# Patient Record
Sex: MALE | Race: WHITE | NOT HISPANIC OR LATINO | Employment: OTHER | URBAN - METROPOLITAN AREA
[De-identification: names, ages, dates, MRNs, and addresses within clinical notes are randomized per-mention and may not be internally consistent; named-entity substitution may affect disease eponyms.]

---

## 2017-01-10 ENCOUNTER — PRE VISIT (OUTPATIENT)
Dept: UROLOGY | Facility: CLINIC | Age: 77
End: 2017-01-10

## 2017-01-10 NOTE — TELEPHONE ENCOUNTER
Patient coming in for a second opinion regarding stoma leakage. Chart reviewed and all records are available. No need for a call.

## 2017-01-12 ENCOUNTER — RECORDS - HEALTHEAST (OUTPATIENT)
Dept: LAB | Facility: CLINIC | Age: 77
End: 2017-01-12

## 2017-01-12 LAB
AST SERPL W P-5'-P-CCNC: 9 U/L (ref 0–40)
CHOLEST SERPL-MCNC: 183 MG/DL
FASTING STATUS PATIENT QL REPORTED: YES
HDLC SERPL-MCNC: 41 MG/DL
LDLC SERPL CALC-MCNC: 123 MG/DL
TRIGL SERPL-MCNC: 93 MG/DL

## 2017-07-10 ENCOUNTER — AMBULATORY - HEALTHEAST (OUTPATIENT)
Dept: VASCULAR SURGERY | Facility: CLINIC | Age: 77
End: 2017-07-10

## 2017-07-10 DIAGNOSIS — I72.3 ILIAC ARTERY ANEURYSM (H): ICD-10-CM

## 2017-07-13 ENCOUNTER — RECORDS - HEALTHEAST (OUTPATIENT)
Dept: LAB | Facility: CLINIC | Age: 77
End: 2017-07-13

## 2017-07-13 LAB
CHOLEST SERPL-MCNC: 149 MG/DL
FASTING STATUS PATIENT QL REPORTED: NORMAL
HDLC SERPL-MCNC: 41 MG/DL
LDLC SERPL CALC-MCNC: 93 MG/DL
PSA SERPL-MCNC: <0.1 NG/ML (ref 0–6.5)
TRIGL SERPL-MCNC: 76 MG/DL

## 2017-09-08 ENCOUNTER — COMMUNICATION - HEALTHEAST (OUTPATIENT)
Dept: VASCULAR SURGERY | Facility: CLINIC | Age: 77
End: 2017-09-08

## 2017-11-16 ENCOUNTER — RECORDS - HEALTHEAST (OUTPATIENT)
Dept: LAB | Facility: CLINIC | Age: 77
End: 2017-11-16

## 2017-11-16 LAB
AST SERPL W P-5'-P-CCNC: 12 U/L (ref 0–40)
CHOLEST SERPL-MCNC: 147 MG/DL
FASTING STATUS PATIENT QL REPORTED: YES
HDLC SERPL-MCNC: 39 MG/DL
LDLC SERPL CALC-MCNC: 90 MG/DL
TRIGL SERPL-MCNC: 91 MG/DL

## 2018-04-18 ENCOUNTER — RECORDS - HEALTHEAST (OUTPATIENT)
Dept: LAB | Facility: CLINIC | Age: 78
End: 2018-04-18

## 2018-04-18 LAB
ANION GAP SERPL CALCULATED.3IONS-SCNC: 8 MMOL/L (ref 5–18)
BUN SERPL-MCNC: 34 MG/DL (ref 8–28)
CALCIUM SERPL-MCNC: 9 MG/DL (ref 8.5–10.5)
CHLORIDE BLD-SCNC: 109 MMOL/L (ref 98–107)
CHOLEST SERPL-MCNC: 144 MG/DL
CO2 SERPL-SCNC: 20 MMOL/L (ref 22–31)
CREAT SERPL-MCNC: 2.14 MG/DL (ref 0.7–1.3)
FASTING STATUS PATIENT QL REPORTED: YES
GFR SERPL CREATININE-BSD FRML MDRD: 30 ML/MIN/1.73M2
GLUCOSE BLD-MCNC: 116 MG/DL (ref 70–125)
HDLC SERPL-MCNC: 40 MG/DL
LDLC SERPL CALC-MCNC: 90 MG/DL
POTASSIUM BLD-SCNC: 5.5 MMOL/L (ref 3.5–5)
SODIUM SERPL-SCNC: 137 MMOL/L (ref 136–145)
TRIGL SERPL-MCNC: 68 MG/DL

## 2018-06-12 ENCOUNTER — RECORDS - HEALTHEAST (OUTPATIENT)
Dept: LAB | Facility: CLINIC | Age: 78
End: 2018-06-12

## 2018-06-12 LAB
ANION GAP SERPL CALCULATED.3IONS-SCNC: 7 MMOL/L (ref 5–18)
BUN SERPL-MCNC: 31 MG/DL (ref 8–28)
CALCIUM SERPL-MCNC: 9.4 MG/DL (ref 8.5–10.5)
CHLORIDE BLD-SCNC: 108 MMOL/L (ref 98–107)
CO2 SERPL-SCNC: 22 MMOL/L (ref 22–31)
CREAT SERPL-MCNC: 1.98 MG/DL (ref 0.7–1.3)
GFR SERPL CREATININE-BSD FRML MDRD: 33 ML/MIN/1.73M2
GLUCOSE BLD-MCNC: 105 MG/DL (ref 70–125)
POTASSIUM BLD-SCNC: 4.8 MMOL/L (ref 3.5–5)
SODIUM SERPL-SCNC: 137 MMOL/L (ref 136–145)

## 2018-07-20 ENCOUNTER — RECORDS - HEALTHEAST (OUTPATIENT)
Dept: ADMINISTRATIVE | Facility: OTHER | Age: 78
End: 2018-07-20

## 2018-07-20 LAB
LAB AP CHARGES (HE HISTORICAL CONVERSION): NORMAL
PATH REPORT.COMMENTS IMP SPEC: NORMAL
PATH REPORT.FINAL DX SPEC: NORMAL
PATH REPORT.GROSS SPEC: NORMAL
PATH REPORT.MICROSCOPIC SPEC OTHER STN: NORMAL
PATH REPORT.RELEVANT HX SPEC: NORMAL
RESULT FLAG (HE HISTORICAL CONVERSION): NORMAL

## 2018-10-18 ENCOUNTER — RECORDS - HEALTHEAST (OUTPATIENT)
Dept: LAB | Facility: CLINIC | Age: 78
End: 2018-10-18

## 2018-10-18 LAB
ANION GAP SERPL CALCULATED.3IONS-SCNC: 9 MMOL/L (ref 5–18)
BUN SERPL-MCNC: 40 MG/DL (ref 8–28)
CALCIUM SERPL-MCNC: 9.4 MG/DL (ref 8.5–10.5)
CHLORIDE BLD-SCNC: 107 MMOL/L (ref 98–107)
CHOLEST SERPL-MCNC: 129 MG/DL
CO2 SERPL-SCNC: 22 MMOL/L (ref 22–31)
CREAT SERPL-MCNC: 2.3 MG/DL (ref 0.7–1.3)
FASTING STATUS PATIENT QL REPORTED: YES
GFR SERPL CREATININE-BSD FRML MDRD: 28 ML/MIN/1.73M2
GLUCOSE BLD-MCNC: 121 MG/DL (ref 70–125)
HDLC SERPL-MCNC: 37 MG/DL
LDLC SERPL CALC-MCNC: 66 MG/DL
POTASSIUM BLD-SCNC: 4.9 MMOL/L (ref 3.5–5)
SODIUM SERPL-SCNC: 138 MMOL/L (ref 136–145)
TRIGL SERPL-MCNC: 129 MG/DL

## 2019-01-10 ENCOUNTER — RECORDS - HEALTHEAST (OUTPATIENT)
Dept: ADMINISTRATIVE | Facility: OTHER | Age: 79
End: 2019-01-10

## 2019-01-22 ENCOUNTER — RECORDS - HEALTHEAST (OUTPATIENT)
Dept: ADMINISTRATIVE | Facility: OTHER | Age: 79
End: 2019-01-22

## 2019-01-23 ENCOUNTER — RECORDS - HEALTHEAST (OUTPATIENT)
Dept: ADMINISTRATIVE | Facility: OTHER | Age: 79
End: 2019-01-23

## 2019-02-04 ENCOUNTER — AMBULATORY - HEALTHEAST (OUTPATIENT)
Dept: SCHEDULING | Facility: CLINIC | Age: 79
End: 2019-02-04

## 2019-02-04 DIAGNOSIS — Z93.6 PRESENCE OF UROSTOMY (H): ICD-10-CM

## 2019-02-20 ENCOUNTER — RECORDS - HEALTHEAST (OUTPATIENT)
Dept: LAB | Facility: CLINIC | Age: 79
End: 2019-02-20

## 2019-02-20 LAB — PSA SERPL-MCNC: <0.1 NG/ML (ref 0–6.5)

## 2019-03-11 ENCOUNTER — RECORDS - HEALTHEAST (OUTPATIENT)
Dept: ADMINISTRATIVE | Facility: OTHER | Age: 79
End: 2019-03-11

## 2019-03-12 ENCOUNTER — RECORDS - HEALTHEAST (OUTPATIENT)
Dept: ADMINISTRATIVE | Facility: OTHER | Age: 79
End: 2019-03-12

## 2019-03-12 ENCOUNTER — AMBULATORY - HEALTHEAST (OUTPATIENT)
Dept: VASCULAR SURGERY | Facility: CLINIC | Age: 79
End: 2019-03-12

## 2019-03-12 DIAGNOSIS — I72.3 ILIAC ARTERY ANEURYSM (H): ICD-10-CM

## 2019-03-13 ENCOUNTER — OFFICE VISIT - HEALTHEAST (OUTPATIENT)
Dept: VASCULAR SURGERY | Facility: CLINIC | Age: 79
End: 2019-03-13

## 2019-03-13 ENCOUNTER — COMMUNICATION - HEALTHEAST (OUTPATIENT)
Dept: VASCULAR SURGERY | Facility: CLINIC | Age: 79
End: 2019-03-13

## 2019-03-13 DIAGNOSIS — I72.3 ILIAC ARTERY ANEURYSM (H): ICD-10-CM

## 2019-03-20 ENCOUNTER — COMMUNICATION - HEALTHEAST (OUTPATIENT)
Dept: VASCULAR SURGERY | Facility: CLINIC | Age: 79
End: 2019-03-20

## 2019-03-22 ENCOUNTER — AMBULATORY - HEALTHEAST (OUTPATIENT)
Dept: VASCULAR SURGERY | Facility: CLINIC | Age: 79
End: 2019-03-22

## 2019-03-22 ENCOUNTER — HOSPITAL ENCOUNTER (OUTPATIENT)
Dept: INTERVENTIONAL RADIOLOGY/VASCULAR | Facility: HOSPITAL | Age: 79
Discharge: HOME | End: 2019-03-22
Attending: SURGERY | Admitting: SURGERY

## 2019-03-22 DIAGNOSIS — I72.3 ILIAC ARTERY ANEURYSM (H): ICD-10-CM

## 2019-03-22 LAB
CREAT SERPL-MCNC: 2.05 MG/DL (ref 0.7–1.3)
GFR SERPL CREATININE-BSD FRML MDRD: 32 ML/MIN/1.73M2
HGB BLD-MCNC: 12.9 G/DL (ref 14–18)
INR PPP: 0.98 (ref 0.9–1.1)
PLATELET # BLD AUTO: 256 THOU/UL (ref 140–440)
POTASSIUM BLD-SCNC: 4.7 MMOL/L (ref 3.5–5)

## 2019-03-26 ENCOUNTER — AMBULATORY - HEALTHEAST (OUTPATIENT)
Dept: VASCULAR SURGERY | Facility: CLINIC | Age: 79
End: 2019-03-26

## 2019-03-26 ENCOUNTER — COMMUNICATION - HEALTHEAST (OUTPATIENT)
Dept: VASCULAR SURGERY | Facility: CLINIC | Age: 79
End: 2019-03-26

## 2019-03-27 ENCOUNTER — AMBULATORY - HEALTHEAST (OUTPATIENT)
Dept: VASCULAR SURGERY | Facility: CLINIC | Age: 79
End: 2019-03-27

## 2019-03-27 DIAGNOSIS — I72.3 ILIAC ARTERY ANEURYSM (H): ICD-10-CM

## 2019-04-08 ENCOUNTER — RECORDS - HEALTHEAST (OUTPATIENT)
Dept: ADMINISTRATIVE | Facility: OTHER | Age: 79
End: 2019-04-08

## 2019-04-08 ENCOUNTER — RECORDS - HEALTHEAST (OUTPATIENT)
Dept: LAB | Facility: CLINIC | Age: 79
End: 2019-04-08

## 2019-04-08 LAB
ANION GAP SERPL CALCULATED.3IONS-SCNC: 8 MMOL/L (ref 5–18)
BUN SERPL-MCNC: 41 MG/DL (ref 8–28)
CALCIUM SERPL-MCNC: 9.3 MG/DL (ref 8.5–10.5)
CHLORIDE BLD-SCNC: 110 MMOL/L (ref 98–107)
CO2 SERPL-SCNC: 20 MMOL/L (ref 22–31)
CREAT SERPL-MCNC: 2.16 MG/DL (ref 0.7–1.3)
GFR SERPL CREATININE-BSD FRML MDRD: 30 ML/MIN/1.73M2
GLUCOSE BLD-MCNC: 113 MG/DL (ref 70–125)
POTASSIUM BLD-SCNC: 5.1 MMOL/L (ref 3.5–5)
SODIUM SERPL-SCNC: 138 MMOL/L (ref 136–145)

## 2019-04-15 ENCOUNTER — ANESTHESIA - HEALTHEAST (OUTPATIENT)
Dept: SURGERY | Facility: CLINIC | Age: 79
End: 2019-04-15

## 2019-04-16 ENCOUNTER — HOSPITAL ENCOUNTER (OUTPATIENT)
Dept: INTERVENTIONAL RADIOLOGY/VASCULAR | Facility: CLINIC | Age: 79
Discharge: HOME | End: 2019-04-16
Attending: SURGERY

## 2019-04-16 ENCOUNTER — SURGERY - HEALTHEAST (OUTPATIENT)
Dept: SURGERY | Facility: CLINIC | Age: 79
End: 2019-04-16

## 2019-04-16 DIAGNOSIS — I72.3 ILIAC ARTERY ANEURYSM (H): ICD-10-CM

## 2019-04-16 ASSESSMENT — MIFFLIN-ST. JEOR
SCORE: 1723.52
SCORE: 1668.8

## 2019-04-17 ENCOUNTER — COMMUNICATION - HEALTHEAST (OUTPATIENT)
Dept: VASCULAR SURGERY | Facility: CLINIC | Age: 79
End: 2019-04-17

## 2019-04-19 ENCOUNTER — COMMUNICATION - HEALTHEAST (OUTPATIENT)
Dept: VASCULAR SURGERY | Facility: CLINIC | Age: 79
End: 2019-04-19

## 2019-04-24 ENCOUNTER — RECORDS - HEALTHEAST (OUTPATIENT)
Dept: LAB | Facility: CLINIC | Age: 79
End: 2019-04-24

## 2019-04-24 LAB
ANION GAP SERPL CALCULATED.3IONS-SCNC: 8 MMOL/L (ref 5–18)
BUN SERPL-MCNC: 39 MG/DL (ref 8–28)
CALCIUM SERPL-MCNC: 8.9 MG/DL (ref 8.5–10.5)
CHLORIDE BLD-SCNC: 110 MMOL/L (ref 98–107)
CHOLEST SERPL-MCNC: 138 MG/DL
CO2 SERPL-SCNC: 20 MMOL/L (ref 22–31)
CREAT SERPL-MCNC: 2.26 MG/DL (ref 0.7–1.3)
FASTING STATUS PATIENT QL REPORTED: NORMAL
GFR SERPL CREATININE-BSD FRML MDRD: 28 ML/MIN/1.73M2
GLUCOSE BLD-MCNC: 151 MG/DL (ref 70–125)
HDLC SERPL-MCNC: 40 MG/DL
LDLC SERPL CALC-MCNC: 79 MG/DL
POTASSIUM BLD-SCNC: 4.7 MMOL/L (ref 3.5–5)
SODIUM SERPL-SCNC: 138 MMOL/L (ref 136–145)
TRIGL SERPL-MCNC: 94 MG/DL

## 2019-04-25 ENCOUNTER — AMBULATORY - HEALTHEAST (OUTPATIENT)
Dept: VASCULAR SURGERY | Facility: CLINIC | Age: 79
End: 2019-04-25

## 2019-04-25 DIAGNOSIS — I82.409 DEEP VEIN THROMBOSIS (H): ICD-10-CM

## 2019-04-25 DIAGNOSIS — M79.606 LEG PAIN: ICD-10-CM

## 2019-04-25 DIAGNOSIS — I82.409 DVT (DEEP VENOUS THROMBOSIS) (H): ICD-10-CM

## 2019-04-25 DIAGNOSIS — I82.401 ACUTE EMBOLISM AND THROMBOSIS OF DEEP VEIN OF RIGHT LOWER EXTREMITY (H): ICD-10-CM

## 2019-05-15 ENCOUNTER — COMMUNICATION - HEALTHEAST (OUTPATIENT)
Dept: ONCOLOGY | Facility: HOSPITAL | Age: 79
End: 2019-05-15

## 2019-05-15 ENCOUNTER — RECORDS - HEALTHEAST (OUTPATIENT)
Dept: VASCULAR ULTRASOUND | Facility: CLINIC | Age: 79
End: 2019-05-15

## 2019-05-15 ENCOUNTER — OFFICE VISIT - HEALTHEAST (OUTPATIENT)
Dept: VASCULAR SURGERY | Facility: CLINIC | Age: 79
End: 2019-05-15

## 2019-05-15 DIAGNOSIS — I72.3 ILIAC ARTERY ANEURYSM (H): ICD-10-CM

## 2019-05-15 DIAGNOSIS — I82.409 ACUTE EMBOLISM AND THROMBOSIS OF UNSPECIFIED DEEP VEINS OF UNSPECIFIED LOWER EXTREMITY (H): ICD-10-CM

## 2019-05-15 DIAGNOSIS — I82.401 ACUTE EMBOLISM AND THROMBOSIS OF DEEP VEIN OF RIGHT LOWER EXTREMITY (H): ICD-10-CM

## 2019-05-15 DIAGNOSIS — M79.606 PAIN IN LEG, UNSPECIFIED: ICD-10-CM

## 2019-05-15 DIAGNOSIS — I82.401 ACUTE EMBOLISM AND THROMBOSIS OF UNSPECIFIED DEEP VEINS OF RIGHT LOWER EXTREMITY (H): ICD-10-CM

## 2019-05-15 DIAGNOSIS — I72.3 ANEURYSM OF ILIAC ARTERY (H): ICD-10-CM

## 2019-05-15 DIAGNOSIS — I72.3 ILIAC ARTERY ANEURYSM, LEFT (H): ICD-10-CM

## 2019-05-24 ENCOUNTER — RECORDS - HEALTHEAST (OUTPATIENT)
Dept: ADMINISTRATIVE | Facility: OTHER | Age: 79
End: 2019-05-24

## 2019-05-24 ENCOUNTER — RECORDS - HEALTHEAST (OUTPATIENT)
Dept: LAB | Facility: CLINIC | Age: 79
End: 2019-05-24

## 2019-05-24 LAB
ANION GAP SERPL CALCULATED.3IONS-SCNC: 8 MMOL/L (ref 5–18)
BUN SERPL-MCNC: 39 MG/DL (ref 8–28)
CALCIUM SERPL-MCNC: 9.1 MG/DL (ref 8.5–10.5)
CHLORIDE BLD-SCNC: 108 MMOL/L (ref 98–107)
CO2 SERPL-SCNC: 20 MMOL/L (ref 22–31)
CREAT SERPL-MCNC: 2.14 MG/DL (ref 0.7–1.3)
GFR SERPL CREATININE-BSD FRML MDRD: 30 ML/MIN/1.73M2
GLUCOSE BLD-MCNC: 125 MG/DL (ref 70–125)
POTASSIUM BLD-SCNC: 4.9 MMOL/L (ref 3.5–5)
SODIUM SERPL-SCNC: 136 MMOL/L (ref 136–145)

## 2019-05-29 ENCOUNTER — COMMUNICATION - HEALTHEAST (OUTPATIENT)
Dept: ONCOLOGY | Facility: HOSPITAL | Age: 79
End: 2019-05-29

## 2019-10-30 ENCOUNTER — RECORDS - HEALTHEAST (OUTPATIENT)
Dept: LAB | Facility: CLINIC | Age: 79
End: 2019-10-30

## 2019-10-30 LAB
ANION GAP SERPL CALCULATED.3IONS-SCNC: 6 MMOL/L (ref 5–18)
BUN SERPL-MCNC: 38 MG/DL (ref 8–28)
CALCIUM SERPL-MCNC: 9 MG/DL (ref 8.5–10.5)
CHLORIDE BLD-SCNC: 109 MMOL/L (ref 98–107)
CHOLEST SERPL-MCNC: 196 MG/DL
CO2 SERPL-SCNC: 23 MMOL/L (ref 22–31)
CREAT SERPL-MCNC: 2.39 MG/DL (ref 0.7–1.3)
FASTING STATUS PATIENT QL REPORTED: NORMAL
GFR SERPL CREATININE-BSD FRML MDRD: 26 ML/MIN/1.73M2
GLUCOSE BLD-MCNC: 122 MG/DL (ref 70–125)
HDLC SERPL-MCNC: 41 MG/DL
LDLC SERPL CALC-MCNC: 128 MG/DL
POTASSIUM BLD-SCNC: 4.7 MMOL/L (ref 3.5–5)
SODIUM SERPL-SCNC: 138 MMOL/L (ref 136–145)
TRIGL SERPL-MCNC: 133 MG/DL

## 2019-11-20 ENCOUNTER — OFFICE VISIT - HEALTHEAST (OUTPATIENT)
Dept: VASCULAR SURGERY | Facility: CLINIC | Age: 79
End: 2019-11-20

## 2019-11-20 ENCOUNTER — RECORDS - HEALTHEAST (OUTPATIENT)
Dept: VASCULAR ULTRASOUND | Facility: CLINIC | Age: 79
End: 2019-11-20

## 2019-11-20 DIAGNOSIS — I82.401 ACUTE EMBOLISM AND THROMBOSIS OF DEEP VEIN OF RIGHT LOWER EXTREMITY (H): ICD-10-CM

## 2019-11-20 DIAGNOSIS — I72.3 ANEURYSM OF ILIAC ARTERY (H): ICD-10-CM

## 2019-11-20 DIAGNOSIS — I72.3 ILIAC ARTERY ANEURYSM, LEFT (H): ICD-10-CM

## 2019-12-04 ENCOUNTER — OFFICE VISIT - HEALTHEAST (OUTPATIENT)
Dept: ONCOLOGY | Facility: HOSPITAL | Age: 79
End: 2019-12-04

## 2019-12-04 DIAGNOSIS — I82.511 CHRONIC DEEP VEIN THROMBOSIS (DVT) OF FEMORAL VEIN OF RIGHT LOWER EXTREMITY (H): ICD-10-CM

## 2019-12-04 DIAGNOSIS — I82.401 ACUTE EMBOLISM AND THROMBOSIS OF DEEP VEIN OF RIGHT LOWER EXTREMITY (H): ICD-10-CM

## 2019-12-09 ENCOUNTER — HOSPITAL ENCOUNTER (OUTPATIENT)
Dept: CT IMAGING | Facility: HOSPITAL | Age: 79
Setting detail: RADIATION/ONCOLOGY SERIES
Discharge: STILL ADMITTED OR ADMINISTRATIVE | End: 2019-12-09
Attending: INTERNAL MEDICINE

## 2019-12-09 ENCOUNTER — COMMUNICATION - HEALTHEAST (OUTPATIENT)
Dept: ONCOLOGY | Facility: HOSPITAL | Age: 79
End: 2019-12-09

## 2019-12-09 DIAGNOSIS — I82.511 CHRONIC DEEP VEIN THROMBOSIS (DVT) OF FEMORAL VEIN OF RIGHT LOWER EXTREMITY (H): ICD-10-CM

## 2019-12-09 DIAGNOSIS — I82.401 ACUTE EMBOLISM AND THROMBOSIS OF DEEP VEIN OF RIGHT LOWER EXTREMITY (H): ICD-10-CM

## 2020-02-06 ENCOUNTER — RECORDS - HEALTHEAST (OUTPATIENT)
Dept: LAB | Facility: CLINIC | Age: 80
End: 2020-02-06

## 2020-02-06 LAB
CHOLEST SERPL-MCNC: 162 MG/DL
FASTING STATUS PATIENT QL REPORTED: NORMAL
HDLC SERPL-MCNC: 40 MG/DL
LDLC SERPL CALC-MCNC: 103 MG/DL
TRIGL SERPL-MCNC: 95 MG/DL

## 2020-03-02 ENCOUNTER — RECORDS - HEALTHEAST (OUTPATIENT)
Dept: LAB | Facility: CLINIC | Age: 80
End: 2020-03-02

## 2020-03-03 LAB
ANION GAP SERPL CALCULATED.3IONS-SCNC: 7 MMOL/L (ref 5–18)
BUN SERPL-MCNC: 33 MG/DL (ref 8–28)
CALCIUM SERPL-MCNC: 8.5 MG/DL (ref 8.5–10.5)
CHLORIDE BLD-SCNC: 107 MMOL/L (ref 98–107)
CO2 SERPL-SCNC: 21 MMOL/L (ref 22–31)
CREAT SERPL-MCNC: 2.4 MG/DL (ref 0.7–1.3)
GFR SERPL CREATININE-BSD FRML MDRD: 26 ML/MIN/1.73M2
GLUCOSE BLD-MCNC: 135 MG/DL (ref 70–125)
POTASSIUM BLD-SCNC: 4.5 MMOL/L (ref 3.5–5)
SODIUM SERPL-SCNC: 135 MMOL/L (ref 136–145)

## 2020-04-28 ENCOUNTER — HOME CARE/HOSPICE - HEALTHEAST (OUTPATIENT)
Dept: HOME HEALTH SERVICES | Facility: HOME HEALTH | Age: 80
End: 2020-04-28

## 2020-04-30 ENCOUNTER — RECORDS - HEALTHEAST (OUTPATIENT)
Dept: LAB | Facility: CLINIC | Age: 80
End: 2020-04-30

## 2020-04-30 LAB
ANION GAP SERPL CALCULATED.3IONS-SCNC: 12 MMOL/L (ref 5–18)
BUN SERPL-MCNC: 27 MG/DL (ref 8–28)
CALCIUM SERPL-MCNC: 8.7 MG/DL (ref 8.5–10.5)
CHLORIDE BLD-SCNC: 108 MMOL/L (ref 98–107)
CO2 SERPL-SCNC: 16 MMOL/L (ref 22–31)
CREAT SERPL-MCNC: 2.28 MG/DL (ref 0.7–1.3)
GFR SERPL CREATININE-BSD FRML MDRD: 28 ML/MIN/1.73M2
GLUCOSE BLD-MCNC: 60 MG/DL (ref 70–125)
HGB BLD-MCNC: 12.1 G/DL (ref 14–18)
POTASSIUM BLD-SCNC: 4.4 MMOL/L (ref 3.5–5)
SODIUM SERPL-SCNC: 136 MMOL/L (ref 136–145)

## 2020-05-28 ENCOUNTER — RECORDS - HEALTHEAST (OUTPATIENT)
Dept: LAB | Facility: CLINIC | Age: 80
End: 2020-05-28

## 2020-05-28 LAB
ANION GAP SERPL CALCULATED.3IONS-SCNC: 7 MMOL/L (ref 5–18)
BUN SERPL-MCNC: 29 MG/DL (ref 8–28)
CALCIUM SERPL-MCNC: 8.9 MG/DL (ref 8.5–10.5)
CHLORIDE BLD-SCNC: 105 MMOL/L (ref 98–107)
CO2 SERPL-SCNC: 25 MMOL/L (ref 22–31)
CREAT SERPL-MCNC: 2.19 MG/DL (ref 0.7–1.3)
GFR SERPL CREATININE-BSD FRML MDRD: 29 ML/MIN/1.73M2
GLUCOSE BLD-MCNC: 96 MG/DL (ref 70–125)
POTASSIUM BLD-SCNC: 4.5 MMOL/L (ref 3.5–5)
SODIUM SERPL-SCNC: 137 MMOL/L (ref 136–145)

## 2020-06-13 ENCOUNTER — HOSPITAL ENCOUNTER (EMERGENCY)
Facility: CLINIC | Age: 80
Discharge: HOME | End: 2020-06-14
Attending: EMERGENCY MEDICINE | Admitting: EMERGENCY MEDICINE
Payer: COMMERCIAL

## 2020-06-13 DIAGNOSIS — R07.9 ACUTE CHEST PAIN: ICD-10-CM

## 2020-06-13 LAB
ANION GAP SERPL CALCULATED.3IONS-SCNC: 7 MMOL/L (ref 3–14)
BASOPHILS # BLD AUTO: 0 10E9/L (ref 0–0.2)
BASOPHILS NFR BLD AUTO: 0.4 %
BUN SERPL-MCNC: 30 MG/DL (ref 7–30)
CALCIUM SERPL-MCNC: 8 MG/DL (ref 8.5–10.1)
CHLORIDE SERPL-SCNC: 111 MMOL/L (ref 94–109)
CO2 SERPL-SCNC: 22 MMOL/L (ref 20–32)
CREAT SERPL-MCNC: 2.07 MG/DL (ref 0.66–1.25)
DIFFERENTIAL METHOD BLD: ABNORMAL
EOSINOPHIL # BLD AUTO: 0.2 10E9/L (ref 0–0.7)
EOSINOPHIL NFR BLD AUTO: 2 %
ERYTHROCYTE [DISTWIDTH] IN BLOOD BY AUTOMATED COUNT: 14.3 % (ref 10–15)
GFR SERPL CREATININE-BSD FRML MDRD: 29 ML/MIN/{1.73_M2}
GLUCOSE SERPL-MCNC: 115 MG/DL (ref 70–99)
HCT VFR BLD AUTO: 39.8 % (ref 40–53)
HGB BLD-MCNC: 13.2 G/DL (ref 13.3–17.7)
IMM GRANULOCYTES # BLD: 0.1 10E9/L (ref 0–0.4)
IMM GRANULOCYTES NFR BLD: 0.6 %
INR PPP: 2.89 (ref 0.86–1.14)
LYMPHOCYTES # BLD AUTO: 1.6 10E9/L (ref 0.8–5.3)
LYMPHOCYTES NFR BLD AUTO: 20.2 %
MCH RBC QN AUTO: 31.3 PG (ref 26.5–33)
MCHC RBC AUTO-ENTMCNC: 33.2 G/DL (ref 31.5–36.5)
MCV RBC AUTO: 94 FL (ref 78–100)
MONOCYTES # BLD AUTO: 0.7 10E9/L (ref 0–1.3)
MONOCYTES NFR BLD AUTO: 9 %
NEUTROPHILS # BLD AUTO: 5.4 10E9/L (ref 1.6–8.3)
NEUTROPHILS NFR BLD AUTO: 67.8 %
NRBC # BLD AUTO: 0 10*3/UL
NRBC BLD AUTO-RTO: 0 /100
PLATELET # BLD AUTO: 235 10E9/L (ref 150–450)
POTASSIUM SERPL-SCNC: 4.4 MMOL/L (ref 3.4–5.3)
RBC # BLD AUTO: 4.22 10E12/L (ref 4.4–5.9)
SODIUM SERPL-SCNC: 140 MMOL/L (ref 133–144)
TROPONIN I SERPL-MCNC: <0.015 UG/L (ref 0–0.04)
WBC # BLD AUTO: 8 10E9/L (ref 4–11)

## 2020-06-13 PROCEDURE — 93005 ELECTROCARDIOGRAM TRACING: CPT | Performed by: EMERGENCY MEDICINE

## 2020-06-13 PROCEDURE — 85610 PROTHROMBIN TIME: CPT | Performed by: EMERGENCY MEDICINE

## 2020-06-13 PROCEDURE — 85025 COMPLETE CBC W/AUTO DIFF WBC: CPT | Performed by: EMERGENCY MEDICINE

## 2020-06-13 PROCEDURE — 99284 EMERGENCY DEPT VISIT MOD MDM: CPT | Performed by: EMERGENCY MEDICINE

## 2020-06-13 PROCEDURE — 93010 ELECTROCARDIOGRAM REPORT: CPT | Mod: Z6 | Performed by: EMERGENCY MEDICINE

## 2020-06-13 PROCEDURE — 99284 EMERGENCY DEPT VISIT MOD MDM: CPT | Mod: 25 | Performed by: EMERGENCY MEDICINE

## 2020-06-13 PROCEDURE — 84484 ASSAY OF TROPONIN QUANT: CPT | Performed by: EMERGENCY MEDICINE

## 2020-06-13 PROCEDURE — 36415 COLL VENOUS BLD VENIPUNCTURE: CPT | Performed by: EMERGENCY MEDICINE

## 2020-06-13 PROCEDURE — 80048 BASIC METABOLIC PNL TOTAL CA: CPT | Performed by: EMERGENCY MEDICINE

## 2020-06-13 PROCEDURE — 25000132 ZZH RX MED GY IP 250 OP 250 PS 637: Mod: GY | Performed by: EMERGENCY MEDICINE

## 2020-06-13 RX ORDER — ASPIRIN 81 MG/1
162 TABLET, CHEWABLE ORAL ONCE
Status: COMPLETED | OUTPATIENT
Start: 2020-06-13 | End: 2020-06-13

## 2020-06-13 RX ADMIN — ASPIRIN 81 MG 162 MG: 81 TABLET ORAL at 22:05

## 2020-06-13 ASSESSMENT — MIFFLIN-ST. JEOR: SCORE: 1832.56

## 2020-06-13 NOTE — ED AVS SNAPSHOT
[facility]  [address] [address]  Phone:  [phone]  Fax:  [phone]                                    Eduardo Laughlin   MRN: [id number]    Department:  [facility]   Date of Visit:  6/13/2020           After Visit Summary Signature Page    I have received my discharge instructions, and my questions have been answered. I have discussed any challenges I see with this plan with the nurse or doctor.    ..........................................................................................................................................  Patient/Patient Representative Signature      ..........................................................................................................................................  Patient Representative Print Name and Relationship to Patient    ..................................................               ................................................  Date                                   Time    ..........................................................................................................................................  Reviewed by Signature/Title    ...................................................              ..............................................  Date                                               Time          22EPIC Rev 08/18

## 2020-06-14 VITALS
RESPIRATION RATE: 17 BRPM | OXYGEN SATURATION: 96 % | HEART RATE: 73 BPM | DIASTOLIC BLOOD PRESSURE: 85 MMHG | BODY MASS INDEX: 35.07 KG/M2 | TEMPERATURE: 98.3 F | WEIGHT: 245 LBS | SYSTOLIC BLOOD PRESSURE: 154 MMHG | HEIGHT: 70 IN

## 2020-06-14 LAB — TROPONIN I SERPL-MCNC: <0.015 UG/L (ref 0–0.04)

## 2020-06-14 PROCEDURE — 36415 COLL VENOUS BLD VENIPUNCTURE: CPT | Performed by: EMERGENCY MEDICINE

## 2020-06-14 PROCEDURE — 84484 ASSAY OF TROPONIN QUANT: CPT | Mod: 91 | Performed by: EMERGENCY MEDICINE

## 2020-06-14 PROCEDURE — 84484 ASSAY OF TROPONIN QUANT: CPT | Performed by: EMERGENCY MEDICINE

## 2020-06-14 NOTE — ED PROVIDER NOTES
History     Chief Complaint   Patient presents with     Chest Pain     HPI  Eduardo Laughlin is a 79 year old male who presents for chest pain.  The patient reports that about 1.5 hours prior to his arrival he had 15 seconds of left-sided chest pain while he was getting ice cream for his wife.  It went away on its own and he did not think anything of it at the time.  He did not have diaphoresis or shortness of breath or nausea with this.  Shortly afterwards he then noticed left arm numbness over the posterior aspect of the forearm ranging from the elbow down to the hand but not involving the fingers.  This came on while he was on the phone.  He was holding the phone with his right hand and had his left arm resting in his lap.  The numbness lasted for about 15 to 20 minutes and then resolved with movement and walking.  At that time he did feel slightly short of breath with this but says this is gone as well.  He denies fever, chills, headache, nausea, vomiting, abdominal pain, diarrhea, bloody stools, or rash.  He is on warfarin for blood clots in his legs.  Pain was mild.  He did take 1 baby aspirin for this.    Allergies:  Allergies   Allergen Reactions     Hmg-Coa-R Inhibitors      Penicillins      Uroxatral [Alfuzosin]        Problem List:    There are no active problems to display for this patient.       Past Medical History:    History reviewed. No pertinent past medical history.    Past Surgical History:    History reviewed. No pertinent surgical history.    Family History:    History reviewed. No pertinent family history.    Social History:  Marital Status:   [2]  Social History     Tobacco Use     Smoking status: Former Smoker     Smokeless tobacco: Never Used     Tobacco comment: Quit smoking 30 years ago   Substance Use Topics     Alcohol use: Not Currently     Drug use: Never        Medications:    ALLOPURINOL PO  AmLODIPine Besylate (NORVASC PO)  glucosamine-chondroitin 500-400 MG  "CAPS  LISINOPRIL PO  METFORMIN HCL PO  METOPROLOL SUCCINATE ER PO  Omega-3 Fatty Acids (OMEGA-3 FISH OIL PO)  Ranitidine HCl (ZANTAC PO)  Rosuvastatin Calcium (CRESTOR PO)          Review of Systems  Pertinent positives and negatives listed in the HPI, all other systems reviewed and are negative.    Physical Exam   BP: (!) 157/102  Pulse: 74  Heart Rate: 75  Temp: 98.3  F (36.8  C)  Resp: 20  Height: 177.8 cm (5' 10\")  Weight: 111.1 kg (245 lb)  SpO2: 95 %      Physical Exam  Vitals signs and nursing note reviewed.   Constitutional:       General: He is in acute distress.      Appearance: He is well-developed. He is not diaphoretic.   HENT:      Head: Normocephalic and atraumatic.      Right Ear: External ear normal.      Left Ear: External ear normal.      Nose: Nose normal.   Eyes:      General: No scleral icterus.     Conjunctiva/sclera: Conjunctivae normal.   Neck:      Musculoskeletal: Normal range of motion.   Cardiovascular:      Rate and Rhythm: Normal rate and regular rhythm.   Pulmonary:      Effort: Pulmonary effort is normal. No respiratory distress.      Breath sounds: No stridor.   Abdominal:      General: There is no distension.      Palpations: Abdomen is soft.   Musculoskeletal:      Right lower leg: No edema.      Left lower leg: No edema.   Skin:     General: Skin is warm and dry.   Neurological:      Mental Status: He is alert and oriented to person, place, and time.   Psychiatric:         Behavior: Behavior normal.         ED Course        Procedures               EKG Interpretation:      Interpreted by Andrew Monzon MD  Time reviewed: 2140  Symptoms at time of EKG: None   Rhythm: sinus   Rate: 76  Axis: Normal  Ectopy: none  Conduction: left bundle branch block (complete)  ST Segments/ T Waves: No acute ischemic changes  Q Waves: III  Comparison to prior: No old EKG available    Clinical Impression: LBBB    Critical Care time:  none               Results for orders placed or performed " during the hospital encounter of 06/13/20 (from the past 24 hour(s))   CBC with platelets, differential   Result Value Ref Range    WBC 8.0 4.0 - 11.0 10e9/L    RBC Count 4.22 (L) 4.4 - 5.9 10e12/L    Hemoglobin 13.2 (L) 13.3 - 17.7 g/dL    Hematocrit 39.8 (L) 40.0 - 53.0 %    MCV 94 78 - 100 fl    MCH 31.3 26.5 - 33.0 pg    MCHC 33.2 31.5 - 36.5 g/dL    RDW 14.3 10.0 - 15.0 %    Platelet Count 235 150 - 450 10e9/L    Diff Method Automated Method     % Neutrophils 67.8 %    % Lymphocytes 20.2 %    % Monocytes 9.0 %    % Eosinophils 2.0 %    % Basophils 0.4 %    % Immature Granulocytes 0.6 %    Nucleated RBCs 0 0 /100    Absolute Neutrophil 5.4 1.6 - 8.3 10e9/L    Absolute Lymphocytes 1.6 0.8 - 5.3 10e9/L    Absolute Monocytes 0.7 0.0 - 1.3 10e9/L    Absolute Eosinophils 0.2 0.0 - 0.7 10e9/L    Absolute Basophils 0.0 0.0 - 0.2 10e9/L    Abs Immature Granulocytes 0.1 0 - 0.4 10e9/L    Absolute Nucleated RBC 0.0    Basic metabolic panel   Result Value Ref Range    Sodium 140 133 - 144 mmol/L    Potassium 4.4 3.4 - 5.3 mmol/L    Chloride 111 (H) 94 - 109 mmol/L    Carbon Dioxide 22 20 - 32 mmol/L    Anion Gap 7 3 - 14 mmol/L    Glucose 115 (H) 70 - 99 mg/dL    Urea Nitrogen 30 7 - 30 mg/dL    Creatinine 2.07 (H) 0.66 - 1.25 mg/dL    GFR Estimate 29 (L) >60 mL/min/[1.73_m2]    GFR Estimate If Black 34 (L) >60 mL/min/[1.73_m2]    Calcium 8.0 (L) 8.5 - 10.1 mg/dL   Troponin I   Result Value Ref Range    Troponin I ES <0.015 0.000 - 0.045 ug/L   INR   Result Value Ref Range    INR 2.89 (H) 0.86 - 1.14   Troponin I   Result Value Ref Range    Troponin I ES <0.015 0.000 - 0.045 ug/L       Medications   aspirin (ASA) chewable tablet 162 mg (162 mg Oral Given 6/13/20 2205)       Assessments & Plan (with Medical Decision Making)   79-year-old male presents with chest pain and left numbness.  All symptoms resolved now.  There is 98.3  F, heart 74, SPO2 is 95% on room air.  EKG shows sinus rhythm with a left bundle branch block,  no signs of ischemia.  He is given additional aspirin.  Initial troponin is normal.  INR is therapeutic.  Electrolytes are within normal limits.  White blood cell count is 8 which is reassuring and hemoglobin is 13.2, no signs of acute anemia causing symptoms.  Repeat troponin is again normal which makes ACS unlikely.  The patient has remained asymptomatic through his 3.5 hours today and has had multiple rechecks.  He is safe to discharge at this time with reassurance and instructions to return if he has worsening symptoms or other concerns, otherwise follow-up in clinic.  The patient is in agreement with this plan.    I have reviewed the nursing notes.    I have reviewed the findings, diagnosis, plan and need for follow up with the patient.       New Prescriptions    No medications on file       Final diagnoses:   Acute chest pain       6/13/2020   [facility] EMERGENCY DEPARTMENT     Andrew Monzon MD  06/14/20 0103

## 2020-06-14 NOTE — DISCHARGE INSTRUCTIONS
Return to the emergency department for worsening symptoms, repeated vomiting, difficulty breathing, or other concerns.  Otherwise follow-up in clinic for a recheck in 1 to 2 weeks.

## 2020-06-14 NOTE — ED TRIAGE NOTES
"Pt here for evaluation of sharp left sided chest pain PTA. States episode lasted 15 minutes before resolving. Was getting ice cream out of freezer for wife when the pain started. Left arm numbness. Does report episode of difficulty finding wallet with this pain. Denies SOB or other sx. Hx of MI per EKG done by PCP \"years ago.\" Also bilateral leg clots which he is on Warfarin for. Dx one month ago while at [facility] for food poisoning. Pt took 1 81 mg ASA tablet PTA. Denies any other medications on board. Remains asymptomatic and pain free on arrival.  "

## 2020-07-23 ENCOUNTER — RECORDS - HEALTHEAST (OUTPATIENT)
Dept: LAB | Facility: CLINIC | Age: 80
End: 2020-07-23

## 2020-07-23 LAB
ANION GAP SERPL CALCULATED.3IONS-SCNC: 7 MMOL/L (ref 5–18)
BUN SERPL-MCNC: 57 MG/DL (ref 8–28)
CALCIUM SERPL-MCNC: 8.9 MG/DL (ref 8.5–10.5)
CHLORIDE BLD-SCNC: 106 MMOL/L (ref 98–107)
CO2 SERPL-SCNC: 23 MMOL/L (ref 22–31)
CREAT SERPL-MCNC: 2.76 MG/DL (ref 0.7–1.3)
GFR SERPL CREATININE-BSD FRML MDRD: 22 ML/MIN/1.73M2
GLUCOSE BLD-MCNC: 75 MG/DL (ref 70–125)
POTASSIUM BLD-SCNC: 4.5 MMOL/L (ref 3.5–5)
SODIUM SERPL-SCNC: 136 MMOL/L (ref 136–145)

## 2020-07-24 LAB — BACTERIA SPEC CULT: NORMAL

## 2020-08-06 ENCOUNTER — RECORDS - HEALTHEAST (OUTPATIENT)
Dept: LAB | Facility: CLINIC | Age: 80
End: 2020-08-06

## 2020-08-06 LAB
ANION GAP SERPL CALCULATED.3IONS-SCNC: 8 MMOL/L (ref 5–18)
BUN SERPL-MCNC: 43 MG/DL (ref 8–28)
CALCIUM SERPL-MCNC: 8.9 MG/DL (ref 8.5–10.5)
CHLORIDE BLD-SCNC: 105 MMOL/L (ref 98–107)
CHOLEST SERPL-MCNC: 226 MG/DL
CO2 SERPL-SCNC: 23 MMOL/L (ref 22–31)
CREAT SERPL-MCNC: 2.45 MG/DL (ref 0.7–1.3)
FASTING STATUS PATIENT QL REPORTED: YES
GFR SERPL CREATININE-BSD FRML MDRD: 26 ML/MIN/1.73M2
GLUCOSE BLD-MCNC: 162 MG/DL (ref 70–125)
HDLC SERPL-MCNC: 42 MG/DL
LDLC SERPL CALC-MCNC: 155 MG/DL
POTASSIUM BLD-SCNC: 4.6 MMOL/L (ref 3.5–5)
SODIUM SERPL-SCNC: 136 MMOL/L (ref 136–145)
TRIGL SERPL-MCNC: 147 MG/DL

## 2020-09-30 ENCOUNTER — RECORDS - HEALTHEAST (OUTPATIENT)
Dept: ADMINISTRATIVE | Facility: OTHER | Age: 80
End: 2020-09-30

## 2020-10-01 ENCOUNTER — HOSPITAL ENCOUNTER (OUTPATIENT)
Dept: ULTRASOUND IMAGING | Facility: CLINIC | Age: 80
Discharge: HOME | End: 2020-10-01
Attending: FAMILY MEDICINE

## 2020-10-01 DIAGNOSIS — I73.9 CLAUDICATION (H): ICD-10-CM

## 2020-11-18 ENCOUNTER — RECORDS - HEALTHEAST (OUTPATIENT)
Dept: VASCULAR ULTRASOUND | Facility: CLINIC | Age: 80
End: 2020-11-18

## 2020-11-18 DIAGNOSIS — I72.3 ANEURYSM OF ILIAC ARTERY (H): ICD-10-CM

## 2021-02-23 ENCOUNTER — RECORDS - HEALTHEAST (OUTPATIENT)
Dept: LAB | Facility: CLINIC | Age: 81
End: 2021-02-23

## 2021-02-23 LAB
ANION GAP SERPL CALCULATED.3IONS-SCNC: 8 MMOL/L (ref 5–18)
BUN SERPL-MCNC: 37 MG/DL (ref 8–28)
CALCIUM SERPL-MCNC: 8.4 MG/DL (ref 8.5–10.5)
CHLORIDE BLD-SCNC: 107 MMOL/L (ref 98–107)
CHOLEST SERPL-MCNC: 177 MG/DL
CO2 SERPL-SCNC: 21 MMOL/L (ref 22–31)
CREAT SERPL-MCNC: 2.45 MG/DL (ref 0.7–1.3)
FASTING STATUS PATIENT QL REPORTED: ABNORMAL
GFR SERPL CREATININE-BSD FRML MDRD: 26 ML/MIN/1.73M2
GLUCOSE BLD-MCNC: 159 MG/DL (ref 70–125)
HDLC SERPL-MCNC: 38 MG/DL
LDLC SERPL CALC-MCNC: 115 MG/DL
POTASSIUM BLD-SCNC: 4.9 MMOL/L (ref 3.5–5)
SODIUM SERPL-SCNC: 136 MMOL/L (ref 136–145)
TRIGL SERPL-MCNC: 122 MG/DL

## 2021-05-26 ENCOUNTER — RECORDS - HEALTHEAST (OUTPATIENT)
Dept: ADMINISTRATIVE | Facility: CLINIC | Age: 81
End: 2021-05-26

## 2021-05-26 VITALS — DIASTOLIC BLOOD PRESSURE: 80 MMHG | HEART RATE: 56 BPM | RESPIRATION RATE: 16 BRPM | SYSTOLIC BLOOD PRESSURE: 138 MMHG

## 2021-05-26 NOTE — H&P (VIEW-ONLY)
VASCULAR SURGERY OUTPATIENT CONSULT OR VISIT   VASCULAR SURGEON: Ana Gregory MD    LOCATION:  [facility]    Eduardo Laughlin   Medical Record #:  [id number]  YOB: 1940  Age:  78 y.o.     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  Imaging is noncontrast and so luminal caliber and patency of internal iliac artery cannot be verified.  Patient only has 1 functioning kidney with CKD 3 so avoiding contrast CT is appropriate.  Aneurysm extends to the iliac bifurcation and so would require either coil embolization of the internal iliac artery or bifurcated endografting if the internal iliac arteries patent.  Common iliac arteries 16 mm.  External iliac arteries 13 mm.  Internal iliac artery appears to measure as much is 12 mm.  Patient is DNR, but on questioning he only wants this to be the case if recovery is futile.  Has a good quality of life.     RECOMMENDATION: Plan for iliac aneurysm treatment with covered stent graft from a left groin approach.  Would likely need a VBX covered stent for effective treatment.  Could perform an IR at [facility], tentatively Friday after next.    HPI:  Eduardo Laughlin is a 78 y.o. male who was seen today in consultation for left common iliac artery aneurysm.  He tells me that his primary care doctor knew about this but it was previously much smaller.  It was re-recognized incidentally after admission for pyelonephritis.  He has not had symptoms from this as far as we can tell.    Patient has chronic left hip pain which he says worsens over the course of the day.  It is not clear if this is vascular in etiology.  This has been long-standing.    No clear family history of aneurysms.  Father  of a blood clot to the heart and his son has had what  sounds like a DVT.  The patient himself does not have a hypercoagulable history and has undergone major pelvic surgery without DVT.    Quit smoking in 1995.  Major prior history is bladder cancer for which he has had removal of his bladder and has an ileal conduit.  No history of stroke.  He had a murmur which led to coronary angiogram 5 years ago he tells me that there was nothing they could treat but that his collateral vessels have improved and he does not need anything now.    Patient is delineated as a DNR in his chart.  I discussed this with him at length.  He seems to feel that this is that he should not be resuscitated from a futile situation.  He did not recognize that this meant no resuscitation at all.  He will discuss it with his primary care physician.  He feels that his quality of life is good enough and his need to take care of his wife is important enough that he would want resuscitation if he can get him back to his baseline status.    He tells me that he is getting over a cold and will likely be fully recovered in the next 2 or 3 days.    No new health issues.  No new fevers or chills, no new diarrhea, no new urinary purulence.  No new neurologic symptoms.  No new rashes.  No new chest pain.  No new shortness of breath.       PHH:    Past Medical History:   Diagnosis Date     Aneurysm (H)     left common iliac - 2.6cm     Arteriosclerotic cardiovascular disease (ASCVD)      Arthritis     osteo     BPH (benign prostatic hypertrophy)      Cancer (H)     Bladder     Coronary artery disease      Diabetes mellitus (H)      Diabetic neuropathy (H)      GERD (gastroesophageal reflux disease)      Gout      Hyperlipidemia      Hypertension      Kidney stone      Kidney stone         Past Surgical History:   Procedure Laterality Date     coronary angiography Left 3/28/08    left ventriculography     CYSTECTOMY       CYSTOSCOPY       CYSTOSCOPY N/A 12/23/2015    Procedure: CYSTOSCOPY BLADDER BIOPSIES with  Fulgeration and Placement of Otero ;  Surgeon: Gordon Bajwa MD;  Location: [facility];  Service:      KIDNEY STONE SURGERY  x4     PROSTATE SURGERY         ALLERGIES:  Penicillins; Statins-hmg-coa reductase inhibitors; and Uroxatral [alfuzosin]    MEDS:    Current Outpatient Medications:      allopurinol (ZYLOPRIM) 300 MG tablet, Take 300 mg by mouth daily., Disp: , Rfl:      aspirin 325 MG tablet, Take 325 mg by mouth daily., Disp: , Rfl:      famotidine (PEPCID) 20 MG tablet, Take 20 mg by mouth 2 (two) times a day as needed for heartburn., Disp: , Rfl:      gabapentin (NEURONTIN) 300 MG capsule, Take 300 mg by mouth at bedtime., Disp: , Rfl:      GLUCOSAMINE/CHONDROITIN SULF A (GLUCOSAMINE-CHONDROITIN ORAL), Take 1 capsule by mouth 2 (two) times a day. Glucosamine-Chondroitin 1353-0507, Disp: , Rfl:      melatonin 5 mg Tab tablet, Take 5 mg by mouth at bedtime as needed., Disp: , Rfl:      metFORMIN (GLUCOPHAGE) 1000 MG tablet, Take 1,000 mg by mouth 2 (two) times a day with meals., Disp: , Rfl:      metoprolol succinate (TOPROL-XL) 100 MG 24 hr tablet, Take 50 mg by mouth daily., Disp: , Rfl:      OMEGA-3/DHA/EPA/FISH OIL (FISH OIL-OMEGA-3 FATTY ACIDS) 300-1,000 mg capsule, Take 2 g by mouth 2 (two) times a day. , Disp: , Rfl:      rosuvastatin (CRESTOR) 5 MG tablet, Take 2.5 mg by mouth once a week. , Disp: , Rfl:      terbinafine HCl (LAMISIL) 1 % cream, Apply 1 application topically daily as needed., Disp: , Rfl:      traZODone (DESYREL) 100 MG tablet, Take  mg by mouth bedtime. , Disp: , Rfl:     SOCIAL HABITS:    Social History     Tobacco Use   Smoking Status Former Smoker     Packs/day: 1.00     Years: 20.00     Pack years: 20.00     Last attempt to quit: 1995     Years since quittin.2   Smokeless Tobacco Never Used       Social History     Substance and Sexual Activity   Alcohol Use No    Comment: very seldom       Social History     Substance and Sexual Activity   Drug Use  No       FAMILY HISTORY:    Family History   Problem Relation Age of Onset     Cancer Father      Cancer Sister      Cancer Brother      Heart disease Brother      Heart disease Sister        REVIEW OF SYSTEMS:    A 12 point ROS was reviewed and except for what is listed in the HPI above, all others are negative    PE:  /84   Pulse 76   Temp 98.5  F (36.9  C)   Resp 18   Wt Readings from Last 1 Encounters:   12/23/16 210 lb (95.3 kg)     There is no height or weight on file to calculate BMI.    EXAM:  GENERAL: This is a well-developed 78 y.o. male who appears his stated age  EYES: Grossly normal.  MOUTH: Buccal mucosa normal   CARDIAC:  Normal S1 and S2, no Murmur  CHEST/LUNG:  Clear lung fields bilaterally  GASTROINTESINAL (ABDOMEN):Soft, non-tender, B/S present, no pulsatile mass .  Urinary bag intact ostomy not assessed.  MUSCULOSKELETAL: Grossly normal and both lower extremities are intact.  HEME/LYMPH: No lymphedema  NEUROLOGIC: Focally intact, Alert and oriented x 3.   PSYCH: appropriate affect  INTEGUMENT: No open lesions or ulcers.  Both groins are clean and not involved in infection.  Pulse Exam:   Carotid: No Bruit    Radial: Left +2   Right  +2    Femoral: Left +2   Right  +2        DIAGNOSTIC STUDIES:     Images:  No results found.    I personally reviewed the images and my interpretation is that his noncontrast CT clearly demonstrates a 3.8 cm fusiform left common iliac aneurysm extending right to the iliac bifurcation..  The common iliac artery appears to be 60 mm in diameter proximally and with enough seal zone to avoid having to extend her treatment into the aorta.  The external iliac appears to be about 13 mm in maximum diameter.  The internal iliac artery, at about 12 mm in diameter.  Do not know about stenoses or occlusion of the internal iliac artery.    LABS:      Sodium   Date Value Ref Range Status   10/18/2018 138 136 - 145 mmol/L Final   06/12/2018 137 136 - 145 mmol/L Final    04/18/2018 137 136 - 145 mmol/L Final     Potassium   Date Value Ref Range Status   10/18/2018 4.9 3.5 - 5.0 mmol/L Final   06/12/2018 4.8 3.5 - 5.0 mmol/L Final   04/18/2018 5.5 (H) 3.5 - 5.0 mmol/L Final     Chloride   Date Value Ref Range Status   10/18/2018 107 98 - 107 mmol/L Final   06/12/2018 108 (H) 98 - 107 mmol/L Final   04/18/2018 109 (H) 98 - 107 mmol/L Final     BUN   Date Value Ref Range Status   10/18/2018 40 (H) 8 - 28 mg/dL Final   06/12/2018 31 (H) 8 - 28 mg/dL Final   04/18/2018 34 (H) 8 - 28 mg/dL Final     Creatinine   Date Value Ref Range Status   10/18/2018 2.30 (H) 0.70 - 1.30 mg/dL Final   06/12/2018 1.98 (H) 0.70 - 1.30 mg/dL Final   04/18/2018 2.14 (H) 0.70 - 1.30 mg/dL Final     Hemoglobin   Date Value Ref Range Status   12/23/2016 11.3 (L) 14.0 - 18.0 g/dL Final   09/21/2016 10.6 (L) 14.0 - 18.0 g/dL Final   08/21/2016 9.1 (L) 14.0 - 18.0 g/dL Final     Platelets   Date Value Ref Range Status   12/23/2016 212 140 - 440 thou/uL Final   09/21/2016 288 140 - 440 thou/uL Final   08/21/2016 282 140 - 440 thou/uL Final     INR   Date Value Ref Range Status   12/23/2016 1.26 (H) 0.90 - 1.10 Final   09/21/2016 1.11 (H) 0.90 - 1.10 Final   08/18/2016 1.08 0.90 - 1.10 Final     Ana Gregory MD  VASCULAR SURGERY

## 2021-05-26 NOTE — OP NOTE
[facility] Interventional Radiology vascular surgery procedure    Date: 03/22/19    Procedures Performed:  Ir Extremity Angiogram Left from the left common iliac catheter location via left groin approach    Ultrasound-guided vascular access    Provider:  Ana Gregory MD    Indication:  Patient is a 78-year-old male who had a left common iliac artery aneurysm discovered on MRI done for evaluation of his hip and spine.  No symptoms.  Significant chronic renal failure with a creatinine over 2 so unable to get CTAs for better clarification.  Plan for CO2 angiogram to better evaluate anatomy of aneurysm and possibly treat if amenable.    Sedation:  Moderate Sedation: The procedure was performed with administration of intravenous conscious sedation with appropriate preoperative, intraoperative, and postoperative evaluation.  17 minutes of supervised face to face intraservice time was provided by a radiology nurse under my direct supervision.  Versed 0.5 mg and fentanyl 25 mcg given.    Antibiotics:  None    Fluoroscopic Time:  1.5 Minutes    Radiation Dose:  915 mGy    Contrast:  5 cc Visipaque    Procedure:    Ultrasound was used to evaluate the left groin.  The selected vessel was left common femoral artery which is good caliber and with only posterior wall plaque.. Ultrasound was then used for real-time ultrasound guided needle entry of the common femoral artery. Permanent images were recorded and saved to the patient's medical record.    Micropuncture technique was used to exchange and an 035 wire.  Over the 035 wire a bareback 4 Lao Omni Flush catheter was introduced into the common iliac artery.  CO2 angiography was performed from several angles.  2 contrast angiograms were then performed from the proximal common iliac artery and from the distal common iliac artery    Findings from all angiograms: Patent lower abdominal aorta with normal caliber.  Patent proximal right and left common iliac artery is of  normal caliber.  Significant aneurysmal degeneration of left common iliac artery with diameter consistent with 37 mm seen on prior MRI.  Internal iliac artery approximately 8.5 mm in maximum diameter.  External iliac artery approximately 9.1 mm in maximum diameter.  Inadequate healthy distal common iliac artery for seal: It is approximately 1 cm in length.    Catheter removed over a wire local pressure applied for hemostasis        Impression:  Large left common iliac artery aneurysm that would be anatomically amenable to an SCHUYLER device.  Not amenable to simple stent grafting.      Ana Gregory  Vascular Surgery

## 2021-05-26 NOTE — INTERVAL H&P NOTE
I have performed an assessment and examined the patient, as necessary, to update the patient's current status that may have changed since the prior History and Physical.  The History & Physical has been reviewed and the patient's status is unchanged.

## 2021-05-27 NOTE — ANESTHESIA POSTPROCEDURE EVALUATION
Patient: Eduardo Laughlin  Endovascular repair of left common iliac artery aneurysm with [place] SCHUYLER device.  Large for access closure x2. PROXIMAL CUFF PLACEMENT - Use CO2 angiography.  Anesthesia type: general    Patient location: PACU  Last vitals:   Vitals Value Taken Time   /82 4/16/2019  6:15 PM   Temp 37  C (98.6  F) 4/16/2019  3:01 PM   Pulse 63 4/16/2019  6:18 PM   Resp 23 4/16/2019  6:18 PM   SpO2 100 % 4/16/2019  6:18 PM   Vitals shown include unvalidated device data.  Post vital signs: stable  Level of consciousness: awake, alert and oriented  Post-anesthesia pain: pain controlled  Post-anesthesia nausea and vomiting: no  Pulmonary: unassisted, nasal cannula  Cardiovascular: stable and blood pressure at baseline  Hydration: adequate  Anesthetic events: no    QCDR Measures:  ASA# 11 - [place]-op Cardiac Arrest: ASA11B - Patient did NOT experience unanticipated cardiac arrest  ASA# 12 - [place]-op Mortality Rate: ASA12B - Patient did NOT die  ASA# 13 - PACU Re-Intubation Rate: ASA13B - Patient did NOT require a new airway mgmt  ASA# 10 - Composite Anes Safety: ASA10A - No serious adverse event    Additional Notes:  Intraoperatively the patients arterial sheath came out of the femoral artery and he bled briefly with a drop in BP.  The surgeon was able to get control of the bleeding and total EBL was 500 mL.  Vital signs have been stable since the OR.  There was some bleeding at the left groin site and there is a hematoma.  The surgeon was notified by the RN and pressure was held for 30 minutes.  The site remains compressible and relatively soft. The surgeon ordered an ultrasound to evaluate the site and the results are pending.  BP remains stable with HR in the 60s.

## 2021-05-27 NOTE — PROGRESS NOTES
Surgery/Procedure: Endovascular repair of left common iliac artery aneurysm with [place] SCHUYLER Device Large Access closure x2     Special Equipment: Michellec-Ai, I.R,. Co2 angiograghy [place] SCHUYLER Device and VBX Graft? Will notify rep     Location: [facility]    Date: 04/16/19    Time: 1200pm    Surgeon: Dr. Gregory    Assist: to be determined    Length of Surgery: 270 min     OR Confirmed/ :  Yes with corie  on 3/26/19    Orders In:  Yes    Provider Team Notified:  Yes    Entered on [employer] Calendar:  Yes    Post Op: CTA abd pelvic 21 month w/Bri to be scheduled

## 2021-05-27 NOTE — TELEPHONE ENCOUNTER
Received a request from [facility] for MD Gregory to review US from today 4/17/19 and to call the unit to discuss plan of care.  Report and information given to MD Gregory

## 2021-05-27 NOTE — ANESTHESIA PREPROCEDURE EVALUATION
Anesthesia Evaluation      Patient summary reviewed   No history of anesthetic complications     Airway   Mallampati: III  Neck ROM: full   Pulmonary - negative ROS and normal exam   (+) sleep apnea on CPAP, ,   (-) not a smoker                         Cardiovascular   Exercise tolerance: > or = 4 METS  (+) hypertension, CAD (Stable; Medical management), dysrhythmias (LBBB), ,   Received beta blockers in last 24 hours prior to incision.  ,  (-) past MI, angina  ECG reviewed  Rhythm: regular  Rate: normal,    murmur      Neuro/Psych    (+) neuromuscular disease (Diabetic neuropathy),    (-) no seizures, no CVA    Endo/Other    (+) diabetes mellitus type 2 well controlled, arthritis, obesity (bmi 30),      Comments: Gout      GI/Hepatic/Renal    (+) GERD well controlled,   chronic renal disease (Kidney stones) CRI,   (-) impaired hepatic function     Other findings: left common iliac artery aneurysm        Dental    (+) caps    Comment: Top front caps                         Anesthesia Plan  Planned anesthetic: general endotracheal    Glidescope intubation  ASA 3   Induction: intravenous   Anesthetic plan and risks discussed with: patient  Anesthesia plan special considerations: video-assisted, antiemetics,   Post-op plan: routine recovery      Results for orders placed or performed during the hospital encounter of 04/16/19   POCT Glucose   Result Value Ref Range    Glucose 132 70 - 139 mg/dL       Chemistry        Component Value Date/Time     04/08/2019 1110    K 5.1 (H) 04/08/2019 1110     (H) 04/08/2019 1110    CO2 20 (L) 04/08/2019 1110    BUN 41 (H) 04/08/2019 1110    CREATININE 2.16 (H) 04/08/2019 1110     04/08/2019 1110        Component Value Date/Time    CALCIUM 9.3 04/08/2019 1110    ALKPHOS 43 (L) 08/19/2016 0258    AST 12 11/16/2017 0838    ALT <6 08/19/2016 0258    BILITOT 0.4 08/19/2016 0258        Lab Results   Component Value Date    WBC 9.0 08/21/2016    HGB 12.9 (L) 03/22/2019     HCT 28.7 (L) 08/21/2016    MCV 86 08/21/2016     03/22/2019

## 2021-05-28 ENCOUNTER — RECORDS - HEALTHEAST (OUTPATIENT)
Dept: ADMINISTRATIVE | Facility: CLINIC | Age: 81
End: 2021-05-28

## 2021-05-28 NOTE — TELEPHONE ENCOUNTER
Telephoned and spoke with patient to scheduled benign hematology consult for iliac artery aneurysm, left, acute embolism & thrombosis of deep vein of right lower extremity as ordered by Dr. Gregory.  Appointment created with Dr. Abdalla on 5/29/19 at 11:30am (11:00 arrival).  Welcome letter, health history form, and medications/allergies list will be sent to patient's home address by US mail.  Liberty Doe RN

## 2021-05-28 NOTE — PROGRESS NOTES
Patient is status post left common iliac artery aneurysm repair with [place] SCHUYLER endovascular device done with CO2 only.  Postoperative course was complicated by discovery of right popliteal and femoral DVT that appeared to be chronic.  Because we could not completely exclude some element of acute DVT the patient has been anticoagulated.  Since then overall doing well.  Major concern is that he cannot afford the co-pays for Xarelto and is going to switch over to Coumadin.    On review the patient definitely has a family history that could be considered concerning for underlying hypercoagulability in both his father, with possible PE, and and his son with possible DVT.  Patient's DVT, in my opinion, most likely occurred at the time of his bladder surgery.    Vitals:    05/15/19 1127   BP: 120/78   Pulse: 60   Resp: 18   Temp: 97.7  F (36.5  C)     Duplex today shows widely patent iliac bifurcated stent graft.  CT today shows good positioning of the stent graft within the aneurysm, although measurement of size is not appropriate until the next follow-up.  DVT study shows what appears to be recanalized and chronic DVT unchanged from prior exam.    Impression and plan: Overall excellent prognosis regarding his aneurysm given that it is now excluded from circulation.  Will need six-month follow-up with repeat duplex and noncontrast CT scan for this.  Regarding his possible underlying hypercoagulability, we will refer him to hematology given that this may have an impact on how he is managed for other future operations.  It may also have any impact on long-term anticoagulation.    Lastly given that this is by enlarge chronic DVT, I think it is reasonable to stop at a coagulation in 3 months time with a duplex at that time.  I will leave that this to his primary care provider to arrange.

## 2021-05-28 NOTE — TELEPHONE ENCOUNTER
Called pt to see how he was doing post L iliac artery repair. He stated that he has experienced some vertigo and his BP has been high. He was started on Eliquis when he was in the hospital. Writer informed him that if the symptoms continue to occur when he decreases his dose to call back.

## 2021-05-28 NOTE — PROGRESS NOTES
1 month . L iliac artery repair on 4/16/19.  Started on Eliquis but patient states he is going to stop because it is too expensive and is planning to transition to warfarin. Seeing PCP on 5/30/19. Pt states that he may be getting the dizziness symptoms from the eliquis. It has improved some but hoping that it will go away after he switches medications.

## 2021-05-31 ENCOUNTER — RECORDS - HEALTHEAST (OUTPATIENT)
Dept: ADMINISTRATIVE | Facility: CLINIC | Age: 81
End: 2021-05-31

## 2021-06-01 ENCOUNTER — RECORDS - HEALTHEAST (OUTPATIENT)
Dept: ADMINISTRATIVE | Facility: CLINIC | Age: 81
End: 2021-06-01

## 2021-06-02 VITALS — HEIGHT: 70 IN | BODY MASS INDEX: 31.77 KG/M2 | WEIGHT: 221.9 LBS

## 2021-06-03 NOTE — PATIENT INSTRUCTIONS - HE
Please see Hematology regarding your blood work up. [facility] [phone]

## 2021-06-03 NOTE — PROGRESS NOTES
VASCULAR SURGERY OUTPATIENT CONSULT OR VISIT   VASCULAR SURGEON: Ana Gregory MD    LOCATION:  [facility]    Eduardo Laughlin   Medical Record #:  [id number]  YOB: 1940  Age:  79 y.o.     Date of Service: 11/20/2019    PRIMARY CARE PROVIDER: Rafael Montelongo MD      Reason for consultation: Follow-up of iliac aneurysm repair    IMPRESSION: Excellent early outcome post iliac aneurysm repair with [place] SCHUYLER device.  Duplex shows widely patent graft and CTA shows no aneurysm growth.  Patient had right femoral DVT incidentally discovered which I thought may have dated back to his prostate surgery 2016, but we could not rule out was associated with his aneurysm surgery.  Could not continue to take Xarelto due to high co-pays.  Did not make his hematology appointment and has been off anticoagulation since I last saw him.  Not had any clinical evidence of additional DVT or pulmonary embolus fortunately.    RECOMMENDATION: Doing well with good protection from aneurysm rupture.  We will see him back in 1 year time with a repeat CT Abdo pelvis without contrast and duplex of his bifurcated endograft.  We will make a formal appointment for him to see hematology to discuss his presumed underlying hypercoagulability and role for resumption of anticoagulation.    HPI:  Eduardo Laughlin is a 79 y.o. male who was seen today in follow-up for his left common iliac artery aneurysm repair.  This was performed with an endovascular [place] SCHUYLER device technique.  Procedure was essentially uneventful and successful but a DVT was discovered in his right groin could not rule out was that acute.  Strongly thought that it needed to when he had swelling from time of his prostate surgery but could not definitively confirm this.  We started him on Xarelto and we came to clinic to see me he said that he could not afford the co-pays that he needs to be on a different regimen.  Plan for him to work this out with his  primary care provider and also arrange for him to see hematology to look into underlying hypercoagulability given that his son had had a DVT as well.  The patient states that he did not get a call back and so missed the hematology appointment.  Quite upset about this.  Patient also did not continue Xarelto and so it appears has not been on any anticoagulation over the last months.  Unfortunately had has not had any acute leg swelling or shortness of breath to suggest new DVT or pulmonary embolus.  This goes along with a DVT having been chronic.  Very much wants to see the hematologist some sort of assurance that he will be reminded of the appointment.  Otherwise doing very well without new issues.    PHH:    Past Medical History:   Diagnosis Date     Aneurysm (H)     left common iliac - 2.6cm     Arteriosclerotic cardiovascular disease (ASCVD)      Arthritis     osteo     BPH (benign prostatic hypertrophy)      Cancer (H)     Bladder     Coronary artery disease      Diabetes mellitus (H)      Diabetic neuropathy (H)      GERD (gastroesophageal reflux disease)      Gout      Hyperlipidemia      Hypertension      Kidney stone      Kidney stone      PONV (postoperative nausea and vomiting)      Sleep apnea     CPAP        Past Surgical History:   Procedure Laterality Date     coronary angiography Left 3/28/08    left ventriculography     CYSTECTOMY       CYSTOSCOPY       CYSTOSCOPY N/A 12/23/2015    Procedure: CYSTOSCOPY BLADDER BIOPSIES with Fulgeration and Placement of Otero ;  Surgeon: Gordon Bajwa MD;  Location: [facility];  Service:      IR EXTREMITY ANGIOGRAM LEFT  3/22/2019     KIDNEY STONE SURGERY  x4     PROSTATE SURGERY         ALLERGIES:  Penicillins; Statins-hmg-coa reductase inhibitors; and Uroxatral [alfuzosin]    MEDS:    Current Outpatient Medications:      acetaminophen (TYLENOL) 500 MG tablet, Take 1,000 mg by mouth every 6 (six) hours as needed for pain.    , Disp: , Rfl:       allopurinol (ZYLOPRIM) 300 MG tablet, Take 300 mg by mouth daily., Disp: , Rfl:      aspirin 81 MG EC tablet, Take 81 mg by mouth every evening., Disp: , Rfl:      ezetimibe (ZETIA) 10 mg tablet, Take 10 mg by mouth daily., Disp: , Rfl: 0     glimepiride (AMARYL) 2 MG tablet, Take 2 mg by mouth daily before breakfast., Disp: , Rfl:      glucosamine-chondroitin 500-400 mg cap, Take 1 capsule by mouth 2 (two) times a day., Disp: , Rfl:      latanoprost (XALATAN) 0.005 % ophthalmic solution, Administer 1 drop to both eyes at bedtime., Disp: , Rfl:      lisinopril (PRINIVIL,ZESTRIL) 20 MG tablet, Take 20 mg by mouth daily., Disp: , Rfl:      metoprolol succinate (TOPROL-XL) 100 MG 24 hr tablet, Take 100 mg by mouth daily.    , Disp: , Rfl:      OMEGA-3/DHA/EPA/FISH OIL (FISH OIL-OMEGA-3 FATTY ACIDS) 300-1,000 mg capsule, Take 1 g by mouth 2 (two) times a day.    , Disp: , Rfl:      traZODone (DESYREL) 100 MG tablet, Take 100 mg by mouth at bedtime.    , Disp: , Rfl:      zolpidem (AMBIEN) 5 MG tablet, Take 1 tablet (5 mg total) by mouth at bedtime., Disp: 30 tablet, Rfl: 0    SOCIAL HABITS:    Social History     Tobacco Use   Smoking Status Former Smoker     Packs/day: 1.00     Years: 20.00     Pack years: 20.00     Last attempt to quit: 1995     Years since quittin.9   Smokeless Tobacco Never Used       Social History     Substance and Sexual Activity   Alcohol Use No    Comment: very seldom       Social History     Substance and Sexual Activity   Drug Use No       FAMILY HISTORY:    Family History   Problem Relation Age of Onset     Cancer Father      Cancer Sister      Cancer Brother      Heart disease Brother      Heart disease Sister        REVIEW OF SYSTEMS:    A 12 point ROS was reviewed and except for what is listed in the HPI above, all others are negative    PE:  /80   Pulse (!) 56   Resp 16   Wt Readings from Last 1 Encounters:   19 (!) 247 lb 2 oz (112.1 kg)     There is no height or  weight on file to calculate BMI.    EXAM:  GENERAL: This is a well-developed 79 y.o. male who appears his stated age  EYES: Grossly normal.  MOUTH: Buccal mucosa normal   MUSCULOSKELETAL: Grossly normal and both lower extremities are intact.  HEME/LYMPH: No lymphedema  NEUROLOGIC: Focally intact, Alert and oriented x 3.   PSYCH: appropriate affect  INTEGUMENT: No open lesions or ulcers        DIAGNOSTIC STUDIES:     Images:  Us Iliac Artery Left    Result Date: 11/20/2019  Bilateral Iliac Arteries with Stents Indication:  SURVEILLANCE LEFT HAYLEE ANEURYSM REPAIR WITH GORE EXCLUDER SCHUYLER ENDOPROTHESIS DONE WITH CO2 ONLY. (ILIAC BIFURCATED STENT GRAFT) Angiogram Date:  4/16/2019    Previous: 5/15/2019 History:CAD, HTN, DIABETIC, CKD Technique: Duplex imaging is performed utilizing gray-scale, two dimensional images, and color-flow imaging. Doppler waveform analysis and spectral Doppler imagine is also performed. Velocities and Waveforms Distal Aorta 65 and waveform is biphasic. Right Iliac Artery Location Velocities (cm/sec)  Waveforms Right HAYLEE Proximal 123  T Right  T Waveforms: T=Triphasic, M=Monophasic, B=Biphasic Left Iliac Artery Location Velocities (cm/sec) Waveforms Left HAYLEE Proximal  119  T Left HAYLEE Mid 116  T Left HAYLEE Distal  121 T Left EIA  109  T Left CFA 97 T Left PFA 73 B Left SFA Prox  98 T Left HAYLEE Stent ---------------------- ------------------- Proximal to Stent 119 B Proximal Stent 116  B Mid Stent  140  B Distal Stent  122  B Distal to Stent  135  B Waveforms: T=Triphasic, M=Monophasic, B=Biphasic Comments:  Impression: Widely patent flow through iliac branch graft device to treat left common iliac artery aneurysm.  Internal iliac branch not clearly visualized.       I personally reviewed the images and my interpretation is that his CT demonstrates a well-positioned endograft in the left common iliac artery aneurysm.  No aneurysm growth.  Duplex shows flow through the stent graft.  Internal iliac  branch not clearly visualized however...    LABS:      Sodium   Date Value Ref Range Status   10/30/2019 138 136 - 145 mmol/L Final   08/26/2019 137 136 - 145 mmol/L Final   08/25/2019 133 (L) 136 - 145 mmol/L Final     Potassium   Date Value Ref Range Status   10/30/2019 4.7 3.5 - 5.0 mmol/L Final   08/26/2019 4.4 3.5 - 5.0 mmol/L Final   08/25/2019 4.2 3.5 - 5.0 mmol/L Final     Chloride   Date Value Ref Range Status   10/30/2019 109 (H) 98 - 107 mmol/L Final   08/26/2019 112 (H) 98 - 107 mmol/L Final   08/25/2019 109 (H) 98 - 107 mmol/L Final     BUN   Date Value Ref Range Status   10/30/2019 38 (H) 8 - 28 mg/dL Final   08/26/2019 32 (H) 8 - 28 mg/dL Final   08/25/2019 33 (H) 8 - 28 mg/dL Final     Creatinine   Date Value Ref Range Status   10/30/2019 2.39 (H) 0.70 - 1.30 mg/dL Final   08/26/2019 2.11 (H) 0.70 - 1.30 mg/dL Final   08/25/2019 2.29 (H) 0.70 - 1.30 mg/dL Final     Hemoglobin   Date Value Ref Range Status   08/26/2019 12.3 (L) 14.0 - 18.0 g/dL Final   08/25/2019 11.9 (L) 14.0 - 18.0 g/dL Final   04/16/2019 11.4 (L) 14.0 - 18.0 g/dL Final     Platelets   Date Value Ref Range Status   08/26/2019 169 140 - 440 thou/uL Final   08/25/2019 163 140 - 440 thou/uL Final   04/16/2019 187 140 - 440 thou/uL Final     INR   Date Value Ref Range Status   03/22/2019 0.98 0.90 - 1.10 Final   12/23/2016 1.26 (H) 0.90 - 1.10 Final   09/21/2016 1.11 (H) 0.90 - 1.10 Final       Total time spent 25 minutes face to face with patient with more than 50% time spent in counseling and coordination of care.    Ana Gregory MD  VASCULAR SURGERY

## 2021-06-04 VITALS
TEMPERATURE: 98.2 F | BODY MASS INDEX: 36.57 KG/M2 | DIASTOLIC BLOOD PRESSURE: 79 MMHG | WEIGHT: 254.9 LBS | HEART RATE: 63 BPM | SYSTOLIC BLOOD PRESSURE: 161 MMHG | OXYGEN SATURATION: 96 %

## 2021-06-04 NOTE — CONSULTS
Consults   [facility] Hematology and Oncology Consult Note    Patient: Eduardo Laughlin  MRN: [id number]  Date of Service: 12/04/2019  Referring provider: Dr. Debby Willams MD.      Reason for Visit     1. Acute embolism and thrombosis of deep vein of right lower extremity (H)  CT Chest Without Contrast   2. 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  A very pleasant 79 years old gentleman with DVT in his right leg.  This was most likely provoked due to his ongoing illness at the time.  2.  Status post 6 months of anticoagulation.  3.  Currently no evidence of any active DVT.  4.  History of significant smoking which he has now quit.  5.  Other medical condition which are stable.    Plan     1.  At this time he does not necessarily need any more anticoagulation.  I do not think it is going to help him too much.  2.  I did tell him that anytime he is hospitalized or has any immobilization for any reason he needs to be anticoagulated and prophylaxed as a high risk individual.  3.  Advised to get a CT scan of the chest for rule out any smoking-related lung damage.  4.  Continue with good diet and exercise.  5.  Follow-up with his regular doctor.  6.  Follow-up with Dr. Willams for his vascular issues.    Staging History     Cancer Staging  No matching staging information was found for the patient.    History     Patient is a very pleasant 79-year-old man who has been referred to me by Dr. Ana Willams from vascular surgery for evaluation of hypercoagulability.  The patient was diagnosed to have a blood clot located in his right superficial femoral vein back in April 2019.  This was detected incidentally when the ultrasound was done to look at the vasculature after percutaneous aneurysm repair.  This was a nonocclusive thrombus.  Subsequent to that this was reevaluated on 17 April with a formal ultrasound of the lower extremities and  that confirmed chronic appearing occlusive thrombus in his right femoral and popliteal veins.  He also was noted to have duplicated right femoral vein with a suspected nonocclusive chronic thrombus in there.  At that point he was prescribed to start anticoagulation.  He initially started apixaban.  After some time switch to  warfarin due to cost issues.  He took it for about 6 months until I believe October 2019.    He has not taken anything since that time.  Fortunately his aortic aneurysm repair has gone well.  Dr. Willams's plan to see him back in a year with a CT scan.  It is important to know that he had been somewhat immobilized prior to that diagnosis due to this aortic aneurysm issue.    He also has other medical issues including diabetes, hypertension, renal insufficiency etc.  Although his condition seems to be under some control.  He follows regularly with his primary care physician.    He comes in today to discuss if he needs any anticoagulation.  He is not keen on taking any.    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     Arteriosclerotic cardiovascular disease (ASCVD)      Arthritis     osteo     Bladder cancer (H)      BPH (benign prostatic hypertrophy)      Cancer (H)     Bladder     Chronic kidney disease      Coronary artery disease      Diabetes mellitus (H)      Diabetic neuropathy (H)      GERD (gastroesophageal reflux disease)      Gout      Hyperlipidemia      Hypertension      Kidney stone      Kidney stone      PONV (postoperative nausea and vomiting)      Prostate cancer (H)      Sleep apnea     CPAP       Past Social History     Social History     Socioeconomic History     Marital status:      Spouse name: Not on file     Number of children: 3     Years of education: Not on file     Highest education level: Not on file   Occupational History     Occupation: Retired Banker   Social Needs     Financial resource  strain: Not on file     Food insecurity:     Worry: Not on file     Inability: Not on file     Transportation needs:     Medical: Not on file     Non-medical: Not on file   Tobacco Use     Smoking status: Former Smoker     Packs/day: 1.00     Years: 20.00     Pack years: 20.00     Last attempt to quit: 1995     Years since quittin.9     Smokeless tobacco: Never Used   Substance and Sexual Activity     Alcohol use: Yes     Comment: very seldom     Drug use: No     Sexual activity: Never   Lifestyle     Physical activity:     Days per week: Not on file     Minutes per session: Not on file     Stress: Not on file   Relationships     Social connections:     Talks on phone: Not on file     Gets together: Not on file     Attends Anabaptism service: Not on file     Active member of club or organization: Not on file     Attends meetings of clubs or organizations: Not on file     Relationship status: Not on file     Intimate partner violence:     Fear of current or ex partner: Not on file     Emotionally abused: Not on file     Physically abused: Not on file     Forced sexual activity: Not on file   Other Topics Concern     Not on file   Social History Narrative     Not on file       Family History     Family History   Problem Relation Age of Onset     No Medical Problems Mother      Cancer Father      Prostate cancer Father      Deep vein thrombosis Father      Cancer Sister      Heart disease Sister      No Medical Problems Daughter      Heart disease Brother      Heart disease Sister      No Medical Problems Daughter        Medication List     Prior to Admission medications    Medication Sig Start Date End Date Taking? Authorizing Provider   acetaminophen (TYLENOL) 500 MG tablet Take 1,000 mg by mouth every 6 (six) hours as needed for pain.          Yes PROVIDER, HISTORICAL   allopurinol (ZYLOPRIM) 300 MG tablet Take 300 mg by mouth daily.   Yes PROVIDER, HISTORICAL   aspirin 81 MG EC tablet Take 81 mg by mouth  every evening.   Yes PROVIDER, HISTORICAL   ezetimibe (ZETIA) 10 mg tablet Take 10 mg by mouth daily. 11/4/19  Yes PROVIDER, HISTORICAL   glimepiride (AMARYL) 2 MG tablet Take 2 mg by mouth daily before breakfast.   Yes PROVIDER, HISTORICAL   glucosamine-chondroitin 500-400 mg cap Take 1 capsule by mouth 2 (two) times a day.   Yes PROVIDER, HISTORICAL   latanoprost (XALATAN) 0.005 % ophthalmic solution Administer 1 drop to both eyes at bedtime. 3/19/19  Yes PROVIDER, HISTORICAL   lisinopril (PRINIVIL,ZESTRIL) 20 MG tablet Take 20 mg by mouth daily.   Yes PROVIDER, HISTORICAL   metoprolol succinate (TOPROL-XL) 100 MG 24 hr tablet Take 100 mg by mouth daily.          Yes PROVIDER, HISTORICAL   OMEGA-3/DHA/EPA/FISH OIL (FISH OIL-OMEGA-3 FATTY ACIDS) 300-1,000 mg capsule Take 1 g by mouth 2 (two) times a day.          Yes PROVIDER, HISTORICAL   traZODone (DESYREL) 100 MG tablet Take 100 mg by mouth at bedtime.          Yes PROVIDER, HISTORICAL   zolpidem (AMBIEN) 5 MG tablet Take 1 tablet (5 mg total) by mouth at bedtime. 8/26/19  Yes Juan Toro MD   ketoconazole (NIZORAL) 2 % cream Apply topically daily. 10/30/19   PROVIDER, HISTORICAL       Allergies     Allergies   Allergen Reactions     Penicillins Hives     Statins-Hmg-Coa Reductase Inhibitors Myalgia     Uroxatral [Alfuzosin] Other (See Comments)     Hypotension       Review of Systems   A comprehensive review of 14 systems was done. Pertinent findings noted here and in history of present illness. All the rest negative.     General  General (WDL): Exceptions to WDL  Fatigue: Yes - Chronic (Greater than 3 months)  Generalized Muscle Weakness: Yes - Chronic (Greater than 3 months)  ENT  ENT (WDL): Exceptions to WDL  Hearing loss: Yes - Chronic (Greater than 3 months)  Tinnitus: Yes - Chronic (Greater than 3 months)  Respiratory  Respiratory (WDL): Exceptions to WDL  Dyspnea: Yes - Chronic (Greater than 3 months)  Cardiovascular  Cardiovascular (WDL):  Exceptions to WDL  Irregular Heart Beat: Yes - Chronic (Greater than 3 months)  Endocrine  Endocrine (WDL): All endocrine elements are within defined limits  Gastrointestinal  Gastrointestinal (WDL): Exceptions to WDL  Constipation: Yes - Chronic (Greater than 3 months)  Diarrhea: Yes - Chronic (Greater than 3 months)  Musculoskeletal  Musculoskeletal (WDL): Exceptions to WDL  Joint pain: Yes - Chronic (Greater than 3 months)  Back Pain: Yes - Chronic (Greater than 3 months)  Recent fall: Yes - Recent (Less than 3 months)  Assistive device: Yes - Chronic (Greater than 3 months)  Neurological  Neurological (WDL): Exceptions to WDL  Difficulty walking: Yes - Chronic (Greater than 3 months)  Difficulty with memory: Yes - Recent (Less than 3 months)  Dominant Hand: Right  Psychological/Emotional  Psychological/Emotional (WDL): Exceptions to WDL  Depression: Yes - Recent (Less than 3 months)  Daytime sleepiness: Yes - Chronic (Greater than 3 months)  Hematological/Lymphatic  Hematological/Lymphatic (WDL): Exceptions to WDL  Bruising: Yes - Chronic (Greater than 3 months)  Dermatological  Dermatologic (WDL): All dermatological elements are within defined limits  Genitourinary/Reproductive  Genitourinary/Reproductive (WDL): All genitourinary/reproductive elements are within defined limits  Reproductive (Females only)     Pain  Currently in Pain: No/denies    Physical Exam     Recent Vitals 12/4/2019   Height -   Weight 254 lbs 14 oz   BSA (m2) 2.39 m2   /79   Pulse 63   Temp 98.2   Temp src 1   SpO2 96   Some recent data might be hidden       Constitutional: Oriented to person, place, and time and appears well-developed.   HEENT:  Normocephalic and atraumatic.  Eyes: Conjunctivae and EOM are normal. Pupils are equal, round, and reactive to light. No discharge.  No scleral icterus. Nose normal. Mouth/Throat: Oropharynx is clear and moist. No oropharyngeal exudate.    NECK: Normal range of motion. Neck supple. No JVD  present. No tracheal deviation present. No thyromegaly present.   CARDIOVASCULAR: Normal rate, regular rhythm and intact distal pulses.  Exam reveals no gallop and no friction rub.  Systolic murmur present.  PULMONARY: Effort normal and breath sounds normal. No respiratory distress.No Wheezing or rales.  ABDOMEN: Soft. Bowel sounds are normal. No distension and no mass.  There is no tenderness. There is no rebound and no guarding. No HSM.  MUSCULOSKELETAL: Normal range of motion. No edema and no tenderness. Mild kyphosis, no tenderness.  LYMPH NODES: Has no cervical, supraclavicular, axillary and groin adenopathy.   NEUROLOGICAL: Alert and oriented to person, place, and time. No cranial nerve deficit.  Normal muscle tone. Coordination normal.   GENITOURINARY: Deferred exam.  SKIN: Skin is warm and dry. No rash noted. No erythema. No pallor.   EXTREMITIES: No cyanosis, no clubbing, no edema. No Deformity.  Currently no sign of any Homans sign or pain.  No asymmetric swelling.  PSYCHIATRIC: Normal mood, affect and behavior.      Lab Results       Lab Results   Component Value Date    ALBUMIN 2.2 (L) 08/20/2016    ALT <6 08/19/2016    AST 12 11/16/2017    BUN 38 (H) 10/30/2019    CALCIUM 9.0 10/30/2019     (H) 10/30/2019    CHOL 196 10/30/2019    CO2 23 10/30/2019    CREATININE 2.39 (H) 10/30/2019    GFRAA 32 (L) 10/30/2019    GFRNONAA 26 (L) 10/30/2019     10/30/2019    HCT 37.0 (L) 08/26/2019    WBC 6.4 08/26/2019    HGB 12.3 (L) 08/26/2019    MG 1.9 08/20/2016    PHOS 3.0 08/20/2016     08/26/2019    K 4.7 10/30/2019    PSA <0.1 02/20/2019     10/30/2019           Imaging Results     Ct Abdomen Pelvis Without Oral Without Iv Contrast    Result Date: 11/20/2019  EXAM DATE:         11/20/2019 EXAM: CT ABDOMEN, PELVIS W/O CONTRAST LOCATION: [facility] DATE/TIME: 11/20/2019 8:30 AM INDICATION: Aneurysm of iliac artery. COMPARISON: 05/15/2019. TECHNIQUE: CT scan of the abdomen  and pelvis was performed without oral or IV contrast. Multiplanar reformats were obtained. Dose reduction techniques were used. CONTRAST: None. FINDINGS: LOWER CHEST: Stable scarring or atelectasis in the right costophrenic angle. Coronary arterial calcifications. HEPATOBILIARY: Stable benign cyst or hemangioma in the left hepatic lobe on image 11. No suspicious liver lesion. Gallbladder is present. PANCREAS: No pancreatic ductal dilatation or peripancreatic inflammatory change. SPLEEN: Normal size. ADRENAL GLANDS: Stable bilateral adrenal gland thickening. KIDNEY/BLADDER: Atrophic right kidney without hydronephrosis. Stable exophytic cyst posterior interpolar region of the right kidney. Stable small cyst in the left kidney. Stable postsurgical change consistent with a cystectomy and right lower quadrant urinary diversion. No urolithiasis. BOWEL: No free air or free fluid. No signs of bowel obstruction or bowel inflammation. There is colonic diverticulosis without diverticulitis, especially in the sigmoid colon. LYMPH NODES: Stable thickening or small amount of fluid low along the anterior right side of the rectum on image 160. No pelvic or retroperitoneal lymphadenopathy. VASCULATURE: Mild ectasia of the proximal abdominal aorta measuring 2.6 cm AP by 2.8 cm transverse. There is calcific atherosclerosis at the origin of the major visceral vessels. Infrarenal abdominal aorta is slightly tortuous but measures 2.7 cm maximal AP dimension and 2.7 cm transverse. Stable appearance of the left common, external and internal iliac artery stents. The Endosac of the distal common iliac artery measures 3.4 cm on image 109, previously 3.5 cm. Patency cannot be determined on the noncontrast examination. Right common iliac artery measures 1.8 x 1.6 cm. There is calcific atherosclerosis of the bilateral iliac systems. PELVIC ORGANS: Prostatectomy. OTHER: Small fat-containing right inguinal hernia. Diastasis recti. Tiny  fat-containing umbilical hernia. MUSCULOSKELETAL: Scoliosis with degenerative change in the spine and pelvic joints. IMPRESSION: 1.  Stable appearance of the left common, external and iliac artery stents. Stable residual Endosac dilatation of the left common iliac artery measuring 3.4 cm today, previously 3.5 cm in May 2019. DICOM format image data is available to non-affiliated external healthcare facilities or entities on a secure, media-free, reciprocally searchable basis with patient authorization for at least a 12-month period after the study.     Us Iliac Artery Left    Result Date: 11/20/2019  Bilateral Iliac Arteries with Stents Indication:  SURVEILLANCE LEFT HAYLEE ANEURYSM REPAIR WITH GORE EXCLUDER SCHUYLER ENDOPROTHESIS DONE WITH CO2 ONLY. (ILIAC BIFURCATED STENT GRAFT) Angiogram Date:  4/16/2019    Previous: 5/15/2019 History:CAD, HTN, DIABETIC, CKD Technique: Duplex imaging is performed utilizing gray-scale, two dimensional images, and color-flow imaging. Doppler waveform analysis and spectral Doppler imagine is also performed. Velocities and Waveforms Distal Aorta 65 and waveform is biphasic. Right Iliac Artery Location Velocities (cm/sec)  Waveforms Right HAYLEE Proximal 123  T Right  T Waveforms: T=Triphasic, M=Monophasic, B=Biphasic Left Iliac Artery Location Velocities (cm/sec) Waveforms Left HAYLEE Proximal  119  T Left HAYLEE Mid 116  T Left HAYLEE Distal  121 T Left EIA  109  T Left CFA 97 T Left PFA 73 B Left SFA Prox  98 T Left HAYLEE Stent ---------------------- ------------------- Proximal to Stent 119 B Proximal Stent 116  B Mid Stent  140  B Distal Stent  122  B Distal to Stent  135  B Waveforms: T=Triphasic, M=Monophasic, B=Biphasic Comments:  Impression: Widely patent flow through iliac branch graft device to treat left common iliac artery aneurysm.  Internal iliac branch not clearly visualized.         Total time spent was 60 minutes, more than half of it was in face-to-face counseling regarding  disease state, review of available images, laboratory values, pathological reports, treatment, options, their side effects and management.    Jadiel Abdalla MD

## 2021-06-04 NOTE — TELEPHONE ENCOUNTER
Patient called with CT results which per Dr. Abdalla says there is no concern for cancer.  He does have some emphysema.  Patient was appreciative of the call.

## 2021-06-09 ENCOUNTER — RECORDS - HEALTHEAST (OUTPATIENT)
Dept: ADMINISTRATIVE | Facility: CLINIC | Age: 81
End: 2021-06-09

## 2021-06-11 NOTE — PROGRESS NOTES
Patient is due for repeat CTA, 3.2 cm left common iliac aneurysm. Order routed to [facility] who will contact Pt regarding appt date, time, and location.

## 2021-06-16 PROBLEM — N18.9 ACUTE ON CHRONIC RENAL INSUFFICIENCY: Status: ACTIVE | Noted: 2020-04-24

## 2021-06-16 PROBLEM — N28.9 ACUTE ON CHRONIC RENAL INSUFFICIENCY: Status: ACTIVE | Noted: 2020-04-24

## 2021-06-16 PROBLEM — M51.06: Chronic | Status: ACTIVE | Noted: 2020-04-27

## 2021-06-17 NOTE — PATIENT INSTRUCTIONS - HE
"Patient Instructions by Rick Warren RN at 5/15/2019 11:45 AM     Author: Rick Warren RN Service: -- Author Type: Registered Nurse    Filed: 5/15/2019 11:57 AM Encounter Date: 5/15/2019 Status: Addendum    : Rick Warren RN (Registered Nurse)    Related Notes: Original Note by Rick Warren RN (Registered Nurse) filed at 5/15/2019 11:45 AM       Patient Education     Computed Tomography (CT) 11/20/19 @8:30AM     During the test, relax and remain as still as you can.     Computed tomography (CT) is a test that combines X-rays and computer scans. The result is a detailed picture that can show problems with soft tissues, such as the lining of your sinuses, organs, such as your kidneys or lungs, blood vessels, and bones.  Tell the technologist   Be sure to tell the technologist if you:    Have allergies or kidney problems    Take diabetes medicine    Are pregnant or think you may be    Ate or drank anything before the test   Before your test    Be sure to tell your healthcare provider if you have ever had a reaction to contrast material (\"X-ray dye\"). If you have had a reaction, you may need to take medicine before your scan, so be sure to tell your provider ahead of time.     Be sure to mention the medicines you take. Ask if it's OK to take them before the test.     Follow any directions youre given for not eating or drinking before the procedure. Your provider will give you instructions if required. You may be required to drink contrast by mouth before arriving for the study depending on the type of exam you are having. Your provider or the imaging site will provide this for you.    The length of the procedure may vary, depending on your condition and your provider's practices.    Arrive on time to check in.    When you arrive, you may be asked to change into a hospital gown. Remove all metal near the part of your body that will be scanned, including jewelry, eyeglasses, and dentures. Women may need to remove any " bra that has metal underwire.   During your test    You may be given contrast through an intravenous (IV) line or by mouth.    You will lie on a table. The table slides into the CT scanner.    The technologist will ask you to hold your breath for a few seconds during your scan.  After your test    You can go back to your normal diet and activities right away. Any contrast will pass naturally through your body within a day.    Before leaving, you may need to wait briefly while your images are being reviewed. Your healthcare provider will discuss the test results with you during a follow-up appointment or over the phone.    Your next appointment is:__________________  Date Last Reviewed: 5/1/2017 2000-2017 [employer]. [address] [place] [address], [place] [address]. All rights reserved. This information is not intended as a substitute for professional medical care. Always follow your healthcare professional's instructions.

## 2021-06-17 NOTE — PATIENT INSTRUCTIONS - HE
Patient Instructions by Yojana Banda RN at 3/13/2019  1:40 PM     Author: Yojana Banda RN Service: -- Author Type: Registered Nurse    Filed: 3/13/2019  2:30 PM Encounter Date: 3/13/2019 Status: Addendum    : Rick Warren RN (Registered Nurse)    Related Notes: Original Note by Rick Warren RN (Registered Nurse) filed at 3/13/2019  2:19 PM       You have been scheduled for the following procedure:    Procedure: Left leg Angiogram    Date: 3/22/19    Arrival Time: 8:30am    Procedure Time: 10am    Location: [facility]     Please report to the information desk located in the [place] entrance on [address] at [facility] or the Main Radiology desk on the ground level at [facility].  Tell them you are scheduled for an appointment in Interventional Radiology.  They will then direct you to the Surgical Admit Unit or the Main Radiology desk.    Please arrive at the hospital by 8:30 am on the day of your procedure.  (Note your scheduled arrival time will be earlier than your scheduled procedure start time to allow for your pre-procedure exam, lab work, preparation and consent.)    Other Instructions:    Do not eat or drink anything after midnight before your procedure.    You may take your normal medications on the morning of your procedure with a few sips of water (unless otherwise instructed)    Please inform us if you are taking insulin, Glucophage (Metformin), Coumadin, Arixtra, or Lovenox. It is ok to stay on Aspirin.     Please bring a current list of medications with    If you are having an angiogram:    You will need a responsible adult to stay with you at home your first night.    You will need a     You will need to hold your Coumadin 5 days before your angiogram and should consult with your primary regarding this.    Hold your Metformin the night before and morning of angiogram    Take only 1/2 of your bedtime insulin dose and hold your morning dose but bring it with you to the  angiogram.        If you have any questions regarding your procedure, your instructions or should you need to reschedule or cancel your procedure, please contact [place] at the [facility].       Peripheral Angiography  Peripheral angiography is an outpatient procedure that makes a map of the vessels (arteries) in your lower body, legs, and arms, using X-ray and dye.This map can show where blood flow may be blocked.  Talk with your healthcare provider about the risks and complications of angiography.   Before the procedure  Prepare for the peripheral angiography as follows:     Tell your healthcare provider about all medicines you take and any allergies you may have.    Follow any directions youre given for not eating or drinking before the procedure. If your provider says to take your normal medicines, swallow them with only small sips of water.    Arrange for a family member or friend to drive you home.  During the procedure  Here is what to expect:      You may get medicine through an IV (intravenous) line to relax you. Youre given an injection to numb the insertion site. Then, a tiny skin cut (incision) is made near an artery in your groin.    Your provider inserts a thin tube (catheter) through the incision. He or she then threads the catheter into an artery while looking at a video monitor.    Contrast dye is injected into the catheter to confirm position. You may feel warmth or pressure in your legs and back. You lie still as X-rays are taken. The catheter is then taken out.  After the procedure  Youll be taken to a recovery area. A healthcare provider will apply pressure to the site for about 10 minutes. Your healthcare provider will tell you how long to lie down and keep the insertion site still. Your healthcare provider will discuss the results with you soon after the procedure.  Back at home  On the day you get home, dont drive, dont exercise, avoid walking and taking stairs, and avoid  bending and lifting. Your healthcare provider may give you other care instructions.  Call your healthcare provider  Call your healthcare provider right away if:    You notice a lump or bleeding at the insertion site    You feel pain at the insertion site    You become lightheaded or dizzy    You have leg pain or numbness    You do not urinate in 8 hours    Date Last Reviewed: 5/1/2016 2000-2017 [employer]. [address] [place] [address]. All rights reserved. This information is not intended as a substitute for professional medical care. Always follow your healthcare professional's instructions.    Angiogram Procedure Discharge Instructions:     1. If you received sedation for your procedure: Do not drive or operate heavy machinery for the rest of the day.     2. Avoid strenuous activity for 72 hours (3 days):                        - Do not lift greater than 10 pounds.                        - Excessive exercise                        - Straining                        - Return to your normal activities as you tolerate after the 3 days restriction     3. Avoid tub baths, Jacuzzis, hot tubs and pools for 72 hours (3 days) or until puncture site is will healed.     4. You may shower beginning tomorrow. Do not scrub puncture site(s) until well healed, pat dry.     5. You can expect to return to work 1-2 days after your procedure - depending on the nature of your profession.     6. It is normal to have some tenderness and minimal swelling at puncture site. A small area of discoloration may be present. Tenderness typically subsides in 24-48 hours. A small knot may also be present at puncture site for 6-8 weeks, this can be a normal part of the healing process.     After the angiogram If you:      1. Experience any bleeding or active swelling from puncture site: Lie down, firmly apply pressure to puncture site and CALL 9-1-1     2. Fever greater than 101 degrees Fahrenheit.     3. Redness,  swelling, warmth to touch, or purulent (yellow/green/foul smelling) drainage from the puncture site.     4. Increasing pain, tenderness or swelling at puncture site OR of arm/leg near puncture site.     5. Feeling weak or faint.     6. Change in color, temperature, or sensation of arm/leg where puncture was made.     Call us with any other questions or concerns after your procedure: [phone]    You will need to have an ultrasound 2-3 weeks after your angiogram and should be scheduled at the time of your follow up appt.  Futher follow up will be based on ultrasound results. Typical follow up is every 3 months for the first year, then every 6 months to one year thereafter.       Your doctor has ordered for you to have imaging done.  Please contact [facility] to arrange your appointment for imaging.     [place]   [address], [address]  [place] [address]  [phone]

## 2021-06-19 NOTE — LETTER
Letter by Ana Gregory MD at      Author: Ana Gregory MD Service: -- Author Type: --    Filed:  Encounter Date: 5/15/2019 Status: (Other)         Rafael Montelongo MD  [address] [place] [id number]                                  May 15, 2019    Patient: Eduardo Laughlin   MR Number: [id number]   YOB: 1940   Date of Visit: 5/15/2019     Dear Dr. Reginald MD:    Thank you for referring Eduardo Laughlin to me for evaluation. Below are the relevant portions of my assessment and plan of care.    If you have questions, please do not hesitate to call me. I look forward to following Eduardo along with you.    Sincerely,        Ana Gregory MD          CC  No Recipients  Ana Gregory MD  5/15/2019 12:03 PM  Sign at close encounter  Patient is status post left common iliac artery aneurysm repair with [place] SCHUYLER endovascular device done with CO2 only.  Postoperative course was complicated by discovery of right popliteal and femoral DVT that appeared to be chronic.  Because we could not completely exclude some element of acute DVT the patient has been anticoagulated.  Since then overall doing well.  Major concern is that he cannot afford the co-pays for Xarelto and is going to switch over to Coumadin.    On review the patient definitely has a family history that could be considered concerning for underlying hypercoagulability in both his father, with possible PE, and and his son with possible DVT.  Patient's DVT, in my opinion, most likely occurred at the time of his bladder surgery.    Vitals:    05/15/19 1127   BP: 120/78   Pulse: 60   Resp: 18   Temp: 97.7  F (36.5  C)     Duplex today shows widely patent iliac bifurcated stent graft.  CT today shows good positioning of the stent graft within the aneurysm, although measurement of size is not appropriate until the next follow-up.  DVT study shows what appears to be recanalized and chronic DVT unchanged from prior exam.    Impression and  plan: Overall excellent prognosis regarding his aneurysm given that it is now excluded from circulation.  Will need six-month follow-up with repeat duplex and noncontrast CT scan for this.  Regarding his possible underlying hypercoagulability, we will refer him to hematology given that this may have an impact on how he is managed for other future operations.  It may also have any impact on long-term anticoagulation.    Lastly given that this is by enlarge chronic DVT, I think it is reasonable to stop at a coagulation in 3 months time with a duplex at that time.  I will leave that this to his primary care provider to arrange.

## 2021-06-19 NOTE — LETTER
Letter by Liberty Doe RN at      Author: Liberty Doe RN Service: -- Author Type: --    Filed:  Encounter Date: 5/15/2019 Status: (Other)       Dear Yuniel:    Thank you for choosing [facility] for your care.  We are committed to providing you with the highest quality and compassionate healthcare services.  The following information pertains to your first appointment with our clinic.    Date/Time of appointment: Wednesday, May 29, 2019--please arrive no later than 11:00am for your 11:30 consult with Dr. Abdalla    Note: We have you arrive 30 minutes prior to your appointment time.  This allows time to complete forms, possible labs and nursing assessment.     Name of your Physician: Jadiel Abdalla MD (2nd floor)    What to bring to your appointment:    Completed Patient History/Initial Nursing Assessment and Medication/Allergy List (these forms were sent to you).    Any paperwork or films from your physician that we have asked you to bring.    Your current insurance card(s).    Parking:    Please refer to the map included to direct you.  The [facility] is located at the [place] end of [address] in [place], [place].      After turning onto [address] from [address], take a right turn at the first stop sign.  We have designated parking on the left, identified as parking for Cancer Care patients (Lot D).     The Code to Enter Lot D is: 0501. This code changes monthly and will always coincide with the current month followed by 01. For example August will be 0801.  The month will continue to change but the 01 will remain constant.  If lot D is full please use Parking Lot A, directly across the street.    Please enter the Cancer Care Center on the north end of the [facility] [place].  You will see a sign on the building.        For Hematology appointments, please take the elevator to the second floor to check in. Also please note appointments can last 1.5-2 hours.    We hope these  instructions are helpful to you.  If you have any questions or concerns, please call us at [phone].  It is our pleasure to assist you.    Warm Regards,      Liberty Doe  Nurse Navigator  [phone]

## 2021-06-24 NOTE — PROGRESS NOTES
Consult- Enlarging iliac aneurysm- Dr. Montelongo referring  Imaging done at [facility] (CT abdomen/pelvis). HTN, DM2, CKD3. ASA, metformin, statin.

## 2021-06-24 NOTE — PROGRESS NOTES
VASCULAR SURGERY OUTPATIENT CONSULT OR VISIT   VASCULAR SURGEON: Ana Gregory MD    LOCATION:  [facility]    Eduardo Laughlin   Medical Record #:  [id number]  YOB: 1940  Age:  78 y.o.     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  Imaging is noncontrast and so luminal caliber and patency of internal iliac artery cannot be verified.  Patient only has 1 functioning kidney with CKD 3 so avoiding contrast CT is appropriate.  Aneurysm extends to the iliac bifurcation and so would require either coil embolization of the internal iliac artery or bifurcated endografting if the internal iliac arteries patent.  Common iliac arteries 16 mm.  External iliac arteries 13 mm.  Internal iliac artery appears to measure as much is 12 mm.  Patient is DNR, but on questioning he only wants this to be the case if recovery is futile.  Has a good quality of life.     RECOMMENDATION: Plan for iliac aneurysm treatment with covered stent graft from a left groin approach.  Would likely need a VBX covered stent for effective treatment.  Could perform an IR at [facility], tentatively Friday after next.    HPI:  Eduardo Laughlin is a 78 y.o. male who was seen today in consultation for left common iliac artery aneurysm.  He tells me that his primary care doctor knew about this but it was previously much smaller.  It was re-recognized incidentally after admission for pyelonephritis.  He has not had symptoms from this as far as we can tell.    Patient has chronic left hip pain which he says worsens over the course of the day.  It is not clear if this is vascular in etiology.  This has been long-standing.    No clear family history of aneurysms.  Father  of a blood clot to the heart and his son has had what  sounds like a DVT.  The patient himself does not have a hypercoagulable history and has undergone major pelvic surgery without DVT.    Quit smoking in 1995.  Major prior history is bladder cancer for which he has had removal of his bladder and has an ileal conduit.  No history of stroke.  He had a murmur which led to coronary angiogram 5 years ago he tells me that there was nothing they could treat but that his collateral vessels have improved and he does not need anything now.    Patient is delineated as a DNR in his chart.  I discussed this with him at length.  He seems to feel that this is that he should not be resuscitated from a futile situation.  He did not recognize that this meant no resuscitation at all.  He will discuss it with his primary care physician.  He feels that his quality of life is good enough and his need to take care of his wife is important enough that he would want resuscitation if he can get him back to his baseline status.    He tells me that he is getting over a cold and will likely be fully recovered in the next 2 or 3 days.    No new health issues.  No new fevers or chills, no new diarrhea, no new urinary purulence.  No new neurologic symptoms.  No new rashes.  No new chest pain.  No new shortness of breath.       PHH:    Past Medical History:   Diagnosis Date     Aneurysm (H)     left common iliac - 2.6cm     Arteriosclerotic cardiovascular disease (ASCVD)      Arthritis     osteo     BPH (benign prostatic hypertrophy)      Cancer (H)     Bladder     Coronary artery disease      Diabetes mellitus (H)      Diabetic neuropathy (H)      GERD (gastroesophageal reflux disease)      Gout      Hyperlipidemia      Hypertension      Kidney stone      Kidney stone         Past Surgical History:   Procedure Laterality Date     coronary angiography Left 3/28/08    left ventriculography     CYSTECTOMY       CYSTOSCOPY       CYSTOSCOPY N/A 12/23/2015    Procedure: CYSTOSCOPY BLADDER BIOPSIES with  Fulgeration and Placement of Otero ;  Surgeon: Gordon Bajwa MD;  Location: [facility];  Service:      KIDNEY STONE SURGERY  x4     PROSTATE SURGERY         ALLERGIES:  Penicillins; Statins-hmg-coa reductase inhibitors; and Uroxatral [alfuzosin]    MEDS:    Current Outpatient Medications:      allopurinol (ZYLOPRIM) 300 MG tablet, Take 300 mg by mouth daily., Disp: , Rfl:      aspirin 325 MG tablet, Take 325 mg by mouth daily., Disp: , Rfl:      famotidine (PEPCID) 20 MG tablet, Take 20 mg by mouth 2 (two) times a day as needed for heartburn., Disp: , Rfl:      gabapentin (NEURONTIN) 300 MG capsule, Take 300 mg by mouth at bedtime., Disp: , Rfl:      GLUCOSAMINE/CHONDROITIN SULF A (GLUCOSAMINE-CHONDROITIN ORAL), Take 1 capsule by mouth 2 (two) times a day. Glucosamine-Chondroitin [id number], Disp: , Rfl:      melatonin 5 mg Tab tablet, Take 5 mg by mouth at bedtime as needed., Disp: , Rfl:      metFORMIN (GLUCOPHAGE) 1000 MG tablet, Take 1,000 mg by mouth 2 (two) times a day with meals., Disp: , Rfl:      metoprolol succinate (TOPROL-XL) 100 MG 24 hr tablet, Take 50 mg by mouth daily., Disp: , Rfl:      OMEGA-3/DHA/EPA/FISH OIL (FISH OIL-OMEGA-3 FATTY ACIDS) 300-1,000 mg capsule, Take 2 g by mouth 2 (two) times a day. , Disp: , Rfl:      rosuvastatin (CRESTOR) 5 MG tablet, Take 2.5 mg by mouth once a week. , Disp: , Rfl:      terbinafine HCl (LAMISIL) 1 % cream, Apply 1 application topically daily as needed., Disp: , Rfl:      traZODone (DESYREL) 100 MG tablet, Take  mg by mouth bedtime. , Disp: , Rfl:     SOCIAL HABITS:    Social History     Tobacco Use   Smoking Status Former Smoker     Packs/day: 1.00     Years: 20.00     Pack years: 20.00     Last attempt to quit: 1995     Years since quittin.2   Smokeless Tobacco Never Used       Social History     Substance and Sexual Activity   Alcohol Use No    Comment: very seldom       Social History     Substance and Sexual Activity   Drug Use  No       FAMILY HISTORY:    Family History   Problem Relation Age of Onset     Cancer Father      Cancer Sister      Cancer Brother      Heart disease Brother      Heart disease Sister        REVIEW OF SYSTEMS:    A 12 point ROS was reviewed and except for what is listed in the HPI above, all others are negative    PE:  /84   Pulse 76   Temp 98.5  F (36.9  C)   Resp 18   Wt Readings from Last 1 Encounters:   12/23/16 210 lb (95.3 kg)     There is no height or weight on file to calculate BMI.    EXAM:  GENERAL: This is a well-developed 78 y.o. male who appears his stated age  EYES: Grossly normal.  MOUTH: Buccal mucosa normal   CARDIAC:  Normal S1 and S2, no Murmur  CHEST/LUNG:  Clear lung fields bilaterally  GASTROINTESINAL (ABDOMEN):Soft, non-tender, B/S present, no pulsatile mass .  Urinary bag intact ostomy not assessed.  MUSCULOSKELETAL: Grossly normal and both lower extremities are intact.  HEME/LYMPH: No lymphedema  NEUROLOGIC: Focally intact, Alert and oriented x 3.   PSYCH: appropriate affect  INTEGUMENT: No open lesions or ulcers.  Both groins are clean and not involved in infection.  Pulse Exam:   Carotid: No Bruit    Radial: Left +2   Right  +2    Femoral: Left +2   Right  +2        DIAGNOSTIC STUDIES:     Images:  No results found.    I personally reviewed the images and my interpretation is that his noncontrast CT clearly demonstrates a 3.8 cm fusiform left common iliac aneurysm extending right to the iliac bifurcation..  The common iliac artery appears to be 60 mm in diameter proximally and with enough seal zone to avoid having to extend her treatment into the aorta.  The external iliac appears to be about 13 mm in maximum diameter.  The internal iliac artery, at about 12 mm in diameter.  Do not know about stenoses or occlusion of the internal iliac artery.    LABS:      Sodium   Date Value Ref Range Status   10/18/2018 138 136 - 145 mmol/L Final   06/12/2018 137 136 - 145 mmol/L Final    04/18/2018 137 136 - 145 mmol/L Final     Potassium   Date Value Ref Range Status   10/18/2018 4.9 3.5 - 5.0 mmol/L Final   06/12/2018 4.8 3.5 - 5.0 mmol/L Final   04/18/2018 5.5 (H) 3.5 - 5.0 mmol/L Final     Chloride   Date Value Ref Range Status   10/18/2018 107 98 - 107 mmol/L Final   06/12/2018 108 (H) 98 - 107 mmol/L Final   04/18/2018 109 (H) 98 - 107 mmol/L Final     BUN   Date Value Ref Range Status   10/18/2018 40 (H) 8 - 28 mg/dL Final   06/12/2018 31 (H) 8 - 28 mg/dL Final   04/18/2018 34 (H) 8 - 28 mg/dL Final     Creatinine   Date Value Ref Range Status   10/18/2018 2.30 (H) 0.70 - 1.30 mg/dL Final   06/12/2018 1.98 (H) 0.70 - 1.30 mg/dL Final   04/18/2018 2.14 (H) 0.70 - 1.30 mg/dL Final     Hemoglobin   Date Value Ref Range Status   12/23/2016 11.3 (L) 14.0 - 18.0 g/dL Final   09/21/2016 10.6 (L) 14.0 - 18.0 g/dL Final   08/21/2016 9.1 (L) 14.0 - 18.0 g/dL Final     Platelets   Date Value Ref Range Status   12/23/2016 212 140 - 440 thou/uL Final   09/21/2016 288 140 - 440 thou/uL Final   08/21/2016 282 140 - 440 thou/uL Final     INR   Date Value Ref Range Status   12/23/2016 1.26 (H) 0.90 - 1.10 Final   09/21/2016 1.11 (H) 0.90 - 1.10 Final   08/18/2016 1.08 0.90 - 1.10 Final     Ana Gregory MD  VASCULAR SURGERY

## 2021-06-25 NOTE — TELEPHONE ENCOUNTER
Called to remind patient of angio set for 3/22/19 with Dr. Gregory. Left message to call back if he has questions regarding prep instructions.

## 2021-08-03 PROBLEM — I72.3 COMMON ILIAC ANEURYSM (H): Chronic | Status: RESOLVED | Noted: 2019-04-16 | Resolved: 2020-04-27

## 2021-08-03 PROBLEM — I72.3 ILIAC ARTERY ANEURYSM, LEFT (H): Status: RESOLVED | Noted: 2019-03-26 | Resolved: 2020-04-27

## 2021-08-13 ENCOUNTER — OFFICE VISIT (OUTPATIENT)
Dept: CARDIOLOGY | Facility: CLINIC | Age: 81
End: 2021-08-13
Payer: COMMERCIAL

## 2021-08-13 VITALS
RESPIRATION RATE: 18 BRPM | WEIGHT: 246 LBS | OXYGEN SATURATION: 97 % | SYSTOLIC BLOOD PRESSURE: 124 MMHG | DIASTOLIC BLOOD PRESSURE: 72 MMHG | BODY MASS INDEX: 35.3 KG/M2 | HEART RATE: 74 BPM

## 2021-08-13 DIAGNOSIS — I82.511 CHRONIC DEEP VEIN THROMBOSIS (DVT) OF FEMORAL VEIN OF RIGHT LOWER EXTREMITY (H): Chronic | ICD-10-CM

## 2021-08-13 DIAGNOSIS — I48.0 PAROXYSMAL ATRIAL FIBRILLATION (H): ICD-10-CM

## 2021-08-13 DIAGNOSIS — R06.09 DYSPNEA ON EXERTION: ICD-10-CM

## 2021-08-13 DIAGNOSIS — E11.610 TYPE 2 DIABETES MELLITUS WITH DIABETIC NEUROPATHIC ARTHROPATHY, WITHOUT LONG-TERM CURRENT USE OF INSULIN (H): Chronic | ICD-10-CM

## 2021-08-13 DIAGNOSIS — I10 HYPERTENSION, UNSPECIFIED TYPE: Primary | Chronic | ICD-10-CM

## 2021-08-13 DIAGNOSIS — I44.7 LBBB (LEFT BUNDLE BRANCH BLOCK): ICD-10-CM

## 2021-08-13 PROCEDURE — 99204 OFFICE O/P NEW MOD 45 MIN: CPT | Performed by: INTERNAL MEDICINE

## 2021-08-13 RX ORDER — NYSTATIN 100000 [USP'U]/G
POWDER TOPICAL PRN
Status: ON HOLD | COMMUNITY
End: 2022-09-13

## 2021-08-13 RX ORDER — TRAZODONE HYDROCHLORIDE 100 MG/1
100-200 TABLET ORAL AT BEDTIME
Status: ON HOLD | COMMUNITY
End: 2022-12-07

## 2021-08-13 RX ORDER — EZETIMIBE 10 MG/1
10 TABLET ORAL DAILY
Status: ON HOLD | COMMUNITY
Start: 2019-11-04 | End: 2022-12-07

## 2021-08-13 RX ORDER — ACETAMINOPHEN 500 MG
500-1000 TABLET ORAL EVERY 6 HOURS PRN
Status: ON HOLD | COMMUNITY
End: 2022-09-14

## 2021-08-13 RX ORDER — ZOLPIDEM TARTRATE 12.5 MG/1
12.5 TABLET, FILM COATED, EXTENDED RELEASE ORAL PRN
Status: ON HOLD | COMMUNITY
Start: 2021-08-13 | End: 2022-09-13

## 2021-08-13 RX ORDER — GLIMEPIRIDE 1 MG/1
1 TABLET ORAL DAILY
Status: ON HOLD | COMMUNITY
Start: 2021-07-06 | End: 2022-12-07

## 2021-08-13 RX ORDER — KETOCONAZOLE 20 MG/G
CREAM TOPICAL PRN
COMMUNITY
Start: 2021-02-23 | End: 2021-10-04

## 2021-08-13 RX ORDER — WARFARIN SODIUM 5 MG/1
5-8 TABLET ORAL SEE ADMIN INSTRUCTIONS
Status: ON HOLD | COMMUNITY
Start: 2021-07-13 | End: 2022-11-30

## 2021-08-13 NOTE — LETTER
8/13/2021    Rafael Montelongo MD  [facility] [address]  [facility] [address]    RE: Eduardo Laughlin       Dear Colleague,    I had the pleasure of seeing Eduardo Laughlin in the [facility].            Assessment/Recommendations   Patient with multiple risk factors for coronary artery disease including hypertension, hyperlipidemia, type 2 diabetes who has shortness of breath. His shortness of breath may be related to deconditioning but I certainly cannot exclude a cardiac contribution. He has atrial fibrillation I suspect it is likely persistent or permanent. I would like to get a Holter monitor to see what his ventricular response is to exercise particularly when he is short of breath. I have asked him to be active when he has a Holter monitor on so that we can get an idea of the the appropriateness of his chronotropic response to exercise which could be either blunted or accelerated.    I have also asked that we get a pharmacologic nuclear study as the last stress test that he had that I can find was 2008. This will evaluate his left ventricular systolic function as well as for myocardial ischemia. He does have a left bundle branch block pattern so if there was reduction in left ventricular systolic function resynchronization therapy could be an option as well.    He had a very extensive pulmonary work-up in the fall 2020 with a negative VQ scan, normal pulmonary function tests and no significant findings on chest x-ray.    Thank you for allowing us to participate in his care.       History of Present Illness/Subjective    Mr. Eduardo Laughlin is a 80 year old male with no known cardiac disease with the exception of hypertension and atrial fibrillation. He believes he has had atrial fibrillation for several years and has been appropriately anticoagulated. His chads 2 vascular score is 7.    He complains of shortness of breath with  activity which has been going on for quite some time. He believes it is gradually getting worse. Minimal activity can cause shortness of breath and a particular toward the end of the day. He seems to be better in the morning. If he forgets to take his metoprolol he knows that even more dramatically.    He denies chest discomfort and also denies orthopnea or paroxysmal nocturnal dyspnea but does have sleep apnea and does not use a CPAP machine. He has a machine but does not use it. He denies syncopal episodes but he has had some lightheadedness in the past. He does not take his heart rate  with exercise but has taken his blood pressure when he is lightheaded and it has been a bit low but not dramatically so.    He denies any history rheumatic fever cerebrovascular accident but does report a TIA but it was very short-lived.    He quit smoking many years ago, does have hypertension and hyperlipidemia and takes Zetia. Statins cause significant muscle aching and joint aching. He is diabetic, type II. Family history is not significant for premature coronary artery disease.    He was born in [facility] and grew up basically in [place]. He worked as a  at [employer] is been retired for many years.      Prior ECG reports indicate left bundle branch block pattern. Personally reviewed EKG from October 2020 which shows left bundle branch block and atrial fibrillation.       Physical Examination Review of Systems   /72 (BP Location: Left arm, Patient Position: Sitting, Cuff Size: Adult Large)   Pulse 74   Resp 18   Wt 111.6 kg (246 lb)   SpO2 97%   BMI 35.30 kg/m    Body mass index is 35.3 kg/m .  Wt Readings from Last 3 Encounters:   08/13/21 111.6 kg (246 lb)   06/13/20 111.1 kg (245 lb)   12/04/19 115.6 kg (254 lb 14.4 oz)     General Appearance:   Alert, cooperative and in no acute distress.   ENT/Mouth: Patient wearing a mask.      EYES:  no scleral icterus, normal conjunctivae   Neck: JVP  normal. No Hepatojugular reflux. Thyroid not visualized.   Chest/Lungs:   Lungs are clear to auscultation, equal chest wall expansion.   Cardiovascular:   S1, S2 without murmur ,clicks or rubs. Brachial, radial and posterior tibial pulses are intact and symetric. No carotid bruits noted   Abdomen:  Nontender. BS+. Rounded   Extremities: No cyanosis, clubbing and trivial pretibial edema   Skin: no xanthelasma, warm.    Neurologic: normal arm movement bilateral, no tremors     Psychiatric: Appropriate affect.      Enc Vitals  BP: 124/72  Pulse: 74  Resp: 18  SpO2: 97 %  Weight: 111.6 kg (246 lb)                                           Medical History  Surgical History Family History Social History   Past Medical History:   Diagnosis Date     Acute renal failure with acute tubular necrosis superimposed on stage 3 chronic kidney disease (H)      Arteriosclerotic cardiovascular disease (ASCVD)      Bladder cancer (H)      BPH (benign prostatic hypertrophy)      Chronic kidney disease      Complicated UTI (urinary tract infection) 8/25/2019     Coronary artery disease      Diabetes mellitus (H)      Diabetic neuropathy (H)      E coli bacteremia      GERD (gastroesophageal reflux disease)      Gout      Hyperlipidemia      Hypertension      Hyponatremia      Iliac artery aneurysm, 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    Added automatically from request for surgery [id number]     Kidney stone      Osteoarthritis      Personal history of malignant neoplasm of prostate 4/27/2020     PONV (postoperative nausea and vomiting)      Prostate cancer (H)      Sleep apnea     CPAP    Past Surgical History:   Procedure Laterality Date     CARDIAC CATHETERIZATION  03/28/2008    and coronary angios     CYSTECTOMY       CYSTOSCOPY       CYSTOSCOPY N/A 12/23/2015    Procedure: CYSTOSCOPY BLADDER BIOPSIES with Fulgeration and Placement of Otero ;  Surgeon: Gordon Bajwa MD;  Location: [facility];  Service:      IR EXTREMITY ANGIOGRAM LEFT   3/22/2019     IR EXTREMITY ANGIOGRAM LEFT  3/22/2019     IR ILEAL CONDUIT INJECTION  12/23/2016     IR NEPHROSTOMY TUBE PLACEMENT RIGHT  8/18/2016     IR URETEROSTOMY TUBE CHANGE RIGHT  9/21/2016     KIDNEY STONE SURGERY  x4     PROSTATE SURGERY      Family History   Problem Relation Age of Onset     No Known Problems Mother      Prostate Cancer Father      Deep Vein Thrombosis Father      Cancer Sister      Heart Disease Sister      No Known Problems Daughter      Heart Disease Brother      Heart Disease Sister      No Known Problems Daughter     Social History     Socioeconomic History     Marital status:      Spouse name: Not on file     Number of children: Not on file     Years of education: Not on file     Highest education level: Not on file   Occupational History     Not on file   Tobacco Use     Smoking status: Former Smoker     Smokeless tobacco: Never Used     Tobacco comment: Quit smoking 30 years ago   Substance and Sexual Activity     Alcohol use: Not Currently     Drug use: Never     Sexual activity: Not Currently   Other Topics Concern     Not on file   Social History Narrative     Not on file     Social Determinants of Health     Financial Resource Strain:      Difficulty of Paying Living Expenses:    Food Insecurity:      Worried About Running Out of Food in the Last Year:      Ran Out of Food in the Last Year:    Transportation Needs:      Lack of Transportation (Medical):      Lack of Transportation (Non-Medical):    Physical Activity:      Days of Exercise per Week:      Minutes of Exercise per Session:    Stress:      Feeling of Stress :    Social Connections:      Frequency of Communication with Friends and Family:      Frequency of Social Gatherings with Friends and Family:      Attends [employer] Services:      Active Member of Clubs or Organizations:      Attends Club or Organization Meetings:      Marital Status:    Intimate Partner Violence:      Fear of Current or Ex-Partner:       Emotionally Abused:      Physically Abused:      Sexually Abused:           Medications  Allergies   Current Outpatient Medications   Medication Sig Dispense Refill     acetaminophen (TYLENOL) 500 MG tablet Take 1,000 mg by mouth as needed       ALLOPURINOL PO Take 300 mg by mouth daily       AmLODIPine Besylate (NORVASC PO) Take 5 mg by mouth daily       ezetimibe (ZETIA) 10 MG tablet Take 10 mg by mouth daily       glimepiride (AMARYL) 1 MG tablet Take 1 mg by mouth daily       ketoconazole (NIZORAL) 2 % external cream Apply topically as needed       LISINOPRIL PO Take 20 mg by mouth 2 times daily        METOPROLOL SUCCINATE ER PO Take 100 mg by mouth daily        nystatin (MYCOSTATIN) 692700 UNIT/GM external powder Apply topically as needed       traZODone (DESYREL) 100 MG tablet Take 50 mg by mouth daily       warfarin ANTICOAGULANT (COUMADIN) 5 MG tablet Take 5-7.5 mg by mouth       zolpidem ER (AMBIEN CR) 12.5 MG CR tablet Take 12.5 mg by mouth as needed       glucosamine-chondroitin 500-400 MG CAPS Take 1 capsule by mouth 2 times daily (Patient not taking: Reported on 8/13/2021)       METFORMIN HCL PO Take 1,000 mg by mouth 2 times daily (Patient not taking: Reported on 8/13/2021)       Omega-3 Fatty Acids (OMEGA-3 FISH OIL PO) Take 1 g by mouth 2 times daily (Patient not taking: Reported on 8/13/2021)       Ranitidine HCl (ZANTAC PO) Take 150 mg by mouth daily as needed for heartburn (Patient not taking: Reported on 8/13/2021)       Rosuvastatin Calcium (CRESTOR PO) Take 2.5 mg by mouth daily  (Patient not taking: Reported on 8/13/2021)      Allergies   Allergen Reactions     Metoprolol Succinate Er      Other reaction(s): low blood pressure     Pcn [Penicillins]      Statin Drugs [Hmg-Coa-R Inhibitors]      Uroxatral [Alfuzosin]          Lab Results    Chemistry/lipid CBC Cardiac Enzymes/BNP/TSH/INR   Lab Results   Component Value Date    CHOL 177 02/23/2021    HDL 38 (L) 02/23/2021    TRIG 122 02/23/2021     BUN 37 (H) 02/23/2021     02/23/2021    CO2 21 (L) 02/23/2021    Lab Results   Component Value Date    WBC 8.0 06/13/2020    HGB 13.2 (L) 06/13/2020    HCT 39.8 (L) 06/13/2020    MCV 94 06/13/2020     06/13/2020    Lab Results   Component Value Date    TROPONINI 0.10 04/25/2020     (H) 04/25/2020    INR 2.89 (H) 06/13/2020                                               Thank you for allowing me to participate in the care of your patient.      Sincerely,     Bryan Proctor MD     [facility]  cc:   Rafael Montelongo MD  [facility]  [address] [address]

## 2021-08-13 NOTE — PROGRESS NOTES
Assessment/Recommendations   Patient with multiple risk factors for coronary artery disease including hypertension, hyperlipidemia, type 2 diabetes who has shortness of breath. His shortness of breath may be related to deconditioning but I certainly cannot exclude a cardiac contribution. He has atrial fibrillation I suspect it is likely persistent or permanent. I would like to get a Holter monitor to see what his ventricular response is to exercise particularly when he is short of breath. I have asked him to be active when he has a Holter monitor on so that we can get an idea of the the appropriateness of his chronotropic response to exercise which could be either blunted or accelerated.    I have also asked that we get a pharmacologic nuclear study as the last stress test that he had that I can find was 2008. This will evaluate his left ventricular systolic function as well as for myocardial ischemia. He does have a left bundle branch block pattern so if there was reduction in left ventricular systolic function resynchronization therapy could be an option as well.    He had a very extensive pulmonary work-up in the fall 2020 with a negative VQ scan, normal pulmonary function tests and no significant findings on chest x-ray.    Thank you for allowing us to participate in his care.       History of Present Illness/Subjective    Mr. Eduardo Laughlin is a 80 year old male with no known cardiac disease with the exception of hypertension and atrial fibrillation. He believes he has had atrial fibrillation for several years and has been appropriately anticoagulated. His chads 2 vascular score is 7.    He complains of shortness of breath with activity which has been going on for quite some time. He believes it is gradually getting worse. Minimal activity can cause shortness of breath and a particular toward the end of the day. He seems to be better in the morning. If he forgets to take his metoprolol he knows  that even more dramatically.    He denies chest discomfort and also denies orthopnea or paroxysmal nocturnal dyspnea but does have sleep apnea and does not use a CPAP machine. He has a machine but does not use it. He denies syncopal episodes but he has had some lightheadedness in the past. He does not take his heart rate  with exercise but has taken his blood pressure when he is lightheaded and it has been a bit low but not dramatically so.    He denies any history rheumatic fever cerebrovascular accident but does report a TIA but it was very short-lived.    He quit smoking many years ago, does have hypertension and hyperlipidemia and takes Zetia. Statins cause significant muscle aching and joint aching. He is diabetic, type II. Family history is not significant for premature coronary artery disease.    He was born in [facility] and grew up basically in [place]. He worked as a  at [employer] is been retired for many years.      Prior ECG reports indicate left bundle branch block pattern. Personally reviewed EKG from October 2020 which shows left bundle branch block and atrial fibrillation.       Physical Examination Review of Systems   /72 (BP Location: Left arm, Patient Position: Sitting, Cuff Size: Adult Large)   Pulse 74   Resp 18   Wt 111.6 kg (246 lb)   SpO2 97%   BMI 35.30 kg/m    Body mass index is 35.3 kg/m .  Wt Readings from Last 3 Encounters:   08/13/21 111.6 kg (246 lb)   06/13/20 111.1 kg (245 lb)   12/04/19 115.6 kg (254 lb 14.4 oz)     General Appearance:   Alert, cooperative and in no acute distress.   ENT/Mouth: Patient wearing a mask.      EYES:  no scleral icterus, normal conjunctivae   Neck: JVP normal. No Hepatojugular reflux. Thyroid not visualized.   Chest/Lungs:   Lungs are clear to auscultation, equal chest wall expansion.   Cardiovascular:   S1, S2 without murmur ,clicks or rubs. Brachial, radial and posterior tibial pulses are intact and symetric. No  carotid bruits noted   Abdomen:  Nontender. BS+. Rounded   Extremities: No cyanosis, clubbing and trivial pretibial edema   Skin: no xanthelasma, warm.    Neurologic: normal arm movement bilateral, no tremors     Psychiatric: Appropriate affect.      Enc Vitals  BP: 124/72  Pulse: 74  Resp: 18  SpO2: 97 %  Weight: 111.6 kg (246 lb)                                           Medical History  Surgical History Family History Social History   Past Medical History:   Diagnosis Date     Acute renal failure with acute tubular necrosis superimposed on stage 3 chronic kidney disease (H)      Arteriosclerotic cardiovascular disease (ASCVD)      Bladder cancer (H)      BPH (benign prostatic hypertrophy)      Chronic kidney disease      Complicated UTI (urinary tract infection) 8/25/2019     Coronary artery disease      Diabetes mellitus (H)      Diabetic neuropathy (H)      E coli bacteremia      GERD (gastroesophageal reflux disease)      Gout      Hyperlipidemia      Hypertension      Hyponatremia      Iliac artery aneurysm, 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    Added automatically from request for surgery [id number]     Kidney stone      Osteoarthritis      Personal history of malignant neoplasm of prostate 4/27/2020     PONV (postoperative nausea and vomiting)      Prostate cancer (H)      Sleep apnea     CPAP    Past Surgical History:   Procedure Laterality Date     CARDIAC CATHETERIZATION  03/28/2008    and coronary angios     CYSTECTOMY       CYSTOSCOPY       CYSTOSCOPY N/A 12/23/2015    Procedure: CYSTOSCOPY BLADDER BIOPSIES with Fulgeration and Placement of Otero ;  Surgeon: Gordon Bajwa MD;  Location: [facility];  Service:      IR EXTREMITY ANGIOGRAM LEFT  3/22/2019     IR EXTREMITY ANGIOGRAM LEFT  3/22/2019     IR ILEAL CONDUIT INJECTION  12/23/2016     IR NEPHROSTOMY TUBE PLACEMENT RIGHT  8/18/2016     IR URETEROSTOMY TUBE CHANGE RIGHT  9/21/2016     KIDNEY STONE SURGERY  x4     PROSTATE SURGERY      Family History    Problem Relation Age of Onset     No Known Problems Mother      Prostate Cancer Father      Deep Vein Thrombosis Father      Cancer Sister      Heart Disease Sister      No Known Problems Daughter      Heart Disease Brother      Heart Disease Sister      No Known Problems Daughter     Social History     Socioeconomic History     Marital status:      Spouse name: Not on file     Number of children: Not on file     Years of education: Not on file     Highest education level: Not on file   Occupational History     Not on file   Tobacco Use     Smoking status: Former Smoker     Smokeless tobacco: Never Used     Tobacco comment: Quit smoking 30 years ago   Substance and Sexual Activity     Alcohol use: Not Currently     Drug use: Never     Sexual activity: Not Currently   Other Topics Concern     Not on file   Social History Narrative     Not on file     Social Determinants of Health     Financial Resource Strain:      Difficulty of Paying Living Expenses:    Food Insecurity:      Worried About Running Out of Food in the Last Year:      Ran Out of Food in the Last Year:    Transportation Needs:      Lack of Transportation (Medical):      Lack of Transportation (Non-Medical):    Physical Activity:      Days of Exercise per Week:      Minutes of Exercise per Session:    Stress:      Feeling of Stress :    Social Connections:      Frequency of Communication with Friends and Family:      Frequency of Social Gatherings with Friends and Family:      Attends [employer] Services:      Active Member of Clubs or Organizations:      Attends Club or Organization Meetings:      Marital Status:    Intimate Partner Violence:      Fear of Current or Ex-Partner:      Emotionally Abused:      Physically Abused:      Sexually Abused:           Medications  Allergies   Current Outpatient Medications   Medication Sig Dispense Refill     acetaminophen (TYLENOL) 500 MG tablet Take 1,000 mg by mouth as needed       ALLOPURINOL PO Take  300 mg by mouth daily       AmLODIPine Besylate (NORVASC PO) Take 5 mg by mouth daily       ezetimibe (ZETIA) 10 MG tablet Take 10 mg by mouth daily       glimepiride (AMARYL) 1 MG tablet Take 1 mg by mouth daily       ketoconazole (NIZORAL) 2 % external cream Apply topically as needed       LISINOPRIL PO Take 20 mg by mouth 2 times daily        METOPROLOL SUCCINATE ER PO Take 100 mg by mouth daily        nystatin (MYCOSTATIN) 769019 UNIT/GM external powder Apply topically as needed       traZODone (DESYREL) 100 MG tablet Take 50 mg by mouth daily       warfarin ANTICOAGULANT (COUMADIN) 5 MG tablet Take 5-7.5 mg by mouth       zolpidem ER (AMBIEN CR) 12.5 MG CR tablet Take 12.5 mg by mouth as needed       glucosamine-chondroitin 500-400 MG CAPS Take 1 capsule by mouth 2 times daily (Patient not taking: Reported on 8/13/2021)       METFORMIN HCL PO Take 1,000 mg by mouth 2 times daily (Patient not taking: Reported on 8/13/2021)       Omega-3 Fatty Acids (OMEGA-3 FISH OIL PO) Take 1 g by mouth 2 times daily (Patient not taking: Reported on 8/13/2021)       Ranitidine HCl (ZANTAC PO) Take 150 mg by mouth daily as needed for heartburn (Patient not taking: Reported on 8/13/2021)       Rosuvastatin Calcium (CRESTOR PO) Take 2.5 mg by mouth daily  (Patient not taking: Reported on 8/13/2021)      Allergies   Allergen Reactions     Metoprolol Succinate Er      Other reaction(s): low blood pressure     Pcn [Penicillins]      Statin Drugs [Hmg-Coa-R Inhibitors]      Uroxatral [Alfuzosin]          Lab Results    Chemistry/lipid CBC Cardiac Enzymes/BNP/TSH/INR   Lab Results   Component Value Date    CHOL 177 02/23/2021    HDL 38 (L) 02/23/2021    TRIG 122 02/23/2021    BUN 37 (H) 02/23/2021     02/23/2021    CO2 21 (L) 02/23/2021    Lab Results   Component Value Date    WBC 8.0 06/13/2020    HGB 13.2 (L) 06/13/2020    HCT 39.8 (L) 06/13/2020    MCV 94 06/13/2020     06/13/2020    Lab Results   Component Value Date     TROPONINI 0.10 04/25/2020     (H) 04/25/2020    INR 2.89 (H) 06/13/2020

## 2021-08-26 ENCOUNTER — HOSPITAL ENCOUNTER (OUTPATIENT)
Dept: NUCLEAR MEDICINE | Facility: HOSPITAL | Age: 81
End: 2021-08-26
Attending: INTERNAL MEDICINE
Payer: COMMERCIAL

## 2021-08-26 ENCOUNTER — HOSPITAL ENCOUNTER (OUTPATIENT)
Dept: CARDIOLOGY | Facility: HOSPITAL | Age: 81
End: 2021-08-26
Attending: INTERNAL MEDICINE
Payer: COMMERCIAL

## 2021-08-26 DIAGNOSIS — I48.0 PAROXYSMAL ATRIAL FIBRILLATION (H): ICD-10-CM

## 2021-08-26 DIAGNOSIS — E11.610 TYPE 2 DIABETES MELLITUS WITH DIABETIC NEUROPATHIC ARTHROPATHY (H): ICD-10-CM

## 2021-08-26 DIAGNOSIS — R06.09 DYSPNEA ON EXERTION: ICD-10-CM

## 2021-08-26 DIAGNOSIS — E11.610 TYPE 2 DIABETES MELLITUS WITH DIABETIC NEUROPATHIC ARTHROPATHY, WITHOUT LONG-TERM CURRENT USE OF INSULIN (H): Chronic | ICD-10-CM

## 2021-08-26 DIAGNOSIS — E11.610 TYPE 2 DIABETES MELLITUS WITH DIABETIC NEUROPATHIC ARTHROPATHY, WITHOUT LONG-TERM CURRENT USE OF INSULIN (H): ICD-10-CM

## 2021-08-26 DIAGNOSIS — I25.10 CORONARY ARTERY DISEASE: ICD-10-CM

## 2021-08-26 DIAGNOSIS — I44.7 LBBB (LEFT BUNDLE BRANCH BLOCK): Primary | ICD-10-CM

## 2021-08-26 PROCEDURE — A9500 TC99M SESTAMIBI: HCPCS | Performed by: INTERNAL MEDICINE

## 2021-08-26 PROCEDURE — 93227 XTRNL ECG REC<48 HR R&I: CPT | Performed by: INTERNAL MEDICINE

## 2021-08-26 PROCEDURE — 93225 XTRNL ECG REC<48 HRS REC: CPT

## 2021-08-26 PROCEDURE — 78452 HT MUSCLE IMAGE SPECT MULT: CPT | Mod: 26 | Performed by: INTERNAL MEDICINE

## 2021-08-26 PROCEDURE — 343N000001 HC RX 343: Performed by: INTERNAL MEDICINE

## 2021-08-26 PROCEDURE — 78452 HT MUSCLE IMAGE SPECT MULT: CPT

## 2021-08-26 PROCEDURE — 93018 CV STRESS TEST I&R ONLY: CPT | Performed by: INTERNAL MEDICINE

## 2021-08-26 PROCEDURE — 93017 CV STRESS TEST TRACING ONLY: CPT

## 2021-08-26 PROCEDURE — 250N000011 HC RX IP 250 OP 636: Performed by: INTERNAL MEDICINE

## 2021-08-26 PROCEDURE — 93016 CV STRESS TEST SUPVJ ONLY: CPT | Performed by: INTERNAL MEDICINE

## 2021-08-26 RX ORDER — REGADENOSON 0.08 MG/ML
0.4 INJECTION, SOLUTION INTRAVENOUS ONCE
Status: COMPLETED | OUTPATIENT
Start: 2021-08-26 | End: 2021-08-26

## 2021-08-26 RX ADMIN — Medication 8.7 MILLICURIE: at 09:25

## 2021-08-26 RX ADMIN — Medication 36 MILLICURIE: at 12:22

## 2021-08-26 RX ADMIN — REGADENOSON 0.4 MG: 0.08 INJECTION, SOLUTION INTRAVENOUS at 10:26

## 2021-08-27 ENCOUNTER — LAB REQUISITION (OUTPATIENT)
Dept: LAB | Facility: CLINIC | Age: 81
End: 2021-08-27

## 2021-08-27 DIAGNOSIS — E78.5 HYPERLIPIDEMIA, UNSPECIFIED: ICD-10-CM

## 2021-08-27 DIAGNOSIS — I12.9 HYPERTENSIVE CHRONIC KIDNEY DISEASE WITH STAGE 1 THROUGH STAGE 4 CHRONIC KIDNEY DISEASE, OR UNSPECIFIED CHRONIC KIDNEY DISEASE: ICD-10-CM

## 2021-08-27 LAB
ANION GAP SERPL CALCULATED.3IONS-SCNC: 7 MMOL/L (ref 5–18)
BUN SERPL-MCNC: 49 MG/DL (ref 8–28)
CALCIUM SERPL-MCNC: 9 MG/DL (ref 8.5–10.5)
CHLORIDE BLD-SCNC: 108 MMOL/L (ref 98–107)
CHOLEST SERPL-MCNC: 180 MG/DL
CO2 SERPL-SCNC: 20 MMOL/L (ref 22–31)
CREAT SERPL-MCNC: 2.73 MG/DL (ref 0.7–1.3)
FASTING STATUS PATIENT QL REPORTED: ABNORMAL
GFR SERPL CREATININE-BSD FRML MDRD: 21 ML/MIN/1.73M2
GLUCOSE BLD-MCNC: 151 MG/DL (ref 70–125)
HDLC SERPL-MCNC: 38 MG/DL
LDLC SERPL CALC-MCNC: 110 MG/DL
MAGNESIUM SERPL-MCNC: 2.3 MG/DL (ref 1.8–2.6)
POTASSIUM BLD-SCNC: 4.9 MMOL/L (ref 3.5–5)
SODIUM SERPL-SCNC: 135 MMOL/L (ref 136–145)
TRIGL SERPL-MCNC: 162 MG/DL

## 2021-08-27 PROCEDURE — 80061 LIPID PANEL: CPT | Performed by: FAMILY MEDICINE

## 2021-08-27 PROCEDURE — 83735 ASSAY OF MAGNESIUM: CPT | Performed by: FAMILY MEDICINE

## 2021-08-27 PROCEDURE — 36415 COLL VENOUS BLD VENIPUNCTURE: CPT | Performed by: FAMILY MEDICINE

## 2021-08-27 PROCEDURE — 80048 BASIC METABOLIC PNL TOTAL CA: CPT | Performed by: FAMILY MEDICINE

## 2021-08-31 DIAGNOSIS — I48.0 PAROXYSMAL ATRIAL FIBRILLATION (H): Primary | ICD-10-CM

## 2021-09-14 ENCOUNTER — HOSPITAL ENCOUNTER (OUTPATIENT)
Dept: CARDIOLOGY | Facility: HOSPITAL | Age: 81
Discharge: HOME | End: 2021-09-14
Attending: INTERNAL MEDICINE | Admitting: INTERNAL MEDICINE
Payer: COMMERCIAL

## 2021-09-14 DIAGNOSIS — I48.0 PAROXYSMAL ATRIAL FIBRILLATION (H): ICD-10-CM

## 2021-09-14 PROCEDURE — 93270 REMOTE 30 DAY ECG REV/REPORT: CPT

## 2021-09-14 PROCEDURE — 93272 ECG/REVIEW INTERPRET ONLY: CPT | Performed by: INTERNAL MEDICINE

## 2021-10-04 ENCOUNTER — OFFICE VISIT (OUTPATIENT)
Dept: CARDIOLOGY | Facility: CLINIC | Age: 81
End: 2021-10-04
Payer: COMMERCIAL

## 2021-10-04 VITALS
HEART RATE: 68 BPM | WEIGHT: 262 LBS | BODY MASS INDEX: 37.51 KG/M2 | DIASTOLIC BLOOD PRESSURE: 76 MMHG | HEIGHT: 70 IN | SYSTOLIC BLOOD PRESSURE: 122 MMHG | RESPIRATION RATE: 16 BRPM

## 2021-10-04 DIAGNOSIS — I44.30 HEART BLOCK, AV: Primary | ICD-10-CM

## 2021-10-04 DIAGNOSIS — E66.01 MORBID OBESITY (H): ICD-10-CM

## 2021-10-04 PROCEDURE — 99215 OFFICE O/P EST HI 40 MIN: CPT | Performed by: NURSE PRACTITIONER

## 2021-10-04 ASSESSMENT — MIFFLIN-ST. JEOR: SCORE: 1904.67

## 2021-10-04 NOTE — LETTER
10/4/2021    Rafael Montelongo MD  [facility] [address]  [facility] [address]    RE: Eduardo Laughlin       Dear Colleague,    I had the pleasure of seeing Eduardo Laughlin in the [facility].          Assessment/Recommendations     Assessment:      1.  Paroxysmal atrial fibrillation-unclear history of atrial fibrillation-EKG 4/2020 shows atrial fibrillation with LBBB.  According to documentation patient had transient episode of atrial fibrillation associated with colitis 4/2020.  Patient reports he is not aware of how long he has had atrial fibrillation.  8/26/2021 Holter monitor showed sinus rhythm with episodes of Mobitz type I second-degree AV block, also brief 2-1 AV block, also 3/2 and 4/3 AV block without pauses or bradycardia.  Metoprolol was decreased and 14-day cardiac monitor worn 9/14 through 9/27/2021 showing poor quality baseline reading with difficult to assess P waves-reported average heart rate 65 with prolonged LA interval and bundle branch block, no bradycardia or high-grade heart block, no atrial ectopy and no atrial fibrillation    -Reviewed recent stress test, Holter monitor, 14-day monitor, and echocardiogram with patient  -Although atrial fibrillation was not documented during Holter; AV block was documented and resolved with a reduction in metoprolol dosing.  Patient reports feeling much better with metoprolol adjustment  -As mentioned; 14-day heart monitor 9/14 through 9/27/2021 showed resolution of 2-1 block  -Continue metoprolol succinate 50 mg oral daily  -Continue ongoing warfarin therapy  -Due to patient being asymptomatic and completely unaware of atrial fibrillation I recommend rate control strategy and not changing any current medications.      [id number] score of 7: Age 2, diabetes 1, hypertension 1, CVA/TIA 2, vascular disease 1 and on lifelong warfarin therapy.    Eduardo Laughlin will follow up in  1 year with Dr. Proctor sooner if needed.       History of Present Illness/Subjective    Mr. Eduardo Laughlin is a 80 year old male seen at [facility] today for paroxysmal atrial fibrillation, also covered AV block.      Eduardo Laughlin has a known history of hypertension, hyperlipidemia, diabetes type 2, shortness of breath.    Met with patient today to discuss paroxysmal atrial fibrillation.  Patient tells me he is unaware of how long he has had atrial fibrillation or if he has atrial fibrillation.  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  Since his metoprolol was decreased he reports feeling much better his shortness of breath has decreased and he is tolerating ADLs with no issues.  He denies fatigue, lightheadedness, shortness of breath, dyspnea on exertion, orthopnea, PND, palpitations, chest pain, abdominal fullness/bloating and lower extremity edema.      Cardiographics (reviewed):    ECHO  9/29/2021 8/26/21 EKG during stress test (from scanned media)- not overly convincing for atrial fibrillation.       NM Lexiscan stress test 8/26/2021  Result Text        There is no prior study for comparison.     Pharmacological regadenoson stress ECG is nondiagnostic due to left bundle branch block.  The patient is in atrial fibrillation.     Pharmacological Regadenonson stress nuclear study is negative for inducible myocardial ischemia or infarction.     Normal left ventricular size with mild global hypokinesis.  The calculated left ventricular ejection fraction 52%.     The patient is at a low risk of future cardiac ischemic events.          Physical Examination Review of Systems   Vitals: There were no vitals taken for this visit.  BMI= There is no height or weight on file to calculate BMI.  Wt Readings from Last 3 Encounters:   08/13/21 111.6 kg (246 lb)   06/13/20 111.1 kg (245 lb)   12/04/19 115.6 kg (254 lb 14.4 oz)       General Appearance:   Alert,  cooperative and in no acute distress.   ENT/Mouth: membranes moist, no facial drooping   EYES:  no scleral icterus, normal conjunctivae   Neck: no JVD   Chest/Lungs:   lungs are clear to auscultation, no rales or wheezing, respirations unlabored   Cardiovascular:   Regular. Normal first and second heart sounds with no murmurs, rubs, or gallops; the radial and posterior tibial pulses are intact, no edema bilateral lower extremities    Abdomen:  Soft, nontender, nondistended, bowel sounds present   Extremities: no cyanosis or clubbing   Skin: warm, dry.    Neurologic: mood and affect are appropriate, alert and oriented x3         Please refer above for cardiac ROS details.      Medical History  Surgical History Family History Social History   Past Medical History:   Diagnosis Date     Acute renal failure with acute tubular necrosis superimposed on stage 3 chronic kidney disease (H)      Arteriosclerotic cardiovascular disease (ASCVD)      Bladder cancer (H)      BPH (benign prostatic hypertrophy)      Chronic kidney disease      Complicated UTI (urinary tract infection) 8/25/2019     Coronary artery disease      Diabetes mellitus (H)      Diabetic neuropathy (H)      E coli bacteremia      GERD (gastroesophageal reflux disease)      Gout      Hyperlipidemia      Hypertension      Hyponatremia      Iliac artery aneurysm, 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    Added automatically from request for surgery [id number]     Kidney stone      Osteoarthritis      Personal history of malignant neoplasm of prostate 4/27/2020     PONV (postoperative nausea and vomiting)      Prostate cancer (H)      Sleep apnea     CPAP     Past Surgical History:   Procedure Laterality Date     CARDIAC CATHETERIZATION  03/28/2008    and coronary angios     CYSTECTOMY       CYSTOSCOPY       CYSTOSCOPY N/A 12/23/2015    Procedure: CYSTOSCOPY BLADDER BIOPSIES with Fulgeration and Placement of Otero ;  Surgeon: Gordon Bajwa MD;  Location: [facility];   Service:      IR EXTREMITY ANGIOGRAM LEFT  3/22/2019     IR EXTREMITY ANGIOGRAM LEFT  3/22/2019     IR ILEAL CONDUIT INJECTION  12/23/2016     IR NEPHROSTOMY TUBE PLACEMENT RIGHT  8/18/2016     IR URETEROSTOMY TUBE CHANGE RIGHT  9/21/2016     KIDNEY STONE SURGERY  x4     PROSTATE SURGERY       Family History   Problem Relation Age of Onset     No Known Problems Mother      Prostate Cancer Father      Deep Vein Thrombosis Father      Cancer Sister      Heart Disease Sister      No Known Problems Daughter      Heart Disease Brother      Heart Disease Sister      No Known Problems Daughter     Social History     Socioeconomic History     Marital status:      Spouse name: Not on file     Number of children: Not on file     Years of education: Not on file     Highest education level: Not on file   Occupational History     Not on file   Tobacco Use     Smoking status: Former Smoker     Smokeless tobacco: Never Used     Tobacco comment: Quit smoking 30 years ago   Substance and Sexual Activity     Alcohol use: Not Currently     Drug use: Never     Sexual activity: Not Currently   Other Topics Concern     Not on file   Social History Narrative     Not on file     Social Determinants of Health     Financial Resource Strain:      Difficulty of Paying Living Expenses:    Food Insecurity:      Worried About Running Out of Food in the Last Year:      Ran Out of Food in the Last Year:    Transportation Needs:      Lack of Transportation (Medical):      Lack of Transportation (Non-Medical):    Physical Activity:      Days of Exercise per Week:      Minutes of Exercise per Session:    Stress:      Feeling of Stress :    Social Connections:      Frequency of Communication with Friends and Family:      Frequency of Social Gatherings with Friends and Family:      Attends Scientologist Services:      Active Member of Clubs or Organizations:      Attends Club or Organization Meetings:      Marital Status:    Intimate Partner  Violence:      Fear of Current or Ex-Partner:      Emotionally Abused:      Physically Abused:      Sexually Abused:           Medications  Allergies   Current Outpatient Medications   Medication Sig Dispense Refill     acetaminophen (TYLENOL) 500 MG tablet Take 1,000 mg by mouth as needed       ALLOPURINOL PO Take 300 mg by mouth daily       AmLODIPine Besylate (NORVASC PO) Take 5 mg by mouth daily       ezetimibe (ZETIA) 10 MG tablet Take 10 mg by mouth daily       glimepiride (AMARYL) 1 MG tablet Take 1 mg by mouth daily       glucosamine-chondroitin 500-400 MG CAPS Take 1 capsule by mouth 2 times daily        ketoconazole (NIZORAL) 2 % external cream Apply topically as needed       LISINOPRIL PO Take 20 mg by mouth 2 times daily        METFORMIN HCL PO Take 1,000 mg by mouth 2 times daily        METOPROLOL SUCCINATE ER PO Take 50 mg by mouth daily       nystatin (MYCOSTATIN) 755728 UNIT/GM external powder Apply topically as needed       Omega-3 Fatty Acids (OMEGA-3 FISH OIL PO) Take 1 g by mouth 2 times daily        Ranitidine HCl (ZANTAC PO) Take 150 mg by mouth daily as needed for heartburn        Rosuvastatin Calcium (CRESTOR PO) Take 2.5 mg by mouth daily        traZODone (DESYREL) 100 MG tablet Take 50 mg by mouth daily       warfarin ANTICOAGULANT (COUMADIN) 5 MG tablet Take 5-7.5 mg by mouth       zolpidem ER (AMBIEN CR) 12.5 MG CR tablet Take 12.5 mg by mouth as needed      Allergies   Allergen Reactions     Metoprolol Succinate Er      Other reaction(s): low blood pressure     Pcn [Penicillins]      Statin Drugs [Hmg-Coa-R Inhibitors]      Uroxatral [Alfuzosin]          Lab Results    Chemistry/lipid CBC Cardiac Enzymes/BNP/TSH/INR   Recent Labs   Lab Test 08/27/21  1041   CHOL 180   HDL 38*      TRIG 162*     Recent Labs   Lab Test 08/27/21  1041 02/23/21  1118 08/06/20  0901    115 155*     Recent Labs   Lab Test 08/27/21  1041   *   POTASSIUM 4.9   CHLORIDE 108*   CO2 20*   GLC  151*   BUN 49*   CR 2.73*   GFRESTIMATED 21*   GALLO 9.0     Recent Labs   Lab Test 08/27/21  1041 02/23/21  1118 08/06/20  0901   CR 2.73* 2.45* 2.45*     Recent Labs   Lab Test 04/25/20  0655 08/25/19  0619 04/17/19  0734   A1C 5.9* 6.3* 6.5*    Recent Labs   Lab Test 06/13/20  2142   WBC 8.0   HGB 13.2*   HCT 39.8*   MCV 94        Recent Labs   Lab Test 06/13/20  2142 04/30/20  1405 04/28/20  0534   HGB 13.2* 12.1* 12.0*    Recent Labs   Lab Test 04/25/20  0655 04/25/20  0009 04/24/20  2049   TROPONINI 0.10 0.14 0.14     Recent Labs   Lab Test 04/25/20  0655 04/24/20  1427   * 86*     No results for input(s): TSH in the last 63077 hours.  Recent Labs   Lab Test 06/13/20  2142 04/28/20  0534 04/27/20  0543   INR 2.89* 1.13* 1.26*        Total Time- 50 minutes spent on date of encounter doing chart review, history and exam, documentation and further activities as noted above.  This note has been dictated using voice recognition software. Any grammatical, typographical, or context distortions are unintentional and inherent to the software.    CHAYITO Sher [facility]        Thank you for allowing me to participate in the care of your patient.      Sincerely,     CARLOS Sher CNP [facility]  cc:   No referring provider defined for this encounter.

## 2021-10-04 NOTE — PROGRESS NOTES
Assessment/Recommendations     Assessment:      1.  Paroxysmal atrial fibrillation-unclear history of atrial fibrillation-EKG 4/2020 shows atrial fibrillation with LBBB.  According to documentation patient had transient episode of atrial fibrillation associated with colitis 4/2020.  Patient reports he is not aware of how long he has had atrial fibrillation.  8/26/2021 Holter monitor showed sinus rhythm with episodes of Mobitz type I second-degree AV block, also brief 2-1 AV block, also 3/2 and 4/3 AV block without pauses or bradycardia.  Metoprolol was decreased and 14-day cardiac monitor worn 9/14 through 9/27/2021 showing poor quality baseline reading with difficult to assess P waves-reported average heart rate 65 with prolonged AR interval and bundle branch block, no bradycardia or high-grade heart block, no atrial ectopy and no atrial fibrillation    -Reviewed recent stress test, Holter monitor, 14-day monitor, and echocardiogram with patient  -Although atrial fibrillation was not documented during Holter; AV block was documented and resolved with a reduction in metoprolol dosing.  Patient reports feeling much better with metoprolol adjustment  -As mentioned; 14-day heart monitor 9/14 through 9/27/2021 showed resolution of 2-1 block  -Continue metoprolol succinate 50 mg oral daily  -Continue ongoing warfarin therapy  -Due to patient being asymptomatic and completely unaware of atrial fibrillation I recommend rate control strategy and not changing any current medications.      [id number] score of 7: Age 2, diabetes 1, hypertension 1, CVA/TIA 2, vascular disease 1 and on lifelong warfarin therapy.    Eduardo Laughlin will follow up in 1 year with Dr. Proctor sooner if needed.       History of Present Illness/Subjective    Mr. Eduardo Laughlin is a 80 year old male seen at [facility] today for paroxysmal atrial fibrillation, also covered AV block.      Eduardo Laughlin has a  known history of hypertension, hyperlipidemia, diabetes type 2, shortness of breath.    Met with patient today to discuss paroxysmal atrial fibrillation.  Patient tells me he is unaware of how long he has had atrial fibrillation or if he has atrial fibrillation.  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  Since his metoprolol was decreased he reports feeling much better his shortness of breath has decreased and he is tolerating ADLs with no issues.  He denies fatigue, lightheadedness, shortness of breath, dyspnea on exertion, orthopnea, PND, palpitations, chest pain, abdominal fullness/bloating and lower extremity edema.      Cardiographics (reviewed):    ECHO  9/29/2021 8/26/21 EKG during stress test (from scanned media)- not overly convincing for atrial fibrillation.       NM Lexiscan stress test 8/26/2021  Result Text        There is no prior study for comparison.     Pharmacological regadenoson stress ECG is nondiagnostic due to left bundle branch block.  The patient is in atrial fibrillation.     Pharmacological Regadenonson stress nuclear study is negative for inducible myocardial ischemia or infarction.     Normal left ventricular size with mild global hypokinesis.  The calculated left ventricular ejection fraction 52%.     The patient is at a low risk of future cardiac ischemic events.          Physical Examination Review of Systems   Vitals: There were no vitals taken for this visit.  BMI= There is no height or weight on file to calculate BMI.  Wt Readings from Last 3 Encounters:   08/13/21 111.6 kg (246 lb)   06/13/20 111.1 kg (245 lb)   12/04/19 115.6 kg (254 lb 14.4 oz)       General Appearance:   Alert, cooperative and in no acute distress.   ENT/Mouth: membranes moist, no facial drooping   EYES:  no scleral icterus, normal conjunctivae   Neck: no JVD   Chest/Lungs:   lungs are clear to auscultation, no rales or wheezing, respirations unlabored   Cardiovascular:   Regular.  Normal first and second heart sounds with no murmurs, rubs, or gallops; the radial and posterior tibial pulses are intact, no edema bilateral lower extremities    Abdomen:  Soft, nontender, nondistended, bowel sounds present   Extremities: no cyanosis or clubbing   Skin: warm, dry.    Neurologic: mood and affect are appropriate, alert and oriented x3         Please refer above for cardiac ROS details.      Medical History  Surgical History Family History Social History   Past Medical History:   Diagnosis Date     Acute renal failure with acute tubular necrosis superimposed on stage 3 chronic kidney disease (H)      Arteriosclerotic cardiovascular disease (ASCVD)      Bladder cancer (H)      BPH (benign prostatic hypertrophy)      Chronic kidney disease      Complicated UTI (urinary tract infection) 8/25/2019     Coronary artery disease      Diabetes mellitus (H)      Diabetic neuropathy (H)      E coli bacteremia      GERD (gastroesophageal reflux disease)      Gout      Hyperlipidemia      Hypertension      Hyponatremia      Iliac artery aneurysm, 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    Added automatically from request for surgery [id number]     Kidney stone      Osteoarthritis      Personal history of malignant neoplasm of prostate 4/27/2020     PONV (postoperative nausea and vomiting)      Prostate cancer (H)      Sleep apnea     CPAP     Past Surgical History:   Procedure Laterality Date     CARDIAC CATHETERIZATION  03/28/2008    and coronary angios     CYSTECTOMY       CYSTOSCOPY       CYSTOSCOPY N/A 12/23/2015    Procedure: CYSTOSCOPY BLADDER BIOPSIES with Fulgeration and Placement of Otero ;  Surgeon: Gordon Bajwa MD;  Location: [facility];  Service:      IR EXTREMITY ANGIOGRAM LEFT  3/22/2019     IR EXTREMITY ANGIOGRAM LEFT  3/22/2019     IR ILEAL CONDUIT INJECTION  12/23/2016     IR NEPHROSTOMY TUBE PLACEMENT RIGHT  8/18/2016     IR URETEROSTOMY TUBE CHANGE RIGHT  9/21/2016     KIDNEY STONE SURGERY  x4     PROSTATE  SURGERY       Family History   Problem Relation Age of Onset     No Known Problems Mother      Prostate Cancer Father      Deep Vein Thrombosis Father      Cancer Sister      Heart Disease Sister      No Known Problems Daughter      Heart Disease Brother      Heart Disease Sister      No Known Problems Daughter     Social History     Socioeconomic History     Marital status:      Spouse name: Not on file     Number of children: Not on file     Years of education: Not on file     Highest education level: Not on file   Occupational History     Not on file   Tobacco Use     Smoking status: Former Smoker     Smokeless tobacco: Never Used     Tobacco comment: Quit smoking 30 years ago   Substance and Sexual Activity     Alcohol use: Not Currently     Drug use: Never     Sexual activity: Not Currently   Other Topics Concern     Not on file   Social History Narrative     Not on file     Social Determinants of Health     Financial Resource Strain:      Difficulty of Paying Living Expenses:    Food Insecurity:      Worried About Running Out of Food in the Last Year:      Ran Out of Food in the Last Year:    Transportation Needs:      Lack of Transportation (Medical):      Lack of Transportation (Non-Medical):    Physical Activity:      Days of Exercise per Week:      Minutes of Exercise per Session:    Stress:      Feeling of Stress :    Social Connections:      Frequency of Communication with Friends and Family:      Frequency of Social Gatherings with Friends and Family:      Attends Yazidism Services:      Active Member of Clubs or Organizations:      Attends Club or Organization Meetings:      Marital Status:    Intimate Partner Violence:      Fear of Current or Ex-Partner:      Emotionally Abused:      Physically Abused:      Sexually Abused:           Medications  Allergies   Current Outpatient Medications   Medication Sig Dispense Refill     acetaminophen (TYLENOL) 500 MG tablet Take 1,000 mg by mouth as  needed       ALLOPURINOL PO Take 300 mg by mouth daily       AmLODIPine Besylate (NORVASC PO) Take 5 mg by mouth daily       ezetimibe (ZETIA) 10 MG tablet Take 10 mg by mouth daily       glimepiride (AMARYL) 1 MG tablet Take 1 mg by mouth daily       glucosamine-chondroitin 500-400 MG CAPS Take 1 capsule by mouth 2 times daily        ketoconazole (NIZORAL) 2 % external cream Apply topically as needed       LISINOPRIL PO Take 20 mg by mouth 2 times daily        METFORMIN HCL PO Take 1,000 mg by mouth 2 times daily        METOPROLOL SUCCINATE ER PO Take 50 mg by mouth daily       nystatin (MYCOSTATIN) 514271 UNIT/GM external powder Apply topically as needed       Omega-3 Fatty Acids (OMEGA-3 FISH OIL PO) Take 1 g by mouth 2 times daily        Ranitidine HCl (ZANTAC PO) Take 150 mg by mouth daily as needed for heartburn        Rosuvastatin Calcium (CRESTOR PO) Take 2.5 mg by mouth daily        traZODone (DESYREL) 100 MG tablet Take 50 mg by mouth daily       warfarin ANTICOAGULANT (COUMADIN) 5 MG tablet Take 5-7.5 mg by mouth       zolpidem ER (AMBIEN CR) 12.5 MG CR tablet Take 12.5 mg by mouth as needed      Allergies   Allergen Reactions     Metoprolol Succinate Er      Other reaction(s): low blood pressure     Pcn [Penicillins]      Statin Drugs [Hmg-Coa-R Inhibitors]      Uroxatral [Alfuzosin]          Lab Results    Chemistry/lipid CBC Cardiac Enzymes/BNP/TSH/INR   Recent Labs   Lab Test 08/27/21  1041   CHOL 180   HDL 38*      TRIG 162*     Recent Labs   Lab Test 08/27/21  1041 02/23/21  1118 08/06/20  0901    115 155*     Recent Labs   Lab Test 08/27/21  1041   *   POTASSIUM 4.9   CHLORIDE 108*   CO2 20*   *   BUN 49*   CR 2.73*   GFRESTIMATED 21*   GALLO 9.0     Recent Labs   Lab Test 08/27/21  1041 02/23/21  1118 08/06/20  0901   CR 2.73* 2.45* 2.45*     Recent Labs   Lab Test 04/25/20  0655 08/25/19  0619 04/17/19  0734   A1C 5.9* 6.3* 6.5*    Recent Labs   Lab Test 06/13/20  6752    WBC 8.0   HGB 13.2*   HCT 39.8*   MCV 94        Recent Labs   Lab Test 06/13/20  2142 04/30/20  1405 04/28/20  0534   HGB 13.2* 12.1* 12.0*    Recent Labs   Lab Test 04/25/20  0655 04/25/20  0009 04/24/20  2049   TROPONINI 0.10 0.14 0.14     Recent Labs   Lab Test 04/25/20  0655 04/24/20  1427   * 86*     No results for input(s): TSH in the last 84568 hours.  Recent Labs   Lab Test 06/13/20  2142 04/28/20  0534 04/27/20  0543   INR 2.89* 1.13* 1.26*        Total Time- 50 minutes spent on date of encounter doing chart review, history and exam, documentation and further activities as noted above.  This note has been dictated using voice recognition software. Any grammatical, typographical, or context distortions are unintentional and inherent to the software.    Shefali Rios, [facility]

## 2021-10-26 LAB
CV STRESS CURRENT BP HE: NORMAL
CV STRESS CURRENT HR HE: 65
CV STRESS CURRENT HR HE: 67
CV STRESS CURRENT HR HE: 68
CV STRESS CURRENT HR HE: 72
CV STRESS CURRENT HR HE: 73
CV STRESS CURRENT HR HE: 74
CV STRESS CURRENT HR HE: 75
CV STRESS CURRENT HR HE: 76
CV STRESS DEVIATION TIME HE: NORMAL
CV STRESS ECHO PERCENT HR HE: NORMAL
CV STRESS EXERCISE STAGE HE: NORMAL
CV STRESS FINAL RESTING BP HE: NORMAL
CV STRESS FINAL RESTING HR HE: 72
CV STRESS MAX HR HE: 79
CV STRESS MAX TREADMILL GRADE HE: 0
CV STRESS MAX TREADMILL SPEED HE: 0
CV STRESS PEAK DIA BP HE: NORMAL
CV STRESS PEAK SYS BP HE: NORMAL
CV STRESS PHASE HE: NORMAL
CV STRESS PROTOCOL HE: NORMAL
CV STRESS RESTING PT POSITION HE: NORMAL
CV STRESS ST DEVIATION AMOUNT HE: NORMAL
CV STRESS ST DEVIATION ELEVATION HE: NORMAL
CV STRESS ST EVELATION AMOUNT HE: NORMAL
CV STRESS TEST TYPE HE: NORMAL
CV STRESS TOTAL STAGE TIME MIN 1 HE: NORMAL
NUC STRESS EJECTION FRACTION: 52 %
RATE PRESSURE PRODUCT: 8690
STRESS ECHO BASELINE DIASTOLIC HE: 70
STRESS ECHO BASELINE HR: 65
STRESS ECHO BASELINE SYSTOLIC BP: 127
STRESS ECHO CALCULATED PERCENT HR: 56 %
STRESS ECHO LAST STRESS DIASTOLIC BP: 80
STRESS ECHO LAST STRESS HR: 75
STRESS ECHO LAST STRESS SYSTOLIC BP: 110
STRESS ECHO TARGET HR: 140

## 2021-11-30 ENCOUNTER — LAB REQUISITION (OUTPATIENT)
Dept: LAB | Facility: CLINIC | Age: 81
End: 2021-11-30

## 2021-11-30 DIAGNOSIS — I12.9 HYPERTENSIVE CHRONIC KIDNEY DISEASE WITH STAGE 1 THROUGH STAGE 4 CHRONIC KIDNEY DISEASE, OR UNSPECIFIED CHRONIC KIDNEY DISEASE: ICD-10-CM

## 2021-11-30 LAB
ANION GAP SERPL CALCULATED.3IONS-SCNC: 12 MMOL/L (ref 5–18)
BUN SERPL-MCNC: 46 MG/DL (ref 8–28)
CALCIUM SERPL-MCNC: 8.9 MG/DL (ref 8.5–10.5)
CHLORIDE BLD-SCNC: 106 MMOL/L (ref 98–107)
CO2 SERPL-SCNC: 18 MMOL/L (ref 22–31)
CREAT SERPL-MCNC: 3.31 MG/DL (ref 0.7–1.3)
GFR SERPL CREATININE-BSD FRML MDRD: 17 ML/MIN/1.73M2
GLUCOSE BLD-MCNC: 168 MG/DL (ref 70–125)
POTASSIUM BLD-SCNC: 4.7 MMOL/L (ref 3.5–5)
SODIUM SERPL-SCNC: 136 MMOL/L (ref 136–145)

## 2021-11-30 PROCEDURE — 80048 BASIC METABOLIC PNL TOTAL CA: CPT | Performed by: FAMILY MEDICINE

## 2022-03-03 ENCOUNTER — LAB REQUISITION (OUTPATIENT)
Dept: LAB | Facility: CLINIC | Age: 82
End: 2022-03-03

## 2022-03-03 DIAGNOSIS — E78.5 HYPERLIPIDEMIA, UNSPECIFIED: ICD-10-CM

## 2022-03-03 DIAGNOSIS — I12.9 HYPERTENSIVE CHRONIC KIDNEY DISEASE WITH STAGE 1 THROUGH STAGE 4 CHRONIC KIDNEY DISEASE, OR UNSPECIFIED CHRONIC KIDNEY DISEASE: ICD-10-CM

## 2022-03-03 LAB
ANION GAP SERPL CALCULATED.3IONS-SCNC: 10 MMOL/L (ref 5–18)
BUN SERPL-MCNC: 30 MG/DL (ref 8–28)
CALCIUM SERPL-MCNC: 8.6 MG/DL (ref 8.5–10.5)
CHLORIDE BLD-SCNC: 108 MMOL/L (ref 98–107)
CHOLEST SERPL-MCNC: 168 MG/DL
CO2 SERPL-SCNC: 20 MMOL/L (ref 22–31)
CREAT SERPL-MCNC: 2.34 MG/DL (ref 0.7–1.3)
GFR SERPL CREATININE-BSD FRML MDRD: 27 ML/MIN/1.73M2
GLUCOSE BLD-MCNC: 155 MG/DL (ref 70–125)
HDLC SERPL-MCNC: 38 MG/DL
LDLC SERPL CALC-MCNC: 107 MG/DL
POTASSIUM BLD-SCNC: 4.6 MMOL/L (ref 3.5–5)
SODIUM SERPL-SCNC: 138 MMOL/L (ref 136–145)
TRIGL SERPL-MCNC: 117 MG/DL

## 2022-03-03 PROCEDURE — 80048 BASIC METABOLIC PNL TOTAL CA: CPT | Performed by: FAMILY MEDICINE

## 2022-03-03 PROCEDURE — 80061 LIPID PANEL: CPT | Performed by: FAMILY MEDICINE

## 2022-06-17 ENCOUNTER — HOSPITAL ENCOUNTER (OUTPATIENT)
Dept: CARDIOLOGY | Facility: HOSPITAL | Age: 82
Discharge: HOME | End: 2022-06-17
Attending: FAMILY MEDICINE | Admitting: FAMILY MEDICINE
Payer: COMMERCIAL

## 2022-06-17 DIAGNOSIS — R00.1 BRADYCARDIA: ICD-10-CM

## 2022-06-17 PROCEDURE — 93270 REMOTE 30 DAY ECG REV/REPORT: CPT

## 2022-07-20 ENCOUNTER — HOSPITAL ENCOUNTER (OUTPATIENT)
Dept: CARDIOLOGY | Facility: HOSPITAL | Age: 82
Discharge: HOME | End: 2022-07-20
Attending: FAMILY MEDICINE | Admitting: FAMILY MEDICINE
Payer: COMMERCIAL

## 2022-07-20 DIAGNOSIS — R00.1 BRADYCARDIA: ICD-10-CM

## 2022-07-20 PROCEDURE — 93226 XTRNL ECG REC<48 HR SCAN A/R: CPT

## 2022-07-23 PROCEDURE — 93227 XTRNL ECG REC<48 HR R&I: CPT | Performed by: INTERNAL MEDICINE

## 2022-09-07 RX ORDER — DOXYCYCLINE HYCLATE 50 MG/1
50 CAPSULE ORAL DAILY
Status: ON HOLD | COMMUNITY
Start: 2022-05-21 | End: 2022-11-08

## 2022-09-09 ENCOUNTER — TRANSFERRED RECORDS (OUTPATIENT)
Dept: HEALTH INFORMATION MANAGEMENT | Facility: CLINIC | Age: 82
End: 2022-09-09

## 2022-09-09 ENCOUNTER — LAB REQUISITION (OUTPATIENT)
Dept: LAB | Facility: CLINIC | Age: 82
End: 2022-09-09
Payer: COMMERCIAL

## 2022-09-09 DIAGNOSIS — I12.9 HYPERTENSIVE CHRONIC KIDNEY DISEASE WITH STAGE 1 THROUGH STAGE 4 CHRONIC KIDNEY DISEASE, OR UNSPECIFIED CHRONIC KIDNEY DISEASE: ICD-10-CM

## 2022-09-09 DIAGNOSIS — Z01.818 ENCOUNTER FOR OTHER PREPROCEDURAL EXAMINATION: ICD-10-CM

## 2022-09-09 PROCEDURE — 80048 BASIC METABOLIC PNL TOTAL CA: CPT | Performed by: FAMILY MEDICINE

## 2022-09-09 PROCEDURE — U0005 INFEC AGEN DETEC AMPLI PROBE: HCPCS | Mod: ORL | Performed by: FAMILY MEDICINE

## 2022-09-10 LAB
ANION GAP SERPL CALCULATED.3IONS-SCNC: 8 MMOL/L (ref 7–15)
BUN SERPL-MCNC: 47.1 MG/DL (ref 8–23)
CALCIUM SERPL-MCNC: 9 MG/DL (ref 8.8–10.2)
CHLORIDE SERPL-SCNC: 104 MMOL/L (ref 98–107)
CREAT SERPL-MCNC: 2.46 MG/DL (ref 0.67–1.17)
DEPRECATED HCO3 PLAS-SCNC: 24 MMOL/L (ref 22–29)
GFR SERPL CREATININE-BSD FRML MDRD: 26 ML/MIN/1.73M2
GLUCOSE SERPL-MCNC: 174 MG/DL (ref 70–99)
POTASSIUM SERPL-SCNC: 4.8 MMOL/L (ref 3.4–5.3)
SARS-COV-2 RNA RESP QL NAA+PROBE: NEGATIVE
SODIUM SERPL-SCNC: 136 MMOL/L (ref 136–145)

## 2022-09-13 ENCOUNTER — ANESTHESIA (OUTPATIENT)
Dept: SURGERY | Facility: CLINIC | Age: 82
End: 2022-09-13
Payer: COMMERCIAL

## 2022-09-13 ENCOUNTER — TRANSFERRED RECORDS (OUTPATIENT)
Dept: HEALTH INFORMATION MANAGEMENT | Facility: CLINIC | Age: 82
End: 2022-09-13

## 2022-09-13 ENCOUNTER — ANESTHESIA EVENT (OUTPATIENT)
Dept: SURGERY | Facility: CLINIC | Age: 82
End: 2022-09-13
Payer: COMMERCIAL

## 2022-09-13 ENCOUNTER — HOSPITAL ENCOUNTER (OUTPATIENT)
Facility: CLINIC | Age: 82
Discharge: HOME | End: 2022-09-14
Attending: ORTHOPAEDIC SURGERY | Admitting: ORTHOPAEDIC SURGERY
Payer: COMMERCIAL

## 2022-09-13 ENCOUNTER — APPOINTMENT (OUTPATIENT)
Dept: RADIOLOGY | Facility: CLINIC | Age: 82
End: 2022-09-13
Attending: ORTHOPAEDIC SURGERY
Payer: COMMERCIAL

## 2022-09-13 DIAGNOSIS — M48.062 NEUROGENIC CLAUDICATION DUE TO LUMBAR SPINAL STENOSIS: Primary | ICD-10-CM

## 2022-09-13 PROBLEM — Z98.890 STATUS POST SPINAL SURGERY: Status: ACTIVE | Noted: 2022-09-13

## 2022-09-13 LAB
GLUCOSE BLDC GLUCOMTR-MCNC: 133 MG/DL (ref 70–99)
GLUCOSE BLDC GLUCOMTR-MCNC: 148 MG/DL (ref 70–99)
GLUCOSE BLDC GLUCOMTR-MCNC: 176 MG/DL (ref 70–99)
GLUCOSE BLDC GLUCOMTR-MCNC: 352 MG/DL (ref 70–99)
HOLD SPECIMEN: NORMAL
INR PPP: 1.06 (ref 0.85–1.15)

## 2022-09-13 PROCEDURE — 250N000005 HC OR RX SURGIFLO HEMOSTATIC MATRIX 10ML 199102S OPNP: Performed by: ORTHOPAEDIC SURGERY

## 2022-09-13 PROCEDURE — 250N000009 HC RX 250: Performed by: NURSE ANESTHETIST, CERTIFIED REGISTERED

## 2022-09-13 PROCEDURE — 250N000009 HC RX 250: Performed by: ANESTHESIOLOGY

## 2022-09-13 PROCEDURE — 272N000001 HC OR GENERAL SUPPLY STERILE: Performed by: ORTHOPAEDIC SURGERY

## 2022-09-13 PROCEDURE — 999N000180 XR SURGERY CARM FLUORO LESS THAN 5 MIN: Mod: TC

## 2022-09-13 PROCEDURE — 36415 COLL VENOUS BLD VENIPUNCTURE: CPT | Performed by: ORTHOPAEDIC SURGERY

## 2022-09-13 PROCEDURE — 96372 THER/PROPH/DIAG INJ SC/IM: CPT | Mod: 59 | Performed by: EMERGENCY MEDICINE

## 2022-09-13 PROCEDURE — 370N000017 HC ANESTHESIA TECHNICAL FEE, PER MIN: Performed by: ORTHOPAEDIC SURGERY

## 2022-09-13 PROCEDURE — 258N000003 HC RX IP 258 OP 636: Performed by: NURSE ANESTHETIST, CERTIFIED REGISTERED

## 2022-09-13 PROCEDURE — 82962 GLUCOSE BLOOD TEST: CPT

## 2022-09-13 PROCEDURE — 250N000025 HC SEVOFLURANE, PER MIN: Performed by: ORTHOPAEDIC SURGERY

## 2022-09-13 PROCEDURE — 258N000003 HC RX IP 258 OP 636: Performed by: ANESTHESIOLOGY

## 2022-09-13 PROCEDURE — 85610 PROTHROMBIN TIME: CPT | Performed by: ORTHOPAEDIC SURGERY

## 2022-09-13 PROCEDURE — 250N000011 HC RX IP 250 OP 636: Performed by: ANESTHESIOLOGY

## 2022-09-13 PROCEDURE — 250N000011 HC RX IP 250 OP 636: Performed by: PHYSICIAN ASSISTANT

## 2022-09-13 PROCEDURE — 710N000010 HC RECOVERY PHASE 1, LEVEL 2, PER MIN: Performed by: ORTHOPAEDIC SURGERY

## 2022-09-13 PROCEDURE — 250N000009 HC RX 250: Performed by: ORTHOPAEDIC SURGERY

## 2022-09-13 PROCEDURE — 250N000011 HC RX IP 250 OP 636: Performed by: NURSE ANESTHETIST, CERTIFIED REGISTERED

## 2022-09-13 PROCEDURE — 999N000141 HC STATISTIC PRE-PROCEDURE NURSING ASSESSMENT: Performed by: ORTHOPAEDIC SURGERY

## 2022-09-13 PROCEDURE — 250N000013 HC RX MED GY IP 250 OP 250 PS 637: Performed by: EMERGENCY MEDICINE

## 2022-09-13 PROCEDURE — 250N000012 HC RX MED GY IP 250 OP 636 PS 637: Performed by: EMERGENCY MEDICINE

## 2022-09-13 PROCEDURE — 99204 OFFICE O/P NEW MOD 45 MIN: CPT | Performed by: EMERGENCY MEDICINE

## 2022-09-13 PROCEDURE — 360N000084 HC SURGERY LEVEL 4 W/ FLUORO, PER MIN: Performed by: ORTHOPAEDIC SURGERY

## 2022-09-13 PROCEDURE — 250N000013 HC RX MED GY IP 250 OP 250 PS 637: Performed by: ORTHOPAEDIC SURGERY

## 2022-09-13 PROCEDURE — 250N000011 HC RX IP 250 OP 636: Performed by: ORTHOPAEDIC SURGERY

## 2022-09-13 RX ORDER — ONDANSETRON 2 MG/ML
4 INJECTION INTRAMUSCULAR; INTRAVENOUS EVERY 30 MIN PRN
Status: DISCONTINUED | OUTPATIENT
Start: 2022-09-13 | End: 2022-09-13 | Stop reason: HOSPADM

## 2022-09-13 RX ORDER — FENTANYL CITRATE 50 UG/ML
25 INJECTION, SOLUTION INTRAMUSCULAR; INTRAVENOUS EVERY 5 MIN PRN
Status: DISCONTINUED | OUTPATIENT
Start: 2022-09-13 | End: 2022-09-13 | Stop reason: HOSPADM

## 2022-09-13 RX ORDER — ALLOPURINOL 300 MG/1
300 TABLET ORAL DAILY
Status: DISCONTINUED | OUTPATIENT
Start: 2022-09-14 | End: 2022-09-14 | Stop reason: HOSPADM

## 2022-09-13 RX ORDER — FENTANYL CITRATE 50 UG/ML
100 INJECTION, SOLUTION INTRAMUSCULAR; INTRAVENOUS
Status: DISCONTINUED | OUTPATIENT
Start: 2022-09-13 | End: 2022-09-13 | Stop reason: HOSPADM

## 2022-09-13 RX ORDER — HYDROMORPHONE HCL IN WATER/PF 6 MG/30 ML
0.2 PATIENT CONTROLLED ANALGESIA SYRINGE INTRAVENOUS EVERY 5 MIN PRN
Status: DISCONTINUED | OUTPATIENT
Start: 2022-09-13 | End: 2022-09-13 | Stop reason: HOSPADM

## 2022-09-13 RX ORDER — HYDROXYZINE HYDROCHLORIDE 10 MG/1
10 TABLET, FILM COATED ORAL EVERY 6 HOURS PRN
Status: DISCONTINUED | OUTPATIENT
Start: 2022-09-13 | End: 2022-09-14 | Stop reason: HOSPADM

## 2022-09-13 RX ORDER — ONDANSETRON 4 MG/1
4 TABLET, ORALLY DISINTEGRATING ORAL EVERY 30 MIN PRN
Status: DISCONTINUED | OUTPATIENT
Start: 2022-09-13 | End: 2022-09-13 | Stop reason: HOSPADM

## 2022-09-13 RX ORDER — GLIMEPIRIDE 1 MG/1
1 TABLET ORAL DAILY
Status: DISCONTINUED | OUTPATIENT
Start: 2022-09-14 | End: 2022-09-14 | Stop reason: HOSPADM

## 2022-09-13 RX ORDER — CLINDAMYCIN PHOSPHATE 900 MG/50ML
900 INJECTION, SOLUTION INTRAVENOUS
Status: COMPLETED | OUTPATIENT
Start: 2022-09-13 | End: 2022-09-13

## 2022-09-13 RX ORDER — CLINDAMYCIN PHOSPHATE 900 MG/50ML
900 INJECTION, SOLUTION INTRAVENOUS EVERY 8 HOURS
Status: COMPLETED | OUTPATIENT
Start: 2022-09-13 | End: 2022-09-14

## 2022-09-13 RX ORDER — AMOXICILLIN 250 MG
1-2 CAPSULE ORAL 2 TIMES DAILY
Qty: 30 TABLET | Refills: 0 | Status: SHIPPED | OUTPATIENT
Start: 2022-09-13 | End: 2022-10-12

## 2022-09-13 RX ORDER — OXYCODONE HYDROCHLORIDE 5 MG/1
5-10 TABLET ORAL EVERY 4 HOURS PRN
Qty: 20 TABLET | Refills: 0 | Status: SHIPPED | OUTPATIENT
Start: 2022-09-13 | End: 2022-10-12

## 2022-09-13 RX ORDER — LISINOPRIL 20 MG/1
20 TABLET ORAL 2 TIMES DAILY
Status: ON HOLD | COMMUNITY
End: 2022-10-19

## 2022-09-13 RX ORDER — NALOXONE HYDROCHLORIDE 0.4 MG/ML
0.2 INJECTION, SOLUTION INTRAMUSCULAR; INTRAVENOUS; SUBCUTANEOUS
Status: DISCONTINUED | OUTPATIENT
Start: 2022-09-13 | End: 2022-09-14 | Stop reason: HOSPADM

## 2022-09-13 RX ORDER — PHENOL 1.4 %
10 AEROSOL, SPRAY (ML) MUCOUS MEMBRANE
COMMUNITY
End: 2022-10-12

## 2022-09-13 RX ORDER — ONDANSETRON 4 MG/1
4 TABLET, ORALLY DISINTEGRATING ORAL EVERY 6 HOURS PRN
Status: DISCONTINUED | OUTPATIENT
Start: 2022-09-13 | End: 2022-09-14 | Stop reason: HOSPADM

## 2022-09-13 RX ORDER — FENTANYL CITRATE 50 UG/ML
100 INJECTION, SOLUTION INTRAMUSCULAR; INTRAVENOUS ONCE
Status: COMPLETED | OUTPATIENT
Start: 2022-09-13 | End: 2022-09-13

## 2022-09-13 RX ORDER — MEPERIDINE HYDROCHLORIDE 25 MG/ML
12.5 INJECTION INTRAMUSCULAR; INTRAVENOUS; SUBCUTANEOUS
Status: DISCONTINUED | OUTPATIENT
Start: 2022-09-13 | End: 2022-09-13 | Stop reason: HOSPADM

## 2022-09-13 RX ORDER — METOPROLOL SUCCINATE 50 MG/1
50 TABLET, EXTENDED RELEASE ORAL DAILY
Status: DISCONTINUED | OUTPATIENT
Start: 2022-09-14 | End: 2022-09-14 | Stop reason: HOSPADM

## 2022-09-13 RX ORDER — PROCHLORPERAZINE MALEATE 5 MG
5 TABLET ORAL EVERY 6 HOURS PRN
Status: DISCONTINUED | OUTPATIENT
Start: 2022-09-13 | End: 2022-09-14 | Stop reason: HOSPADM

## 2022-09-13 RX ORDER — PROPOFOL 10 MG/ML
INJECTION, EMULSION INTRAVENOUS PRN
Status: DISCONTINUED | OUTPATIENT
Start: 2022-09-13 | End: 2022-09-13

## 2022-09-13 RX ORDER — TEMAZEPAM 15 MG/1
15 CAPSULE ORAL
COMMUNITY
End: 2022-11-18

## 2022-09-13 RX ORDER — OXYCODONE HYDROCHLORIDE 5 MG/1
5 TABLET ORAL EVERY 4 HOURS PRN
Status: DISCONTINUED | OUTPATIENT
Start: 2022-09-13 | End: 2022-09-13 | Stop reason: HOSPADM

## 2022-09-13 RX ORDER — NALOXONE HYDROCHLORIDE 0.4 MG/ML
0.4 INJECTION, SOLUTION INTRAMUSCULAR; INTRAVENOUS; SUBCUTANEOUS
Status: DISCONTINUED | OUTPATIENT
Start: 2022-09-13 | End: 2022-09-14 | Stop reason: HOSPADM

## 2022-09-13 RX ORDER — EZETIMIBE 10 MG/1
10 TABLET ORAL DAILY
Status: DISCONTINUED | OUTPATIENT
Start: 2022-09-14 | End: 2022-09-14 | Stop reason: HOSPADM

## 2022-09-13 RX ORDER — LISINOPRIL 20 MG/1
20 TABLET ORAL 2 TIMES DAILY
Status: DISCONTINUED | OUTPATIENT
Start: 2022-09-13 | End: 2022-09-14 | Stop reason: HOSPADM

## 2022-09-13 RX ORDER — ACETAMINOPHEN 325 MG/1
650 TABLET ORAL EVERY 6 HOURS PRN
Status: DISCONTINUED | OUTPATIENT
Start: 2022-09-13 | End: 2022-09-14 | Stop reason: HOSPADM

## 2022-09-13 RX ORDER — EPHEDRINE SULFATE 50 MG/ML
INJECTION, SOLUTION INTRAMUSCULAR; INTRAVENOUS; SUBCUTANEOUS PRN
Status: DISCONTINUED | OUTPATIENT
Start: 2022-09-13 | End: 2022-09-13

## 2022-09-13 RX ORDER — SODIUM CHLORIDE, SODIUM LACTATE, POTASSIUM CHLORIDE, CALCIUM CHLORIDE 600; 310; 30; 20 MG/100ML; MG/100ML; MG/100ML; MG/100ML
INJECTION, SOLUTION INTRAVENOUS CONTINUOUS
Status: DISCONTINUED | OUTPATIENT
Start: 2022-09-13 | End: 2022-09-13 | Stop reason: HOSPADM

## 2022-09-13 RX ORDER — NICOTINE POLACRILEX 4 MG
15-30 LOZENGE BUCCAL
Status: DISCONTINUED | OUTPATIENT
Start: 2022-09-13 | End: 2022-09-14 | Stop reason: HOSPADM

## 2022-09-13 RX ORDER — AMLODIPINE BESYLATE 5 MG/1
7.5 TABLET ORAL AT BEDTIME
Status: ON HOLD | COMMUNITY
End: 2022-10-19

## 2022-09-13 RX ORDER — ALLOPURINOL 300 MG/1
300 TABLET ORAL DAILY
Status: ON HOLD | COMMUNITY
End: 2022-10-19

## 2022-09-13 RX ORDER — METOPROLOL SUCCINATE 100 MG/1
50 TABLET, EXTENDED RELEASE ORAL DAILY
Status: ON HOLD | COMMUNITY
End: 2022-11-07

## 2022-09-13 RX ORDER — AMLODIPINE BESYLATE 5 MG/1
2.5 TABLET ORAL DAILY PRN
COMMUNITY
End: 2022-11-04

## 2022-09-13 RX ORDER — DOXYCYCLINE HYCLATE 50 MG/1
50 CAPSULE ORAL DAILY
Status: DISCONTINUED | OUTPATIENT
Start: 2022-09-14 | End: 2022-09-14 | Stop reason: HOSPADM

## 2022-09-13 RX ORDER — GABAPENTIN 100 MG/1
100 CAPSULE ORAL
Status: COMPLETED | OUTPATIENT
Start: 2022-09-13 | End: 2022-09-13

## 2022-09-13 RX ORDER — DEXAMETHASONE SODIUM PHOSPHATE 10 MG/ML
INJECTION, SOLUTION INTRAMUSCULAR; INTRAVENOUS PRN
Status: DISCONTINUED | OUTPATIENT
Start: 2022-09-13 | End: 2022-09-13

## 2022-09-13 RX ORDER — LIDOCAINE 40 MG/G
CREAM TOPICAL
Status: DISCONTINUED | OUTPATIENT
Start: 2022-09-13 | End: 2022-09-13 | Stop reason: HOSPADM

## 2022-09-13 RX ORDER — ACETAMINOPHEN 325 MG/1
325-650 TABLET ORAL EVERY 4 HOURS PRN
Qty: 100 TABLET | Refills: 0 | Status: SHIPPED | OUTPATIENT
Start: 2022-09-13 | End: 2022-11-06

## 2022-09-13 RX ORDER — HYDROMORPHONE HCL IN WATER/PF 6 MG/30 ML
0.2 PATIENT CONTROLLED ANALGESIA SYRINGE INTRAVENOUS
Status: DISCONTINUED | OUTPATIENT
Start: 2022-09-13 | End: 2022-09-14 | Stop reason: HOSPADM

## 2022-09-13 RX ORDER — TRAZODONE HYDROCHLORIDE 100 MG/1
100-200 TABLET ORAL AT BEDTIME
Status: DISCONTINUED | OUTPATIENT
Start: 2022-09-13 | End: 2022-09-14 | Stop reason: HOSPADM

## 2022-09-13 RX ORDER — LIDOCAINE 40 MG/G
CREAM TOPICAL
Status: DISCONTINUED | OUTPATIENT
Start: 2022-09-13 | End: 2022-09-14 | Stop reason: HOSPADM

## 2022-09-13 RX ORDER — OXYCODONE HYDROCHLORIDE 5 MG/1
5 TABLET ORAL EVERY 4 HOURS PRN
Status: DISCONTINUED | OUTPATIENT
Start: 2022-09-13 | End: 2022-09-14 | Stop reason: HOSPADM

## 2022-09-13 RX ORDER — BUPIVACAINE HYDROCHLORIDE AND EPINEPHRINE 5; 5 MG/ML; UG/ML
INJECTION, SOLUTION PERINEURAL PRN
Status: DISCONTINUED | OUTPATIENT
Start: 2022-09-13 | End: 2022-09-13 | Stop reason: HOSPADM

## 2022-09-13 RX ORDER — ONDANSETRON 2 MG/ML
4 INJECTION INTRAMUSCULAR; INTRAVENOUS EVERY 6 HOURS PRN
Status: DISCONTINUED | OUTPATIENT
Start: 2022-09-13 | End: 2022-09-14 | Stop reason: HOSPADM

## 2022-09-13 RX ORDER — DEXTROSE MONOHYDRATE 25 G/50ML
25-50 INJECTION, SOLUTION INTRAVENOUS
Status: DISCONTINUED | OUTPATIENT
Start: 2022-09-13 | End: 2022-09-14 | Stop reason: HOSPADM

## 2022-09-13 RX ORDER — CLINDAMYCIN PHOSPHATE 900 MG/50ML
900 INJECTION, SOLUTION INTRAVENOUS SEE ADMIN INSTRUCTIONS
Status: DISCONTINUED | OUTPATIENT
Start: 2022-09-13 | End: 2022-09-13 | Stop reason: HOSPADM

## 2022-09-13 RX ORDER — TEMAZEPAM 15 MG/1
15 CAPSULE ORAL
Status: DISCONTINUED | OUTPATIENT
Start: 2022-09-13 | End: 2022-09-14 | Stop reason: HOSPADM

## 2022-09-13 RX ADMIN — PHENYLEPHRINE HYDROCHLORIDE 0.5 MCG/KG/MIN: 10 INJECTION INTRAVENOUS at 15:28

## 2022-09-13 RX ADMIN — AMLODIPINE BESYLATE 7.5 MG: 5 TABLET ORAL at 21:28

## 2022-09-13 RX ADMIN — ONDANSETRON 4 MG: 2 INJECTION INTRAMUSCULAR; INTRAVENOUS at 14:42

## 2022-09-13 RX ADMIN — LISINOPRIL 20 MG: 20 TABLET ORAL at 21:28

## 2022-09-13 RX ADMIN — TEMAZEPAM 15 MG: 15 CAPSULE ORAL at 22:36

## 2022-09-13 RX ADMIN — CLINDAMYCIN PHOSPHATE 900 MG: 900 INJECTION, SOLUTION INTRAVENOUS at 21:27

## 2022-09-13 RX ADMIN — SUGAMMADEX 400 MG: 100 INJECTION, SOLUTION INTRAVENOUS at 16:01

## 2022-09-13 RX ADMIN — PHENYLEPHRINE HYDROCHLORIDE 200 MCG: 10 INJECTION INTRAVENOUS at 15:05

## 2022-09-13 RX ADMIN — LIDOCAINE HYDROCHLORIDE 20 MG: 10 INJECTION, SOLUTION INFILTRATION; PERINEURAL at 14:42

## 2022-09-13 RX ADMIN — Medication 10 MG: at 15:17

## 2022-09-13 RX ADMIN — PHENYLEPHRINE HYDROCHLORIDE 300 MCG: 10 INJECTION INTRAVENOUS at 15:23

## 2022-09-13 RX ADMIN — INSULIN ASPART 1 UNITS: 100 INJECTION, SOLUTION INTRAVENOUS; SUBCUTANEOUS at 20:19

## 2022-09-13 RX ADMIN — Medication 15 MG: at 15:21

## 2022-09-13 RX ADMIN — CLINDAMYCIN PHOSPHATE 900 MG: 900 INJECTION, SOLUTION INTRAVENOUS at 12:41

## 2022-09-13 RX ADMIN — FENTANYL CITRATE 100 MCG: 50 INJECTION, SOLUTION INTRAMUSCULAR; INTRAVENOUS at 14:42

## 2022-09-13 RX ADMIN — ROCURONIUM BROMIDE 50 MG: 10 INJECTION, SOLUTION INTRAVENOUS at 14:42

## 2022-09-13 RX ADMIN — PROPOFOL 200 MG: 10 INJECTION, EMULSION INTRAVENOUS at 14:42

## 2022-09-13 RX ADMIN — TRAZODONE HYDROCHLORIDE 200 MG: 100 TABLET ORAL at 22:36

## 2022-09-13 RX ADMIN — GABAPENTIN 100 MG: 100 CAPSULE ORAL at 12:36

## 2022-09-13 RX ADMIN — DEXAMETHASONE SODIUM PHOSPHATE 10 MG: 10 INJECTION, SOLUTION INTRAMUSCULAR; INTRAVENOUS at 15:08

## 2022-09-13 RX ADMIN — PHENYLEPHRINE HYDROCHLORIDE 200 MCG: 10 INJECTION INTRAVENOUS at 15:01

## 2022-09-13 RX ADMIN — SODIUM CHLORIDE, POTASSIUM CHLORIDE, SODIUM LACTATE AND CALCIUM CHLORIDE: 600; 310; 30; 20 INJECTION, SOLUTION INTRAVENOUS at 12:39

## 2022-09-13 ASSESSMENT — ACTIVITIES OF DAILY LIVING (ADL)
ADLS_ACUITY_SCORE: 36
ADLS_ACUITY_SCORE: 35
ADLS_ACUITY_SCORE: 36
ADLS_ACUITY_SCORE: 35
ADLS_ACUITY_SCORE: 36
ADLS_ACUITY_SCORE: 36

## 2022-09-13 NOTE — ANESTHESIA PREPROCEDURE EVALUATION
Anesthesia Pre-Procedure Evaluation    Patient: Eduardo Laughlin   MRN: [id number] : 1940        Procedure : Procedure(s):  LUMBAR 4 - LUMBAR 5 BILATERAL MEDIAL FACETECTOMY AND DECOMPRESSION          Past Medical History:   Diagnosis Date     Acute renal failure with acute tubular necrosis superimposed on stage 3 chronic kidney disease (H)      Arteriosclerotic cardiovascular disease (ASCVD)      Bladder cancer (H)      BPH (benign prostatic hypertrophy)      Chronic kidney disease      Complicated UTI (urinary tract infection) 2019     Coronary artery disease      Diabetes mellitus (H)      Diabetic neuropathy (H)      E coli bacteremia      GERD (gastroesophageal reflux disease)      Gout      Hyperlipidemia      Hypertension      Hyponatremia      Iliac artery aneurysm, left (H) 2019    Added automatically from request for surgery [id number]     Kidney stone      Osteoarthritis      Personal history of malignant neoplasm of prostate 2020     Prostate cancer (H)      Sleep apnea     CPAP      Past Surgical History:   Procedure Laterality Date     CARDIAC CATHETERIZATION  2008    and coronary angios     CYSTECTOMY       CYSTOSCOPY       CYSTOSCOPY N/A 2015    Procedure: CYSTOSCOPY BLADDER BIOPSIES with Fulgeration and Placement of Otero ;  Surgeon: Gordon Bajwa MD;  Location: [facility] OR;  Service:      IR EXTREMITY ANGIOGRAM LEFT  3/22/2019     IR EXTREMITY ANGIOGRAM LEFT  3/22/2019     IR ILEAL CONDUIT INJECTION  2016     IR NEPHROSTOMY TUBE PLACEMENT RIGHT  2016     IR URETEROSTOMY TUBE CHANGE RIGHT  2016     KIDNEY STONE SURGERY  x4     PROSTATE SURGERY        Allergies   Allergen Reactions     Metoprolol Succinate Er      Other reaction(s): low blood pressure     Pcn [Penicillins] Other (See Comments)     Childhood-doesn't know reactions     Statin Drugs [Hmg-Coa-R Inhibitors] Cramps     Uroxatral [Alfuzosin] Other (See Comments)     hypotension       Social History     Tobacco Use     Smoking status: Former Smoker     Smokeless tobacco: Never Used     Tobacco comment: Quit smoking 30 years ago   Substance Use Topics     Alcohol use: Not Currently      Wt Readings from Last 1 Encounters:   09/13/22 117.8 kg (259 lb 11.2 oz)        Anesthesia Evaluation            ROS/MED HX  ENT/Pulmonary:       Neurologic:  - neg neurologic ROS     Cardiovascular:       METS/Exercise Tolerance:     Hematologic:  - neg hematologic  ROS     Musculoskeletal:  - neg musculoskeletal ROS     GI/Hepatic:       Renal/Genitourinary:       Endo:       Psychiatric/Substance Use:  - neg psychiatric ROS     Infectious Disease:  - neg infectious disease ROS     Malignancy:  - neg malignancy ROS     Other:  - neg other ROS          Physical Exam    Airway  airway exam normal      Mallampati: II       Respiratory Devices and Support         Dental  no notable dental history         Cardiovascular   cardiovascular exam normal          Pulmonary   pulmonary exam normal                OUTSIDE LABS:  CBC:   Lab Results   Component Value Date    WBC 8.0 06/13/2020    WBC 5.8 04/26/2020    HGB 13.2 (L) 06/13/2020    HGB 12.1 (L) 04/30/2020    HCT 39.8 (L) 06/13/2020    HCT 34.1 (L) 04/26/2020     06/13/2020     04/28/2020     BMP:   Lab Results   Component Value Date     09/09/2022     03/03/2022    POTASSIUM 4.8 09/09/2022    POTASSIUM 4.6 03/03/2022    CHLORIDE 104 09/09/2022    CHLORIDE 108 (H) 03/03/2022    CO2 24 09/09/2022    CO2 20 (L) 03/03/2022    BUN 47.1 (H) 09/09/2022    BUN 30 (H) 03/03/2022    CR 2.46 (H) 09/09/2022    CR 2.34 (H) 03/03/2022     (H) 09/09/2022     (H) 03/03/2022     COAGS:   Lab Results   Component Value Date    PTT 25 04/24/2020    INR 2.89 (H) 06/13/2020     POC: No results found for: BGM, HCG, HCGS  HEPATIC:   Lab Results   Component Value Date    ALBUMIN 3.0 (L) 04/24/2020    PROTTOTAL 7.9 04/24/2020    ALT 20 04/24/2020     AST 22 04/24/2020    ALKPHOS 64 04/24/2020    BILITOTAL 0.3 04/24/2020     OTHER:   Lab Results   Component Value Date    LACT 1.6 04/24/2020    A1C 5.9 (H) 04/25/2020    GALLO 9.0 09/09/2022    MAG 2.3 08/27/2021    CRP 12.0 (H) 04/25/2020       Anesthesia Plan    ASA Status:  4      Anesthesia Type: General.     - Airway: ETT              Consents    Anesthesia Plan(s) and associated risks, benefits, and realistic alternatives discussed. Questions answered and patient/representative(s) expressed understanding.    - Discussed:     - Discussed with:  Patient         Postoperative Care            Comments:                Yadiel Duque MD

## 2022-09-13 NOTE — PROCEDURES
Procedure     PREOPERATIVE DIAGNOSES:  1.     L4-5 spinal stenosis with neurogenic claudication.  2.     Failure of conservative management.      POSTOPERATIVE DIAGNOSES:  1.     L4-5 spinal stenosis with neurogenic claudication.  2.     Failure of conservative management.      PROCEDURES PERFORMED:  1.     Left L4-5 laminotomy.  2.     Bilateral L4-5 medial facetectomies and bilateral lateral recess decompression.      ATTENDING SURGEON:  Everett Holland M.D.      FIRST ASSISTANT:  Hector Lerma PA-C assist required for the entire duration of the case, including patient positioning, soft tissue retraction, and patient safety. He was invaluable in the performance of the discectomy, particularly in protecting the edge of the dura and the nerve root. I would not have allowed this job to be done by a surgical tech, but by a trained surgical PA with training in spine.         ESTIMATED BLOOD LOSS:52 cc.     COMPLICATIONS:  None.     DETAILS OF PROCEDURE/INTRODUCTION:  The patient is a very [id number] year old male patient who came to the clinic with a longstanding history of low back pain and bilateral lower extremity pain consistent with imaging studies showing severe L4-5 spinal stenosis. The patient had exhausted nonoperative measures and continued to have debilitating symptoms. Therefore, I had a discussion with him regarding surgery and specifically discussed the risks including but not limited to death, infection, dural tear, nerve injury, and nonresolution of symptoms, among others, and he accepted and wished to proceed.      The patient was brought to the operating room and placed under general endotracheal anesthetic without complications. Antibiotics were given intravenously within 1 hour of incision. He was then placed prone on the Stefano table ensuring that all nerve areas and bony areas were padded and free of pressure. The low back area was then prepped and draped in routine sterile manner. After  appropriate timeout, I placed a marking needle into the skin and subcutaneous tissue and took intraoperative imaging to plan my incision. Thereafter, I made an incision over the L4 and L5  spinous processes and carried out a subperiosteal dissection on the left side to expose the interlaminar space. I took intraoperative imaging to confirm my level. I then,  on the L4-5 level on the left side, used a rosey to rosey down the medial descending facet of L4. I used a Kerrison rongeur to perform a laminotomy and medial facetectomy all the way to the medial border of the L4 and L5 pedicles on the left side. I then, through the same laminotomy, performed a contralateral lateral recess decompression and medial facetectomy all the way to the medial border of the L4 and L5 pedicles on the right side.  I then performed copious irrigation and hemostasis. At the end of the procedure, there was a capacious canal at the L4-5 level with no evidence of neural impingement. I performed closure of the fascia utilizing Vicryl 0 in a continuous fashion. I then closed the subcutaneous tissue layer utilizing Vicryl 2-0. Vicryl 3-0 was used for the skin. Marcaine 0.25% with epinephrine was then injected into the skin and subcutaneous tissues. Steri-Strips were applied followed by a sterile dressing. The patient was then placed supine on his bed and subsequently extubated without complications and brought to the recovery room in satisfactory condition.      POSTOPERATIVE PLAN:  The patient is to mobilize as tolerated. Oxycodone for pain. After discharge, the patient will be seen back in clinic in 6 weeks.

## 2022-09-13 NOTE — INTERVAL H&P NOTE
"I have reviewed the surgical (or preoperative) H&P that is linked to this encounter, and examined the patient. There are no significant changes    Clinical Conditions Present on Arrival:  Clinically Significant Risk Factors Present on Admission                 # Coagulation Defect: home medication list includes an anticoagulant medication   # Obesity: Estimated body mass index is 37.26 kg/m  as calculated from the following:    Height as of this encounter: 1.778 m (5' 10\").    Weight as of this encounter: 117.8 kg (259 lb 11.2 oz).       "

## 2022-09-13 NOTE — PHARMACY-ADMISSION MEDICATION HISTORY
Pharmacy Note - Admission Medication History    Pertinent Provider Information:    ______________________________________________________________________    Prior To Admission (PTA) med list completed and updated in EMR.       PTA Med List   Medication Sig Note Last Dose     acetaminophen (TYLENOL) 500 MG tablet Take 500-1,000 mg by mouth every 6 hours as needed  Past Week at Unknown time     allopurinol (ZYLOPRIM) 300 MG tablet Take 300 mg by mouth daily  9/13/2022 at Unknown time     amLODIPine (NORVASC) 5 MG tablet Take 7.5 mg by mouth At Bedtime  9/12/2022 at Unknown time     amLODIPine (NORVASC) 5 MG tablet Take 2.5 mg by mouth daily as needed (For elevated BP SBP > 140)  Past Week at Unknown time     doxycycline hyclate (VIBRAMYCIN) 50 MG capsule Take 50 mg by mouth daily  9/12/2022 at Unknown time     ezetimibe (ZETIA) 10 MG tablet Take 10 mg by mouth daily  9/13/2022 at Unknown time     glimepiride (AMARYL) 1 MG tablet Take 1 mg by mouth daily  9/12/2022 at Unknown time     lisinopril (ZESTRIL) 20 MG tablet Take 20 mg by mouth 2 times daily  9/12/2022 at pm     Melatonin 10 MG TABS tablet Take 10 mg by mouth nightly as needed for sleep  Past Week at Unknown time     metoprolol succinate ER (TOPROL XL) 100 MG 24 hr tablet Take 50 mg by mouth daily  9/13/2022 at am     temazepam (RESTORIL) 15 MG capsule Take 15 mg by mouth nightly as needed for sleep  9/12/2022 at Unknown time     traZODone (DESYREL) 100 MG tablet Take 100-200 mg by mouth At Bedtime  9/12/2022 at Unknown time     warfarin ANTICOAGULANT (COUMADIN) 5 MG tablet Take 5-7.5 mg by mouth See Admin Instructions 7.5 mg MWF and 5 mg all other days 9/7/2022: LD 9/6 9/6/2022       Information source(s): Patient and CareEverywhere/SureScripts    Method of interview communication: in-person    Patient was asked about OTC/herbal products specifically.  PTA med list reflects this.    Based on the pharmacist's assessment, the PTA med list information appears  reliable    Allergies were reviewed, assessed, and updated with the patient.      Patient does not use any multi-dose medications prior to admission.     Thank you for the opportunity to participate in the care of this patient.      Isaac Pittman RPH     9/13/2022     12:47 PM

## 2022-09-13 NOTE — ANESTHESIA PROCEDURE NOTES
Airway       Patient location during procedure: OR       Procedure Start/Stop Times: 9/13/2022 2:45 PM  Staff -        Anesthesiologist:  Yadiel Duque MD       CRNA: Marcelina Mccarthy APRN CRNA       Performed By: CRNA  Consent for Airway        Urgency: elective  Indications and Patient Condition       Indications for airway management: charles-procedural and airway protection       Induction type:intravenous       Mask difficulty assessment: 2 - vent by mask + OA or adjuvant +/- NMBA    Final Airway Details       Final airway type: endotracheal airway       Successful airway: ETT - single  Endotracheal Airway Details        ETT size (mm): 7.0       Cuffed: yes       Successful intubation technique: video laryngoscopy       VL Blade Size: Glidescope 4       Grade View of Cords: 1       Adjucts: stylet       Position: Right       Measured from: lips       Secured at (cm): 22       Bite block used: None    Post intubation assessment        Placement verified by: capnometry, equal breath sounds and chest rise        Number of attempts at approach: 1       Number of other approaches attempted: 0       Secured with: silk tape       Ease of procedure: easy       Dentition: Intact and Unchanged    Medication(s) Administered   Medication Administration Time: 9/13/2022 2:45 PM

## 2022-09-13 NOTE — ANESTHESIA CARE TRANSFER NOTE
Patient: Eduardo Laughlin    Procedure: Procedure(s):  LUMBAR 4 - LUMBAR 5 BILATERAL MEDIAL FACETECTOMY AND DECOMPRESSION       Diagnosis: Neurogenic claudication (H) [G95.19]  Spinal stenosis [M48.00]  Diagnosis Additional Information: No value filed.    Anesthesia Type:   General     Note:    Oropharynx: oropharynx clear of all foreign objects  Level of Consciousness: awake  Oxygen Supplementation: face mask  Level of Supplemental Oxygen (L/min / FiO2): 8  Independent Airway: airway patency satisfactory and stable  Dentition: dentition unchanged  Vital Signs Stable: post-procedure vital signs reviewed and stable  Report to RN Given: handoff report given  Patient transferred to: PACU    Handoff Report: Identifed the Patient, Identified the Reponsible Provider, Reviewed the pertinent medical history, Discussed the surgical course, Reviewed Intra-OP anesthesia mangement and issues during anesthesia, Set expectations for post-procedure period and Allowed opportunity for questions and acknowledgement of understanding      Vitals:  Vitals Value Taken Time   /79 09/13/22 1616   Temp 36.8  C (98.2  F) 09/13/22 1616   Pulse 78 09/13/22 1617   Resp 21 09/13/22 1617   SpO2 98 % 09/13/22 1617   Vitals shown include unvalidated device data.    Electronically Signed By: CARLOS VINCENT CRNA  September 13, 2022  4:18 PM

## 2022-09-13 NOTE — CONSULTS
[facility] MEDICINE CONSULT NOTE   Physician requesting consult: Everett Holland*    Reason for consult: Postoperative medical management of medical co-morbidities as below    Assessment and Plan    81 year old old male with a history of neurogenic claudication due to lumbar spinal stenosis who underwent a bilateral L4-5 medial facetectomy and decompression.    [facility] service was asked to evaluate patient for postoperative medical management as follows below. Please resume the home medications as reconciled and further noted below with ordered hold parameters.  Vital signs have been stable post-operatively including hemodynamically stable blood pressure and heart rate. Thank you for this consult; we will continue to follow this patient until discharge.    Problem list:    1.  History of HTN: restart amlodipine 7.5 mg daily  2.  History of DM2: restart glimepiride 1 mg daily; glucose monitoring; insulin    If needed  3.  GINNY: does not use a CPAP device; pulse oximeter tonight  4.  History of left iliac vein dvt: prophylaxis per ortho  5.  Chronic atrial fibrillation with coumadin use: restart per ortho  6.  Code status: full  7.  Disposition: possible home tomorrow      Procedure(s):  LUMBAR 4 - LUMBAR 5 BILATERAL MEDIAL FACETECTOMY AND DECOMPRESSION  Post-operative Day: Day of Surgery  Code status:No Order     Type of Anesthesia       Estimated Blood Loss:  5 mL    Hospital Problem List   No problem-specific Assessment & Plan notes found for this encounter.    Active Problems:    Status post spinal surgery      -Reviewed the patient's preoperative H and P and updated missing elements.  -Home medication reconciliation has been reviewed.  Medications have been ordered as noted from the home list and changes are documented above     HISTORY     81 year old male into the hospital for an elective bilateral L4-5 median facetectomy and decompression.  PMHx includes diabetes mellitus type 2  non insulin, essential hypertension, paroxsysmal atrial fibrillation, history of dvt due to prolonged immobilization, ckd (baseline creatinine about 2.4 with the same on   9.9)    Past Medical History       1.    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) 04/27/2020     Priority: Medium     Hydronephrosis of right kidney      Priority: Medium     Coronary artery disease      Priority: Medium     Paroxysmal atrial fibrillation (H)      Priority: Medium     LBBB (left bundle branch block)      Priority: Medium     Deep vein thrombosis (DVT) of proximal vein of both lower extremities, unspecified chronicity (H)      Priority: Medium     Acute on chronic renal insufficiency 04/24/2020     Priority: Medium     Chronic deep vein thrombosis (DVT) of femoral vein of right lower extremity (H) 12/04/2019     Priority: Medium     S/P ileal conduit (H) 08/18/2016     Priority: Medium     HTN (hypertension) 08/18/2016     Priority: Medium     CKD (chronic kidney disease) stage 3, GFR 30-59 ml/min (H) 08/18/2016     Priority: Medium     Bladder cancer (H) 01/27/2016     Priority: Medium        Surgical History   He  has a past surgical history that includes IR Nephrostomy Tube Placement Right (8/18/2016); IR Ureterostomy Tube Change Right (9/21/2016); IR Ileal Conduit Injection (12/23/2016); IR Extremity Angiogram Left (3/22/2019); Cystoscopy; Kidney Stone Surgery (x4); Cystoscopy (N/A, 12/23/2015); Prostate surgery; Cystectomy; IR Extremity Angiogram Left (3/22/2019); and  Cardiac catheterization (03/28/2008).     Past Surgical History:   Procedure Laterality Date     CARDIAC CATHETERIZATION  03/28/2008    and coronary angios     CYSTECTOMY       CYSTOSCOPY       CYSTOSCOPY N/A 12/23/2015    Procedure: CYSTOSCOPY BLADDER BIOPSIES with Fulgeration and Placement of Otero ;  Surgeon: Gordon Bajwa MD;  Location: [facility] [place];  Service:      IR EXTREMITY ANGIOGRAM LEFT  3/22/2019     IR EXTREMITY ANGIOGRAM LEFT  3/22/2019     IR ILEAL CONDUIT INJECTION  12/23/2016     IR NEPHROSTOMY TUBE PLACEMENT RIGHT  8/18/2016     IR URETEROSTOMY TUBE CHANGE RIGHT  9/21/2016     KIDNEY STONE SURGERY  x4     PROSTATE SURGERY         Family History    Reviewed, and family history includes Cancer in his sister; Deep Vein Thrombosis in his father; Heart Disease in his brother, sister, and sister; No Known Problems in his daughter, daughter, and mother; Prostate Cancer in his father.    Social History    Reviewed, and he  reports that he has quit smoking. He has never used smokeless tobacco. He reports previous alcohol use. He reports that he does not use drugs.  Social History     Tobacco Use     Smoking status: Former Smoker     Smokeless tobacco: Never Used     Tobacco comment: Quit smoking 30 years ago   Substance Use Topics     Alcohol use: Not Currently       Allergies     Allergies   Allergen Reactions     Metoprolol Succinate Er      Other reaction(s): low blood pressure     Pcn [Penicillins] Other (See Comments)     Childhood-doesn't know reactions     Statin Drugs [Hmg-Coa-R Inhibitors] Cramps     Uroxatral [Alfuzosin] Other (See Comments)     hypotension       Prior to Admission Medications      Medications Prior to Admission   Medication Sig Dispense Refill Last Dose     acetaminophen (TYLENOL) 500 MG tablet Take 500-1,000 mg by mouth every 6 hours as needed   Past Week at Unknown time     allopurinol (ZYLOPRIM) 300 MG tablet Take 300 mg by mouth daily   9/13/2022 at Unknown time      amLODIPine (NORVASC) 5 MG tablet Take 7.5 mg by mouth At Bedtime   9/12/2022 at Unknown time     amLODIPine (NORVASC) 5 MG tablet Take 2.5 mg by mouth daily as needed (For elevated BP SBP > 140)   Past Week at Unknown time     doxycycline hyclate (VIBRAMYCIN) 50 MG capsule Take 50 mg by mouth daily   9/12/2022 at Unknown time     ezetimibe (ZETIA) 10 MG tablet Take 10 mg by mouth daily   9/13/2022 at Unknown time     glimepiride (AMARYL) 1 MG tablet Take 1 mg by mouth daily   9/12/2022 at Unknown time     lisinopril (ZESTRIL) 20 MG tablet Take 20 mg by mouth 2 times daily   9/12/2022 at pm     Melatonin 10 MG TABS tablet Take 10 mg by mouth nightly as needed for sleep   Past Week at Unknown time     metoprolol succinate ER (TOPROL XL) 100 MG 24 hr tablet Take 50 mg by mouth daily   9/13/2022 at am     temazepam (RESTORIL) 15 MG capsule Take 15 mg by mouth nightly as needed for sleep   9/12/2022 at Unknown time     traZODone (DESYREL) 100 MG tablet Take 100-200 mg by mouth At Bedtime   9/12/2022 at Unknown time     warfarin ANTICOAGULANT (COUMADIN) 5 MG tablet Take 5-7.5 mg by mouth See Admin Instructions 7.5 mg MWF and 5 mg all other days   9/6/2022       Review of Systems   A 12 point comprehensive review of systems was negative except as noted above.    OBJECTIVE         Physical Exam   Temp:  [97  F (36.1  C)-98.3  F (36.8  C)] 98.3  F (36.8  C)  Pulse:  [62-80] 76  Resp:  [16-22] 21  BP: (131-182)/(63-91) 136/70  SpO2:  [93 %-98 %] 93 %  Body mass index is 37.26 kg/m .    GENERAL:  Alert, appears comfortable, in no acute distress, appears stated age   HEAD:  Normocephalic, without obvious abnormality, atraumatic   EYES:  PERRL, conjunctiva/corneas clear, no scleral icterus, EOM's intact   NOSE: Nares normal, septum midline, mucosa normal, no drainage   THROAT: Lips, mucosa, and tongue normal; teeth and gums normal, mouth moist   NECK: Supple, symmetrical, trachea midline   BACK:   Symmetric, no curvature, ROM  "normal   LUNGS:   Clear to auscultation bilaterally, no rales, rhonchi, or wheezing, symmetric chest rise on inhalation, respirations unlabored   CHEST WALL:  No tenderness or deformity   HEART:  Regular rate and rhythm, S1 and S2 normal, no murmur, rub, or gallop    ABDOMEN:   Soft, non-tender, bowel sounds active all four quadrants, no masses, no organomegaly, no rebound or guarding   EXTREMITIES: Extremities normal, atraumatic, no cyanosis or edema    SKIN: Post op dressing not pulled   NEURO: Alert, oriented x 4, moves all four extremities freely/spontaneously   PSYCH: Cooperative, behavior is appropriate        Cardiographics Reviewed Personally By Myself   EKG Results: 9/12 normal sinus rhythm with LBBB (old), stable    Imaging Reviewed Personally By Myself    Radiology Results:   Recent Results (from the past 24 hour(s))   POC US Guidance Needle Placement    Narrative    Ultrasound was performed as guidance to an anesthesia procedure.  Click   \"PACS images\" hyperlink below to view any stored images.  For specific   procedure details, view procedure note authored by anesthesia.   XR Surgery KASH L/T 5 Min Fluoro    Narrative    This exam was marked as non-reportable because it will not be read by a   radiologist or a [place] non-radiologist provider.             Labs Reviewed Personally By Myself     Most Recent 3 BMP's:Recent Labs   Lab Test 09/13/22  1620 09/13/22  1234 09/09/22  1128 03/03/22  0921 11/30/21  1058   NA  --   --  136 138 136   POTASSIUM  --   --  4.8 4.6 4.7   CHLORIDE  --   --  104 108* 106   CO2  --   --  24 20* 18*   BUN  --   --  47.1* 30* 46*   CR  --   --  2.46* 2.34* 3.31*   ANIONGAP  --   --  8 10 12   GALLO  --   --  9.0 8.6 8.9   * 148* 174* 155* 168*       Preoperative Labs Reviewed Personally By Myself     Thank you for this consultation.  Appreciate the opportunity to participate in the care of Eduardo SILAS Melgarjoanne, please feel free to contact us for any questions or " concerns.    Sidra Siddiqui MD  [facility] Medicine  [facility]  Phone: #[phone]

## 2022-09-13 NOTE — PROGRESS NOTES
[facility] Orthopedic Brief Operative Note    Pre-operative diagnosis: Neurogenic claudication (H) [G95.19]  Spinal stenosis [M48.00]   Post-operative diagnosis: Same   Procedure: Bilateral L4-5 medial facetectomy and decompression   Surgeon: Everett Holland   Assistant(s): Hector Lerma PA-C   Anesthesia: General endotracheal anesthesia   Estimated blood loss: Less than 50 ml   Total IV fluids: (See anesthesia record)   Blood transfusion: No transfusion was given during surgery   Total urine output: (See anesthesia record)   Drains: None   Specimens: None   Implants: None   Findings: L4-5 spinal stenosis   Complications: None   Condition: Stable   Weight bearing status: Weight bearing as tolerated   Activity: Activity as tolerated  Patient may move about with assist as indicated or with supervision   Orthotic management: Not applicable   Comments: See dictated operative report for full details     Hector Lerma PA-C  [facility]

## 2022-09-14 ENCOUNTER — APPOINTMENT (OUTPATIENT)
Dept: OCCUPATIONAL THERAPY | Facility: CLINIC | Age: 82
End: 2022-09-14
Attending: ORTHOPAEDIC SURGERY
Payer: COMMERCIAL

## 2022-09-14 ENCOUNTER — APPOINTMENT (OUTPATIENT)
Dept: PHYSICAL THERAPY | Facility: CLINIC | Age: 82
End: 2022-09-14
Attending: ORTHOPAEDIC SURGERY
Payer: COMMERCIAL

## 2022-09-14 VITALS
WEIGHT: 259.7 LBS | DIASTOLIC BLOOD PRESSURE: 64 MMHG | SYSTOLIC BLOOD PRESSURE: 118 MMHG | HEIGHT: 70 IN | OXYGEN SATURATION: 96 % | RESPIRATION RATE: 16 BRPM | BODY MASS INDEX: 37.18 KG/M2 | TEMPERATURE: 97.1 F | HEART RATE: 75 BPM

## 2022-09-14 LAB
FASTING STATUS PATIENT QL REPORTED: ABNORMAL
GLUCOSE BLD-MCNC: 227 MG/DL (ref 70–125)
GLUCOSE BLDC GLUCOMTR-MCNC: 204 MG/DL (ref 70–99)
INR PPP: 1.11 (ref 0.85–1.15)

## 2022-09-14 PROCEDURE — 82962 GLUCOSE BLOOD TEST: CPT

## 2022-09-14 PROCEDURE — 250N000011 HC RX IP 250 OP 636: Performed by: PHYSICIAN ASSISTANT

## 2022-09-14 PROCEDURE — 250N000013 HC RX MED GY IP 250 OP 250 PS 637: Performed by: EMERGENCY MEDICINE

## 2022-09-14 PROCEDURE — 97535 SELF CARE MNGMENT TRAINING: CPT | Mod: GO

## 2022-09-14 PROCEDURE — 36415 COLL VENOUS BLD VENIPUNCTURE: CPT | Performed by: EMERGENCY MEDICINE

## 2022-09-14 PROCEDURE — 97110 THERAPEUTIC EXERCISES: CPT | Mod: GP

## 2022-09-14 PROCEDURE — 97166 OT EVAL MOD COMPLEX 45 MIN: CPT | Mod: GO

## 2022-09-14 PROCEDURE — 99214 OFFICE O/P EST MOD 30 MIN: CPT | Performed by: EMERGENCY MEDICINE

## 2022-09-14 PROCEDURE — 97116 GAIT TRAINING THERAPY: CPT | Mod: GP

## 2022-09-14 PROCEDURE — 82947 ASSAY GLUCOSE BLOOD QUANT: CPT | Mod: 91 | Performed by: ORTHOPAEDIC SURGERY

## 2022-09-14 PROCEDURE — 97161 PT EVAL LOW COMPLEX 20 MIN: CPT | Mod: GP

## 2022-09-14 PROCEDURE — 85610 PROTHROMBIN TIME: CPT | Performed by: EMERGENCY MEDICINE

## 2022-09-14 RX ORDER — FENTANYL CITRATE 50 UG/ML
25 INJECTION, SOLUTION INTRAMUSCULAR; INTRAVENOUS
Status: DISCONTINUED | OUTPATIENT
Start: 2022-09-14 | End: 2022-09-14 | Stop reason: CLARIF

## 2022-09-14 RX ADMIN — LISINOPRIL 20 MG: 20 TABLET ORAL at 08:02

## 2022-09-14 RX ADMIN — CLINDAMYCIN PHOSPHATE 900 MG: 900 INJECTION, SOLUTION INTRAVENOUS at 04:04

## 2022-09-14 RX ADMIN — DOXYCYCLINE HYCLATE 50 MG: 50 CAPSULE ORAL at 08:02

## 2022-09-14 RX ADMIN — METOPROLOL SUCCINATE 50 MG: 50 TABLET, EXTENDED RELEASE ORAL at 08:03

## 2022-09-14 RX ADMIN — EZETIMIBE 10 MG: 10 TABLET ORAL at 08:02

## 2022-09-14 RX ADMIN — ALLOPURINOL 300 MG: 300 TABLET ORAL at 08:03

## 2022-09-14 RX ADMIN — INSULIN ASPART 1 UNITS: 100 INJECTION, SOLUTION INTRAVENOUS; SUBCUTANEOUS at 08:49

## 2022-09-14 RX ADMIN — GLIMEPIRIDE 1 MG: 1 TABLET ORAL at 08:02

## 2022-09-14 ASSESSMENT — ACTIVITIES OF DAILY LIVING (ADL)
ADLS_ACUITY_SCORE: 36
ADLS_ACUITY_SCORE: 38

## 2022-09-14 NOTE — PLAN OF CARE
Patient vital signs are at baseline: Yes  Patient able to ambulate as they were prior to admission or with assist devices provided by therapies during their stay:  Yes  Patient MUST void prior to discharge:  Yes  Patient able to tolerate oral intake:  Yes  Pain has adequate pain control using Oral analgesics:  Yes  Does patient have an identified :  Yes  Has goal D/C date and time been discussed with patient:  Yes - Pt CMS is intact and baseline. ABD to incision is CDI. Denies much pain. Plan to discharge at 1000 this morning.

## 2022-09-14 NOTE — ANESTHESIA POSTPROCEDURE EVALUATION
Patient: Eduardo Laughlin    Procedure: Procedure(s):  LUMBAR 4 - LUMBAR 5 BILATERAL MEDIAL FACETECTOMY AND DECOMPRESSION       Anesthesia Type:  General    Note:     Postop Pain Control: Uneventful            Sign Out: Well controlled pain   PONV: No   Neuro/Psych: Uneventful            Sign Out: Acceptable/Baseline neuro status   Airway/Respiratory: Uneventful            Sign Out: Acceptable/Baseline resp. status   CV/Hemodynamics: Uneventful            Sign Out: Acceptable CV status; No obvious hypovolemia; No obvious fluid overload   Other NRE: NONE   DID A NON-ROUTINE EVENT OCCUR? No           Last vitals:  Vitals Value Taken Time   /70 09/13/22 1700   Temp 36.8  C (98.3  F) 09/13/22 1700   Pulse 77 09/13/22 1701   Resp 21 09/13/22 1700   SpO2 93 % 09/13/22 1701   Vitals shown include unvalidated device data.    Electronically Signed By: Freddie Proctor MD  September 13, 2022  9:17 PM

## 2022-09-14 NOTE — PROGRESS NOTES
Physical Therapy Discharge Summary    Reason for therapy discharge:    Discharged to home.  All goals and outcomes met, no further needs identified.    Progress towards therapy goal(s). See goals on Care Plan in [facility] electronic health record for goal details.  Goals met    Therapy recommendation(s):    Continue home exercise program.

## 2022-09-14 NOTE — PROGRESS NOTES
Occupational Therapy Discharge Summary    Reason for therapy discharge:    Discharged to home.    Progress towards therapy goal(s). See goals on Care Plan in [facility] electronic health record for goal details.  Goals met    Therapy recommendation(s):    No further therapy is recommended.

## 2022-09-14 NOTE — PROGRESS NOTES
"Orthopedic Progress Note      Assessment: 1 Day Post-Op  S/P Procedure(s):  LUMBAR 4 - LUMBAR 5 BILATERAL MEDIAL FACETECTOMY AND DECOMPRESSION @    Plan:   -Continue PT/OT.   -Weightbearing status: WBAT.  -Avoid bending, lifting & twisting x6 weeks. No lifting greater than 10 pounds x6 weeks.  -DVT prophylaxis with SCDs, stalin stockings and early ambulation.  -Anticoagulation: None due to Spine surgery. May resume Coumadin on 9/18/22.  -NO aspirin, anticoagulation or NSAIDS (advil/ibuprofen, aleve/naproxen, celebrex) for 5 days after surgery.  -Encourage IS.  -Appreciate hospitalist recs.  -Discharge planning: Anticipate discharge to home today. Discussed with him that he'll resume Coumadin on 9/18/22.    Subjective:  Pain: No pain while at rest.   Nausea, Vomiting:  no  Chest pain: No  Lightheadedness, Dizziness:  no  Neuro:  Patient denies new onset numbness or paresthesias in bilateral lower extremities    Patient doing very well on POD #1. Not really having any pain while at rest. Having surgical pain while up and moving. Tolerating oral intake. Ambulating in hallways. Urostomy in place.     Objective:  BP (!) 150/71 (BP Location: Left arm)   Pulse 75   Temp 97.1  F (36.2  C) (Oral)   Resp 18   Ht 1.778 m (5' 10\")   Wt 117.8 kg (259 lb 11.2 oz)   SpO2 96%   BMI 37.26 kg/m    The patient is A&Ox3. Appears comfortable. Sitting up at bedside. Conversational.  Sensation is intact to light touch in L2-S1 dermatomes bilaterally.  Motor: Left lower extremity +5/5 hip flexion, knee extension, ankle plantarflexion, dorsiflexion & EHL. Right lower extremity +5/5 hip flexion, knee extension, ankle plantar flexion; +4.5/5 ankle dorsiflexion & EHL.  Dorsalis pedis pulses intact bilaterally (R>L).  Calves are soft and non-tender. Negative Adrienne's.  Back incision is covered. Removed part of dressing and incision c/d/i, no surrounding erythema, re-taped dressing.     Pertinent Labs   Lab Results: personally reviewed.   Lab " Results   Component Value Date    INR 1.11 09/14/2022    INR 1.06 09/13/2022    INR 2.89 (H) 06/13/2020     Lab Results   Component Value Date    WBC 8.0 06/13/2020    HGB 13.2 (L) 06/13/2020    HCT 39.8 (L) 06/13/2020    MCV 94 06/13/2020     06/13/2020     Lab Results   Component Value Date     09/09/2022    CO2 24 09/09/2022       Report completed by:  Jeanette Katz PA-C  [facility]    Date: 9/14/2022  Time: 8:01 AM

## 2022-09-14 NOTE — PLAN OF CARE
Patient vital signs are at baseline: No - BP elevated   Patient able to ambulate as they were prior to admission or with assist devices provided by therapies during their stay:  Yes  Patient MUST void prior to discharge:  No,  Reason:  Pt has urostomy bag. Home appliance. Draining adequately.   Patient able to tolerate oral intake:  Yes  Pain has adequate pain control using Oral analgesics:  Yes  Does patient have an identified :  Yes  Has goal D/C date and time been discussed with patient:  Yes     Pt has discomfort at surgery site- declined MAR interventions. Ice pack removed due to causing pressure on surgery site per pt. Pt was repositioned. Pt ambulating in [place]. Urostomy in place - pt placed new appliance this AM.

## 2022-09-14 NOTE — DISCHARGE SUMMARY
ORTHOPEDIC DISCHARGE SUMMARY       Eduardo Laughlin,  1940, MRN [id number]    Admission Date: 2022      Admission Diagnoses: Neurogenic claudication (H) [G95.19]  Spinal stenosis [M48.00]  Status post spinal surgery [Z98.890]     Discharge Date: 2022     Post-operative Day:  1 Day Post-Op    Reason for Admission: The patient was admitted for the following: Procedure(s):  LUMBAR 4 - LUMBAR 5 BILATERAL MEDIAL FACETECTOMY AND DECOMPRESSION    BRIEF HOSPITAL COURSE   Eduardo Laughlin is a pleasant 81 year old male who underwent the aforementioned procedure with Dr. Holland on 2022. There were no intraoperative complications and the patient was transferred to the recovery room and later the orthopedic unit in stable condition. Once the patient reached the orthopedic floor our orthopedic pain protocol was implemented along with the following:    Anticoagulation Medications: None Patient will resume Coumadin on 2022. INR 1.11 at discharge.  Therapy: PT and OT  Activity: Avoid bending, lifting & twisting x6 weeks. No lifting greater than 10 pounds x6 weeks.  Bracing: None    Consultations during Admission: Hospitalist service for medical management     COMPLICATIONS/SIGNIFICANT FINDINGS    None    DISCHARGE INFORMATION   Condition at discharge: Good  Discharge destination: Home  Patient was seen by myself on the date of discharge.    FOLLOW UP CARE   Follow up with orthopedics in 2 weeks or sooner should the need arise. Ortho will continue to manage pain control, post op anticoagulation and incision care.     Follow up with your PCP for management of chronic medical problems and to evaluate for post op medical complications including constipation, nausea/vomiting, DVT/PE, anemia, changes in blood pressure, fevers/chills, urinary retention and atelectasis/pneumonia.     Subjective   Patient is doing well on POD #1. Pain is well controlled with oral medications. Ambulating. Tolerating oral  "intake. Urostomy in place.     Physical Exam   BP (!) 150/71 (BP Location: Left arm)   Pulse 75   Temp 97.1  F (36.2  C) (Oral)   Resp 18   Ht 1.778 m (5' 10\")   Wt 117.8 kg (259 lb 11.2 oz)   SpO2 96%   BMI 37.26 kg/m    The patient is A&Ox3. Appears comfortable. Sitting up at bedside. Conversational.  Sensation is intact to light touch in L2-S1 dermatomes bilaterally.  Motor: Left lower extremity +5/5 hip flexion, knee extension, ankle plantarflexion, dorsiflexion & EHL. Right lower extremity +5/5 hip flexion, knee extension, ankle plantar flexion; +4.5/5 ankle dorsiflexion & EHL.  Dorsalis pedis pulses intact bilaterally (R>L).  Calves are soft and non-tender. Negative Adrienne's.  Back incision is covered. Removed part of dressing and incision c/d/i, no surrounding erythema, re-taped dressing.     Pertinent Results at Discharge     Hemoglobin   Date/Time Value Ref Range Status   06/13/2020 09:42 PM 13.2 (L) 13.3 - 17.7 g/dL Final   04/30/2020 02:05 PM 12.1 (L) 14.0 - 18.0 g/dL Final   04/28/2020 05:34 AM 12.0 (L) 14.0 - 18.0 g/dL Final   04/26/2020 05:24 AM 11.6 (L) 14.0 - 18.0 g/dL Final   02/19/2016 09:10 PM 10.5 (L) 13.3 - 17.7 g/dL Final     INR   Date/Time Value Ref Range Status   09/14/2022 05:53 AM 1.11 0.85 - 1.15 Final   09/13/2022 12:33 PM 1.06 0.85 - 1.15 Final   06/13/2020 09:42 PM 2.89 (H) 0.86 - 1.14 Final   04/28/2020 05:34 AM 1.13 (H) 0.90 - 1.10 Final     Platelet Count   Date/Time Value Ref Range Status   06/13/2020 09:42  150 - 450 10e9/L Final   04/28/2020 05:34  140 - 440 thou/uL Final   04/26/2020 05:24  140 - 440 thou/uL Final   04/25/2020 06:55  140 - 440 thou/uL Final   04/25/2020 06:55  140 - 440 thou/uL Final       Problem List   Active Problems:    Status post spinal surgery    Jeanette Katz PA-C/Dr. Holland  [facility]  [phone]  Date: 9/14/2022  Time: 8:12 AM    "

## 2022-09-14 NOTE — PROGRESS NOTES
[facility]      OUTPATIENT PHYSICAL THERAPY EVALUATION  PLAN OF TREATMENT FOR OUTPATIENT REHABILITATION  (COMPLETE FOR INITIAL CLAIMS ONLY)  Patient's Last Name, First Name, M.I.  YOB: 1940  Eduardo Laughlin                        Provider's Name  [facility] Medical Record No.  [id number]                               Onset Date:  09/13/22   Start of Care Date:  09/14/22      Type:     _X_PT   ___OT   ___SLP Medical Diagnosis:  s/p lumbar decomp                        PT Diagnosis:  Impaired functional mobility   Visits from [facility] [place]:  1   _________________________________________________________________________________  Plan of Treatment/Functional Goals    Planned Interventions: gait training, home exercise program, patient/family education, ROM (range of motion), strengthening     Goals: See Physical Therapy Goals on Care Plan in [employer] electronic health record.    Therapy Frequency: One time eval and treatment only  Predicted Duration of Therapy Intervention: 09/14/22  _________________________________________________________________________________    I CERTIFY THE NEED FOR THESE SERVICES FURNISHED UNDER        THIS PLAN OF TREATMENT AND WHILE UNDER MY CARE     (Physician co-signature of this document indicates review and certification of the therapy plan).              Certification date from: 09/14/22, Certification date to: 10/14/22    Referring Physician: Dr. Negro Holland            Initial Assessment        See Physical Therapy evaluation dated 09/14/22 in Epic electronic health record.

## 2022-09-14 NOTE — PROGRESS NOTES
09/14/22 0830   Quick Adds   Quick Adds Certification   Type of Visit Initial Occupational Therapy Evaluation   Living Environment   People in Home spouse   Current Living Arrangements apartment   Self-Care   Usual Activity Tolerance moderate   Current Activity Tolerance moderate   Activity/Exercise/Self-Care Comment indep with all IADL includ driving   General Information   Onset of Illness/Injury or Date of Surgery 09/13/22   Referring Physician Dr. Holland   Patient/Family Therapy Goal Statement (OT) home today   Additional Occupational Profile Info/Pertinent History of Current Problem mod   Existing Precautions/Restrictions spinal;lifting   Cognitive Status Examination   Orientation Status orientation to person, place and time   Memory Deficit minimal deficit   Sensory   Sensory Quick Adds No deficits were identified   Pain Assessment   Patient Currently in Pain No   Posture   Posture forward head position   Range of Motion Comprehensive   General Range of Motion no range of motion deficits identified   Strength Comprehensive (MMT)   Comment, General Manual Muscle Testing (MMT) Assessment grossly intact   Coordination   Upper Extremity Coordination No deficits were identified   Bed Mobility   Comment (Bed Mobility) SBA with cues   Transfers   Transfer Comments SBA with cues   Balance   Balance Comments decreased slightly   Activities of Daily Living   BADL Assessment/Intervention lower body dressing   Lower Body Dressing Assessment/Training   [place] Level (Lower Body Dressing) minimum assist (75% patient effort)   Clinical Impression   Criteria for Skilled Therapeutic Interventions Met (OT) Yes, treatment indicated   OT Diagnosis decr ADL indep/safety   Influenced by the following impairments spinal surgery   OT Problem List-Impairments impacting ADL activity tolerance impaired;balance;cognition;mobility;post-surgical precautions   Assessment of Occupational Performance 3-5 Performance Deficits    Identified Performance Deficits dressing, home mgmt, bathing   Planned Therapy Interventions (OT) ADL retraining;transfer training   Clinical Decision Making Complexity (OT) moderate complexity   Anticipated Equipment Needs Upon Discharge (OT) raised toilet seat   Risk & Benefits of therapy have been explained care plan/treatment goals reviewed;patient   Clinical Impression Comments Pt needed reinforcement for spinal precautions, as tended to forget.   OT Discharge Planning   OT Discharge Recommendation (DC Rec) (S)  home with assist   OT Rationale for DC Rec pt may need assist for I/ADL   Therapy Certification   Start of Care Date 09/14/22   Certification date from 09/14/22   Certification date to 10/14/22   Total Evaluation Time (Minutes)   Total Evaluation Time (Minutes) 10   OT Goals   Therapy Frequency (OT) One time eval and treatment   OT Predicted Duration/Target Date for Goal Attainment 09/14/22   OT Goals Lower Body Dressing;Bed Mobility   OT: Lower Body Dressing Supervision/stand-by assist;within precautions;using adaptive equipment;Goal Met;Completed  (after increased cues)   OT: Bed Mobility Modified independent;supine to/from sitting;within precautions;Goal Met;Completed

## 2022-09-14 NOTE — CONSULTS
[facility] MEDICINE CONSULT NOTE  Follow up   Physician requesting consult: Everett Holland*    Reason for consult: Postoperative medical management of medical co-morbidities as below    Assessment and Plan    81 year old old male with a history of neurogenic claudication due to lumbar spinal stenosis who underwent a bilateral L4-5 medial facetectomy and decompression.    [facility] service was asked to evaluate patient for postoperative medical management as follows below. Please resume the home medications as reconciled and further noted below with ordered hold parameters.  Vital signs have been stable post-operatively including hemodynamically stable blood pressure and heart rate. Thank you for this consult; we will continue to follow this patient until discharge.    Problem list:    1.  History of HTN: restart amlodipine 7.5 mg daily  2.  History of DM2: restart glimepiride 1 mg daily; glucose monitoring; insulin    If needed  3.  GINNY: does not use a CPAP device; pulse oximeter tonight  4.  History of left iliac vein dvt: ambulation, TEDS, discussed with ortho;   5.  Chronic atrial fibrillation with coumadin use: ortho requests restart on 9/18  6.  Code status: full  7.  Disposition: home today      Procedure(s):  LUMBAR 4 - LUMBAR 5 BILATERAL MEDIAL FACETECTOMY AND DECOMPRESSION  Post-operative Day: Day of Surgery  Code status:Full Code     Type of Anesthesia       Estimated Blood Loss:  5 mL    Hospital Problem List   No problem-specific Assessment & Plan notes found for this encounter.    Active Problems:    Status post spinal surgery      -Reviewed the patient's preoperative H and P and updated missing elements.  -Home medication reconciliation has been reviewed.  Medications have been ordered as noted from the home list and changes are documented above     HISTORY     81 year old male into the hospital for an elective bilateral L4-5 median facetectomy and decompression.  PMHx  includes diabetes mellitus type 2 non insulin, essential hypertension, paroxsysmal atrial fibrillation, history of dvt due to prolonged immobilization, ckd (baseline creatinine about 2.4 with the same on   9.9)    Past Medical History       1.    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) 04/27/2020     Priority: Medium     Hydronephrosis of right kidney      Priority: Medium     Coronary artery disease      Priority: Medium     Paroxysmal atrial fibrillation (H)      Priority: Medium     LBBB (left bundle branch block)      Priority: Medium     Deep vein thrombosis (DVT) of proximal vein of both lower extremities, unspecified chronicity (H)      Priority: Medium     Acute on chronic renal insufficiency 04/24/2020     Priority: Medium     Chronic deep vein thrombosis (DVT) of femoral vein of right lower extremity (H) 12/04/2019     Priority: Medium     S/P ileal conduit (H) 08/18/2016     Priority: Medium     HTN (hypertension) 08/18/2016     Priority: Medium     CKD (chronic kidney disease) stage 3, GFR 30-59 ml/min (H) 08/18/2016     Priority: Medium     Bladder cancer (H) 01/27/2016     Priority: Medium        Surgical History   He  has a past surgical history that includes IR Nephrostomy Tube Placement Right (8/18/2016); IR Ureterostomy Tube Change Right (9/21/2016); IR Ileal Conduit Injection (12/23/2016); IR Extremity Angiogram Left (3/22/2019); Cystoscopy; Kidney Stone Surgery (x4); Cystoscopy (N/A, 12/23/2015); Prostate surgery; Cystectomy; IR Extremity  Angiogram Left (3/22/2019); and Cardiac catheterization (03/28/2008).     Past Surgical History:   Procedure Laterality Date     CARDIAC CATHETERIZATION  03/28/2008    and coronary angios     CYSTECTOMY       CYSTOSCOPY       CYSTOSCOPY N/A 12/23/2015    Procedure: CYSTOSCOPY BLADDER BIOPSIES with Fulgeration and Placement of Otero ;  Surgeon: Gordon Bajwa MD;  Location: [facility];  Service:      IR EXTREMITY ANGIOGRAM LEFT  3/22/2019     IR EXTREMITY ANGIOGRAM LEFT  3/22/2019     IR ILEAL CONDUIT INJECTION  12/23/2016     IR NEPHROSTOMY TUBE PLACEMENT RIGHT  8/18/2016     IR URETEROSTOMY TUBE CHANGE RIGHT  9/21/2016     KIDNEY STONE SURGERY  x4     PROSTATE SURGERY         Family History    Reviewed, and family history includes Cancer in his sister; Deep Vein Thrombosis in his father; Heart Disease in his brother, sister, and sister; No Known Problems in his daughter, daughter, and mother; Prostate Cancer in his father.    Social History    Reviewed, and he  reports that he has quit smoking. He has never used smokeless tobacco. He reports previous alcohol use. He reports that he does not use drugs.  Social History     Tobacco Use     Smoking status: Former Smoker     Smokeless tobacco: Never Used     Tobacco comment: Quit smoking 30 years ago   Substance Use Topics     Alcohol use: Not Currently       Allergies     Allergies   Allergen Reactions     Metoprolol Succinate Er      Other reaction(s): low blood pressure     Pcn [Penicillins] Other (See Comments)     Childhood-doesn't know reactions     Statin Drugs [Hmg-Coa-R Inhibitors] Cramps     Uroxatral [Alfuzosin] Other (See Comments)     hypotension       Prior to Admission Medications      Medications Prior to Admission   Medication Sig Dispense Refill Last Dose     allopurinol (ZYLOPRIM) 300 MG tablet Take 300 mg by mouth daily   9/13/2022 at Unknown time     amLODIPine (NORVASC) 5 MG tablet Take 7.5 mg by mouth At Bedtime   9/12/2022 at Unknown  time     amLODIPine (NORVASC) 5 MG tablet Take 2.5 mg by mouth daily as needed (For elevated BP SBP > 140)   Past Week at Unknown time     doxycycline hyclate (VIBRAMYCIN) 50 MG capsule Take 50 mg by mouth daily   9/12/2022 at Unknown time     ezetimibe (ZETIA) 10 MG tablet Take 10 mg by mouth daily   9/13/2022 at Unknown time     glimepiride (AMARYL) 1 MG tablet Take 1 mg by mouth daily   9/12/2022 at Unknown time     lisinopril (ZESTRIL) 20 MG tablet Take 20 mg by mouth 2 times daily   9/12/2022 at pm     Melatonin 10 MG TABS tablet Take 10 mg by mouth nightly as needed for sleep   Past Week at Unknown time     metoprolol succinate ER (TOPROL XL) 100 MG 24 hr tablet Take 50 mg by mouth daily   9/13/2022 at am     temazepam (RESTORIL) 15 MG capsule Take 15 mg by mouth nightly as needed for sleep   9/12/2022 at Unknown time     traZODone (DESYREL) 100 MG tablet Take 100-200 mg by mouth At Bedtime   9/12/2022 at Unknown time     warfarin ANTICOAGULANT (COUMADIN) 5 MG tablet Take 5-7.5 mg by mouth See Admin Instructions 7.5 mg MWF and 5 mg all other days   9/6/2022     [DISCONTINUED] acetaminophen (TYLENOL) 500 MG tablet Take 500-1,000 mg by mouth every 6 hours as needed   Past Week at Unknown time       Review of Systems   A 12 point comprehensive review of systems was negative except as noted above.    OBJECTIVE         Physical Exam   Temp:  [97  F (36.1  C)-98.3  F (36.8  C)] 97.1  F (36.2  C)  Pulse:  [62-84] 75  Resp:  [16-22] 16  BP: (118-182)/(63-91) 118/64  SpO2:  [93 %-98 %] 96 %  Body mass index is 37.26 kg/m .    GENERAL:  Alert, appears comfortable, in no acute distress, appears stated age   HEAD:  Normocephalic, without obvious abnormality, atraumatic   EYES:  PERRL, conjunctiva/corneas clear, no scleral icterus, EOM's intact   NOSE: Nares normal, septum midline, mucosa normal, no drainage   THROAT: Lips, mucosa, and tongue normal; teeth and gums normal, mouth moist   NECK: Supple, symmetrical, trachea  "midline   BACK:   Symmetric, no curvature, ROM normal   LUNGS:   Clear to auscultation bilaterally, no rales, rhonchi, or wheezing, symmetric chest rise on inhalation, respirations unlabored   CHEST WALL:  No tenderness or deformity   HEART:  Regular rate and rhythm, S1 and S2 normal, no murmur, rub, or gallop    ABDOMEN:   Soft, non-tender, bowel sounds active all four quadrants, no masses, no organomegaly, no rebound or guarding   EXTREMITIES: Extremities normal, atraumatic, no cyanosis or edema    SKIN: Post op dressing not pulled   NEURO: Alert, oriented x 4, moves all four extremities freely/spontaneously   PSYCH: Cooperative, behavior is appropriate        Cardiographics Reviewed Personally By Myself   EKG Results: 9/12 normal sinus rhythm with LBBB (old), stable    Imaging Reviewed Personally By Myself    Radiology Results:   Recent Results (from the past 24 hour(s))   POC US Guidance Needle Placement    Narrative    Ultrasound was performed as guidance to an anesthesia procedure.  Click   \"PACS images\" hyperlink below to view any stored images.  For specific   procedure details, view procedure note authored by anesthesia.   XR Surgery KASH L/T 5 Min Fluoro    Narrative    This exam was marked as non-reportable because it will not be read by a   radiologist or a [facility] non-radiologist provider.             Labs Reviewed Personally By Myself     Most Recent 3 BMP's:  Recent Labs   Lab Test 09/14/22  0810 09/14/22  0553 09/13/22 2058 09/13/22  1234 09/09/22  1128 03/03/22  0921 11/30/21  1058   NA  --   --   --   --  136 138 136   POTASSIUM  --   --   --   --  4.8 4.6 4.7   CHLORIDE  --   --   --   --  104 108* 106   CO2  --   --   --   --  24 20* 18*   BUN  --   --   --   --  47.1* 30* 46*   CR  --   --   --   --  2.46* 2.34* 3.31*   ANIONGAP  --   --   --   --  8 10 12   GALLO  --   --   --   --  9.0 8.6 8.9   * 227* 352*   < > 174* 155* 168*    < > = values in this interval not displayed. "       Preoperative Labs Reviewed Personally By Myself     Thank you for this consultation.  Appreciate the opportunity to participate in the care of Eduardo Laughlin, please feel free to contact us for any questions or concerns.    Sidra Siddiqui MD  [facility]  Phone: #[phone]

## 2022-09-14 NOTE — PROGRESS NOTES
Care Management Discharge Note    Discharge Date: 09/14/2022       Discharge Disposition: Home    Discharge Services: None    Discharge DME: None    Discharge Transportation:      Private pay costs discussed: Not applicable    PAS Confirmation Code:  (N/A)  Patient/family educated on Medicare website which has current facility and service quality ratings: no    Education Provided on the Discharge Plan:  No   Persons Notified of Discharge Plans: Pt   Patient/Family in Agreement with the Plan: yes    Handoff Referral Completed: No    Additional Information:    Chart review.  Pt has no CM needs.  Family to transport.     ANJEL Cee

## 2022-09-14 NOTE — PROGRESS NOTES
09/14/22 0730   Quick Adds   Quick Adds Certification   Type of Visit Initial PT Evaluation   Living Environment   People in Home spouse   Current Living Arrangements apartment   Home Accessibility no concerns   Self-Care   Usual Activity Tolerance moderate   Current Activity Tolerance moderate   Equipment Currently Used at Home walker, rolling   Fall history within last six months no   General Information   Onset of Illness/Injury or Date of Surgery 09/13/22   Referring Physician Dr. Negro Holland   Patient/Family Therapy Goals Statement (PT) Improve overall mobility   Pertinent History of Current Problem (include personal factors and/or comorbidities that impact the POC) Lumbar decomp   Existing Precautions/Restrictions spinal   Weight-Bearing Status - LLE full weight-bearing   Weight-Bearing Status - RLE full weight-bearing   Range of Motion (ROM)   ROM Comment WFL, decreased due to precautions   Strength (Manual Muscle Testing)   Strength Comments WFL   Transfers   Transfers sit-stand transfer   Sit-Stand Transfer   Sit-Stand [place] (Transfers) contact guard   Gait/Stairs (Locomotion)   [place] Level (Gait) contact guard   Distance in Feet (Required for LE Total Joints) 10'   Pattern (Gait) step-through   Deviations/Abnormal Patterns (Gait) antalgic;wilber decreased;gait speed decreased   Clinical Impression   Criteria for Skilled Therapeutic Intervention Yes, treatment indicated   PT Diagnosis (PT) Impaired functional mobility   Influenced by the following impairments Weakness, pain   Functional limitations due to impairments Transfers, gait   Clinical Presentation (PT Evaluation Complexity) Stable/Uncomplicated   Clinical Presentation Rationale Pt presents as medically diagnosed   Clinical Decision Making (Complexity) low complexity   Planned Therapy Interventions (PT) gait training;home exercise program;patient/family education;ROM (range of motion);strengthening   Risk & Benefits of therapy  have been explained care plan/treatment goals reviewed;patient   PT Discharge Planning   PT Discharge Recommendation (DC Rec) (S)  home   PT Rationale for DC Rec Pt ambulating and transferring well, pain controlled, no stairs, understands precautions. Safe to d/c from mobility standpoint.   Plan of Care Review   Plan of Care Reviewed With patient   Therapy Certification   Start of care date 09/14/22   Certification date from 09/14/22   Certification date to 10/14/22   Medical Diagnosis s/p lumbar decomp   Total Evaluation Time   Total Evaluation Time (Minutes) 10   Physical Therapy Goals   PT Frequency One time eval and treatment only   PT Predicted Duration/Target Date for Goal Attainment 09/14/22   PT Goals Transfers;Gait;PT Goal 1   PT: Transfers Supervision/stand-by assist;Sit to/from stand;Within precautions;Goal Met   PT: Gait Supervision/stand-by assist;Greater than 200 feet;Goal Met   PT: Goal 1 Independent with HEP for spinal rehab after initial education and cueing, Goal Met

## 2022-09-14 NOTE — PROGRESS NOTES
[facility]      OUTPATIENT OCCUPATIONAL THERAPY  EVALUATION  PLAN OF TREATMENT FOR OUTPATIENT REHABILITATION  (COMPLETE FOR INITIAL CLAIMS ONLY)  Patient's Last Name, First Name, M.I.  YOB: 1940  Eduardo Laughlin                          Provider's Name  [facility] Medical Record No.  [id number]                               Onset Date:  09/13/22   Start of Care Date:  09/14/22     Type:     ___PT   _X_OT   ___SLP Medical Diagnosis:                           OT Diagnosis:  decr ADL indep/safety   Visits from SOC:  1   _________________________________________________________________________________  Plan of Treatment/Functional Goals    Planned Interventions: ADL retraining, transfer training   Goals: See Occupational Therapy Goals on Care Plan in [facility] electronic health record.    Therapy Frequency:    Predicted Duration of Therapy Intervention:    _________________________________________________________________________________    I CERTIFY THE NEED FOR THESE SERVICES FURNISHED UNDER        THIS PLAN OF TREATMENT AND WHILE UNDER MY CARE     (Physician co-signature of this document indicates review and certification of the therapy plan).              Certification date from: 09/14/22, Certification date to: 10/14/22    Referring Physician: Dr. Holland            Initial Assessment        See Occupational Therapy evaluation dated 09/14/22 in Epic electronic health record.

## 2022-09-19 NOTE — PROCEDURES
Procedure     PREOPERATIVE DIAGNOSES:  1.     L4-5 spinal stenosis with neurogenic claudication.  2.     Failure of conservative management.      POSTOPERATIVE DIAGNOSES:  1.     L4-5 spinal stenosis with neurogenic claudication.  2.     Failure of conservative management.      PROCEDURES PERFORMED:  1.     Left L4-5 laminotomy.  2.     Bilateral L4-5 medial facetectomies and bilateral lateral recess decompression.      ATTENDING SURGEON:  Everett Holland M.D.      FIRST ASSISTANT:  Hector Lerma PA-C assist required for the entire duration of the case, including patient positioning, soft tissue retraction, and patient safety. He was invaluable in the performance of the discectomy, particularly in protecting the edge of the dura and the nerve root. I would not have allowed this job to be done by a surgical tech, but by a trained surgical PA with training in spine.         ESTIMATED BLOOD LOSS:  10 cc.     COMPLICATIONS:  None.     DETAILS OF PROCEDURE/INTRODUCTION:  The patient is a very pleasant 81 year old male patient who came to the clinic with a longstanding history of low back pain and bilateral lower extremity pain consistent with imaging studies showing severe L4-5 spinal stenosis. The patient had exhausted nonoperative measures and continued to have debilitating symptoms. Therefore, I had a discussion with him regarding surgery and specifically discussed the risks including but not limited to death, infection, dural tear, nerve injury, and nonresolution of symptoms, among others, and he accepted and wished to proceed.      The patient was brought to the operating room and placed under general endotracheal anesthetic without complications. Antibiotics were given intravenously within 1 hour of incision. He was then placed prone on the Stefano table ensuring that all nerve areas and bony areas were padded and free of pressure. The low back area was then prepped and draped in routine sterile manner. After  appropriate timeout, I placed a marking needle into the skin and subcutaneous tissue and took intraoperative imaging to plan my incision. Thereafter, I made an incision over the L4 and L5 spinous processes and carried out a subperiosteal dissection on the left side to expose the interlaminar space. I took intraoperative imaging to confirm my level. I then,  on the L4-5 level on the left side, used a rosey to rosey down the medial descending facet of L4. I used a Kerrison rongeur to perform a laminotomy and medial facetectomy all the way to the medial border of the L4 and L5 pedicles on the left side. I then, through the same laminotomy, performed a contralateral lateral recess decompression and medial facetectomy all the way to the medial border of the L4 and L5 pedicles on the right side.  I then performed copious irrigation and hemostasis. At the end of the procedure, there was a capacious canal at the L4-L5 level with no evidence of neural impingement. I performed closure of the fascia utilizing Vicryl 0 in a continuous fashion. I then closed the subcutaneous tissue layer utilizing Vicryl 2-0. Vicryl 3-0 was used for the skin. Marcaine 0.25% with epinephrine was then injected into the skin and subcutaneous tissues. Steri-Strips were applied followed by a sterile dressing. The patient was then placed supine on his bed and subsequently extubated without complications and brought to the recovery room in satisfactory condition.      POSTOPERATIVE PLAN:  The patient is to mobilize as tolerated. Oxycodone for pain. After discharge, the patient will be seen back in clinic in 6 weeks.

## 2022-10-12 ENCOUNTER — HOSPITAL ENCOUNTER (INPATIENT)
Facility: HOSPITAL | Age: 82
LOS: 7 days | Discharge: HOME | DRG: 291 | End: 2022-10-19
Attending: EMERGENCY MEDICINE | Admitting: INTERNAL MEDICINE
Payer: COMMERCIAL

## 2022-10-12 ENCOUNTER — TELEPHONE (OUTPATIENT)
Dept: CARDIOLOGY | Facility: CLINIC | Age: 82
End: 2022-10-12

## 2022-10-12 ENCOUNTER — APPOINTMENT (OUTPATIENT)
Dept: RADIOLOGY | Facility: HOSPITAL | Age: 82
DRG: 291 | End: 2022-10-12
Attending: FAMILY MEDICINE
Payer: COMMERCIAL

## 2022-10-12 DIAGNOSIS — R06.00 DYSPNEA, UNSPECIFIED TYPE: ICD-10-CM

## 2022-10-12 DIAGNOSIS — I50.9 ACUTE ON CHRONIC CONGESTIVE HEART FAILURE, UNSPECIFIED HEART FAILURE TYPE (H): ICD-10-CM

## 2022-10-12 DIAGNOSIS — R42 LIGHTHEADEDNESS: ICD-10-CM

## 2022-10-12 DIAGNOSIS — M1A.9XX0 CHRONIC GOUT WITHOUT TOPHUS, UNSPECIFIED CAUSE, UNSPECIFIED SITE: Primary | ICD-10-CM

## 2022-10-12 DIAGNOSIS — I44.7 LBBB (LEFT BUNDLE BRANCH BLOCK): ICD-10-CM

## 2022-10-12 LAB
ANION GAP SERPL CALCULATED.3IONS-SCNC: 10 MMOL/L (ref 7–15)
BASOPHILS # BLD AUTO: 0 10E3/UL (ref 0–0.2)
BASOPHILS NFR BLD AUTO: 0 %
BUN SERPL-MCNC: 45.7 MG/DL (ref 8–23)
CALCIUM SERPL-MCNC: 8.9 MG/DL (ref 8.8–10.2)
CHLORIDE SERPL-SCNC: 104 MMOL/L (ref 98–107)
CREAT SERPL-MCNC: 3.03 MG/DL (ref 0.67–1.17)
DEPRECATED HCO3 PLAS-SCNC: 22 MMOL/L (ref 22–29)
EOSINOPHIL # BLD AUTO: 0.1 10E3/UL (ref 0–0.7)
EOSINOPHIL NFR BLD AUTO: 1 %
ERYTHROCYTE [DISTWIDTH] IN BLOOD BY AUTOMATED COUNT: 14.2 % (ref 10–15)
GFR SERPL CREATININE-BSD FRML MDRD: 20 ML/MIN/1.73M2
GLUCOSE SERPL-MCNC: 108 MG/DL (ref 70–99)
HBA1C MFR BLD: 7.7 %
HCT VFR BLD AUTO: 39.5 % (ref 40–53)
HGB BLD-MCNC: 13.2 G/DL (ref 13.3–17.7)
IMM GRANULOCYTES # BLD: 0.1 10E3/UL
IMM GRANULOCYTES NFR BLD: 1 %
LYMPHOCYTES # BLD AUTO: 1.8 10E3/UL (ref 0.8–5.3)
LYMPHOCYTES NFR BLD AUTO: 21 %
MAGNESIUM SERPL-MCNC: 2.2 MG/DL (ref 1.7–2.3)
MCH RBC QN AUTO: 31.8 PG (ref 26.5–33)
MCHC RBC AUTO-ENTMCNC: 33.4 G/DL (ref 31.5–36.5)
MCV RBC AUTO: 95 FL (ref 78–100)
MONOCYTES # BLD AUTO: 0.8 10E3/UL (ref 0–1.3)
MONOCYTES NFR BLD AUTO: 9 %
NEUTROPHILS # BLD AUTO: 5.8 10E3/UL (ref 1.6–8.3)
NEUTROPHILS NFR BLD AUTO: 68 %
NRBC # BLD AUTO: 0 10E3/UL
NRBC BLD AUTO-RTO: 0 /100
NT-PROBNP SERPL-MCNC: 5303 PG/ML (ref 0–1800)
PLATELET # BLD AUTO: 201 10E3/UL (ref 150–450)
POTASSIUM SERPL-SCNC: 4.6 MMOL/L (ref 3.4–5.3)
RBC # BLD AUTO: 4.15 10E6/UL (ref 4.4–5.9)
SODIUM SERPL-SCNC: 136 MMOL/L (ref 136–145)
TROPONIN T SERPL HS-MCNC: 59 NG/L
TROPONIN T SERPL HS-MCNC: 73 NG/L
WBC # BLD AUTO: 8.4 10E3/UL (ref 4–11)

## 2022-10-12 PROCEDURE — 83880 ASSAY OF NATRIURETIC PEPTIDE: CPT | Performed by: FAMILY MEDICINE

## 2022-10-12 PROCEDURE — 83735 ASSAY OF MAGNESIUM: CPT | Performed by: FAMILY MEDICINE

## 2022-10-12 PROCEDURE — 84484 ASSAY OF TROPONIN QUANT: CPT | Performed by: EMERGENCY MEDICINE

## 2022-10-12 PROCEDURE — 250N000011 HC RX IP 250 OP 636: Performed by: EMERGENCY MEDICINE

## 2022-10-12 PROCEDURE — 99285 EMERGENCY DEPT VISIT HI MDM: CPT | Mod: 25

## 2022-10-12 PROCEDURE — 84484 ASSAY OF TROPONIN QUANT: CPT | Performed by: FAMILY MEDICINE

## 2022-10-12 PROCEDURE — 85025 COMPLETE CBC W/AUTO DIFF WBC: CPT | Performed by: FAMILY MEDICINE

## 2022-10-12 PROCEDURE — 210N000001 HC R&B IMCU HEART CARE

## 2022-10-12 PROCEDURE — 36415 COLL VENOUS BLD VENIPUNCTURE: CPT | Performed by: EMERGENCY MEDICINE

## 2022-10-12 PROCEDURE — 99223 1ST HOSP IP/OBS HIGH 75: CPT | Performed by: INTERNAL MEDICINE

## 2022-10-12 PROCEDURE — 96374 THER/PROPH/DIAG INJ IV PUSH: CPT

## 2022-10-12 PROCEDURE — 83036 HEMOGLOBIN GLYCOSYLATED A1C: CPT | Performed by: INTERNAL MEDICINE

## 2022-10-12 PROCEDURE — 93005 ELECTROCARDIOGRAM TRACING: CPT | Performed by: FAMILY MEDICINE

## 2022-10-12 PROCEDURE — 82310 ASSAY OF CALCIUM: CPT | Performed by: FAMILY MEDICINE

## 2022-10-12 PROCEDURE — 96376 TX/PRO/DX INJ SAME DRUG ADON: CPT

## 2022-10-12 PROCEDURE — 36415 COLL VENOUS BLD VENIPUNCTURE: CPT | Performed by: FAMILY MEDICINE

## 2022-10-12 PROCEDURE — 71046 X-RAY EXAM CHEST 2 VIEWS: CPT

## 2022-10-12 RX ORDER — FUROSEMIDE 10 MG/ML
40 INJECTION INTRAMUSCULAR; INTRAVENOUS EVERY 12 HOURS
Status: DISCONTINUED | OUTPATIENT
Start: 2022-10-13 | End: 2022-10-14

## 2022-10-12 RX ORDER — NICOTINE POLACRILEX 4 MG
15-30 LOZENGE BUCCAL
Status: DISCONTINUED | OUTPATIENT
Start: 2022-10-12 | End: 2022-10-19 | Stop reason: HOSPADM

## 2022-10-12 RX ORDER — ONDANSETRON 2 MG/ML
4 INJECTION INTRAMUSCULAR; INTRAVENOUS EVERY 6 HOURS PRN
Status: DISCONTINUED | OUTPATIENT
Start: 2022-10-12 | End: 2022-10-19 | Stop reason: HOSPADM

## 2022-10-12 RX ORDER — LIDOCAINE 40 MG/G
CREAM TOPICAL
Status: DISCONTINUED | OUTPATIENT
Start: 2022-10-12 | End: 2022-10-19 | Stop reason: HOSPADM

## 2022-10-12 RX ORDER — ONDANSETRON 4 MG/1
4 TABLET, ORALLY DISINTEGRATING ORAL EVERY 6 HOURS PRN
Status: DISCONTINUED | OUTPATIENT
Start: 2022-10-12 | End: 2022-10-19 | Stop reason: HOSPADM

## 2022-10-12 RX ORDER — HYDRALAZINE HYDROCHLORIDE 10 MG/1
10 TABLET, FILM COATED ORAL 4 TIMES DAILY PRN
Status: DISCONTINUED | OUTPATIENT
Start: 2022-10-12 | End: 2022-10-19 | Stop reason: HOSPADM

## 2022-10-12 RX ORDER — DEXTROSE MONOHYDRATE 25 G/50ML
25-50 INJECTION, SOLUTION INTRAVENOUS
Status: DISCONTINUED | OUTPATIENT
Start: 2022-10-12 | End: 2022-10-19 | Stop reason: HOSPADM

## 2022-10-12 RX ORDER — FUROSEMIDE 10 MG/ML
40 INJECTION INTRAMUSCULAR; INTRAVENOUS ONCE
Status: COMPLETED | OUTPATIENT
Start: 2022-10-12 | End: 2022-10-12

## 2022-10-12 RX ORDER — ACETAMINOPHEN 650 MG/1
650 SUPPOSITORY RECTAL EVERY 6 HOURS PRN
Status: DISCONTINUED | OUTPATIENT
Start: 2022-10-12 | End: 2022-10-19 | Stop reason: HOSPADM

## 2022-10-12 RX ORDER — ACETAMINOPHEN 325 MG/1
650 TABLET ORAL EVERY 6 HOURS PRN
Status: DISCONTINUED | OUTPATIENT
Start: 2022-10-12 | End: 2022-10-19 | Stop reason: HOSPADM

## 2022-10-12 RX ADMIN — FUROSEMIDE 40 MG: 10 INJECTION, SOLUTION INTRAMUSCULAR; INTRAVENOUS at 20:02

## 2022-10-12 ASSESSMENT — ACTIVITIES OF DAILY LIVING (ADL)
ADLS_ACUITY_SCORE: 35
ADLS_ACUITY_SCORE: 35
DEPENDENT_IADLS:: INDEPENDENT
ADLS_ACUITY_SCORE: 35

## 2022-10-12 NOTE — TELEPHONE ENCOUNTER
"Received a voicemail from patient requesting call back to schedule appointment to see Dr. Proctor regarding \"episodes\" he has been having.     Return call to patient no answer and no voicemail. Will try again later. -ejb    "

## 2022-10-12 NOTE — ED NOTES
"ED Provider In Triage Note  [facility]  Encounter Date: Oct 12, 2022    Chief Complaint   Patient presents with     Dizziness       Brief HPI:   Eduardo Laughlin is a 81 year old male presenting to the Emergency Department with a chief complaint of dizziness and shortness of breath for months, worse last night.  Episode last night lasted about 20 minutes, with \"difficulty focusing.\"    EKG in triage- LBBB and degree AV block, both of which have been seen on prior EKGs.    Brief Physical Exam:  /68   Pulse 71   Temp 97.8  F (36.6  C) (Temporal)   Resp 16   Ht 1.778 m (5' 10\")   Wt 117.9 kg (260 lb)   SpO2 98%   BMI 37.31 kg/m    General: Non-toxic appearing  HEENT: Atraumatic  Resp: No respiratory distress  Abdomen: Non-peritoneal  Neuro: Alert, oriented, answers questions appropriately  Psych: Behavior appropriate    Plan Initiated in Triage:  No orders of the defined types were placed in this encounter.    PIT Dispo:   Return to lobby while awaiting workup and ED bed availability    Toby Bashir MD on 10/12/2022 at 5:37 PM    Patient was evaluated by the Physician in Triage due to a limitation of available rooms in the Emergency Department. A plan of care was discussed based on the information obtained on the initial evaluation and patient was consuled to return back to the Emergency Department lobby after this initial evalutaiton until results were obtained or a room became available in the Emergency Department. Patient was counseled not to leave prior to receiving the results of their workup.     Toby Bashir MD  [facility] EMERGENCY DEPARTMENT  [address] [place] [id number]  [phone]     Toby Bashir MD  10/12/22 1742    "

## 2022-10-12 NOTE — ED PROVIDER NOTES
EMERGENCY DEPARTMENT ENCOUnter      NAME: Eduardo Laughlin  AGE: 81 year old male  YOB: 1940  MRN: [id number]  EVALUATION DATE & TIME: 10/12/2022  6:11 PM    PCP: Rafael Montelongo    ED PROVIDER: Nando Adams DO      Chief Complaint   Patient presents with     Dizziness         FINAL IMPRESSION:  1. Lightheadedness    2. LBBB (left bundle branch block)    3. Acute on chronic congestive heart failure, unspecified heart failure type (H)    4. Dyspnea, unspecified type          ED COURSE & MEDICAL DECISION MAKIN:21 PM I met with the patient in room hallway 1 to gather history and to perform my initial exam. We discussed plans for the ED course, including diagnostic testing and treatment.    8:30 PM Spoke with the Hospitalist, Dr. Miranda who accepts the patient for admission.      The patient presented to the emergency department today with complaints of shortness of breath on exertion and lightheadedness.  The patient clinically appears well with no concerning physical exam findings.  Laboratory testing is notable for significantly elevated BNP at greater than 5000.  Chest x-ray is unremarkable.  Given the patient's symptoms along with his laboratory findings we will plan to keep him overnight in the hospital.  He is comfortable with this plan.        Medical Decision Making    Supplemental history from: Family Member    External Record(s) Reviewed: N/A    Differential Diagnosis: See MDM charting for differential considered.     I performed an independent interpretation of the: EKG: See my documentation.    Discussed with radiology regarding test interpretation: N/A    Discussion of management with another provider: Hospitalist    The following testing was considered but ultimately not selected: None    I considered prescription management with: N/A    The patient's care impacted: Cerebrovascular Disease    Consideration of Admission/Observation: Yes: patient admitted    Care significantly  affected by Social Determinants of Health including: N/A       At the conclusion of the encounter I discussed the results of all of the tests and the disposition. The questions were answered. The patient or family acknowledged understanding and was agreeable with the care plan.         =================================================================    HPI        Eduardo Laughlin is a 81 year old male with a pertinent medical history of DVT, paroxysmal atrial fibrillation, ASCVD, CAD, ASHD, acute renal failure with acute tubular necrosis, CKD, bladder cancer, malignant neoplasm of prostate, UTI, T2DM, iliac artery aneurysm, E. Coli bacteremia, hyponatremia, HLD, HTN, morbid obesity, who presents to this ED via walk-in for evaluation of dizziness, shortness of breath, and generalized weakness.     Patient reports that for a couple of months he has had mild dizziness and mild shortness of breath which he notes both significantly worsened last night. He mentions that last night he called EMS secondary to his dizziness and shortness of breath as they were the worst they have ever been, and states that despite EMS being concerned about him due his EKG, he was too stubborn to head to an ED yesterday. He additionally endorses having significant generalized weakness recently which he notes is provoked with standing, movement, and exertion. He notes that recently even when using a walker and walking very slowly he becomes significantly weak in all of his muscles. He also notes that today he had bilateral leg aching, bilateral vision blurriness, and light-headedness while standing, which prompted him to visit an ED. He notes that when he is laying and at rest he is at baseline and in his normal state of health. He denies any recent abdominal pain. He denies any history of respiratory/breathing problems. He denies any other complications at this karena.       REVIEW OF SYSTEMS     Constitutional:  Denies fever or  chills  HENT:  Denies sore throat   EYES: Positive for bilateral vision blurriness.  Respiratory:  Positive for shortness of breath. Denies cough.   Cardiovascular:  Denies chest pain or palpitations  GI:  Denies abdominal pain, nausea, or vomiting  Musculoskeletal:  Positive for bilateral leg pain.   Skin:  Denies rash   Neurologic:  Positive for dizziness. Positive for weakness (generalized). Positive for light-headedness. Denies headache or sensory changes    All other systems reviewed and are negative      PAST MEDICAL HISTORY:  Past Medical History:   Diagnosis Date     Acute renal failure with acute tubular necrosis superimposed on stage 3 chronic kidney disease (H)      Arteriosclerotic cardiovascular disease (ASCVD)      Bladder cancer (H)      BPH (benign prostatic hypertrophy)      Chronic kidney disease      Complicated UTI (urinary tract infection) 08/25/2019     Coronary artery disease      Diabetes mellitus (H)      Diabetic neuropathy (H)      E coli bacteremia      GERD (gastroesophageal reflux disease)      Gout      Hyperlipidemia      Hypertension      Hyponatremia      Iliac artery aneurysm, 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    Added automatically from request for surgery [id number]     Kidney stone      Osteoarthritis      Personal history of malignant neoplasm of prostate 04/27/2020     Prostate cancer (H)      Sleep apnea     CPAP       PAST SURGICAL HISTORY:  Past Surgical History:   Procedure Laterality Date     CARDIAC CATHETERIZATION  03/28/2008    and coronary angios     CYSTECTOMY       CYSTOSCOPY       CYSTOSCOPY N/A 12/23/2015    Procedure: CYSTOSCOPY BLADDER BIOPSIES with Fulgeration and Placement of Otero ;  Surgeon: Gordon Bajwa MD;  Location: [facility];  Service:      IR EXTREMITY ANGIOGRAM LEFT  3/22/2019     IR EXTREMITY ANGIOGRAM LEFT  3/22/2019     IR ILEAL CONDUIT INJECTION  12/23/2016     IR NEPHROSTOMY TUBE PLACEMENT RIGHT  8/18/2016     IR URETEROSTOMY TUBE CHANGE RIGHT   9/21/2016     KIDNEY STONE SURGERY  x4     LAMINECTOMY LUMBAR ONE LEVEL Bilateral 9/13/2022    Procedure: LUMBAR 4 - LUMBAR 5 BILATERAL MEDIAL FACETECTOMY AND DECOMPRESSION;  Surgeon: Everett Holland MD;  Location: [facility] Main OR     PROSTATE SURGERY             CURRENT MEDICATIONS:    acetaminophen (TYLENOL) 325 MG tablet  allopurinol (ZYLOPRIM) 300 MG tablet  amLODIPine (NORVASC) 5 MG tablet  amLODIPine (NORVASC) 5 MG tablet  doxycycline hyclate (VIBRAMYCIN) 50 MG capsule  ezetimibe (ZETIA) 10 MG tablet  glimepiride (AMARYL) 1 MG tablet  lisinopril (ZESTRIL) 20 MG tablet  metoprolol succinate ER (TOPROL XL) 100 MG 24 hr tablet  temazepam (RESTORIL) 15 MG capsule  traZODone (DESYREL) 100 MG tablet  warfarin ANTICOAGULANT (COUMADIN) 5 MG tablet        ALLERGIES:  Allergies   Allergen Reactions     Metoprolol Succinate Er      Other reaction(s): low blood pressure     Pcn [Penicillins] Other (See Comments)     Childhood-doesn't know reactions     Statin Drugs [Hmg-Coa-R Inhibitors] Cramps     Uroxatral [Alfuzosin] Other (See Comments)     hypotension       FAMILY HISTORY:  Family History   Problem Relation Age of Onset     No Known Problems Mother      Prostate Cancer Father      Deep Vein Thrombosis Father      Cancer Sister      Heart Disease Sister      No Known Problems Daughter      Heart Disease Brother      Heart Disease Sister      No Known Problems Daughter        SOCIAL HISTORY:   Social History     Socioeconomic History     Marital status:    Tobacco Use     Smoking status: Former     Smokeless tobacco: Never     Tobacco comments:     Quit smoking 30 years ago   Vaping Use     Vaping Use: Never used   Substance and Sexual Activity     Alcohol use: Not Currently     Drug use: Never     Sexual activity: Not Currently       VITALS:  Patient Vitals for the past 24 hrs:   BP Temp Temp src Pulse Resp SpO2 Height Weight   10/12/22 2100 (!) 185/85 -- -- 72 30 97 % -- --   10/12/22 5065  "118/68 97.8  F (36.6  C) Temporal 71 16 98 % 1.778 m (5' 10\") 117.9 kg (260 lb)       PHYSICAL EXAM    Constitutional:  Well developed, Well nourished,  HENT:  Normocephalic, Atraumatic, Bilateral external ears normal, Oropharynx moist, Nose normal.   Neck:  Normal range of motion, No meningismus, No stridor.   Eyes:  EOMI, Conjunctiva normal, No discharge.   Respiratory: Diminished breath sounds bilaterally, No respiratory distress, No wheezing, No chest tenderness.   Cardiovascular:  Normal heart rate, Normal rhythm, No murmurs  GI:  Soft, No tenderness, No guarding, No CVA tenderness.   Musculoskeletal:   No tenderness to palpation or major deformities noted.   Integument:  Warm, Dry, No erythema, No rash.   Neurologic:  Alert & oriented x 3, Normal motor function, Normal sensory function, No focal deficits noted.   Psychiatric:  Affect normal, Judgment normal, Mood normal.      LAB:  All pertinent labs reviewed and interpreted.  Results for orders placed or performed during the hospital encounter of 10/12/22              Basic metabolic panel   Result Value Ref Range    Sodium 136 136 - 145 mmol/L    Potassium 4.6 3.4 - 5.3 mmol/L    Chloride 104 98 - 107 mmol/L    Carbon Dioxide (CO2) 22 22 - 29 mmol/L    Anion Gap 10 7 - 15 mmol/L    Urea Nitrogen 45.7 (H) 8.0 - 23.0 mg/dL    Creatinine 3.03 (H) 0.67 - 1.17 mg/dL    Calcium 8.9 8.8 - 10.2 mg/dL    Glucose 108 (H) 70 - 99 mg/dL    GFR Estimate 20 (L) >60 mL/min/1.73m2   Result Value Ref Range    Troponin T, High Sensitivity 73 (H) <=22 ng/L   Result Value Ref Range    Magnesium 2.2 1.7 - 2.3 mg/dL   Nt probnp inpatient (BNP)   Result Value Ref Range    N terminal Pro BNP Inpatient 5,303 (H) 0 - 1,800 pg/mL   CBC with platelets and differential   Result Value Ref Range    WBC Count 8.4 4.0 - 11.0 10e3/uL    RBC Count 4.15 (L) 4.40 - 5.90 10e6/uL    Hemoglobin 13.2 (L) 13.3 - 17.7 g/dL    Hematocrit 39.5 (L) 40.0 - 53.0 %    MCV 95 78 - 100 fL    MCH 31.8 26.5 - " 33.0 pg    MCHC 33.4 31.5 - 36.5 g/dL    RDW 14.2 10.0 - 15.0 %    Platelet Count 201 150 - 450 10e3/uL    % Neutrophils 68 %    % Lymphocytes 21 %    % Monocytes 9 %    % Eosinophils 1 %    % Basophils 0 %    % Immature Granulocytes 1 %    NRBCs per 100 WBC 0 <1 /100    Absolute Neutrophils 5.8 1.6 - 8.3 10e3/uL    Absolute Lymphocytes 1.8 0.8 - 5.3 10e3/uL    Absolute Monocytes 0.8 0.0 - 1.3 10e3/uL    Absolute Eosinophils 0.1 0.0 - 0.7 10e3/uL    Absolute Basophils 0.0 0.0 - 0.2 10e3/uL    Absolute Immature Granulocytes 0.1 <=0.4 10e3/uL    Absolute NRBCs 0.0 10e3/uL   Troponin T, High Sensitivity (now)   Result Value Ref Range    Troponin T, High Sensitivity 59 (H) <=22 ng/L   Result Value Ref Range    Hemoglobin A1C 7.7 (H) <5.7 %       RADIOLOGY:  I have independently reviewed and interpreted the above imaging, pending the final radiology read.  Chest XR,  PA & LAT   Final Result   IMPRESSION: Lungs hyperinflated. Minimal left basilar atelectasis/scar. No pleural effusion. Stable enlarged cardiac silhouette. The pulmonary vasculature is normal.      Echocardiogram Complete    (Results Pending)       EKG:    Normal sinus rhythm at 69 bpm.  Left bundle branch block.  No obvious signs of acute ischemia.   ms,  ms.    I have independently reviewed and interpreted this EKG          I, Phan Palmer, am serving as a scribe to document services personally performed by Dr. Adams based on my observation and the provider's statements to me. I, Nando Adams, DO attest that Phan Palmer is acting in a scribe capacity, has observed my performance of the services and has documented them in accordance with my direction.    Nando Adams DO  Emergency Medicine  [facility] EMERGENCY DEPARTMENT  [address] [address]  [phone]  Dept: [phone]       Nando Adams MD  10/12/22 7479

## 2022-10-12 NOTE — ED TRIAGE NOTES
"Patient comes in with daughter.   Patient has been having \"dizzy\" spells for a long time and then last night it was \"the worst it has ever been\". EMS was called and patient was told he had a abnormal EKG and a hypotension. Patient report that he didn't want to go in.   Today, patient reports that he is dizzy every time he stands, thus why he is here.      Triage Assessment     Row Name 10/12/22 9935       Triage Assessment (Adult)    Airway WDL WDL              "

## 2022-10-13 ENCOUNTER — APPOINTMENT (OUTPATIENT)
Dept: CARDIOLOGY | Facility: HOSPITAL | Age: 82
DRG: 291 | End: 2022-10-13
Attending: INTERNAL MEDICINE
Payer: COMMERCIAL

## 2022-10-13 ENCOUNTER — TELEPHONE (OUTPATIENT)
Dept: CARDIOLOGY | Facility: CLINIC | Age: 82
End: 2022-10-13

## 2022-10-13 ENCOUNTER — APPOINTMENT (OUTPATIENT)
Dept: OCCUPATIONAL THERAPY | Facility: HOSPITAL | Age: 82
DRG: 291 | End: 2022-10-13
Attending: INTERNAL MEDICINE
Payer: COMMERCIAL

## 2022-10-13 DIAGNOSIS — I50.9 ACUTE DECOMPENSATED HEART FAILURE (H): Primary | ICD-10-CM

## 2022-10-13 LAB
ANION GAP SERPL CALCULATED.3IONS-SCNC: 9 MMOL/L (ref 7–15)
ANION GAP SERPL CALCULATED.3IONS-SCNC: 9 MMOL/L (ref 7–15)
ATRIAL RATE - MUSE: 69 BPM
BASOPHILS # BLD AUTO: 0 10E3/UL (ref 0–0.2)
BASOPHILS NFR BLD AUTO: 0 %
BUN SERPL-MCNC: 45.7 MG/DL (ref 8–23)
CALCIUM SERPL-MCNC: 9 MG/DL (ref 8.8–10.2)
CHLORIDE SERPL-SCNC: 103 MMOL/L (ref 98–107)
CHLORIDE SERPL-SCNC: 103 MMOL/L (ref 98–107)
CK SERPL-CCNC: 294 U/L (ref 39–308)
CREAT SERPL-MCNC: 2.75 MG/DL (ref 0.67–1.17)
DEPRECATED HCO3 PLAS-SCNC: 24 MMOL/L (ref 22–29)
DEPRECATED HCO3 PLAS-SCNC: 24 MMOL/L (ref 22–29)
DIASTOLIC BLOOD PRESSURE - MUSE: NORMAL MMHG
EOSINOPHIL # BLD AUTO: 0.1 10E3/UL (ref 0–0.7)
EOSINOPHIL NFR BLD AUTO: 1 %
ERYTHROCYTE [DISTWIDTH] IN BLOOD BY AUTOMATED COUNT: 14.3 % (ref 10–15)
GFR SERPL CREATININE-BSD FRML MDRD: 22 ML/MIN/1.73M2
GLUCOSE BLDC GLUCOMTR-MCNC: 126 MG/DL (ref 70–99)
GLUCOSE BLDC GLUCOMTR-MCNC: 129 MG/DL (ref 70–99)
GLUCOSE BLDC GLUCOMTR-MCNC: 134 MG/DL (ref 70–99)
GLUCOSE BLDC GLUCOMTR-MCNC: 135 MG/DL (ref 70–99)
GLUCOSE BLDC GLUCOMTR-MCNC: 154 MG/DL (ref 70–99)
GLUCOSE BLDC GLUCOMTR-MCNC: 169 MG/DL (ref 70–99)
GLUCOSE SERPL-MCNC: 152 MG/DL (ref 70–99)
HCT VFR BLD AUTO: 40.7 % (ref 40–53)
HGB BLD-MCNC: 13.5 G/DL (ref 13.3–17.7)
IMM GRANULOCYTES # BLD: 0.1 10E3/UL
IMM GRANULOCYTES NFR BLD: 1 %
INR PPP: 2.68 (ref 0.85–1.15)
INTERPRETATION ECG - MUSE: NORMAL
LVEF ECHO: NORMAL
LYMPHOCYTES # BLD AUTO: 1.4 10E3/UL (ref 0.8–5.3)
LYMPHOCYTES NFR BLD AUTO: 17 %
MAGNESIUM SERPL-MCNC: 1.9 MG/DL (ref 1.7–2.3)
MCH RBC QN AUTO: 31.5 PG (ref 26.5–33)
MCHC RBC AUTO-ENTMCNC: 33.2 G/DL (ref 31.5–36.5)
MCV RBC AUTO: 95 FL (ref 78–100)
MONOCYTES # BLD AUTO: 0.7 10E3/UL (ref 0–1.3)
MONOCYTES NFR BLD AUTO: 9 %
NEUTROPHILS # BLD AUTO: 6.2 10E3/UL (ref 1.6–8.3)
NEUTROPHILS NFR BLD AUTO: 72 %
NRBC # BLD AUTO: 0 10E3/UL
NRBC BLD AUTO-RTO: 0 /100
P AXIS - MUSE: 9 DEGREES
PHOSPHATE SERPL-MCNC: 4 MG/DL (ref 2.5–4.5)
PLATELET # BLD AUTO: 192 10E3/UL (ref 150–450)
POTASSIUM SERPL-SCNC: 3.7 MMOL/L (ref 3.4–5.3)
POTASSIUM SERPL-SCNC: 4.2 MMOL/L (ref 3.4–5.3)
POTASSIUM SERPL-SCNC: 4.2 MMOL/L (ref 3.4–5.3)
PR INTERVAL - MUSE: 294 MS
QRS DURATION - MUSE: 166 MS
QT - MUSE: 436 MS
QTC - MUSE: 467 MS
R AXIS - MUSE: -2 DEGREES
RBC # BLD AUTO: 4.28 10E6/UL (ref 4.4–5.9)
SODIUM SERPL-SCNC: 136 MMOL/L (ref 136–145)
SODIUM SERPL-SCNC: 136 MMOL/L (ref 136–145)
SYSTOLIC BLOOD PRESSURE - MUSE: NORMAL MMHG
T AXIS - MUSE: 68 DEGREES
TROPONIN T SERPL HS-MCNC: 61 NG/L
VENTRICULAR RATE- MUSE: 69 BPM
WBC # BLD AUTO: 8.4 10E3/UL (ref 4–11)

## 2022-10-13 PROCEDURE — 93005 ELECTROCARDIOGRAM TRACING: CPT

## 2022-10-13 PROCEDURE — 85025 COMPLETE CBC W/AUTO DIFF WBC: CPT | Performed by: INTERNAL MEDICINE

## 2022-10-13 PROCEDURE — 84100 ASSAY OF PHOSPHORUS: CPT | Performed by: INTERNAL MEDICINE

## 2022-10-13 PROCEDURE — 250N000012 HC RX MED GY IP 250 OP 636 PS 637: Performed by: INTERNAL MEDICINE

## 2022-10-13 PROCEDURE — 250N000013 HC RX MED GY IP 250 OP 250 PS 637: Performed by: INTERNAL MEDICINE

## 2022-10-13 PROCEDURE — 85610 PROTHROMBIN TIME: CPT | Performed by: INTERNAL MEDICINE

## 2022-10-13 PROCEDURE — 84132 ASSAY OF SERUM POTASSIUM: CPT | Performed by: INTERNAL MEDICINE

## 2022-10-13 PROCEDURE — 250N000013 HC RX MED GY IP 250 OP 250 PS 637: Performed by: HOSPITALIST

## 2022-10-13 PROCEDURE — 36415 COLL VENOUS BLD VENIPUNCTURE: CPT | Performed by: INTERNAL MEDICINE

## 2022-10-13 PROCEDURE — 84484 ASSAY OF TROPONIN QUANT: CPT | Performed by: INTERNAL MEDICINE

## 2022-10-13 PROCEDURE — 999N000208 ECHOCARDIOGRAM COMPLETE

## 2022-10-13 PROCEDURE — 210N000001 HC R&B IMCU HEART CARE

## 2022-10-13 PROCEDURE — 93005 ELECTROCARDIOGRAM TRACING: CPT | Performed by: HOSPITALIST

## 2022-10-13 PROCEDURE — 97166 OT EVAL MOD COMPLEX 45 MIN: CPT | Mod: GO

## 2022-10-13 PROCEDURE — 83735 ASSAY OF MAGNESIUM: CPT | Performed by: INTERNAL MEDICINE

## 2022-10-13 PROCEDURE — 99233 SBSQ HOSP IP/OBS HIGH 50: CPT | Performed by: HOSPITALIST

## 2022-10-13 PROCEDURE — 93306 TTE W/DOPPLER COMPLETE: CPT | Mod: 26 | Performed by: INTERNAL MEDICINE

## 2022-10-13 PROCEDURE — 255N000002 HC RX 255 OP 636: Performed by: INTERNAL MEDICINE

## 2022-10-13 PROCEDURE — 250N000011 HC RX IP 250 OP 636: Performed by: INTERNAL MEDICINE

## 2022-10-13 PROCEDURE — 99223 1ST HOSP IP/OBS HIGH 75: CPT | Performed by: INTERNAL MEDICINE

## 2022-10-13 PROCEDURE — 93010 ELECTROCARDIOGRAM REPORT: CPT | Performed by: INTERNAL MEDICINE

## 2022-10-13 PROCEDURE — 82550 ASSAY OF CK (CPK): CPT | Performed by: INTERNAL MEDICINE

## 2022-10-13 PROCEDURE — 97535 SELF CARE MNGMENT TRAINING: CPT | Mod: GO

## 2022-10-13 PROCEDURE — 80048 BASIC METABOLIC PNL TOTAL CA: CPT | Performed by: INTERNAL MEDICINE

## 2022-10-13 PROCEDURE — 97110 THERAPEUTIC EXERCISES: CPT | Mod: GO

## 2022-10-13 PROCEDURE — 84295 ASSAY OF SERUM SODIUM: CPT | Performed by: INTERNAL MEDICINE

## 2022-10-13 RX ORDER — WARFARIN SODIUM 5 MG/1
5 TABLET ORAL
Status: COMPLETED | OUTPATIENT
Start: 2022-10-13 | End: 2022-10-13

## 2022-10-13 RX ORDER — METOPROLOL SUCCINATE 50 MG/1
50 TABLET, EXTENDED RELEASE ORAL DAILY
Status: DISCONTINUED | OUTPATIENT
Start: 2022-10-13 | End: 2022-10-19 | Stop reason: HOSPADM

## 2022-10-13 RX ORDER — EZETIMIBE 10 MG/1
10 TABLET ORAL DAILY
Status: DISCONTINUED | OUTPATIENT
Start: 2022-10-13 | End: 2022-10-19 | Stop reason: HOSPADM

## 2022-10-13 RX ORDER — AMLODIPINE BESYLATE 2.5 MG/1
2.5 TABLET ORAL DAILY PRN
Status: DISCONTINUED | OUTPATIENT
Start: 2022-10-13 | End: 2022-10-19 | Stop reason: HOSPADM

## 2022-10-13 RX ORDER — POTASSIUM CHLORIDE 1500 MG/1
40 TABLET, EXTENDED RELEASE ORAL 2 TIMES DAILY
Status: COMPLETED | OUTPATIENT
Start: 2022-10-13 | End: 2022-10-14

## 2022-10-13 RX ORDER — HYDRALAZINE HYDROCHLORIDE 50 MG/1
50 TABLET, FILM COATED ORAL 4 TIMES DAILY
Status: DISCONTINUED | OUTPATIENT
Start: 2022-10-13 | End: 2022-10-19 | Stop reason: HOSPADM

## 2022-10-13 RX ORDER — DOXYCYCLINE HYCLATE 50 MG/1
50 CAPSULE ORAL DAILY
Status: DISCONTINUED | OUTPATIENT
Start: 2022-10-13 | End: 2022-10-19 | Stop reason: HOSPADM

## 2022-10-13 RX ORDER — ALLOPURINOL 300 MG/1
300 TABLET ORAL DAILY
Status: DISCONTINUED | OUTPATIENT
Start: 2022-10-13 | End: 2022-10-16 | Stop reason: DRUGHIGH

## 2022-10-13 RX ORDER — TEMAZEPAM 15 MG/1
15 CAPSULE ORAL
Status: DISCONTINUED | OUTPATIENT
Start: 2022-10-13 | End: 2022-10-19 | Stop reason: HOSPADM

## 2022-10-13 RX ORDER — TRAZODONE HYDROCHLORIDE 100 MG/1
100-200 TABLET ORAL AT BEDTIME
Status: DISCONTINUED | OUTPATIENT
Start: 2022-10-13 | End: 2022-10-19 | Stop reason: HOSPADM

## 2022-10-13 RX ORDER — MAGNESIUM OXIDE 400 MG/1
400 TABLET ORAL DAILY
Status: DISCONTINUED | OUTPATIENT
Start: 2022-10-13 | End: 2022-10-19 | Stop reason: HOSPADM

## 2022-10-13 RX ADMIN — TRAZODONE HYDROCHLORIDE 200 MG: 100 TABLET ORAL at 21:36

## 2022-10-13 RX ADMIN — TEMAZEPAM 15 MG: 15 CAPSULE ORAL at 21:36

## 2022-10-13 RX ADMIN — DOXYCYCLINE HYCLATE 50 MG: 50 CAPSULE ORAL at 08:42

## 2022-10-13 RX ADMIN — FUROSEMIDE 40 MG: 10 INJECTION, SOLUTION INTRAMUSCULAR; INTRAVENOUS at 16:13

## 2022-10-13 RX ADMIN — FUROSEMIDE 40 MG: 10 INJECTION, SOLUTION INTRAMUSCULAR; INTRAVENOUS at 04:17

## 2022-10-13 RX ADMIN — EZETIMIBE 10 MG: 10 TABLET ORAL at 08:41

## 2022-10-13 RX ADMIN — METOPROLOL SUCCINATE 50 MG: 50 TABLET, EXTENDED RELEASE ORAL at 08:38

## 2022-10-13 RX ADMIN — AMLODIPINE BESYLATE 7.5 MG: 5 TABLET ORAL at 02:02

## 2022-10-13 RX ADMIN — INSULIN ASPART 1 UNITS: 100 INJECTION, SOLUTION INTRAVENOUS; SUBCUTANEOUS at 12:27

## 2022-10-13 RX ADMIN — Medication 400 MG: at 21:36

## 2022-10-13 RX ADMIN — ACETAMINOPHEN 650 MG: 325 TABLET, FILM COATED ORAL at 20:02

## 2022-10-13 RX ADMIN — WARFARIN SODIUM 5 MG: 5 TABLET ORAL at 17:27

## 2022-10-13 RX ADMIN — TRAZODONE HYDROCHLORIDE 200 MG: 100 TABLET ORAL at 02:02

## 2022-10-13 RX ADMIN — PERFLUTREN 2 ML: 6.52 INJECTION, SUSPENSION INTRAVENOUS at 09:00

## 2022-10-13 RX ADMIN — HYDRALAZINE HYDROCHLORIDE 50 MG: 50 TABLET, FILM COATED ORAL at 16:13

## 2022-10-13 RX ADMIN — HYDRALAZINE HYDROCHLORIDE 50 MG: 50 TABLET, FILM COATED ORAL at 20:06

## 2022-10-13 RX ADMIN — POTASSIUM CHLORIDE 40 MEQ: 1500 TABLET, EXTENDED RELEASE ORAL at 21:36

## 2022-10-13 RX ADMIN — HYDRALAZINE HYDROCHLORIDE 50 MG: 50 TABLET, FILM COATED ORAL at 12:28

## 2022-10-13 RX ADMIN — ALLOPURINOL 300 MG: 300 TABLET ORAL at 08:38

## 2022-10-13 ASSESSMENT — ACTIVITIES OF DAILY LIVING (ADL)
ADLS_ACUITY_SCORE: 35
ADLS_ACUITY_SCORE: 22
ADLS_ACUITY_SCORE: 35
ADLS_ACUITY_SCORE: 35
DOING_ERRANDS_INDEPENDENTLY_DIFFICULTY: NO
ADLS_ACUITY_SCORE: 39
DRESSING/BATHING_DIFFICULTY: NO
ADLS_ACUITY_SCORE: 35
CONCENTRATING,_REMEMBERING_OR_MAKING_DECISIONS_DIFFICULTY: NO
ADLS_ACUITY_SCORE: 35
ADLS_ACUITY_SCORE: 39
FALL_HISTORY_WITHIN_LAST_SIX_MONTHS: NO
ADLS_ACUITY_SCORE: 22
ADLS_ACUITY_SCORE: 22
EQUIPMENT_CURRENTLY_USED_AT_HOME: WALKER, ROLLING
WEAR_GLASSES_OR_BLIND: NO
ADLS_ACUITY_SCORE: 22
DIFFICULTY_EATING/SWALLOWING: NO
TOILETING_ISSUES: NO
CHANGE_IN_FUNCTIONAL_STATUS_SINCE_ONSET_OF_CURRENT_ILLNESS/INJURY: NO
WALKING_OR_CLIMBING_STAIRS_DIFFICULTY: NO
ADLS_ACUITY_SCORE: 39

## 2022-10-13 NOTE — H&P
[facility]    History and Physical - Hospitalist Service       Date of Admission:  10/12/2022    Assessment & Plan             Eduardo Laughlin is a 81 year old male admitted on 10/12/2022. He has been admitted with sob with activity since couple of months and worse since last few days. The sob is also associated with dizziness/lightheadedness on standing. He also 2 episodes of CP. Last episode of CP was last night - felt like pressure, non radiating, was standing in the kitchen at that time, no cp with activity and currently CP free. Patient subsequently came to the ER for further evaluation and management. He was found to be in acute CHF with BNP elevated and started on iv diuresis, trend troponin, check echo, consult cardio.        A/p :         ROBBINS    Acute on chronic CHF     He has been admitted with sob with activity since couple of months and worse since last few days. The sob is also associated with dizziness/lightheadedness on standing.     He also 2 episodes of CP. Last episode of CP was last night - felt like pressure, non radiating, was standing in the kitchen at that time, no cp with activity and currently CP free.     Patient subsequently came to the ER for further evaluation and management.     He was found to be in acute CHF with BNP elevated and started on iv diuresis, trend troponin, check echo, consult cardio.      CP    Elevated troponin, ? Demand ischemia    He also 2 episodes of CP. Last episode of CP was last night - felt like pressure, non radiating, was standing in the kitchen at that time, no cp with activity and currently CP free.       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 Cardiorenal, on iv diuresis.      Gout : on allopurinol 300 mg daily      HTN, Essential : on norvasc 7.5 mg daily and 2.5 mg daily prn, lisinopril 20 mg bid, metoprolol 50 mg daily      HLD : on zetia      DM2 : on glimepiride 1 mg daily at home, on sliding scale  "insulin currently      Recurrent skin infections : on doxycyline 50 mg daily for prevention      Insomnia : on restoril 15 mg at bedtime prn, trazodone 100-200 mg at bedtime      A fib h/o : on coumadin      DVT on legs : on coumadin      Cancer of bladder and prostrate cancer : s/p surgery, s/p ileal conduit      Morbid obesity/ Sleep apnea ; non compliant with c pap           Diet:   cardiac  DVT Prophylaxis: Warfarin  Otero Catheter: Not present  Central Lines: None  Cardiac Monitoring: None  Code Status:   DNR/DNI    Clinically Significant Risk Factors Present on Admission               # Coagulation Defect: home medication list includes an anticoagulant medication   # Hypertension: home medication list includes antihypertensive(s)    # Obesity: Estimated body mass index is 37.31 kg/m  as calculated from the following:    Height as of this encounter: 1.778 m (5' 10\").    Weight as of this encounter: 117.9 kg (260 lb).        Disposition Plan      Expected Discharge Date: 10/15/2022                The patient's care was discussed with the Bedside Nurse, Patient and Patient's Family.    Reji Miranda MD  Hospitalist Service  [facility]  Securely message with the Vocera Web Console (learn more here)  Text page via [employer] Paging/Directory         ______________________________________________________________________    Chief Complaint   sob    History is obtained from the patient    History of Present Illness     Eduardo Laughlin is a 81 year old male admitted on 10/12/2022. He has been admitted with sob with activity since couple of months and worse since last few days. The sob is also associated with dizziness/lightheadedness on standing. He also 2 episodes of CP. Last episode of CP was last night - felt like pressure, non radiating, was standing in the kitchen at that time, no cp with activity and currently CP free. Patient subsequently came to the ER for further evaluation and " management. He was found to be in acute CHF with BNP elevated and started on iv diuresis, trend troponin, check echo, consult cardio.          Review of Systems      No fever or chills  No cough or phlegm  No abdominal symptoms  No urinary symptoms  No neuro symptoms    Past Medical History    I have reviewed this patient's medical history and updated it with pertinent information if needed.   Past Medical History:   Diagnosis Date     Acute renal failure with acute tubular necrosis superimposed on stage 3 chronic kidney disease (H)      Arteriosclerotic cardiovascular disease (ASCVD)      Bladder cancer (H)      BPH (benign prostatic hypertrophy)      Chronic kidney disease      Complicated UTI (urinary tract infection) 08/25/2019     Coronary artery disease      Diabetes mellitus (H)      Diabetic neuropathy (H)      E coli bacteremia      GERD (gastroesophageal reflux disease)      Gout      Hyperlipidemia      Hypertension      Hyponatremia      Iliac artery aneurysm, 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    Added automatically from request for surgery [id number]     Kidney stone      Osteoarthritis      Personal history of malignant neoplasm of prostate 04/27/2020     Prostate cancer (H)      Sleep apnea     CPAP       Past Surgical History   I have reviewed this patient's surgical history and updated it with pertinent information if needed.  Past Surgical History:   Procedure Laterality Date     CARDIAC CATHETERIZATION  03/28/2008    and coronary angios     CYSTECTOMY       CYSTOSCOPY       CYSTOSCOPY N/A 12/23/2015    Procedure: CYSTOSCOPY BLADDER BIOPSIES with Fulgeration and Placement of Otero ;  Surgeon: Gordon Bajwa MD;  Location: [facility];  Service:      IR EXTREMITY ANGIOGRAM LEFT  3/22/2019     IR EXTREMITY ANGIOGRAM LEFT  3/22/2019     IR ILEAL CONDUIT INJECTION  12/23/2016     IR NEPHROSTOMY TUBE PLACEMENT RIGHT  8/18/2016     IR URETEROSTOMY TUBE CHANGE RIGHT  9/21/2016     KIDNEY STONE SURGERY  x4      LAMINECTOMY LUMBAR ONE LEVEL Bilateral 9/13/2022    Procedure: LUMBAR 4 - LUMBAR 5 BILATERAL MEDIAL FACETECTOMY AND DECOMPRESSION;  Surgeon: Everett Holland MD;  Location: [facility] OR     PROSTATE SURGERY         Social History   I have reviewed this patient's social history and updated it with pertinent information if needed.  Social History     Tobacco Use     Smoking status: Former     Smokeless tobacco: Never     Tobacco comments:     Quit smoking 30 years ago   Vaping Use     Vaping Use: Never used   Substance Use Topics     Alcohol use: Not Currently     Drug use: Never     Lives in [place] in . Living    3 children  Denies smoking, alcohol. Drugs  Uses a walker    Family History   I have reviewed this patient's family history and updated it with pertinent information if needed.  Family History   Problem Relation Age of Onset     No Known Problems Mother      Prostate Cancer Father      Deep Vein Thrombosis Father      Cancer Sister      Heart Disease Sister      No Known Problems Daughter      Heart Disease Brother      Heart Disease Sister      No Known Problems Daughter        Prior to Admission Medications   Prior to Admission Medications   Prescriptions Last Dose Informant Patient Reported? Taking?   acetaminophen (TYLENOL) 325 MG tablet Past Month  No Yes   Sig: Take 1-2 tablets (325-650 mg) by mouth every 4 hours as needed for pain (mild pain)   allopurinol (ZYLOPRIM) 300 MG tablet 10/12/2022  Yes Yes   Sig: Take 300 mg by mouth daily   amLODIPine (NORVASC) 5 MG tablet 10/11/2022  Yes Yes   Sig: Take 7.5 mg by mouth At Bedtime   amLODIPine (NORVASC) 5 MG tablet Past Week  Yes Yes   Sig: Take 2.5 mg by mouth daily as needed (For elevated BP SBP > 140)   doxycycline hyclate (VIBRAMYCIN) 50 MG capsule 10/12/2022  Yes Yes   Sig: Take 50 mg by mouth daily   ezetimibe (ZETIA) 10 MG tablet 10/12/2022  Yes Yes   Sig: Take 10 mg by mouth daily   glimepiride (AMARYL) 1 MG tablet  10/12/2022  Yes Yes   Sig: Take 1 mg by mouth daily   lisinopril (ZESTRIL) 20 MG tablet 10/12/2022 at AM  Yes Yes   Sig: Take 20 mg by mouth 2 times daily   metoprolol succinate ER (TOPROL XL) 100 MG 24 hr tablet 10/12/2022 at AM  Yes Yes   Sig: Take 50 mg by mouth daily   temazepam (RESTORIL) 15 MG capsule Past Month  Yes Yes   Sig: Take 15 mg by mouth nightly as needed for sleep   traZODone (DESYREL) 100 MG tablet 10/11/2022  Yes Yes   Sig: Take 100-200 mg by mouth At Bedtime   warfarin ANTICOAGULANT (COUMADIN) 5 MG tablet 10/12/2022 at 8mg  Yes Yes   Sig: Take 5-8 mg by mouth See Admin Instructions 8 mg MWF and 5 mg all other days      Facility-Administered Medications: None     Allergies   Allergies   Allergen Reactions     Metoprolol Succinate Er      Other reaction(s): low blood pressure     Pcn [Penicillins] Other (See Comments)     Childhood-doesn't know reactions     Statin Drugs [Hmg-Coa-R Inhibitors] Cramps     Uroxatral [Alfuzosin] Other (See Comments)     hypotension       Physical Exam   Vital Signs: Temp: 97.8  F (36.6  C) Temp src: Temporal BP: 118/68 Pulse: 71   Resp: 16 SpO2: 98 %      Weight: 260 lbs 0 oz       GENERAL: The patient is not in any acute distressed. Awake and alert.  HEENT: Nonicteric sclerae, PERRLA, EOMI. Oropharynx clear. Moist mucous membranes. Conjunctivae appear well perfused.  HEART: Regular rate and rhythm without murmurs.  LUNGS: Clear to auscultation bilaterally. No wheezing or crackles.  ABDOMEN: Soft, positive bowel sounds, nontender.  SKIN: No rash, no excessive bruising, petechiae, or purpura.  EXTREMITIES : no rashes, b/l  swelling in legs.  NEUROLOGIC: conscious and oriented, follows commands, no obvious focal deficits.  ROS: All other systems negative       Data   Data reviewed today: I reviewed all medications, new labs and imaging results over the last 24 hours. I personally reviewed the EKG tracing showing NSR.    Recent Labs   Lab 10/13/22  0214 10/13/22  0018  10/12/22  1802   WBC  --   --  8.4   HGB  --   --  13.2*   MCV  --   --  95   PLT  --   --  201   NA  --   --  136   POTASSIUM  --   --  4.6   CHLORIDE  --   --  104   CO2  --   --  22   BUN  --   --  45.7*   CR  --   --  3.03*   ANIONGAP  --   --  10   GALLO  --   --  8.9   * 134* 108*

## 2022-10-13 NOTE — PLAN OF CARE
"[facility] ED Handoff Report    ED Chief Complaint:   ED Diagnosis:  (R42) Lightheadedness  Comment:  Plan:     (I44.7) LBBB (left bundle branch block)  Comment:   Plan:     (I50.9) Acute on chronic congestive heart failure, unspecified heart failure type (H)  Comment:  Plan:     (R06.00) Dyspnea, unspecified type  Comment:  Plan:        PMH:    Past Medical History:   Diagnosis Date     Acute renal failure with acute tubular necrosis superimposed on stage 3 chronic kidney disease (H)      Arteriosclerotic cardiovascular disease (ASCVD)      Bladder cancer (H)      BPH (benign prostatic hypertrophy)      Chronic kidney disease      Complicated UTI (urinary tract infection) 08/25/2019     Coronary artery disease      Diabetes mellitus (H)      Diabetic neuropathy (H)      E coli bacteremia      GERD (gastroesophageal reflux disease)      Gout      Hyperlipidemia      Hypertension      Hyponatremia      Iliac artery aneurysm, 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    Added automatically from request for surgery [id number]     Kidney stone      Osteoarthritis      Personal history of malignant neoplasm of prostate 04/27/2020     Prostate cancer (H)      Sleep apnea     CPAP        Code Status:  No CPR- Do NOT Intubate     Falls Risk: Yes Band: Applied    Current Living Situation/Residence: lives with a significant other     Elimination Status: Continent: ileostomy     Activity Level: SBA with walker    Patients Preferred Language:  English     Needed: No    Vital Signs:  /67   Pulse 73   Temp 98  F (36.7  C) (Oral)   Resp 24   Ht 1.778 m (5' 10\")   Wt 117.9 kg (260 lb)   SpO2 96%   BMI 37.31 kg/m       Cardiac Rhythm: NSR 1 degree block HX Afib on warfarin    Pain Score: 0/10    Is the Patient Confused:  No    Last Food or Drink: 10/13/22 at 1200    Focused Assessment:  Patient denies SOB, walked with therapy steady on feet. Urostomy put out 1400 ml in 8 hours. No BM.     Tests Performed: Done: Labs and " Imaging    Treatments Provided:      Family Dynamics/Concerns: Yes; please explain: Wife depends on patient and is home alone. [place] two children in [place] in    Family Updated On Visitor Policy: Yes    Plan of Care Communicated to Family: Yes    Who Was Updated about Plan of Care: spoke with daughter and wife    Belongings Checklist Done and Signed by Patient: Yes    Medications sent with patient: insulin pen warfarin    Covid: asymptomatic , negative    Additional Information:     RN: Shea Carrillo RN   10/13/2022 3:01 PM

## 2022-10-13 NOTE — PROGRESS NOTES
Occupational Therapy Discharge Summary    Reason for therapy discharge:    All goals and outcomes met, no further needs identified.    Progress towards therapy goal(s). See goals on Care Plan in [facility] electronic health record for goal details.  Goals met    Therapy recommendation(s):    No further therapy is recommended. Please re-order OT if pt's dizziness returns during activity for re-evaluation.       10/13/22 0905   Appointment Info   Signing Clinician's Name / Credentials (OT) Milla Zhu, OTR/L   Living Environment   People in Home spouse   Current Living Arrangements independent living facility   Home Accessibility no concerns   Self-Care   Equipment Currently Used at Home walker, rolling  (4WW)   Fall history within last six months no   Activity/Exercise/Self-Care Comment ind with BADLs   Instrumental Activities of Daily Living (IADL)   IADL Comments ind with IADLs, drives, does majority of house work/cooking   General Information   Onset of Illness/Injury or Date of Surgery 10/12/22   Referring Physician Reji Miranda MD   Patient/Family Therapy Goal Statement (OT) go home   Additional Occupational Profile Info/Pertinent History of Current Problem admitted with dizziness and acute on chronic heart failure; PMH spine surgery ~4 weeks ago, bladder CA   Existing Precautions/Restrictions cardiac;spinal   Cognitive Status Examination   Orientation Status orientation to person, place and time   Affect/Mental Status (Cognitive) WFL   Follows Commands WFL   Range of Motion Comprehensive   General Range of Motion no range of motion deficits identified   Strength Comprehensive (MMT)   Comment, General Manual Muscle Testing (MMT) Assessment BUE WFL   Bed Mobility   Bed Mobility supine-sit;sit-supine   Supine-Sit [place] (Bed Mobility) supervision   Sit-Supine [place] (Bed Mobility) supervision   Transfers   Transfers sit-stand transfer;toilet transfer   Sit-Stand Transfer   Sit-Stand [place]  (Transfers) supervision;verbal cues   Toilet Transfer   Type (Toilet Transfer) sit-stand;stand-sit   [place] Level (Toilet Transfer) supervision;verbal cues   Balance   Balance Comments supervision with 4WW for mobility, VC for pacing   Activities of Daily Living   BADL Assessment/Intervention other (see comments)  (supervision, VC for pacing)   Clinical Impression   Criteria for Skilled Therapeutic Interventions Met (OT) Yes, treatment indicated   Influenced by the following impairments dec ADL indep   OT Problem List-Impairments impacting ADL problems related to;activity tolerance impaired   Assessment of Occupational Performance 3-5 Performance Deficits   Identified Performance Deficits household management, household mobility, meal preparation   Planned Therapy Interventions (OT) ADL retraining;IADL retraining;home program guidelines;progressive activity/exercise   Clinical Decision Making Complexity (OT) moderate complexity   Risk & Benefits of therapy have been explained evaluation/treatment results reviewed;participants included;patient   OT Total Evaluation Time   OT Eval, Moderate Complexity Minutes (16583) 10   OT Goals   Therapy Frequency (OT) One time eval and treatment   OT Predicted Duration/Target Date for Goal Attainment 10/13/22   OT Goals Cardiac Phase 1   OT: Understanding of cardiac education to maximize quality of life, condition management, and health outcomes Patient;Verbalize   OT: Perform aerobic activity with stable cardiovascular response continuous;5 minutes;ambulation   OT Discharge Planning   OT Plan d/c   OT Discharge Recommendation (DC Rec) home   OT Rationale for DC Rec appears at baseline for ADLs and IADLs. shows endurance needed to complete household management. no dizziness during activity today.   OT Brief overview of current status ind for ADLs and IADLs   Total Session Time   Total Session Time (sum of timed and untimed services) 10    M [facility]  Services  OUTPATIENT OCCUPATIONAL THERAPY  EVALUATION  PLAN OF TREATMENT FOR OUTPATIENT REHABILITATION  (COMPLETE FOR INITIAL CLAIMS ONLY)  Patient's Last Name, First Name, M.I.  YOB: 1940  Eduardo Laughlin                          Provider's Name  [facility] Medical Record No.  [id number]                             Onset Date:  10/12/22   Start of Care Date:      Type:     ___PT   _X_OT   ___SLP Medical Diagnosis:                       OT Diagnosis:    Visits from SOC:  1     See note for plan of treatment, functional goals and certification details    I CERTIFY THE NEED FOR THESE SERVICES FURNISHED UNDER        THIS PLAN OF TREATMENT AND WHILE UNDER MY CARE     (Physician co-signature of this document indicates review and certification of the therapy plan).

## 2022-10-13 NOTE — PROGRESS NOTES
Hospitalist Progress Note        CODE STATUS:  No CPR- Do NOT Intubate    Assessment/Plan:  Eduardo Laughlin is an 80 YO man w/ history of morbid obesity, CAD, HTN, DMII, CKD, paroxysmal atrial fibrillation, Hx of DVT, prostate cancer s/p ileal conduit, insomnia who presented for several months of dyspnea on exertion for few months worsened in the past few days PTA. Admitted for medical management of acute heart failure.    Acute Heart Failure Exacerbation  - CXR reports no pulm edema. BNP ~5K  - Started on IV lasix 40 mg BID  - Echo ordered and pending  - Cardiology consulted  - Daily weights, Strict I&Os    Chest Pain   Elevated Troponin   - Trop not significantly elevated, & this iso of acute kidney injury  - Suspect demand ischemia   - Stress test in August 2021 was negative & noted EF of 52% at that time. Follow up echo.    Acute on chronic Kidney Injury   - Baseline 2-2.5. Cr 3.0 on admission. Possibly some CRS.  - Monitor & avoid nephrotoxins as able    HTN  - PTA meds include: Norvasc 7.5 mg daily, lisinopril 20 mg bid, metoprolol 50 mg daily  - Holding Lisinopril due to ROBERT    Hx of paroxysmal Atrial Fibrillation  - Rates wnl. Continue coumadin, dosing per pharmacy    Hx of DVT   - c/w coumadin as above     DMII  - PTA on glimepiride. Continue ISS.    Recurrent Skin Infections  - PTA on doxycycline 50 mg daily for prevention?    Sleep Apnea  - Non-compliance w/ CPAP    Gout  - On Allopurinol      DVT Prophylaxis:  On warfari n PTA for DVT/pAF    Disposition/Barrier to discharge: 2-3 days      Subjective:  Interval History:   Patient seen and examined.   Denied CP or SOB.   Reports good UOP with diuretics.  Has not experienced much exertion since admission to gauge improvement in symptoms.    Review of Systems:   As mentioned in subjective.    Patient Active Problem List   Diagnosis     Bladder cancer (H)     Hydronephrosis of right kidney     Coronary artery disease     S/P ileal conduit (H)     HTN  (hypertension)     CKD (chronic kidney disease) stage 3, GFR 30-59 ml/min (H)     Chronic deep vein thrombosis (DVT) of femoral vein of right lower extremity (H)     Acute on chronic renal insufficiency     Paroxysmal atrial fibrillation (H)     LBBB (left bundle branch block)     Deep vein thrombosis (DVT) of proximal vein of both lower extremities, unspecified chronicity (H)     Gastro-esophageal reflux disease without esophagitis     Idiopathic chronic gout without tophus     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)     Morbid obesity (H)     Status post spinal surgery     Lightheadedness     Dyspnea, unspecified type     Acute on chronic congestive heart failure, unspecified heart failure type (H)       Scheduled Meds:    allopurinol  300 mg Oral Daily     amLODIPine  7.5 mg Oral At Bedtime     doxycycline hyclate  50 mg Oral Daily     ezetimibe  10 mg Oral Daily     furosemide  40 mg Intravenous Q12H     insulin aspart  1-7 Units Subcutaneous TID AC     insulin aspart  1-5 Units Subcutaneous At Bedtime     metoprolol succinate ER  50 mg Oral Daily     sodium chloride (PF)  3 mL Intracatheter Q8H     traZODone  100-200 mg Oral At Bedtime     Warfarin Therapy Reminder  1 each Oral See Admin Instructions     Continuous Infusions:    - MEDICATION INSTRUCTIONS -       PRN Meds:.- MEDICATION INSTRUCTIONS -, acetaminophen **OR** acetaminophen, amLODIPine, glucose **OR** dextrose **OR** glucagon, hydrALAZINE, lidocaine 4%, lidocaine (buffered or not buffered), melatonin, ondansetron **OR** ondansetron, sodium chloride (PF), temazepam    Objective:  Vital signs in last 24 hours:  Temp:  [97.8  F (36.6  C)] 97.8  F (36.6  C)  Pulse:  [71-75] 75  Resp:  [16-30] 18  BP: (118-185)/(68-85) 149/75  SpO2:  [97 %-98 %] 98 %    Physical Exam:  General: Well appearing, in NAD.  HEENT: NCAT & EOMI  CV: Normal S1S2, regular rhythm, normal  rate  Lungs: CTAB, no crackles  Abdomen: Soft, NT, ND, +BS  Extremities: Trace edema of LLE. None of RLE. Reported on admission prior to diuretics  Neuro: AAOx3  Psych: Normal Affect.     Lab Results:    Recent Results (from the past 24 hour(s))   Basic metabolic panel    Collection Time: 10/12/22  6:02 PM   Result Value Ref Range    Sodium 136 136 - 145 mmol/L    Potassium 4.6 3.4 - 5.3 mmol/L    Chloride 104 98 - 107 mmol/L    Carbon Dioxide (CO2) 22 22 - 29 mmol/L    Anion Gap 10 7 - 15 mmol/L    Urea Nitrogen 45.7 (H) 8.0 - 23.0 mg/dL    Creatinine 3.03 (H) 0.67 - 1.17 mg/dL    Calcium 8.9 8.8 - 10.2 mg/dL    Glucose 108 (H) 70 - 99 mg/dL    GFR Estimate 20 (L) >60 mL/min/1.73m2   Troponin T, High Sensitivity    Collection Time: 10/12/22  6:02 PM   Result Value Ref Range    Troponin T, High Sensitivity 73 (H) <=22 ng/L   Magnesium    Collection Time: 10/12/22  6:02 PM   Result Value Ref Range    Magnesium 2.2 1.7 - 2.3 mg/dL   Nt probnp inpatient (BNP)    Collection Time: 10/12/22  6:02 PM   Result Value Ref Range    N terminal Pro BNP Inpatient 5,303 (H) 0 - 1,800 pg/mL   CBC with platelets and differential    Collection Time: 10/12/22  6:02 PM   Result Value Ref Range    WBC Count 8.4 4.0 - 11.0 10e3/uL    RBC Count 4.15 (L) 4.40 - 5.90 10e6/uL    Hemoglobin 13.2 (L) 13.3 - 17.7 g/dL    Hematocrit 39.5 (L) 40.0 - 53.0 %    MCV 95 78 - 100 fL    MCH 31.8 26.5 - 33.0 pg    MCHC 33.4 31.5 - 36.5 g/dL    RDW 14.2 10.0 - 15.0 %    Platelet Count 201 150 - 450 10e3/uL    % Neutrophils 68 %    % Lymphocytes 21 %    % Monocytes 9 %    % Eosinophils 1 %    % Basophils 0 %    % Immature Granulocytes 1 %    NRBCs per 100 WBC 0 <1 /100    Absolute Neutrophils 5.8 1.6 - 8.3 10e3/uL    Absolute Lymphocytes 1.8 0.8 - 5.3 10e3/uL    Absolute Monocytes 0.8 0.0 - 1.3 10e3/uL    Absolute Eosinophils 0.1 0.0 - 0.7 10e3/uL    Absolute Basophils 0.0 0.0 - 0.2 10e3/uL    Absolute Immature Granulocytes 0.1 <=0.4 10e3/uL    Absolute  NRBCs 0.0 10e3/uL   Hemoglobin A1c    Collection Time: 10/12/22  6:02 PM   Result Value Ref Range    Hemoglobin A1C 7.7 (H) <5.7 %   Troponin T, High Sensitivity (now)    Collection Time: 10/12/22  7:53 PM   Result Value Ref Range    Troponin T, High Sensitivity 59 (H) <=22 ng/L   Glucose by meter    Collection Time: 10/13/22 12:18 AM   Result Value Ref Range    GLUCOSE BY METER POCT 134 (H) 70 - 99 mg/dL   Glucose by meter    Collection Time: 10/13/22  2:14 AM   Result Value Ref Range    GLUCOSE BY METER POCT 126 (H) 70 - 99 mg/dL       Serum Glucose range:   Recent Labs   Lab 10/13/22  0214 10/13/22  0018 10/12/22  1802   * 134* 108*     ABG: No lab results found in last 7 days.  CBC:   Recent Labs   Lab 10/12/22  1802   WBC 8.4   HGB 13.2*   HCT 39.5*   MCV 95      NEUTROPHIL 68   LYMPH 21   MONOCYTE 9   EOSINOPHIL 1     Chemistry:   Recent Labs   Lab 10/12/22  1802      POTASSIUM 4.6   CHLORIDE 104   CO2 22   BUN 45.7*   CR 3.03*   GFRESTIMATED 20*   GALLO 8.9   MAG 2.2     Coags:  No results for input(s): INR, PROTIME, PTT in the last 168 hours.    Invalid input(s): APTT  Cardiac Markers:  No results for input(s): CKTOTAL, TROPONINI in the last 168 hours.               10/13/2022   Jaki Roberts MD  Hospitalist  Pager: [phone]

## 2022-10-13 NOTE — CONSULTS
"Care Management Initial Consult    General Information  Assessment completed with: Patient Yuniel  Type of CM/SW Visit: Initial Assessment    Primary Care Provider verified and updated as needed: Yes   Readmission within the last 30 days: no previous admission in last 30 days      Reason for Consult: discharge planning  Advance Care Planning: Advance Care Planning Reviewed: no concerns identified          Communication Assessment  Patient's communication style: spoken language (English or Bilingual)                   Living Environment:   People in home: spouse     Current living Arrangements: apartment ([facility])      Able to return to prior arrangements: yes       Family/Social Support:  Care provided by: self  Provides care for: no one  Marital Status:   Wife  Ginny       Description of Support System: Supportive, Involved    Support Assessment: Adequate family and caregiver support, Adequate social supports, Patient communicates needs well met    Current Resources:   Patient receiving home care services: No     Community Resources: None  Equipment currently used at home: walker, rolling  Supplies currently used at home: None    Employment/Financial:  Employment Status: retired        Financial Concerns:     Referral to Financial Worker: No       Lifestyle & Psychosocial Needs:  Social Determinants of Health     Tobacco Use: Medium Risk     Smoking Tobacco Use: Former     Smokeless Tobacco Use: Never     Passive Exposure: Not on file   Alcohol Use: Not on file   Financial Resource Strain: Not on file   Food Insecurity: Not on file   Transportation Needs: Not on file   Physical Activity: Not on file   Stress: Not on file   Social Connections: Not on file   Intimate Partner Violence: Not on file   Depression: Not on file   Housing Stability: Not on file       Functional Status:  Prior to admission patient needed assistance:   Dependent ADLs:: Independent (\"uses " "walker some of the time\")  Dependent IADLs:: Independent       Mental Health Status:          Chemical Dependency Status:                Values/Beliefs:  Spiritual, Cultural Beliefs, Protestant Practices, Values that affect care:                 Additional Information:  Yuniel lives at [facility] with his wife. He is independent with ADLs at baseline.    Unknown discharge needs at this time, but should be able to return home.    Wife to transport at discharge.    Sharee Boland RN      "

## 2022-10-13 NOTE — PROGRESS NOTES
Nutrition Therapy    Noted consult for diet education.  Pt currently rooming in the ED  RD to see patient in next 1-2 days for diet ed after transfer to patient unit.

## 2022-10-13 NOTE — PLAN OF CARE
Problem: Plan of Care - These are the overarching goals to be used throughout the patient stay.    Goal: Absence of Hospital-Acquired Illness or Injury  Intervention: Identify and Manage Fall Risk  Recent Flowsheet Documentation  Taken 10/12/2022 2330 by Jens Landa RN  Safety Promotion/Fall Prevention:    lighting adjusted    room near nurse's station  Intervention: Prevent Skin Injury  Recent Flowsheet Documentation  Taken 10/12/2022 2330 by Jens Landa RN  Body Position: position changed independently  Intervention: Prevent and Manage VTE (Venous Thromboembolism) Risk  Recent Flowsheet Documentation  Taken 10/12/2022 2330 by Jens Landa RN  VTE Prevention/Management: foot pump device off     Problem: Risk for Delirium  Goal: Improved Attention and Thought Clarity  Intervention: Maximize Cognitive Function  Recent Flowsheet Documentation  Taken 10/12/2022 2330 by Jens Landa RN  Reorientation Measures: clock in view   Goal Outcome Evaluation:  Patient is A&Ox4. Denies pain. VSS, except slightly elevated BP. Good UOP. Independent with ADLs. IV lasix given per MAR. Able to make needs known.

## 2022-10-13 NOTE — PHARMACY-ANTICOAGULATION SERVICE
Clinical Pharmacy - Warfarin Dosing Consult     Pharmacy has been consulted to manage this patient s warfarin therapy.  Indication: Atrial Fibrillation  Therapy Goal: INR 2-3  Provider/Team: EMIR/Ruben  Warfarin Prior to Admission: Yes  Warfarin PTA Regimen: 8mg MWF and 5mg all other days  Significant drug interactions: Allopurinol, doxycycline, trazodone  Recent documented change in oral intake/nutrition: No    INR   Date Value Ref Range Status   10/13/2022 2.68 (H) 0.85 - 1.15 Final   09/14/2022 1.11 0.85 - 1.15 Final       Recommend warfarin 5 mg today.  Pharmacy will monitor Eduardo Laughlin daily and order warfarin doses to achieve specified goal.      Please contact pharmacy as soon as possible if the warfarin needs to be held for a procedure or if the warfarin goals change.

## 2022-10-13 NOTE — CONSULTS
Assessment:    Persistent dry cough that is seasonal.  Decreased FEV1 to FVC ratio but normal FVC and FEV1.   I am suspicious of possible underlying asthma.    Negative skin testing.  This does not exclude allergies    Plan:    I would recommend daily Qvar 40-2 puffs daily with a spacer.  Albuterol 2 puffs every 4 hours as needed with a spacer    Cetirizine 5 mg daily as needed    Follow-up 6 weeks after starting Qvar  ____________________________________________________________________________     Bradlye is here today with her mother for evaluation of persistent cough as well as typical allergy symptoms.  She has cough from September through the spring.  Usually it improves over the summer months.  It is a dry cough.  No clear wheezing or shortness of breath.  In addition she has sneezing, itchy watery eyes, nasal congestion.  She developed some will sometimes wake from sleep with coughing.  The use Zyrtec 5 mg daily which seems to help.  Occasionally a children's for cough suppression.  At 10 months of age she was given albuterol for bronchiolitis.  Currently they will occasionally use albuterol via nebulizer and it does seem to help somewhat.      Review of symptoms:  As above, otherwise negative    Past medical history: No other chronic medical conditions noted.    Allergies: No known allergies to medications, latex, foods or hymenoptera venom    Family history: Father and mother with allergies.  Dad is currently on allergy shots    Social history: Currently lives in a house is built in 2004.  She has been in this house her whole life.  It has central air-conditioning.  There is a basement present.  She does have a basement bedroom.  No visible water seepage or mold in the home.  They have had a dog in the home for 2 years.  It does not go on the bed.  No significant cigarette smoke exposure.    Medications: reviewed in chart    Physical Exam:  General:  Alert and Oriented X 3.  Eyes:  Sclera clear.  Ears: TMs  HEART CARE NOTE        Thank you, Dr. Miranda, for asking the [facility] team to see Eduardo Laughlin to evaluate ADHF.      Assessment/Recommendations     1. ADHF c/b HFpEF  Assessment / Plan    Hypervolemic on physical exam; diuresing well on current regimen - no changes at this time     GDMT as detailed below; mainstay of treatment for HFpEF includes diuretics and adequate BP control (class I) and SGLT2-I (class 2a); additional medical therapy (ARNI, MRA, ARB) demonstrated less robust evidence for indication but may be considered per guideline recommendations (2b); no indication for BBlockers     Current Pharmacotherapy AHA Guideline-Directed Medical Therapy   Losartan  - on hold given progressive renal dysfunction ARNI/ARB   Spironolactone not started  MRA   SGLT2 inhibitor not started SGLT2-I    Furosemide IV 40 mg q 12 hrs Loop diuretic      2. 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 DM2  Assessment / Plan    Management per primary team    4. Afib   Assessment / Plan    Currently in NSR - continue metoprolol and warfarin    Clinically Significant Risk Factors Present on Admission               # Coagulation Defect: home medication list includes an anticoagulant medication      Cardiovascular: Cardiac Arrhythmia: Atrial fibrillation: Unspecified    Fluid & Electrolyte Disorders: Fluid overload, unspecified, Other fluid overload and Other disorders of electrolyte and fluid balance, not elsewhere classified     Nephrology: CKD POA List: Stage 4 (GFR 15-29)      History of Present Illness/Subjective    Mr. Eduardo Laughlin is a 81 year old male with a PMHx significant for CKD, HTN, HLP, DM2, afib, DVT, bladder and prostate Ca, morbid obesity who presents with complaints of dyspnea x several months and found to be in ADHF.     Today, Mr. Laughlin endorses  "progressive symptoms of dyspnea on exertion x several months. Per patient, his symptoms worsened significantly over the last few days which prompted his ED presentation; Management plan as detailed above    ECG: Personally reviewed. normal sinus rhythm, LBBB, 1st degree AV block.    ECHO (personnaly Reviewed):   The left ventricle is normal in size.  The visual ejection fraction is 50-55%.  Septal motion is consistent with conduction abnormality.  No regional wall motion abnormalities noted.  The right ventricular systolic function is normal.  Suspect mild AS  The ascending aorta is Moderately dilated.  There is mild aortic root enlargement.  IVC diameter <2.1 cm collapsing >50% with sniff suggests a normal RA pressure  of 3 mmHg.  Technically difficult, suboptimal study.        Physical Examination Review of Systems   BP (!) 149/75   Pulse 75   Temp 97.8  F (36.6  C) (Oral)   Resp 18   Ht 1.778 m (5' 10\")   Wt 117.9 kg (260 lb)   SpO2 98%   BMI 37.31 kg/m    Body mass index is 37.31 kg/m .  Wt Readings from Last 3 Encounters:   10/12/22 117.9 kg (260 lb)   09/13/22 117.8 kg (259 lb 11.2 oz)   10/04/21 118.8 kg (262 lb)     General Appearance:   no distress, normal body habitus   ENT/Mouth: membranes moist, no oral lesions or bleeding gums.      EYES:  no scleral icterus, normal conjunctivae   Neck: no carotid bruits or thyromegaly   Chest/Lungs:   lungs are clear to auscultation, no rales or wheezing, equal chest wall expansion    Cardiovascular:   Regular. Normal first and second heart sounds with no murmurs, rubs, or gallops; the carotid, radial and posterior tibial pulses are intact, + JVD and mild LE edema bilaterally    Abdomen:  no organomegaly, masses, bruits, or tenderness; bowel sounds are present   Extremities: no cyanosis or clubbing   Skin: no xanthelasma, warm.    Neurologic: alert and oriented x3, calm     Psychiatric: alert and oriented x3, calm     A complete 10 systems ROS was reviewed  And " translucent grey with bony landmarks visible. Nose: Pale, boggy mucosal membranes.  Throat: Pink, moist.  No lesions.  Neck: Supple.  No lymphadenopathy.  Lungs: CTA.  CV: Regular rate and rhythm. Extremities: Well perfused.  No clubbing or cyanosis. Skin: No rash    Spirometry: FEV1 to FVC ratio is 80%.  FEV1 is 1.05 L which is 92% of predicted.  FVC is 1.31 L which is 106% of predicted.  This is consistent with mild airway obstruction with decreased FEV1 to FVC ratio.    Nitric oxide testing was attempted we are unable to get a good result.    Last Percutaneous Allergy Test Results  Trees  Avni, White  1:20 H  (W/F in mm): 0/0 (06/22/17 1517)  Birch Mix 1:20 H (W/F in mm): 0/0 (06/22/17 1517)  Toombs, Common 1:20 H (W/F in mm): 0/0 (06/22/17 1517)  Elm, American 1:20 H (W/F in mm): 0/0 (06/22/17 1517)  [place], Shagbark 1:20 H (W/F in mm): 0/0 (06/22/17 1517)  Maple, Hard/Sugar 1:20 H (W/F in mm): 0/0 (06/22/17 1517)  Lone Wolf Mix 1:20 H (W/F in mm): 0/0 (06/22/17 1517)  [place], Red 1:20 H (W/F in mm): 0/0 (06/22/17 1517)  Sullivan, American 1:20 H (W/F in mm): 0/0 (06/22/17 1517)  [place] Tree 1:20 H (W/F in mm): 0/0 (06/22/17 1517)  Dust Mites  D. Pteronyssinus Mite 30,000 AU/ML H (W/F in mm): 0/0 (06/22/17 1517)  D. Farinae Mite 30,000 AU/ML H (W/F in mm: 0/0 (06/22/17 1517)  Grasses  Grass Mix #4 10,000 BAU/ML H: 0/0 (06/22/17 1517)  Sarkis Grass 1:20 H (W/F in mm): 0/0 (06/22/17 1517)  Cockroach  Cockroach Mix 1:10 H (W/F in mm): 0/0 (06/22/17 1517)  Molds/Fungi  Alternaria Tenuis 1:10 H (W/F in mm): 0/0 (06/22/17 1517)  Aspergillus Fumigatus 1:10 H (W/F in mm): 0/0 (06/22/17 1517)  Homodendrum Cladosporioides 1:10 H (W/F in mm): 0/0 (06/22/17 1517)  Penicillin Notatum 1:10 H (W/F in mm): 0/0 (06/22/17 1517)  Epicoccum 1:10 H (W/F in mm): 0/0 (06/22/17 1517)  Weeds  Ragweed, Short 1:20 H (W/F in mm): 0/0 (06/22/17 1517)  Dock, Sorrel 1:20 H (W/F in mm): 0/0 (06/22/17 1517)  Lamb's Quarter 1:20 H (W/F in mm):  0/0 (06/22/17 1517)  Pigweed, Rough Red Root 1:20 H  (W/F in mm): 0/0 (06/22/17 1517)  Plantain, English 1:20 H  (W/F in mm): 0/0 (06/22/17 1517)  Sagebrush, Mugwort 1:20 H  (W/F in mm): 0/0 (06/22/17 1517)  Animal  Cat 10,000 BAU/ML H (W/F in mm): 0/0 (06/22/17 1517)  Dog 1:10 H (W/F in mm): 0/0 (06/22/17 1517)  Controls  Device Type: QUINTIP (06/22/17 1517)  Neg. control: 50% Glycerine/Saline H (W/F in mm): 0/0 (06/22/17 1517)  Pos. control: Histamine 6mg/ML (W/F in mms): 6/30 (06/22/17 1517)   This transcription uses voice recognition software, which may contain typographical errors.     is negative except what is listed in the HPI.          Medical History  Surgical History Family History Social History   Past Medical History:   Diagnosis Date     Acute renal failure with acute tubular necrosis superimposed on stage 3 chronic kidney disease (H)      Arteriosclerotic cardiovascular disease (ASCVD)      Bladder cancer (H)      BPH (benign prostatic hypertrophy)      Chronic kidney disease      Complicated UTI (urinary tract infection) 08/25/2019     Coronary artery disease      Diabetes mellitus (H)      Diabetic neuropathy (H)      E coli bacteremia      GERD (gastroesophageal reflux disease)      Gout      Hyperlipidemia      Hypertension      Hyponatremia      Iliac artery aneurysm, 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    Added automatically from request for surgery [id number]     Kidney stone      Osteoarthritis      Personal history of malignant neoplasm of prostate 04/27/2020     Prostate cancer (H)      Sleep apnea     CPAP    Past Surgical History:   Procedure Laterality Date     CARDIAC CATHETERIZATION  03/28/2008    and coronary angios     CYSTECTOMY       CYSTOSCOPY       CYSTOSCOPY N/A 12/23/2015    Procedure: CYSTOSCOPY BLADDER BIOPSIES with Fulgeration and Placement of Otero ;  Surgeon: Gordon Bajwa MD;  Location: [facility];  Service:      IR EXTREMITY ANGIOGRAM LEFT  3/22/2019     IR EXTREMITY ANGIOGRAM LEFT  3/22/2019     IR ILEAL CONDUIT INJECTION  12/23/2016     IR NEPHROSTOMY TUBE PLACEMENT RIGHT  8/18/2016     IR URETEROSTOMY TUBE CHANGE RIGHT  9/21/2016     KIDNEY STONE SURGERY  x4     LAMINECTOMY LUMBAR ONE LEVEL Bilateral 9/13/2022    Procedure: LUMBAR 4 - LUMBAR 5 BILATERAL MEDIAL FACETECTOMY AND DECOMPRESSION;  Surgeon: Everett Holland MD;  Location: [facility]     PROSTATE SURGERY      no family history of premature coronary artery disease Social History     Socioeconomic History     Marital status:      Spouse name: Not on file     Number of children:  Not on file     Years of education: Not on file     Highest education level: Not on file   Occupational History     Not on file   Tobacco Use     Smoking status: Former     Smokeless tobacco: Never     Tobacco comments:     Quit smoking 30 years ago   Vaping Use     Vaping Use: Never used   Substance and Sexual Activity     Alcohol use: Not Currently     Drug use: Never     Sexual activity: Not Currently   Other Topics Concern     Not on file   Social History Narrative     Not on file     Social Determinants of Health     Financial Resource Strain: Not on file   Food Insecurity: Not on file   Transportation Needs: Not on file   Physical Activity: Not on file   Stress: Not on file   Social Connections: Not on file   Intimate Partner Violence: Not on file   Housing Stability: Not on file           Lab Results    Chemistry/lipid CBC Cardiac Enzymes/BNP/TSH/INR   Lab Results   Component Value Date    CHOL 168 03/03/2022    HDL 38 (L) 03/03/2022    TRIG 117 03/03/2022    BUN 45.7 (H) 10/12/2022     10/12/2022    CO2 22 10/12/2022    Lab Results   Component Value Date    WBC 8.4 10/12/2022    HGB 13.2 (L) 10/12/2022    HCT 39.5 (L) 10/12/2022    MCV 95 10/12/2022     10/12/2022    Lab Results   Component Value Date    TROPONINI 0.10 04/25/2020     (H) 04/25/2020    INR 1.11 09/14/2022     Lab Results   Component Value Date    TROPONINI 0.10 04/25/2020          Weight:    Wt Readings from Last 3 Encounters:   10/12/22 117.9 kg (260 lb)   09/13/22 117.8 kg (259 lb 11.2 oz)   10/04/21 118.8 kg (262 lb)       Allergies  Allergies   Allergen Reactions     Metoprolol Succinate Er      Other reaction(s): low blood pressure     Pcn [Penicillins] Other (See Comments)     Childhood-doesn't know reactions     Statin Drugs [Hmg-Coa-R Inhibitors] Cramps     Uroxatral [Alfuzosin] Other (See Comments)     hypotension         Surgical History  Past Surgical History:   Procedure Laterality Date     CARDIAC  CATHETERIZATION  03/28/2008    and coronary angios     CYSTECTOMY       CYSTOSCOPY       CYSTOSCOPY N/A 12/23/2015    Procedure: CYSTOSCOPY BLADDER BIOPSIES with Fulgeration and Placement of Otero ;  Surgeon: Gordon Bajwa MD;  Location: [facility];  Service:      IR EXTREMITY ANGIOGRAM LEFT  3/22/2019     IR EXTREMITY ANGIOGRAM LEFT  3/22/2019     IR ILEAL CONDUIT INJECTION  12/23/2016     IR NEPHROSTOMY TUBE PLACEMENT RIGHT  8/18/2016     IR URETEROSTOMY TUBE CHANGE RIGHT  9/21/2016     KIDNEY STONE SURGERY  x4     LAMINECTOMY LUMBAR ONE LEVEL Bilateral 9/13/2022    Procedure: LUMBAR 4 - LUMBAR 5 BILATERAL MEDIAL FACETECTOMY AND DECOMPRESSION;  Surgeon: Everett Holland MD;  Location: [facility]     PROSTATE SURGERY         Social History  Tobacco:   History   Smoking Status     Former   Smokeless Tobacco     Never     Comment: Quit smoking 30 years ago    Alcohol:   Social History    Substance and Sexual Activity      Alcohol use: Not Currently   Illicit Drugs:   History   Drug Use Unknown       Family History  Family History   Problem Relation Age of Onset     No Known Problems Mother      Prostate Cancer Father      Deep Vein Thrombosis Father      Cancer Sister      Heart Disease Sister      No Known Problems Daughter      Heart Disease Brother      Heart Disease Sister      No Known Problems Daughter           Abdelrahman Mazariegos MD on 10/13/2022      cc: Rafael Montelongo,

## 2022-10-13 NOTE — PHARMACY-ADMISSION MEDICATION HISTORY
Pharmacy Note - Admission Medication History    Pertinent Provider Information:      ______________________________________________________________________    Prior To Admission (PTA) med list completed and updated in EMR.       PTA Med List   Medication Sig Last Dose     acetaminophen (TYLENOL) 325 MG tablet Take 1-2 tablets (325-650 mg) by mouth every 4 hours as needed for pain (mild pain) Past Month     allopurinol (ZYLOPRIM) 300 MG tablet Take 300 mg by mouth daily 10/12/2022     amLODIPine (NORVASC) 5 MG tablet Take 7.5 mg by mouth At Bedtime 10/11/2022     amLODIPine (NORVASC) 5 MG tablet Take 2.5 mg by mouth daily as needed (For elevated BP SBP > 140) Past Week     doxycycline hyclate (VIBRAMYCIN) 50 MG capsule Take 50 mg by mouth daily 10/12/2022     ezetimibe (ZETIA) 10 MG tablet Take 10 mg by mouth daily 10/12/2022     glimepiride (AMARYL) 1 MG tablet Take 1 mg by mouth daily 10/12/2022     lisinopril (ZESTRIL) 20 MG tablet Take 20 mg by mouth 2 times daily 10/12/2022 at AM     metoprolol succinate ER (TOPROL XL) 100 MG 24 hr tablet Take 50 mg by mouth daily 10/12/2022 at AM     temazepam (RESTORIL) 15 MG capsule Take 15 mg by mouth nightly as needed for sleep Past Month     traZODone (DESYREL) 100 MG tablet Take 100-200 mg by mouth At Bedtime 10/11/2022     warfarin ANTICOAGULANT (COUMADIN) 5 MG tablet Take 5-8 mg by mouth See Admin Instructions 8 mg MWF and 5 mg all other days 10/12/2022 at 8mg       Information source(s): Patient, Family member and [facility]Everywhere[facility]  Method of interview communication: in-person    Summary of Changes to PTA Med List  New: none  Discontinued: melatonin  Changed: warfarin dose    Patient was asked about OTC/herbal products specifically.  PTA med list reflects this.    In the past week, patient estimated taking medication this percent of the time:  greater than 90%.    Allergies were reviewed, assessed, and updated with the patient.      Patient does not use any  multi-dose medications prior to admission.    The information provided in this note is only as accurate as the sources available at the time of the update(s).    Thank you for the opportunity to participate in the care of this patient.    Joya Vivar [facility]  10/12/2022 7:49 PM

## 2022-10-14 LAB
ANION GAP SERPL CALCULATED.3IONS-SCNC: 14 MMOL/L (ref 7–15)
ATRIAL RATE - MUSE: 73 BPM
BUN SERPL-MCNC: 55.9 MG/DL (ref 8–23)
CALCIUM SERPL-MCNC: 8.6 MG/DL (ref 8.8–10.2)
CHLORIDE SERPL-SCNC: 98 MMOL/L (ref 98–107)
CREAT SERPL-MCNC: 2.9 MG/DL (ref 0.67–1.17)
DEPRECATED HCO3 PLAS-SCNC: 19 MMOL/L (ref 22–29)
DIASTOLIC BLOOD PRESSURE - MUSE: NORMAL MMHG
GFR SERPL CREATININE-BSD FRML MDRD: 21 ML/MIN/1.73M2
GLUCOSE BLDC GLUCOMTR-MCNC: 132 MG/DL (ref 70–99)
GLUCOSE BLDC GLUCOMTR-MCNC: 146 MG/DL (ref 70–99)
GLUCOSE BLDC GLUCOMTR-MCNC: 151 MG/DL (ref 70–99)
GLUCOSE BLDC GLUCOMTR-MCNC: 170 MG/DL (ref 70–99)
GLUCOSE SERPL-MCNC: 154 MG/DL (ref 70–99)
HOLD SPECIMEN: NORMAL
INR PPP: 2.53 (ref 0.85–1.15)
INTERPRETATION ECG - MUSE: NORMAL
MAGNESIUM SERPL-MCNC: 2.1 MG/DL (ref 1.7–2.3)
P AXIS - MUSE: -10 DEGREES
POTASSIUM SERPL-SCNC: 4.4 MMOL/L (ref 3.4–5.3)
PR INTERVAL - MUSE: 276 MS
QRS DURATION - MUSE: 176 MS
QT - MUSE: 438 MS
QTC - MUSE: 482 MS
R AXIS - MUSE: -23 DEGREES
SODIUM SERPL-SCNC: 131 MMOL/L (ref 136–145)
SYSTOLIC BLOOD PRESSURE - MUSE: NORMAL MMHG
T AXIS - MUSE: 122 DEGREES
VENTRICULAR RATE- MUSE: 73 BPM

## 2022-10-14 PROCEDURE — 250N000013 HC RX MED GY IP 250 OP 250 PS 637: Performed by: INTERNAL MEDICINE

## 2022-10-14 PROCEDURE — 99233 SBSQ HOSP IP/OBS HIGH 50: CPT | Performed by: INTERNAL MEDICINE

## 2022-10-14 PROCEDURE — 250N000011 HC RX IP 250 OP 636: Performed by: INTERNAL MEDICINE

## 2022-10-14 PROCEDURE — 85610 PROTHROMBIN TIME: CPT | Performed by: INTERNAL MEDICINE

## 2022-10-14 PROCEDURE — 80048 BASIC METABOLIC PNL TOTAL CA: CPT | Performed by: HOSPITALIST

## 2022-10-14 PROCEDURE — 83735 ASSAY OF MAGNESIUM: CPT | Performed by: HOSPITALIST

## 2022-10-14 PROCEDURE — 36415 COLL VENOUS BLD VENIPUNCTURE: CPT | Performed by: INTERNAL MEDICINE

## 2022-10-14 PROCEDURE — 250N000013 HC RX MED GY IP 250 OP 250 PS 637: Performed by: HOSPITALIST

## 2022-10-14 PROCEDURE — 99233 SBSQ HOSP IP/OBS HIGH 50: CPT | Performed by: HOSPITALIST

## 2022-10-14 PROCEDURE — P9047 ALBUMIN (HUMAN), 25%, 50ML: HCPCS | Performed by: INTERNAL MEDICINE

## 2022-10-14 PROCEDURE — 999N000157 HC STATISTIC RCP TIME EA 10 MIN

## 2022-10-14 PROCEDURE — 210N000001 HC R&B IMCU HEART CARE

## 2022-10-14 RX ORDER — BUMETANIDE 2 MG/1
2 TABLET ORAL DAILY
Status: DISCONTINUED | OUTPATIENT
Start: 2022-10-14 | End: 2022-10-19 | Stop reason: HOSPADM

## 2022-10-14 RX ORDER — ALBUMIN (HUMAN) 12.5 G/50ML
50 SOLUTION INTRAVENOUS ONCE
Status: COMPLETED | OUTPATIENT
Start: 2022-10-14 | End: 2022-10-14

## 2022-10-14 RX ADMIN — WARFARIN SODIUM 8 MG: 5 TABLET ORAL at 15:55

## 2022-10-14 RX ADMIN — POTASSIUM CHLORIDE 40 MEQ: 1500 TABLET, EXTENDED RELEASE ORAL at 08:16

## 2022-10-14 RX ADMIN — TEMAZEPAM 15 MG: 15 CAPSULE ORAL at 22:19

## 2022-10-14 RX ADMIN — INSULIN ASPART 1 UNITS: 100 INJECTION, SOLUTION INTRAVENOUS; SUBCUTANEOUS at 17:31

## 2022-10-14 RX ADMIN — INSULIN ASPART 1 UNITS: 100 INJECTION, SOLUTION INTRAVENOUS; SUBCUTANEOUS at 08:01

## 2022-10-14 RX ADMIN — Medication 400 MG: at 08:17

## 2022-10-14 RX ADMIN — ACETAMINOPHEN 650 MG: 325 TABLET, FILM COATED ORAL at 18:31

## 2022-10-14 RX ADMIN — TRAZODONE HYDROCHLORIDE 200 MG: 100 TABLET ORAL at 22:19

## 2022-10-14 RX ADMIN — DOXYCYCLINE HYCLATE 50 MG: 50 CAPSULE ORAL at 08:16

## 2022-10-14 RX ADMIN — METOPROLOL SUCCINATE 50 MG: 50 TABLET, EXTENDED RELEASE ORAL at 08:16

## 2022-10-14 RX ADMIN — BUMETANIDE 2 MG: 2 TABLET ORAL at 15:55

## 2022-10-14 RX ADMIN — HYDRALAZINE HYDROCHLORIDE 50 MG: 50 TABLET, FILM COATED ORAL at 08:17

## 2022-10-14 RX ADMIN — HYDRALAZINE HYDROCHLORIDE 50 MG: 50 TABLET, FILM COATED ORAL at 12:07

## 2022-10-14 RX ADMIN — ALLOPURINOL 300 MG: 300 TABLET ORAL at 08:17

## 2022-10-14 RX ADMIN — HYDRALAZINE HYDROCHLORIDE 50 MG: 50 TABLET, FILM COATED ORAL at 20:30

## 2022-10-14 RX ADMIN — FUROSEMIDE 40 MG: 10 INJECTION, SOLUTION INTRAMUSCULAR; INTRAVENOUS at 05:40

## 2022-10-14 RX ADMIN — POTASSIUM CHLORIDE 40 MEQ: 1500 TABLET, EXTENDED RELEASE ORAL at 20:30

## 2022-10-14 RX ADMIN — ALBUMIN HUMAN 50 G: 0.25 SOLUTION INTRAVENOUS at 06:55

## 2022-10-14 RX ADMIN — HYDRALAZINE HYDROCHLORIDE 50 MG: 50 TABLET, FILM COATED ORAL at 15:55

## 2022-10-14 RX ADMIN — INSULIN ASPART 1 UNITS: 100 INJECTION, SOLUTION INTRAVENOUS; SUBCUTANEOUS at 12:07

## 2022-10-14 RX ADMIN — EZETIMIBE 10 MG: 10 TABLET ORAL at 08:17

## 2022-10-14 ASSESSMENT — ACTIVITIES OF DAILY LIVING (ADL)
ADLS_ACUITY_SCORE: 22
ADLS_ACUITY_SCORE: 20
ADLS_ACUITY_SCORE: 22
ADLS_ACUITY_SCORE: 20
ADLS_ACUITY_SCORE: 19

## 2022-10-14 NOTE — CONSULTS
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 He expresses great desire to make changes in his nutrition to help with personal motivation to continue living and taking care of his wife in their apartment. He does all the cooking, driving, and shopping. He does enjoy salt regularly, but is willing to try out other herbs and spices like Mrs. Dash, and use pepper more often as he already likes it. His brother visited him who is 91 yo, he reports that it brought him motivation to continue improving his health. Discussed low sodium nutrition with a handout, and provided a low sodium cook book. He believes this is feasible for him and will start implementing change.    CURRENT DIET:  2 gm Na    NUTRITION DIAGNOSIS:  Decreased sodium need related to chronic illness as evidenced by heart failure with preserved ejection fraction of 50-55%.    INTERVENTIONS:    Nutrition Prescription:  2000 mg sodium limit daily    Implementation:      *  Nutrition Education (Content):   A)  Provided handout on 2 gm sodium daily limit and low sodium cookbook      *  Nutrition Education (Application):   A)  Discussed current eating habits and recommended alternative food choices      *  Anticipate good compliance      *  Diet Education - refer to Education Flowsheet    Goals:      *  Patient will verbalize understanding of diet - met    Follow Up/Monitoring:      *  Provided RD contact information for future questions      *  Recommended Out-Patient Nutrition Referral, if further diet instructions are needed

## 2022-10-14 NOTE — PLAN OF CARE
Goal Outcome Evaluation:  Pt. States he feels confused about the plan of care.  Pt states he has not seen any doctors since he has been here.  RN went over who his doctors are and who saw him yesterday.  Pt states he does remember Dr. Mazariegos now, but states that he feels confused about the plan still.  RN went over the plan of care with pt.  Pt states he still wants to hear about it from the provider.

## 2022-10-14 NOTE — PLAN OF CARE
Problem: Pain Acute  Goal: Optimal Pain Control and Function  Outcome: Progressing  Intervention: Develop Pain Management Plan  Recent Flowsheet Documentation  Taken 10/13/2022 2003 by Elke Kirby, RN  Pain Management Interventions:    medication (see MAR)    MD notified (comment)  Intervention: Prevent or Manage Pain  Recent Flowsheet Documentation  Taken 10/13/2022 1700 by Elke Kirby, RN  Medication Review/Management: medications reviewed     Problem: Fluid Volume Excess  Goal: Fluid Balance  Outcome: Progressing     Problem: Electrolyte Imbalance  Goal: Electrolyte Imbalance: Plan of Care  Outcome: Progressing   Goal Outcome Evaluation:    Admit from ED.  A & O.  On RA 93%. Blood sugars 123.  Has urostomy bag.  Output 1700cc.  Pt telemetry NSR 1st degree BBB with ST elevation and long QT.  Monitor tech was concerned about ST elevation.  EKG ordered.  Texted Dr. Roberts  if she wants to look at EKG.  Pt was asymptomatic. Dr. Roberts said she didn't have that pt.  I asked Dr. Lr who looked at telemetry and said it was a BBB and hard to see ST elevation.  I reported to charge nurse who said to monitor pt. Pt reported bad leg cramps.  Dr. Quiñones ordered labs. Gave pt tylenol at 2000.  And took for a walk to stretch legs. K+ 3.7. Mag 1.9.  Doctor ordered to give mag and potassium.  Gave prn temazepam for HS.        Heart Failure Care Map  GOALS TO BE MET BEFORE DISCHARGE:    1. Decrease congestion and/or edema with diuretic therapy to achieve near optimal volume status.     Dyspnea improved: Yes, satisfactory for discharge.222   Edema improved: Yes, satisfactory for discharge.        Net I/O and Weights since admission:   09/13 2300 - 10/13 2259  In: 1096 [P.O.:1096]  Out: 6390 [Urine:6390]  Net: -5294     Vitals:    10/12/22 1739 10/13/22 1551   Weight: 117.9 kg (260 lb) 113.9 kg (251 lb)       2.  O2 sats > 90% on room air, or at prior home O2 therapy level.      Able to wean O2 this shift to keep sats  above 90%?: Yes, satisfactory for discharge.   Does patient use Home O2? No          Current oxygenation status:   SpO2: 92 %     O2 Device: None (Room air),      3.  Tolerates ambulation and mobility near baseline.     Ambulation: Yes, satisfactory for discharge.   Times patient ambulated with staff this shift: 2    Please review the Heart Failure Care Map for additional HF goal outcomes.    Elke Kirby RN  10/13/2022 222

## 2022-10-14 NOTE — PLAN OF CARE
Heart Failure Care Map  GOALS TO BE MET BEFORE DISCHARGE:    1. Decrease congestion and/or edema with diuretic therapy to achieve near optimal volume status.     Dyspnea improved: No, further care required to meet this goal. Please explain Has dyspnea on exertion    Edema improved: Yes, satisfactory for discharge.        Net I/O and Weights since admission:   09/14 0700 - 10/14 0659  In: 1336 [P.O.:1336]  Out: 7090 [Urine:7090]  Net: -5754     Vitals:    10/12/22 1739 10/13/22 1551 10/14/22 0532   Weight: 117.9 kg (260 lb) 113.9 kg (251 lb) 113.1 kg (249 lb 6.4 oz)       2.  O2 sats > 90% on room air, or at prior home O2 therapy level.      Able to wean O2 this shift to keep sats above 90%?: Yes, satisfactory for discharge.   Does patient use Home O2? No          Current oxygenation status:   SpO2: 97 %     O2 Device: None (Room air),      3.  Tolerates ambulation and mobility near baseline.     Ambulation: Yes, satisfactory for discharge.   Times patient ambulated with staff this shift: 2 in the room.    Please review the Heart Failure Care Map for additional HF goal outcomes.    Becca Fritz RN  10/14/2022       Goal Outcome Evaluation: Patient is aox4. Has SR with 1 degree AV block, BBB, and prolonged QT. ST elevation noted in V1 and ST depression noted in lead II. EKG done in evening and MD aware per evening report. Patient is asymptomatic. Denies new chest pain, SOB, and dizziness. VSS. /67 and 117/71. SpO2 97%, on room air. Patient has cramping on legs and hands. Getting better per patient after electrolytes replacement. K. 4.4 today. Patient has urostomy bag. Output 700 ml. Intake 240 ml. Continue to monitor.

## 2022-10-14 NOTE — PLAN OF CARE
Goal Outcome Evaluation:      Plan of Care Reviewed With: patient    Overall Patient Progress: improvingOverall Patient Progress: improving    Outcome Evaluation: Pt continues on RA.  Pt w/ SR BBB 1AVB on tele.  Pt has urostomy that he manages himself. Pt's son brought in urostomy supples for pt to change his pouch. Pt up to chair most of shift.

## 2022-10-14 NOTE — PROGRESS NOTES
Hospitalist Progress Note        CODE STATUS:  No CPR- Do NOT Intubate    Assessment/Plan:  Eduardo Laughlin is an 80 YO man w/ history of morbid obesity, CAD, HTN, DMII, CKD, paroxysmal atrial fibrillation, Hx of DVT, prostate cancer s/p ileal conduit, insomnia who presented for several months of dyspnea on exertion for few months worsened in the past few days PTA. Admitted for medical management of acute heart failure.    Acute Heart Failure Exacerbation  - CXR reports no pulm edema. BNP ~5K  - Cardiology following  - Started on IV lasix 40 mg BID.   - Due to increase in Cr, given albumin this AM & IV lasix held. Started on Bumex 2 mg BID per cards.  - Echo 10/13: EF 50-55%, septal WMA  - Daily weights, Strict I&Os    Acute on chronic Kidney Injury   - Baseline 2-2.5. Cr 3.0 on admission. Possibly some CRS.  - Monitor & avoid nephrotoxins as able    Hyponatremia  - Likely related to heart failure. Monitor while diuresing.     Chest Pain   Elevated Troponin   - Trop not significantly elevated, & this iso of acute kidney injury  - Suspect demand ischemia   - Stress test in August 2021 was negative     HTN  - PTA meds include: Norvasc 7.5 mg daily, lisinopril 20 mg bid, metoprolol 50 mg daily  - Holding Lisinopril due to ROBERT    Hx of paroxysmal Atrial Fibrillation  - Rates wnl. Continue coumadin, dosing per pharmacy    Hx of DVT   - c/w coumadin as above     DMII  - PTA on glimepiride. Continue ISS.    Recurrent Skin Infections  - PTA on doxycycline 50 mg daily for prevention?    Sleep Apnea  - Non-compliance w/ CPAP    Gout  - On Allopurinol      DVT Prophylaxis:  On warfari n PTA for DVT/pAF    Disposition/Barrier to discharge: 2-3 days      Subjective:  Interval History:   Patient seen and examined.   Denied CP or SOB. Feels improved.  Wondering when he might be able to go home.      Review of Systems:   As mentioned in subjective.    Patient Active Problem List   Diagnosis     Bladder cancer (H)      Hydronephrosis of right kidney     Coronary artery disease     S/P ileal conduit (H)     HTN (hypertension)     CKD (chronic kidney disease) stage 3, GFR 30-59 ml/min (H)     Chronic deep vein thrombosis (DVT) of femoral vein of right lower extremity (H)     Acute on chronic renal insufficiency     Paroxysmal atrial fibrillation (H)     LBBB (left bundle branch block)     Deep vein thrombosis (DVT) of proximal vein of both lower extremities, unspecified chronicity (H)     Gastro-esophageal reflux disease without esophagitis     Idiopathic chronic gout without tophus     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)     Morbid obesity (H)     Status post spinal surgery     Lightheadedness     Dyspnea, unspecified type     Acute on chronic congestive heart failure, unspecified heart failure type (H)       Scheduled Meds:    allopurinol  300 mg Oral Daily     bumetanide  2 mg Oral Daily     doxycycline hyclate  50 mg Oral Daily     ezetimibe  10 mg Oral Daily     hydrALAZINE  50 mg Oral 4x Daily     insulin aspart  1-7 Units Subcutaneous TID AC     insulin aspart  1-5 Units Subcutaneous At Bedtime     magnesium oxide  400 mg Oral Daily     metoprolol succinate ER  50 mg Oral Daily     potassium chloride  40 mEq Oral BID     sodium chloride (PF)  3 mL Intracatheter Q8H     traZODone  100-200 mg Oral At Bedtime     Warfarin Therapy Reminder  1 each Oral See Admin Instructions     Continuous Infusions:    - MEDICATION INSTRUCTIONS -       PRN Meds:.- MEDICATION INSTRUCTIONS -, acetaminophen **OR** acetaminophen, amLODIPine, glucose **OR** dextrose **OR** glucagon, hydrALAZINE, lidocaine 4%, lidocaine (buffered or not buffered), melatonin, ondansetron **OR** ondansetron, sodium chloride (PF), temazepam    Objective:  Vital signs in last 24 hours:  Temp:  [97.5  F (36.4  C)-98.8  F (37.1  C)] 97.5  F (36.4  C)  Pulse:  [70-93] 79  Resp:   [18-24] 18  BP: ()/(53-80) 127/64  SpO2:  [92 %-97 %] 95 %    Physical Exam:  General: Well appearing, in NAD.  HEENT: NCAT & EOMI  CV: Normal S1S2, regular rhythm, normal rate  Lungs: CTAB  Abdomen: Soft, NT, ND, +BS  Extremities: No LE edema b/l  Neuro: AAOx3  Psych: Normal Affect.     Lab Results:    Recent Results (from the past 24 hour(s))   Glucose by meter    Collection Time: 10/13/22  9:08 AM   Result Value Ref Range    GLUCOSE BY METER POCT 169 (H) 70 - 99 mg/dL   Echocardiogram Complete    Collection Time: 10/13/22  9:10 AM   Result Value Ref Range    LVEF  50-55%    Glucose by meter    Collection Time: 10/13/22 12:03 PM   Result Value Ref Range    GLUCOSE BY METER POCT 154 (H) 70 - 99 mg/dL   ECG 12-LEAD WITH MUSE (LHE)    Collection Time: 10/13/22  4:55 PM   Result Value Ref Range    Systolic Blood Pressure  mmHg    Diastolic Blood Pressure  mmHg    Ventricular Rate 73 BPM    Atrial Rate 73 BPM    OR Interval 276 ms    QRS Duration 176 ms     ms    QTc 482 ms    P Axis -10 degrees    R AXIS -23 degrees    T Axis 122 degrees    Interpretation ECG       Sinus rhythm with 1st degree A-V block  Left bundle branch block  Abnormal ECG  When compared with ECG of 12-OCT-2022 17:35,  No significant change was found     Glucose by meter    Collection Time: 10/13/22  4:58 PM   Result Value Ref Range    GLUCOSE BY METER POCT 129 (H) 70 - 99 mg/dL   Magnesium    Collection Time: 10/13/22  8:12 PM   Result Value Ref Range    Magnesium 1.9 1.7 - 2.3 mg/dL   Potassium    Collection Time: 10/13/22  8:12 PM   Result Value Ref Range    Potassium 3.7 3.4 - 5.3 mmol/L   Phosphorus    Collection Time: 10/13/22  8:12 PM   Result Value Ref Range    Phosphorus 4.0 2.5 - 4.5 mg/dL   CK total    Collection Time: 10/13/22  8:12 PM   Result Value Ref Range     39 - 308 U/L   Glucose by meter    Collection Time: 10/13/22  9:28 PM   Result Value Ref Range    GLUCOSE BY METER POCT 135 (H) 70 - 99 mg/dL   INR     Collection Time: 10/14/22  4:48 AM   Result Value Ref Range    INR 2.53 (H) 0.85 - 1.15   Basic metabolic panel    Collection Time: 10/14/22  4:48 AM   Result Value Ref Range    Sodium 131 (L) 136 - 145 mmol/L    Potassium 4.4 3.4 - 5.3 mmol/L    Chloride 98 98 - 107 mmol/L    Carbon Dioxide (CO2) 19 (L) 22 - 29 mmol/L    Anion Gap 14 7 - 15 mmol/L    Urea Nitrogen 55.9 (H) 8.0 - 23.0 mg/dL    Creatinine 2.90 (H) 0.67 - 1.17 mg/dL    Calcium 8.6 (L) 8.8 - 10.2 mg/dL    Glucose 154 (H) 70 - 99 mg/dL    GFR Estimate 21 (L) >60 mL/min/1.73m2   Extra Purple Top Tube    Collection Time: 10/14/22  4:48 AM   Result Value Ref Range    Hold Specimen JIC    Glucose by meter    Collection Time: 10/14/22  7:33 AM   Result Value Ref Range    GLUCOSE BY METER POCT 170 (H) 70 - 99 mg/dL       Serum Glucose range:   Recent Labs   Lab 10/14/22  0733 10/14/22  0448 10/13/22  2128 10/13/22  1658   * 154* 135* 129*     ABG: No lab results found in last 7 days.  CBC:   Recent Labs   Lab 10/13/22  0750 10/12/22  1802   WBC 8.4 8.4   HGB 13.5 13.2*   HCT 40.7 39.5*   MCV 95 95    201   NEUTROPHIL 72 68   LYMPH 17 21   MONOCYTE 9 9   EOSINOPHIL 1 1     Chemistry:   Recent Labs   Lab 10/14/22  0448 10/13/22  2012 10/13/22  0750 10/12/22  1802   *  --  136  136 136   POTASSIUM 4.4 3.7 4.2  4.2 4.6   CHLORIDE 98  --  103  103 104   CO2 19*  --  24  24 22   BUN 55.9*  --  45.7* 45.7*   CR 2.90*  --  2.75* 3.03*   GFRESTIMATED 21*  --  22* 20*   GALLO 8.6*  --  9.0 8.9   MAG  --  1.9  --  2.2     Coags:  Recent Labs   Lab 10/14/22  0448 10/13/22  0750   INR 2.53* 2.68*     Cardiac Markers:  No results for input(s): CKTOTAL, TROPONINI in the last 168 hours.               10/14/2022   Jaki Roberts MD  Hospitalist  Pager: [phone]

## 2022-10-14 NOTE — PROGRESS NOTES
HEART CARE NOTE          Assessment/Recommendations     1. ADHF c/b HFpEF  Assessment / Plan    Significant diuresis on current regimen; now with progressive ROBERT on am labs - will hold IV diuresis, give albumin and plan to transition to oral diuresis later today    GDMT as detailed below; mainstay of treatment for HFpEF includes diuretics and adequate BP control (class I) and SGLT2-I (class 2a); additional medical therapy (ARNI, MRA, ARB) demonstrated less robust evidence for indication but may be considered per guideline recommendations (2b); no indication for BBlockers      Current Pharmacotherapy AHA Guideline-Directed Medical Therapy   Losartan  - on hold given progressive renal dysfunction ARNI/ARB   Spironolactone not started  MRA   SGLT2 inhibitor not started SGLT2-I    Furosemide IV 40 mg q 12 hrs Loop diuretic       2. ROBERT on CKD stage 4-5  Assessment / Plan    Baseline crt 2.46 - continue to monitor closely with diuresis     Likely some component of CRS compounding progressive CKD     3. DM2  Assessment / Plan    Management per primary team     4. Afib   Assessment / Plan    Currently in NSR - continue metoprolol and warfarin      History of Present Illness/Subjective      Mr. Eduardo Laughlin is a 81 year old male with a PMHx significant for CKD, HTN, HLP, DM2, afib, DVT, bladder and prostate Ca, morbid obesity who presents with complaints of dyspnea x several months and found to be in ADHF.      Today, Mr. Laughlin denies any acute cardiac events or complaints; Management plan as detailed above     ECG: Personally reviewed. normal sinus rhythm, LBBB, 1st degree AV block.     ECHO (personnaly Reviewed):   The left ventricle is normal in size.  The visual ejection fraction is 50-55%.  Septal motion is consistent with conduction abnormality.  No regional wall motion abnormalities noted.  The right ventricular systolic function is normal.  Suspect mild AS  The ascending aorta is Moderately  "dilated.  There is mild aortic root enlargement.  IVC diameter <2.1 cm collapsing >50% with sniff suggests a normal RA pressure  of 3 mmHg.  Technically difficult, suboptimal study.          Physical Examination Review of Systems   /71 (BP Location: Right arm, Patient Position: Semi-Wills's)   Pulse 79   Temp 97.6  F (36.4  C) (Oral)   Resp 20   Ht 1.778 m (5' 10\")   Wt 113.1 kg (249 lb 6.4 oz)   SpO2 97%   BMI 35.79 kg/m    Body mass index is 35.79 kg/m .  Wt Readings from Last 3 Encounters:   10/14/22 113.1 kg (249 lb 6.4 oz)   09/13/22 117.8 kg (259 lb 11.2 oz)   10/04/21 118.8 kg (262 lb)     General Appearance:   no distress, normal body habitus   ENT/Mouth: membranes moist, no oral lesions or bleeding gums.      EYES:  no scleral icterus, normal conjunctivae   Neck: no carotid bruits or thyromegaly   Chest/Lungs:   lungs are clear to auscultation, no rales or wheezing, equal chest wall expansion    Cardiovascular:   Regular. Normal first and second heart sounds with no murmurs, rubs, or gallops; the carotid, radial and posterior tibial pulses are intact, no JVD and mild LE edema bilaterally    Abdomen:  no organomegaly, masses, bruits, or tenderness; bowel sounds are present   Extremities: no cyanosis or clubbing   Skin: no xanthelasma, warm.    Neurologic: alert and oriented x3, calm     Psychiatric: alert and oriented x3, calm     A complete 10 systems ROS was reviewed  And is negative except what is listed in the HPI.          Medical History  Surgical History Family History Social History   Past Medical History:   Diagnosis Date     Acute renal failure with acute tubular necrosis superimposed on stage 3 chronic kidney disease (H)      Arteriosclerotic cardiovascular disease (ASCVD)      Bladder cancer (H)      BPH (benign prostatic hypertrophy)      Chronic kidney disease      Complicated UTI (urinary tract infection) 08/25/2019     Coronary artery disease      Diabetes mellitus (H)      " Diabetic neuropathy (H)      E coli bacteremia      GERD (gastroesophageal reflux disease)      Gout      Hyperlipidemia      Hypertension      Hyponatremia      Iliac artery aneurysm, 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    Added automatically from request for surgery [id number]     Kidney stone      Osteoarthritis      Personal history of malignant neoplasm of prostate 04/27/2020     Prostate cancer (H)      Sleep apnea     CPAP    Past Surgical History:   Procedure Laterality Date     CARDIAC CATHETERIZATION  03/28/2008    and coronary angios     CYSTECTOMY       CYSTOSCOPY       CYSTOSCOPY N/A 12/23/2015    Procedure: CYSTOSCOPY BLADDER BIOPSIES with Fulgeration and Placement of Otero ;  Surgeon: Gordon Bajwa MD;  Location: [facility];  Service:      IR EXTREMITY ANGIOGRAM LEFT  3/22/2019     IR EXTREMITY ANGIOGRAM LEFT  3/22/2019     IR ILEAL CONDUIT INJECTION  12/23/2016     IR NEPHROSTOMY TUBE PLACEMENT RIGHT  8/18/2016     IR URETEROSTOMY TUBE CHANGE RIGHT  9/21/2016     KIDNEY STONE SURGERY  x4     LAMINECTOMY LUMBAR ONE LEVEL Bilateral 9/13/2022    Procedure: LUMBAR 4 - LUMBAR 5 BILATERAL MEDIAL FACETECTOMY AND DECOMPRESSION;  Surgeon: Everett Holland MD;  [facility]     PROSTATE SURGERY      no family history of premature coronary artery disease Social History     Socioeconomic History     Marital status:      Spouse name: Not on file     Number of children: Not on file     Years of education: Not on file     Highest education level: Not on file   Occupational History     Not on file   Tobacco Use     Smoking status: Former     Smokeless tobacco: Never     Tobacco comments:     Quit smoking 30 years ago   Vaping Use     Vaping Use: Never used   Substance and Sexual Activity     Alcohol use: Not Currently     Drug use: Never     Sexual activity: Not Currently   Other Topics Concern     Not on file   Social History Narrative     Not on file     Social Determinants of Health      Financial Resource Strain: Not on file   Food Insecurity: Not on file   Transportation Needs: Not on file   Physical Activity: Not on file   Stress: Not on file   Social Connections: Not on file   Intimate Partner Violence: Not on file   Housing Stability: Not on file           Lab Results    Chemistry/lipid CBC Cardiac Enzymes/BNP/TSH/INR   Lab Results   Component Value Date    CHOL 168 03/03/2022    HDL 38 (L) 03/03/2022    TRIG 117 03/03/2022    BUN 55.9 (H) 10/14/2022     (L) 10/14/2022    CO2 19 (L) 10/14/2022    Lab Results   Component Value Date    WBC 8.4 10/13/2022    HGB 13.5 10/13/2022    HCT 40.7 10/13/2022    MCV 95 10/13/2022     10/13/2022    Lab Results   Component Value Date    TROPONINI 0.10 04/25/2020     (H) 04/25/2020    INR 2.53 (H) 10/14/2022     Lab Results   Component Value Date    TROPONINI 0.10 04/25/2020          Weight:    Wt Readings from Last 3 Encounters:   10/14/22 113.1 kg (249 lb 6.4 oz)   09/13/22 117.8 kg (259 lb 11.2 oz)   10/04/21 118.8 kg (262 lb)       Allergies  Allergies   Allergen Reactions     Metoprolol Succinate Er      Other reaction(s): low blood pressure     Pcn [Penicillins] Other (See Comments)     Childhood-doesn't know reactions     Statin Drugs [Hmg-Coa-R Inhibitors] Cramps     Uroxatral [Alfuzosin] Other (See Comments)     hypotension         Surgical History  Past Surgical History:   Procedure Laterality Date     CARDIAC CATHETERIZATION  03/28/2008    and coronary angios     CYSTECTOMY       CYSTOSCOPY       CYSTOSCOPY N/A 12/23/2015    Procedure: CYSTOSCOPY BLADDER BIOPSIES with Fulgeration and Placement of Otero ;  Surgeon: Gordon Bajwa MD;  Location: [facility] OR;  Service:      IR EXTREMITY ANGIOGRAM LEFT  3/22/2019     IR EXTREMITY ANGIOGRAM LEFT  3/22/2019     IR ILEAL CONDUIT INJECTION  12/23/2016     IR NEPHROSTOMY TUBE PLACEMENT RIGHT  8/18/2016     IR URETEROSTOMY TUBE CHANGE RIGHT  9/21/2016     KIDNEY STONE  SURGERY  x4     LAMINECTOMY LUMBAR ONE LEVEL Bilateral 9/13/2022    Procedure: LUMBAR 4 - LUMBAR 5 BILATERAL MEDIAL FACETECTOMY AND DECOMPRESSION;  Surgeon: Everett Holland MD;  Location: [facility] Main OR     PROSTATE SURGERY         Social History  Tobacco:   History   Smoking Status     Former   Smokeless Tobacco     Never     Comment: Quit smoking 30 years ago    Alcohol:   Social History    Substance and Sexual Activity      Alcohol use: Not Currently   Illicit Drugs:   History   Drug Use Unknown       Family History  Family History   Problem Relation Age of Onset     No Known Problems Mother      Prostate Cancer Father      Deep Vein Thrombosis Father      Cancer Sister      Heart Disease Sister      No Known Problems Daughter      Heart Disease Brother      Heart Disease Sister      No Known Problems Daughter           Abdelrahman Mazariegos MD on 10/14/2022      cc: Rafael Montelongo

## 2022-10-15 LAB
ANION GAP SERPL CALCULATED.3IONS-SCNC: 12 MMOL/L (ref 7–15)
BUN SERPL-MCNC: 71.1 MG/DL (ref 8–23)
CALCIUM SERPL-MCNC: 8.9 MG/DL (ref 8.8–10.2)
CHLORIDE SERPL-SCNC: 101 MMOL/L (ref 98–107)
CREAT SERPL-MCNC: 3.36 MG/DL (ref 0.67–1.17)
DEPRECATED HCO3 PLAS-SCNC: 20 MMOL/L (ref 22–29)
GFR SERPL CREATININE-BSD FRML MDRD: 18 ML/MIN/1.73M2
GLUCOSE BLDC GLUCOMTR-MCNC: 122 MG/DL (ref 70–99)
GLUCOSE BLDC GLUCOMTR-MCNC: 142 MG/DL (ref 70–99)
GLUCOSE BLDC GLUCOMTR-MCNC: 178 MG/DL (ref 70–99)
GLUCOSE BLDC GLUCOMTR-MCNC: 178 MG/DL (ref 70–99)
GLUCOSE SERPL-MCNC: 157 MG/DL (ref 70–99)
HOLD SPECIMEN: NORMAL
INR PPP: 2.59 (ref 0.85–1.15)
POTASSIUM SERPL-SCNC: 5.1 MMOL/L (ref 3.4–5.3)
SODIUM SERPL-SCNC: 133 MMOL/L (ref 136–145)

## 2022-10-15 PROCEDURE — 85610 PROTHROMBIN TIME: CPT | Performed by: INTERNAL MEDICINE

## 2022-10-15 PROCEDURE — 36415 COLL VENOUS BLD VENIPUNCTURE: CPT | Performed by: HOSPITALIST

## 2022-10-15 PROCEDURE — 250N000013 HC RX MED GY IP 250 OP 250 PS 637: Performed by: HOSPITALIST

## 2022-10-15 PROCEDURE — 210N000001 HC R&B IMCU HEART CARE

## 2022-10-15 PROCEDURE — 999N000127 HC STATISTIC PERIPHERAL IV START W US GUIDANCE

## 2022-10-15 PROCEDURE — 258N000003 HC RX IP 258 OP 636: Performed by: HOSPITALIST

## 2022-10-15 PROCEDURE — 82310 ASSAY OF CALCIUM: CPT | Performed by: HOSPITALIST

## 2022-10-15 PROCEDURE — 99233 SBSQ HOSP IP/OBS HIGH 50: CPT | Performed by: HOSPITALIST

## 2022-10-15 PROCEDURE — 99232 SBSQ HOSP IP/OBS MODERATE 35: CPT | Performed by: INTERNAL MEDICINE

## 2022-10-15 PROCEDURE — U0005 INFEC AGEN DETEC AMPLI PROBE: HCPCS | Performed by: INTERNAL MEDICINE

## 2022-10-15 PROCEDURE — 250N000013 HC RX MED GY IP 250 OP 250 PS 637: Performed by: INTERNAL MEDICINE

## 2022-10-15 RX ORDER — WARFARIN SODIUM 5 MG/1
5 TABLET ORAL
Status: COMPLETED | OUTPATIENT
Start: 2022-10-15 | End: 2022-10-15

## 2022-10-15 RX ORDER — SODIUM CHLORIDE 9 MG/ML
INJECTION, SOLUTION INTRAVENOUS CONTINUOUS
Status: DISCONTINUED | OUTPATIENT
Start: 2022-10-15 | End: 2022-10-18

## 2022-10-15 RX ADMIN — HYDRALAZINE HYDROCHLORIDE 50 MG: 50 TABLET, FILM COATED ORAL at 08:05

## 2022-10-15 RX ADMIN — TEMAZEPAM 15 MG: 15 CAPSULE ORAL at 23:32

## 2022-10-15 RX ADMIN — INSULIN ASPART 1 UNITS: 100 INJECTION, SOLUTION INTRAVENOUS; SUBCUTANEOUS at 12:47

## 2022-10-15 RX ADMIN — ACETAMINOPHEN 650 MG: 325 TABLET, FILM COATED ORAL at 00:48

## 2022-10-15 RX ADMIN — ALLOPURINOL 300 MG: 300 TABLET ORAL at 08:05

## 2022-10-15 RX ADMIN — Medication 400 MG: at 08:05

## 2022-10-15 RX ADMIN — HYDRALAZINE HYDROCHLORIDE 50 MG: 50 TABLET, FILM COATED ORAL at 11:25

## 2022-10-15 RX ADMIN — DOXYCYCLINE HYCLATE 50 MG: 50 CAPSULE ORAL at 08:05

## 2022-10-15 RX ADMIN — ACETAMINOPHEN 650 MG: 325 TABLET, FILM COATED ORAL at 12:55

## 2022-10-15 RX ADMIN — TRAZODONE HYDROCHLORIDE 200 MG: 100 TABLET ORAL at 21:16

## 2022-10-15 RX ADMIN — ACETAMINOPHEN 650 MG: 325 TABLET, FILM COATED ORAL at 21:15

## 2022-10-15 RX ADMIN — EZETIMIBE 10 MG: 10 TABLET ORAL at 08:05

## 2022-10-15 RX ADMIN — SODIUM CHLORIDE: 9 INJECTION, SOLUTION INTRAVENOUS at 11:25

## 2022-10-15 RX ADMIN — INSULIN ASPART 1 UNITS: 100 INJECTION, SOLUTION INTRAVENOUS; SUBCUTANEOUS at 17:29

## 2022-10-15 RX ADMIN — HYDRALAZINE HYDROCHLORIDE 50 MG: 50 TABLET, FILM COATED ORAL at 17:29

## 2022-10-15 RX ADMIN — HYDRALAZINE HYDROCHLORIDE 50 MG: 50 TABLET, FILM COATED ORAL at 21:15

## 2022-10-15 RX ADMIN — WARFARIN SODIUM 5 MG: 5 TABLET ORAL at 17:31

## 2022-10-15 RX ADMIN — INSULIN ASPART 1 UNITS: 100 INJECTION, SOLUTION INTRAVENOUS; SUBCUTANEOUS at 08:05

## 2022-10-15 RX ADMIN — METOPROLOL SUCCINATE 50 MG: 50 TABLET, EXTENDED RELEASE ORAL at 08:05

## 2022-10-15 ASSESSMENT — ACTIVITIES OF DAILY LIVING (ADL)
ADLS_ACUITY_SCORE: 19
ADLS_ACUITY_SCORE: 20
ADLS_ACUITY_SCORE: 19

## 2022-10-15 NOTE — PLAN OF CARE
Problem: Plan of Care - These are the overarching goals to be used throughout the patient stay.    Goal: Absence of Hospital-Acquired Illness or Injury  Intervention: Identify and Manage Fall Risk  Recent Flowsheet Documentation  Taken 10/14/2022 2030 by Jens Landa RN  Safety Promotion/Fall Prevention: lighting adjusted  Intervention: Prevent Skin Injury  Recent Flowsheet Documentation  Taken 10/14/2022 2030 by Jens Landa RN  Body Position: position changed independently  Goal: Optimal Comfort and Wellbeing  Intervention: Monitor Pain and Promote Comfort  Recent Flowsheet Documentation  Taken 10/14/2022 2030 by Jens Landa RN  Pain Management Interventions: declines     Problem: Risk for Delirium  Goal: Improved Attention and Thought Clarity  Intervention: Maximize Cognitive Function  Recent Flowsheet Documentation  Taken 10/14/2022 2030 by Jens Landa RN  Sensory Stimulation Regulation: television on  Reorientation Measures: clock in view     Problem: Pain Acute  Goal: Optimal Pain Control and Function  Intervention: Develop Pain Management Plan  Recent Flowsheet Documentation  Taken 10/14/2022 2030 by Jens Landa RN  Pain Management Interventions: declines  Intervention: Prevent or Manage Pain  Recent Flowsheet Documentation  Taken 10/14/2022 2030 by Jens Landa RN  Sensory Stimulation Regulation: television on     Problem: Heart Failure  Goal: Optimal Functional Ability  Intervention: Optimize Functional Ability  Recent Flowsheet Documentation  Taken 10/14/2022 2030 by Jens Landa RN  Activity Management: activity adjusted per tolerance  Goal: Effective Oxygenation and Ventilation  Intervention: Promote Airway Secretion Clearance  Recent Flowsheet Documentation  Taken 10/14/2022 2030 by Jens Landa RN  Cough And Deep Breathing: done independently per patient  Activity Management: activity adjusted per tolerance  Intervention: Optimize Oxygenation and  Ventilation  Recent Flowsheet Documentation  Taken 10/14/2022 2030 by Jens Landa, RN  Head of Bed (HOB) Positioning: HOB at 20-30 degrees   Goal Outcome Evaluation: Patient is A&Ox4. C/o mild headache, declined intervention, stated waiting for next dose of PRN tylenol. Afebrile, VSS. Tele shows SR w/ 1st AVB and IVCD. Patient on RA. Pt resting comfortably.

## 2022-10-15 NOTE — PROGRESS NOTES
RESPIRATORY CARE NOTE    Pt declined CPAP; states that he does not use at home.       Curtis Dietrich, RT

## 2022-10-15 NOTE — PROGRESS NOTES
Heart Failure Care Map  GOALS TO BE MET BEFORE DISCHARGE:    1. Decrease congestion and/or edema with diuretic therapy to achieve near optimal volume status.     Dyspnea improved: Yes, satisfactory for discharge.   Edema improved: Yes, satisfactory for discharge.        Net I/O and Weights since admission:   09/15 0700 - 10/15 0659  In: 2416 [P.O.:2416]  Out: 8940 [Urine:8940]  Net: -6524     Vitals:    10/12/22 1739 10/13/22 1551 10/14/22 0532   Weight: 117.9 kg (260 lb) 113.9 kg (251 lb) 113.1 kg (249 lb 6.4 oz)       2.  O2 sats > 90% on room air, or at prior home O2 therapy level.      Able to wean O2 this shift to keep sats above 90%?: Yes, satisfactory for discharge. Pt is on RA and lung sounds are clear.    Does patient use Home O2? No          Current oxygenation status:   SpO2: 96 %     O2 Device: None (Room air),      3.  Tolerates ambulation and mobility near baseline.     Ambulation: Yes, satisfactory for discharge.   Times patient ambulated with staff this shift: 0. Pt is independent, sleeping, Pt has urostomy which he  manages himself.SR with BBB and first degree AVB on tele.     Please review the Heart Failure Care Map for additional HF goal outcomes.    Zoran Evans RN  10/15/2022

## 2022-10-15 NOTE — PROGRESS NOTES
Hospitalist Progress Note    Assessment/Plan    Principal Problem:    Dyspnea, unspecified type  Active Problems:    LBBB (left bundle branch block)    Lightheadedness    Acute on chronic congestive heart failure, unspecified heart failure type (H)  81-year-old patient with history of morbid obesity, CAD, hypertension, paroxysmal A. fib and history of DVT, prostate cancer status post ileal conduit, presented with dyspnea on exertion for few months worsening for a few days also found to have acute heart failure    Shortness of breath due to acute diastolic heart failure  Chest x-ray with evidence of pulmonary edema elevated BNP,  Was started on IV Lasix but it was held due to worsening kidney function, switch to p.o. Bumex but again it was held due to worsening kidney function,  Had negative fluid balance of 1267 in 24 hours weight has been stable, cardiology is following,    Paroxysmal A. Fib  On Coumadin and Toprol-XL, cardiology is following      Acute kidney injury with chronic kidney disease  Baseline creatinine 2-2 .5, creatinine 3.39  Bumex has been held, started him on IV fluid hydration recheck his kidney function tomorrow,    Hyponatremia  Possible secondary to dehydration, will recheck after hydration    Elevated troponin  Cardiology is following, no significant elevation possible demand ischemia    Hypertension  Lisinopril on hold due to acute kidney injury blood pressure has been stable on Norvasc and metoprolol      Barriers to Discharge: Worsening kidney function    Anticipated discharge date/Disposition: 2 days when kidney function improves likely can go home    Subjective  Patient was seen today he is frustrated from fluid restrictions and that he feels well and wants to go home I explained to him that we will liberate his fluid and will have some IV fluid hydration to help improve his kidney function    Objective    Vital signs in last 24 hours  Temp:  [98.1  F (36.7  C)-99.3  F (37.4  C)] 99  F  (37.2  C)  Pulse:  [78-87] 78  Resp:  [20] 20  BP: (113-129)/(59-76) 116/59  SpO2:  [92 %-96 %] 95 % [unfilled] O2 Device: None (Room air)    Weight:   [unfilled] Weight change: -0.771 kg (-1 lb 11.2 oz)    Intake/Output last 3 shifts  I/O last 3 completed shifts:  In: 963 [P.O.:960; I.V.:3]  Out: 1900 [Urine:1900]  Body mass index is 35.77 kg/m .    Physical Exam:  General Appearance: Alert, oriented x3, does not appear in distress.  HEENT: Normocephalic. No scleral icterus, . Mucous membranes moist.  Heart: :Regular rate and rhythm, normal S1 ,S2, No murmurs, no JVD, no pedal edema   Lungs: Clear to auscultation bilaterally. No wheezing or crackles  Abdomen: Soft, non tender, no rebound or rigidity, non distended, bowel sounds present.      Pertinent Labs   Lab Results: personally reviewed.   Recent Labs   Lab 10/15/22  0443 10/14/22  0448 10/13/22  0750   * 131* 136  136   CO2 20* 19* 24  24   BUN 71.1* 55.9* 45.7*     Recent Labs   Lab 10/13/22  0750 10/12/22  1802   WBC 8.4 8.4   HGB 13.5 13.2*   HCT 40.7 39.5*    201     No results for input(s): CKTOTAL, TROPONINI in the last 168 hours.    Invalid input(s): TROPONINT, CKMBINDEX  Invalid input(s): POCGLUFGR      Advanced Care Planning:  Discharge Planning discussed with patient  Total time with this patient is 35 min with 50% of time spent in examining the patient, reviewing records, discussing plan of care and counseling, 50% of time spent in coordination of care.  Care discussed and coordinated with RN, care manager.      Maritza Kimbrough MD  Internal Medicine Hospitalist  10/15/2022

## 2022-10-15 NOTE — PROGRESS NOTES
"[facility]  Cardiac Electrophysiology  [address]  [place] [address]   Office: [phone]  Fax: [phone]     Cardiology Progress Note    Patient: Eduardo Laughlin   : 1940       Assessment/Recommendations   Mr. Eduardo Laughlin is an 81 year old male with chronic diastolic heart failure, atrial fibrillation, LBBB, HTN, T2DM, history of DVT, bladder and prostate cancer with prior ileal conduit, CKD, GINNY non-compliant with CPAP, morbid obesity admitted with acute on chronic diastolic heart failure.    Acute on chronic diastolic heart failure - LVEF 50-55%.  LBBB  - hold bumetanide today given progressive ROBERT on CKD  - once renal function normalizes, will aim to start maintenance diuretic dose with ongoing cardiology ambulatory follow-up  - BP management per medicine team (lisinopril on hold due to ROBERT)    Atrial fibrillation - presently in sinus rhythm  [id number]  - continue metoprolol XL 50mg daily  - continue warfarin         Interval Events   Diuretics transitioned from IV to bumetanide 2mg daily orally yesterday - 24hr I/O 1083/2350, Cr 3.36 (2.90).  He feels well, with resolved exertional dyspnea.       Physical Examination  Review of Systems   VITALS: /68 (BP Location: Right arm)   Pulse 82   Temp 99.2  F (37.3  C) (Oral)   Resp 20   Ht 1.778 m (5' 10\")   Wt 113.1 kg (249 lb 4.8 oz)   SpO2 95%   BMI 35.77 kg/m    Wt Readings from Last 3 Encounters:   10/15/22 113.1 kg (249 lb 4.8 oz)   22 117.8 kg (259 lb 11.2 oz)   10/04/21 118.8 kg (262 lb)       Intake/Output Summary (Last 24 hours) at 10/15/2022 0730  Last data filed at 10/15/2022 0628  Gross per 24 hour   Intake 1083 ml   Output 2350 ml   Net -1267 ml     CONSTITUTIONAL: no distress  EYES:  Conjunctivae pink, sclerae clear.    E/N/T:  Oral mucosa pink, moist mucous membranes  RESPIRATORY:  Respiratory effort is normal  CARDIOVASCULAR:  normal S1 and S2, no lower extremity " edema  GASTROINTESTINAL:  Abdomen without masses or tenderness  EXTREMITIES:  No clubbing or cyanosis.    MUSCULOSKELETAL:  Overall grossly normal muscle strength  SKIN:  Overall, skin warm and dry, no lesions.  NEURO/PSYCH:  Oriented x 3 with normal affect.   Constitutional:  No weight loss or loss of appetite    Cardiovascular: As detailed above  Respiratory: No cough  Genitourinary: No trouble urinating           Medical History  Surgical History   Past Medical History:   Diagnosis Date     Acute renal failure with acute tubular necrosis superimposed on stage 3 chronic kidney disease (H)      Arteriosclerotic cardiovascular disease (ASCVD)      Bladder cancer (H)      BPH (benign prostatic hypertrophy)      Chronic kidney disease      Complicated UTI (urinary tract infection) 08/25/2019     Coronary artery disease      Diabetes mellitus (H)      Diabetic neuropathy (H)      E coli bacteremia      GERD (gastroesophageal reflux disease)      Gout      Hyperlipidemia      Hypertension      Hyponatremia      Iliac artery aneurysm, 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    Added automatically from request for surgery [id number]     Kidney stone      Osteoarthritis      Personal history of malignant neoplasm of prostate 04/27/2020     Prostate cancer (H)      Sleep apnea     CPAP    Past Surgical History:   Procedure Laterality Date     CARDIAC CATHETERIZATION  03/28/2008    and coronary angios     CYSTECTOMY       CYSTOSCOPY       CYSTOSCOPY N/A 12/23/2015    Procedure: CYSTOSCOPY BLADDER BIOPSIES with Fulgeration and Placement of Otero ;  Surgeon: Gordon Bajwa MD;  Location: [facility] [place];  Service:      IR EXTREMITY ANGIOGRAM LEFT  3/22/2019     IR EXTREMITY ANGIOGRAM LEFT  3/22/2019     IR ILEAL CONDUIT INJECTION  12/23/2016     IR NEPHROSTOMY TUBE PLACEMENT RIGHT  8/18/2016     IR URETEROSTOMY TUBE CHANGE RIGHT  9/21/2016     KIDNEY STONE SURGERY  x4     LAMINECTOMY LUMBAR ONE LEVEL Bilateral 9/13/2022    Procedure: LUMBAR 4  - LUMBAR 5 BILATERAL MEDIAL FACETECTOMY AND DECOMPRESSION;  Surgeon: Everett Holland MD;  Location: [facility] Main OR     PROSTATE SURGERY           Family History Social History   Family History   Problem Relation Age of Onset     No Known Problems Mother      Prostate Cancer Father      Deep Vein Thrombosis Father      Cancer Sister      Heart Disease Sister      No Known Problems Daughter      Heart Disease Brother      Heart Disease Sister      No Known Problems Daughter         Social History     Tobacco Use     Smoking status: Former     Smokeless tobacco: Never     Tobacco comments:     Quit smoking 30 years ago   Vaping Use     Vaping Use: Never used   Substance Use Topics     Alcohol use: Not Currently     Drug use: Never         Medications  Allergies     Current Facility-Administered Medications:      - MEDICATION INSTRUCTIONS -, , Does not apply, DOES NOT GO TO Ruben ROSADO Rahul, MD     acetaminophen (TYLENOL) tablet 650 mg, 650 mg, Oral, Q6H PRN, 650 mg at 10/15/22 0048 **OR** acetaminophen (TYLENOL) Suppository 650 mg, 650 mg, Rectal, Q6H PRN, Reji Miranda MD     allopurinol (ZYLOPRIM) tablet 300 mg, 300 mg, Oral, Daily, Reji Miranda MD, 300 mg at 10/14/22 0817     amLODIPine (NORVASC) tablet 2.5 mg, 2.5 mg, Oral, Daily PRN, Reji Miranda MD     bumetanide (BUMEX) tablet 2 mg, 2 mg, Oral, Daily, Abdelrahman Mazariegos MD, 2 mg at 10/14/22 1555     glucose gel 15-30 g, 15-30 g, Oral, Q15 Min PRN **OR** dextrose 50 % injection 25-50 mL, 25-50 mL, Intravenous, Q15 Min PRN **OR** glucagon injection 1 mg, 1 mg, Subcutaneous, Q15 Min PRN, Reji Miranda MD     doxycycline hyclate (VIBRAMYCIN) capsule 50 mg, 50 mg, Oral, Daily, Reji Miranda MD, 50 mg at 10/14/22 0816     ezetimibe (ZETIA) tablet 10 mg, 10 mg, Oral, Daily, Reji Miranda MD, 10 mg at 10/14/22 0817     hydrALAZINE (APRESOLINE) tablet 10 mg, 10 mg, Oral, 4x Daily PRN, Reji Miranda MD     hydrALAZINE (APRESOLINE)  tablet 50 mg, 50 mg, Oral, 4x Daily, Abdelrahman Mazariegos MD, 50 mg at 10/14/22 2030     insulin aspart (NovoLOG) injection (RAPID ACTING), 1-7 Units, Subcutaneous, TID AC, Reji Miranda MD, 1 Units at 10/14/22 1731     insulin aspart (NovoLOG) injection (RAPID ACTING), 1-5 Units, Subcutaneous, At Bedtime, Reji Miranda MD     lidocaine (LMX4) cream, , Topical, Q1H PRN, Reji Miranda MD     lidocaine 1 % 0.1-1 mL, 0.1-1 mL, Other, Q1H PRN, Reji Miranda MD     magnesium oxide (MAG-OX) tablet 400 mg, 400 mg, Oral, Daily, Cecy Quiñones MD, 400 mg at 10/14/22 0817     melatonin tablet 1 mg, 1 mg, Oral, At Bedtime PRN, eRji Miranda MD     metoprolol succinate ER (TOPROL XL) 24 hr tablet 50 mg, 50 mg, Oral, Daily, Reji Miranda MD, 50 mg at 10/14/22 0816     ondansetron (ZOFRAN ODT) ODT tab 4 mg, 4 mg, Oral, Q6H PRN **OR** ondansetron (ZOFRAN) injection 4 mg, 4 mg, Intravenous, Q6H PRN, Reji Miranda MD     sodium chloride (PF) 0.9% PF flush 3 mL, 3 mL, Intracatheter, Q8H, Reji Miranda MD, 3 mL at 10/15/22 0628     sodium chloride (PF) 0.9% PF flush 3 mL, 3 mL, Intracatheter, q1 min prn, Reji Miranda MD     temazepam (RESTORIL) capsule 15 mg, 15 mg, Oral, At Bedtime PRN, Savage Carrero MD, 15 mg at 10/14/22 2219     traZODone (DESYREL) tablet 100-200 mg, 100-200 mg, Oral, At Bedtime, Savage Carrero MD, 200 mg at 10/14/22 2219     Warfarin Dose Required Daily - Pharmacist Managed, 1 each, Oral, See Admin Instructions, Reji Miranda MD     Allergies   Allergen Reactions     Metoprolol Succinate Er      Other reaction(s): low blood pressure     Pcn [Penicillins] Other (See Comments)     Childhood-doesn't know reactions     Statin Drugs [Hmg-Coa-R Inhibitors] Cramps     Uroxatral [Alfuzosin] Other (See Comments)     hypotension          Lab Results    Chemistry CBC Cardiac Enzymes/BNP/TSH/INR   Recent Labs   Lab Test 10/15/22  0716 10/15/22  0443   NA  --  133*   POTASSIUM  --  5.1    CHLORIDE  --  101   CO2  --  20*   * 157*   BUN  --  71.1*   CR  --  3.36*   GFRESTIMATED  --  18*   GALLO  --  8.9     Recent Labs   Lab Test 10/15/22  0443 10/14/22  0448 10/13/22  0750   CR 3.36* 2.90* 2.75*          Recent Labs   Lab Test 10/13/22  0750   WBC 8.4   HGB 13.5   HCT 40.7   MCV 95        Recent Labs   Lab Test 10/13/22  0750 10/12/22  1802 06/13/20  2142   HGB 13.5 13.2* 13.2*    Recent Labs   Lab Test 04/25/20  0655 04/25/20  0009 04/24/20  2049   TROPONINI 0.10 0.14 0.14     Recent Labs   Lab Test 10/12/22  1802 04/25/20  0655 04/24/20  1427   BNP  --  258* 86*   NTBNPI 5,303*  --   --      No results for input(s): TSH in the last 76851 hours.  Recent Labs   Lab Test 10/15/22  0443 10/14/22  0448 10/13/22  0750   INR 2.59* 2.53* 2.68*            Siobhan Carroll MD  10/15/2022  7:30 AM

## 2022-10-15 NOTE — PLAN OF CARE
Goal Outcome Evaluation:      Plan of Care Reviewed With: patient    Overall Patient Progress: improvingOverall Patient Progress: improving    Outcome Evaluation: Pt continues on RA. Pt in SR BBB 1AVB. Pt with urostomy that he manages.  Pt on IVF.

## 2022-10-16 LAB
ANION GAP SERPL CALCULATED.3IONS-SCNC: 11 MMOL/L (ref 7–15)
BUN SERPL-MCNC: 80 MG/DL (ref 8–23)
CALCIUM SERPL-MCNC: 8.7 MG/DL (ref 8.8–10.2)
CHLORIDE SERPL-SCNC: 102 MMOL/L (ref 98–107)
CREAT SERPL-MCNC: 3.96 MG/DL (ref 0.67–1.17)
DEPRECATED HCO3 PLAS-SCNC: 19 MMOL/L (ref 22–29)
GFR SERPL CREATININE-BSD FRML MDRD: 14 ML/MIN/1.73M2
GLUCOSE BLDC GLUCOMTR-MCNC: 144 MG/DL (ref 70–99)
GLUCOSE BLDC GLUCOMTR-MCNC: 166 MG/DL (ref 70–99)
GLUCOSE BLDC GLUCOMTR-MCNC: 179 MG/DL (ref 70–99)
GLUCOSE BLDC GLUCOMTR-MCNC: 184 MG/DL (ref 70–99)
GLUCOSE SERPL-MCNC: 161 MG/DL (ref 70–99)
HOLD SPECIMEN: NORMAL
INR PPP: 2.72 (ref 0.85–1.15)
POTASSIUM SERPL-SCNC: 5.1 MMOL/L (ref 3.4–5.3)
SARS-COV-2 RNA RESP QL NAA+PROBE: NEGATIVE
SODIUM SERPL-SCNC: 132 MMOL/L (ref 136–145)

## 2022-10-16 PROCEDURE — 250N000013 HC RX MED GY IP 250 OP 250 PS 637: Performed by: INTERNAL MEDICINE

## 2022-10-16 PROCEDURE — 250N000013 HC RX MED GY IP 250 OP 250 PS 637: Performed by: HOSPITALIST

## 2022-10-16 PROCEDURE — 99232 SBSQ HOSP IP/OBS MODERATE 35: CPT | Performed by: INTERNAL MEDICINE

## 2022-10-16 PROCEDURE — 36415 COLL VENOUS BLD VENIPUNCTURE: CPT | Performed by: HOSPITALIST

## 2022-10-16 PROCEDURE — 210N000001 HC R&B IMCU HEART CARE

## 2022-10-16 PROCEDURE — 80048 BASIC METABOLIC PNL TOTAL CA: CPT | Performed by: HOSPITALIST

## 2022-10-16 PROCEDURE — 85610 PROTHROMBIN TIME: CPT | Performed by: INTERNAL MEDICINE

## 2022-10-16 PROCEDURE — 99233 SBSQ HOSP IP/OBS HIGH 50: CPT | Performed by: INTERNAL MEDICINE

## 2022-10-16 PROCEDURE — 258N000003 HC RX IP 258 OP 636: Performed by: HOSPITALIST

## 2022-10-16 RX ORDER — ALLOPURINOL 100 MG/1
200 TABLET ORAL DAILY
Status: DISCONTINUED | OUTPATIENT
Start: 2022-10-17 | End: 2022-10-19 | Stop reason: HOSPADM

## 2022-10-16 RX ORDER — WARFARIN SODIUM 5 MG/1
5 TABLET ORAL
Status: COMPLETED | OUTPATIENT
Start: 2022-10-16 | End: 2022-10-16

## 2022-10-16 RX ADMIN — HYDRALAZINE HYDROCHLORIDE 50 MG: 50 TABLET, FILM COATED ORAL at 21:57

## 2022-10-16 RX ADMIN — HYDRALAZINE HYDROCHLORIDE 50 MG: 50 TABLET, FILM COATED ORAL at 08:48

## 2022-10-16 RX ADMIN — ACETAMINOPHEN 650 MG: 325 TABLET, FILM COATED ORAL at 13:21

## 2022-10-16 RX ADMIN — DOXYCYCLINE HYCLATE 50 MG: 50 CAPSULE ORAL at 08:48

## 2022-10-16 RX ADMIN — EZETIMIBE 10 MG: 10 TABLET ORAL at 08:49

## 2022-10-16 RX ADMIN — METOPROLOL SUCCINATE 50 MG: 50 TABLET, EXTENDED RELEASE ORAL at 08:49

## 2022-10-16 RX ADMIN — TRAZODONE HYDROCHLORIDE 200 MG: 100 TABLET ORAL at 21:59

## 2022-10-16 RX ADMIN — WARFARIN SODIUM 5 MG: 5 TABLET ORAL at 17:22

## 2022-10-16 RX ADMIN — INSULIN ASPART 1 UNITS: 100 INJECTION, SOLUTION INTRAVENOUS; SUBCUTANEOUS at 17:21

## 2022-10-16 RX ADMIN — ALLOPURINOL 300 MG: 300 TABLET ORAL at 08:49

## 2022-10-16 RX ADMIN — INSULIN ASPART 1 UNITS: 100 INJECTION, SOLUTION INTRAVENOUS; SUBCUTANEOUS at 12:33

## 2022-10-16 RX ADMIN — TEMAZEPAM 15 MG: 15 CAPSULE ORAL at 21:59

## 2022-10-16 RX ADMIN — SODIUM CHLORIDE 50 ML/HR: 9 INJECTION, SOLUTION INTRAVENOUS at 09:01

## 2022-10-16 RX ADMIN — Medication 400 MG: at 08:48

## 2022-10-16 RX ADMIN — INSULIN ASPART 1 UNITS: 100 INJECTION, SOLUTION INTRAVENOUS; SUBCUTANEOUS at 08:47

## 2022-10-16 ASSESSMENT — ACTIVITIES OF DAILY LIVING (ADL)
ADLS_ACUITY_SCORE: 20
ADLS_ACUITY_SCORE: 23
ADLS_ACUITY_SCORE: 20
ADLS_ACUITY_SCORE: 23
ADLS_ACUITY_SCORE: 20
ADLS_ACUITY_SCORE: 23

## 2022-10-16 NOTE — PROGRESS NOTES
Care Management Follow Up    Length of Stay (days): 4    Expected Discharge Date: 10/16/2022     Concerns to be Addressed:     Nephrology consult pending; Cards following  Patient plan of care discussed at interdisciplinary rounds: Yes    Anticipated Discharge Disposition: Home       Referrals Placed by CM/SW:    Private pay costs discussed: Not applicable    Additional Information:    Therapy REC is home.     No CM needs yet identified. Anticipate pt to return home with wife to transport, pending medical mgmt.    CM following.       SHILPA LomasSW

## 2022-10-16 NOTE — PLAN OF CARE
Goal Outcome Evaluation:      Plan of Care Reviewed With: patient    Overall Patient Progress: improvingOverall Patient Progress: improving    Outcome Evaluation: Pt continues on RA. Pt in SR BBB 1AVB. Pt with urostomy that he manages.  Pt on IVF.    Heart Failure Care Map  GOALS TO BE MET BEFORE DISCHARGE:    1. Decrease congestion and/or edema with diuretic therapy to achieve near optimal volume status.     Dyspnea improved: Yes, satisfactory for discharge.   Edema improved: Yes, satisfactory for discharge.        Net I/O and Weights since admission:   09/16 1500 - 10/16 1459  In: 4505 [P.O.:3796; I.V.:709]  Out: 97641 [Urine:91743]  Net: -6635     Vitals:    10/12/22 1739 10/13/22 1551 10/14/22 0532 10/15/22 0408   Weight: 117.9 kg (260 lb) 113.9 kg (251 lb) 113.1 kg (249 lb 6.4 oz) 113.1 kg (249 lb 4.8 oz)    10/16/22 0500   Weight: 113.5 kg (250 lb 4.8 oz)       2.  O2 sats > 90% on room air, or at prior home O2 therapy level.      Able to wean O2 this shift to keep sats above 90%?: Yes, satisfactory for discharge.   Does patient use Home O2? No          Current oxygenation status:   SpO2: 97 %     O2 Device: None (Room air),      3.  Tolerates ambulation and mobility near baseline.     Ambulation: Yes, satisfactory for discharge.   Times patient ambulated with staff this shift: 1 ambulated in room    Please review the Heart Failure Care Map for additional HF goal outcomes.    Mary Merritt RN  10/16/2022

## 2022-10-16 NOTE — PROGRESS NOTES
Nephrology consult noted.  Patient follows with [facility]. I will forward the consult to their group for continuity of care.    Puja Adam MD  [facility]  [address] [place] [address], [address]  [place] [address]  Phone# [phone]  Fax# [phone]

## 2022-10-16 NOTE — PROGRESS NOTES
Heart Failure Care Map  GOALS TO BE MET BEFORE DISCHARGE:    1. Decrease congestion and/or edema with diuretic therapy to achieve near optimal volume status.     Dyspnea improved: Yes, satisfactory for discharge.   Edema improved: Yes, satisfactory for discharge.        Net I/O and Weights since admission:   09/16 0700 - 10/16 0659  In: 3262 [P.O.:3256; I.V.:6]  Out: 46606 [Urine:13749]  Net: -7128     Vitals:    10/12/22 1739 10/13/22 1551 10/14/22 0532 10/15/22 0408   Weight: 117.9 kg (260 lb) 113.9 kg (251 lb) 113.1 kg (249 lb 6.4 oz) 113.1 kg (249 lb 4.8 oz)       2.  O2 sats > 90% on room air, or at prior home O2 therapy level.      Able to wean O2 this shift to keep sats above 90%?: Yes, satisfactory for discharge.   Does patient use Home O2? No          Current oxygenation status:   SpO2: 97 %     O2 Device: None (Room air),      3.  Tolerates ambulation and mobility near baseline.     Ambulation: Yes, satisfactory for discharge.   Times patient ambulated with staff this shift: 1    Please review the Heart Failure Care Map for additional HF goal outcomes.    Zoran Evans RN  10/16/2022

## 2022-10-16 NOTE — CONSULTS
NEPHROLOGY CONSULTATION    REASON FOR CONSULTATION:    Acute kidney injury on chronic kidney disease.    HISTORY OF PRESENT ILLNESS:  Patient is an 81-year-old male with known history of chronic kidney disease stage IV, baseline creatinine 2.45 on 2/23/2021, most recently creatinine 2.75.  Patient is also known to have history of chronic diastolic heart failure, longstanding hypertension, diabetes mellitus type 2, previous history of DVTs, bladder and prostate cancer, obstructive sleep apnea, he is noncompliant with CPAP, morbid obesity, left bundle branch block.    This time patient was admitted to the hospital on 10/14/2022 with increased shortness of breath.  On exam he was found to have acute on chronic diastolic heart failure, patient was initiated on higher doses of diuretics, his overall condition has been improving.    Serum creatinine on admission 2.75, over the past few days creatinine improved to 2.9 but over the past 2 days creatinine went up to 3.96 today (10/16/2022.    Patient tells me that he feels good, he denies any peripheral edema, no chest pain or shortness of breath, no orthopnea.  He offers no complaints today.    Patient was evaluated by cardiology for heart failure, patient is now euvolemic on exam, bumetanide is on hold, lisinopril is on hold.  REVIEW OF SYSTEMS:   See history of present illness.    Past Medical History:   Diagnosis Date     Acute renal failure with acute tubular necrosis superimposed on stage 3 chronic kidney disease (H)      Arteriosclerotic cardiovascular disease (ASCVD)      Bladder cancer (H)      BPH (benign prostatic hypertrophy)      Chronic kidney disease      Complicated UTI (urinary tract infection) 08/25/2019     Coronary artery disease      Diabetes mellitus (H)      Diabetic neuropathy (H)      E coli bacteremia      GERD (gastroesophageal reflux disease)      Gout      Hyperlipidemia      Hypertension      Hyponatremia      Iliac artery aneurysm, left  (H) 03/26/2019    Added automatically from request for surgery [id number]     Kidney stone      Osteoarthritis      Personal history of malignant neoplasm of prostate 04/27/2020     Prostate cancer (H)      Sleep apnea     CPAP       Social History     Socioeconomic History     Marital status:      Spouse name: Not on file     Number of children: Not on file     Years of education: Not on file     Highest education level: Not on file   Occupational History     Not on file   Tobacco Use     Smoking status: Former     Smokeless tobacco: Never     Tobacco comments:     Quit smoking 30 years ago   Vaping Use     Vaping Use: Never used   Substance and Sexual Activity     Alcohol use: Not Currently     Drug use: Never     Sexual activity: Not Currently   Other Topics Concern     Not on file   Social History Narrative     Not on file     Social Determinants of Health     Financial Resource Strain: Not on file   Food Insecurity: Not on file   Transportation Needs: Not on file   Physical Activity: Not on file   Stress: Not on file   Social Connections: Not on file   Intimate Partner Violence: Not on file   Housing Stability: Not on file       Family History   Problem Relation Age of Onset     No Known Problems Mother      Prostate Cancer Father      Deep Vein Thrombosis Father      Cancer Sister      Heart Disease Sister      No Known Problems Daughter      Heart Disease Brother      Heart Disease Sister      No Known Problems Daughter        Allergies   Allergen Reactions     Metoprolol Succinate Er      Other reaction(s): low blood pressure     Pcn [Penicillins] Other (See Comments)     Childhood-doesn't know reactions     Statin Drugs [Hmg-Coa-R Inhibitors] Cramps     Uroxatral [Alfuzosin] Other (See Comments)     hypotension       MEDICATIONS:    [START ON 10/17/2022] allopurinol  200 mg Oral Daily     [Held by provider] bumetanide  2 mg Oral Daily     doxycycline hyclate  50 mg Oral Daily     ezetimibe  10 mg Oral  "Daily     hydrALAZINE  50 mg Oral 4x Daily     insulin aspart  1-7 Units Subcutaneous TID AC     insulin aspart  1-5 Units Subcutaneous At Bedtime     magnesium oxide  400 mg Oral Daily     metoprolol succinate ER  50 mg Oral Daily     sodium chloride (PF)  3 mL Intracatheter Q8H     traZODone  100-200 mg Oral At Bedtime     warfarin ANTICOAGULANT  5 mg Oral ONCE at 18:00     Warfarin Therapy Reminder  1 each Oral See Admin Instructions         PHYSICAL EXAM    /65 (BP Location: Right arm)   Pulse 67   Temp 97.4  F (36.3  C) (Oral)   Resp 18   Ht 1.778 m (5' 10\")   Wt 113.5 kg (250 lb 4.8 oz)   SpO2 95%   BMI 35.91 kg/m        Intake/Output Summary (Last 24 hours) at 10/16/2022 1030  Last data filed at 10/16/2022 0901  Gross per 24 hour   Intake 1546 ml   Output 1200 ml   Net 346 ml     Resp: 18      GENERAL: Chronically ill and debilitated  HEAD: Normal  HEENT: Equal pupils. Normal hearing. No eyelid edema.  CARDIOVASCULAR: No JVD present.  Atrial fibrillation present.. No peripheral edema  PULMONARY: No respiratory distress. No cyanosis  GASTROINTESTINAL: No ascites present  MSK: Diffuse muscle wasting.   NEURO: Alert, no gross focal findings  PSYCHIATRIC: Adequate mood and interaction  SKIN: Pale, no jaundice, no rash.    LABORATORIES    Recent Labs   Lab 10/13/22  0750 10/12/22  1802   WBC 8.4 8.4   HGB 13.5 13.2*   HCT 40.7 39.5*    201     Recent Labs   Lab 10/16/22  0500 10/15/22  0443 10/14/22  0448   * 133* 131*   CO2 19* 20* 19*   BUN 80.0* 71.1* 55.9*     Recent Labs   Lab 10/16/22  0500 10/15/22  0443 10/14/22  0448   INR 2.72* 2.59* 2.53*     Invalid input(s): FERRITIN  No results for input(s): IRON in the last 168 hours.    Invalid input(s): TIBC    RADIOLOGY    ECHOCARDIOGRAM    ASSESSMENT/PLAN:    1. Acute kidney injury on chronic kidney disease a stage IV.  Acute kidney injury secondary to overdiuresis of heart failure.  Baseline creatinine 2.7-2.9, creatinine went up to " 3.9 (10/16) with diuresis of heart failure.    Heart failure clinically compensated on exam today (10/16)    Hold diuretics, hold lisinopril.    Monitor renal function daily.    Will resume diuretics at a lower dose and lisinopril once a renal function is approaching baseline.    No dialysis indication.  2. Chronic kidney disease a stage IV.  Baseline creatinine 2.7-2.9.  Patient was previously evaluated by Dr. Angela in the year 2020.  No recent renal follow-up.  Chronic kidney disease most likely secondary to a combination of age-related nephrosclerosis, atherosclerotic systemic cardiovascular disease, longstanding hypertension, and contribution from longstanding diabetes mellitus type 2 and morbid obesity.    Will defer to additional work-up until renal function is improved.  3. Acute on chronic diastolic heart failure.  Left ventricular ejection fraction 50-55%.  Left bundle branch block present.  Patient seems euvolemic on exam.  Holding diuretics as above.  Continue management as per cardiology.  4. Chronic atrial fibrillation.  Patient is on metoprolol and warfarin.  Continue management as per cardiology.  5. Diabetes mellitus type 2.  Management as per primary service.    Condition and plan of care discussed in detail with patient and family at the bedside.    Christian Lackey MD  Nephrology

## 2022-10-16 NOTE — PLAN OF CARE
Problem: Plan of Care - These are the overarching goals to be used throughout the patient stay.    Goal: Plan of Care Review  Description: The Plan of Care Review/Shift note should be completed every shift.  The Outcome Evaluation is a brief statement about your assessment that the patient is improving, declining, or no change.  This information will be displayed automatically on your shift note.  Outcome: Progressing  Flowsheets (Taken 10/16/2022 1820)  Plan of Care Reviewed With: patient   Goal Outcome Evaluation:      Plan of Care Reviewed With: patient      Pt alert and oriented times 4. VSS. Denied any pain. Had to hold his scheduled hydralazine because it didn't meet parameter. Urostomy dry, intact and draining well. Blood sugar before meal was 144 mg/dl. Tele NSR with 1st degree AV block and BBB.

## 2022-10-16 NOTE — PROGRESS NOTES
"[facility]  Cardiac Electrophysiology  [address]  [place] [address]   Office: [phone]  Fax: [phone]     Cardiology Progress Note    Patient: Eduardo Laughlin   : 1940       Assessment/Recommendations   Mr. Eduardo Laughlin is an 81 year old male with chronic diastolic heart failure, atrial fibrillation, LBBB, HTN, T2DM, history of DVT, bladder and prostate cancer with prior ileal conduit, CKD, GINNY non-compliant with CPAP, morbid obesity admitted with acute on chronic diastolic heart failure.     Acute on chronic diastolic heart failure - LVEF 50-55%.  LBBB.  Euvolemic on exam.  Has ROBERT on CKD, possibly related to overdiuresis  - continue to hold bumetanide today given progressive ROBERT on CKD  - once renal function normalizes, will aim to start maintenance diuretic dose with ongoing cardiology ambulatory follow-up  - BP management per medicine team (lisinopril on hold due to ROBERT)     Atrial fibrillation - presently in sinus rhythm  [id number] 5  - continue metoprolol XL 50mg daily  - continue warfarin           Interval Events   Diuretics transitioned from IV to bumetanide 2mg daily orally yesterday - 24hr I/O 846/1200, Cr 3.96 (3.36, 2.90).  He feels well, with resolved exertional dyspnea.       Physical Examination  Review of Systems   VITALS: /59 (BP Location: Right arm)   Pulse 75   Temp 98.6  F (37  C) (Oral)   Resp 20   Ht 1.778 m (5' 10\")   Wt 113.5 kg (250 lb 4.8 oz)   SpO2 95%   BMI 35.91 kg/m    Wt Readings from Last 3 Encounters:   10/16/22 113.5 kg (250 lb 4.8 oz)   22 117.8 kg (259 lb 11.2 oz)   10/04/21 118.8 kg (262 lb)       Intake/Output Summary (Last 24 hours) at 10/16/2022 0707  Last data filed at 10/16/2022 0634  Gross per 24 hour   Intake 846 ml   Output 1200 ml   Net -354 ml     CONSTITUTIONAL: no distress  EYES:  Conjunctivae pink, sclerae clear.    E/N/T:  Oral mucosa pink, moist mucous " membranes  RESPIRATORY:  Respiratory effort is normal  CARDIOVASCULAR:  normal S1 and S2, no lower extremity edema  GASTROINTESTINAL:  Abdomen without masses or tenderness  EXTREMITIES:  No clubbing or cyanosis.    MUSCULOSKELETAL:  Overall grossly normal muscle strength  SKIN:  Overall, skin warm and dry, no lesions.  NEURO/PSYCH:  Oriented x 3 with normal affect.    Constitutional:  No weight loss or loss of appetite    Cardiovascular: As detailed above  Respiratory: No cough  Genitourinary: No trouble urinating           Medical History  Surgical History   Past Medical History:   Diagnosis Date     Acute renal failure with acute tubular necrosis superimposed on stage 3 chronic kidney disease (H)      Arteriosclerotic cardiovascular disease (ASCVD)      Bladder cancer (H)      BPH (benign prostatic hypertrophy)      Chronic kidney disease      Complicated UTI (urinary tract infection) 08/25/2019     Coronary artery disease      Diabetes mellitus (H)      Diabetic neuropathy (H)      E coli bacteremia      GERD (gastroesophageal reflux disease)      Gout      Hyperlipidemia      Hypertension      Hyponatremia      Iliac artery aneurysm, 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    Added automatically from request for surgery [id number]     Kidney stone      Osteoarthritis      Personal history of malignant neoplasm of prostate 04/27/2020     Prostate cancer (H)      Sleep apnea     CPAP    Past Surgical History:   Procedure Laterality Date     CARDIAC CATHETERIZATION  03/28/2008    and coronary angios     CYSTECTOMY       CYSTOSCOPY       CYSTOSCOPY N/A 12/23/2015    Procedure: CYSTOSCOPY BLADDER BIOPSIES with Fulgeration and Placement of Otero ;  Surgeon: Gordon Bajwa MD;  Location: [facility];  Service:      IR EXTREMITY ANGIOGRAM LEFT  3/22/2019     IR EXTREMITY ANGIOGRAM LEFT  3/22/2019     IR ILEAL CONDUIT INJECTION  12/23/2016     IR NEPHROSTOMY TUBE PLACEMENT RIGHT  8/18/2016     IR URETEROSTOMY TUBE CHANGE RIGHT   9/21/2016     KIDNEY STONE SURGERY  x4     LAMINECTOMY LUMBAR ONE LEVEL Bilateral 9/13/2022    Procedure: LUMBAR 4 - LUMBAR 5 BILATERAL MEDIAL FACETECTOMY AND DECOMPRESSION;  Surgeon: Everett Holland MD;  Location: [facility] Main OR     PROSTATE SURGERY           Family History Social History   Family History   Problem Relation Age of Onset     No Known Problems Mother      Prostate Cancer Father      Deep Vein Thrombosis Father      Cancer Sister      Heart Disease Sister      No Known Problems Daughter      Heart Disease Brother      Heart Disease Sister      No Known Problems Daughter         Social History     Tobacco Use     Smoking status: Former     Smokeless tobacco: Never     Tobacco comments:     Quit smoking 30 years ago   Vaping Use     Vaping Use: Never used   Substance Use Topics     Alcohol use: Not Currently     Drug use: Never         Medications  Allergies     Current Facility-Administered Medications:      - MEDICATION INSTRUCTIONS -, , Does not apply, DOES NOT GO TO Ruben ROSADO Rahul, MD     acetaminophen (TYLENOL) tablet 650 mg, 650 mg, Oral, Q6H PRN, 650 mg at 10/15/22 2115 **OR** acetaminophen (TYLENOL) Suppository 650 mg, 650 mg, Rectal, Q6H PRN, Reji Miranda MD     allopurinol (ZYLOPRIM) tablet 300 mg, 300 mg, Oral, Daily, Reji Miranda MD, 300 mg at 10/15/22 0805     amLODIPine (NORVASC) tablet 2.5 mg, 2.5 mg, Oral, Daily PRN, Reji Miranda MD     [Held by provider] bumetanide (BUMEX) tablet 2 mg, 2 mg, Oral, Daily, Abdelrahman Mazariegos MD, 2 mg at 10/14/22 1555     glucose gel 15-30 g, 15-30 g, Oral, Q15 Min PRN **OR** dextrose 50 % injection 25-50 mL, 25-50 mL, Intravenous, Q15 Min PRN **OR** glucagon injection 1 mg, 1 mg, Subcutaneous, Q15 Min PRN, Reji Miranda MD     doxycycline hyclate (VIBRAMYCIN) capsule 50 mg, 50 mg, Oral, Daily, Reji Miranda MD, 50 mg at 10/15/22 0805     ezetimibe (ZETIA) tablet 10 mg, 10 mg, Oral, Daily, Reji Miranda MD, 10 mg at  10/15/22 0805     hydrALAZINE (APRESOLINE) tablet 10 mg, 10 mg, Oral, 4x Daily PRN, Reji Miranda MD     hydrALAZINE (APRESOLINE) tablet 50 mg, 50 mg, Oral, 4x Daily, Abdelrahman Mazariegos MD, 50 mg at 10/15/22 2115     insulin aspart (NovoLOG) injection (RAPID ACTING), 1-7 Units, Subcutaneous, TID AC, Reji Miranda MD, 1 Units at 10/15/22 1729     insulin aspart (NovoLOG) injection (RAPID ACTING), 1-5 Units, Subcutaneous, At Bedtime, Reji Miranda MD     lidocaine (LMX4) cream, , Topical, Q1H PRN, Reji Miranda MD     lidocaine 1 % 0.1-1 mL, 0.1-1 mL, Other, Q1H PRN, Reji Miranda MD     magnesium oxide (MAG-OX) tablet 400 mg, 400 mg, Oral, Daily, Cecy Quiñones MD, 400 mg at 10/15/22 0805     melatonin tablet 1 mg, 1 mg, Oral, At Bedtime PRN, Reji Miranda MD     metoprolol succinate ER (TOPROL XL) 24 hr tablet 50 mg, 50 mg, Oral, Daily, Reji Miranda MD, 50 mg at 10/15/22 0805     ondansetron (ZOFRAN ODT) ODT tab 4 mg, 4 mg, Oral, Q6H PRN **OR** ondansetron (ZOFRAN) injection 4 mg, 4 mg, Intravenous, Q6H PRN, Reji Miranda MD     sodium chloride (PF) 0.9% PF flush 3 mL, 3 mL, Intracatheter, Q8H, Reji Miranda MD, 3 mL at 10/16/22 0634     sodium chloride (PF) 0.9% PF flush 3 mL, 3 mL, Intracatheter, q1 min prn, Reji Miranda MD     sodium chloride 0.9% infusion, , Intravenous, Continuous, Maritza Kimbrough MD, Last Rate: 50 mL/hr at 10/16/22 0635, 50 mL/hr at 10/16/22 0635     temazepam (RESTORIL) capsule 15 mg, 15 mg, Oral, At Bedtime PRN, Savage Carrero MD, 15 mg at 10/15/22 2332     traZODone (DESYREL) tablet 100-200 mg, 100-200 mg, Oral, At Bedtime, Savage Carrero MD, 200 mg at 10/15/22 2117     Warfarin Dose Required Daily - Pharmacist Managed, 1 each, Oral, See Admin Instructions, Reji Miranda MD     Allergies   Allergen Reactions     Metoprolol Succinate Er      Other reaction(s): low blood pressure     Pcn [Penicillins] Other (See Comments)     Childhood-doesn't  know reactions     Statin Drugs [Hmg-Coa-R Inhibitors] Cramps     Uroxatral [Alfuzosin] Other (See Comments)     hypotension          Lab Results    Chemistry CBC Cardiac Enzymes/BNP/TSH/INR   Recent Labs   Lab Test 10/16/22  0500   *   POTASSIUM 5.1   CHLORIDE 102   CO2 19*   *   BUN 80.0*   CR 3.96*   GFRESTIMATED 14*   GALLO 8.7*     Recent Labs   Lab Test 10/16/22  0500 10/15/22  0443 10/14/22  0448   CR 3.96* 3.36* 2.90*          Recent Labs   Lab Test 10/13/22  0750   WBC 8.4   HGB 13.5   HCT 40.7   MCV 95        Recent Labs   Lab Test 10/13/22  0750 10/12/22  1802 06/13/20  2142   HGB 13.5 13.2* 13.2*    Recent Labs   Lab Test 04/25/20  0655 04/25/20  0009 04/24/20  2049   TROPONINI 0.10 0.14 0.14     Recent Labs   Lab Test 10/12/22  1802 04/25/20  0655 04/24/20  1427   BNP  --  258* 86*   NTBNPI 5,303*  --   --      No results for input(s): TSH in the last 67192 hours.  Recent Labs   Lab Test 10/16/22  0500 10/15/22  0443 10/14/22  0448   INR 2.72* 2.59* 2.53*            Siobhan Carroll MD  10/16/2022  7:07 AM

## 2022-10-16 NOTE — PROGRESS NOTES
[facility]    Medicine Progress Note - Hospitalist Service    Date of Admission:  10/12/2022    Assessment & Plan           81-year-old patient with history of morbid obesity, CAD, hypertension, paroxysmal A. fib and history of DVT, prostate cancer status post ileal conduit, presented with dyspnea on exertion for few months worsening for a few days also found to have acute heart failure     Shortness of breath due to acute diastolic heart failure  Chest x-ray with evidence of pulmonary edema elevated BNP,  Was started on IV Lasix but it was held due to worsening kidney function, switch to p.o. Bumex but again it was held due to worsening kidney function,  Had negative fluid balance of 1267 in 24 hours weight has been stable, cardiology is following,     Paroxysmal A. Fib  On Coumadin and Toprol-XL, cardiology is following        Acute kidney injury with chronic kidney disease  Baseline creatinine 2-2 .5, creatinine trending up  Bumex has been held, started him on IV fluid hydration   -nephrology consultation     Hyponatremia  Possible secondary to dehydration,  monitor     Elevated troponin  Cardiology is following, no significant elevation possible demand ischemia     Hypertension  Lisinopril on hold due to acute kidney injury blood pressure has been stable on Norvasc and metoprolol       Diet: Room Service  2 Gram Sodium Diet Other - please comment    DVT Prophylaxis: Warfarin and Pneumatic Compression Devices  Otero Catheter: Not present  Central Lines: None  Cardiac Monitoring: ACTIVE order. Indication: Acute decompensated heart failure (48 hours)  Code Status: No CPR- Do NOT Intubate      Disposition Plan      Expected Discharge Date: 10/17/2022        Discharge Comments: LASIX  change to po 10/15, then on hold due to ROBERT. Renal Consult 10/16        The patient's care was discussed with the Bedside Nurse and Patient.    Cassie Leone MD  Hospitalist Service  [facility]  "Hospital  Securely message with the Vocera Web Console (learn more here)  Text page via [employer] Paging/Directory         Clinically Significant Risk Factors Present on Admission               # DMII: A1C = N/A within past 3 months  # Obesity: Estimated body mass index is 35.91 kg/m  as calculated from the following:    Height as of this encounter: 1.778 m (5' 10\").    Weight as of this encounter: 113.5 kg (250 lb 4.8 oz).        ______________________________________________________________________    Interval History   Feeling fine, no cp/sob, no f/c, no leg edema  Feeling frustrated after hearing cr up    Data reviewed today: I reviewed all medications, new labs and imaging results over the last 24 hours.     Physical Exam   Vital Signs: Temp: 97.4  F (36.3  C) Temp src: Oral BP: 115/65 Pulse: 67   Resp: 18 SpO2: 95 % O2 Device: None (Room air)    Weight: 250 lbs 4.8 oz  General.  Awake alert oriented not in acute distress. Obese  HEENT.  Pupils equal round react to light, anicteric, EOM intact.  Neck supple no JVD.  CVS regular rhythm no murmur gallops.  Lungs.  Clear to auscultation bilateral no wheezing or rales.  Abdomen.  Soft nontender bowel sounds present.  Extremities.  No edema no calf tenderness.  Neurological.  Awake and alert. No focal deficit.  Skin no rash. No pallor.  Psych. Normal mood.       Data   Recent Labs   Lab 10/16/22  0819 10/16/22  0500 10/15/22  2112 10/15/22  0716 10/15/22  0443 10/14/22  0733 10/14/22  0448 10/13/22  0908 10/13/22  0750 10/13/22  0018 10/12/22  1802   WBC  --   --   --   --   --   --   --   --  8.4  --  8.4   HGB  --   --   --   --   --   --   --   --  13.5  --  13.2*   MCV  --   --   --   --   --   --   --   --  95  --  95   PLT  --   --   --   --   --   --   --   --  192  --  201   INR  --  2.72*  --   --  2.59*  --  2.53*  --  2.68*   < >  --    NA  --  132*  --   --  133*  --  131*  --  136  136  --  136   POTASSIUM  --  5.1  --   --  5.1  --  4.4   < > 4.2  4.2  " --  4.6   CHLORIDE  --  102  --   --  101  --  98  --  103  103  --  104   CO2  --  19*  --   --  20*  --  19*  --  24  24  --  22   BUN  --  80.0*  --   --  71.1*  --  55.9*  --  45.7*  --  45.7*   CR  --  3.96*  --   --  3.36*  --  2.90*  --  2.75*  --  3.03*   ANIONGAP  --  11  --   --  12  --  14  --  9  9  --  10   GALLO  --  8.7*  --   --  8.9  --  8.6*  --  9.0  --  8.9   * 161* 122*   < > 157*   < > 154*   < > 152*   < > 108*    < > = values in this interval not displayed.     No results found for this or any previous visit (from the past 24 hour(s)).  Medications     - MEDICATION INSTRUCTIONS -       sodium chloride 50 mL/hr (10/16/22 0901)       [START ON 10/17/2022] allopurinol  200 mg Oral Daily     [Held by provider] bumetanide  2 mg Oral Daily     doxycycline hyclate  50 mg Oral Daily     ezetimibe  10 mg Oral Daily     hydrALAZINE  50 mg Oral 4x Daily     insulin aspart  1-7 Units Subcutaneous TID AC     insulin aspart  1-5 Units Subcutaneous At Bedtime     magnesium oxide  400 mg Oral Daily     metoprolol succinate ER  50 mg Oral Daily     sodium chloride (PF)  3 mL Intracatheter Q8H     traZODone  100-200 mg Oral At Bedtime     warfarin ANTICOAGULANT  5 mg Oral ONCE at 18:00     Warfarin Therapy Reminder  1 each Oral See Admin Instructions

## 2022-10-17 ENCOUNTER — APPOINTMENT (OUTPATIENT)
Dept: PHYSICAL THERAPY | Facility: HOSPITAL | Age: 82
DRG: 291 | End: 2022-10-17
Attending: INTERNAL MEDICINE
Payer: COMMERCIAL

## 2022-10-17 LAB
ANION GAP SERPL CALCULATED.3IONS-SCNC: 12 MMOL/L (ref 7–15)
BUN SERPL-MCNC: 80.4 MG/DL (ref 8–23)
CALCIUM SERPL-MCNC: 8.6 MG/DL (ref 8.8–10.2)
CHLORIDE SERPL-SCNC: 102 MMOL/L (ref 98–107)
CREAT SERPL-MCNC: 3.38 MG/DL (ref 0.67–1.17)
DEPRECATED HCO3 PLAS-SCNC: 18 MMOL/L (ref 22–29)
GFR SERPL CREATININE-BSD FRML MDRD: 18 ML/MIN/1.73M2
GLUCOSE BLDC GLUCOMTR-MCNC: 148 MG/DL (ref 70–99)
GLUCOSE BLDC GLUCOMTR-MCNC: 148 MG/DL (ref 70–99)
GLUCOSE BLDC GLUCOMTR-MCNC: 153 MG/DL (ref 70–99)
GLUCOSE BLDC GLUCOMTR-MCNC: 161 MG/DL (ref 70–99)
GLUCOSE SERPL-MCNC: 157 MG/DL (ref 70–99)
INR PPP: 2.98 (ref 0.85–1.15)
POTASSIUM SERPL-SCNC: 5.2 MMOL/L (ref 3.4–5.3)
SODIUM SERPL-SCNC: 132 MMOL/L (ref 136–145)

## 2022-10-17 PROCEDURE — 99232 SBSQ HOSP IP/OBS MODERATE 35: CPT | Performed by: INTERNAL MEDICINE

## 2022-10-17 PROCEDURE — 250N000013 HC RX MED GY IP 250 OP 250 PS 637: Performed by: INTERNAL MEDICINE

## 2022-10-17 PROCEDURE — 97110 THERAPEUTIC EXERCISES: CPT | Mod: GP

## 2022-10-17 PROCEDURE — 120N000004 HC R&B MS OVERFLOW

## 2022-10-17 PROCEDURE — 36415 COLL VENOUS BLD VENIPUNCTURE: CPT | Performed by: INTERNAL MEDICINE

## 2022-10-17 PROCEDURE — 250N000013 HC RX MED GY IP 250 OP 250 PS 637: Performed by: HOSPITALIST

## 2022-10-17 PROCEDURE — 97161 PT EVAL LOW COMPLEX 20 MIN: CPT | Mod: GP

## 2022-10-17 PROCEDURE — 80048 BASIC METABOLIC PNL TOTAL CA: CPT | Performed by: HOSPITALIST

## 2022-10-17 PROCEDURE — 97116 GAIT TRAINING THERAPY: CPT | Mod: GP

## 2022-10-17 PROCEDURE — 85610 PROTHROMBIN TIME: CPT | Performed by: INTERNAL MEDICINE

## 2022-10-17 RX ORDER — WARFARIN SODIUM 3 MG/1
3 TABLET ORAL
Status: COMPLETED | OUTPATIENT
Start: 2022-10-17 | End: 2022-10-17

## 2022-10-17 RX ADMIN — EZETIMIBE 10 MG: 10 TABLET ORAL at 08:50

## 2022-10-17 RX ADMIN — DOXYCYCLINE HYCLATE 50 MG: 50 CAPSULE ORAL at 08:50

## 2022-10-17 RX ADMIN — TRAZODONE HYDROCHLORIDE 200 MG: 100 TABLET ORAL at 22:22

## 2022-10-17 RX ADMIN — ACETAMINOPHEN 650 MG: 325 TABLET, FILM COATED ORAL at 08:59

## 2022-10-17 RX ADMIN — WARFARIN SODIUM 3 MG: 3 TABLET ORAL at 17:10

## 2022-10-17 RX ADMIN — INSULIN ASPART 1 UNITS: 100 INJECTION, SOLUTION INTRAVENOUS; SUBCUTANEOUS at 17:06

## 2022-10-17 RX ADMIN — INSULIN ASPART 1 UNITS: 100 INJECTION, SOLUTION INTRAVENOUS; SUBCUTANEOUS at 08:52

## 2022-10-17 RX ADMIN — HYDRALAZINE HYDROCHLORIDE 50 MG: 50 TABLET, FILM COATED ORAL at 19:14

## 2022-10-17 RX ADMIN — Medication 400 MG: at 08:50

## 2022-10-17 RX ADMIN — ALLOPURINOL 200 MG: 100 TABLET ORAL at 08:50

## 2022-10-17 RX ADMIN — HYDRALAZINE HYDROCHLORIDE 50 MG: 50 TABLET, FILM COATED ORAL at 08:50

## 2022-10-17 RX ADMIN — ACETAMINOPHEN 650 MG: 325 TABLET, FILM COATED ORAL at 22:21

## 2022-10-17 RX ADMIN — METOPROLOL SUCCINATE 50 MG: 50 TABLET, EXTENDED RELEASE ORAL at 08:50

## 2022-10-17 RX ADMIN — INSULIN ASPART 1 UNITS: 100 INJECTION, SOLUTION INTRAVENOUS; SUBCUTANEOUS at 12:15

## 2022-10-17 RX ADMIN — HYDRALAZINE HYDROCHLORIDE 50 MG: 50 TABLET, FILM COATED ORAL at 16:08

## 2022-10-17 RX ADMIN — TEMAZEPAM 15 MG: 15 CAPSULE ORAL at 22:21

## 2022-10-17 ASSESSMENT — ACTIVITIES OF DAILY LIVING (ADL)
ADLS_ACUITY_SCORE: 23

## 2022-10-17 NOTE — PLAN OF CARE
Physical Therapy Discharge Summary    Reason for therapy discharge:    All goals and outcomes met, no further needs identified.    Progress towards therapy goal(s). See goals on Care Plan in [facility] electronic health record for goal details.  Goals met    Therapy recommendation(s):    Continue home exercise program.

## 2022-10-17 NOTE — PROGRESS NOTES
Pt alert and oriented times 4. VSS. Denied any pain. Hydrazine given this evening due to BP of 141/70. Urostomy dry, intact and draining well. Blood sugar at bed time did not require coverage.  Tele NSR with 1st degree AV block and BBB. Will continue to monitor pt.

## 2022-10-17 NOTE — PLAN OF CARE
Heart Failure Care Map  GOALS TO BE MET BEFORE DISCHARGE:    1. Decrease congestion and/or edema with diuretic therapy to achieve near optimal volume status.     Dyspnea improved: Yes, satisfactory for discharge.   Edema improved: Yes, satisfactory for discharge.        Net I/O and Weights since admission:   09/17 2300 - 10/17 2259  In: 5791 [P.O.:5076; I.V.:715]  Out: 77839 [Urine:01180]  Net: -7449     Vitals:    10/12/22 1739 10/13/22 1551 10/14/22 0532 10/15/22 0408   Weight: 117.9 kg (260 lb) 113.9 kg (251 lb) 113.1 kg (249 lb 6.4 oz) 113.1 kg (249 lb 4.8 oz)    10/16/22 0500 10/17/22 0614   Weight: 113.5 kg (250 lb 4.8 oz) 113.9 kg (251 lb)       2.  O2 sats > 90% on room air, or at prior home O2 therapy level.      Able to wean O2 this shift to keep sats above 90%?: No, further care required to meet this goal. Please explain RA   Does patient use Home O2? No          Current oxygenation status:   SpO2: 95 %     O2 Device: None (Room air),      3.  Tolerates ambulation and mobility near baseline.     Ambulation: Yes, satisfactory for discharge.   Times patient ambulated with staff this shift: 2    Pt A&O x4. C/O 4/10 headache- PRN Tylenol given. Non-tele.     Please review the Heart Failure Care Map for additional HF goal outcomes.    Betty Sanchez RN  10/17/2022

## 2022-10-17 NOTE — PROGRESS NOTES
10/17/22 1340   Appointment Info   Signing Clinician's Name / Credentials (PT) Kelly Rojo, SILVANO   Student Supervision Direct supervision provided   Living Environment   People in Home spouse   Current Living Arrangements independent living facility   Home Accessibility no concerns   Transportation Anticipated family or friend will provide   Self-Care   Usual Activity Tolerance moderate   Current Activity Tolerance moderate   Regular Exercise Other (see comments)  (ADLs. All house work besides laundry. Cares for spouse.)   Equipment Currently Used at Home walker, rolling   Fall history within last six months no   Activity/Exercise/Self-Care Comment   (Pt is independent with ADLs and performs all house chores other than laundry. He cares for his wife who he states is rather dependent on him.)   General Information   Onset of Illness/Injury or Date of Surgery 10/12/22   Referring Physician Cassie Leone MD   Patient/Family Therapy Goals Statement (PT) go home   Pertinent History of Current Problem (include personal factors and/or comorbidities that impact the POC) 81-year-old patient with history of morbid obesity, CAD, hypertension, paroxysmal A. fib and history of DVT, prostate cancer status post ileal conduit, presented with dyspnea on exertion for few months worsening for a few days also found to have acute heart failure   Pain Assessment   Patient Currently in Pain No   Range of Motion (ROM)   Range of Motion ROM is WNL   Strength (Manual Muscle Testing)   Strength (Manual Muscle Testing) strength is WNL   Bed Mobility   Bed Mobility sit-supine   Sit-Supine [place] (Bed Mobility) independent;supervision   Assistive Device (Bed Mobility) bed rails   Transfers   Transfers sit-stand transfer   Sit-Stand Transfer   Sit-Stand [place] (Transfers) modified independence   Assistive Device (Sit-Stand Transfers) walker, 4-wheeled   Gait/Stairs (Locomotion)   [place] Level (Gait) supervision   Distance in  Feet (Required for LE Total Joints) 75 feet   Distance in Feet (Gait) 300 feet   Pattern (Gait) step-through   Deviations/Abnormal Patterns (Gait) base of support, wide;right sided deviations  (Trendelenburg gait)   Comment, (Gait/Stairs) Pt demonstrates Trendelenburg gait with wide EPI. He is able to ambulate with supervision and 4WW. Pt demonstrates decreased activity tolerance with increased distances.   Balance   Balance no deficits were identified   Balance Comments Pt demonstrates Trendelenburg gait, but is able to maintain balance throughout standing and gait with 4WW for support.   Clinical Impression   Criteria for Skilled Therapeutic Intervention Yes, treatment indicated   PT Diagnosis (PT) Impaired functional mobility, gait abnormality.   Influenced by the following impairments BLE weakness and decreased activity tolerance   Functional limitations due to impairments Transfers, gait.   Clinical Presentation (PT Evaluation Complexity) Stable/Uncomplicated   Clinical Presentation Rationale Pt presents as medically diagnosed   Clinical Decision Making (Complexity) low complexity   Planned Therapy Interventions (PT) gait training;home exercise program;strengthening;transfer training   Anticipated Equipment Needs at Discharge (PT) walker, rolling  (4WW owns)   Risk & Benefits of therapy have been explained evaluation/treatment results reviewed;care plan/treatment goals reviewed;patient   PT Total Evaluation Time   PT Eval, Low Complexity Minutes (26285) 10   Physical Therapy Goals   PT Frequency One time eval and treatment only   PT Predicted Duration/Target Date for Goal Attainment 10/17/22   PT Goals Transfers;Gait   PT: Transfers Modified independent;Sit to/from stand;Assistive device;Goal Met;Completed   PT: Gait Rolling walker;Greater than 200 feet;Supervision/stand-by assist;Goal Met   Interventions   Interventions Quick Adds Gait Training;Therapeutic Procedure;Therapeutic Activity   Therapeutic  Procedure/Exercise   Ther. Procedure: strength, endurance, ROM, flexibillity Minutes (88769) 10   Symptoms Noted During/After Treatment none   Treatment Detail/Skilled Intervention B LE Ankle Pumps x 15 reps. Long sitting B LE hamstring curls x 10 reps. Seated B LE hip abduction/adduction, x 8 reps each. B LE SLR x 5 reps each. B UE scaption x 12 reps each. B LE shoulder flexion x 10 reps each.   Therapeutic Activity   Therapeutic Activities: dynamic activities to improve functional performance Minutes (11427) 5   Symptoms Noted During/After Treatment None   Treatment Detail/Skilled Intervention Sit<>stand x 2, modified independent with bed rails. Bed mobility modified independent with UE support for positioning in bed.   Gait Training   Gait Training Minutes (32179) 8   Symptoms Noted During/After Treatment (Gait Training) none   Treatment Detail/Skilled Intervention Pt ambulates well in halls with supervision and 4WW. Patient required no rest breaks and able to ambulate 300 feet w/o exacerbation of symptoms and denied any symptoms of SOB or dizziness.   [place] Level (Gait Training) other (see comments)  (Supervision)   Physical Assistance Level (Gait Training) supervision   Assistive Device (Gait Training) rolling walker  (4WW)   Gait Analysis Deviations other (see comments)  (Trendelenburg gait)   PT Discharge Planning   PT Plan D/C PT   PT Discharge Recommendation (DC Rec) home   PT Rationale for DC Rec Pt ambulating and transferring well, pain controlled, no stairs, understands precautions. Safe to d/c from mobility standpoint.   PT Brief overview of current status Pt is able to ambulate with modified independent using 4WW. Owns 4WW. Pt safe to return to home that has no stairs and be independent with ADLs.   Total Session Time   Timed Code Treatment Minutes 23   Total Session Time (sum of timed and untimed services) 33

## 2022-10-17 NOTE — PROGRESS NOTES
[facility]    Medicine Progress Note - Hospitalist Service    Date of Admission:  10/12/2022    Assessment & Plan               81-year-old patient with history of morbid obesity, CAD, hypertension, paroxysmal A. fib and history of DVT, prostate cancer status post ileal conduit, presented with dyspnea on exertion for few months worsening for a few days also found to have acute heart failure     Shortness of breath due to acute diastolic heart failure  Chest x-ray with evidence of pulmonary edema elevated BNP,  Was started on IV Lasix but it was held due to worsening kidney function, switch to p.o. Bumex but again it was held due to worsening kidney function,  Had negative fluid balance of 1267 in 24 hours weight has been stable, cardiology is following,     Paroxysmal A. Fib  On Coumadin and Toprol-XL, cardiology is following        Acute kidney injury with chronic kidney disease  Baseline creatinine 2-2 .5, creatinine trending up peak at 3.9, now back down to 3.38  Bumex has been held, gentle ivf   -nephrology consultation appreciated  -defer to card/nephrology for diuretics     Hyponatremia  Possible secondary to dehydration,  monitor     Elevated troponin  Cardiology is following, no significant elevation possible demand ischemia     Hypertension  Lisinopril on hold due to acute kidney injury blood pressure has been stable on Norvasc and metoprolol         Diet: Room Service  2 Gram Sodium Diet Other - please comment    DVT Prophylaxis: Warfarin  Otero Catheter: Not present  Central Lines: None  Cardiac Monitoring: ACTIVE order. Indication: Acute decompensated heart failure (48 hours)  Code Status: No CPR- Do NOT Intubate      Disposition Plan     Expected Discharge Date: 10/17/2022        Discharge Comments: LASIX  change to po 10/15 Renal Consult 10/16        The patient's care was discussed with the Patient.    Cassie Leone MD  Hospitalist Service  [facility]  "Hospital  Securely message with the Vocera Web Console (learn more here)  Text page via [email or web address] Paging/Directory         Clinically Significant Risk Factors Present on Admission                      ______________________________________________________________________    Interval History   Feeling \"fine\", no cp/sob, no f/c, no n/v, no legs edema    Data reviewed today: I reviewed all medications, new labs and imaging results over the last 24 hours.    Physical Exam   Vital Signs: Temp: 97.6  F (36.4  C) Temp src: Oral BP: 139/66 Pulse: 72   Resp: 18 SpO2: 93 % O2 Device: None (Room air)    Weight: 251 lbs 0 oz  General.  Awake alert oriented not in acute distress. Obese  HEENT.  Pupils equal round react to light, anicteric, EOM intact.  Neck supple no JVD.  CVS regular rhythm no murmur gallops.  Lungs.  Clear to auscultation bilateral no wheezing or rales.  Abdomen.  Soft nontender bowel sounds present.  Extremities.  No edema no calf tenderness.  Neurological.  Awake and alert. No focal deficit.  Skin no rash. No pallor.  Psych. Normal mood.       Data   Recent Labs   Lab 10/17/22  0752 10/17/22  0450 10/16/22  2108 10/16/22  0819 10/16/22  0500 10/15/22  0716 10/15/22  0443 10/13/22  0908 10/13/22  0750 10/13/22  0018 10/12/22  1802   WBC  --   --   --   --   --   --   --   --  8.4  --  8.4   HGB  --   --   --   --   --   --   --   --  13.5  --  13.2*   MCV  --   --   --   --   --   --   --   --  95  --  95   PLT  --   --   --   --   --   --   --   --  192  --  201   INR  --  2.98*  --   --  2.72*  --  2.59*   < > 2.68*  --   --    NA  --  132*  --   --  132*  --  133*   < > 136  136  --  136   POTASSIUM  --  5.2  --   --  5.1  --  5.1   < > 4.2  4.2  --  4.6   CHLORIDE  --  102  --   --  102  --  101   < > 103  103  --  104   CO2  --  18*  --   --  19*  --  20*   < > 24  24  --  22   BUN  --  80.4*  --   --  80.0*  --  71.1*   < > 45.7*  --  45.7*   CR  --  3.38*  --   --  3.96*  --  3.36*   < > 2.75*  --  " 3.03*   ANIONGAP  --  12  --   --  11  --  12   < > 9  9  --  10   GALLO  --  8.6*  --   --  8.7*  --  8.9   < > 9.0  --  8.9   * 157* 179*   < > 161*   < > 157*   < > 152*   < > 108*    < > = values in this interval not displayed.     No results found for this or any previous visit (from the past 24 hour(s)).  Medications     - MEDICATION INSTRUCTIONS -       sodium chloride 50 mL/hr (10/16/22 0901)       allopurinol  200 mg Oral Daily     [Held by provider] bumetanide  2 mg Oral Daily     doxycycline hyclate  50 mg Oral Daily     ezetimibe  10 mg Oral Daily     hydrALAZINE  50 mg Oral 4x Daily     insulin aspart  1-7 Units Subcutaneous TID AC     insulin aspart  1-5 Units Subcutaneous At Bedtime     magnesium oxide  400 mg Oral Daily     metoprolol succinate ER  50 mg Oral Daily     sodium chloride (PF)  3 mL Intracatheter Q8H     traZODone  100-200 mg Oral At Bedtime     Warfarin Therapy Reminder  1 each Oral See Admin Instructions

## 2022-10-17 NOTE — PROGRESS NOTES
Heart Failure Care Map  GOALS TO BE MET BEFORE DISCHARGE:    1. Decrease congestion and/or edema with diuretic therapy to achieve near optimal volume status.     Dyspnea improved: Yes, satisfactory for discharge.   Edema improved: Yes, satisfactory for discharge.        Net I/O and Weights since admission:   09/17 0700 - 10/17 0659  In: 4748 [P.O.:4036; I.V.:712]  Out: 82197 [Urine:67886]  Net: -7092     Vitals:    10/12/22 1739 10/13/22 1551 10/14/22 0532 10/15/22 0408   Weight: 117.9 kg (260 lb) 113.9 kg (251 lb) 113.1 kg (249 lb 6.4 oz) 113.1 kg (249 lb 4.8 oz)    10/16/22 0500   Weight: 113.5 kg (250 lb 4.8 oz)       2.  O2 sats > 90% on room air, or at prior home O2 therapy level.      Able to wean O2 this shift to keep sats above 90%?: Yes, satisfactory for discharge.   Does patient use Home O2? No          Current oxygenation status:   SpO2: 93 %     O2 Device: None (Room air),      3.  Tolerates ambulation and mobility near baseline.     Ambulation: Yes, satisfactory for discharge. pt was able to ambulate in the hallway. Reported feeling so good after going for a walk. Expressed the wish to go home.    Times patient ambulated with staff this shift: 1    Please review the Heart Failure Care Map for additional HF goal outcomes.    Zoran Evans RN  10/17/2022

## 2022-10-17 NOTE — PLAN OF CARE
Heart Failure Care Map  GOALS TO BE MET BEFORE DISCHARGE:    1. Decrease congestion and/or edema with diuretic therapy to achieve near optimal volume status.     Dyspnea improved: Yes, satisfactory for discharge.   Edema improved: Yes, satisfactory for discharge.        Net I/O and Weights since admission:   09/17 1500 - 10/17 1459  In: 5311 [P.O.:4596; I.V.:715]  Out: 94965 [Urine:76976]  Net: -7229     Vitals:    10/12/22 1739 10/13/22 1551 10/14/22 0532 10/15/22 0408   Weight: 117.9 kg (260 lb) 113.9 kg (251 lb) 113.1 kg (249 lb 6.4 oz) 113.1 kg (249 lb 4.8 oz)    10/16/22 0500 10/17/22 0614   Weight: 113.5 kg (250 lb 4.8 oz) 113.9 kg (251 lb)       2.  O2 sats > 90% on room air, or at prior home O2 therapy level.      Able to wean O2 this shift to keep sats above 90%?: Yes, satisfactory for discharge.   Does patient use Home O2? No          Current oxygenation status:   SpO2: 93 %     O2 Device: None (Room air),      3.  Tolerates ambulation and mobility near baseline.     Ambulation: Yes, satisfactory for discharge.   Times patient ambulated with staff this shift: 1    Please review the Heart Failure Care Map for additional HF goal outcomes.    Mary Merritt RN  10/17/2022

## 2022-10-17 NOTE — PROGRESS NOTES
HEART CARE NOTE          Assessment/Recommendations     1. ADHF c/b HFpEF  Assessment / Plan    Continue to hold diuresis given progressive ROBERT - nephrology following    GDMT as detailed below; mainstay of treatment for HFpEF includes diuretics and adequate BP control (class I) and SGLT2-I (class 2a); additional medical therapy (ARNI, MRA, ARB) demonstrated less robust evidence for indication but may be considered per guideline recommendations (2b); no indication for BBlockers      Current Pharmacotherapy AHA Guideline-Directed Medical Therapy   Losartan  - on hold given progressive renal dysfunction ARNI/ARB   Spironolactone not started  MRA   SGLT2 inhibitor not started SGLT2-I    Furosemide IV 40 mg q 12 hrs - on hold Loop diuretic       2. 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 DM2  Assessment / Plan    Management per primary team     4. Afib   Assessment / Plan    Currently in NSR - continue metoprolol and warfarin      History of Present Illness/Subjective      Mr. Eduardo Laughlin is a 81 year old male with a PMHx significant for CKD, HTN, HLP, DM2, afib, DVT, bladder and prostate Ca, morbid obesity who presents with complaints of dyspnea x several months and found to be in ADHF.      Today, Mr. Laughlin denies any acute cardiac events or complaints; Management plan as detailed above     ECG: Personally reviewed. normal sinus rhythm, LBBB, 1st degree AV block.     ECHO (personnaly Reviewed):   The left ventricle is normal in size.  The visual ejection fraction is 50-55%.  Septal motion is consistent with conduction abnormality.  No regional wall motion abnormalities noted.  The right ventricular systolic function is normal.  Suspect mild AS  The ascending aorta is Moderately dilated.  There is mild aortic root enlargement.  IVC diameter <2.1 cm collapsing >50% with sniff suggests a  "normal RA pressure  of 3 mmHg.  Technically difficult, suboptimal study.          Physical Examination Review of Systems   BP 99/67 (BP Location: Right arm)   Pulse 65   Temp 98.7  F (37.1  C) (Oral)   Resp 20   Ht 1.778 m (5' 10\")   Wt 113.5 kg (250 lb 4.8 oz)   SpO2 94%   BMI 35.91 kg/m    Body mass index is 35.91 kg/m .  Wt Readings from Last 3 Encounters:   10/16/22 113.5 kg (250 lb 4.8 oz)   09/13/22 117.8 kg (259 lb 11.2 oz)   10/04/21 118.8 kg (262 lb)     General Appearance:   no distress, normal body habitus   ENT/Mouth: membranes moist, no oral lesions or bleeding gums.      EYES:  no scleral icterus, normal conjunctivae   Neck: no carotid bruits or thyromegaly   Chest/Lungs:   lungs are clear to auscultation, no rales or wheezing, equal chest wall expansion    Cardiovascular:   Regular. Normal first and second heart sounds with no murmurs, rubs, or gallops; the carotid, radial and posterior tibial pulses are intact, no JVD or LE edema bilaterally    Abdomen:  no organomegaly, masses, bruits, or tenderness; bowel sounds are present   Extremities: no cyanosis or clubbing   Skin: no xanthelasma, warm.    Neurologic: alert and oriented x3, calm     Psychiatric: alert and oriented x3, calm     A complete 10 systems ROS was reviewed  And is negative except what is listed in the HPI.          Medical History  Surgical History Family History Social History   Past Medical History:   Diagnosis Date     Acute renal failure with acute tubular necrosis superimposed on stage 3 chronic kidney disease (H)      Arteriosclerotic cardiovascular disease (ASCVD)      Bladder cancer (H)      BPH (benign prostatic hypertrophy)      Chronic kidney disease      Complicated UTI (urinary tract infection) 08/25/2019     Coronary artery disease      Diabetes mellitus (H)      Diabetic neuropathy (H)      E coli bacteremia      GERD (gastroesophageal reflux disease)      Gout      Hyperlipidemia      Hypertension      " Hyponatremia      Iliac artery aneurysm, 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    Added automatically from request for surgery [id number]     Kidney stone      Osteoarthritis      Personal history of malignant neoplasm of prostate 04/27/2020     Prostate cancer (H)      Sleep apnea     CPAP    Past Surgical History:   Procedure Laterality Date     CARDIAC CATHETERIZATION  03/28/2008    and coronary angios     CYSTECTOMY       CYSTOSCOPY       CYSTOSCOPY N/A 12/23/2015    Procedure: CYSTOSCOPY BLADDER BIOPSIES with Fulgeration and Placement of Otero ;  Surgeon: Gordon Bajwa MD;  Location: [facility];  Service:      IR EXTREMITY ANGIOGRAM LEFT  3/22/2019     IR EXTREMITY ANGIOGRAM LEFT  3/22/2019     IR ILEAL CONDUIT INJECTION  12/23/2016     IR NEPHROSTOMY TUBE PLACEMENT RIGHT  8/18/2016     IR URETEROSTOMY TUBE CHANGE RIGHT  9/21/2016     KIDNEY STONE SURGERY  x4     LAMINECTOMY LUMBAR ONE LEVEL Bilateral 9/13/2022    Procedure: LUMBAR 4 - LUMBAR 5 BILATERAL MEDIAL FACETECTOMY AND DECOMPRESSION;  Surgeon: Everett Holland MD;  Location: [facility]     PROSTATE SURGERY      no family history of premature coronary artery disease Social History     Socioeconomic History     Marital status:      Spouse name: Not on file     Number of children: Not on file     Years of education: Not on file     Highest education level: Not on file   Occupational History     Not on file   Tobacco Use     Smoking status: Former     Smokeless tobacco: Never     Tobacco comments:     Quit smoking 30 years ago   Vaping Use     Vaping Use: Never used   Substance and Sexual Activity     Alcohol use: Not Currently     Drug use: Never     Sexual activity: Not Currently   Other Topics Concern     Not on file   Social History Narrative     Not on file     Social Determinants of Health     Financial Resource Strain: Not on file   Food Insecurity: Not on file   Transportation Needs: Not on file   Physical Activity: Not on file    Stress: Not on file   Social Connections: Not on file   Intimate Partner Violence: Not on file   Housing Stability: Not on file           Lab Results    Chemistry/lipid CBC Cardiac Enzymes/BNP/TSH/INR   Lab Results   Component Value Date    CHOL 168 03/03/2022    HDL 38 (L) 03/03/2022    TRIG 117 03/03/2022    BUN 80.0 (H) 10/16/2022     (L) 10/16/2022    CO2 19 (L) 10/16/2022    Lab Results   Component Value Date    WBC 8.4 10/13/2022    HGB 13.5 10/13/2022    HCT 40.7 10/13/2022    MCV 95 10/13/2022     10/13/2022    Lab Results   Component Value Date    TROPONINI 0.10 04/25/2020     (H) 04/25/2020    INR 2.72 (H) 10/16/2022     Lab Results   Component Value Date    TROPONINI 0.10 04/25/2020          Weight:    Wt Readings from Last 3 Encounters:   10/16/22 113.5 kg (250 lb 4.8 oz)   09/13/22 117.8 kg (259 lb 11.2 oz)   10/04/21 118.8 kg (262 lb)       Allergies  Allergies   Allergen Reactions     Metoprolol Succinate Er      Other reaction(s): low blood pressure     Pcn [Penicillins] Other (See Comments)     Childhood-doesn't know reactions     Statin Drugs [Hmg-Coa-R Inhibitors] Cramps     Uroxatral [Alfuzosin] Other (See Comments)     hypotension         Surgical History  Past Surgical History:   Procedure Laterality Date     CARDIAC CATHETERIZATION  03/28/2008    and coronary angios     CYSTECTOMY       CYSTOSCOPY       CYSTOSCOPY N/A 12/23/2015    Procedure: CYSTOSCOPY BLADDER BIOPSIES with Fulgeration and Placement of Otero ;  Surgeon: Gordon Bajwa MD;  Location: [facility] OR;  Service:      IR EXTREMITY ANGIOGRAM LEFT  3/22/2019     IR EXTREMITY ANGIOGRAM LEFT  3/22/2019     IR ILEAL CONDUIT INJECTION  12/23/2016     IR NEPHROSTOMY TUBE PLACEMENT RIGHT  8/18/2016     IR URETEROSTOMY TUBE CHANGE RIGHT  9/21/2016     KIDNEY STONE SURGERY  x4     LAMINECTOMY LUMBAR ONE LEVEL Bilateral 9/13/2022    Procedure: LUMBAR 4 - LUMBAR 5 BILATERAL MEDIAL FACETECTOMY AND DECOMPRESSION;   Surgeon: Everett Holland MD;  Location: [facility] Main OR     PROSTATE SURGERY         Social History  Tobacco:   History   Smoking Status     Former   Smokeless Tobacco     Never     Comment: Quit smoking 30 years ago    Alcohol:   Social History    Substance and Sexual Activity      Alcohol use: Not Currently   Illicit Drugs:   History   Drug Use Unknown       Family History  Family History   Problem Relation Age of Onset     No Known Problems Mother      Prostate Cancer Father      Deep Vein Thrombosis Father      Cancer Sister      Heart Disease Sister      No Known Problems Daughter      Heart Disease Brother      Heart Disease Sister      No Known Problems Daughter           Abdelrahman Mazariegos MD on 10/17/2022      cc: Rafael Montelongo

## 2022-10-18 LAB
ANION GAP SERPL CALCULATED.3IONS-SCNC: 10 MMOL/L (ref 7–15)
BUN SERPL-MCNC: 77.3 MG/DL (ref 8–23)
CALCIUM SERPL-MCNC: 8.8 MG/DL (ref 8.8–10.2)
CHLORIDE SERPL-SCNC: 103 MMOL/L (ref 98–107)
CREAT SERPL-MCNC: 3.17 MG/DL (ref 0.67–1.17)
DEPRECATED HCO3 PLAS-SCNC: 20 MMOL/L (ref 22–29)
GFR SERPL CREATININE-BSD FRML MDRD: 19 ML/MIN/1.73M2
GLUCOSE BLDC GLUCOMTR-MCNC: 153 MG/DL (ref 70–99)
GLUCOSE BLDC GLUCOMTR-MCNC: 158 MG/DL (ref 70–99)
GLUCOSE BLDC GLUCOMTR-MCNC: 172 MG/DL (ref 70–99)
GLUCOSE BLDC GLUCOMTR-MCNC: 245 MG/DL (ref 70–99)
GLUCOSE SERPL-MCNC: 150 MG/DL (ref 70–99)
HOLD SPECIMEN: NORMAL
INR PPP: 2.71 (ref 0.85–1.15)
POTASSIUM SERPL-SCNC: 5 MMOL/L (ref 3.4–5.3)
SODIUM SERPL-SCNC: 133 MMOL/L (ref 136–145)

## 2022-10-18 PROCEDURE — 250N000011 HC RX IP 250 OP 636: Performed by: INTERNAL MEDICINE

## 2022-10-18 PROCEDURE — 120N000004 HC R&B MS OVERFLOW

## 2022-10-18 PROCEDURE — P9047 ALBUMIN (HUMAN), 25%, 50ML: HCPCS | Performed by: INTERNAL MEDICINE

## 2022-10-18 PROCEDURE — 85610 PROTHROMBIN TIME: CPT | Performed by: INTERNAL MEDICINE

## 2022-10-18 PROCEDURE — 250N000013 HC RX MED GY IP 250 OP 250 PS 637: Performed by: HOSPITALIST

## 2022-10-18 PROCEDURE — 250N000013 HC RX MED GY IP 250 OP 250 PS 637: Performed by: INTERNAL MEDICINE

## 2022-10-18 PROCEDURE — 99232 SBSQ HOSP IP/OBS MODERATE 35: CPT | Performed by: INTERNAL MEDICINE

## 2022-10-18 PROCEDURE — 999N000127 HC STATISTIC PERIPHERAL IV START W US GUIDANCE

## 2022-10-18 PROCEDURE — 36415 COLL VENOUS BLD VENIPUNCTURE: CPT | Performed by: HOSPITALIST

## 2022-10-18 PROCEDURE — 80048 BASIC METABOLIC PNL TOTAL CA: CPT | Performed by: HOSPITALIST

## 2022-10-18 RX ORDER — ALBUMIN (HUMAN) 12.5 G/50ML
50 SOLUTION INTRAVENOUS ONCE
Status: COMPLETED | OUTPATIENT
Start: 2022-10-18 | End: 2022-10-18

## 2022-10-18 RX ORDER — WARFARIN SODIUM 2 MG/1
4 TABLET ORAL
Status: COMPLETED | OUTPATIENT
Start: 2022-10-18 | End: 2022-10-18

## 2022-10-18 RX ADMIN — BUMETANIDE 2 MG: 2 TABLET ORAL at 08:30

## 2022-10-18 RX ADMIN — TEMAZEPAM 15 MG: 15 CAPSULE ORAL at 22:08

## 2022-10-18 RX ADMIN — WARFARIN SODIUM 4 MG: 2 TABLET ORAL at 17:26

## 2022-10-18 RX ADMIN — Medication 400 MG: at 08:29

## 2022-10-18 RX ADMIN — HYDRALAZINE HYDROCHLORIDE 50 MG: 50 TABLET, FILM COATED ORAL at 12:42

## 2022-10-18 RX ADMIN — INSULIN ASPART 1 UNITS: 100 INJECTION, SOLUTION INTRAVENOUS; SUBCUTANEOUS at 16:33

## 2022-10-18 RX ADMIN — METOPROLOL SUCCINATE 50 MG: 50 TABLET, EXTENDED RELEASE ORAL at 08:29

## 2022-10-18 RX ADMIN — HYDRALAZINE HYDROCHLORIDE 50 MG: 50 TABLET, FILM COATED ORAL at 16:11

## 2022-10-18 RX ADMIN — ALBUMIN HUMAN 50 G: 0.25 SOLUTION INTRAVENOUS at 10:34

## 2022-10-18 RX ADMIN — TRAZODONE HYDROCHLORIDE 200 MG: 100 TABLET ORAL at 22:08

## 2022-10-18 RX ADMIN — HYDRALAZINE HYDROCHLORIDE 50 MG: 50 TABLET, FILM COATED ORAL at 19:12

## 2022-10-18 RX ADMIN — HYDRALAZINE HYDROCHLORIDE 50 MG: 50 TABLET, FILM COATED ORAL at 08:30

## 2022-10-18 RX ADMIN — INSULIN ASPART 1 UNITS: 100 INJECTION, SOLUTION INTRAVENOUS; SUBCUTANEOUS at 12:41

## 2022-10-18 RX ADMIN — DOXYCYCLINE HYCLATE 50 MG: 50 CAPSULE ORAL at 08:30

## 2022-10-18 RX ADMIN — ACETAMINOPHEN 650 MG: 325 TABLET, FILM COATED ORAL at 22:08

## 2022-10-18 RX ADMIN — ALLOPURINOL 200 MG: 100 TABLET ORAL at 08:29

## 2022-10-18 RX ADMIN — EZETIMIBE 10 MG: 10 TABLET ORAL at 08:29

## 2022-10-18 ASSESSMENT — ACTIVITIES OF DAILY LIVING (ADL)
ADLS_ACUITY_SCORE: 26
ADLS_ACUITY_SCORE: 26
ADLS_ACUITY_SCORE: 23
ADLS_ACUITY_SCORE: 26
ADLS_ACUITY_SCORE: 23
ADLS_ACUITY_SCORE: 23
ADLS_ACUITY_SCORE: 26
ADLS_ACUITY_SCORE: 26
ADLS_ACUITY_SCORE: 23
ADLS_ACUITY_SCORE: 26

## 2022-10-18 NOTE — PROGRESS NOTES
[facility]    Medicine Progress Note - Hospitalist Service    Date of Admission:  10/12/2022    Assessment & Plan           81-year-old patient with history of morbid obesity, CAD, hypertension, paroxysmal A. fib and history of DVT, prostate cancer status post ileal conduit, presented with dyspnea on exertion for few months worsening for a few days also found to have acute heart failure     Shortness of breath due to acute diastolic heart failure  Chest x-ray with evidence of pulmonary edema elevated BNP,  Was started on IV Lasix but it was held due to worsening kidney function, switch to p.o. Bumex but again it was held due to worsening kidney function,  cardiology is following,     Paroxysmal A. Fib  On Coumadin and Toprol-XL, cardiology is following        Acute kidney injury with chronic kidney disease  Baseline creatinine 2-2 .5, creatinine trending up peak at 3.9, now trending down  Bumex has been held, gentle ivf   -nephrology consultation appreciated  -defer to card/nephrology for diuretics     Hyponatremia  Possible secondary to dehydration,  monitor     Elevated troponin  Cardiology is following, no significant elevation possible demand ischemia     Hypertension  Lisinopril on hold due to acute kidney injury blood pressure has been stable on Norvasc and metoprolol       Diet: Room Service  2 Gram Sodium Diet Other - please comment    DVT Prophylaxis: Warfarin  Otero Catheter: Not present  Central Lines: None  Cardiac Monitoring: None  Code Status: No CPR- Do NOT Intubate      Disposition Plan     Expected Discharge Date: 10/18/2022        Discharge Comments: Monitor kidney function.        The patient's care was discussed with the Patient.    Cassie Leone MD  Hospitalist Service  [facility]  Securely message with the Vocera Web Console (learn more here)  Text page via [employer] Paging/Directory         Clinically Significant Risk Factors Present on Admission                       ______________________________________________________________________    Interval History   Patient feels fine, wish to go home. But understand to wait for renal function improvement/resuming diuretics.   PT did well     Data reviewed today: I reviewed all medications, new labs and imaging results over the last 24 hours.     Physical Exam   Vital Signs: Temp: 98  F (36.7  C) Temp src: Oral BP: 103/58 Pulse: 73   Resp: 20 SpO2: 92 % O2 Device: None (Room air)    Weight: 251 lbs 0 oz  General.  Awake alert oriented not in acute distress. Obese  HEENT.  Pupils equal round react to light, anicteric, EOM intact.  Neck supple no JVD.  CVS regular rhythm no murmur gallops.  Lungs.  Clear to auscultation bilateral no wheezing or rales.  Abdomen.  Soft nontender bowel sounds present.  Extremities.  No edema no calf tenderness.  Neurological.  Awake and alert. No focal deficit.  Skin no rash. No pallor.  Psych. Normal mood.       Data   Recent Labs   Lab 10/18/22  0911 10/18/22  0445 10/17/22  2117 10/17/22  0752 10/17/22  0450 10/16/22  0819 10/16/22  0500 10/13/22  0908 10/13/22  0750 10/13/22  0018 10/12/22  1802   WBC  --   --   --   --   --   --   --   --  8.4  --  8.4   HGB  --   --   --   --   --   --   --   --  13.5  --  13.2*   MCV  --   --   --   --   --   --   --   --  95  --  95   PLT  --   --   --   --   --   --   --   --  192  --  201   INR  --  2.71*  --   --  2.98*  --  2.72*   < > 2.68*  --   --    NA  --  133*  --   --  132*  --  132*   < > 136  136  --  136   POTASSIUM  --  5.0  --   --  5.2  --  5.1   < > 4.2  4.2  --  4.6   CHLORIDE  --  103  --   --  102  --  102   < > 103  103  --  104   CO2  --  20*  --   --  18*  --  19*   < > 24  24  --  22   BUN  --  77.3*  --   --  80.4*  --  80.0*   < > 45.7*  --  45.7*   CR  --  3.17*  --   --  3.38*  --  3.96*   < > 2.75*  --  3.03*   ANIONGAP  --  10  --   --  12  --  11   < > 9  9  --  10   GALLO  --  8.8  --   --  8.6*  --  8.7*   < >  9.0  --  8.9   * 150* 161*   < > 157*   < > 161*   < > 152*   < > 108*    < > = values in this interval not displayed.     No results found for this or any previous visit (from the past 24 hour(s)).  Medications     - MEDICATION INSTRUCTIONS -         albumin human  50 g Intravenous Once     allopurinol  200 mg Oral Daily     bumetanide  2 mg Oral Daily     doxycycline hyclate  50 mg Oral Daily     ezetimibe  10 mg Oral Daily     hydrALAZINE  50 mg Oral 4x Daily     insulin aspart  1-7 Units Subcutaneous TID AC     insulin aspart  1-5 Units Subcutaneous At Bedtime     magnesium oxide  400 mg Oral Daily     metoprolol succinate ER  50 mg Oral Daily     sodium chloride (PF)  3 mL Intracatheter Q8H     traZODone  100-200 mg Oral At Bedtime     Warfarin Therapy Reminder  1 each Oral See Admin Instructions

## 2022-10-18 NOTE — PROGRESS NOTES
HEART CARE NOTE          Assessment/Recommendations     1. ADHF c/b HFpEF  Assessment / Plan    Renal function steadily improving; will give IV albumin and resume oral diuretic regimen; continue to monitor closely    GDMT as detailed below; mainstay of treatment for HFpEF includes diuretics and adequate BP control (class I) and SGLT2-I (class 2a); additional medical therapy (ARNI, MRA, ARB) demonstrated less robust evidence for indication but may be considered per guideline recommendations (2b); no indication for BBlockers      Current Pharmacotherapy AHA Guideline-Directed Medical Therapy   Losartan  - on hold given renal dysfunction ARNI/ARB   Spironolactone not started  MRA   SGLT2 inhibitor not started SGLT2-I    Bumetanide 2 mg daily Loop diuretic       2. 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 DM2  Assessment / Plan    Management per primary team     4. Afib   Assessment / Plan    Currently in NSR - continue metoprolol and warfarin      History of Present Illness/Subjective      Mr. Eduardo Laughlin is a 81 year old male with a PMHx significant for CKD, HTN, HLP, DM2, afib, DVT, bladder and prostate Ca, morbid obesity who presents with complaints of dyspnea x several months and found to be in ADHF.      Today, Mr. Laughlin denies any acute cardiac events or complaints; Management plan as detailed above     ECG: Personally reviewed. normal sinus rhythm, LBBB, 1st degree AV block.     ECHO (personnaly Reviewed):   The left ventricle is normal in size.  The visual ejection fraction is 50-55%.  Septal motion is consistent with conduction abnormality.  No regional wall motion abnormalities noted.  The right ventricular systolic function is normal.  Suspect mild AS  The ascending aorta is Moderately dilated.  There is mild aortic root enlargement.  IVC diameter <2.1 cm collapsing >50% with  "sniff suggests a normal RA pressure  of 3 mmHg.  Technically difficult, suboptimal study.          Physical Examination Review of Systems   /58 (BP Location: Right arm)   Pulse 73   Temp 98  F (36.7  C) (Oral)   Resp 20   Ht 1.778 m (5' 10\")   Wt 113.9 kg (251 lb)   SpO2 92%   BMI 36.01 kg/m    Body mass index is 36.01 kg/m .  Wt Readings from Last 3 Encounters:   10/17/22 113.9 kg (251 lb)   09/13/22 117.8 kg (259 lb 11.2 oz)   10/04/21 118.8 kg (262 lb)     General Appearance:   no distress, normal body habitus   ENT/Mouth: membranes moist, no oral lesions or bleeding gums.      EYES:  no scleral icterus, normal conjunctivae   Neck: no carotid bruits or thyromegaly   Chest/Lungs:   lungs are clear to auscultation, no rales or wheezing, equal chest wall expansion    Cardiovascular:   Regular. Normal first and second heart sounds with no murmurs, rubs, or gallops; the carotid, radial and posterior tibial pulses are intact, no JVD and mild LE edema bilaterally    Abdomen:  no organomegaly, masses, bruits, or tenderness; bowel sounds are present   Extremities: no cyanosis or clubbing   Skin: no xanthelasma, warm.    Neurologic: alert and oriented x3, calm     Psychiatric: alert and oriented x3, calm     A complete 10 systems ROS was reviewed  And is negative except what is listed in the HPI.          Medical History  Surgical History Family History Social History   Past Medical History:   Diagnosis Date     Acute renal failure with acute tubular necrosis superimposed on stage 3 chronic kidney disease (H)      Arteriosclerotic cardiovascular disease (ASCVD)      Bladder cancer (H)      BPH (benign prostatic hypertrophy)      Chronic kidney disease      Complicated UTI (urinary tract infection) 08/25/2019     Coronary artery disease      Diabetes mellitus (H)      Diabetic neuropathy (H)      E coli bacteremia      GERD (gastroesophageal reflux disease)      Gout      Hyperlipidemia      Hypertension      " Hyponatremia      Iliac artery aneurysm, 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    Added automatically from request for surgery [id number]     Kidney stone      Osteoarthritis      Personal history of malignant neoplasm of prostate 04/27/2020     Prostate cancer (H)      Sleep apnea     CPAP    Past Surgical History:   Procedure Laterality Date     CARDIAC CATHETERIZATION  03/28/2008    and coronary angios     CYSTECTOMY       CYSTOSCOPY       CYSTOSCOPY N/A 12/23/2015    Procedure: CYSTOSCOPY BLADDER BIOPSIES with Fulgeration and Placement of Otero ;  Surgeon: Gordon Bajwa MD;  Location: [facility] [place];  Service:      IR EXTREMITY ANGIOGRAM LEFT  3/22/2019     IR EXTREMITY ANGIOGRAM LEFT  3/22/2019     IR ILEAL CONDUIT INJECTION  12/23/2016     IR NEPHROSTOMY TUBE PLACEMENT RIGHT  8/18/2016     IR URETEROSTOMY TUBE CHANGE RIGHT  9/21/2016     KIDNEY STONE SURGERY  x4     LAMINECTOMY LUMBAR ONE LEVEL Bilateral 9/13/2022    Procedure: LUMBAR 4 - LUMBAR 5 BILATERAL MEDIAL FACETECTOMY AND DECOMPRESSION;  Surgeon: Everett Holland MD;  Location: [facility]     PROSTATE SURGERY      no family history of premature coronary artery disease Social History     Socioeconomic History     Marital status:      Spouse name: Not on file     Number of children: Not on file     Years of education: Not on file     Highest education level: Not on file   Occupational History     Not on file   Tobacco Use     Smoking status: Former     Smokeless tobacco: Never     Tobacco comments:     Quit smoking 30 years ago   Vaping Use     Vaping Use: Never used   Substance and Sexual Activity     Alcohol use: Not Currently     Drug use: Never     Sexual activity: Not Currently   Other Topics Concern     Not on file   Social History Narrative     Not on file     Social Determinants of Health     Financial Resource Strain: Not on file   Food Insecurity: Not on file   Transportation Needs: Not on file   Physical Activity: Not on file    Stress: Not on file   Social Connections: Not on file   Intimate Partner Violence: Not on file   Housing Stability: Not on file           Lab Results    Chemistry/lipid CBC Cardiac Enzymes/BNP/TSH/INR   Lab Results   Component Value Date    CHOL 168 03/03/2022    HDL 38 (L) 03/03/2022    TRIG 117 03/03/2022    BUN 80.4 (H) 10/17/2022     (L) 10/17/2022    CO2 18 (L) 10/17/2022    Lab Results   Component Value Date    WBC 8.4 10/13/2022    HGB 13.5 10/13/2022    HCT 40.7 10/13/2022    MCV 95 10/13/2022     10/13/2022    Lab Results   Component Value Date    TROPONINI 0.10 04/25/2020     (H) 04/25/2020    INR 2.98 (H) 10/17/2022     Lab Results   Component Value Date    TROPONINI 0.10 04/25/2020          Weight:    Wt Readings from Last 3 Encounters:   10/17/22 113.9 kg (251 lb)   09/13/22 117.8 kg (259 lb 11.2 oz)   10/04/21 118.8 kg (262 lb)       Allergies  Allergies   Allergen Reactions     Metoprolol Succinate Er      Other reaction(s): low blood pressure     Pcn [Penicillins] Other (See Comments)     Childhood-doesn't know reactions     Statin Drugs [Hmg-Coa-R Inhibitors] Cramps     Uroxatral [Alfuzosin] Other (See Comments)     hypotension         Surgical History  Past Surgical History:   Procedure Laterality Date     CARDIAC CATHETERIZATION  03/28/2008    and coronary angios     CYSTECTOMY       CYSTOSCOPY       CYSTOSCOPY N/A 12/23/2015    Procedure: CYSTOSCOPY BLADDER BIOPSIES with Fulgeration and Placement of Otero ;  Surgeon: Gordon Bawja MD;  Location: [facility] OR;  Service:      IR EXTREMITY ANGIOGRAM LEFT  3/22/2019     IR EXTREMITY ANGIOGRAM LEFT  3/22/2019     IR ILEAL CONDUIT INJECTION  12/23/2016     IR NEPHROSTOMY TUBE PLACEMENT RIGHT  8/18/2016     IR URETEROSTOMY TUBE CHANGE RIGHT  9/21/2016     KIDNEY STONE SURGERY  x4     LAMINECTOMY LUMBAR ONE LEVEL Bilateral 9/13/2022    Procedure: LUMBAR 4 - LUMBAR 5 BILATERAL MEDIAL FACETECTOMY AND DECOMPRESSION;   Surgeon: Everett Holland MD;  Location: [facility] OR     PROSTATE SURGERY         Social History  Tobacco:   History   Smoking Status     Former   Smokeless Tobacco     Never     Comment: Quit smoking 30 years ago    Alcohol:   Social History    Substance and Sexual Activity      Alcohol use: Not Currently   Illicit Drugs:   History   Drug Use Unknown       Family History  Family History   Problem Relation Age of Onset     No Known Problems Mother      Prostate Cancer Father      Deep Vein Thrombosis Father      Cancer Sister      Heart Disease Sister      No Known Problems Daughter      Heart Disease Brother      Heart Disease Sister      No Known Problems Daughter           Abdelrahman Mazariegos MD on 10/18/2022      cc: Rafael Montelongo

## 2022-10-18 NOTE — PLAN OF CARE
Patient denied pain and was able to sleep for majority of the night. Independent in the room and able to make needs known.     Vitals:    10/17/22 1607 10/17/22 1608 10/17/22 1911 10/18/22 0028   BP: (!) 153/73 (!) 153/73 114/66 103/58   BP Location: Right arm  Right arm Right arm   Patient Position: Semi-Wills's      Pulse: 70  74 73   Resp: 20  20 20   Temp: 98  F (36.7  C)  98.2  F (36.8  C) 98  F (36.7  C)   TempSrc: Oral  Oral Oral   SpO2:    92%   Weight:       Height:          Heart Failure Care Map  GOALS TO BE MET BEFORE DISCHARGE:    1. Decrease congestion and/or edema with diuretic therapy to achieve near optimal volume status.     Dyspnea improved: yes   Edema improved: yes        Net I/O and Weights since admission:   09/18 0700 - 10/18 0659  In: 6271 [P.O.:5556; I.V.:715]  Out: 05348 [Urine:70052]  Net: -8069     Vitals:    10/12/22 1739 10/13/22 1551 10/14/22 0532 10/15/22 0408   Weight: 117.9 kg (260 lb) 113.9 kg (251 lb) 113.1 kg (249 lb 6.4 oz) 113.1 kg (249 lb 4.8 oz)    10/16/22 0500 10/17/22 0614   Weight: 113.5 kg (250 lb 4.8 oz) 113.9 kg (251 lb)       2.  O2 sats > 90% on room air, or at prior home O2 therapy level.      Able to wean O2 this shift to keep sats above 90%?: Yes   Does patient use Home O2? No         Current oxygenation status:   SpO2: 92 %     O2 Device: None (Room air),      3.  Tolerates ambulation and mobility near baseline.     Ambulation: None   Times patient ambulated with staff this shift: 0     Please review the Heart Failure Care Map for additional HF goal outcomes.    Marce Patrick RN  10/18/2022      Problem: Plan of Care - These are the overarching goals to be used throughout the patient stay.    Goal: Optimal Comfort and Wellbeing  Outcome: Progressing     Problem: Risk for Delirium  Goal: Improved Sleep  Outcome: Progressing   Goal Outcome Evaluation:

## 2022-10-18 NOTE — PROGRESS NOTES
Care Management Follow Up    Length of Stay (days): 6    Expected Discharge Date: 10/19/2022     Concerns to be Addressed:     Improved renal function needed before discharge; Cards and Nephrology following   Patient plan of care discussed at interdisciplinary rounds: Yes    Anticipated Discharge Disposition: Home       Referrals Placed by CM/SW:    Private pay costs discussed: Not applicable    Additional Information:    Therapy REC is home. No CM needs yet identified. Anticipate pt to return home with wife to transport, pending medical mgmt. CM following.       LILLI Lomas

## 2022-10-18 NOTE — PROGRESS NOTES
Heart Failure Care Map  GOALS TO BE MET BEFORE DISCHARGE:    1. Decrease congestion and/or edema with diuretic therapy to achieve near optimal volume status.     Dyspnea improved: Yes, satisfactory for discharge.   Edema improved: Yes, satisfactory for discharge.        Net I/O and Weights since admission:   09/18 1500 - 10/18 1459  In: 6571 [P.O.:5856; I.V.:715]  Out: 25139 [Urine:03386]  Net: -7769     Vitals:    10/12/22 1739 10/13/22 1551 10/14/22 0532 10/15/22 0408   Weight: 117.9 kg (260 lb) 113.9 kg (251 lb) 113.1 kg (249 lb 6.4 oz) 113.1 kg (249 lb 4.8 oz)    10/16/22 0500 10/17/22 0614 10/18/22 0746   Weight: 113.5 kg (250 lb 4.8 oz) 113.9 kg (251 lb) 114.1 kg (251 lb 8 oz)       2.  O2 sats > 90% on room air, or at prior home O2 therapy level.      Able to wean O2 this shift to keep sats above 90%?: Yes, satisfactory for discharge.   Does patient use Home O2? No          Current oxygenation status:   SpO2: 97 %     O2 Device: None (Room air),      3.  Tolerates ambulation and mobility near baseline.     Ambulation: Yes, satisfactory for discharge.   Times patient ambulated with staff this shift: 1    Please review the Heart Failure Care Map for additional HF goal outcomes.  Patient alert and oriented x 4. His creatinine is 3.17 and albumin just ordered by cardiologist. Bumex given as ordered. Patient with no c/o pain. Ambulated t the bathroom steadily. Self care of urostomy. Med surg status. Hx paroxizimal  afib and on coumadin. INR 2.71 today. Call light in reach. Patient said he is wanting to go home in a couple days if possible. All care explained.   Anastasia Elias RN  10/18/2022

## 2022-10-18 NOTE — PLAN OF CARE
Heart Failure Care Map  GOALS TO BE MET BEFORE DISCHARGE:    1. Decrease congestion and/or edema with diuretic therapy to achieve near optimal volume status.     Dyspnea improved: Yes, satisfactory for discharge.   Edema improved: Yes, satisfactory for discharge.        Net I/O and Weights since admission:   09/18 2300 - 10/18 2259  In: 7251 [P.O.:6336; I.V.:915]  Out: 16138 [Urine:08606]  Net: -9989     Vitals:    10/12/22 1739 10/13/22 1551 10/14/22 0532 10/15/22 0408   Weight: 117.9 kg (260 lb) 113.9 kg (251 lb) 113.1 kg (249 lb 6.4 oz) 113.1 kg (249 lb 4.8 oz)    10/16/22 0500 10/17/22 0614 10/18/22 0746   Weight: 113.5 kg (250 lb 4.8 oz) 113.9 kg (251 lb) 114.1 kg (251 lb 8 oz)       2.  O2 sats > 90% on room air, or at prior home O2 therapy level.      Able to wean O2 this shift to keep sats above 90%?: Yes, satisfactory for discharge.   Does patient use Home O2? No          Current oxygenation status:   SpO2: 96 %     O2 Device: None (Room air),      3.  Tolerates ambulation and mobility near baseline.     Ambulation: Yes, satisfactory for discharge.   Times patient ambulated with staff this shift: 1    Pt is A&O x4. C/O 4/10 headache- PRN Tylenol given x1. Walked in the hallway with staff x1. Non-tele    Please review the Heart Failure Care Map for additional HF goal outcomes.    Betty Sanchez RN  10/18/2022

## 2022-10-19 VITALS
RESPIRATION RATE: 20 BRPM | HEIGHT: 70 IN | WEIGHT: 247.7 LBS | TEMPERATURE: 97.6 F | SYSTOLIC BLOOD PRESSURE: 134 MMHG | OXYGEN SATURATION: 94 % | DIASTOLIC BLOOD PRESSURE: 74 MMHG | BODY MASS INDEX: 35.46 KG/M2 | HEART RATE: 68 BPM

## 2022-10-19 LAB
ANION GAP SERPL CALCULATED.3IONS-SCNC: 10 MMOL/L (ref 7–15)
BUN SERPL-MCNC: 85.1 MG/DL (ref 8–23)
CALCIUM SERPL-MCNC: 9 MG/DL (ref 8.8–10.2)
CHLORIDE SERPL-SCNC: 99 MMOL/L (ref 98–107)
CREAT SERPL-MCNC: 3.48 MG/DL (ref 0.67–1.17)
DEPRECATED HCO3 PLAS-SCNC: 21 MMOL/L (ref 22–29)
GFR SERPL CREATININE-BSD FRML MDRD: 17 ML/MIN/1.73M2
GLUCOSE BLDC GLUCOMTR-MCNC: 162 MG/DL (ref 70–99)
GLUCOSE BLDC GLUCOMTR-MCNC: 252 MG/DL (ref 70–99)
GLUCOSE SERPL-MCNC: 171 MG/DL (ref 70–99)
INR PPP: 2.4 (ref 0.85–1.15)
POTASSIUM SERPL-SCNC: 4.8 MMOL/L (ref 3.4–5.3)
SODIUM SERPL-SCNC: 130 MMOL/L (ref 136–145)

## 2022-10-19 PROCEDURE — 99239 HOSP IP/OBS DSCHRG MGMT >30: CPT | Performed by: INTERNAL MEDICINE

## 2022-10-19 PROCEDURE — 36415 COLL VENOUS BLD VENIPUNCTURE: CPT | Performed by: INTERNAL MEDICINE

## 2022-10-19 PROCEDURE — 99232 SBSQ HOSP IP/OBS MODERATE 35: CPT | Performed by: INTERNAL MEDICINE

## 2022-10-19 PROCEDURE — 250N000013 HC RX MED GY IP 250 OP 250 PS 637: Performed by: HOSPITALIST

## 2022-10-19 PROCEDURE — 250N000011 HC RX IP 250 OP 636: Performed by: INTERNAL MEDICINE

## 2022-10-19 PROCEDURE — 36415 COLL VENOUS BLD VENIPUNCTURE: CPT | Performed by: HOSPITALIST

## 2022-10-19 PROCEDURE — 250N000013 HC RX MED GY IP 250 OP 250 PS 637: Performed by: INTERNAL MEDICINE

## 2022-10-19 PROCEDURE — P9047 ALBUMIN (HUMAN), 25%, 50ML: HCPCS | Performed by: INTERNAL MEDICINE

## 2022-10-19 PROCEDURE — 85610 PROTHROMBIN TIME: CPT | Performed by: INTERNAL MEDICINE

## 2022-10-19 PROCEDURE — 80048 BASIC METABOLIC PNL TOTAL CA: CPT | Performed by: HOSPITALIST

## 2022-10-19 RX ORDER — ALLOPURINOL 100 MG/1
200 TABLET ORAL DAILY
Qty: 60 TABLET | Refills: 0 | Status: ON HOLD | OUTPATIENT
Start: 2022-10-20 | End: 2022-11-30

## 2022-10-19 RX ORDER — FUROSEMIDE 20 MG
20 TABLET ORAL 2 TIMES DAILY
Qty: 30 TABLET | Refills: 0 | Status: SHIPPED | OUTPATIENT
Start: 2022-10-19 | End: 2022-11-04

## 2022-10-19 RX ORDER — ALBUMIN (HUMAN) 12.5 G/50ML
50 SOLUTION INTRAVENOUS ONCE
Status: COMPLETED | OUTPATIENT
Start: 2022-10-19 | End: 2022-10-19

## 2022-10-19 RX ADMIN — HYDRALAZINE HYDROCHLORIDE 50 MG: 50 TABLET, FILM COATED ORAL at 12:09

## 2022-10-19 RX ADMIN — DOXYCYCLINE HYCLATE 50 MG: 50 CAPSULE ORAL at 08:03

## 2022-10-19 RX ADMIN — INSULIN ASPART 1 UNITS: 100 INJECTION, SOLUTION INTRAVENOUS; SUBCUTANEOUS at 08:00

## 2022-10-19 RX ADMIN — METOPROLOL SUCCINATE 50 MG: 50 TABLET, EXTENDED RELEASE ORAL at 08:03

## 2022-10-19 RX ADMIN — HYDRALAZINE HYDROCHLORIDE 50 MG: 50 TABLET, FILM COATED ORAL at 08:03

## 2022-10-19 RX ADMIN — ALBUMIN HUMAN 50 G: 0.25 SOLUTION INTRAVENOUS at 08:59

## 2022-10-19 RX ADMIN — INSULIN ASPART 3 UNITS: 100 INJECTION, SOLUTION INTRAVENOUS; SUBCUTANEOUS at 12:10

## 2022-10-19 RX ADMIN — EZETIMIBE 10 MG: 10 TABLET ORAL at 08:03

## 2022-10-19 RX ADMIN — Medication 400 MG: at 08:03

## 2022-10-19 RX ADMIN — ALLOPURINOL 200 MG: 100 TABLET ORAL at 08:03

## 2022-10-19 ASSESSMENT — ACTIVITIES OF DAILY LIVING (ADL)
ADLS_ACUITY_SCORE: 26

## 2022-10-19 NOTE — PROGRESS NOTES
[facility]    Medicine Progress Note - Hospitalist Service    Date of Admission:  10/12/2022    Assessment & Plan             81-year-old patient with history of morbid obesity, CAD, hypertension, paroxysmal A. fib and history of DVT, prostate cancer status post ileal conduit, presented with dyspnea on exertion for few months worsening for a few days also found to have acute heart failure     Shortness of breath due to acute diastolic heart failure  Chest x-ray with evidence of pulmonary edema elevated BNP,  Was started on IV Lasix but it was held due to worsening kidney function, switch to p.o. Bumex which was on hold due to worsening kidney function, just restart as per cardiologist on 10/18. But cr uo again  cardiology is following,  -patient has been very frustrated with the situation and with to go home     Paroxysmal A. Fib  On Coumadin and Toprol-XL, cardiology is following        Acute kidney injury with chronic kidney disease  Baseline creatinine 2-2 .5, creatinine trending up peak at 3.9, trending down for last two days, but up a little today  Bumex po was restarted yesterday  -nephrology consultation appreciated  -defer to card/nephrology for diuretics     Hyponatremia  Possible secondary to dehydration,  monitor     Elevated troponin  Cardiology is following, no significant elevation possible demand ischemia     Hypertension  Lisinopril on hold due to acute kidney injury blood pressure has been stable on Norvasc and metoprolol         Diet: Room Service  2 Gram Sodium Diet Other - please comment    DVT Prophylaxis: Warfarin  Otero Catheter: Not present  Central Lines: None  Cardiac Monitoring: None  Code Status: No CPR- Do NOT Intubate      Disposition Plan     Expected Discharge Date: 10/19/2022        Discharge Comments: Monitor kidney function.        The patient's care was discussed with the Patient.    Cassie Leone MD  Hospitalist Service  [facility]  "Hospital  Securely message with the Vocera Web Console (learn more here)  Text page via [employer] Paging/Directory         Clinically Significant Risk Factors         # Hyponatremia: Lowest Na = 130 mmol/L (Ref range: 136-145) in last 2 days, will monitor as appropriate              # DMII: A1C = 7.7 % (Ref range: <5.7 %) within past 3 months   # Obesity: Estimated body mass index is 35.54 kg/m  as calculated from the following:    Height as of this encounter: 1.778 m (5' 10\").    Weight as of this encounter: 112.4 kg (247 lb 11.2 oz).          ______________________________________________________________________    Interval History   Feeling fine, no sob, no cp, no f/c. But very upset after knowing his cr up     Data reviewed today: I reviewed all medications, new labs and imaging results over the last 24 hours.     Physical Exam   Vital Signs: Temp: 97.6  F (36.4  C) Temp src: Oral BP: (!) 141/72 Pulse: 68   Resp: 20 SpO2: 94 % O2 Device: None (Room air)    Weight: 247 lbs 11.2 oz  General.  Awake alert oriented not in acute distress. obese  HEENT.  Pupils equal round react to light, anicteric, EOM intact.  Neck supple no JVD.  CVS regular rhythm no murmur gallops.  Lungs.  Clear to auscultation bilateral no wheezing or rales.  Abdomen.  Soft nontender bowel sounds present.  Extremities.  No edema no calf tenderness.  Neurological.  Awake and alert. No focal deficit.  Skin no rash. No pallor.  Psych. Normal mood.     Data   Recent Labs   Lab 10/19/22  0737 10/19/22  0633 10/19/22  0437 10/18/22  2114 10/18/22  0911 10/18/22  0445 10/17/22  0752 10/17/22  0450 10/13/22  0908 10/13/22  0750 10/13/22  0018 10/12/22  1802   WBC  --   --   --   --   --   --   --   --   --  8.4  --  8.4   HGB  --   --   --   --   --   --   --   --   --  13.5  --  13.2*   MCV  --   --   --   --   --   --   --   --   --  95  --  95   PLT  --   --   --   --   --   --   --   --   --  192  --  201   INR  --   --  2.40*  --   --  2.71*  --  " 2.98*   < > 2.68*  --   --    NA  --  130*  --   --   --  133*  --  132*   < > 136  136  --  136   POTASSIUM  --  4.8  --   --   --  5.0  --  5.2   < > 4.2  4.2  --  4.6   CHLORIDE  --  99  --   --   --  103  --  102   < > 103  103  --  104   CO2  --  21*  --   --   --  20*  --  18*   < > 24  24  --  22   BUN  --  85.1*  --   --   --  77.3*  --  80.4*   < > 45.7*  --  45.7*   CR  --  3.48*  --   --   --  3.17*  --  3.38*   < > 2.75*  --  3.03*   ANIONGAP  --  10  --   --   --  10  --  12   < > 9  9  --  10   GALLO  --  9.0  --   --   --  8.8  --  8.6*   < > 9.0  --  8.9   * 171*  --  153*   < > 150*   < > 157*   < > 152*   < > 108*    < > = values in this interval not displayed.     No results found for this or any previous visit (from the past 24 hour(s)).  Medications     - MEDICATION INSTRUCTIONS -         allopurinol  200 mg Oral Daily     [Held by provider] bumetanide  2 mg Oral Daily     doxycycline hyclate  50 mg Oral Daily     ezetimibe  10 mg Oral Daily     hydrALAZINE  50 mg Oral 4x Daily     insulin aspart  1-7 Units Subcutaneous TID AC     insulin aspart  1-5 Units Subcutaneous At Bedtime     magnesium oxide  400 mg Oral Daily     metoprolol succinate ER  50 mg Oral Daily     sodium chloride (PF)  3 mL Intracatheter Q8H     traZODone  100-200 mg Oral At Bedtime     warfarin ANTICOAGULANT  7.5 mg Oral ONCE at 18:00     Warfarin Therapy Reminder  1 each Oral See Admin Instructions

## 2022-10-19 NOTE — PROGRESS NOTES
HEART CARE NOTE          Assessment/Recommendations     1. ADHF c/b HFpEF  Assessment / Plan    Good response to oral diuretic regimen; unfortunately, some decline in renal function - will hold further diuresis and discuss with nephrology as we attempt to find a balance between volume overload and renal function    GDMT as detailed below; mainstay of treatment for HFpEF includes diuretics and adequate BP control (class I) and SGLT2-I (class 2a); additional medical therapy (ARNI, MRA, ARB) demonstrated less robust evidence for indication but may be considered per guideline recommendations (2b); no indication for BBlockers      Current Pharmacotherapy AHA Guideline-Directed Medical Therapy   Losartan  - on hold given renal dysfunction ARNI/ARB   Spironolactone not started  MRA   SGLT2 inhibitor not started SGLT2-I    Bumetanide 2 mg daily - on hold Loop diuretic       2. 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 DM2  Assessment / Plan    Management per primary team     4. Afib   Assessment / Plan    Currently in NSR - continue metoprolol and warfarin      History of Present Illness/Subjective      Mr. Eduardo Laughlin is a 81 year old male with a PMHx significant for CKD, HTN, HLP, DM2, afib, DVT, bladder and prostate Ca, morbid obesity who presents with complaints of dyspnea x several months and found to be in ADHF.      Today, Mr. Laughlin denies any acute cardiac events or complaints; Management plan as detailed above     ECG: Personally reviewed. normal sinus rhythm, LBBB, 1st degree AV block.     ECHO (personnaly Reviewed):   The left ventricle is normal in size.  The visual ejection fraction is 50-55%.  Septal motion is consistent with conduction abnormality.  No regional wall motion abnormalities noted.  The right ventricular systolic function is normal.  Suspect mild AS  The ascending  "aorta is Moderately dilated.  There is mild aortic root enlargement.  IVC diameter <2.1 cm collapsing >50% with sniff suggests a normal RA pressure  of 3 mmHg.  Technically difficult, suboptimal study.          Physical Examination Review of Systems   /59 (BP Location: Right arm)   Pulse 76   Temp 97.7  F (36.5  C) (Oral)   Resp 18   Ht 1.778 m (5' 10\")   Wt 114.1 kg (251 lb 8 oz)   SpO2 96%   BMI 36.09 kg/m    Body mass index is 36.09 kg/m .  Wt Readings from Last 3 Encounters:   10/18/22 114.1 kg (251 lb 8 oz)   09/13/22 117.8 kg (259 lb 11.2 oz)   10/04/21 118.8 kg (262 lb)     General Appearance:   no distress, normal body habitus   ENT/Mouth: membranes moist, no oral lesions or bleeding gums.      EYES:  no scleral icterus, normal conjunctivae   Neck: no carotid bruits or thyromegaly   Chest/Lungs:   lungs are clear to auscultation, no rales or wheezing, equal chest wall expansion    Cardiovascular:   Regular. Normal first and second heart sounds with no murmurs, rubs, or gallops; the carotid, radial and posterior tibial pulses are intact, no JVD or LE edema bilaterally    Abdomen:  no organomegaly, masses, bruits, or tenderness; bowel sounds are present   Extremities: no cyanosis or clubbing   Skin: no xanthelasma, warm.    Neurologic: alert and oriented x3, calm     Psychiatric: alert and oriented x3, calm     A complete 10 systems ROS was reviewed  And is negative except what is listed in the HPI.          Medical History  Surgical History Family History Social History   Past Medical History:   Diagnosis Date     Acute renal failure with acute tubular necrosis superimposed on stage 3 chronic kidney disease (H)      Arteriosclerotic cardiovascular disease (ASCVD)      Bladder cancer (H)      BPH (benign prostatic hypertrophy)      Chronic kidney disease      Complicated UTI (urinary tract infection) 08/25/2019     Coronary artery disease      Diabetes mellitus (H)      Diabetic neuropathy (H)  "     E coli bacteremia      GERD (gastroesophageal reflux disease)      Gout      Hyperlipidemia      Hypertension      Hyponatremia      Iliac artery aneurysm, 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    Added automatically from request for surgery [id number]     Kidney stone      Osteoarthritis      Personal history of malignant neoplasm of prostate 04/27/2020     Prostate cancer (H)      Sleep apnea     CPAP    Past Surgical History:   Procedure Laterality Date     CARDIAC CATHETERIZATION  03/28/2008    and coronary angios     CYSTECTOMY       CYSTOSCOPY       CYSTOSCOPY N/A 12/23/2015    Procedure: CYSTOSCOPY BLADDER BIOPSIES with Fulgeration and Placement of Otero ;  Surgeon: Gordon Bajwa MD;  Location: [facility];  Service:      IR EXTREMITY ANGIOGRAM LEFT  3/22/2019     IR EXTREMITY ANGIOGRAM LEFT  3/22/2019     IR ILEAL CONDUIT INJECTION  12/23/2016     IR NEPHROSTOMY TUBE PLACEMENT RIGHT  8/18/2016     IR URETEROSTOMY TUBE CHANGE RIGHT  9/21/2016     KIDNEY STONE SURGERY  x4     LAMINECTOMY LUMBAR ONE LEVEL Bilateral 9/13/2022    Procedure: LUMBAR 4 - LUMBAR 5 BILATERAL MEDIAL FACETECTOMY AND DECOMPRESSION;  Surgeon: Everett Holland MD;  Location: [facility]     PROSTATE SURGERY      no family history of premature coronary artery disease Social History     Socioeconomic History     Marital status:      Spouse name: Not on file     Number of children: Not on file     Years of education: Not on file     Highest education level: Not on file   Occupational History     Not on file   Tobacco Use     Smoking status: Former     Smokeless tobacco: Never     Tobacco comments:     Quit smoking 30 years ago   Vaping Use     Vaping Use: Never used   Substance and Sexual Activity     Alcohol use: Not Currently     Drug use: Never     Sexual activity: Not Currently   Other Topics Concern     Not on file   Social History Narrative     Not on file     Social Determinants of Health     Financial Resource  Strain: Not on file   Food Insecurity: Not on file   Transportation Needs: Not on file   Physical Activity: Not on file   Stress: Not on file   Social Connections: Not on file   Intimate Partner Violence: Not on file   Housing Stability: Not on file           Lab Results    Chemistry/lipid CBC Cardiac Enzymes/BNP/TSH/INR   Lab Results   Component Value Date    CHOL 168 03/03/2022    HDL 38 (L) 03/03/2022    TRIG 117 03/03/2022    BUN 77.3 (H) 10/18/2022     (L) 10/18/2022    CO2 20 (L) 10/18/2022    Lab Results   Component Value Date    WBC 8.4 10/13/2022    HGB 13.5 10/13/2022    HCT 40.7 10/13/2022    MCV 95 10/13/2022     10/13/2022    Lab Results   Component Value Date    TROPONINI 0.10 04/25/2020     (H) 04/25/2020    INR 2.71 (H) 10/18/2022     Lab Results   Component Value Date    TROPONINI 0.10 04/25/2020          Weight:    Wt Readings from Last 3 Encounters:   10/18/22 114.1 kg (251 lb 8 oz)   09/13/22 117.8 kg (259 lb 11.2 oz)   10/04/21 118.8 kg (262 lb)       Allergies  Allergies   Allergen Reactions     Metoprolol Succinate Er      Other reaction(s): low blood pressure     Pcn [Penicillins] Other (See Comments)     Childhood-doesn't know reactions     Statin Drugs [Hmg-Coa-R Inhibitors] Cramps     Uroxatral [Alfuzosin] Other (See Comments)     hypotension         Surgical History  Past Surgical History:   Procedure Laterality Date     CARDIAC CATHETERIZATION  03/28/2008    and coronary angios     CYSTECTOMY       CYSTOSCOPY       CYSTOSCOPY N/A 12/23/2015    Procedure: CYSTOSCOPY BLADDER BIOPSIES with Fulgeration and Placement of Otero ;  Surgeon: Gordon Bajwa MD;  Location: [facility] OR;  Service:      IR EXTREMITY ANGIOGRAM LEFT  3/22/2019     IR EXTREMITY ANGIOGRAM LEFT  3/22/2019     IR ILEAL CONDUIT INJECTION  12/23/2016     IR NEPHROSTOMY TUBE PLACEMENT RIGHT  8/18/2016     IR URETEROSTOMY TUBE CHANGE RIGHT  9/21/2016     KIDNEY STONE SURGERY  x4     LAMINECTOMY  LUMBAR ONE LEVEL Bilateral 9/13/2022    Procedure: LUMBAR 4 - LUMBAR 5 BILATERAL MEDIAL FACETECTOMY AND DECOMPRESSION;  Surgeon: Everett Holland MD;  Location: [facility] OR     PROSTATE SURGERY         Social History  Tobacco:   History   Smoking Status     Former   Smokeless Tobacco     Never     Comment: Quit smoking 30 years ago    Alcohol:   Social History    Substance and Sexual Activity      Alcohol use: Not Currently   Illicit Drugs:   History   Drug Use Unknown       Family History  Family History   Problem Relation Age of Onset     No Known Problems Mother      Prostate Cancer Father      Deep Vein Thrombosis Father      Cancer Sister      Heart Disease Sister      No Known Problems Daughter      Heart Disease Brother      Heart Disease Sister      No Known Problems Daughter           Abdelrahman Mazariegos MD on 10/19/2022      cc: Rafael Montelongo

## 2022-10-19 NOTE — PROGRESS NOTES
NEPHROLOGY PROGRESS NOTE    Date of service: 10/19/22     CC: ROBERT, CKD      ASSESSMENT/RECOMMENDATIONS:  1. Acute kidney injury on chronic kidney disease a stage IV.  Acute kidney injury secondary to overdiuresis of heart failure.  Baseline creatinine 2.7-2.9, creatinine went up to 3.9 (10/16) with diuresis of heart failure.    Heart failure clinically compensated on exam today     Can start lasix 20mg po bid on discharge.    Monitor renal function daily.    Hold lisinopril for now    Will arrange for follow-up at [facility] within 2 months. Will need labs with primary within one week(has appt for tomorrow) and with cards in about a week.  2. Chronic kidney disease a stage IV.  Baseline creatinine 2.7-2.9.  Patient was previously evaluated by Dr. Angela in the year 2020.  No recent renal follow-up.  Chronic kidney disease most likely secondary to a combination of age-related nephrosclerosis, atherosclerotic systemic cardiovascular disease, longstanding hypertension, and contribution from longstanding diabetes mellitus type 2 and morbid obesity.    3. Acute on chronic diastolic heart failure.  Left ventricular ejection fraction 50-55%.  Left bundle branch block present.  Patient seems euvolemic on exam.  Holding diuretics as above.  Continue management as per cardiology.        Buddy Zhang MD  [facility]   Office: [phone]      S: Since yesterday, creatinine up a bit from yesterday. He wants to go home. He reports feeling much better. No dyspnea, cp, edema.     Current Facility-Administered Medications   Medication     - MEDICATION INSTRUCTIONS -     acetaminophen (TYLENOL) tablet 650 mg    Or     acetaminophen (TYLENOL) Suppository 650 mg     allopurinol (ZYLOPRIM) tablet 200 mg     amLODIPine (NORVASC) tablet 2.5 mg     [Held by provider] bumetanide (BUMEX) tablet 2 mg     glucose gel 15-30 g    Or     dextrose 50 % injection 25-50 mL    Or     glucagon injection 1 mg      "doxycycline hyclate (VIBRAMYCIN) capsule 50 mg     ezetimibe (ZETIA) tablet 10 mg     hydrALAZINE (APRESOLINE) tablet 10 mg     hydrALAZINE (APRESOLINE) tablet 50 mg     insulin aspart (NovoLOG) injection (RAPID ACTING)     insulin aspart (NovoLOG) injection (RAPID ACTING)     lidocaine (LMX4) cream     lidocaine 1 % 0.1-1 mL     magnesium oxide (MAG-OX) tablet 400 mg     melatonin tablet 1 mg     metoprolol succinate ER (TOPROL XL) 24 hr tablet 50 mg     ondansetron (ZOFRAN ODT) ODT tab 4 mg    Or     ondansetron (ZOFRAN) injection 4 mg     sodium chloride (PF) 0.9% PF flush 3 mL     sodium chloride (PF) 0.9% PF flush 3 mL     temazepam (RESTORIL) capsule 15 mg     traZODone (DESYREL) tablet 100-200 mg     warfarin ANTICOAGULANT (COUMADIN) tablet 7.5 mg     Warfarin Dose Required Daily - Pharmacist Managed        No interval change in SH, FH.    Physical Exam  /74   Pulse 68   Temp 97.6  F (36.4  C) (Oral)   Resp 20   Ht 1.778 m (5' 10\")   Wt 112.4 kg (247 lb 11.2 oz)   SpO2 94%   BMI 35.54 kg/m    GENERAL: NAD  HEENT: NCAT, pupils equal, sclerae not icteric, MMM  NECK: Supple.Trachea midline.   LUNGS: no respiratory distress,  HEART:  no leg edema.   ABDOMEN: Soft, NT  PSYCH: Alert, normal affect  NEURO:  moves all extremities  MUSC/SKEL: normal muscle mass, no joint effusions evident  SKIN: No rash       Lab Data:  Recent Labs   Lab Test 10/19/22  0633 10/18/22  0445 10/17/22  0450 04/25/20  0655 04/24/20  1427   POTASSIUM 4.8 5.0 5.2   < > 4.0   CHLORIDE 99 103 102   < > 109*   ALBUMIN  --   --   --   --  3.0*    < > = values in this interval not displayed.     Recent Labs   Lab Test 10/19/22  0633 10/18/22  0445 10/17/22  0450 10/16/22  0500   BUN 85.1* 77.3* 80.4* 80.0*     Recent Labs   Lab Test 10/13/22  0750 10/12/22  1802 06/13/20  2142 04/25/20  0655 04/24/20 2049   WBC 8.4 8.4 8.0   < >  --    HGB 13.5 13.2* 13.2*   < >  --    MCV 95 95 94   < >  --     201 235   < >  --    LDH  --   " --   --   --  189    < > = values in this interval not displayed.     No lab results found.    Invalid input(s): PTHINTACT, [id number], [id number], PROTCREATUR    I reviewed the above labs

## 2022-10-19 NOTE — PLAN OF CARE
Patient denied pain and was able to sleep for most of the night. Independent in the room and able to make needs known. Plan to discharge today to home with family to transport.     Vitals:    10/18/22 1611 10/18/22 1912 10/19/22 0029 10/19/22 0649   BP: 132/78 111/61 103/59    BP Location:   Right arm    Pulse:   76    Resp:   18    Temp:   97.7  F (36.5  C)    TempSrc:   Oral    SpO2:   96%    Weight:    112.4 kg (247 lb 11.2 oz)   Height:            Heart Failure Care Map  GOALS TO BE MET BEFORE DISCHARGE:    1. Decrease congestion and/or edema with diuretic therapy to achieve near optimal volume status.     Dyspnea improved: yes   Edema improved: Yes        Net I/O and Weights since admission:   09/19 0700 - 10/19 0659  In: 7611 [P.O.:6696; I.V.:915]  Out: 68825 [Urine:71103]  Net: -05673     Vitals:    10/12/22 1739 10/13/22 1551 10/14/22 0532 10/15/22 0408   Weight: 117.9 kg (260 lb) 113.9 kg (251 lb) 113.1 kg (249 lb 6.4 oz) 113.1 kg (249 lb 4.8 oz)    10/16/22 0500 10/17/22 0614 10/18/22 0746   Weight: 113.5 kg (250 lb 4.8 oz) 113.9 kg (251 lb) 114.1 kg (251 lb 8 oz)       2.  O2 sats > 90% on room air, or at prior home O2 therapy level.      Able to wean O2 this shift to keep sats above 90%?: es   Does patient use Home O2? No         Current oxygenation status:   SpO2: 96 %     O2 Device: None (Room air),      3.  Tolerates ambulation and mobility near baseline.     Ambulation: within room   Times patient ambulated with staff this shift: 1    Please review the Heart Failure Care Map for additional HF goal outcomes.    Marce Patrick RN  10/19/2022      Problem: Plan of Care - These are the overarching goals to be used throughout the patient stay.    Goal: Optimal Comfort and Wellbeing  Outcome: Progressing     Problem: Risk for Delirium  Goal: Improved Sleep  Outcome: Progressing     Problem: Pain Acute  Goal: Optimal Pain Control and Function  Outcome: Progressing  Intervention: Prevent or Manage  Pain  Recent Flowsheet Documentation  Taken 10/19/2022 0100 by Marce Patrick, RN  Medication Review/Management: medications reviewed   Goal Outcome Evaluation:

## 2022-10-19 NOTE — PROGRESS NOTES
CLINICAL NUTRITION SERVICES     Reviewed nutrition risk factors due to LOS. Pt is tolerating diet, eating well per nursing documentation (100%). He has no nutrition concerns at this time and has no questions about the 2g sodium diet education that he received previously. No nutrition issues identified at this time. RD will follow peripherally at this time, unless consulted.    Krystal Diego RDN, LD

## 2022-10-19 NOTE — DISCHARGE SUMMARY
[facility]  Hospitalist Discharge Summary      Date of Admission:  10/12/2022  Date of Discharge:  10/19/2022  Discharging Provider: Cassie Leone MD  Discharge Service: Hospitalist Service    Discharge Diagnoses   CHF  ROBERT on CKD    Follow-ups Needed After Discharge   Follow-up Appointments     Follow-up and recommended labs and tests       Follow up with primary care provider, Rafael Montelongo, within 1-7 days   to evaluate medication change and for hospital follow- up.  The following   labs/tests are recommended: cbc, bmp, mg.    Follow up with  Cardiologist within 7 days;   Follow up with nephrologist as instructed             Unresulted Labs Ordered in the Past 30 Days of this Admission     No orders found from 9/12/2022 to 10/13/2022.          Discharge Disposition   Discharged to home  Condition at discharge: Fair      Hospital Course          81-year-old patient with history of morbid obesity, CAD, hypertension, paroxysmal A. fib and history of DVT, prostate cancer status post ileal conduit, presented with dyspnea on exertion for few months worsening for a few days also found to have acute heart failure     Shortness of breath due to acute diastolic heart failure  Chest x-ray with evidence of pulmonary edema elevated BNP,  Was started on IV Lasix but it was held due to worsening kidney function, switch to p.o. Bumex which was on hold due to worsening kidney function, just restart as per cardiologist on 10/18. But cr up again  cardiology and nephrologist: ok to discharge home on Lasix 20 mg po bid, with close follow up with PCP, cardiologist       Paroxysmal A. Fib  On Coumadin and Toprol-XL, cardiology is following        Acute kidney injury with chronic kidney disease  Baseline creatinine 2-2 .5, creatinine trending up peak at 3.9, trending down for last two days, but up a little today  Bumex po was restarted yesterday  -nephrology: ok to discharge on oral Lasix  Lisinopril is  on hold     Hyponatremia  Possible secondary to dehydration,  monitor     Elevated troponin  Cardiology is following, no significant elevation possible demand ischemia     Hypertension  Lisinopril on hold due to acute kidney injury blood pressure has been stable on Norvasc and metoprolol        Consultations This Hospital Stay   CARE MANAGEMENT / SOCIAL WORK IP CONSULT  CARDIOLOGY IP CONSULT  CORE CLINIC EVALUATION IP CONSULT  OCCUPATIONAL THERAPY ADULT IP CONSULT  NUTRITION SERVICES ADULT IP CONSULT  CARE MANAGEMENT / SOCIAL WORK IP CONSULT  PHARMACY TO DOSE WARFARIN  NEPHROLOGY IP CONSULT  NEPHROLOGY IP CONSULT  PHYSICAL THERAPY ADULT IP CONSULT    Code Status   No CPR- Do NOT Intubate    Time Spent on this Encounter   I, Cassie Leone MD, personally saw the patient today and spent greater than 30 minutes discharging this patient.       Cassie Leone MD  [facility] HEART [address] [address]  Phone: [phone]  Fax: [phone]  ______________________________________________________________________    Physical Exam   Vital Signs: Temp: 97.6  F (36.4  C) Temp src: Oral BP: 134/74 Pulse: 68   Resp: 20 SpO2: 94 % O2 Device: None (Room air)    Weight: 247 lbs 11.2 oz  General.  Awake alert oriented not in acute distress. Obese  HEENT.  Pupils equal round react to light, anicteric, EOM intact.  Neck supple no JVD.  CVS regular rhythm no murmur gallops.  Lungs.  Clear to auscultation bilateral no wheezing or rales.  Abdomen.  Soft nontender bowel sounds present.  Extremities.  No edema no calf tenderness.  Neurological.  Awake and alert. No focal deficit.  Skin no rash. No pallor.  Psych. Normal mood.          Primary Care Physician   Rafael Montelongo    Discharge Orders      Reason for your hospital stay    CHF  ROBERT on CKD     Follow-up and recommended labs and tests     Follow up with primary care provider, Rafael Montelongo, within 1-7 days to evaluate  medication change and for hospital follow- up.  The following labs/tests are recommended: cbc, bmp, mg.    Follow up with  Cardiologist within 7 days;   Follow up with nephrologist as instructed     Activity    Your activity upon discharge: activity as tolerated     When to contact your care team    Call your primary doctor if you have any of the following: concerns.     Diet    Follow this diet upon discharge: Orders Placed This Encounter        2 Gram Sodium Diet Other - please comment       Significant Results and Procedures   Most Recent 3 CBC's:Recent Labs   Lab Test 10/13/22  0750 10/12/22  1802 06/13/20  2142   WBC 8.4 8.4 8.0   HGB 13.5 13.2* 13.2*   MCV 95 95 94    201 235     Most Recent 3 BMP's:Recent Labs   Lab Test 10/19/22  1207 10/19/22  0737 10/19/22  0633 10/18/22  0911 10/18/22  0445 10/17/22  0752 10/17/22  0450   NA  --   --  130*  --  133*  --  132*   POTASSIUM  --   --  4.8  --  5.0  --  5.2   CHLORIDE  --   --  99  --  103  --  102   CO2  --   --  21*  --  20*  --  18*   BUN  --   --  85.1*  --  77.3*  --  80.4*   CR  --   --  3.48*  --  3.17*  --  3.38*   ANIONGAP  --   --  10  --  10  --  12   GALLO  --   --  9.0  --  8.8  --  8.6*   * 162* 171*   < > 150*   < > 157*    < > = values in this interval not displayed.     Most Recent 3 INR's:Recent Labs   Lab Test 10/19/22  0437 10/18/22  0445 10/17/22  0450   INR 2.40* 2.71* 2.98*     Most Recent 3 Troponin's:Recent Labs   Lab Test 06/14/20  0033 06/13/20 2142   TROPI <0.015 <0.015     Most Recent 3 BNP's:Recent Labs   Lab Test 10/12/22  1802   NTBNPI 5,303*   ,   Results for orders placed or performed during the hospital encounter of 10/12/22   Chest XR,  PA & LAT    Narrative    EXAM: XR CHEST 2 VIEWS  LOCATION: [facility]  DATE/TIME: 10/12/2022 7:03 PM    INDICATION: lightheaded  COMPARISON: 10/21/2020      Impression    IMPRESSION: Lungs hyperinflated. Minimal left basilar atelectasis/scar. No  pleural effusion. Stable enlarged cardiac silhouette. The pulmonary vasculature is normal.   Echocardiogram Complete     Value    LVEF  50-55%    [facility]    [id number]  [id number]  [id number]  [id number]^[address]^[place] [address]     Name: ROXANNA PITT  MRN: [id number]  : 1940  Study Date: 10/13/2022 08:25 AM  Age: 81 yrs  Gender: Male  Patient Location: [facility]  Reason For Study: CHF  Ordering Physician: NAINA SAMPSON  Performed By: NAS     BSA: 2.3 m2  Height: 70 in  Weight: 260 lb  HR: 78  ______________________________________________________________________________  Procedure  Complete Portable Echo Adult. Definity (NDC #01278-483) given intravenously.  ______________________________________________________________________________  Interpretation Summary     The left ventricle is normal in size.  The visual ejection fraction is 50-55%.  Septal motion is consistent with conduction abnormality.  No regional wall motion abnormalities noted.  The right ventricular systolic function is normal.  Suspect mild AS  The ascending aorta is Moderately dilated.  There is mild aortic root enlargement.  IVC diameter <2.1 cm collapsing >50% with sniff suggests a normal RA pressure  of 3 mmHg.  Technically difficult, suboptimal study.  ______________________________________________________________________________  Left Ventricle  The left ventricle is normal in size. The visual ejection fraction is 50-55%.  Diastolic function not assessed due to arrhythmia. Septal motion is consistent  with conduction abnormality. No regional wall motion abnormalities noted.     Right Ventricle  TAPSE is normal, which is consistent with normal right ventricular systolic  function. The right ventricular systolic function is normal.     Atria  The left atrium is not well visualized. Right atrium not well visualized.     Mitral Valve  The mitral valve leaflets appear thickened, but open  well. There is mild  mitral annular calcification. There is mild (1+) mitral regurgitation. There  is no mitral valve stenosis.     Tricuspid Valve  The tricuspid valve is not well visualized. There is mild (1+) tricuspid  regurgitation. There is no tricuspid stenosis.     Aortic Valve  The aortic valve is not well visualized. Suspect mild AS.     Pulmonic Valve  The pulmonic valve is not well visualized. There is no pulmonic valvular  stenosis.     Vessels  The ascending aorta is Moderately dilated. There is mild aortic root  enlargement. IVC diameter <2.1 cm collapsing >50% with sniff suggests a normal  RA pressure of 3 mmHg.     Pericardium  There is no pericardial effusion.     Rhythm  The rhythm was undetermined.  ______________________________________________________________________________  MMode/2D Measurements & Calculations  Ao root diam: 4.3 cm  asc Aorta Diam: 4.5 cm  LVOT diam: 2.2 cm  LVOT area: 4.0 cm2     Time Measurements  MM HR: 79.0 BPM     Doppler Measurements & Calculations  MV max P.0 mmHg  MV mean P.3 mmHg  MV V2 VTI: 22.2 cm  PA acc time: 0.11 sec  Peak E' Micha: 5.9 cm/sec     ______________________________________________________________________________  Report approved by: Rufina Tran 10/13/2022 09:57 AM               Discharge Medications   Current Discharge Medication List      START taking these medications    Details   furosemide (LASIX) 20 MG tablet Take 1 tablet (20 mg) by mouth 2 times daily for 15 days  Qty: 30 tablet, Refills: 0    Associated Diagnoses: Acute on chronic congestive heart failure, unspecified heart failure type (H)         CONTINUE these medications which have CHANGED    Details   allopurinol (ZYLOPRIM) 100 MG tablet Take 2 tablets (200 mg) by mouth daily for 30 days  Qty: 60 tablet, Refills: 0    Associated Diagnoses: Chronic gout without tophus, unspecified cause, unspecified site         CONTINUE these medications which have NOT CHANGED     Details   acetaminophen (TYLENOL) 325 MG tablet Take 1-2 tablets (325-650 mg) by mouth every 4 hours as needed for pain (mild pain)  Qty: 100 tablet, Refills: 0    Associated Diagnoses: Neurogenic claudication due to lumbar spinal stenosis      amLODIPine (NORVASC) 5 MG tablet Take 2.5 mg by mouth daily as needed (For elevated BP SBP > 140)      doxycycline hyclate (VIBRAMYCIN) 50 MG capsule Take 50 mg by mouth daily      ezetimibe (ZETIA) 10 MG tablet Take 10 mg by mouth daily      glimepiride (AMARYL) 1 MG tablet Take 1 mg by mouth daily      metoprolol succinate ER (TOPROL XL) 100 MG 24 hr tablet Take 50 mg by mouth daily      temazepam (RESTORIL) 15 MG capsule Take 15 mg by mouth nightly as needed for sleep      traZODone (DESYREL) 100 MG tablet Take 100-200 mg by mouth At Bedtime      warfarin ANTICOAGULANT (COUMADIN) 5 MG tablet Take 5-8 mg by mouth See Admin Instructions 8 mg MWF and 5 mg all other days         STOP taking these medications       lisinopril (ZESTRIL) 20 MG tablet Comments:   Reason for Stopping:             Allergies   Allergies   Allergen Reactions     Metoprolol Succinate Er      Other reaction(s): low blood pressure     Pcn [Penicillins] Other (See Comments)     Childhood-doesn't know reactions     Statin Drugs [Hmg-Coa-R Inhibitors] Cramps     Uroxatral [Alfuzosin] Other (See Comments)     hypotension

## 2022-10-19 NOTE — PROGRESS NOTES
Heart Failure Care Map  GOALS TO BE MET BEFORE DISCHARGE:    1. Decrease congestion and/or edema with diuretic therapy to achieve near optimal volume status.     Dyspnea improved: Yes, satisfactory for discharge.   Edema improved: Yes, satisfactory for discharge.        Net I/O and Weights since admission:   09/19 1500 - 10/19 1459  In: 7931 [P.O.:6816; I.V.:915]  Out: 81601 [Urine:07535]  Net: -04867     Vitals:    10/12/22 1739 10/13/22 1551 10/14/22 0532 10/15/22 0408   Weight: 117.9 kg (260 lb) 113.9 kg (251 lb) 113.1 kg (249 lb 6.4 oz) 113.1 kg (249 lb 4.8 oz)    10/16/22 0500 10/17/22 0614 10/18/22 0746 10/19/22 0649   Weight: 113.5 kg (250 lb 4.8 oz) 113.9 kg (251 lb) 114.1 kg (251 lb 8 oz) 112.4 kg (247 lb 11.2 oz)       2.  O2 sats > 90% on room air, or at prior home O2 therapy level.      Able to wean O2 this shift to keep sats above 90%?: Yes, satisfactory for discharge.   Does patient use Home O2? No          Current oxygenation status:   SpO2: 94 %     O2 Device: None (Room air),      3.  Tolerates ambulation and mobility near baseline.     Ambulation: Yes, satisfactory for discharge.   Times patient ambulated with staff this shift: 1    Please review the Heart Failure Care Map for additional HF goal outcomes.  Patient alert and oriented x 4. He is waiting for nephrologist to come see him regarding elevated creatinine. Patient impatient and wants to go home yet agreed to stay another day when careplan explained to him. Albumin given as ordered this morning. No c/o pain. Does not feel like eating lunch. INR 2.40 and to receive Coumadin 7.5 mg tonight. Urostomy with 300 ml out. Call light in reach.   Anastasia Elias RN  10/19/2022

## 2022-10-19 NOTE — PROGRESS NOTES
Informed patient that he is able to go according to the nephrologist, cardiologist, and hospitalist. Working on discharge orders and patient said his son can come at 1600 to drive him home. 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 Will update next shift of the plan of care.

## 2022-10-20 ENCOUNTER — PATIENT OUTREACH (OUTPATIENT)
Dept: CARE COORDINATION | Facility: CLINIC | Age: 82
End: 2022-10-20

## 2022-10-20 ENCOUNTER — LAB REQUISITION (OUTPATIENT)
Dept: LAB | Facility: CLINIC | Age: 82
End: 2022-10-20

## 2022-10-20 DIAGNOSIS — I50.32 CHRONIC DIASTOLIC (CONGESTIVE) HEART FAILURE (H): ICD-10-CM

## 2022-10-20 LAB
ANION GAP SERPL CALCULATED.3IONS-SCNC: 14 MMOL/L (ref 7–15)
BUN SERPL-MCNC: 82.9 MG/DL (ref 8–23)
CALCIUM SERPL-MCNC: 9.6 MG/DL (ref 8.8–10.2)
CHLORIDE SERPL-SCNC: 100 MMOL/L (ref 98–107)
CREAT SERPL-MCNC: 3.18 MG/DL (ref 0.67–1.17)
DEPRECATED HCO3 PLAS-SCNC: 20 MMOL/L (ref 22–29)
GFR SERPL CREATININE-BSD FRML MDRD: 19 ML/MIN/1.73M2
GLUCOSE SERPL-MCNC: 179 MG/DL (ref 70–99)
MAGNESIUM SERPL-MCNC: 2.8 MG/DL (ref 1.7–2.3)
POTASSIUM SERPL-SCNC: 5 MMOL/L (ref 3.4–5.3)
SODIUM SERPL-SCNC: 134 MMOL/L (ref 136–145)

## 2022-10-20 PROCEDURE — 80048 BASIC METABOLIC PNL TOTAL CA: CPT | Performed by: FAMILY MEDICINE

## 2022-10-20 PROCEDURE — 83735 ASSAY OF MAGNESIUM: CPT | Performed by: FAMILY MEDICINE

## 2022-10-20 NOTE — PROGRESS NOTES
[facility]    Background: Transitional Care Management program auto-identified and prompting a chart review by [facility] team.    Assessment: Upon chart review, [facility] Team member will cancel/close this episode of Transitional Care Management program due to reason below:    Patient has active communication with a nurse, provider or care team for reason of post-hospital follow up plan.  Outreach call by CCR team not indicated to minimize duplicative efforts.     Plan: Transitional Care Management episode closed per reason above.      Corrina Green RN  [facility], [facility]    *Connected Care Resource Team does NOT follow patient ongoing. Referrals are identified based on internal discharge reports and the outreach is to ensure patient has an understanding of their discharge instructions.

## 2022-11-04 ENCOUNTER — APPOINTMENT (OUTPATIENT)
Dept: CARDIOLOGY | Facility: CLINIC | Age: 82
End: 2022-11-04
Attending: INTERNAL MEDICINE
Payer: COMMERCIAL

## 2022-11-04 VITALS
HEIGHT: 70 IN | BODY MASS INDEX: 36.08 KG/M2 | WEIGHT: 252 LBS | DIASTOLIC BLOOD PRESSURE: 62 MMHG | SYSTOLIC BLOOD PRESSURE: 94 MMHG | HEART RATE: 76 BPM | RESPIRATION RATE: 16 BRPM

## 2022-11-04 DIAGNOSIS — I48.0 PAROXYSMAL ATRIAL FIBRILLATION (H): ICD-10-CM

## 2022-11-04 DIAGNOSIS — I50.9 ACUTE ON CHRONIC CONGESTIVE HEART FAILURE, UNSPECIFIED HEART FAILURE TYPE (H): ICD-10-CM

## 2022-11-04 DIAGNOSIS — G47.33 OSA (OBSTRUCTIVE SLEEP APNEA): ICD-10-CM

## 2022-11-04 DIAGNOSIS — I10 HYPERTENSION, UNSPECIFIED TYPE: Chronic | ICD-10-CM

## 2022-11-04 DIAGNOSIS — N18.32 STAGE 3B CHRONIC KIDNEY DISEASE (H): Chronic | ICD-10-CM

## 2022-11-04 DIAGNOSIS — I50.32 CHRONIC HEART FAILURE WITH PRESERVED EJECTION FRACTION (H): Primary | ICD-10-CM

## 2022-11-04 LAB
ANION GAP SERPL CALCULATED.3IONS-SCNC: 12 MMOL/L (ref 7–15)
BUN SERPL-MCNC: 54 MG/DL (ref 8–23)
CALCIUM SERPL-MCNC: 8.9 MG/DL (ref 8.8–10.2)
CHLORIDE SERPL-SCNC: 103 MMOL/L (ref 98–107)
CREAT SERPL-MCNC: 2.46 MG/DL (ref 0.67–1.17)
DEPRECATED HCO3 PLAS-SCNC: 21 MMOL/L (ref 22–29)
GFR SERPL CREATININE-BSD FRML MDRD: 26 ML/MIN/1.73M2
GLUCOSE SERPL-MCNC: 121 MG/DL (ref 70–99)
MAGNESIUM SERPL-MCNC: 2.2 MG/DL (ref 1.7–2.3)
POTASSIUM SERPL-SCNC: 4.7 MMOL/L (ref 3.4–5.3)
SODIUM SERPL-SCNC: 136 MMOL/L (ref 136–145)

## 2022-11-04 PROCEDURE — 83735 ASSAY OF MAGNESIUM: CPT | Performed by: NURSE PRACTITIONER

## 2022-11-04 PROCEDURE — 99215 OFFICE O/P EST HI 40 MIN: CPT | Performed by: NURSE PRACTITIONER

## 2022-11-04 PROCEDURE — 36415 COLL VENOUS BLD VENIPUNCTURE: CPT | Performed by: NURSE PRACTITIONER

## 2022-11-04 PROCEDURE — 80048 BASIC METABOLIC PNL TOTAL CA: CPT | Performed by: NURSE PRACTITIONER

## 2022-11-04 RX ORDER — FUROSEMIDE 20 MG
20 TABLET ORAL PRN
Qty: 60 TABLET | Refills: 1 | Status: ON HOLD | OUTPATIENT
Start: 2022-11-04 | End: 2022-11-30

## 2022-11-04 RX ORDER — WARFARIN SODIUM 3 MG/1
3 TABLET ORAL DAILY
Status: ON HOLD | COMMUNITY
End: 2022-11-08

## 2022-11-04 NOTE — PATIENT INSTRUCTIONS
Eduardo Laughlin,    It was a pleasure to see you today at [facility].     My recommendations after this visit include:  - Please follow up with Dr Proctor in 6 weeks   - Please follow up with Toma Diaz in 3 months  - I have checked labs and will contact you with results  - Try taking metoprolol at night   - Take furosemide 20 mg as needed for a weight gain of 2-3 pounds in one day    Toma Diaz CNP    What is the [facility] Failure Program?     The [facility] Heart Failure Program is a heart failure specialty clinic within Heart Care.  You will work with your cardiologist, nurse practitioner, and nurses to carefully adjust medications and learn how to live with heart failure.  The Heart Failure Program will help you:    Better understand your chronic heart condition  Feel better and avoid hospital stays    Monitoring for Symptoms      Call the Heart Failure Phone Line ([phone]) if you have any of these symptoms:   Increased shortness of breath/shortness of breath at rest  Waking up at night with difficulty breathing  Unable to lie down for sleep due to symptoms or needing to sit upright for sleep  Weight gain of 2 pounds a day for 2 days in a row OR 5 pounds in 1 week  Increased swelling in your ankles or legs  Dizziness or lightheadedness    Medications     Take your medications as prescribed  Bring all your medications in their original bottles to every appointment  Avoid non-steroidal anti-inflammatory medications (Advil, Aleve, Ibuprofen, Naprosyn, Naproxen, Celebrex)  Do not stop taking your medications or begin taking over-the-counter or herbal medications without first talking to your doctor or nurse practitioner    Diet and Lifestyle     Limit sodium/salt to 2000 mg daily   Read food labels for sodium content  Do not add salt when cooking or add salt at the table  Weigh yourself every day and record in your daily weight log   Call if you gain 2  pounds a day for 2 days in a row OR 5 pounds in 1 week  Bring daily weight log to every appointment  Stay active, pace yourself, listen to your body, and rest when tired  Elevate your legs if they are swollen. Ask about using compression/support stockings  Stop smoking  Lose weight if you are overweight  Avoid drinking alcohol or limit amount  Stay updated on your immunizations including flu and pneumonia vaccines     Pt presents to PST for pre-surgical evaluation prior to exposure and bonding of impacted teeth #22 & 27 on 8/13/2020 with Dr. Gómez at [facility].

## 2022-11-04 NOTE — LETTER
11/4/2022    Rafael Montelongo MD  [address]  [facility] [address]    RE: Eduardo Laughlin       Dear Colleague,     I had the pleasure of seeing Eduardo Laughlin in the [facility].        Assessment/Recommendations   Assessment:    1.  Heart failure with preserved ejection fraction, NYHA class III: Compensated.  He states his dyspnea on exertion and lower extremity edema have improved.  He was only prescribed Lasix for 2 weeks.  This ended a few days ago and his weight has increased 1 pound.  We discussed monitoring symptoms, following a low-sodium diet and monitoring daily weights.  - Last heart failure hospitalization: October 2022  - Ischemic evaluation: Lexiscan August 2021 negative for inducible myocardial ischemia or infarction  2.  Hypotension: Blood pressure 94/62.  He states he is having lightheadedness and dizziness.  Encouraged drinking 50 to 60 ounces of fluids per day.  3.  Chronic kidney disease stage IV: He had labs a few weeks ago with a sodium 134, potassium 5.0, BUN 83 and creatinine 3.18 which is improved since the hospital.  4.  GINNY: He wears his CPAP  5.  Paroxysmal atrial fibrillation: Normal sinus rhythm.  He continues warfarin for anticoagulation and metoprolol.  6. Hyperkalemia: most recent potassium 5.0. He states he has been using a salt substitute frequently. Recommend using sparingly     Plan:  1. BMP and magnesium pending    2.  Take Lasix 20 mg as needed for weight gain of 2 to 3 pounds in 1 day  3.  Try taking Metroprolol at night.  If symptoms of dizziness and lightheadedness continue may need to decrease to 25 mg daily  4.  Continue CPAP  5.  Continue monitoring weights and following low-sodium diet    Eduardo Laughlin will follow up with Dr Proctor in 6 weeks and in the heart failure clinic in 3 months.     History of Present Illness/Subjective    Mr. Eduardo Laughlin is a 82 year old male seen at [facility] heart failure clinic today for  "continued follow-up.  He follows up with heart failure with preserved ejection fraction.  He was hospitalized  October 12 to October 19 with acute heart failure and ROBERT.  He had an elevated BNP and evidence of pulmonary edema on chest x-ray.  He was started on IV Lasix which symptoms improved but had worsening kidney function.  His diuretics were on hold.  At discharge he was started on Lasix 20 mg twice a day.  He had an echocardiogram which showed ejection fraction 50 to 55%.  His lisinopril was discontinued due to acute kidney injury.  Patient has a past medical history significant for hypertension, chronic kidney disease stage IV, paroxysmal atrial fibrillation, diabetes, hyperlipidemia, GINNY on CPAP.    Today, he states his shortness of breath and edema have improved.  He does have lightheadedness and dizziness.  He denies orthopnea, PND, palpitations, chest pain, abdominal fullness/bloating and lower extremity edema.      He is monitoring home weights which are stable between 245-247 pounds.  He is following a low sodium diet.      ECHOCARDIOGRAM: 10/13/2022  Interpretation Summary     The left ventricle is normal in size.  The visual ejection fraction is 50-55%.  Septal motion is consistent with conduction abnormality.  No regional wall motion abnormalities noted.  The right ventricular systolic function is normal.  Suspect mild AS  The ascending aorta is Moderately dilated.  There is mild aortic root enlargement.  IVC diameter <2.1 cm collapsing >50% with sniff suggests a normal RA pressure  of 3 mmHg.  Technically difficult, suboptimal study.     Physical Examination Review of Systems   BP 94/62 (BP Location: Left arm, Patient Position: Sitting, Cuff Size: Adult Large)   Pulse 76   Resp 16   Ht 1.778 m (5' 10\")   Wt 114.3 kg (252 lb)   BMI 36.16 kg/m    Body mass index is 36.16 kg/m .  Wt Readings from Last 3 Encounters:   11/04/22 114.3 kg (252 lb)   10/19/22 112.4 kg (247 lb 11.2 oz)   09/13/22 117.8 " kg (259 lb 11.2 oz)       General Appearance:   no acute distress   ENT/Mouth: Wearing mask   EYES:  no scleral icterus, normal conjunctivae   Neck: no thyromegaly   Chest/Lungs:   lungs are clear to auscultation, no rales or wheezing, equal chest wall expansion    Cardiovascular:   Regular. Normal first and second heart sounds with no murmurs, rubs, or gallops, no edema bilaterally    Abdomen:  bowel sounds are present   Extremities: no cyanosis or clubbing   Skin: no xanthelasma, warm.    Neurologic: normal  bilateral, no tremors     Psychiatric: alert and oriented x3                                              Medical History  Surgical History Family History Social History   Past Medical History:   Diagnosis Date     Acute renal failure with acute tubular necrosis superimposed on stage 3 chronic kidney disease (H)      Arteriosclerotic cardiovascular disease (ASCVD)      Atrial fibrillation (H)      Bladder cancer (H)      BPH (benign prostatic hypertrophy)      Chronic kidney disease      Complicated UTI (urinary tract infection) 08/25/2019     Congestive heart failure (H)      Coronary artery disease      Diabetes mellitus (H)      Diabetic neuropathy (H)      E coli bacteremia      GERD (gastroesophageal reflux disease)      Gout      Hyperlipidemia      Hypertension      Hyponatremia      Iliac artery aneurysm, 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    Added automatically from request for surgery [id number]     Kidney stone      Osteoarthritis      Personal history of malignant neoplasm of prostate 04/27/2020     Prostate cancer (H)      Sleep apnea     CPAP    Past Surgical History:   Procedure Laterality Date     CARDIAC CATHETERIZATION  03/28/2008    and coronary angios     CYSTECTOMY       CYSTOSCOPY       CYSTOSCOPY N/A 12/23/2015    Procedure: CYSTOSCOPY BLADDER BIOPSIES with Fulgeration and Placement of Otero ;  Surgeon: Gordon Bajwa MD;  Location: [facility];  Service:      IR EXTREMITY ANGIOGRAM LEFT   3/22/2019     IR EXTREMITY ANGIOGRAM LEFT  3/22/2019     IR ILEAL CONDUIT INJECTION  12/23/2016     IR NEPHROSTOMY TUBE PLACEMENT RIGHT  8/18/2016     IR URETEROSTOMY TUBE CHANGE RIGHT  9/21/2016     KIDNEY STONE SURGERY  x4     LAMINECTOMY LUMBAR ONE LEVEL Bilateral 9/13/2022    Procedure: LUMBAR 4 - LUMBAR 5 BILATERAL MEDIAL FACETECTOMY AND DECOMPRESSION;  Surgeon: Everett Holland MD;  Location: [facility] Main OR     PROSTATE SURGERY      Family History   Problem Relation Age of Onset     No Known Problems Mother      Prostate Cancer Father      Deep Vein Thrombosis Father      Cancer Sister      Heart Disease Sister      No Known Problems Daughter      Heart Disease Brother      Heart Disease Sister      No Known Problems Daughter     Social History     Socioeconomic History     Marital status:      Spouse name: Not on file     Number of children: Not on file     Years of education: Not on file     Highest education level: Not on file   Occupational History     Not on file   Tobacco Use     Smoking status: Former     Smokeless tobacco: Never     Tobacco comments:     Quit smoking 30 years ago   Vaping Use     Vaping Use: Never used   Substance and Sexual Activity     Alcohol use: Not Currently     Drug use: Never     Sexual activity: Not Currently   Other Topics Concern     Not on file   Social History Narrative     Not on file     Social Determinants of Health     Financial Resource Strain: Not on file   Food Insecurity: Not on file   Transportation Needs: Not on file   Physical Activity: Not on file   Stress: Not on file   Social Connections: Not on file   Intimate Partner Violence: Not on file   Housing Stability: Not on file          Medications  Allergies   Current Outpatient Medications   Medication Sig Dispense Refill     acetaminophen (TYLENOL) 325 MG tablet Take 1-2 tablets (325-650 mg) by mouth every 4 hours as needed for pain (mild pain) 100 tablet 0     allopurinol (ZYLOPRIM) 100  MG tablet Take 2 tablets (200 mg) by mouth daily for 30 days 60 tablet 0     doxycycline hyclate (VIBRAMYCIN) 50 MG capsule Take 50 mg by mouth daily       ezetimibe (ZETIA) 10 MG tablet Take 10 mg by mouth daily       furosemide (LASIX) 20 MG tablet Take 1 tablet (20 mg) by mouth as needed (weight gain of 2-3 lbs in a day) 60 tablet 1     glimepiride (AMARYL) 1 MG tablet Take 1 mg by mouth daily       metoprolol succinate ER (TOPROL XL) 100 MG 24 hr tablet Take 50 mg by mouth daily       temazepam (RESTORIL) 15 MG capsule Take 15 mg by mouth nightly as needed for sleep       traZODone (DESYREL) 100 MG tablet Take 100-200 mg by mouth At Bedtime       warfarin ANTICOAGULANT (COUMADIN) 3 MG tablet Take 3 mg by mouth daily Take along with 5 MG on M, W, and F for a total of 8 MG.       warfarin ANTICOAGULANT (COUMADIN) 5 MG tablet Take 5-8 mg by mouth See Admin Instructions 8 mg MWF and 5 mg all other days      Allergies   Allergen Reactions     Metoprolol Succinate Er      Other reaction(s): low blood pressure     Pcn [Penicillins] Other (See Comments)     Childhood-doesn't know reactions     Statin Drugs [Hmg-Coa-R Inhibitors] Cramps     Uroxatral [Alfuzosin] Other (See Comments)     hypotension         Lab Results    Chemistry/lipid CBC Cardiac Enzymes/BNP/TSH/INR   Lab Results   Component Value Date    CHOL 168 03/03/2022    HDL 38 (L) 03/03/2022    TRIG 117 03/03/2022    BUN 82.9 (H) 10/20/2022     (L) 10/20/2022    CO2 20 (L) 10/20/2022    Lab Results   Component Value Date    WBC 8.4 10/13/2022    HGB 13.5 10/13/2022    HCT 40.7 10/13/2022    MCV 95 10/13/2022     10/13/2022    Lab Results   Component Value Date    TROPONINI 0.10 04/25/2020     (H) 04/25/2020    INR 2.40 (H) 10/19/2022             This note has been dictated using voice recognition software. Any grammatical, typographical, or context distortions are unintentional and inherent to the software    40 minutes spent on the date of  encounter doing chart review, review of outside records, review of test results, interpretation with above tests, patient visit and documentation.                  Thank you for allowing me to participate in the care of your patient.      Sincerely,     CARLOS Dumas [facility]  cc:   Abdelrahman Mazariegos MD  [address] [address]

## 2022-11-04 NOTE — PROGRESS NOTES
Assessment/Recommendations   Assessment:    1.  Heart failure with preserved ejection fraction, NYHA class III: Compensated.  He states his dyspnea on exertion and lower extremity edema have improved.  He was only prescribed Lasix for 2 weeks.  This ended a few days ago and his weight has increased 1 pound.  We discussed monitoring symptoms, following a low-sodium diet and monitoring daily weights.  - Last heart failure hospitalization: October 2022  - Ischemic evaluation: Lexiscan August 2021 negative for inducible myocardial ischemia or infarction  2.  Hypotension: Blood pressure 94/62.  He states he is having lightheadedness and dizziness.  Encouraged drinking 50 to 60 ounces of fluids per day.  3.  Chronic kidney disease stage IV: He had labs a few weeks ago with a sodium 134, potassium 5.0, BUN 83 and creatinine 3.18 which is improved since the hospital.  4.  GINNY: He wears his CPAP  5.  Paroxysmal atrial fibrillation: Normal sinus rhythm.  He continues warfarin for anticoagulation and metoprolol.  6. Hyperkalemia: most recent potassium 5.0. He states he has been using a salt substitute frequently. Recommend using sparingly     Plan:  1. BMP and magnesium pending    2.  Take Lasix 20 mg as needed for weight gain of 2 to 3 pounds in 1 day  3.  Try taking Metroprolol at night.  If symptoms of dizziness and lightheadedness continue may need to decrease to 25 mg daily  4.  Continue CPAP  5.  Continue monitoring weights and following low-sodium diet    Eduardo Laughlin will follow up with Dr Proctor in 6 weeks and in the heart failure clinic in 3 months.     History of Present Illness/Subjective    Mr. Eduardo Laughlin is a 82 year old male seen at [facility] heart failure clinic today for continued follow-up.  He follows up with heart failure with preserved ejection fraction.  He was hospitalized  October 12 to October 19 with acute heart failure and ROBERT.  He had an elevated BNP and evidence of  "pulmonary edema on chest x-ray.  He was started on IV Lasix which symptoms improved but had worsening kidney function.  His diuretics were on hold.  At discharge he was started on Lasix 20 mg twice a day.  He had an echocardiogram which showed ejection fraction 50 to 55%.  His lisinopril was discontinued due to acute kidney injury.  Patient has a past medical history significant for hypertension, chronic kidney disease stage IV, paroxysmal atrial fibrillation, diabetes, hyperlipidemia, GINNY on CPAP.    Today, he states his shortness of breath and edema have improved.  He does have lightheadedness and dizziness.  He denies orthopnea, PND, palpitations, chest pain, abdominal fullness/bloating and lower extremity edema.      He is monitoring home weights which are stable between 245-247 pounds.  He is following a low sodium diet.      ECHOCARDIOGRAM: 10/13/2022  Interpretation Summary     The left ventricle is normal in size.  The visual ejection fraction is 50-55%.  Septal motion is consistent with conduction abnormality.  No regional wall motion abnormalities noted.  The right ventricular systolic function is normal.  Suspect mild AS  The ascending aorta is Moderately dilated.  There is mild aortic root enlargement.  IVC diameter <2.1 cm collapsing >50% with sniff suggests a normal RA pressure  of 3 mmHg.  Technically difficult, suboptimal study.     Physical Examination Review of Systems   BP 94/62 (BP Location: Left arm, Patient Position: Sitting, Cuff Size: Adult Large)   Pulse 76   Resp 16   Ht 1.778 m (5' 10\")   Wt 114.3 kg (252 lb)   BMI 36.16 kg/m    Body mass index is 36.16 kg/m .  Wt Readings from Last 3 Encounters:   11/04/22 114.3 kg (252 lb)   10/19/22 112.4 kg (247 lb 11.2 oz)   09/13/22 117.8 kg (259 lb 11.2 oz)       General Appearance:   no acute distress   ENT/Mouth: Wearing mask   EYES:  no scleral icterus, normal conjunctivae   Neck: no thyromegaly   Chest/Lungs:   lungs are clear to " auscultation, no rales or wheezing, equal chest wall expansion    Cardiovascular:   Regular. Normal first and second heart sounds with no murmurs, rubs, or gallops, no edema bilaterally    Abdomen:  bowel sounds are present   Extremities: no cyanosis or clubbing   Skin: no xanthelasma, warm.    Neurologic: normal  bilateral, no tremors     Psychiatric: alert and oriented x3                                              Medical History  Surgical History Family History Social History   Past Medical History:   Diagnosis Date     Acute renal failure with acute tubular necrosis superimposed on stage 3 chronic kidney disease (H)      Arteriosclerotic cardiovascular disease (ASCVD)      Atrial fibrillation (H)      Bladder cancer (H)      BPH (benign prostatic hypertrophy)      Chronic kidney disease      Complicated UTI (urinary tract infection) 08/25/2019     Congestive heart failure (H)      Coronary artery disease      Diabetes mellitus (H)      Diabetic neuropathy (H)      E coli bacteremia      GERD (gastroesophageal reflux disease)      Gout      Hyperlipidemia      Hypertension      Hyponatremia      Iliac artery aneurysm, 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    Added automatically from request for surgery [id number]     Kidney stone      Osteoarthritis      Personal history of malignant neoplasm of prostate 04/27/2020     Prostate cancer (H)      Sleep apnea     CPAP    Past Surgical History:   Procedure Laterality Date     CARDIAC CATHETERIZATION  03/28/2008    and coronary angios     CYSTECTOMY       CYSTOSCOPY       CYSTOSCOPY N/A 12/23/2015    Procedure: CYSTOSCOPY BLADDER BIOPSIES with Fulgeration and Placement of Otero ;  Surgeon: Gordon Bajwa MD;  Location: [facility] [place];  Service:      IR EXTREMITY ANGIOGRAM LEFT  3/22/2019     IR EXTREMITY ANGIOGRAM LEFT  3/22/2019     IR ILEAL CONDUIT INJECTION  12/23/2016     IR NEPHROSTOMY TUBE PLACEMENT RIGHT  8/18/2016     IR URETEROSTOMY TUBE CHANGE RIGHT  9/21/2016      KIDNEY STONE SURGERY  x4     LAMINECTOMY LUMBAR ONE LEVEL Bilateral 9/13/2022    Procedure: LUMBAR 4 - LUMBAR 5 BILATERAL MEDIAL FACETECTOMY AND DECOMPRESSION;  Surgeon: Everett Holland MD;  Location: [facility] Main OR     PROSTATE SURGERY      Family History   Problem Relation Age of Onset     No Known Problems Mother      Prostate Cancer Father      Deep Vein Thrombosis Father      Cancer Sister      Heart Disease Sister      No Known Problems Daughter      Heart Disease Brother      Heart Disease Sister      No Known Problems Daughter     Social History     Socioeconomic History     Marital status:      Spouse name: Not on file     Number of children: Not on file     Years of education: Not on file     Highest education level: Not on file   Occupational History     Not on file   Tobacco Use     Smoking status: Former     Smokeless tobacco: Never     Tobacco comments:     Quit smoking 30 years ago   Vaping Use     Vaping Use: Never used   Substance and Sexual Activity     Alcohol use: Not Currently     Drug use: Never     Sexual activity: Not Currently   Other Topics Concern     Not on file   Social History Narrative     Not on file     Social Determinants of Health     Financial Resource Strain: Not on file   Food Insecurity: Not on file   Transportation Needs: Not on file   Physical Activity: Not on file   Stress: Not on file   Social Connections: Not on file   Intimate Partner Violence: Not on file   Housing Stability: Not on file          Medications  Allergies   Current Outpatient Medications   Medication Sig Dispense Refill     acetaminophen (TYLENOL) 325 MG tablet Take 1-2 tablets (325-650 mg) by mouth every 4 hours as needed for pain (mild pain) 100 tablet 0     allopurinol (ZYLOPRIM) 100 MG tablet Take 2 tablets (200 mg) by mouth daily for 30 days 60 tablet 0     doxycycline hyclate (VIBRAMYCIN) 50 MG capsule Take 50 mg by mouth daily       ezetimibe (ZETIA) 10 MG tablet Take 10 mg  by mouth daily       furosemide (LASIX) 20 MG tablet Take 1 tablet (20 mg) by mouth as needed (weight gain of 2-3 lbs in a day) 60 tablet 1     glimepiride (AMARYL) 1 MG tablet Take 1 mg by mouth daily       metoprolol succinate ER (TOPROL XL) 100 MG 24 hr tablet Take 50 mg by mouth daily       temazepam (RESTORIL) 15 MG capsule Take 15 mg by mouth nightly as needed for sleep       traZODone (DESYREL) 100 MG tablet Take 100-200 mg by mouth At Bedtime       warfarin ANTICOAGULANT (COUMADIN) 3 MG tablet Take 3 mg by mouth daily Take along with 5 MG on M, W, and F for a total of 8 MG.       warfarin ANTICOAGULANT (COUMADIN) 5 MG tablet Take 5-8 mg by mouth See Admin Instructions 8 mg MWF and 5 mg all other days      Allergies   Allergen Reactions     Metoprolol Succinate Er      Other reaction(s): low blood pressure     Pcn [Penicillins] Other (See Comments)     Childhood-doesn't know reactions     Statin Drugs [Hmg-Coa-R Inhibitors] Cramps     Uroxatral [Alfuzosin] Other (See Comments)     hypotension         Lab Results    Chemistry/lipid CBC Cardiac Enzymes/BNP/TSH/INR   Lab Results   Component Value Date    CHOL 168 03/03/2022    HDL 38 (L) 03/03/2022    TRIG 117 03/03/2022    BUN 82.9 (H) 10/20/2022     (L) 10/20/2022    CO2 20 (L) 10/20/2022    Lab Results   Component Value Date    WBC 8.4 10/13/2022    HGB 13.5 10/13/2022    HCT 40.7 10/13/2022    MCV 95 10/13/2022     10/13/2022    Lab Results   Component Value Date    TROPONINI 0.10 04/25/2020     (H) 04/25/2020    INR 2.40 (H) 10/19/2022             This note has been dictated using voice recognition software. Any grammatical, typographical, or context distortions are unintentional and inherent to the software    40 minutes spent on the date of encounter doing chart review, review of outside records, review of test results, interpretation with above tests, patient visit and documentation.

## 2022-11-05 ENCOUNTER — HOSPITAL ENCOUNTER (INPATIENT)
Facility: CLINIC | Age: 82
LOS: 2 days | Discharge: HOME | DRG: 291 | End: 2022-11-08
Attending: EMERGENCY MEDICINE | Admitting: INTERNAL MEDICINE
Payer: COMMERCIAL

## 2022-11-05 ENCOUNTER — APPOINTMENT (OUTPATIENT)
Dept: GENERAL RADIOLOGY | Facility: CLINIC | Age: 82
DRG: 291 | End: 2022-11-05
Attending: EMERGENCY MEDICINE
Payer: COMMERCIAL

## 2022-11-05 DIAGNOSIS — I50.9 ACUTE CONGESTIVE HEART FAILURE, UNSPECIFIED HEART FAILURE TYPE (H): ICD-10-CM

## 2022-11-05 DIAGNOSIS — N28.9 RENAL INSUFFICIENCY: ICD-10-CM

## 2022-11-05 DIAGNOSIS — Z79.01 CHRONIC ANTICOAGULATION: ICD-10-CM

## 2022-11-05 DIAGNOSIS — I21.4 NSTEMI (NON-ST ELEVATED MYOCARDIAL INFARCTION) (H): ICD-10-CM

## 2022-11-05 DIAGNOSIS — I10 PRIMARY HYPERTENSION: Primary | Chronic | ICD-10-CM

## 2022-11-05 DIAGNOSIS — I50.9 ACUTE ON CHRONIC CONGESTIVE HEART FAILURE, UNSPECIFIED HEART FAILURE TYPE (H): ICD-10-CM

## 2022-11-05 LAB
ALBUMIN SERPL BCG-MCNC: 3.2 G/DL (ref 3.5–5.2)
ALP SERPL-CCNC: 68 U/L (ref 40–129)
ALT SERPL W P-5'-P-CCNC: 32 U/L (ref 10–50)
ANION GAP SERPL CALCULATED.3IONS-SCNC: 14 MMOL/L (ref 7–15)
AST SERPL W P-5'-P-CCNC: 42 U/L (ref 10–50)
BASE EXCESS BLDV CALC-SCNC: -10.3 MMOL/L (ref -7.7–1.9)
BASOPHILS # BLD AUTO: 0 10E3/UL (ref 0–0.2)
BASOPHILS NFR BLD AUTO: 0 %
BILIRUB SERPL-MCNC: 0.3 MG/DL
BUN SERPL-MCNC: 47.1 MG/DL (ref 8–23)
CALCIUM SERPL-MCNC: 8.6 MG/DL (ref 8.8–10.2)
CHLORIDE SERPL-SCNC: 106 MMOL/L (ref 98–107)
CREAT SERPL-MCNC: 2.35 MG/DL (ref 0.67–1.17)
DEPRECATED HCO3 PLAS-SCNC: 17 MMOL/L (ref 22–29)
EOSINOPHIL # BLD AUTO: 0.1 10E3/UL (ref 0–0.7)
EOSINOPHIL NFR BLD AUTO: 1 %
ERYTHROCYTE [DISTWIDTH] IN BLOOD BY AUTOMATED COUNT: 14.8 % (ref 10–15)
GFR SERPL CREATININE-BSD FRML MDRD: 27 ML/MIN/1.73M2
GLUCOSE SERPL-MCNC: 232 MG/DL (ref 70–99)
HCO3 BLDV-SCNC: 17 MMOL/L (ref 21–28)
HCT VFR BLD AUTO: 37.3 % (ref 40–53)
HGB BLD-MCNC: 12.3 G/DL (ref 13.3–17.7)
HOLD SPECIMEN: NORMAL
IMM GRANULOCYTES # BLD: 0.1 10E3/UL
IMM GRANULOCYTES NFR BLD: 1 %
INR PPP: 3.74 (ref 0.85–1.15)
LACTATE SERPL-SCNC: 4.1 MMOL/L (ref 0.7–2)
LYMPHOCYTES # BLD AUTO: 2.4 10E3/UL (ref 0.8–5.3)
LYMPHOCYTES NFR BLD AUTO: 26 %
MCH RBC QN AUTO: 31.5 PG (ref 26.5–33)
MCHC RBC AUTO-ENTMCNC: 33 G/DL (ref 31.5–36.5)
MCV RBC AUTO: 95 FL (ref 78–100)
MONOCYTES # BLD AUTO: 0.8 10E3/UL (ref 0–1.3)
MONOCYTES NFR BLD AUTO: 9 %
NEUTROPHILS # BLD AUTO: 5.8 10E3/UL (ref 1.6–8.3)
NEUTROPHILS NFR BLD AUTO: 63 %
NRBC # BLD AUTO: 0 10E3/UL
NRBC BLD AUTO-RTO: 0 /100
NT-PROBNP SERPL-MCNC: 2226 PG/ML (ref 0–1800)
O2/TOTAL GAS SETTING VFR VENT: 60 %
PCO2 BLDV: 39 MM HG (ref 40–50)
PH BLDV: 7.23 [PH] (ref 7.32–7.43)
PLATELET # BLD AUTO: 154 10E3/UL (ref 150–450)
PO2 BLDV: 46 MM HG (ref 25–47)
POTASSIUM SERPL-SCNC: 4.4 MMOL/L (ref 3.4–5.3)
PROT SERPL-MCNC: 7.3 G/DL (ref 6.4–8.3)
RBC # BLD AUTO: 3.91 10E6/UL (ref 4.4–5.9)
SODIUM SERPL-SCNC: 137 MMOL/L (ref 136–145)
TROPONIN T SERPL HS-MCNC: 47 NG/L
WBC # BLD AUTO: 9.2 10E3/UL (ref 4–11)

## 2022-11-05 PROCEDURE — 80053 COMPREHEN METABOLIC PANEL: CPT | Performed by: EMERGENCY MEDICINE

## 2022-11-05 PROCEDURE — 93010 ELECTROCARDIOGRAM REPORT: CPT | Performed by: EMERGENCY MEDICINE

## 2022-11-05 PROCEDURE — 999N000157 HC STATISTIC RCP TIME EA 10 MIN

## 2022-11-05 PROCEDURE — 99285 EMERGENCY DEPT VISIT HI MDM: CPT | Mod: 25 | Performed by: EMERGENCY MEDICINE

## 2022-11-05 PROCEDURE — 94660 CPAP INITIATION&MGMT: CPT

## 2022-11-05 PROCEDURE — 71045 X-RAY EXAM CHEST 1 VIEW: CPT

## 2022-11-05 PROCEDURE — 82040 ASSAY OF SERUM ALBUMIN: CPT | Performed by: EMERGENCY MEDICINE

## 2022-11-05 PROCEDURE — C9113 INJ PANTOPRAZOLE SODIUM, VIA: HCPCS | Performed by: EMERGENCY MEDICINE

## 2022-11-05 PROCEDURE — 999N000215 HC STATISTIC HFNC ADULT NON-CPAP

## 2022-11-05 PROCEDURE — 85025 COMPLETE CBC W/AUTO DIFF WBC: CPT | Performed by: EMERGENCY MEDICINE

## 2022-11-05 PROCEDURE — 250N000011 HC RX IP 250 OP 636: Performed by: EMERGENCY MEDICINE

## 2022-11-05 PROCEDURE — 36415 COLL VENOUS BLD VENIPUNCTURE: CPT | Performed by: EMERGENCY MEDICINE

## 2022-11-05 PROCEDURE — 84484 ASSAY OF TROPONIN QUANT: CPT | Performed by: EMERGENCY MEDICINE

## 2022-11-05 PROCEDURE — 82803 BLOOD GASES ANY COMBINATION: CPT | Performed by: EMERGENCY MEDICINE

## 2022-11-05 PROCEDURE — 83605 ASSAY OF LACTIC ACID: CPT | Performed by: EMERGENCY MEDICINE

## 2022-11-05 PROCEDURE — 999N000185 HC STATISTIC TRANSPORT TIME EA 15 MIN

## 2022-11-05 PROCEDURE — 999N000123 HC STATISTIC OXYGEN O2DAILY TECH TIME

## 2022-11-05 PROCEDURE — 83880 ASSAY OF NATRIURETIC PEPTIDE: CPT | Performed by: EMERGENCY MEDICINE

## 2022-11-05 PROCEDURE — 99285 EMERGENCY DEPT VISIT HI MDM: CPT | Mod: 25

## 2022-11-05 PROCEDURE — 85610 PROTHROMBIN TIME: CPT | Performed by: EMERGENCY MEDICINE

## 2022-11-05 PROCEDURE — 96375 TX/PRO/DX INJ NEW DRUG ADDON: CPT

## 2022-11-05 PROCEDURE — C9803 HOPD COVID-19 SPEC COLLECT: HCPCS

## 2022-11-05 PROCEDURE — 93005 ELECTROCARDIOGRAM TRACING: CPT

## 2022-11-05 PROCEDURE — 96374 THER/PROPH/DIAG INJ IV PUSH: CPT

## 2022-11-05 RX ORDER — NITROGLYCERIN 0.4 MG/1
0.4 TABLET SUBLINGUAL EVERY 5 MIN PRN
Status: DISCONTINUED | OUTPATIENT
Start: 2022-11-05 | End: 2022-11-06

## 2022-11-05 RX ORDER — FUROSEMIDE 10 MG/ML
60 INJECTION INTRAMUSCULAR; INTRAVENOUS ONCE
Status: COMPLETED | OUTPATIENT
Start: 2022-11-05 | End: 2022-11-05

## 2022-11-05 RX ORDER — ONDANSETRON 2 MG/ML
4 INJECTION INTRAMUSCULAR; INTRAVENOUS EVERY 30 MIN PRN
Status: DISCONTINUED | OUTPATIENT
Start: 2022-11-05 | End: 2022-11-06

## 2022-11-05 RX ADMIN — PANTOPRAZOLE SODIUM 80 MG: 40 INJECTION, POWDER, FOR SOLUTION INTRAVENOUS at 22:11

## 2022-11-05 RX ADMIN — ONDANSETRON 4 MG: 2 INJECTION INTRAMUSCULAR; INTRAVENOUS at 22:09

## 2022-11-05 RX ADMIN — FUROSEMIDE 60 MG: 10 INJECTION, SOLUTION INTRAMUSCULAR; INTRAVENOUS at 21:55

## 2022-11-05 ASSESSMENT — ENCOUNTER SYMPTOMS
CHILLS: 0
SORE THROAT: 0
COUGH: 1
BACK PAIN: 0
LIGHT-HEADEDNESS: 0
CHEST TIGHTNESS: 1
HEADACHES: 0
ABDOMINAL PAIN: 0
RHINORRHEA: 0
FATIGUE: 1
SHORTNESS OF BREATH: 1
VOMITING: 0
DIARRHEA: 0
APPETITE CHANGE: 0
NAUSEA: 0
FEVER: 0

## 2022-11-05 ASSESSMENT — ACTIVITIES OF DAILY LIVING (ADL): ADLS_ACUITY_SCORE: 35

## 2022-11-06 ENCOUNTER — APPOINTMENT (OUTPATIENT)
Dept: CT IMAGING | Facility: CLINIC | Age: 82
DRG: 291 | End: 2022-11-06
Attending: EMERGENCY MEDICINE
Payer: COMMERCIAL

## 2022-11-06 PROBLEM — R55 SYNCOPE: Status: ACTIVE | Noted: 2022-11-06

## 2022-11-06 PROBLEM — I50.9 ACUTE ON CHRONIC CONGESTIVE HEART FAILURE, UNSPECIFIED HEART FAILURE TYPE (H): Status: RESOLVED | Noted: 2022-10-12 | Resolved: 2022-11-06

## 2022-11-06 PROBLEM — N28.9 ACUTE ON CHRONIC RENAL INSUFFICIENCY: Status: RESOLVED | Noted: 2020-04-24 | Resolved: 2022-11-06

## 2022-11-06 PROBLEM — I21.4 NSTEMI (NON-ST ELEVATED MYOCARDIAL INFARCTION) (H): Status: ACTIVE | Noted: 2022-11-06

## 2022-11-06 PROBLEM — R06.00 DYSPNEA, UNSPECIFIED TYPE: Status: RESOLVED | Noted: 2022-10-12 | Resolved: 2022-11-06

## 2022-11-06 PROBLEM — N28.9 RENAL INSUFFICIENCY: Status: ACTIVE | Noted: 2022-11-06

## 2022-11-06 PROBLEM — N18.9 ACUTE ON CHRONIC RENAL INSUFFICIENCY: Status: RESOLVED | Noted: 2020-04-24 | Resolved: 2022-11-06

## 2022-11-06 PROBLEM — N28.9 RENAL INSUFFICIENCY: Status: RESOLVED | Noted: 2022-11-06 | Resolved: 2022-11-06

## 2022-11-06 PROBLEM — R42 LIGHTHEADEDNESS: Status: RESOLVED | Noted: 2022-10-12 | Resolved: 2022-11-06

## 2022-11-06 LAB
BASE EXCESS BLDV CALC-SCNC: -4.1 MMOL/L (ref -7.7–1.9)
GLUCOSE BLDC GLUCOMTR-MCNC: 165 MG/DL (ref 70–99)
GLUCOSE BLDC GLUCOMTR-MCNC: 189 MG/DL (ref 70–99)
HCO3 BLDV-SCNC: 22 MMOL/L (ref 21–28)
INR PPP: 3.1 (ref 0.85–1.15)
LACTATE SERPL-SCNC: 1 MMOL/L (ref 0.7–2)
O2/TOTAL GAS SETTING VFR VENT: 35 %
PCO2 BLDV: 41 MM HG (ref 40–50)
PH BLDV: 7.33 [PH] (ref 7.32–7.43)
PO2 BLDV: 53 MM HG (ref 25–47)
SARS-COV-2 RNA RESP QL NAA+PROBE: NEGATIVE
TROPONIN T SERPL HS-MCNC: 116 NG/L
TROPONIN T SERPL HS-MCNC: 138 NG/L
TROPONIN T SERPL HS-MCNC: 150 NG/L

## 2022-11-06 PROCEDURE — 250N000013 HC RX MED GY IP 250 OP 250 PS 637: Performed by: PHYSICIAN ASSISTANT

## 2022-11-06 PROCEDURE — 87635 SARS-COV-2 COVID-19 AMP PRB: CPT | Performed by: EMERGENCY MEDICINE

## 2022-11-06 PROCEDURE — 250N000012 HC RX MED GY IP 250 OP 636 PS 637: Performed by: PHYSICIAN ASSISTANT

## 2022-11-06 PROCEDURE — 120N000001 HC R&B MED SURG/OB

## 2022-11-06 PROCEDURE — 84484 ASSAY OF TROPONIN QUANT: CPT | Performed by: EMERGENCY MEDICINE

## 2022-11-06 PROCEDURE — 99223 1ST HOSP IP/OBS HIGH 75: CPT | Mod: AI | Performed by: PHYSICIAN ASSISTANT

## 2022-11-06 PROCEDURE — 83605 ASSAY OF LACTIC ACID: CPT | Performed by: EMERGENCY MEDICINE

## 2022-11-06 PROCEDURE — 85610 PROTHROMBIN TIME: CPT | Performed by: EMERGENCY MEDICINE

## 2022-11-06 PROCEDURE — 36415 COLL VENOUS BLD VENIPUNCTURE: CPT | Performed by: EMERGENCY MEDICINE

## 2022-11-06 PROCEDURE — 999N000123 HC STATISTIC OXYGEN O2DAILY TECH TIME

## 2022-11-06 PROCEDURE — 250N000013 HC RX MED GY IP 250 OP 250 PS 637: Performed by: INTERNAL MEDICINE

## 2022-11-06 PROCEDURE — 82803 BLOOD GASES ANY COMBINATION: CPT | Performed by: EMERGENCY MEDICINE

## 2022-11-06 PROCEDURE — 71250 CT THORAX DX C-: CPT

## 2022-11-06 RX ORDER — ONDANSETRON 2 MG/ML
4 INJECTION INTRAMUSCULAR; INTRAVENOUS EVERY 6 HOURS PRN
Status: DISCONTINUED | OUTPATIENT
Start: 2022-11-06 | End: 2022-11-08 | Stop reason: HOSPADM

## 2022-11-06 RX ORDER — TRAZODONE HYDROCHLORIDE 100 MG/1
200 TABLET ORAL AT BEDTIME
Status: DISCONTINUED | OUTPATIENT
Start: 2022-11-07 | End: 2022-11-06

## 2022-11-06 RX ORDER — ACETAMINOPHEN 325 MG/1
650 TABLET ORAL EVERY 4 HOURS PRN
Status: DISCONTINUED | OUTPATIENT
Start: 2022-11-06 | End: 2022-11-08 | Stop reason: HOSPADM

## 2022-11-06 RX ORDER — AMOXICILLIN 250 MG
1-2 CAPSULE ORAL 2 TIMES DAILY PRN
Status: DISCONTINUED | OUTPATIENT
Start: 2022-11-06 | End: 2022-11-08 | Stop reason: HOSPADM

## 2022-11-06 RX ORDER — FUROSEMIDE 20 MG
20 TABLET ORAL
Status: DISCONTINUED | OUTPATIENT
Start: 2022-11-07 | End: 2022-11-08 | Stop reason: HOSPADM

## 2022-11-06 RX ORDER — TEMAZEPAM 7.5 MG/1
7.5 CAPSULE ORAL
Status: DISCONTINUED | OUTPATIENT
Start: 2022-11-06 | End: 2022-11-08 | Stop reason: HOSPADM

## 2022-11-06 RX ORDER — LIDOCAINE 40 MG/G
CREAM TOPICAL
Status: DISCONTINUED | OUTPATIENT
Start: 2022-11-06 | End: 2022-11-08 | Stop reason: HOSPADM

## 2022-11-06 RX ORDER — EZETIMIBE 10 MG/1
10 TABLET ORAL DAILY
Status: DISCONTINUED | OUTPATIENT
Start: 2022-11-07 | End: 2022-11-08 | Stop reason: HOSPADM

## 2022-11-06 RX ORDER — ALLOPURINOL 100 MG/1
200 TABLET ORAL DAILY
Status: DISCONTINUED | OUTPATIENT
Start: 2022-11-07 | End: 2022-11-08 | Stop reason: HOSPADM

## 2022-11-06 RX ORDER — NICOTINE POLACRILEX 4 MG
15-30 LOZENGE BUCCAL
Status: DISCONTINUED | OUTPATIENT
Start: 2022-11-06 | End: 2022-11-08 | Stop reason: HOSPADM

## 2022-11-06 RX ORDER — WARFARIN SODIUM 2.5 MG/1
2.5 TABLET ORAL
Status: COMPLETED | OUTPATIENT
Start: 2022-11-06 | End: 2022-11-06

## 2022-11-06 RX ORDER — DEXTROSE MONOHYDRATE 25 G/50ML
25-50 INJECTION, SOLUTION INTRAVENOUS
Status: DISCONTINUED | OUTPATIENT
Start: 2022-11-06 | End: 2022-11-08 | Stop reason: HOSPADM

## 2022-11-06 RX ORDER — TEMAZEPAM 7.5 MG/1
15 CAPSULE ORAL
Status: DISCONTINUED | OUTPATIENT
Start: 2022-11-06 | End: 2022-11-06

## 2022-11-06 RX ORDER — TRAZODONE HYDROCHLORIDE 100 MG/1
200 TABLET ORAL AT BEDTIME
Status: DISCONTINUED | OUTPATIENT
Start: 2022-11-06 | End: 2022-11-08 | Stop reason: HOSPADM

## 2022-11-06 RX ORDER — ACETAMINOPHEN 500 MG
1000 TABLET ORAL EVERY 6 HOURS PRN
Status: ON HOLD | COMMUNITY
End: 2022-12-07

## 2022-11-06 RX ORDER — ONDANSETRON 4 MG/1
4 TABLET, ORALLY DISINTEGRATING ORAL EVERY 6 HOURS PRN
Status: DISCONTINUED | OUTPATIENT
Start: 2022-11-06 | End: 2022-11-08 | Stop reason: HOSPADM

## 2022-11-06 RX ORDER — GLIMEPIRIDE 1 MG/1
1 TABLET ORAL DAILY
Status: DISCONTINUED | OUTPATIENT
Start: 2022-11-07 | End: 2022-11-08 | Stop reason: HOSPADM

## 2022-11-06 RX ORDER — TRAZODONE HYDROCHLORIDE 100 MG/1
100 TABLET ORAL AT BEDTIME
Status: DISCONTINUED | OUTPATIENT
Start: 2022-11-06 | End: 2022-11-06

## 2022-11-06 RX ADMIN — ACETAMINOPHEN 650 MG: 325 TABLET, FILM COATED ORAL at 21:12

## 2022-11-06 RX ADMIN — INSULIN ASPART 1 UNITS: 100 INJECTION, SOLUTION INTRAVENOUS; SUBCUTANEOUS at 18:25

## 2022-11-06 RX ADMIN — TEMAZEPAM 7.5 MG: 7.5 CAPSULE ORAL at 22:33

## 2022-11-06 RX ADMIN — WARFARIN SODIUM 2.5 MG: 2.5 TABLET ORAL at 18:31

## 2022-11-06 RX ADMIN — TRAZODONE HYDROCHLORIDE 200 MG: 100 TABLET ORAL at 22:53

## 2022-11-06 ASSESSMENT — ACTIVITIES OF DAILY LIVING (ADL)
ADLS_ACUITY_SCORE: 35
DOING_ERRANDS_INDEPENDENTLY_DIFFICULTY: NO
ADLS_ACUITY_SCORE: 35
TRANSFERRING: 0-->INDEPENDENT
FALL_HISTORY_WITHIN_LAST_SIX_MONTHS: NO
WALKING_OR_CLIMBING_STAIRS_DIFFICULTY: YES
DIFFICULTY_EATING/SWALLOWING: NO
ADLS_ACUITY_SCORE: 35
CHANGE_IN_FUNCTIONAL_STATUS_SINCE_ONSET_OF_CURRENT_ILLNESS/INJURY: NO
ADLS_ACUITY_SCORE: 35
WEAR_GLASSES_OR_BLIND: YES
ADLS_ACUITY_SCORE: 35
TRANSFERRING: 0-->INDEPENDENT
ADLS_ACUITY_SCORE: 24
WALKING_OR_CLIMBING_STAIRS: AMBULATION DIFFICULTY, REQUIRES EQUIPMENT
VISION_MANAGEMENT: GLASSES FOR READING
ADLS_ACUITY_SCORE: 24
TOILETING_ISSUES: NO
ADLS_ACUITY_SCORE: 35
DRESSING/BATHING_DIFFICULTY: NO
EQUIPMENT_CURRENTLY_USED_AT_HOME: WALKER, ROLLING
CONCENTRATING,_REMEMBERING_OR_MAKING_DECISIONS_DIFFICULTY: NO
ADLS_ACUITY_SCORE: 24
ADLS_ACUITY_SCORE: 35

## 2022-11-06 NOTE — PROGRESS NOTES
"[facility] ADMISSION NOTE    Patient admitted to room 2215 at approximately 1645 via wheel chair from emergency room. Patient was accompanied by son and daughter.     Verbal SBAR report received from Melvin HANNA prior to patient arrival.     Patient ambulated to bed with stand-by assist. Patient alert and oriented X 3. The patient is not having any pain.  . Admission vital signs: Blood pressure 134/73, pulse 80, temperature 97.9  F (36.6  C), temperature source Oral, resp. rate 20, height 1.778 m (5' 10\"), weight 111.3 kg (245 lb 6 oz), SpO2 94 %. Patient was oriented to plan of care, call light, bed controls, tv, telephone, bathroom and visiting hours.     Risk Assessment    The following safety risks were identified during admission: fall. Yellow risk band applied: YES.     Skin Initial Assessment    This writer admitted this patient and completed a full skin assessment and Guillermo score in the Adult PCS flowsheet. Appropriate interventions initiated as needed.     Secondary skin check completed by Fariba HANNA.         Education    Patient has a [place] to Observation order: No  Observation education completed and documented: N/A      Yojana Simmons RN    "
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
## 2022-11-06 NOTE — ED PROVIDER NOTES
History     Chief Complaint   Patient presents with     Shortness of Breath     HPI  Eduardo Laughlin is a 82 year old male with a history of bladder cancer status post resection, hypertension, chronic kidney disease, DVT, diabetes, and congestive heart failure with preserved ejection fraction presenting for evaluation of abrupt worsening dyspnea.  Patient reports he has been off of his Lasix for about 2 days due to running out of prescription.  Patient has noticed increasing weight by about 1 pound per day.  This evening he accidentally burned some food in the house and started Coughing.  He has had worsening difficulty breathing ever since.  Unable to catch his breath and eventually called 911 due to severe dyspnea.  Denies chest pains.  Denies diaphoresis or nausea.  Was feeling relatively well last night and this morning when he woke up.  EMS found patient in severe respiratory distress with oxygen saturations in the 50s.  Placed on high flow oxygen with improvement up to the 80s.  Patient had a coughing spell and episode of severe bradycardia for EMS with heart rates down to the 20s.  Upon arrival patient feeling better with oxygen saturations finally up into the 90s although still with increased work of breathing.  No reported fevers, chills, rhinorrhea, or nasal congestion.  Patient is not certain whether has been having increasing swelling in his legs.  Denies abdominal pain, nausea, or vomiting.  Did cough up some dark pink sputum for EMS, patient reports is the first time this is happened.    ==================================================================    CHART REVIEW:      Echo 10/12/22:    Interpretation Summary     The left ventricle is normal in size.  The visual ejection fraction is 50-55%.  Septal motion is consistent with conduction abnormality.  No regional wall motion abnormalities noted.  The right ventricular systolic function is normal.  Suspect mild AS  The ascending aorta is  Moderately dilated.  There is mild aortic root enlargement.  IVC diameter <2.1 cm collapsing >50% with sniff suggests a normal RA pressure  of 3 mmHg.  Technically difficult, suboptimal study.    END CHART REVIEW  ==================================================================      Allergies:  Allergies   Allergen Reactions     Metoprolol Succinate Er      Other reaction(s): low blood pressure     Pcn [Penicillins] Other (See Comments)     Childhood-doesn't know reactions     Statin Drugs [Hmg-Coa-R Inhibitors] Cramps     Uroxatral [Alfuzosin] Other (See Comments)     hypotension       Problem List:    Patient Active Problem List    Diagnosis Date Noted     NSTEMI (non-ST elevated myocardial infarction) (H) 11/06/2022     Priority: Medium     Chronic heart failure with preserved ejection fraction (H) 11/04/2022     Priority: Medium     GINNY (obstructive sleep apnea) 11/04/2022     Priority: Medium     Lightheadedness 10/12/2022     Priority: Medium     Dyspnea, unspecified type 10/12/2022     Priority: Medium     Acute on chronic congestive heart failure, unspecified heart failure type (H) 10/12/2022     Priority: Medium     Status post spinal surgery 09/13/2022     Priority: Medium     Morbid obesity (H) 10/04/2021     Priority: Medium     Gastro-esophageal reflux disease without esophagitis 04/27/2020     Priority: Medium     Idiopathic chronic gout without tophus 04/27/2020     Priority: Medium     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 04/27/2020     Priority: Medium     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) 04/27/2020     Priority: Medium     Hydronephrosis of right kidney      Priority: Medium     Coronary artery disease      Priority: Medium     Paroxysmal atrial fibrillation (H)      Priority: Medium     LBBB (left bundle branch block)      Priority: Medium     Deep vein thrombosis (DVT) of proximal vein of both lower extremities, unspecified chronicity  (H)      Priority: Medium     Acute on chronic renal insufficiency 04/24/2020     Priority: Medium     Chronic deep vein thrombosis (DVT) of femoral vein of right lower extremity (H) 12/04/2019     Priority: Medium     S/P ileal conduit (H) 08/18/2016     Priority: Medium     HTN (hypertension) 08/18/2016     Priority: Medium     CKD (chronic kidney disease) stage 3, GFR 30-59 ml/min (H) 08/18/2016     Priority: Medium     Bladder cancer (H) 01/27/2016     Priority: Medium        Past Medical History:    Past Medical History:   Diagnosis Date     Acute renal failure with acute tubular necrosis superimposed on stage 3 chronic kidney disease (H)      Arteriosclerotic cardiovascular disease (ASCVD)      Atrial fibrillation (H)      Bladder cancer (H)      BPH (benign prostatic hypertrophy)      Chronic kidney disease      Complicated UTI (urinary tract infection) 08/25/2019     Congestive heart failure (H)      Coronary artery disease      Diabetes mellitus (H)      Diabetic neuropathy (H)      E coli bacteremia      GERD (gastroesophageal reflux disease)      Gout      Hyperlipidemia      Hypertension      Hyponatremia      Iliac artery aneurysm, 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     Kidney stone      Osteoarthritis      Personal history of malignant neoplasm of prostate 04/27/2020     Prostate cancer (H)      Sleep apnea        Past Surgical History:    Past Surgical History:   Procedure Laterality Date     CARDIAC CATHETERIZATION  03/28/2008    and coronary angios     CYSTECTOMY       CYSTOSCOPY       CYSTOSCOPY N/A 12/23/2015    Procedure: CYSTOSCOPY BLADDER BIOPSIES with Fulgeration and Placement of Otero ;  Surgeon: Gordon Bajwa MD;  [place] [facility] [place]:      IR EXTREMITY ANGIOGRAM LEFT  3/22/2019     IR EXTREMITY ANGIOGRAM LEFT  3/22/2019     IR ILEAL CONDUIT INJECTION  12/23/2016     IR NEPHROSTOMY TUBE PLACEMENT RIGHT  8/18/2016     IR URETEROSTOMY TUBE CHANGE RIGHT  9/21/2016     KIDNEY STONE  SURGERY  x4     LAMINECTOMY LUMBAR ONE LEVEL Bilateral 9/13/2022    Procedure: LUMBAR 4 - LUMBAR 5 BILATERAL MEDIAL FACETECTOMY AND DECOMPRESSION;  Surgeon: Everett Holland MD;  Location: [facility] Main OR     PROSTATE SURGERY         Family History:    Family History   Problem Relation Age of Onset     No Known Problems Mother      Prostate Cancer Father      Deep Vein Thrombosis Father      Cancer Sister      Heart Disease Sister      No Known Problems Daughter      Heart Disease Brother      Heart Disease Sister      No Known Problems Daughter        Social History:  Marital Status:   [2]  Social History     Tobacco Use     Smoking status: Former     Smokeless tobacco: Never     Tobacco comments:     Quit smoking 30 years ago   Vaping Use     Vaping Use: Never used   Substance Use Topics     Alcohol use: Not Currently     Drug use: Never        Medications:    acetaminophen (TYLENOL) 325 MG tablet  allopurinol (ZYLOPRIM) 100 MG tablet  doxycycline hyclate (VIBRAMYCIN) 50 MG capsule  ezetimibe (ZETIA) 10 MG tablet  furosemide (LASIX) 20 MG tablet  glimepiride (AMARYL) 1 MG tablet  metoprolol succinate ER (TOPROL XL) 100 MG 24 hr tablet  temazepam (RESTORIL) 15 MG capsule  traZODone (DESYREL) 100 MG tablet  warfarin ANTICOAGULANT (COUMADIN) 3 MG tablet  warfarin ANTICOAGULANT (COUMADIN) 5 MG tablet          Review of Systems   Constitutional: Positive for fatigue. Negative for appetite change, chills and fever.   HENT: Negative for congestion, rhinorrhea and sore throat.    Respiratory: Positive for cough (this afternoon), chest tightness and shortness of breath.    Cardiovascular: Positive for leg swelling (uncertain if worsening). Negative for chest pain.   Gastrointestinal: Negative for abdominal pain, diarrhea, nausea and vomiting.   Genitourinary: Negative for decreased urine volume.   Musculoskeletal: Negative for back pain.   Skin: Negative for rash.   Neurological: Negative for  light-headedness and headaches.   All other systems reviewed and are negative.      Physical Exam   BP: (!) 89/64  Pulse: 74  Resp: 9  SpO2: 100 %      Physical Exam  Vitals and nursing note reviewed.   Constitutional:       General: He is in acute distress.      Appearance: He is ill-appearing. He is not diaphoretic.      Comments: Awake and alert but with obvious respiratory distress upon arrival on high flow oxygen by EMS.  Patient able to speak in 3-5 word sentences.   HENT:      Head: Atraumatic.      Mouth/Throat:      Comments: Mildly dry oral mucous membranes  Eyes:      Pupils: Pupils are equal, round, and reactive to light.   Cardiovascular:      Rate and Rhythm: Normal rate. Rhythm irregular.   Pulmonary:      Effort: Accessory muscle usage and respiratory distress present.      Breath sounds: Decreased breath sounds (Diminished throughout), wheezing (Trace scattered wheeze) and rales (Bilateral bases) present.   Chest:      Chest wall: No tenderness.   Abdominal:      Palpations: Abdomen is soft.      Tenderness: There is no abdominal tenderness. There is no guarding.   Musculoskeletal:      Cervical back: Normal range of motion.      Right lower leg: No tenderness. Edema present.      Left lower leg: No tenderness. Edema present.      Comments: 1-2+ pitting edema bilaterally   Skin:     General: Skin is warm and dry.      Capillary Refill: Capillary refill takes less than 2 seconds.   Neurological:      Mental Status: He is oriented to person, place, and time.   Psychiatric:         Mood and Affect: Mood normal.         ED Course                 Procedures              EKG Interpretation:      Interpreted by Freddie Lawrence MD  Time reviewed: 955p  Symptoms at time of EKG: Dyspnea  Rhythm: Sinus rhythm with first-degree AV block and bigeminal PVCs  Rate: Normal  Axis: Left Axis Deviation  Ectopy: Bigeminal PVCs  Conduction: left bundle branch block (complete)  ST Segments/ T Waves: No acute  changes  Q Waves: Lead III, aVF, V1  Comparison to prior: Similar to previous EKG    Clinical Impression: Sinus rhythm with left bundle branch block and bigeminal PVCs, general QRS morphology similar to previous however bigeminal PVCs are new.                   Results for orders placed or performed during the hospital encounter of 11/05/22 (from the past 24 hour(s))   [place] Draw    Narrative    The following orders were created for panel order [place] Draw.  Procedure                               Abnormality         Status                     ---------                               -----------         ------                     Extra Blue Top Tube[[id number]]                              Final result               Extra Red Top Tube[[id number]]                               Final result               Extra Green Top (Lithium...[[id number]]                      Final result               Extra Purple Top Tube[[id number]]                            Final result                 Please view results for these tests on the individual orders.   Lactic acid whole blood   Result Value Ref Range    Lactic Acid 4.1 (HH) 0.7 - 2.0 mmol/L   Blood gas venous   Result Value Ref Range    pH Venous 7.23 (L) 7.32 - 7.43    pCO2 Venous 39 (L) 40 - 50 mm Hg    pO2 Venous 46 25 - 47 mm Hg    Bicarbonate Venous 17 (L) 21 - 28 mmol/L    Base Excess/Deficit (+/-) -10.3 (L) -7.7 - 1.9 mmol/L    FIO2 60    Extra Blue Top Tube   Result Value Ref Range    Hold Specimen JIC    Extra Red Top Tube   Result Value Ref Range    Hold Specimen JIC    Extra Green Top (Lithium Heparin) Tube   Result Value Ref Range    Hold Specimen JIC    Extra Purple Top Tube   Result Value Ref Range    Hold Specimen JIC    CBC with platelets differential    Narrative    The following orders were created for panel order CBC with platelets differential.  Procedure                               Abnormality         Status                     ---------                                -----------         ------                     CBC with platelets and d...[[id number]]  Abnormal            Final result                 Please view results for these tests on the individual orders.   Comprehensive metabolic panel   Result Value Ref Range    Sodium 137 136 - 145 mmol/L    Potassium 4.4 3.4 - 5.3 mmol/L    Chloride 106 98 - 107 mmol/L    Carbon Dioxide (CO2) 17 (L) 22 - 29 mmol/L    Anion Gap 14 7 - 15 mmol/L    Urea Nitrogen 47.1 (H) 8.0 - 23.0 mg/dL    Creatinine 2.35 (H) 0.67 - 1.17 mg/dL    Calcium 8.6 (L) 8.8 - 10.2 mg/dL    Glucose 232 (H) 70 - 99 mg/dL    Alkaline Phosphatase 68 40 - 129 U/L    AST 42 10 - 50 U/L    ALT 32 10 - 50 U/L    Protein Total 7.3 6.4 - 8.3 g/dL    Albumin 3.2 (L) 3.5 - 5.2 g/dL    Bilirubin Total 0.3 <=1.2 mg/dL    GFR Estimate 27 (L) >60 mL/min/1.73m2   Troponin T, High Sensitivity   Result Value Ref Range    Troponin T, High Sensitivity 47 (H) <=22 ng/L   Nt probnp inpatient (BNP)   Result Value Ref Range    N terminal Pro BNP Inpatient 2,226 (H) 0 - 1,800 pg/mL   INR   Result Value Ref Range    INR 3.74 (H) 0.85 - 1.15   CBC with platelets and differential   Result Value Ref Range    WBC Count 9.2 4.0 - 11.0 10e3/uL    RBC Count 3.91 (L) 4.40 - 5.90 10e6/uL    Hemoglobin 12.3 (L) 13.3 - 17.7 g/dL    Hematocrit 37.3 (L) 40.0 - 53.0 %    MCV 95 78 - 100 fL    MCH 31.5 26.5 - 33.0 pg    MCHC 33.0 31.5 - 36.5 g/dL    RDW 14.8 10.0 - 15.0 %    Platelet Count 154 150 - 450 10e3/uL    % Neutrophils 63 %    % Lymphocytes 26 %    % Monocytes 9 %    % Eosinophils 1 %    % Basophils 0 %    % Immature Granulocytes 1 %    NRBCs per 100 WBC 0 <1 /100    Absolute Neutrophils 5.8 1.6 - 8.3 10e3/uL    Absolute Lymphocytes 2.4 0.8 - 5.3 10e3/uL    Absolute Monocytes 0.8 0.0 - 1.3 10e3/uL    Absolute Eosinophils 0.1 0.0 - 0.7 10e3/uL    Absolute Basophils 0.0 0.0 - 0.2 10e3/uL    Absolute Immature Granulocytes 0.1 <=0.4 10e3/uL    Absolute NRBCs 0.0 10e3/uL   XR Chest Port 1  View    Narrative    EXAM: XR CHEST PORT 1 VIEW  LOCATION: [facility]  DATE/TIME: 11/5/2022 10:13 PM    INDICATION: dyspnea  COMPARISON: 10/12/2022      Impression    IMPRESSION: Heart size is enlarged. Lung volumes are mildly diminished. Increased pulmonary vascularity is noted centrally, with increased interstitial and airspace opacities bilaterally, left greater than right. Findings are favored to represent   asymmetric edema. Superimposed left-sided pneumonia is difficult to exclude. No pneumothorax. Possible small left effusion. No acute bony abnormality.   Troponin T, High Sensitivity   Result Value Ref Range    Troponin T, High Sensitivity 138 (HH) <=22 ng/L   Blood gas venous   Result Value Ref Range    pH Venous 7.33 7.32 - 7.43    pCO2 Venous 41 40 - 50 mm Hg    pO2 Venous 53 (H) 25 - 47 mm Hg    Bicarbonate Venous 22 21 - 28 mmol/L    Base Excess/Deficit (+/-) -4.1 -7.7 - 1.9 mmol/L    FIO2 35    Lactic acid whole blood   Result Value Ref Range    Lactic Acid 1.0 0.7 - 2.0 mmol/L   Asymptomatic COVID-19 Virus (Coronavirus) by PCR Nasopharyngeal    Specimen: Nasopharyngeal; Swab   Result Value Ref Range    SARS CoV2 PCR Negative Negative    Narrative    Testing was performed using the harrison  SARS-CoV-2 & Influenza A/B Assay on the harrison  [employer]  System.  This test should be ordered for the detection of SARS-COV-2 in individuals who meet SARS-CoV-2 clinical and/or epidemiological criteria. Test performance is unknown in asymptomatic patients.  This test is for in vitro diagnostic use under the FDA EUA for laboratories certified under CLIA to perform moderate and/or high complexity testing. This test has not been FDA cleared or approved.  A negative test does not rule out the presence of PCR inhibitors in the specimen or target RNA in concentration below the limit of detection for the assay. The possibility of a false negative should be considered if the patient's recent exposure or  clinical presentation suggests COVID-19.  [facility] are certified under the Clinical Laboratory Improvement Amendments of 1988 (CLIA-88) as qualified to perform moderate and/or high complexity laboratory testing.       Medications   nitroGLYcerin (NITROSTAT) sublingual tablet 0.4 mg (has no administration in time range)   ondansetron (ZOFRAN) injection 4 mg (4 mg Intravenous Given 11/5/22 2209)   furosemide (LASIX) injection 60 mg (60 mg Intravenous Given 11/5/22 2155)   pantoprazole (PROTONIX) IV push injection 80 mg (80 mg Intravenous Given 11/5/22 2211)     9:58 PM Patient re-assessed: Patient reports feeling much better on the BiPAP.  Titrating back oxygen and volumes, brought his levels down to 14/7 with 35% oxygen.  Patient had a bout of nausea and did vomit 1 more time.  Emesis was approximately 200cc of dark brown fluid.  Suspicious for some blood.  Will order Zofran and Protonix.    1:36 AM: Patient reassessed.  Second troponin climbing up to 138 from 47.  INR shows him being supratherapeutic at 3.74.  Will discuss with cardiology.  Patient breathing much more comfortably on BiPAP.  VBG has normalized as is his lactic acid.  Patient would like to try coming off of BiPAP so we will trial this.  Has not had any further vomiting.  Searching for a bed for transfer    6:15 AM: Patient was signed out at shift change to Dr Ortega.     Assessments & Plan (with Medical Decision Making)  82-year-old male with history of CHF presenting for evaluation of abrupt onset of worsening dyspnea tonight.  Has been off his Lasix for 2 days and started coughing after inhaling some smoke fumes at home.  Developed significant dyspnea and hypoxia and was brought here for evaluation and treatment.  Having significant increased work of breathing upon arrival despite improvement in oxygenation with supplemental oxygen.  Started on BiPAP with continued improvement.  Did vomit once here with some dark fluid and  once for EMS.  Not having abdominal pain or tenderness, uncertain whether there is some degree of mild GI bleed.  Due to this vomiting, given dose of pantoprazole without any further episodes of vomiting or nausea.  Regarding his breathing, he was able to be weaned from BiPAP and was diuresed with a single dose of Lasix.  X-ray showed evidence of some fluid overload and his BNP was elevated although less so than his previous value.  Not having any chest pain and his EKG showed bigeminal PVCs but otherwise not significant change from previous.  Initial troponin mildly elevated and repeat 2 hours later showed more than a doubling elevation.  Initially considered heparin however patient is already anticoagulated and on Coumadin and is supratherapeutic.  No beds available for transfer overnight.  Repeat labs ordered for the morning and signed out at shift change pending repeat evaluation and disposition     I have reviewed the nursing notes.    I have reviewed the findings, diagnosis, plan and need for follow up with the patient.     Critical Care Addendum    My initial assessment, based on my review of prehospital provider report, review of nursing observations, review of vital signs, focused history and physical exam, established that Eduardo Laughlin has respiratory insufficiency and respiratory failure, which requires immediate intervention, and therefore he is critically ill.     After the initial assessment, the care team initiated multiple lab tests, initiated IV fluid administration, initiated medication therapy with lasix and initiated intensive non-invasive respiratory support to provide stabilization care. Due to the critical nature of this patient, I reassessed nursing observations, vital signs, physical exam, review of cardiac rhythm monitor, 12 lead ECG analysis, mental status, neurologic status and respiratory status multiple times prior to his disposition.     Time also spent performing documentation,  reviewing test results and coordination of care.     Critical care time (excluding teaching time and procedures): 60 minutes.     New Prescriptions    No medications on file       Final diagnoses:   NSTEMI (non-ST elevated myocardial infarction) (H)   Acute congestive heart failure, unspecified heart failure type (H)   Renal insufficiency       11/5/2022   [facility] EMERGENCY DEPT     Lawrence, Freddie Butts MD  11/06/22 0696

## 2022-11-06 NOTE — ED TRIAGE NOTES
Pt was in the hospital for 8 days due to shortness of breath last month. He was discharged on October 19th and was sent home with lasix. Pt has not taken his lasix for a few days and has gained a few pounds. Pt started having increased shortness of breath today. EMS stated pts o2 was 50% on room air upon arrival and HR was in the 20s. Pt started coughing in the ambulance and vomited x2 with slight blood noted. EMS stated pt was not adequately ventilating and they placed him on double o2. Pt is now on bipap 18/8 fio2 40% with o2 sats at 100%.     Triage Assessment       Row Name 11/05/22 0630       Triage Assessment (Adult)    Airway WDL WDL       Respiratory WDL    Respiratory WDL X;rhythm/pattern    Rhythm/Pattern, Respiratory shortness of breath;labored       Skin Circulation/Temperature WDL    Skin Circulation/Temperature WDL WDL       Cardiac WDL    Cardiac WDL WDL       Peripheral/Neurovascular WDL    Peripheral Neurovascular WDL WDL       Cognitive/Neuro/Behavioral WDL    Cognitive/Neuro/Behavioral WDL WDL

## 2022-11-06 NOTE — H&P
[facility]    History and Physical - Hospitalist Service       Date of Admission:  11/5/2022    Assessment & Plan      Eduardo Laughlin is a 82 year old male admitted on 11/5/2022. He has history of HFpEF, CKD, hypertension, type 2 diabetes, paroxysmal atrial fibrillation, hyperlipidemia, gout, bladder cancer status post cystectomy and insomnia.  He presents due to progressive shortness of breath, dizziness and an episode of syncope.    Acute hypoxic respiratory failure  Acute on Chronic Diastolic Heart Failure (HFpEF)  Reportedly hypoxic per EMS and started on BiPAP in ED though able to wean to oxygen by nasal cannula. No clear documented hypoxia.     No weight done on presentation; recent discharge weight 247 lb 11 oz after diuresis. BNP 2226. Troponin elevated as below. CT shows mild groundglass infiltrates. Last Echo 10/12/22: EF 50 to 55%, no regional wall motion abnormality, suspected mild aortic stenosis, moderate ascending aorta dilation, mild aortic root dilation.  Recent hospitalization for decompensation (10/12-10/19/22 at [facility]), diuresed with IV furosemide 40 mg twice daily at that time, developed ROBERT on this regimen and then discharged on furosemide 20 mg daily for 2 weeks. Home diuretics completed 11/2/22.  Suspect this is recurrent CHF exacerbation in the setting of recently stopping oral diuretics as outpatient.  Received IV furosemide 60 mg x 1 dose in ED with ~1700 mL diuresis recorded. Weaned off oxygen at time of admission evaluation. Appears to be fairly well compensated on admission though has not ambulated yet.  - restart oral furosemide 20 mg BID  - I and O's, daily weights    Elevated troponin, suspected demand ischemia  Troponin trend 47 --> 138 --> 150 --> 116 during ED observation. ECG shows bigeminy, LBBB which was previously present, similar morphology to prior. Low suspicion for ACS. Suspect this is demand ischemia related to CHF.  - monitor  for concerning anginal symptoms    Elevated Lactic  Lactic 4.1 on presentation, improved to 1.0 on recheck after initiated on oxygen/BiPAP and furosemide. Suspect this may have been related to increased work of breathing. No signs of infection/sepsis.   - monitor    Syncope  Progressive SOB and dizziness over the past few weeks. Reports orthostatic symptoms at home with low blood pressures on home checks. Vitals notable for: initial blood pressure low since then stable/mild hypertension and HR 50s-70s. On admission, borderline orthostasis with BP drop of 18 mmHg.  May be related to hypoxia in the setting of decompensated HF as above versus orthostatic hypotension versus metoprolol causing hypotension and/or bradycardia.   - continue telemetry   - orthostatic vital signs twice daily  - hold home metoprolol, monitor HR/blood pressure  - recent echo without structural etiology  - CHF management as above    CKD  On admission, creatinine 2.46, GFR 26, BUN 47.1. Recent baseline creatinine 2.34-2.46 and GFR 26-27.  Recently had ROBERT with peak creatinine of 3.96 on 10/16/2022 in the setting of diuresis.  Renal function has since recovered and is at baseline on admission.  - AM BMP    Hypertension  Chronic. Blood pressures reviewed, stable though borderline orthostasis as above.  Lisinopril recently discontinued due to renal dysfunction.  Recently noted to have low blood pressures, cardiology recommended decreasing metoprolol to 25 mg daily if this persisted.  - hold home metoprolol initially, monitor BP/orthostasis    Diabetes Mellitus, Type II  Chronic.  Last a1C 7.7% on 10/2022.   -continue home glimepiride  -moderate SSI  -hypo/hyperglycemia protocol with blood glucose monitoring    Paroxysmal Atrial Fibrillation  Acquired coagulopathy secondary to warfarin  LBBB, chronic  Rate managed on metoprolol though has been 50s-70s on admission despite not receiving metoprolol. INR therapeutic on admission.   - Continue warfarin  "(pharmacy to dose)  - Hold home metoprolol, monitor rate    Hyperlipidemia  - Chronic: continue home ezetimibe     Gout  - Chronic: continue home allopurinol    History of bladder and prostate cancer  S/p cystectomy, ostomy in place  No current issues.  - ostomy cares per nursing    Sleep Disturbance  - Chronic: continue home trazodone, temazepam prn    Obesity  Body mass index is 35.21 kg/m . Contributes to overall morbidity and mortality.      Diet: Moderate Consistent Carb (60 g CHO per Meal) Diet  DVT Prophylaxis: Warfarin  Otero Catheter: Not present  Central Lines: None  Cardiac Monitoring: ACTIVE order. Indication: Acute decompensated heart failure (48 hours)  Code Status: No CPR- Do NOT Intubate , discussed directly on admission    Clinically Significant Risk Factors Present on Admission              # Hypoalbuminemia: Lowest albumin = 3.2 g/dL (Ref range: 3.5-5.2) at 11/5/2022  9:45 PM, will monitor as appropriate  # Drug Induced Coagulation Defect: home medication list includes an anticoagulant medication       # DMII: A1C = 7.7 % (Ref range: <5.7 %) within past 3 months    # Obesity: Estimated body mass index is 35.21 kg/m  as calculated from the following:    Height as of this encounter: 1.778 m (5' 10\").    Weight as of this encounter: 111.3 kg (245 lb 6 oz).           Disposition Plan      Expected Discharge Date: 11/07/2022                The patient's care was discussed with the Attending Physician, Dr. Tim Osei, Patient and Patient's Family.    Matilda Cooper PA-C  Hospitalist Service  [facility]  Securely message with the Vocera Web Console (learn more here)  Text page via [facility] Paging/Directory   ______________________________________________________________________    Chief Complaint   Dizziness, syncope    History is obtained from the patient, electronic health record and patient's children    History of Present Illness   Eduardo Sortojaylinjoanne is a 82 year old " male who presents with concerns of dizziness and shortness of breath.    He was recently hospitalized at [facility] for CHF exacerbation. At that time he presented with progressive shortness of breath, chest discomfort and dizziness. His symptoms improved with diuresis. The hospital course was complicated by ROBERT. He was ultimately discharged on furosemide 20 mg twice day for a total of 2 weeks.     He started feeling dizzy again about 5 days after leaving the hospital. The symptoms were worse when he stood up and later in the day. His blood pressure was low when checked during these symptoms. He feels better if he takes time transitioning to standing and takes deep breaths.     Around the same time he developed shortness of breath which is worse with activity. He has no orthopnea or PND. He denies significant cough, congestion or URI symptoms. No fevers, chills or malaise. As above he was taking furosemide 20 mg twice daily until 11/2/22, when his prescribed medication was complete.    Yesterday his symptoms were much worse and he had a syncopal episode where he was not responsive per family report. He was brought in by EMS. He reportedly was hypoxic and ultimately started on BiPAP initially on arrival. He was weaned to oxygen fairly quickly.     There were no beds available for hospitalization and so he remained a boarder in the ED until this evening. He received IV furosemide and had good urinary output. On admission he has no complaints. He feels well although he notes he has not been up out of bed much. He did use the bedside commode without any concerning symptoms.    He further denies any palpitations, chest discomfort, weakness, abdominal pain, nausea, emesis, diarrhea, urinary issues or skin complaints.    Review of Systems    The 10 point Review of Systems is negative other than noted in the HPI or here.     Past Medical History    I have reviewed this patient's medical history and updated it with pertinent  information if needed.   Past Medical History:   Diagnosis Date     Acute renal failure with acute tubular necrosis superimposed on stage 3 chronic kidney disease (H)      Arteriosclerotic cardiovascular disease (ASCVD)      Atrial fibrillation (H)      Bladder cancer (H)      BPH (benign prostatic hypertrophy)      Chronic kidney disease      Complicated UTI (urinary tract infection) 08/25/2019     Congestive heart failure (H)      Coronary artery disease      Diabetes mellitus (H)      Diabetic neuropathy (H)      E coli bacteremia      GERD (gastroesophageal reflux disease)      Gout      Hyperlipidemia      Hypertension      Hyponatremia      Iliac artery aneurysm, 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  Past Surgical History:   Procedure Laterality Date     CARDIAC CATHETERIZATION  03/28/2008    and coronary angios     CYSTECTOMY       CYSTOSCOPY       CYSTOSCOPY N/A 12/23/2015    Procedure: CYSTOSCOPY BLADDER BIOPSIES with Fulgeration and Placement of Otero ;  Surgeon: Gordon Bajwa MD;  [place] [facility] [place]  Service:      IR EXTREMITY ANGIOGRAM LEFT  3/22/2019     IR EXTREMITY ANGIOGRAM LEFT  3/22/2019     IR ILEAL CONDUIT INJECTION  12/23/2016     IR NEPHROSTOMY TUBE PLACEMENT RIGHT  8/18/2016     IR URETEROSTOMY TUBE CHANGE RIGHT  9/21/2016     KIDNEY STONE SURGERY  x4     LAMINECTOMY LUMBAR ONE LEVEL Bilateral 9/13/2022    Procedure: LUMBAR 4 - LUMBAR 5 BILATERAL MEDIAL FACETECTOMY AND DECOMPRESSION;  Surgeon: Everett Holland MD;  Location: [facility]     PROSTATE SURGERY         Social History   I have reviewed this patient's social history and updated it  with pertinent information if needed.  He lives in [place] with his wife in independently senior apartments. No current smoking, alcohol or illicit drugs.  Social History     Tobacco Use     Smoking status: Former     Smokeless tobacco: Never     Tobacco comments:     Quit smoking 30 years ago   Vaping Use     Vaping Use: Never used   Substance Use Topics     Alcohol use: Not Currently     Drug use: Never       Family History   I have reviewed this patient's family history and updated it with pertinent information if needed.  Family History   Problem Relation Age of Onset     No Known Problems Mother      Prostate Cancer Father      Deep Vein Thrombosis Father      Cancer Sister      Heart Disease Sister      No Known Problems Daughter      Heart Disease Brother      Heart Disease Sister      No Known Problems Daughter        Prior to Admission Medications   Prior to Admission Medications   Prescriptions Last Dose Informant Patient Reported? Taking?   acetaminophen (TYLENOL) 500 MG tablet 11/5/2022 at am Self Yes Yes   Sig: Take 1,000 mg by mouth every 6 hours as needed for mild pain   allopurinol (ZYLOPRIM) 100 MG tablet 11/5/2022 at am Self No Yes   Sig: Take 2 tablets (200 mg) by mouth daily for 30 days   doxycycline hyclate (VIBRAMYCIN) 50 MG capsule 11/5/2022 at am Self Yes Yes   Sig: Take 50 mg by mouth daily   ezetimibe (ZETIA) 10 MG tablet 11/5/2022 at am Self Yes Yes   Sig: Take 10 mg by mouth daily   furosemide (LASIX) 20 MG tablet 11/2/2022 Self No Yes   Sig: Take 1 tablet (20 mg) by mouth as needed (weight gain of 2-3 lbs in a day)   glimepiride (AMARYL) 1 MG tablet 11/5/2022 at am Self Yes Yes   Sig: Take 1 mg by mouth daily   metoprolol succinate ER (TOPROL XL) 100 MG 24 hr tablet 11/5/2022 at am Self Yes Yes   Sig: Take 50 mg by mouth daily   temazepam (RESTORIL) 15 MG capsule 11/4/2022 at hs Self Yes Yes   Sig: Take 15 mg by mouth nightly as needed for sleep   traZODone (DESYREL) 100 MG tablet  11/4/2022 at hs Self Yes Yes   Sig: Take 100-200 mg by mouth At Bedtime   warfarin ANTICOAGULANT (COUMADIN) 3 MG tablet  Self Yes No   Sig: Take 3 mg by mouth daily Take along with 5 MG on M, W, and F for a total of 8 MG.   warfarin ANTICOAGULANT (COUMADIN) 5 MG tablet 11/5/2022 Self Yes Yes   Sig: Take 5-8 mg by mouth See Admin Instructions 8 mg MWF and 5 mg all other days      Facility-Administered Medications: None     Allergies   Allergies   Allergen Reactions     Metoprolol Succinate Er      Other reaction(s): low blood pressure     Pcn [Penicillins] Other (See Comments)     Childhood-doesn't know reactions     Statin Drugs [Hmg-Coa-R Inhibitors] Cramps     Uroxatral [Alfuzosin] Other (See Comments)     hypotension     Physical Exam   Vital Signs: Temp: 97.9  F (36.6  C) Temp src: Oral BP: 134/73 Pulse: 80   Resp: 20 SpO2: 94 % O2 Device: None (Room air) Oxygen Delivery: 3 LPM  Weight: 245 lbs 5.95 oz    Constitutional: Laying down comfortably in bed, well-appearing, awake, alert, cooperative, no apparent distress, and appears stated age  ENT: Oropharynx is clear and moist  Respiratory: No increased work of breathing, good air exchange, clear to auscultation bilaterally, no crackles or wheezing  Cardiovascular: Normal rate, regular rhythm with occasional irregularity noted.  Trace bilateral lower extremity edema.  GI: Obese, normal bowel sounds, soft, nondistended, nontender.  Genitounirinary: Deferred  Skin: Warm and dry  Musculoskeletal: Normal bulk and tone.  Moving all 4 extremities.  Neurologic: Awake, alert, oriented  Neuropsychiatric: Calm, pleasant, cooperative.  Appropriate thought process and content.  Return memory grossly intact.    Data   Data reviewed today: I reviewed all medications, new labs and imaging results over the last 24 hours. I personally reviewed the EKG tracing showing atrial bigeminty with LBBB (chronic).    Recent Labs   Lab 11/06/22  0639 11/05/22  2145 11/04/22  1458   WBC  --   9.2  --    HGB  --  12.3*  --    MCV  --  95  --    PLT  --  154  --    INR 3.10* 3.74*  --    NA  --  137 136   POTASSIUM  --  4.4 4.7   CHLORIDE  --  106 103   CO2  --  17* 21*   BUN  --  47.1* 54.0*   CR  --  2.35* 2.46*   ANIONGAP  --  14 12   GALLO  --  8.6* 8.9   GLC  --  232* 121*   ALBUMIN  --  3.2*  --    PROTTOTAL  --  7.3  --    BILITOTAL  --  0.3  --    ALKPHOS  --  68  --    ALT  --  32  --    AST  --  42  --      Recent Results (from the past 24 hour(s))   XR Chest Port 1 View    Narrative    EXAM: XR CHEST PORT 1 VIEW  LOCATION: [facility]  DATE/TIME: 11/5/2022 10:13 PM    INDICATION: dyspnea  COMPARISON: 10/12/2022      Impression    IMPRESSION: Heart size is enlarged. Lung volumes are mildly diminished. Increased pulmonary vascularity is noted centrally, with increased interstitial and airspace opacities bilaterally, left greater than right. Findings are favored to represent   asymmetric edema. Superimposed left-sided pneumonia is difficult to exclude. No pneumothorax. Possible small left effusion. No acute bony abnormality.   Chest CT w/o contrast    Narrative    EXAM: CT CHEST WITHOUT CONTRAST  LOCATION: [facility]  DATE/TIME: 11/06/2022, 11:38 AM    INDICATION: Acute hypoxic respiratory distress with syncope. Chronic anticoagulation, concern for hemoptysis.  Abnormal x-ray chest. Evaluate for acute cardiopulmonary process. CT chest 12.09/2019.  COMPARISON: 12/09/2019.  TECHNIQUE: CT chest without IV contrast. Multiplanar reformats were obtained. Dose reduction techniques were used.  CONTRAST: None.    FINDINGS:   LUNGS AND PLEURA: Scattered benign granulomatous change. Groundglass infiltrates in the left upper lobe, right upper lobe, and right lower lobe noted. Stable benign left upper lobe nodule image 21 series 3.    MEDIASTINUM/AXILLAE: No gross adenopathy in the absence of contrast. No aneurysm.    CORONARY ARTERY CALCIFICATION:  Moderate.    UPPER ABDOMEN: No acute findings. Right renal atrophy.    MUSCULOSKELETAL: No frankly destructive bony lesions.      Impression    IMPRESSION:   1.  Mild groundglass infiltrates in both lungs.

## 2022-11-07 LAB
ANION GAP SERPL CALCULATED.3IONS-SCNC: 8 MMOL/L (ref 7–15)
BUN SERPL-MCNC: 47.5 MG/DL (ref 8–23)
CALCIUM SERPL-MCNC: 9 MG/DL (ref 8.8–10.2)
CHLORIDE SERPL-SCNC: 106 MMOL/L (ref 98–107)
CREAT SERPL-MCNC: 2.54 MG/DL (ref 0.67–1.17)
DEPRECATED HCO3 PLAS-SCNC: 23 MMOL/L (ref 22–29)
ERYTHROCYTE [DISTWIDTH] IN BLOOD BY AUTOMATED COUNT: 14.6 % (ref 10–15)
GFR SERPL CREATININE-BSD FRML MDRD: 25 ML/MIN/1.73M2
GLUCOSE BLDC GLUCOMTR-MCNC: 127 MG/DL (ref 70–99)
GLUCOSE BLDC GLUCOMTR-MCNC: 153 MG/DL (ref 70–99)
GLUCOSE BLDC GLUCOMTR-MCNC: 154 MG/DL (ref 70–99)
GLUCOSE BLDC GLUCOMTR-MCNC: 168 MG/DL (ref 70–99)
GLUCOSE BLDC GLUCOMTR-MCNC: 193 MG/DL (ref 70–99)
GLUCOSE SERPL-MCNC: 151 MG/DL (ref 70–99)
HCT VFR BLD AUTO: 35.4 % (ref 40–53)
HGB BLD-MCNC: 11.6 G/DL (ref 13.3–17.7)
INR PPP: 2.78 (ref 0.85–1.15)
MCH RBC QN AUTO: 31.4 PG (ref 26.5–33)
MCHC RBC AUTO-ENTMCNC: 32.8 G/DL (ref 31.5–36.5)
MCV RBC AUTO: 96 FL (ref 78–100)
PLATELET # BLD AUTO: 137 10E3/UL (ref 150–450)
POTASSIUM SERPL-SCNC: 4.3 MMOL/L (ref 3.4–5.3)
RBC # BLD AUTO: 3.69 10E6/UL (ref 4.4–5.9)
SODIUM SERPL-SCNC: 137 MMOL/L (ref 136–145)
WBC # BLD AUTO: 7 10E3/UL (ref 4–11)

## 2022-11-07 PROCEDURE — 250N000013 HC RX MED GY IP 250 OP 250 PS 637: Performed by: PHYSICIAN ASSISTANT

## 2022-11-07 PROCEDURE — 36415 COLL VENOUS BLD VENIPUNCTURE: CPT | Performed by: PHYSICIAN ASSISTANT

## 2022-11-07 PROCEDURE — 85610 PROTHROMBIN TIME: CPT | Performed by: PHYSICIAN ASSISTANT

## 2022-11-07 PROCEDURE — 82310 ASSAY OF CALCIUM: CPT | Performed by: PHYSICIAN ASSISTANT

## 2022-11-07 PROCEDURE — 85027 COMPLETE CBC AUTOMATED: CPT | Performed by: PHYSICIAN ASSISTANT

## 2022-11-07 PROCEDURE — 250N000013 HC RX MED GY IP 250 OP 250 PS 637: Performed by: INTERNAL MEDICINE

## 2022-11-07 PROCEDURE — 120N000001 HC R&B MED SURG/OB

## 2022-11-07 PROCEDURE — 99233 SBSQ HOSP IP/OBS HIGH 50: CPT | Performed by: HOSPITALIST

## 2022-11-07 PROCEDURE — 250N000013 HC RX MED GY IP 250 OP 250 PS 637: Performed by: HOSPITALIST

## 2022-11-07 RX ORDER — METOPROLOL SUCCINATE 25 MG/1
25 TABLET, EXTENDED RELEASE ORAL DAILY
Status: DISCONTINUED | OUTPATIENT
Start: 2022-11-07 | End: 2022-11-08 | Stop reason: HOSPADM

## 2022-11-07 RX ADMIN — MICONAZOLE NITRATE ANTIFUNGAL POWDER: 2 POWDER TOPICAL at 20:49

## 2022-11-07 RX ADMIN — INSULIN ASPART 1 UNITS: 100 INJECTION, SOLUTION INTRAVENOUS; SUBCUTANEOUS at 08:10

## 2022-11-07 RX ADMIN — FUROSEMIDE 20 MG: 20 TABLET ORAL at 08:07

## 2022-11-07 RX ADMIN — METOPROLOL SUCCINATE 25 MG: 25 TABLET, FILM COATED, EXTENDED RELEASE ORAL at 10:08

## 2022-11-07 RX ADMIN — INSULIN ASPART 1 UNITS: 100 INJECTION, SOLUTION INTRAVENOUS; SUBCUTANEOUS at 11:45

## 2022-11-07 RX ADMIN — WARFARIN SODIUM 8 MG: 5 TABLET ORAL at 17:57

## 2022-11-07 RX ADMIN — ALLOPURINOL 200 MG: 100 TABLET ORAL at 08:08

## 2022-11-07 RX ADMIN — EZETIMIBE 10 MG: 10 TABLET ORAL at 08:08

## 2022-11-07 RX ADMIN — TRAZODONE HYDROCHLORIDE 200 MG: 100 TABLET ORAL at 22:43

## 2022-11-07 RX ADMIN — FUROSEMIDE 20 MG: 20 TABLET ORAL at 17:00

## 2022-11-07 RX ADMIN — MICONAZOLE NITRATE ANTIFUNGAL POWDER: 2 POWDER TOPICAL at 09:51

## 2022-11-07 RX ADMIN — TEMAZEPAM 7.5 MG: 7.5 CAPSULE ORAL at 22:43

## 2022-11-07 RX ADMIN — GLIMEPIRIDE 1 MG: 1 TABLET ORAL at 08:08

## 2022-11-07 ASSESSMENT — ACTIVITIES OF DAILY LIVING (ADL)
ADLS_ACUITY_SCORE: 24

## 2022-11-07 NOTE — PROGRESS NOTES
[facility]    Medicine Progress Note - Hospitalist Service    Date of Admission:  11/5/2022    Assessment & Plan      Eduardo Laughlin is a 82 year old male admitted on 11/5/2022. He has history of HFpEF, CKD, hypertension, type 2 diabetes, paroxysmal atrial fibrillation, hyperlipidemia, gout, bladder cancer status post cystectomy and insomnia.  He presents due to progressive shortness of breath, dizziness and an episode of syncope.    Acute hypoxic respiratory failure  Acute on Chronic Diastolic Heart Failure (HFpEF)  Reportedly hypoxic per EMS and started on BiPAP in ED though able to wean to oxygen by nasal cannula. No clear documented hypoxia.     No weight done on presentation; recent discharge weight 247 lb 11 oz after diuresis. BNP 2226. Troponin elevated as below. CT shows mild groundglass infiltrates. Last Echo 10/12/22: EF 50 to 55%, no regional wall motion abnormality, suspected mild aortic stenosis, moderate ascending aorta dilation, mild aortic root dilation.  Recent hospitalization for decompensation (10/12-10/19/22 at [facility]), diuresed with IV furosemide 40 mg twice daily at that time, developed ROBERT on this regimen and then discharged on furosemide 20 mg daily for 2 weeks. Home diuretics completed 11/2/22.  Suspect this is recurrent CHF exacerbation in the setting of recently stopping oral diuretics as outpatient.  Received IV furosemide 60 mg x 1 dose in ED with ~1700 mL diuresis recorded. Weaned off oxygen at time of admission evaluation. Appears to be fairly well compensated on admission though has not ambulated yet.  - restart oral furosemide 20 mg BID  - I and O's, daily weights    Elevated troponin, suspected demand ischemia  Troponin trend 47 --> 138 --> 150 --> 116 during ED observation. ECG shows bigeminy, LBBB which was previously present, similar morphology to prior. Low suspicion for ACS. Suspect this is demand ischemia related to CHF.  - monitor for  concerning anginal symptoms    Elevated Lactic  Lactic 4.1 on presentation, improved to 1.0 on recheck after initiated on oxygen/BiPAP and furosemide. Suspect this may have been related to increased work of breathing. No signs of infection/sepsis.   - monitor    Syncope  Progressive SOB and dizziness over the past few weeks. Reports orthostatic symptoms at home with low blood pressures on home checks. Vitals notable for: initial blood pressure low since then stable/mild hypertension and HR 50s-70s. On admission, borderline orthostasis with BP drop of 18 mmHg.  May be related to hypoxia in the setting of decompensated HF as above versus orthostatic hypotension versus metoprolol causing hypotension and/or bradycardia.   - continue telemetry   - orthostatic vital signs twice daily  - hold home metoprolol, monitor HR/blood pressure  - recent echo without structural etiology  - CHF management as above    CKD  On admission, creatinine 2.46, GFR 26, BUN 47.1. Recent baseline creatinine 2.34-2.46 and GFR 26-27.  Recently had ROBERT with peak creatinine of 3.96 on 10/16/2022 in the setting of diuresis.  Renal function has since recovered and is at baseline on admission.  - AM BMP    Hypertension  Chronic. Blood pressures reviewed, stable though borderline orthostasis as above.  Lisinopril recently discontinued due to renal dysfunction.  Recently noted to have low blood pressures, cardiology recommended decreasing metoprolol to 25 mg daily if this persisted.  - hold home metoprolol initially, monitor BP/orthostasis    Diabetes Mellitus, Type II  Chronic.  Last a1C 7.7% on 10/2022.   -continue home glimepiride  -moderate SSI  -hypo/hyperglycemia protocol with blood glucose monitoring    Paroxysmal Atrial Fibrillation  Acquired coagulopathy secondary to warfarin  LBBB, chronic  Rate managed on metoprolol though has been 50s-70s on admission despite not receiving metoprolol. INR therapeutic on admission.   - Continue warfarin  "(pharmacy to dose)  - Hold home metoprolol, monitor rate    Hyperlipidemia  - Chronic: continue home ezetimibe     Gout  - Chronic: continue home allopurinol    History of bladder and prostate cancer  S/p cystectomy, ostomy in place  No current issues.  - ostomy cares per nursing    Sleep Disturbance  - Chronic: continue home trazodone, temazepam prn    Obesity  Body mass index is 35.21 kg/m . Contributes to overall morbidity and mortality.        Diet: Moderate Consistent Carb (60 g CHO per Meal) Diet    DVT Prophylaxis: Warfarin  Otero Catheter: Not present  Central Lines: None  Cardiac Monitoring: ACTIVE order. Indication: Acute decompensated heart failure (48 hours)  Code Status: No CPR- Do NOT Intubate      Disposition Plan     Expected Discharge Date: 11/07/2022      Destination: home with family          The patient's care was discussed with the Bedside Nurse and Patient.    Josh Oliver MD  Hospitalist Service  [facility]  Securely message with the Vocera Web Console (learn more here)  Text page via [employer] Paging/Directory         Clinically Significant Risk Factors              # Hypoalbuminemia: Lowest albumin = 3.2 g/dL (Ref range: 3.5-5.2) at 11/5/2022  9:45 PM, will monitor as appropriate         # DMII: A1C = 7.7 % (Ref range: <5.7 %) within past 3 months, PRESENT ON ADMISSION  # Obesity: Estimated body mass index is 35.21 kg/m  as calculated from the following:    Height as of this encounter: 1.778 m (5' 10\").    Weight as of this encounter: 111.3 kg (245 lb 6 oz)., PRESENT ON ADMISSION         ______________________________________________________________________    Interval History   Patient generally feels better and does not have any new complaints.  He is not short of breath.  He is on room air.  Denies any chest pain or cough or fever or chills or abdominal pain or nausea or vomiting or dysuria.  He does not have any rales or edema.  Lung exam is normal.  He seems " to be at baseline this evening.  Most likely can be discharged home tomorrow.  We will recheck BMP in AM.      Data reviewed today: I reviewed all medications, new labs and imaging results over the last 24 hours. I personally reviewed no images or EKG's today.    Physical Exam   Vital Signs: Temp: 97.7  F (36.5  C) Temp src: Oral BP: (!) 144/85 Pulse: 88   Resp: 18 SpO2: 97 % O2 Device: None (Room air)    Weight: 245 lbs 5.95 oz  Patient is alert and oriented and pleasant.  Laying down in bed and is in no acute distress.  He is on room air.  HEENT is unremarkable  Neck is supple  Chest is clear to auscultation bilaterally without crackles and with good air movement.  Heart with regular rate and rhythm  Abdomen is benign and nontender   deferred  Extremities without edema  Neurological exam is nonfocal.  He is alert and oriented.    Data   Recent Labs   Lab 11/07/22  1635 11/07/22  1134 11/07/22  0806 11/07/22  0411 11/06/22  1714 11/06/22  0639 11/05/22  2145 11/04/22  1458   WBC  --   --   --  7.0  --   --  9.2  --    HGB  --   --   --  11.6*  --   --  12.3*  --    MCV  --   --   --  96  --   --  95  --    PLT  --   --   --  137*  --   --  154  --    INR  --   --   --  2.78*  --  3.10* 3.74*  --    NA  --   --   --  137  --   --  137 136   POTASSIUM  --   --   --  4.3  --   --  4.4 4.7   CHLORIDE  --   --   --  106  --   --  106 103   CO2  --   --   --  23  --   --  17* 21*   BUN  --   --   --  47.5*  --   --  47.1* 54.0*   CR  --   --   --  2.54*  --   --  2.35* 2.46*   ANIONGAP  --   --   --  8  --   --  14 12   GALLO  --   --   --  9.0  --   --  8.6* 8.9   * 154* 168* 151*   < >  --  232* 121*   ALBUMIN  --   --   --   --   --   --  3.2*  --    PROTTOTAL  --   --   --   --   --   --  7.3  --    BILITOTAL  --   --   --   --   --   --  0.3  --    ALKPHOS  --   --   --   --   --   --  68  --    ALT  --   --   --   --   --   --  32  --    AST  --   --   --   --   --   --  42  --     < > = values in this  interval not displayed.     No results found for this or any previous visit (from the past 24 hour(s)).  Medications       allopurinol  200 mg Oral Daily     ezetimibe  10 mg Oral Daily     furosemide  20 mg Oral BID     glimepiride  1 mg Oral Daily     insulin aspart  1-7 Units Subcutaneous TID AC     insulin aspart  1-5 Units Subcutaneous At Bedtime     metoprolol succinate ER  25 mg Oral Daily     miconazole   Topical BID     sodium chloride (PF)  3 mL Intracatheter Q8H     traZODone  200 mg Oral At Bedtime     warfarin ANTICOAGULANT  8 mg Oral ONCE at 18:00     Warfarin Therapy Reminder  1 each Oral See Admin Instructions

## 2022-11-07 NOTE — PLAN OF CARE
Patient is alert and oriented.  Ambulates with walker and sba, patient is weak.  Dyspnea on exertion.  Patient has a urostomy with good urine output following lasix given in emergency department.  Denies pain.  O2 sat is good on room air at this time, 95%.      Orthostatic BP: Lying 117/51, HR 87       Sitting 104/56, HR 61       Standing 99/51, HR 54

## 2022-11-07 NOTE — PLAN OF CARE
Goal Outcome Evaluation:      Neuro: A&O x4  Cardiac: Afib, on tele   Respiratory: WNL  GI/: Ostomy  Diet/appetite: Consistent Carb  Activity: SBA  Pain: Denies  Skin: Scattered bruising, blanchable coccyx  LDA's:  R hand SL, L AC SL     Other: Powder for perineum     Plan: Possible discharge tomorrow

## 2022-11-07 NOTE — CONSULTS
Nutrition Note Brief    RD consulted for HF-low sodium diet education. Patient is not a new HF dx. Patient received education from RD at last hospitalization. Literature was last provided at that time. RD spoke to MD who stated OK to take out orders for patient.    Citlali Ortiz RDN, LD  Clinical Dietitian  Office: [phone]  Weekend pager: [phone]

## 2022-11-08 VITALS
HEIGHT: 70 IN | BODY MASS INDEX: 35.1 KG/M2 | TEMPERATURE: 98.3 F | WEIGHT: 245.15 LBS | HEART RATE: 90 BPM | SYSTOLIC BLOOD PRESSURE: 156 MMHG | OXYGEN SATURATION: 93 % | RESPIRATION RATE: 18 BRPM | DIASTOLIC BLOOD PRESSURE: 84 MMHG

## 2022-11-08 LAB
ANION GAP SERPL CALCULATED.3IONS-SCNC: 9 MMOL/L (ref 7–15)
BUN SERPL-MCNC: 47.8 MG/DL (ref 8–23)
CALCIUM SERPL-MCNC: 9.1 MG/DL (ref 8.8–10.2)
CHLORIDE SERPL-SCNC: 101 MMOL/L (ref 98–107)
CREAT SERPL-MCNC: 2.39 MG/DL (ref 0.67–1.17)
DEPRECATED HCO3 PLAS-SCNC: 24 MMOL/L (ref 22–29)
GFR SERPL CREATININE-BSD FRML MDRD: 26 ML/MIN/1.73M2
GLUCOSE BLDC GLUCOMTR-MCNC: 131 MG/DL (ref 70–99)
GLUCOSE BLDC GLUCOMTR-MCNC: 155 MG/DL (ref 70–99)
GLUCOSE BLDC GLUCOMTR-MCNC: 171 MG/DL (ref 70–99)
GLUCOSE SERPL-MCNC: 162 MG/DL (ref 70–99)
HOLD SPECIMEN: NORMAL
INR PPP: 2.42 (ref 0.85–1.15)
POTASSIUM SERPL-SCNC: 4.1 MMOL/L (ref 3.4–5.3)
SODIUM SERPL-SCNC: 134 MMOL/L (ref 136–145)

## 2022-11-08 PROCEDURE — 250N000013 HC RX MED GY IP 250 OP 250 PS 637: Performed by: PHYSICIAN ASSISTANT

## 2022-11-08 PROCEDURE — 85610 PROTHROMBIN TIME: CPT | Performed by: PHYSICIAN ASSISTANT

## 2022-11-08 PROCEDURE — 80048 BASIC METABOLIC PNL TOTAL CA: CPT | Performed by: HOSPITALIST

## 2022-11-08 PROCEDURE — 250N000013 HC RX MED GY IP 250 OP 250 PS 637: Performed by: HOSPITALIST

## 2022-11-08 PROCEDURE — 36415 COLL VENOUS BLD VENIPUNCTURE: CPT | Performed by: PHYSICIAN ASSISTANT

## 2022-11-08 PROCEDURE — 99239 HOSP IP/OBS DSCHRG MGMT >30: CPT | Performed by: HOSPITALIST

## 2022-11-08 RX ORDER — METOPROLOL SUCCINATE 25 MG/1
25 TABLET, EXTENDED RELEASE ORAL DAILY
Qty: 30 TABLET | Refills: 0 | Status: ON HOLD | OUTPATIENT
Start: 2022-11-09 | End: 2022-12-07

## 2022-11-08 RX ORDER — WARFARIN SODIUM 5 MG/1
5 TABLET ORAL
Status: DISCONTINUED | OUTPATIENT
Start: 2022-11-08 | End: 2022-11-08 | Stop reason: HOSPADM

## 2022-11-08 RX ORDER — DOXYCYCLINE HYCLATE 50 MG/1
50 CAPSULE ORAL DAILY
Status: ON HOLD | COMMUNITY
End: 2022-11-30

## 2022-11-08 RX ORDER — WARFARIN SODIUM 3 MG/1
TABLET ORAL
Qty: 1 TABLET | Refills: 0 | Status: ON HOLD | OUTPATIENT
Start: 2022-11-08 | End: 2022-11-30

## 2022-11-08 RX ADMIN — EZETIMIBE 10 MG: 10 TABLET ORAL at 09:14

## 2022-11-08 RX ADMIN — INSULIN ASPART 1 UNITS: 100 INJECTION, SOLUTION INTRAVENOUS; SUBCUTANEOUS at 12:22

## 2022-11-08 RX ADMIN — ALLOPURINOL 200 MG: 100 TABLET ORAL at 09:14

## 2022-11-08 RX ADMIN — INSULIN ASPART 1 UNITS: 100 INJECTION, SOLUTION INTRAVENOUS; SUBCUTANEOUS at 09:12

## 2022-11-08 RX ADMIN — FUROSEMIDE 20 MG: 20 TABLET ORAL at 09:14

## 2022-11-08 RX ADMIN — METOPROLOL SUCCINATE 25 MG: 25 TABLET, FILM COATED, EXTENDED RELEASE ORAL at 09:15

## 2022-11-08 RX ADMIN — ACETAMINOPHEN 650 MG: 325 TABLET, FILM COATED ORAL at 09:33

## 2022-11-08 RX ADMIN — GLIMEPIRIDE 1 MG: 1 TABLET ORAL at 09:14

## 2022-11-08 ASSESSMENT — ACTIVITIES OF DAILY LIVING (ADL)
ADLS_ACUITY_SCORE: 25
ADLS_ACUITY_SCORE: 24
ADLS_ACUITY_SCORE: 25
ADLS_ACUITY_SCORE: 24

## 2022-11-08 NOTE — PHARMACY-ANTICOAGULATION SERVICE
Clinical Pharmacy- Warfarin Discharge Note  This patient is currently on warfarin for the treatment of Atrial fibrillation.  INR Goal= 2-3  Expected length of therapy undetermined.    Warfarin PTA Regimen: 8 mg MWF, 5 mg all other days      Anticoagulation Dose History     Recent Dosing and Labs Latest Ref Rng & Units 10/17/2022 10/18/2022 10/19/2022 11/5/2022 11/6/2022 11/7/2022 11/8/2022    SKYE IMS TEMPLATE - - - - - -  -    Warfarin 2 mg - - 4 mg - - - - -    Warfarin 2.5 mg - - - - - 2.5 mg - -    Warfarin 3 mg - 3 mg - - - - - -    INR 0.85 - 1.15 2.98(H) 2.71(H) 2.40(H) 3.74(H) 3.10(H) 2.78(H) 2.42(H)          Recommend discharging the patient with the following warfarin/INR instructions:  Resume your normal home warfarin regimen of 8 mg three days weekly (on Mondays, Wednesdays, and Fridays) and 5 mg four days weekly (the rest of the week). Please call your normal anticoagulation clinic, inform them that you have been hospitalized, and follow their instructions regarding when you should next have your INR checked to ensure your warfarin dosing is appropriate for you.

## 2022-11-08 NOTE — PROGRESS NOTES
"Time: Assumed care of patient from [id number]      Reason for Admission: Acute CHF      Activity: SBA with walker      Neuro: alert and oriented, uses call light and able to make needs known      GI/:  continent of bowels, urostomy present and patent, draining into night bag currently      Skin: scattered bruising      Diet: consistent carb diet, diabetic, strict I&O      Lines/Drains: PIV saline locked, patent      Vitals: /60 (BP Location: Left arm)   Pulse 83   Temp 98.1  F (36.7  C) (Oral)   Resp 16   Ht 1.778 m (5' 10\")   Wt 111.3 kg (245 lb 6 oz)   SpO2 94%   BMI 35.21 kg/m        Pain: denies pain      Plan: discharge today      Labs: sodium 134, urea nitrogen 47.8, Cre 2.39, GFR 26, glucose 162, INR 2.42    Jeanette Pacheco RN on 11/8/2022 at 5:29 AM      "

## 2022-11-08 NOTE — DISCHARGE INSTRUCTIONS
Warfarin Instructions: Resume your normal home warfarin regimen of 8 mg three days weekly (on Mondays, Wednesdays, and Fridays) and 5 mg four days weekly (the rest of the week). Please call your normal anticoagulation clinic, inform them that you have been hospitalized, and follow their instructions regarding when you should next have your INR checked to ensure your warfarin dosing is appropriate for you.        Warfarin (By mouth)  Warfarin (WAR-far-in)    Prevents and treats blood clots. May lower the risk of serious complications after a heart attack. This medicine is a blood thinner.    Brand Name(s):Coumadin, Jantoven  There may be other brand names for this medicine.    When This Medicine Should Not Be Used:  This medicine is not right for everyone. Do not use it if you had an allergic reaction to warfarin, if you are pregnant, or if you have health problems that could cause bleeding.    How to Use This Medicine:  Tablet    - Take your medicine as directed. Your dose may need to be changed several times to find what works best for you.  - This medicine should come with a Medication Guide. Ask your pharmacist for a copy if you do not have one.  - Missed dose: Take a dose as soon as you remember. If it is almost time for your next dose, wait until then and take a regular dose. Do not take extra medicine to make up for a missed dose.  - Store the medicine in a closed container at room temperature, away from heat, moisture, and direct light.    Drugs and Foods to Avoid:  Ask your doctor or pharmacist before using any other medicine, including over-the-counter medicines, vitamins, and herbal products.    - Many medicines and foods can affect how warfarin works and may affect the PT/INR test results. Tell your doctor before you start or stop any medicine, especially the following:  - Co-enzyme Q10, echinacea, garlic, ginkgo, ginseng, goldenseal, or Lincoln's wort  - Another blood thinner, including apixaban,  argatroban, bivalirudin, cilostazol, clopidogrel, dabigatran, desirudin, dipyridamole, heparin, lepirudin, prasugrel, rivaroxaban, ticlopidine  - Medicine to treat depression or anxiety, including citalopram, desvenlafaxine, duloxetine, escitalopram, fluoxetine, fluvoxamine, milnacipran, paroxetine, sertraline, venlafaxine, vilazodone  - Medicine to treat an infection  - NSAID pain or arthritis medicine, including aspirin, celecoxib, diclofenac, diflunisal, fenoprofen, ibuprofen, indomethacin, ketoprofen, ketorolac, mefenamic acid, naproxen, oxaprozin, piroxicam, sulindac. Check labels for over-the-counter medicines to find out if they contain an NSAID.  - Steroid medicine, including dexamethasone, hydrocortisone, methylprednisolone, prednisolone, prednisone  - Warfarin works best if you eat about the same amount of vitamin K every day. Foods high in vitamin K include asparagus, broccoli, brussels sprouts, cabbage, green leafy vegetables, plums, rhubarb, and canola oil. Talk to your doctor before you make changes to your normal diet.  - Do not eat grapefruit or drink grapefruit juice while you are using this medicine.    Warnings While Using This Medicine:    - It is not safe to take this medicine during pregnancy. It could harm an unborn baby. Tell your doctor right away if you become pregnant. Use an effective form of birth control to keep from getting pregnant during treatment and for at least 1 month after your last dose.  - Tell your doctor if you are breastfeeding, or if you have kidney disease, liver disease, heart disease, diabetes, heart failure, high blood pressure, an infection, a stomach ulcer, or protein C deficiency. Also tell your doctor if you had recent surgery or an injury, or a history of stroke, anemia, severe bleeding or bruising, or problems caused by heparin use.  - This medicine may cause the following problems:  - Bleeding, which may be life-threatening  - Gangrene (skin or tissue damage)  -  Calciphylaxis or calcium uremic arteriolopathy  - Kidney problems, including acute kidney injury  - Purple toes syndrome  - You must have a PT/INR blood test while you use this medicine to check how well your blood is clotting. Your doctor will use the test results to make sure the medicine is working properly. Keep all appointments for the PT/INR blood tests.  - You may bleed or bruise more easily with warfarin. To prevent injury or cuts, do not play rough sports, be careful with sharp objects, and brush and floss your teeth gently. Blow your nose gently, and do not pick your nose.  - Carry an ID card or wear a medical alert bracelet to let emergency caregivers know that you use warfarin.  - Tell any doctor or dentist who treats you that you are using this medicine. You may need to stop using this medicine several days before you have surgery or medical tests.  - Keep all medicine out of the reach of children. Never share your medicine with anyone.    Possible Side Effects While Using This Medicine:  Call your doctor right away if you notice any of these side effects:    - Allergic reaction: Itching or hives, swelling in your face or hands, swelling or tingling in your mouth or throat, chest tightness, trouble breathing  - Bleeding from your gums or nose, coughing up blood, heavy monthly periods  - Bleeding that does not stop, bruising, or weakness  - Dizziness, fainting, lightheadedness  - Pain, brown or black skin, or skin that is cool to the touch  - Purple toes or feet, or new pain in your leg, foot, or toes  - Purplish red, net-like, blotchy spots on the skin  - Red or dark brown urine, or red or black, tarry stools  - Vomiting blood or material that looks like coffee grounds    If you notice other side effects that you think are caused by this medicine, tell your doctor.  Call your doctor for medical advice about side effects. You may report side effects to FDA at 0-739-FDA-[phone]

## 2022-11-08 NOTE — PROVIDER NOTIFICATION
Orthostatic BP and pulse done this evening per orders.  Patient denied feeling dizzy while vitals were obtained.  Patient up to chair for supper.  Denies pain, nausea or shortenss of air.  Patient only ate pudding from supper tray and declined offer of other foods.   11/07/22 1710 11/07/22 1712 11/07/22 1714   Lying Orthostatic BP   Lying Orthostatic /90  --   --    Lying Orthostatic Pulse 80 bpm  --   --    Sitting Orthostatic BP   Sitting Orthostatic BP  --  142/76  --    Sitting Orthostatic Pulse  --  89 bpm  --    Standing Orthostatic BP   Standing Orthostatic BP  --   --  119/70   Standing Orthostatic Pulse  --   --  90 bpm

## 2022-11-08 NOTE — CONSULTS
Care Management Note:     Care Management team received referral due to elevated SHAYY score.       Per IDT rounds, EMR review, and/or discussion with staff, it has been determined that pt will discharge to home with support of family.  Pt declined the need of supportive services. Patient states that he is eager to discharge from the hospital as he is the main caregiver to his wife. Pt reports that he is at his baseline.     Writer spoke about recommendation of making an appointment w/ his PCP following his hospitalization, patient reports that he will take care of making an appointment.      Care Management will close referral at this time, but please place new consult should pt develop new needs during this stay.     ANJEL Parrish  Care Management, [facility]  [phone]

## 2022-11-08 NOTE — PLAN OF CARE
[facility] [facility] DISCHARGE NOTE    Patient discharged to home at 1330 PM via wheel chair. Accompanied by son and staff. Discharge instructions reviewed with patient and son, opportunity offered to ask questions. Prescriptions sent to patients preferred pharmacy. All belongings sent with patient.    Gemma Crawford RN    Goal Outcome Evaluation:      Plan of Care Reviewed With: patient, other (see comments) (son)

## 2022-11-08 NOTE — DISCHARGE SUMMARY
[facility]  Hospitalist Discharge Summary      Date of Admission:  11/5/2022  Date of Discharge:  11/8/2022  Discharging Provider: Josh Oliver MD  Discharge Service: Hospitalist Service    Discharge Diagnoses   CHF with exacerbation  Hypertension  CKD 3  Orthostatic hypotension    Follow-ups Needed After Discharge       Unresulted Labs Ordered in the Past 30 Days of this Admission     No orders found from 10/6/2022 to 11/6/2022.          Discharge Disposition   Discharged to home  Condition at discharge: Stable    Hospital Course   Eduardo Laughlin is a 82 year old male admitted on 11/5/2022. He has history of HFpEF, CKD, hypertension, type 2 diabetes, paroxysmal atrial fibrillation, hyperlipidemia, gout, bladder cancer status post cystectomy and insomnia.  He presents due to progressive shortness of breath, dizziness and an episode of syncope.    Acute hypoxic respiratory failure  Acute on Chronic Diastolic Heart Failure (HFpEF)  Reportedly hypoxic per EMS and started on BiPAP in ED though able to wean to oxygen by nasal cannula. No clear documented hypoxia.     No weight done on presentation; recent discharge weight 247 lb 11 oz after diuresis. BNP 2226. Troponin elevated as below. CT shows mild groundglass infiltrates. Last Echo 10/12/22: EF 50 to 55%, no regional wall motion abnormality, suspected mild aortic stenosis, moderate ascending aorta dilation, mild aortic root dilation.  Recent hospitalization for decompensation (10/12-10/19/22 at [facility]), diuresed with IV furosemide 40 mg twice daily at that time, developed ROBERT on this regimen and then discharged on furosemide 20 mg daily for 2 weeks. Home diuretics completed 11/2/22.  Suspect this is recurrent CHF exacerbation in the setting of recently stopping oral diuretics as outpatient.  Received IV furosemide 60 mg x 1 dose in ED with ~1700 mL diuresis recorded. Weaned off oxygen at time of admission evaluation.  Appears to be fairly well compensated on admission though has not ambulated yet.  - Started oral furosemide 20 mg BID.  He was discharged on home dose of Lasix.  - I and O's, daily weights    Elevated troponin, suspected demand ischemia  Troponin trend 47 --> 138 --> 150 --> 116 during ED observation. ECG shows bigeminy, LBBB which was previously present, similar morphology to prior. Low suspicion for ACS. Suspect this is demand ischemia related to CHF.  - monitor for concerning anginal symptoms    Elevated Lactic  Lactic 4.1 on presentation, improved to 1.0 on recheck after initiated on oxygen/BiPAP and furosemide. Suspect this may have been related to increased work of breathing. No signs of infection/sepsis.   - monitor    Syncope  Progressive SOB and dizziness over the past few weeks. Reports orthostatic symptoms at home with low blood pressures on home checks. Vitals notable for: initial blood pressure low since then stable/mild hypertension and HR 50s-70s. On admission, borderline orthostasis with BP drop of 18 mmHg.  May be related to hypoxia in the setting of decompensated HF as above versus orthostatic hypotension versus metoprolol causing hypotension and/or bradycardia.   - continued telemetry, was uneventful  - Patient had orthostatic hypotension.  He was discharged on lower dose of metoprolol as 25 mg daily.  He needs to follow-up with his PCP/cardiology to adjust medication dosages if continues being symptomatic with occasional syncope or lightheadedness.  - Patient was discharged on home dose of metoprolol which was initially held  - recent echo without structural etiology  - CHF management with Lasix 20 mg twice daily    CKD  On admission, creatinine 2.46, GFR 26, BUN 47.1. Recent baseline creatinine 2.34-2.46 and GFR 26-27.  Recently had ROBERT with peak creatinine of 3.96 on 10/16/2022 in the setting of diuresis.  Renal function has since recovered and is at baseline on admission.  - BMP was repeated  and BUN and creatinine stayed the same.  Sodium was 134 on day of discharge and potassium was within normal range.    Hypertension  Chronic. Blood pressures reviewed, stable though borderline orthostasis as above.  Lisinopril recently discontinued due to renal dysfunction.  Recently noted to have low blood pressures, cardiology recommended decreasing metoprolol to 25 mg daily if this persisted.  - hold home metoprolol initially, monitor BP/orthostasis    Diabetes Mellitus, Type II  Chronic.  Last a1C 7.7% on 10/2022.   -continued home glimepiride  -moderate SSI  -hypo/hyperglycemia protocol with blood glucose monitoring    Paroxysmal Atrial Fibrillation  Acquired coagulopathy secondary to warfarin  LBBB, chronic  Rate managed on metoprolol though has been 50s-70s on admission despite not receiving metoprolol. INR therapeutic on admission.   - Continue warfarin (pharmacy to dose)  - Metoprolol was initially held.  Heart rate stayed controlled.  Patient was discharged on home dose of metoprolol    Hyperlipidemia  - Chronic: continue home ezetimibe     Gout  - Chronic: continue home allopurinol    History of bladder and prostate cancer  S/p cystectomy, ostomy in place  No current issues.  - ostomy cares per nursing    Sleep Disturbance  - Chronic: continue home trazodone, temazepam prn    Obesity  Body mass index is 35.21 kg/m . Contributes to overall morbidity and mortality.       Consultations This Hospital Stay   CARE MANAGEMENT / SOCIAL WORK IP CONSULT  NUTRITION SERVICES ADULT IP CONSULT  PHARMACY TO DOSE WARFARIN    Code Status   No CPR- Do NOT Intubate    Time Spent on this Encounter   IJosh MD, personally saw the patient today and spent greater than 30 minutes discharging this patient.       Josh Oliver MD  [facility] SURGICAL  [address] [address]  Phone: [phone]  Fax:  [phone]  ______________________________________________________________________    Physical Exam   Vital Signs: Temp: 98.3  F (36.8  C) Temp src: Oral BP: (!) 156/84 Pulse: 90   Resp: 18 SpO2: 93 % O2 Device: None (Room air)    Weight: 245 lbs 2.42 oz  Patient is alert and oriented and pleasant.  Laying down in bed and is in no acute distress.  He is on room air.  HEENT is unremarkable  Neck is supple  Chest is clear to auscultation bilaterally without crackles and with good air movement.  Heart with regular rate and rhythm  Abdomen is benign and nontender   deferred  Extremities without edema  Neurological exam is nonfocal.  He is alert and oriented.        Primary Care Physician   Rafael Montelongo    Discharge Orders      Reason for your hospital stay    CHF exacerbation     Follow-up and recommended labs and tests     Follow up with primary care provider, Rafael Montelongo, within 7 days to evaluate medication change and for hospital follow- up.  The following labs/tests are recommended: BMP, BNP, troponin.    Follow up with your cardiologist in the next 2 weeks or first available appointment to review your medications.  You will also need to follow-up with a nephrologist on regular basis due to chronic renal failure.     Activity    Your activity upon discharge: activity as tolerated     Diet    Follow this diet upon discharge: Orders Placed This Encounter      Moderate Consistent Carb (60 g CHO per Meal) Diet, less than 2 g NaCl per day.  No added salt.       Significant Results and Procedures   Most Recent 3 CBC's:  Recent Labs   Lab Test 11/07/22  0411 11/05/22  2145 10/13/22  0750   WBC 7.0 9.2 8.4   HGB 11.6* 12.3* 13.5   MCV 96 95 95   * 154 192     Most Recent 3 BMP's:  Recent Labs   Lab Test 11/08/22  1154 11/08/22  0750 11/08/22  0413 11/07/22  0806 11/07/22  0411 11/06/22  1714 11/05/22  2145   NA  --   --  134*  --  137  --  137   POTASSIUM  --   --  4.1  --  4.3  --  4.4   CHLORIDE  --    --  101  --  106  --  106   CO2  --   --  24  --  23  --  17*   BUN  --   --  47.8*  --  47.5*  --  47.1*   CR  --   --  2.39*  --  2.54*  --  2.35*   ANIONGAP  --   --  9  --  8  --  14   GALLO  --   --  9.1  --  9.0  --  8.6*   * 171* 162*   < > 151*   < > 232*    < > = values in this interval not displayed.     Most Recent 2 LFT's:  Recent Labs   Lab Test 11/05/22 2145 04/24/20  1427   AST 42 22   ALT 32 20   ALKPHOS 68 64   BILITOTAL 0.3 0.3     Most Recent 3 INR's:  Recent Labs   Lab Test 11/08/22  0413 11/07/22  0411 11/06/22  0639   INR 2.42* 2.78* 3.10*     Most Recent 3 BNP's:  Recent Labs   Lab Test 11/05/22  2145 10/12/22  1802   NTBNPI 2,226* 5,303*     Most Recent 6 glucoses:  Recent Labs   Lab Test 11/08/22  1154 11/08/22  0750 11/08/22  0413 11/08/22  0204 11/07/22  2130 11/07/22  1635   * 171* 162* 131* 193* 127*   ,   Results for orders placed or performed during the hospital encounter of 11/05/22   XR Chest Port 1 View    Narrative    EXAM: XR CHEST PORT 1 VIEW  LOCATION: [facility]  DATE/TIME: 11/5/2022 10:13 PM    INDICATION: dyspnea  COMPARISON: 10/12/2022      Impression    IMPRESSION: Heart size is enlarged. Lung volumes are mildly diminished. Increased pulmonary vascularity is noted centrally, with increased interstitial and airspace opacities bilaterally, left greater than right. Findings are favored to represent   asymmetric edema. Superimposed left-sided pneumonia is difficult to exclude. No pneumothorax. Possible small left effusion. No acute bony abnormality.   Chest CT w/o contrast    Narrative    EXAM: CT CHEST WITHOUT CONTRAST  LOCATION: [facility]  DATE/TIME: 11/06/2022, 11:38 AM    INDICATION: Acute hypoxic respiratory distress with syncope. Chronic anticoagulation, concern for hemoptysis.  Abnormal x-ray chest. Evaluate for acute cardiopulmonary process. CT chest 12.09/2019.  COMPARISON: 12/09/2019.  TECHNIQUE: CT  chest without IV contrast. Multiplanar reformats were obtained. Dose reduction techniques were used.  CONTRAST: None.    FINDINGS:   LUNGS AND PLEURA: Scattered benign granulomatous change. Groundglass infiltrates in the left upper lobe, right upper lobe, and right lower lobe noted. Stable benign left upper lobe nodule image 21 series 3.    MEDIASTINUM/AXILLAE: No gross adenopathy in the absence of contrast. No aneurysm.    CORONARY ARTERY CALCIFICATION: Moderate.    UPPER ABDOMEN: No acute findings. Right renal atrophy.    MUSCULOSKELETAL: No frankly destructive bony lesions.      Impression    IMPRESSION:   1.  Mild groundglass infiltrates in both lungs.           Discharge Medications   Current Discharge Medication List      START taking these medications    Details   metoprolol succinate ER (TOPROL XL) 25 MG 24 hr tablet Take 1 tablet (25 mg) by mouth daily for 30 days  Qty: 30 tablet, Refills: 0    Associated Diagnoses: Acute on chronic congestive heart failure, unspecified heart failure type (H); Primary hypertension         CONTINUE these medications which have CHANGED    Details   !! warfarin ANTICOAGULANT (COUMADIN) 3 MG tablet Used to be taken with 5 mg tablets on M, W, and F for a total of 8 mg.  Qty: 1 tablet, Refills: 0    Associated Diagnoses: Chronic anticoagulation       !! - Potential duplicate medications found. Please discuss with provider.      CONTINUE these medications which have NOT CHANGED    Details   acetaminophen (TYLENOL) 500 MG tablet Take 1,000 mg by mouth every 6 hours as needed for mild pain      allopurinol (ZYLOPRIM) 100 MG tablet Take 2 tablets (200 mg) by mouth daily for 30 days  Qty: 60 tablet, Refills: 0    Associated Diagnoses: Chronic gout without tophus, unspecified cause, unspecified site      doxycycline hyclate (VIBRAMYCIN) 50 MG capsule Take 50 mg by mouth daily      ezetimibe (ZETIA) 10 MG tablet Take 10 mg by mouth daily      furosemide (LASIX) 20 MG tablet Take 1  tablet (20 mg) by mouth as needed (weight gain of 2-3 lbs in a day)  Qty: 60 tablet, Refills: 1    Associated Diagnoses: Acute on chronic congestive heart failure, unspecified heart failure type (H)      glimepiride (AMARYL) 1 MG tablet Take 1 mg by mouth daily      temazepam (RESTORIL) 15 MG capsule Take 15 mg by mouth nightly as needed for sleep      traZODone (DESYREL) 100 MG tablet Take 100-200 mg by mouth At Bedtime      !! warfarin ANTICOAGULANT (COUMADIN) 5 MG tablet Take 5-8 mg by mouth See Admin Instructions 8 mg MWF and 5 mg all other days       !! - Potential duplicate medications found. Please discuss with provider.        Allergies   Allergies   Allergen Reactions     Metoprolol Succinate Er      Other reaction(s): low blood pressure     Pcn [Penicillins] Other (See Comments)     Childhood-doesn't know reactions     Statin Drugs [Hmg-Coa-R Inhibitors] Cramps     Uroxatral [Alfuzosin] Other (See Comments)     hypotension

## 2022-11-08 NOTE — PROVIDER NOTIFICATION
Patient's orthostatic vitals done this morning.     11/08/22 0741   Lying Orthostatic BP   Lying Orthostatic /78   Lying Orthostatic Pulse 95 bpm   Sitting Orthostatic BP   Sitting Orthostatic BP 92/58   Sitting Orthostatic Pulse 76 bpm   Standing Orthostatic BP   Standing Orthostatic BP 80/46   Standing Orthostatic Pulse 65 bpm

## 2022-11-10 ENCOUNTER — PATIENT OUTREACH (OUTPATIENT)
Dept: CARE COORDINATION | Facility: CLINIC | Age: 82
End: 2022-11-10

## 2022-11-10 NOTE — PROGRESS NOTES
Clinic Care Coordination Contact  Care Team Conversations    Patient identified for care management outreach, however [facility] RN staff has already followed up with patient to ensure they are following up with PCP and have needs and resources met. Clinic RN will refer back to TRESSA JACKSON if needed/appropriate.    Erin Vasquez, [facility]  Social Work Care Coordinator - [facility]  Care Coordination  [email or web address]  Cell Phone: [phone]  Gender pronouns: she/her  Employed by [employer]

## 2022-11-11 ENCOUNTER — TRANSFERRED RECORDS (OUTPATIENT)
Dept: HEALTH INFORMATION MANAGEMENT | Facility: CLINIC | Age: 82
End: 2022-11-11

## 2022-11-11 ENCOUNTER — LAB REQUISITION (OUTPATIENT)
Dept: LAB | Facility: CLINIC | Age: 82
End: 2022-11-11

## 2022-11-11 DIAGNOSIS — I50.32 CHRONIC DIASTOLIC (CONGESTIVE) HEART FAILURE (H): ICD-10-CM

## 2022-11-11 LAB
ANION GAP SERPL CALCULATED.3IONS-SCNC: 13 MMOL/L (ref 7–15)
BUN SERPL-MCNC: 55.4 MG/DL (ref 8–23)
CALCIUM SERPL-MCNC: 8.9 MG/DL (ref 8.8–10.2)
CHLORIDE SERPL-SCNC: 105 MMOL/L (ref 98–107)
CREAT SERPL-MCNC: 2.46 MG/DL (ref 0.67–1.17)
DEPRECATED HCO3 PLAS-SCNC: 18 MMOL/L (ref 22–29)
GFR SERPL CREATININE-BSD FRML MDRD: 26 ML/MIN/1.73M2
GLUCOSE SERPL-MCNC: 159 MG/DL (ref 70–99)
NT-PROBNP SERPL-MCNC: 1108 PG/ML (ref 0–1800)
POTASSIUM SERPL-SCNC: 4.2 MMOL/L (ref 3.4–5.3)
SODIUM SERPL-SCNC: 136 MMOL/L (ref 136–145)
TROPONIN T SERPL HS-MCNC: 44 NG/L

## 2022-11-11 PROCEDURE — 80048 BASIC METABOLIC PNL TOTAL CA: CPT | Performed by: FAMILY MEDICINE

## 2022-11-11 PROCEDURE — 83880 ASSAY OF NATRIURETIC PEPTIDE: CPT | Performed by: FAMILY MEDICINE

## 2022-11-11 PROCEDURE — 84484 ASSAY OF TROPONIN QUANT: CPT | Performed by: FAMILY MEDICINE

## 2022-11-18 ENCOUNTER — APPOINTMENT (OUTPATIENT)
Dept: RADIOLOGY | Facility: HOSPITAL | Age: 82
DRG: 853 | End: 2022-11-18
Attending: NURSE PRACTITIONER
Payer: COMMERCIAL

## 2022-11-18 ENCOUNTER — APPOINTMENT (OUTPATIENT)
Dept: CT IMAGING | Facility: HOSPITAL | Age: 82
DRG: 853 | End: 2022-11-18
Attending: EMERGENCY MEDICINE
Payer: COMMERCIAL

## 2022-11-18 ENCOUNTER — HOSPITAL ENCOUNTER (INPATIENT)
Facility: HOSPITAL | Age: 82
LOS: 13 days | Discharge: HOME HEALTH | DRG: 853 | End: 2022-12-01
Attending: EMERGENCY MEDICINE | Admitting: INTERNAL MEDICINE
Payer: COMMERCIAL

## 2022-11-18 ENCOUNTER — ANCILLARY PROCEDURE (OUTPATIENT)
Dept: ULTRASOUND IMAGING | Facility: HOSPITAL | Age: 82
DRG: 853 | End: 2022-11-18
Attending: EMERGENCY MEDICINE
Payer: COMMERCIAL

## 2022-11-18 DIAGNOSIS — I25.110 CORONARY ARTERY DISEASE INVOLVING NATIVE CORONARY ARTERY OF NATIVE HEART WITH UNSTABLE ANGINA PECTORIS (H): Chronic | ICD-10-CM

## 2022-11-18 DIAGNOSIS — M1A.9XX0 CHRONIC GOUT WITHOUT TOPHUS, UNSPECIFIED CAUSE, UNSPECIFIED SITE: ICD-10-CM

## 2022-11-18 DIAGNOSIS — R10.13 EPIGASTRIC PAIN: ICD-10-CM

## 2022-11-18 DIAGNOSIS — J96.01 ACUTE RESPIRATORY FAILURE WITH HYPOXIA (H): ICD-10-CM

## 2022-11-18 DIAGNOSIS — I21.4 NSTEMI (NON-ST ELEVATED MYOCARDIAL INFARCTION) (H): Primary | ICD-10-CM

## 2022-11-18 DIAGNOSIS — F41.9 ANXIETY: ICD-10-CM

## 2022-11-18 DIAGNOSIS — I50.32 CHRONIC HEART FAILURE WITH PRESERVED EJECTION FRACTION (H): Chronic | ICD-10-CM

## 2022-11-18 DIAGNOSIS — K59.01 SLOW TRANSIT CONSTIPATION: ICD-10-CM

## 2022-11-18 DIAGNOSIS — Z79.01 CHRONIC ANTICOAGULATION: ICD-10-CM

## 2022-11-18 DIAGNOSIS — E87.20 LACTIC ACIDOSIS: ICD-10-CM

## 2022-11-18 DIAGNOSIS — I50.9 ACUTE CONGESTIVE HEART FAILURE, UNSPECIFIED HEART FAILURE TYPE (H): ICD-10-CM

## 2022-11-18 DIAGNOSIS — J69.0 ASPIRATION PNEUMONIA OF BOTH LUNGS, UNSPECIFIED ASPIRATION PNEUMONIA TYPE, UNSPECIFIED PART OF LUNG (H): ICD-10-CM

## 2022-11-18 DIAGNOSIS — E46 MALNUTRITION, UNSPECIFIED TYPE (H): ICD-10-CM

## 2022-11-18 LAB
ALBUMIN UR-MCNC: 600 MG/DL
ANION GAP SERPL CALCULATED.3IONS-SCNC: 13 MMOL/L (ref 7–15)
ANION GAP SERPL CALCULATED.3IONS-SCNC: 13 MMOL/L (ref 7–15)
ANION GAP SERPL CALCULATED.3IONS-SCNC: 20 MMOL/L (ref 7–15)
APPEARANCE UR: ABNORMAL
BASE EXCESS BLDV CALC-SCNC: -8.8 MMOL/L
BASOPHILS # BLD MANUAL: 0 10E3/UL (ref 0–0.2)
BASOPHILS NFR BLD MANUAL: 0 %
BILIRUB UR QL STRIP: NEGATIVE
BUN SERPL-MCNC: 50.8 MG/DL (ref 8–23)
BUN SERPL-MCNC: 51.2 MG/DL (ref 8–23)
BUN SERPL-MCNC: 53.7 MG/DL (ref 8–23)
CALCIUM SERPL-MCNC: 8.4 MG/DL (ref 8.8–10.2)
CALCIUM SERPL-MCNC: 8.6 MG/DL (ref 8.8–10.2)
CALCIUM SERPL-MCNC: 8.6 MG/DL (ref 8.8–10.2)
CHLORIDE SERPL-SCNC: 101 MMOL/L (ref 98–107)
CHLORIDE SERPL-SCNC: 102 MMOL/L (ref 98–107)
CHLORIDE SERPL-SCNC: 99 MMOL/L (ref 98–107)
COLOR UR AUTO: YELLOW
CREAT BLD-MCNC: 4.2 MG/DL (ref 0.7–1.3)
CREAT SERPL-MCNC: 3.46 MG/DL (ref 0.67–1.17)
CREAT SERPL-MCNC: 3.68 MG/DL (ref 0.67–1.17)
CREAT SERPL-MCNC: 3.74 MG/DL (ref 0.67–1.17)
DEPRECATED HCO3 PLAS-SCNC: 14 MMOL/L (ref 22–29)
DEPRECATED HCO3 PLAS-SCNC: 19 MMOL/L (ref 22–29)
DEPRECATED HCO3 PLAS-SCNC: 20 MMOL/L (ref 22–29)
ELLIPTOCYTES BLD QL SMEAR: SLIGHT
EOSINOPHIL # BLD MANUAL: 0 10E3/UL (ref 0–0.7)
EOSINOPHIL NFR BLD MANUAL: 0 %
ERYTHROCYTE [DISTWIDTH] IN BLOOD BY AUTOMATED COUNT: 15.4 % (ref 10–15)
FLUAV RNA SPEC QL NAA+PROBE: NEGATIVE
FLUBV RNA RESP QL NAA+PROBE: NEGATIVE
FRAGMENTS BLD QL SMEAR: SLIGHT
GFR SERPL CREATININE-BSD FRML MDRD: 13 ML/MIN/1.73M2
GFR SERPL CREATININE-BSD FRML MDRD: 15 ML/MIN/1.73M2
GFR SERPL CREATININE-BSD FRML MDRD: 16 ML/MIN/1.73M2
GFR SERPL CREATININE-BSD FRML MDRD: 17 ML/MIN/1.73M2
GLUCOSE BLDC GLUCOMTR-MCNC: 160 MG/DL (ref 70–99)
GLUCOSE BLDC GLUCOMTR-MCNC: 167 MG/DL (ref 70–99)
GLUCOSE SERPL-MCNC: 152 MG/DL (ref 70–99)
GLUCOSE SERPL-MCNC: 174 MG/DL (ref 70–99)
GLUCOSE SERPL-MCNC: 245 MG/DL (ref 70–99)
GLUCOSE UR STRIP-MCNC: NEGATIVE MG/DL
HCO3 BLDV-SCNC: 19 MMOL/L (ref 24–30)
HCT VFR BLD AUTO: 41.9 % (ref 40–53)
HGB BLD-MCNC: 13.4 G/DL (ref 13.3–17.7)
HGB UR QL STRIP: ABNORMAL
HOLD SPECIMEN: NORMAL
HYALINE CASTS: 21 /LPF
INR PPP: 4.11 (ref 0.85–1.15)
KETONES UR STRIP-MCNC: NEGATIVE MG/DL
LACTATE SERPL-SCNC: 1.5 MMOL/L (ref 0.7–2)
LACTATE SERPL-SCNC: 2.5 MMOL/L (ref 0.7–2)
LACTATE SERPL-SCNC: 6.9 MMOL/L (ref 0.7–2)
LEUKOCYTE ESTERASE UR QL STRIP: ABNORMAL
LYMPHOCYTES # BLD MANUAL: 0.8 10E3/UL (ref 0.8–5.3)
LYMPHOCYTES NFR BLD MANUAL: 4 %
MAGNESIUM SERPL-MCNC: 2.4 MG/DL (ref 1.7–2.3)
MCH RBC QN AUTO: 31.3 PG (ref 26.5–33)
MCHC RBC AUTO-ENTMCNC: 32 G/DL (ref 31.5–36.5)
MCV RBC AUTO: 98 FL (ref 78–100)
MONOCYTES # BLD MANUAL: 0.2 10E3/UL (ref 0–1.3)
MONOCYTES NFR BLD MANUAL: 1 %
MUCOUS THREADS #/AREA URNS LPF: PRESENT /LPF
NEUTROPHILS # BLD MANUAL: 19.5 10E3/UL (ref 1.6–8.3)
NEUTROPHILS NFR BLD MANUAL: 95 %
NITRATE UR QL: NEGATIVE
NT-PROBNP SERPL-MCNC: 3064 PG/ML (ref 0–1800)
OXYHGB MFR BLDV: 37.1 % (ref 70–75)
PCO2 BLDV: 49 MM HG (ref 35–50)
PH BLDV: 7.21 [PH] (ref 7.35–7.45)
PH UR STRIP: 6.5 [PH] (ref 5–7)
PLAT MORPH BLD: ABNORMAL
PLATELET # BLD AUTO: 322 10E3/UL (ref 150–450)
PO2 BLDV: 23 MM HG (ref 25–47)
POTASSIUM SERPL-SCNC: 4.9 MMOL/L (ref 3.4–5.3)
POTASSIUM SERPL-SCNC: 5.5 MMOL/L (ref 3.4–5.3)
POTASSIUM SERPL-SCNC: 5.9 MMOL/L (ref 3.4–5.3)
PROCALCITONIN SERPL IA-MCNC: 1.21 NG/ML
RBC # BLD AUTO: 4.28 10E6/UL (ref 4.4–5.9)
RBC MORPH BLD: ABNORMAL
RBC URINE: 30 /HPF
RSV RNA SPEC NAA+PROBE: NEGATIVE
SAO2 % BLDV: 37.6 % (ref 70–75)
SARS-COV-2 RNA RESP QL NAA+PROBE: NEGATIVE
SODIUM SERPL-SCNC: 133 MMOL/L (ref 136–145)
SODIUM SERPL-SCNC: 133 MMOL/L (ref 136–145)
SODIUM SERPL-SCNC: 135 MMOL/L (ref 136–145)
SP GR UR STRIP: 1.02 (ref 1–1.03)
SQUAMOUS EPITHELIAL: 2 /HPF
TROPONIN T SERPL HS-MCNC: 325 NG/L
TROPONIN T SERPL HS-MCNC: 703 NG/L
UROBILINOGEN UR STRIP-MCNC: <2 MG/DL
WBC # BLD AUTO: 20.5 10E3/UL (ref 4–11)
WBC CLUMPS #/AREA URNS HPF: PRESENT /HPF
WBC URINE: >182 /HPF

## 2022-11-18 PROCEDURE — 72128 CT CHEST SPINE W/O DYE: CPT

## 2022-11-18 PROCEDURE — 83605 ASSAY OF LACTIC ACID: CPT | Performed by: EMERGENCY MEDICINE

## 2022-11-18 PROCEDURE — 999N000157 HC STATISTIC RCP TIME EA 10 MIN

## 2022-11-18 PROCEDURE — 36415 COLL VENOUS BLD VENIPUNCTURE: CPT | Performed by: NURSE PRACTITIONER

## 2022-11-18 PROCEDURE — C9803 HOPD COVID-19 SPEC COLLECT: HCPCS

## 2022-11-18 PROCEDURE — 85610 PROTHROMBIN TIME: CPT | Performed by: EMERGENCY MEDICINE

## 2022-11-18 PROCEDURE — 82805 BLOOD GASES W/O2 SATURATION: CPT | Performed by: EMERGENCY MEDICINE

## 2022-11-18 PROCEDURE — 87086 URINE CULTURE/COLONY COUNT: CPT | Performed by: EMERGENCY MEDICINE

## 2022-11-18 PROCEDURE — 80048 BASIC METABOLIC PNL TOTAL CA: CPT | Performed by: INTERNAL MEDICINE

## 2022-11-18 PROCEDURE — 250N000011 HC RX IP 250 OP 636: Performed by: NURSE PRACTITIONER

## 2022-11-18 PROCEDURE — 82565 ASSAY OF CREATININE: CPT

## 2022-11-18 PROCEDURE — 80048 BASIC METABOLIC PNL TOTAL CA: CPT | Performed by: EMERGENCY MEDICINE

## 2022-11-18 PROCEDURE — 84484 ASSAY OF TROPONIN QUANT: CPT | Performed by: NURSE PRACTITIONER

## 2022-11-18 PROCEDURE — 200N000001 HC R&B ICU

## 2022-11-18 PROCEDURE — 93005 ELECTROCARDIOGRAM TRACING: CPT | Performed by: EMERGENCY MEDICINE

## 2022-11-18 PROCEDURE — 71250 CT THORAX DX C-: CPT

## 2022-11-18 PROCEDURE — 5A09357 ASSISTANCE WITH RESPIRATORY VENTILATION, LESS THAN 24 CONSECUTIVE HOURS, CONTINUOUS POSITIVE AIRWAY PRESSURE: ICD-10-PCS | Performed by: EMERGENCY MEDICINE

## 2022-11-18 PROCEDURE — 250N000011 HC RX IP 250 OP 636: Performed by: EMERGENCY MEDICINE

## 2022-11-18 PROCEDURE — 83605 ASSAY OF LACTIC ACID: CPT | Performed by: NURSE PRACTITIONER

## 2022-11-18 PROCEDURE — 83880 ASSAY OF NATRIURETIC PEPTIDE: CPT | Performed by: EMERGENCY MEDICINE

## 2022-11-18 PROCEDURE — 999N000065 XR CHEST PORT 1 VIEW

## 2022-11-18 PROCEDURE — 85027 COMPLETE CBC AUTOMATED: CPT | Performed by: EMERGENCY MEDICINE

## 2022-11-18 PROCEDURE — 250N000012 HC RX MED GY IP 250 OP 636 PS 637: Performed by: INTERNAL MEDICINE

## 2022-11-18 PROCEDURE — 82310 ASSAY OF CALCIUM: CPT | Performed by: NURSE PRACTITIONER

## 2022-11-18 PROCEDURE — 96365 THER/PROPH/DIAG IV INF INIT: CPT

## 2022-11-18 PROCEDURE — 250N000009 HC RX 250: Performed by: EMERGENCY MEDICINE

## 2022-11-18 PROCEDURE — 250N000013 HC RX MED GY IP 250 OP 250 PS 637: Performed by: NURSE PRACTITIONER

## 2022-11-18 PROCEDURE — 99285 EMERGENCY DEPT VISIT HI MDM: CPT | Mod: 25

## 2022-11-18 PROCEDURE — 70450 CT HEAD/BRAIN W/O DYE: CPT

## 2022-11-18 PROCEDURE — 99291 CRITICAL CARE FIRST HOUR: CPT | Performed by: NURSE PRACTITIONER

## 2022-11-18 PROCEDURE — 72131 CT LUMBAR SPINE W/O DYE: CPT

## 2022-11-18 PROCEDURE — 87637 SARSCOV2&INF A&B&RSV AMP PRB: CPT | Performed by: EMERGENCY MEDICINE

## 2022-11-18 PROCEDURE — 84145 PROCALCITONIN (PCT): CPT | Performed by: EMERGENCY MEDICINE

## 2022-11-18 PROCEDURE — 85007 BL SMEAR W/DIFF WBC COUNT: CPT | Performed by: EMERGENCY MEDICINE

## 2022-11-18 PROCEDURE — 36415 COLL VENOUS BLD VENIPUNCTURE: CPT | Performed by: EMERGENCY MEDICINE

## 2022-11-18 PROCEDURE — 272N000452 HC KIT SHRLOCK 5FR POWER PICC TRIPLE LUMEN

## 2022-11-18 PROCEDURE — 83735 ASSAY OF MAGNESIUM: CPT | Performed by: EMERGENCY MEDICINE

## 2022-11-18 PROCEDURE — 36569 INSJ PICC 5 YR+ W/O IMAGING: CPT

## 2022-11-18 PROCEDURE — 84484 ASSAY OF TROPONIN QUANT: CPT | Performed by: EMERGENCY MEDICINE

## 2022-11-18 PROCEDURE — 72125 CT NECK SPINE W/O DYE: CPT

## 2022-11-18 PROCEDURE — 87040 BLOOD CULTURE FOR BACTERIA: CPT | Performed by: EMERGENCY MEDICINE

## 2022-11-18 PROCEDURE — 81001 URINALYSIS AUTO W/SCOPE: CPT | Performed by: EMERGENCY MEDICINE

## 2022-11-18 PROCEDURE — 94660 CPAP INITIATION&MGMT: CPT

## 2022-11-18 RX ORDER — BUMETANIDE 0.25 MG/ML
1 INJECTION INTRAMUSCULAR; INTRAVENOUS ONCE
Status: COMPLETED | OUTPATIENT
Start: 2022-11-18 | End: 2022-11-18

## 2022-11-18 RX ORDER — BISACODYL 5 MG
5 TABLET, DELAYED RELEASE (ENTERIC COATED) ORAL DAILY PRN
Status: DISCONTINUED | OUTPATIENT
Start: 2022-11-18 | End: 2022-12-01 | Stop reason: HOSPADM

## 2022-11-18 RX ORDER — NICOTINE POLACRILEX 4 MG
15-30 LOZENGE BUCCAL
Status: DISCONTINUED | OUTPATIENT
Start: 2022-11-18 | End: 2022-11-25

## 2022-11-18 RX ORDER — LIDOCAINE 40 MG/G
CREAM TOPICAL
Status: ACTIVE | OUTPATIENT
Start: 2022-11-18 | End: 2022-11-21

## 2022-11-18 RX ORDER — FUROSEMIDE 10 MG/ML
40 INJECTION INTRAMUSCULAR; INTRAVENOUS ONCE
Status: COMPLETED | OUTPATIENT
Start: 2022-11-18 | End: 2022-11-18

## 2022-11-18 RX ORDER — ONDANSETRON 4 MG/1
4 TABLET, ORALLY DISINTEGRATING ORAL EVERY 6 HOURS PRN
Status: DISCONTINUED | OUTPATIENT
Start: 2022-11-18 | End: 2022-12-01 | Stop reason: HOSPADM

## 2022-11-18 RX ORDER — PROCHLORPERAZINE 25 MG
12.5 SUPPOSITORY, RECTAL RECTAL EVERY 12 HOURS PRN
Status: DISCONTINUED | OUTPATIENT
Start: 2022-11-18 | End: 2022-12-01 | Stop reason: HOSPADM

## 2022-11-18 RX ORDER — MEROPENEM 500 MG/1
500 INJECTION, POWDER, FOR SOLUTION INTRAVENOUS ONCE
Status: COMPLETED | OUTPATIENT
Start: 2022-11-18 | End: 2022-11-18

## 2022-11-18 RX ORDER — ACETAMINOPHEN 500 MG
1000 TABLET ORAL EVERY 6 HOURS PRN
Status: DISCONTINUED | OUTPATIENT
Start: 2022-11-18 | End: 2022-12-01 | Stop reason: HOSPADM

## 2022-11-18 RX ORDER — PANTOPRAZOLE SODIUM 40 MG/1
40 TABLET, DELAYED RELEASE ORAL
Status: DISCONTINUED | OUTPATIENT
Start: 2022-11-19 | End: 2022-12-01 | Stop reason: HOSPADM

## 2022-11-18 RX ORDER — DEXTROSE MONOHYDRATE 25 G/50ML
25-50 INJECTION, SOLUTION INTRAVENOUS
Status: DISCONTINUED | OUTPATIENT
Start: 2022-11-18 | End: 2022-12-01 | Stop reason: HOSPADM

## 2022-11-18 RX ORDER — MEROPENEM 1 G/1
1 INJECTION, POWDER, FOR SOLUTION INTRAVENOUS EVERY 12 HOURS
Status: DISCONTINUED | OUTPATIENT
Start: 2022-11-19 | End: 2022-11-18

## 2022-11-18 RX ORDER — BISACODYL 5 MG
10 TABLET, DELAYED RELEASE (ENTERIC COATED) ORAL DAILY PRN
Status: DISCONTINUED | OUTPATIENT
Start: 2022-11-18 | End: 2022-12-01 | Stop reason: HOSPADM

## 2022-11-18 RX ORDER — NICOTINE POLACRILEX 4 MG
15-30 LOZENGE BUCCAL
Status: DISCONTINUED | OUTPATIENT
Start: 2022-11-18 | End: 2022-12-01 | Stop reason: HOSPADM

## 2022-11-18 RX ORDER — ONDANSETRON 2 MG/ML
4 INJECTION INTRAMUSCULAR; INTRAVENOUS EVERY 6 HOURS PRN
Status: DISCONTINUED | OUTPATIENT
Start: 2022-11-18 | End: 2022-12-01 | Stop reason: HOSPADM

## 2022-11-18 RX ORDER — TRAZODONE HYDROCHLORIDE 50 MG/1
100 TABLET, FILM COATED ORAL AT BEDTIME
Status: DISCONTINUED | OUTPATIENT
Start: 2022-11-18 | End: 2022-11-23

## 2022-11-18 RX ORDER — PROCHLORPERAZINE MALEATE 5 MG
5 TABLET ORAL EVERY 6 HOURS PRN
Status: DISCONTINUED | OUTPATIENT
Start: 2022-11-18 | End: 2022-12-01 | Stop reason: HOSPADM

## 2022-11-18 RX ORDER — MEROPENEM 500 MG/1
500 INJECTION, POWDER, FOR SOLUTION INTRAVENOUS EVERY 12 HOURS
Status: DISCONTINUED | OUTPATIENT
Start: 2022-11-19 | End: 2022-11-21

## 2022-11-18 RX ORDER — BISACODYL 5 MG
15 TABLET, DELAYED RELEASE (ENTERIC COATED) ORAL DAILY PRN
Status: DISCONTINUED | OUTPATIENT
Start: 2022-11-18 | End: 2022-12-01 | Stop reason: HOSPADM

## 2022-11-18 RX ORDER — ALLOPURINOL 100 MG/1
200 TABLET ORAL DAILY
Status: DISCONTINUED | OUTPATIENT
Start: 2022-11-19 | End: 2022-12-01 | Stop reason: HOSPADM

## 2022-11-18 RX ORDER — DEXTROSE MONOHYDRATE 25 G/50ML
25-50 INJECTION, SOLUTION INTRAVENOUS
Status: DISCONTINUED | OUTPATIENT
Start: 2022-11-18 | End: 2022-11-25

## 2022-11-18 RX ADMIN — INSULIN ASPART 1 UNITS: 100 INJECTION, SOLUTION INTRAVENOUS; SUBCUTANEOUS at 19:40

## 2022-11-18 RX ADMIN — FUROSEMIDE 40 MG: 10 INJECTION, SOLUTION INTRAMUSCULAR; INTRAVENOUS at 13:39

## 2022-11-18 RX ADMIN — MEROPENEM 500 MG: 500 INJECTION, POWDER, FOR SOLUTION INTRAVENOUS at 12:17

## 2022-11-18 RX ADMIN — ACETAMINOPHEN 1000 MG: 500 TABLET ORAL at 19:35

## 2022-11-18 RX ADMIN — BUMETANIDE 1 MG: 0.25 INJECTION, SOLUTION INTRAMUSCULAR; INTRAVENOUS at 17:38

## 2022-11-18 RX ADMIN — SODIUM BICARBONATE 50 MEQ: 84 INJECTION, SOLUTION INTRAVENOUS at 13:24

## 2022-11-18 ASSESSMENT — ACTIVITIES OF DAILY LIVING (ADL)
CONCENTRATING,_REMEMBERING_OR_MAKING_DECISIONS_DIFFICULTY: NO
ADLS_ACUITY_SCORE: 35
WALKING_OR_CLIMBING_STAIRS_DIFFICULTY: YES
DRESS: 0-->INDEPENDENT
FALL_HISTORY_WITHIN_LAST_SIX_MONTHS: YES
ADLS_ACUITY_SCORE: 35
DOING_ERRANDS_INDEPENDENTLY_DIFFICULTY: YES
EQUIPMENT_CURRENTLY_USED_AT_HOME: WALKER, ROLLING
TOILETING_ISSUES: NO
WALKING_OR_CLIMBING_STAIRS: AMBULATION DIFFICULTY, ASSISTANCE 1 PERSON
TRANSFERRING: 0-->INDEPENDENT
DRESSING/BATHING_DIFFICULTY: YES
DIFFICULTY_EATING/SWALLOWING: NO
ADLS_ACUITY_SCORE: 21
CHANGE_IN_FUNCTIONAL_STATUS_SINCE_ONSET_OF_CURRENT_ILLNESS/INJURY: YES
WEAR_GLASSES_OR_BLIND: YES
ADLS_ACUITY_SCORE: 35
TRANSFERRING: 0-->INDEPENDENT
DRESS: 0-->INDEPENDENT
ADLS_ACUITY_SCORE: 21
DEPENDENT_IADLS:: INDEPENDENT
ADLS_ACUITY_SCORE: 35
ADLS_ACUITY_SCORE: 23
BATHING: 0-->INDEPENDENT

## 2022-11-18 NOTE — ED TRIAGE NOTES
Pt arrives via EMS. Wife called EMS due to pt having an altered mental status. Upon EMS arrival, pt was slumped over, bradycardiac, HR  34, O2 sats of 75% on RA, and pressures were low. EMS gave 0.5 of atropine, fluids were started. Possible fall this morning.  Upon arrival, Pt is alert and orientated x 4, HR 49. He has a history of A-fib.

## 2022-11-18 NOTE — ED NOTES
POC US ABDOMEN LIMITED    Date/Time: 11/18/2022 11:01 AM  Performed by: Ildefonso Pickens MD  Authorized by: Ildefonso Pickens MD     Procedure details:     Indications comment:  Fall, lightheadedness, low blood pressure in the field  Comments:      Views obtained: Right upper quadrant, left upper quadrant, suprapubic, subxiphoid cardiac, bilateral thoracic.  Findings: There is no evidence of intra-abdominal fluid.  The right kidney is irregular consistent with the patient's history of chronic right-sided hydronephrosis.  Patient has also had a cystectomy, no visible lower abdominal fluid collections.  There is no evidence of pericardial effusion.  There is bilateral lung sliding.    Impression: Negative E fast exam    Attempted to save images in PACS.  I was able to pull up the patient's exam, but I am sure if these images are going to be able to properly load due to recent technical difficulties.           Ildefonso Pickens MD  11/18/22 1104

## 2022-11-18 NOTE — ED NOTES
Skin tear to left elbow, skin missing. Bruising noted to right calf. Pt admits he had a fall yesterday and was able to get himself up with his walker.

## 2022-11-18 NOTE — PHARMACY-ADMISSION MEDICATION HISTORY
I Called pt and not available, LMV to call back.     If pt calls back please inform pt that her CK improved a little bit,  Monday was: 539. But still high. Likely due to her legs muscle pain. I ordered referral to rheumatologist for the further evaluation and treatment, the office will call her for the appointment. In the mean time please push water. If she develops chest pain, sob, headache, nausea and vomiting please go to er.     Valeria Salgado MD on 2/23/2022 at 9:19 AM              Pharmacy Note - Admission Medication History    Pertinent Provider Information: Unable to confirm meds with patient in ED due to AMS and BIPAP. Family does not know meds.     Recent discharge from [facility] on 11/8/22. Follow up visit at [facility] on 11/11/22.   Med list per these 2 recent visits, plus fill history.     ______________________________________________________________________    Prior To Admission (PTA) med list completed and updated in EMR.       PTA Med List   Medication Sig Note Last Dose     allopurinol (ZYLOPRIM) 100 MG tablet Take 2 tablets (200 mg) by mouth daily for 30 days  11/17/2022     doxycycline hyclate (VIBRAMYCIN) 50 MG capsule Take 50 mg by mouth daily  11/17/2022     ezetimibe (ZETIA) 10 MG tablet Take 10 mg by mouth daily  11/17/2022     furosemide (LASIX) 20 MG tablet Take 1 tablet (20 mg) by mouth as needed (weight gain of 2-3 lbs in a day)  Unknown at prn     glimepiride (AMARYL) 1 MG tablet Take 1 mg by mouth daily  11/17/2022     metoprolol succinate ER (TOPROL XL) 25 MG 24 hr tablet Take 1 tablet (25 mg) by mouth daily for 30 days  11/17/2022     traZODone (DESYREL) 100 MG tablet Take 100-200 mg by mouth At Bedtime  11/17/2022     warfarin ANTICOAGULANT (COUMADIN) 5 MG tablet Take 5-8 mg by mouth See Admin Instructions 8 mg MWF and 5 mg all other days 11/18/2022: Dose as reported at hospitals clinic visit 11/11/22. Unable to confirm with patient. 11/17/2022       Information source(s): Family member, Clinic records, Hospital records and [facility]  Method of interview communication: N/A    Summary of Changes to PTA Med List  New: none  Discontinued: temazepam (no fills on file, but is on clinic med list)  Changed: none    In the past week, patient estimated taking medication this percent of the time:  Unable to determine.    Allergies were reviewed, assessed, and updated with clinic records    Patient does not use any multi-dose medications prior to  admission.    The information provided in this note is only as accurate as the sources available at the time of the update(s).    Thank you,  Mila Bingham, [facility]  11/18/2022 1:23 PM

## 2022-11-18 NOTE — H&P
"ICU NOTE:    11/18/2022 10:11 AM Admit Date and Time     Clinically Significant Risk Factors Present on Admission        # Hyperkalemia: Highest K = 5.9 mmol/L (Ref range: 3.4-5.3) in last 2 days, will monitor as appropriate  # Hyponatremia: Lowest Na = 133 mmol/L (Ref range: 136-145) in last 2 days, will monitor as appropriate     # Anion Gap Metabolic Acidosis: Highest Anion Gap = 20 mmol/L (Ref range: 7-15) in last 2 days, will monitor and treat as appropriate   # Drug Induced Coagulation Defect: home medication list includes an anticoagulant medication   # Acute Kidney Injury, unspecified: based on a >150% or 0.3 mg/dL increase in last creatinine compared to past 90 day average, will monitor renal function    # Non-Invasive mechanical ventilation: current O2 Device: BiPAP/CPAP  # Acute hypoxic respiratory failure: continue supplemental O2 as needed   # DMII: A1C = 7.7 % (Ref range: <5.7 %) within past 3 months    # Obesity: Estimated body mass index is 35.15 kg/m  as calculated from the following:    Height as of this encounter: 1.778 m (5' 10\").    Weight as of this encounter: 111.1 kg (245 lb).             Assessment/Plan:    Eduardo Laughlin is a 82 year old male with medical history significant for bladder cancer, urostomy, CKD-stage III, DVT-on Coumadin, LBBB, NSTEMI, T2DM, and morbid obesity.  Patient was recently hospitalized for CHF exacerbation.  Today, patient presented to the emergency room with concerns for recent falls, generalized weakness, and shortness of breath.  He was found to be bradycardia, hypothermic, and hypotensive, with increased work of breathing.  He was placed on Bipap and started on antibiotics. Will be admitted to ICU.     Neuro: Weakness at home.  Frequent episodes of standing too fast, becoming dizzy, and then falling.    CT scans of head and spine negative for acute injury or abnormalities.    Avoid narcotics or other sedating medications    Tylenol prn pain and or " fever    Patient complains of difficulty moving and numbness of left arm.  He did recently fall on his elbow.  Consider Xray.       Pulmonary:  Acute respiratory failure in the setting of possible left lower lobe pneumonia and pulmonary edema    BNP elevated at 3,064    Given one time dose of Lasix, agree that despite elevated lactic and sepsis, patient we should not be aggressively receiving  fluids in setting of CHF    Merrem for possible pneumonia, given PCN allergy as well as recent hospitalization     Will obtain sputum culture if able    SpO2 goal 92% or greater.  Bipap for work of breathing.      CV:  Septic Shock in the setting of UTI and aspiration pneumonia and CHF exacerbation.  Elevation of Troponin   History of Paroxysmal A fib   Bradycardia      Recheck troponin, likely this is related to demand ischemia in setting of CHF and shock    Bradycardic on admission.  Given atropine times one for HR of 30's.  Likely secondary to hypothermia. K was also elevated at 5.9. Patient is also on metoprolol PTA  Could consider dopamine gtt if patient is symptomatic, or becomes hypotensive.     Given lasix 40 mg times one in ED, blood pressure tolerating     Elevated Lactic, now improving from 6.9 to 2.5 as oxygenation has improved    MAP goal 65 or greater, at this point has been hemodynamically stable    Warfarin PTA for hx of DVT and hx of A fib     Patient is also on metoprolol at home.  His dose was recently reduced by his primary due to falls and dizziness at home.  Holding now.       GI:  No acute issues.  Keep NPO except for meds,  while requiring NIPPV.       Renal:  Acute on Chronic Renal Failure.    Anion Gap Metabolic Acidosis  Lactic Acidosis.. Hyperkalemia    Sepsis in setting of UTI (urine with very elevated leukocyte esterase).  Patient has history of urostomy/bladder cancer.  Urine culture pending     As above, lactic acid is improving with improved respiratory status and oxygenation.    Follow up  labs, as above, received one time dose of lasix.  Hopefully this will improve his hyperkalemia.    Metabolic acidosis in setting of acute on chronic renal failure. Given amp of Bicarb in ED.  Consider gtt based on results of follow up BMP          ID:  Sepsis with urinary source.  Possible pneumonia.    Merrem as above, should cover for pneumonia and UTI    Try to obtain sputum as above    UC pending as above    Blood culture in process          Heme: Leukocytosis, in setting of UTI and possible pneumonia    Continue to trend    Antibiotics as above     Endocrine:  T2DM    ICU insulin protocol    Goal FSBG less than 180    sliding scale insulin, medium resistance scale to start, may require insulin gtt      ICU Prophylaxis:    PPI:  Protonix while NPO    Peridex:  Not required     Anticoagulation/DVT Prophylaxis:  Warfarin, pharmacy to dose          Lines/Drains/Tubes:  PIV, Day one  PICC Line to be placed       CODE STATUS:  No CPR, Do Not Intubate      SUBJECTIVE:    Ccx:  Feeling better.  Family would just like him to go to TCU at discharge.    HPI:  Eduardo Laughlin is a 82 year old male with medical history significant for bladder cancer, urostomy, CKD-stage III, DVT-on Coumadin, LBBB, NSTEMI, T2DM, and morbid obesity.  Patient was recently hospitalized for CHF exacerbation.  Today, patient presented to the emergency room with concerns for recent falls, generalized weakness, and shortness of breath.  He was found to be bradycardia, hypothermic, and hypotensive, with increased work of breathing.  He was placed on Bipap and started on antibiotics. Will be admitted to ICU.   Patent taking break from BiPAP when I saw in ED.  He had normal work of breathing, and did not feel short of breath.  He is not having chest pain, or any symptoms of dizziness while lying in bed.  He is thirsty.  Family's main concern is his dispo at discharge.  They feel he is not currently safe at home due to frequent falls.  He also  "dose a lot of \"cares\" for his wife.            Past Medical History:   Diagnosis Date     Acute renal failure with acute tubular necrosis superimposed on stage 3 chronic kidney disease (H)      Arteriosclerotic cardiovascular disease (ASCVD)      Atrial fibrillation (H)      Bladder cancer (H)      BPH (benign prostatic hypertrophy)      Chronic kidney disease      Complicated UTI (urinary tract infection) 08/25/2019     Congestive heart failure (H)      Coronary artery disease      Diabetes mellitus (H)      Diabetic neuropathy (H)      E coli bacteremia      GERD (gastroesophageal reflux disease)      Gout      Hyperlipidemia      Hypertension      Hyponatremia      Iliac artery aneurysm, 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    Added automatically from request for surgery [id number]     Kidney stone      Osteoarthritis      Personal history of malignant neoplasm of prostate 04/27/2020     Prostate cancer (H)      Sleep apnea     CPAP     Past Surgical History:   Procedure Laterality Date     CARDIAC CATHETERIZATION  03/28/2008    and coronary angios     CYSTECTOMY       CYSTOSCOPY       CYSTOSCOPY N/A 12/23/2015    Procedure: CYSTOSCOPY BLADDER BIOPSIES with Fulgeration and Placement of Otero ;  Surgeon: Gordon Bajwa MD;  [place] [facility] [place]:      IR EXTREMITY ANGIOGRAM LEFT  3/22/2019     IR EXTREMITY ANGIOGRAM LEFT  3/22/2019     IR ILEAL CONDUIT INJECTION  12/23/2016     IR NEPHROSTOMY TUBE PLACEMENT RIGHT  8/18/2016     IR URETEROSTOMY TUBE CHANGE RIGHT  9/21/2016     KIDNEY STONE SURGERY  x4     LAMINECTOMY LUMBAR ONE LEVEL Bilateral 9/13/2022    Procedure: LUMBAR 4 - LUMBAR 5 BILATERAL MEDIAL FACETECTOMY AND DECOMPRESSION;  Surgeon: Everett Holland MD;  Location: [facility]     PROSTATE SURGERY       No current facility-administered medications for this encounter.     Current Outpatient Medications   Medication     allopurinol (ZYLOPRIM) 100 MG tablet     doxycycline hyclate " (VIBRAMYCIN) 50 MG capsule     ezetimibe (ZETIA) 10 MG tablet     furosemide (LASIX) 20 MG tablet     glimepiride (AMARYL) 1 MG tablet     metoprolol succinate ER (TOPROL XL) 25 MG 24 hr tablet     traZODone (DESYREL) 100 MG tablet     warfarin ANTICOAGULANT (COUMADIN) 5 MG tablet     acetaminophen (TYLENOL) 500 MG tablet     warfarin ANTICOAGULANT (COUMADIN) 3 MG tablet        Allergies   Allergen Reactions     Metoprolol Succinate Er      Other reaction(s): low blood pressure     Pcn [Penicillins] Other (See Comments)     Childhood-doesn't know reactions     Statin Drugs [Hmg-Coa-R Inhibitors] Cramps     Uroxatral [Alfuzosin] Other (See Comments)     hypotension     Family History   Problem Relation Age of Onset     No Known Problems Mother      Prostate Cancer Father      Deep Vein Thrombosis Father      Cancer Sister      Heart Disease Sister      No Known Problems Daughter      Heart Disease Brother      Heart Disease Sister      No Known Problems Daughter          PHYSICAL ASSESSMENT:  General: appears stated age, alert, cooperative  Lungs:  Crackles throughout.   Heart: RRR, S1 and S2   Abdomen: Soft, non-tender.  Bowel sounds present X 4 quadrants.  Skin: skin color normal, texture normal.  Multiple bruises on arms.  Right elbow has bandage in place after skin tear/fall.   Extremities:  No overt edema noted.   Pulses:  +2 BUE  Neuro:  Seems a bit forgetful, but easily re-directed.     Temp: (!) 96.1  F (35.6  C) Temp src: Axillary BP: (!) 146/65 Pulse: (!) 45   Resp: 27 SpO2: 100 % O2 Device: BiPAP/CPAP          Intake/Output Summary (Last 24 hours) at 11/18/2022 1448  Last data filed at 11/18/2022 1255  Gross per 24 hour   Intake 100 ml   Output --   Net 100 ml     Temp: (!) 96.1  F (35.6  C) Temp src: Axillary BP: (!) 146/65 Pulse: (!) 45   Resp: 27 SpO2: 100 % O2 Device: BiPAP/CPAP        Lab Results   Component Value Date    WBC 20.5 11/18/2022    WBC 8.0 06/13/2020     Lab Results   Component Value Date     RBC 4.28 11/18/2022    RBC 4.22 06/13/2020     Lab Results   Component Value Date    HGB 13.4 11/18/2022    HGB 13.2 06/13/2020     Lab Results   Component Value Date    HCT 41.9 11/18/2022    HCT 39.8 06/13/2020     No components found for: MCT  Lab Results   Component Value Date    MCV 98 11/18/2022    MCV 94 06/13/2020     Lab Results   Component Value Date    MCH 31.3 11/18/2022    MCH 31.3 06/13/2020     Lab Results   Component Value Date    MCHC 32.0 11/18/2022    MCHC 33.2 06/13/2020     Lab Results   Component Value Date    RDW 15.4 11/18/2022    RDW 14.3 06/13/2020     Lab Results   Component Value Date     11/18/2022     06/13/2020       Last Comprehensive Metabolic Panel:  Sodium   Date Value Ref Range Status   11/18/2022 133 (L) 136 - 145 mmol/L Final   06/13/2020 140 133 - 144 mmol/L Final     Potassium   Date Value Ref Range Status   11/18/2022 5.9 (H) 3.4 - 5.3 mmol/L Final     Comment:     Specimen slightly hemolyzed, potassium may be falsely elevated.   03/03/2022 4.6 3.5 - 5.0 mmol/L Final   06/13/2020 4.4 3.4 - 5.3 mmol/L Final     Chloride   Date Value Ref Range Status   11/18/2022 99 98 - 107 mmol/L Final   03/03/2022 108 (H) 98 - 107 mmol/L Final   06/13/2020 111 (H) 94 - 109 mmol/L Final     Carbon Dioxide   Date Value Ref Range Status   06/13/2020 22 20 - 32 mmol/L Final     Carbon Dioxide (CO2)   Date Value Ref Range Status   11/18/2022 14 (L) 22 - 29 mmol/L Final   03/03/2022 20 (L) 22 - 31 mmol/L Final     Anion Gap   Date Value Ref Range Status   11/18/2022 20 (H) 7 - 15 mmol/L Final   03/03/2022 10 5 - 18 mmol/L Final   06/13/2020 7 3 - 14 mmol/L Final     Glucose   Date Value Ref Range Status   11/18/2022 245 (H) 70 - 99 mg/dL Final   09/14/2022 227 (H) 70 - 125 mg/dL Final   06/13/2020 115 (H) 70 - 99 mg/dL Final     GLUCOSE BY METER POCT   Date Value Ref Range Status   11/08/2022 155 (H) 70 - 99 mg/dL Final     Urea Nitrogen   Date Value Ref Range Status   11/18/2022  50.8 (H) 8.0 - 23.0 mg/dL Final   03/03/2022 30 (H) 8 - 28 mg/dL Final   06/13/2020 30 7 - 30 mg/dL Final     Creatinine   Date Value Ref Range Status   11/18/2022 3.46 (H) 0.67 - 1.17 mg/dL Final   06/13/2020 2.07 (H) 0.66 - 1.25 mg/dL Final     Creatinine POCT   Date Value Ref Range Status   11/18/2022 4.2 (H) 0.7 - 1.3 mg/dL Final     GFR Estimate   Date Value Ref Range Status   11/18/2022 17 (L) >60 mL/min/1.73m2 Final     Comment:     Effective December 21, 2021 eGFRcr in adults is calculated using the 2021 CKD-EPI creatinine equation which includes age and gender (Roselyn carter al., [place], DOI: 10.1056/YJSLwy5740295)   02/23/2021 26 (L) >60 mL/min/1.73m2 Final   06/13/2020 29 (L) >60 mL/min/[1.73_m2] Final     Comment:     Non  GFR Calc  Starting 12/18/2018, serum creatinine based estimated GFR (eGFR) will be   calculated using the Chronic Kidney Disease Epidemiology Collaboration   (CKD-EPI) equation.       GFR, ESTIMATED POCT   Date Value Ref Range Status   11/18/2022 13 (L) >60 mL/min/1.73m2 Final     Calcium   Date Value Ref Range Status   11/18/2022 8.6 (L) 8.8 - 10.2 mg/dL Final   06/13/2020 8.0 (L) 8.5 - 10.1 mg/dL Final       Last Arterial Blood Gas:  No results found for: PH, PCO2, PO2, HCO3, O2SAT, BASEEXCESS, HARPER, SAT    Troponin I ES   Date Value Ref Range Status   06/14/2020 <0.015 0.000 - 0.045 ug/L Final     Comment:     The 99th percentile for upper reference range is 0.045 ug/L.  Troponin values   in the range of 0.045 - 0.120 ug/L may be associated with risks of adverse   clinical events.         EXAM: CT CHEST ABDOMEN PELVIS W/O CONTRAST  LOCATION: [facility]  DATE/TIME: 11/18/2022 11:13 AM     INDICATION: Severe weakness, dyspnea, sepsis evaluation, fall  COMPARISON: CT abdomen pelvis 03/25/2022, CT chest 12/09/2019  TECHNIQUE: CT scan of the chest, abdomen, and pelvis was performed without IV contrast. Multiplanar reformats were obtained. Dose reduction  techniques were used.   CONTRAST: None.     FINDINGS:   LUNGS AND PLEURA: Patchy and nodular airspace opacities throughout the upper and lower lobes bilaterally. Bronchial wall thickening and endobronchial debris in the left lower lobe. No pleural effusion or pneumothorax.      MEDIASTINUM/AXILLAE: Normal.     CORONARY ARTERY CALCIFICATION: Moderate.     HEPATOBILIARY: Normal.     PANCREAS: Normal.     SPLEEN: Normal.     ADRENAL GLANDS: Normal.     KIDNEYS/BLADDER: Atrophic right kidney with unchanged mild collecting system dilation to the urinary diversion. Cystectomy with ileal diversion. Simple cysts, which do not require follow-up.     BOWEL: Diverticulosis of the colon. No acute inflammatory change. No obstruction.      LYMPH NODES: Normal.     VASCULATURE: Moderate atherosclerotic calcification with stents in the left common, external, and internal iliac arteries. Patency not assessed without IV contrast.     PELVIC ORGANS: Absent.     MUSCULOSKELETAL: Osteopenia and degenerative changes in the spine. No aggressive or destructive lesion. No acute fracture.                                                                      IMPRESSION:  1.  Airspace opacities in both lungs, left greater than right, concerning for multifocal pneumonia associated endobronchial debris and bronchial wall thickening in the left lower lobe suggests superimposed aspiration.      EXAM: CT HEAD W/O CONTRAST  LOCATION: [facility]  DATE/TIME: 11/18/2022 11:04 AM     INDICATION: Recurrent falls, weakness, anticoagulation.  COMPARISON: Head CT 5/12/2020.  TECHNIQUE: Routine CT Head without IV contrast. Multiplanar reformats. Dose reduction techniques were used.     FINDINGS:  INTRACRANIAL CONTENTS: No acute intracranial hemorrhage. No mass effect or midline shift. Mild diffuse parenchymal volume loss. Patchy periventricular white matter hypodensities are nonspecific, but most likely related to chronic  microvascular ischemic   disease. Chronic-appearing infarct in the left insular cortex, also present on prior. Small chronic lacunar infarct versus dilated perivascular space in the left basal ganglia.     VISUALIZED ORBITS/SINUSES/MASTOIDS: No intraorbital abnormality. No paranasal sinus mucosal disease. No middle ear or mastoid effusion.     BONES/SOFT TISSUES: No acute abnormality.                                                                      IMPRESSION:  1.  No acute intracranial hemorrhage, mass, or herniation.  2.  Mild diffuse parenchymal volume loss and white matter changes most likely due to chronic microvascular ischemic disease.  3.  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        EXAM: CT CERVICAL SPINE W/O CONTRAST  LOCATION: [facility]  DATE/TIME: 11/18/2022 11:06 AM     INDICATION: Fall, weakness, AMS.  COMPARISON: Cervical spine CT 8/24/2019.  TECHNIQUE: Routine CT Cervical Spine without IV contrast. Multiplanar reformats. Dose reduction techniques were used.     FINDINGS:  VERTEBRA: Rotation of C1 on C2 which is presumably positional. No acute lucent fracture line visualized. No significant loss of vertebral body height. Marked degenerative endplate changes and loss of disc height in the cervical spine particularly at   C4-5, C5-6, and C6-7. Fusion of the right C2 and C3 facets. Right greater than left facet hypertrophy throughout the cervical spine.       CANAL/FORAMINA: No significant spinal canal narrowing at any level. Multiple levels of neural foraminal narrowing, particularly at C4-5, C5-6, and C6-7.     PARASPINAL: Groundglass opacities in the visualized left lung, better described on CT chest, abdomen and pelvis. Atherosclerotic calcifications of the carotid arteries.                                                                      IMPRESSION:  1.  No fracture or posttraumatic subluxation.  2.  Multilevel degenerative changes in the  cervical spine as detailed above.    EXAM: CT THORACIC SPINE W/O CONTRAST, CT LUMBAR SPINE W/O CONTRAST  LOCATION: [facility]  DATE/TIME: 11/18/2022 11:07 AM     INDICATION: Fall, back pain, very limited historian.  COMPARISON: None.  TECHNIQUE:  1) Routine CT Thoracic Spine without IV contrast. Multiplanar reformats. Dose reduction techniques were used.   2) Routine CT Lumbar Spine without IV contrast. Multiplanar reformats. Dose reduction techniques were used.      FINDINGS:     THORACIC SPINE CT:  VERTEBRA: Thoracic spine alignment is grossly within normal limits. No significant loss of thoracic vertebral body height. No acute lucent fracture lines appreciated in the thoracic vertebral bodies.     Subtle cortical step-off and fracture line through the medial right 11th rib (series 4 image 135). Very subtle cortical irregularity also of the medial right 10th and ninth ribs which could represent subtle fractures. Subtle cortical irregularity and   sclerosis in the right 12th rib.     Mild degenerative endplate changes and loss of disc height throughout the thoracic spine. Prominent anterior osteophytic spurring in the thoracic spine. No significant disc herniation appreciated. No significant facet hypertrophy.     CANAL/FORAMINA: No high-grade spinal canal or neural foraminal narrowing.     PARASPINAL: Groundglass opacities throughout the lungs, these are better described on CT chest abdomen pelvis report.     LUMBAR SPINE CT:  VERTEBRA: Leftward listhesis of L2 on L3. Mild grade 1 retrolisthesis of L2 on L3. Grade 1 anterolisthesis of L4 on L5. Sequela of previous left-sided laminectomy at L4-L5. Lucency through the left L4 inferior facet with sclerotic margins is likely   chronic and may be related to surgery at that site.     Subtle cortical step-off with sclerosis of the right L1, L2, and L3 transverse processes which may represent subacute fractures. No other lucent fracture lines  appreciated. No significant loss of lumbar vertebral body height.     Marked degenerative endplate changes and loss of disc height at L2-L3. Moderate degenerative endplate changes and loss of disc height at the other levels. Multiple disc herniations throughout the lumbar spine particularly at L2-L3 and asymmetric towards   the left at L3-L4, L4-L5, and L5-S1. Facet hypertrophy in the lower lumbar spine.     CANAL/FORAMINA:   Moderate spinal canal narrowings at L2-L3 and L3-L4. Multiple levels of neural foraminal narrowing most pronounced right greater than left at L2-L3 and left greater than right at L3-L4, L4-L5, and L5-S1.     PARASPINAL: Vascular stents in place.                                                                      IMPRESSION:  THORACIC SPINE CT:  1.  Acute appearing fracture of the medial right 11th rib. Probable subtle acute fractures also of the medial right 10th and ninth ribs. Question acute to subacute fracture of the right 12th rib.  2.  No fractures within the thoracic vertebral bodies. Thoracic spine alignment is within normal limits.  3.  Degenerative changes in the thoracic spine without significant spinal canal or neural foraminal narrowing.     LUMBAR SPINE CT:  1.  Subtle acute to subacute appearing fractures of the right L1, L2, and L3 transverse processes with minimal cortical step-off and surrounding sclerosis.  2.  No other acute fractures appreciated in the lumbar spine. No significant loss of lumbar vertebral body height.  3.  Multilevel degenerative changes in the lumbar spine as detailed above. Sequela of previous left-sided laminectomy at L4-L5.        CARLOS Banuelos, CNP  Pulmonary Critical Care  Pager: [phone]    Time Billed:  60 minutes of critical care time    Patient requiring ICU level of care:   Patient with bradycardia and intermittent NIPPV needs. High risk for further decompensation and or death.

## 2022-11-18 NOTE — CONSULTS
"Care Management Initial Consult    General Information  Assessment completed with: Elver Yuniel  Type of CM/SW Visit: Initial Assessment    Primary Care Provider verified and updated as needed: Yes   Readmission within the last 30 days: current reason for admission unrelated to previous admission (11/5/22 - 11/8/22 at [facility])   Return Category: New Diagnosis  Reason for Consult: discharge planning  Advance Care Planning: Advance Care Planning Reviewed: no concerns identified          Communication Assessment  Patient's communication style: spoken language (English or Bilingual)            Living Environment:   People in home: spouse     Current living Arrangements: apartment, independent living facility ([facility])      Able to return to prior arrangements: other (see comments) (unknown at this time)       Family/Social Support:  Care provided by: self  Provides care for: no one  Marital Status:   Wife  Ginny       Description of Support System: Supportive, Involved    Support Assessment: Adequate family and caregiver support    Current Resources:   Patient receiving home care services: No     Community Resources: None  Equipment currently used at home: walker, rolling (\"uses walker some of the time\")  Supplies currently used at home: None    Employment/Financial:  Employment Status: retired        Financial Concerns:     Referral to Financial Worker: No       Lifestyle & Psychosocial Needs:  Social Determinants of Health     Tobacco Use: Medium Risk     Smoking Tobacco Use: Former     Smokeless Tobacco Use: Never     Passive Exposure: Not on file   Alcohol Use: Not on file   Financial Resource Strain: Not on file   Food Insecurity: Not on file   Transportation Needs: Not on file   Physical Activity: Not on file   Stress: Not on file   Social Connections: Not on file   Intimate Partner Violence: Not on file   Depression: Not on file   Housing Stability: Not on file " "      Functional Status:  Prior to admission patient needed assistance:   Dependent ADLs:: Independent (\"uses walker some of the time\")  Dependent IADLs:: Independent       Mental Health Status:          Chemical Dependency Status:                Values/Beliefs:  Spiritual, Cultural Beliefs, Zoroastrianism Practices, Values that affect care:                 Additional Information:  Yuniel lives at [facility] with his wife. He is independent with ADLs at baseline.     Unknown discharge needs at this time.     Wife to transport at discharge.    Sharee Boland RN      "

## 2022-11-18 NOTE — ED NOTES
Bed: JN-02  Expected date:   Expected time:   Means of arrival:   Comments:  [facility] - Hypotensive 82 y M

## 2022-11-18 NOTE — ED NOTES
Called lab due to labs not resulting in timely manner. Lab states they did receive the samples but is unsure why nothing has resulted yet. Dr. Pickens updated.

## 2022-11-18 NOTE — ED PROVIDER NOTES
EMERGENCY DEPARTMENT ENCOUNTER      NAME: Eduardo Laughlin  AGE: 82 year old male  YOB: 1940  MRN: [id number]  EVALUATION DATE & TIME: 2022 10:11 AM    PCP: Rafael Montelongo    ED PROVIDER: Ildefonso Pickens M.D.      Chief Complaint   Patient presents with     Hypotension         FINAL IMPRESSION:  1. Acute respiratory failure with hypoxia (H)    2. Aspiration pneumonia of both lungs, unspecified aspiration pneumonia type, unspecified part of lung (H)    3. Lactic acidosis          ED COURSE & MEDICAL DECISION MAKIN year old male presents to the Emergency Department for evaluation of generalized weakness, fall, shortness of breath.  History of recent hospitalization for congestive heart failure.  Has been recovering at home for the last few days but progressively worsening.  He has reportedly fallen, circumstances somewhat unclear.  He was bradycardic, reported to be hypotensive in the field, and hypoxemic.  He arrives to the emergency department in mild to moderate respiratory distress, coarse lung sounds bilaterally.  Reporting back pain but also significantly altered.  He is DNR/DNI, confirmed with daughter at bedside here.  I did perform a FAST exam which was negative.  His blood pressure remained stable here.  He was bradycardic, seem to be variable between sinus rhythm with first-degree AV block and left bundle branch block and bradycardic atrial fibrillation.  Labs are immediately concerning for an elevated lactic acidosis.  I think a lot of his current symptoms seem quite reminiscent of his hospitalization about a week ago where he was on BiPAP for congestive heart failure exacerbation.  He was expedited to CT imaging.  This showed pulmonary congestion and asymmetric infiltrates most concerning for multifocal pneumonia or aspiration with endobronchial debris.  Discussed with pharmacist and will place patient on meropenem for antimicrobial coverage.  I am concerned about  possibility of sepsis given his severe lactic acidosis but also given his very tenuous volume status and current respiratory failure, I think IV fluid resuscitation would be counterproductive and prohibited currently.    Patient's labs are notable for mild acute kidney injury on top of baseline CKD.  He has a metabolic acidosis probably mostly being driven by lactate and renal insufficiency.  I think once again that fluids would be contraindicated, in fact he is resting much more comfortably on BiPAP at this time and I suspect there is probably some component of CHF as well and I am going to start him on Lasix and will monitor response.  Did update the patient's daughter that especially since this appears to be concerning for pneumonia, there is a fairly high likelihood of patient continuing to worsen and potentially dying from this illness and she was understanding of the guarded prognosis.  Reviewed case with intensivist Dr. Laird.    10:06 AM I met with the patient and performed my initial interview and exam  10:22 AM I met with the patient's daughter who is at the patient's bedside. I reexamined the patient   10:42 AM The lab reports that the patient's lactate is 6.2  10:47 AM I rechecked the patient. The lab called and reported the patient's blood pH is 7.21  12:56 PM I discussed the patient with the intensivist who agrees to admit the patient.      Critical Care  Performed by: Ildefonso Pickens MD  Authorized by: Ildefonso Pickens MD     Total critical care time: 75 minutes  Critical care time was exclusive of separately billable procedures and treating other patients.  Critical care was necessary to treat or prevent imminent or life-threatening deterioration of the following conditions: Severe lactic acidosis, respiratory failure requiring noninvasive ventilation  Critical care was time spent personally by me on the following activities: development of treatment plan with patient or surrogate, discussions  with consultants, examination of patient, evaluation of patient's response to treatment, obtaining history from patient or surrogate, ordering and performing treatments and interventions, ordering and review of laboratory studies, ordering and review of radiographic studies and re-evaluation of patient's condition, this excludes any separately billable procedures.      MEDICATIONS GIVEN IN THE EMERGENCY:  Medications   sodium bicarbonate 8.4 % injection 50 mEq (has no administration in time range)   furosemide (LASIX) injection 40 mg (has no administration in time range)   meropenem (MERREM) 500 mg vial to attach to  mL bag for ADULTS or 25 mL bag for PEDS (0 mg Intravenous Stopped 11/18/22 1255)       NEW PRESCRIPTIONS STARTED AT TODAY'S ER VISIT  New Prescriptions    No medications on file          =================================================================    HPI    Patient information was obtained from: EMS, patient, and patient's daughter    Use of : N/A       Eduardo Laughlin is a 82 year old male with a pertinent history of bladder cancer, urostomy bag present, CKD stage 3, DVT (on warfarin), LBBB, NSTEMI, DM type II, and morbid obesity who presents to this ED via EMS for evaluation of hypotension    Per chart review: The patient was admitted to [facility] from 11/5-11/8 (discharged 10 days ago) for hypoxia, CKD, and acute on chronic diastolic heart failure. The patient was initially on BiPAP, but was able to be weaned off. The patient had an Echo on 10/12/22 which showed EF 50-55% and suspected mild aortic wall stenosis. Patient had elevated troponin and elevated lactic of 4.1 which was able to be brought down to 1 following oxygen and furosemide. The patient showed no signs of sepsis. The patient had a creatinine of 2.46 upon admission which is the patient's baseline. Of note, the patient was hospitalized on 10/16 and had an ROBERT with a peak creatinine of 3.96.  "Renal function recovered during this admission. Chest xray showed enlarged heart size and a chest CT showed mild groundglass infiltrates in both lungs. Patient was discharged with Lasix and metoprolol.     Per EMS: The patient's wife called EMS because the patient had an altered mental status. The stroke scale by EMS was zero.  EMS found the patient to be incontinent of bowels and bradycardic at 34 BPM with low blood pressure and a blood sugar of 406. They placed the patient on 2L of oxygen and had O2 sats of 89%.  They report the patient was slumped over and slow to response, but is feeling better now. They report that the patient fell this morning and has a wound to the left elbow from a previous fall. They note that the patient lives in a senior apartment with their wife and both the patient and their wife are poor historians.     Per Patient: The patient complains of abdominal pain and low back pain, stating that their \"back really hurts\". The patient reports they have been feeling sick for the past 3 months and has \"kind of\" a fever. The patient notes they use a walker when walking and lives with their wife, Ginny. The patient denies chest pain.    Per patient's daughter: The patient has been sick since being discharged from the hospital (see chart review) and worsening in the past few days. They report that the patient has frequent spells of standing up too fast, feeling lightheaded, and falling. They are unsure if the patient loses consciousness during these episodes. They are not aware of the patient having recent fevers or infection symptoms. They note that the patient was on BiPAP the first day of their recent hospital stay (see chart review). They note that the they were planning to bring the patient into the hospital yesterday for their worsening weakness. They report that the patient is DNR/DNI and does not want to be intubated under any circumstances.     REVIEW OF SYSTEMS   All systems reviewed and " negative except as noted in HPI.    PAST MEDICAL HISTORY:  Past Medical History:   Diagnosis Date     Acute renal failure with acute tubular necrosis superimposed on stage 3 chronic kidney disease (H)      Arteriosclerotic cardiovascular disease (ASCVD)      Atrial fibrillation (H)      Bladder cancer (H)      BPH (benign prostatic hypertrophy)      Chronic kidney disease      Complicated UTI (urinary tract infection) 08/25/2019     Congestive heart failure (H)      Coronary artery disease      Diabetes mellitus (H)      Diabetic neuropathy (H)      E coli bacteremia      GERD (gastroesophageal reflux disease)      Gout      Hyperlipidemia      Hypertension      Hyponatremia      Iliac artery aneurysm, 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    Added automatically from request for surgery [id number]     Kidney stone      Osteoarthritis      Personal history of malignant neoplasm of prostate 04/27/2020     Prostate cancer (H)      Sleep apnea     CPAP       PAST SURGICAL HISTORY:  Past Surgical History:   Procedure Laterality Date     CARDIAC CATHETERIZATION  03/28/2008    and coronary angios     CYSTECTOMY       CYSTOSCOPY       CYSTOSCOPY N/A 12/23/2015    Procedure: CYSTOSCOPY BLADDER BIOPSIES with Fulgeration and Placement of Otero ;  Surgeon: Gordon Bajwa MD;  [place] [facility] [place]:      IR EXTREMITY ANGIOGRAM LEFT  3/22/2019     IR EXTREMITY ANGIOGRAM LEFT  3/22/2019     IR ILEAL CONDUIT INJECTION  12/23/2016     IR NEPHROSTOMY TUBE PLACEMENT RIGHT  8/18/2016     IR URETEROSTOMY TUBE CHANGE RIGHT  9/21/2016     KIDNEY STONE SURGERY  x4     LAMINECTOMY LUMBAR ONE LEVEL Bilateral 9/13/2022    Procedure: LUMBAR 4 - LUMBAR 5 BILATERAL MEDIAL FACETECTOMY AND DECOMPRESSION;  Surgeon: Everett Holland MD;  Location: [facility]     PROSTATE SURGERY             CURRENT MEDICATIONS:    Current Facility-Administered Medications   Medication     furosemide (LASIX) injection 40 mg     sodium bicarbonate  "8.4 % injection 50 mEq     Current Outpatient Medications   Medication     allopurinol (ZYLOPRIM) 100 MG tablet     doxycycline hyclate (VIBRAMYCIN) 50 MG capsule     ezetimibe (ZETIA) 10 MG tablet     furosemide (LASIX) 20 MG tablet     glimepiride (AMARYL) 1 MG tablet     metoprolol succinate ER (TOPROL XL) 25 MG 24 hr tablet     traZODone (DESYREL) 100 MG tablet     warfarin ANTICOAGULANT (COUMADIN) 5 MG tablet     acetaminophen (TYLENOL) 500 MG tablet     warfarin ANTICOAGULANT (COUMADIN) 3 MG tablet         ALLERGIES:  Allergies   Allergen Reactions     Metoprolol Succinate Er      Other reaction(s): low blood pressure     Pcn [Penicillins] Other (See Comments)     Childhood-doesn't know reactions     Statin Drugs [Hmg-Coa-R Inhibitors] Cramps     Uroxatral [Alfuzosin] Other (See Comments)     hypotension       FAMILY HISTORY:  Family History   Problem Relation Age of Onset     No Known Problems Mother      Prostate Cancer Father      Deep Vein Thrombosis Father      Cancer Sister      Heart Disease Sister      No Known Problems Daughter      Heart Disease Brother      Heart Disease Sister      No Known Problems Daughter        SOCIAL HISTORY:   Social History     Socioeconomic History     Marital status:    Tobacco Use     Smoking status: Former     Smokeless tobacco: Never     Tobacco comments:     Quit smoking 30 years ago   Vaping Use     Vaping Use: Never used   Substance and Sexual Activity     Alcohol use: Not Currently     Drug use: Never     Sexual activity: Not Currently       VITALS:  /73   Pulse (!) 46   Temp (!) 96.1  F (35.6  C) (Axillary)   Resp 25   Ht 1.778 m (5' 10\")   Wt 111.1 kg (245 lb)   SpO2 96%   BMI 35.15 kg/m      PHYSICAL EXAM    Constitutional: Chronically ill-appearing elderly male patient, sitting up in bed, moderate respiratory distress, ill-appearing  HENT: Normocephalic, Atraumatic. Neck Supple.  Eyes: EOMI, Conjunctiva normal.  Respiratory: Moderate " respiratory distress, coarse lung sounds at bilateral bases.  Speaking shorter sentences due to dyspnea.  Cardiovascular: Bradycardic heart rate, Regular rhythm. No peripheral edema.  Abdomen: Soft, nontender  Musculoskeletal: Good range of motion in all major joints. No major deformities noted.  No visible external trauma to the back, no specific midline palpable deformity, some lumbar midline tenderness.  Integument: Warm, Dry.  Neurologic: Alert & awake, oriented to self only.  Face is symmetric.  Speech is normal.  Strength is diffusely decreased but symmetric throughout bilateral upper and lower extremities and patient is able to follow commands x4.  Psychiatric: Cooperative. Affect appropriate.     LAB:  All pertinent labs reviewed and interpreted.  Labs Ordered and Resulted from Time of ED Arrival to Time of ED Departure   BASIC METABOLIC PANEL - Abnormal       Result Value    Sodium 133 (*)     Potassium 5.9 (*)     Chloride 99      Carbon Dioxide (CO2) 14 (*)     Anion Gap 20 (*)     Urea Nitrogen 50.8 (*)     Creatinine 3.46 (*)     Calcium 8.6 (*)     Glucose 245 (*)     GFR Estimate 17 (*)    TROPONIN T, HIGH SENSITIVITY - Abnormal    Troponin T, High Sensitivity 325 (*)    MAGNESIUM - Abnormal    Magnesium 2.4 (*)    LACTIC ACID WHOLE BLOOD - Abnormal    Lactic Acid 6.9 (*)    ROUTINE UA WITH MICROSCOPIC REFLEX TO CULTURE - Abnormal    Color Urine Yellow      Appearance Urine Cloudy (*)     Glucose Urine Negative      Bilirubin Urine Negative      Ketones Urine Negative      Specific Gravity Urine 1.016      Blood Urine 0.5 mg/dL (*)     pH Urine 6.5      Protein Albumin Urine 600 (*)     Urobilinogen Urine <2.0      Nitrite Urine Negative      Leukocyte Esterase Urine 500 Reuben/uL (*)     WBC Clumps Urine Present (*)     Mucus Urine Present (*)     RBC Urine 30 (*)     WBC Urine >182 (*)     Squamous Epithelials Urine 2 (*)     Hyaline Casts Urine 21 (*)    INR - Abnormal    INR 4.11 (*)    BLOOD GAS  VENOUS - Abnormal    pH Venous 7.21 (*)     pCO2 Venous 49      pO2 Venous 23 (*)     Bicarbonate Venous 19 (*)     Base Excess/Deficit (+/-) -8.8      Oxyhemoglobin Venous 37.1 (*)     O2 Sat, Venous 37.6 (*)    CBC WITH PLATELETS AND DIFFERENTIAL - Abnormal    WBC Count 20.5 (*)     RBC Count 4.28 (*)     Hemoglobin 13.4      Hematocrit 41.9      MCV 98      MCH 31.3      MCHC 32.0      RDW 15.4 (*)     Platelet Count 322     NT PROBNP INPATIENT - Abnormal    N terminal Pro BNP Inpatient 3,064 (*)    ISTAT CREATININE POCT - Abnormal    Creatinine POCT 4.2 (*)     GFR, ESTIMATED POCT 13 (*)    DIFFERENTIAL - Abnormal    % Neutrophils 95      % Lymphocytes 4      % Monocytes 1      % Eosinophils 0      % Basophils 0      Absolute Neutrophils 19.5 (*)     Absolute Lymphocytes 0.8      Absolute Monocytes 0.2      Absolute Eosinophils 0.0      Absolute Basophils 0.0      RBC Morphology Confirmed RBC Indices      Platelet Assessment        Value: Automated Count Confirmed. Platelet morphology is normal.    Elliptocytes Slight (*)     RBC Fragments Slight (*)    INFLUENZA A/B & SARS-COV2 PCR MULTIPLEX - Normal    Influenza A PCR Negative      Influenza B PCR Negative      RSV PCR Negative      SARS CoV2 PCR Negative     LACTIC ACID WHOLE BLOOD   PROCALCITONIN   ISTAT CREATININE POCT   BLOOD CULTURE   BLOOD CULTURE   URINE CULTURE       RADIOLOGY:  Reviewed all pertinent imaging. Please see official radiology report.  CT Chest Abdomen Pelvis w/o Contrast   Final Result   IMPRESSION:   1.  Airspace opacities in both lungs, left greater than right, concerning for multifocal pneumonia associated endobronchial debris and bronchial wall thickening in the left lower lobe suggests superimposed aspiration.      Lumbar spine CT w/o contrast   Final Result   IMPRESSION:   THORACIC SPINE CT:   1.  Acute appearing fracture of the medial right 11th rib. Probable subtle acute fractures also of the medial right 10th and ninth ribs.  Question acute to subacute fracture of the right 12th rib.   2.  No fractures within the thoracic vertebral bodies. Thoracic spine alignment is within normal limits.   3.  Degenerative changes in the thoracic spine without significant spinal canal or neural foraminal narrowing.      LUMBAR SPINE CT:   1.  Subtle acute to subacute appearing fractures of the right L1, L2, and L3 transverse processes with minimal cortical step-off and surrounding sclerosis.   2.  No other acute fractures appreciated in the lumbar spine. No significant loss of lumbar vertebral body height.   3.  Multilevel degenerative changes in the lumbar spine as detailed above. Sequela of previous left-sided laminectomy at L4-L5.         CT Thoracic Spine w/o Contrast   Final Result   IMPRESSION:   THORACIC SPINE CT:   1.  Acute appearing fracture of the medial right 11th rib. Probable subtle acute fractures also of the medial right 10th and ninth ribs. Question acute to subacute fracture of the right 12th rib.   2.  No fractures within the thoracic vertebral bodies. Thoracic spine alignment is within normal limits.   3.  Degenerative changes in the thoracic spine without significant spinal canal or neural foraminal narrowing.      LUMBAR SPINE CT:   1.  Subtle acute to subacute appearing fractures of the right L1, L2, and L3 transverse processes with minimal cortical step-off and surrounding sclerosis.   2.  No other acute fractures appreciated in the lumbar spine. No significant loss of lumbar vertebral body height.   3.  Multilevel degenerative changes in the lumbar spine as detailed above. Sequela of previous left-sided laminectomy at L4-L5.         Cervical spine CT w/o contrast   Final Result   IMPRESSION:   1.  No fracture or posttraumatic subluxation.   2.  Multilevel degenerative changes in the cervical spine as detailed above.      Head CT w/o contrast   Final Result   IMPRESSION:   1.  No acute intracranial hemorrhage, mass, or  herniation.   2.  Mild diffuse parenchymal volume loss and white matter changes most likely due to chronic microvascular ischemic disease.   3.  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                        POC US ABDOMEN LIMITED   Final Result          EKG:    Performed at: 1018    Impression: Atrial fibrillation with slow ventricular response, left bundle branch block    Rate: 44  Rhythm: Afib  Axis: Leftward  KY Interval: NA  QRS Interval: 164  QTc Interval: 448  ST Changes: No acute changes  Comparison: Compared to October 13, 2022, atrial fibrillation has replaced sinus rhythm, rate is decreased    I have independently reviewed and interpreted the EKG(s) documented above.      Performed at: 1118    Impression: Sinus bradycardia with first-degree AV block, left bundle branch block    Rate: 43  Rhythm: Sinus  Axis: Leftward  KY Interval: 286  QRS Interval: 184  QTc Interval: 446  ST Changes: No acute changes  Comparison: Compared to earlier today, sinus rhythm has replaced atrial fibrillation    I have independently reviewed and interpreted the EKG(s) documented above.        I, Kira Lancaster, am serving as a scribe to document services personally performed by Dr. Ildefonso Pickens, based on my observation and the provider's statements to me. I, Ildefonso Pickens MD attest that Kira Lancaster is acting in a scribe capacity, has observed my performance of the services and has documented them in accordance with my direction.    Ildefonso Pickens M.D.  Emergency Medicine  [facility] EMERGENCY DEPARTMENT  [facility] [facility] [address]  [phone]  Dept: [phone]     Ildefonso Pickens MD  11/18/22 7649

## 2022-11-18 NOTE — Clinical Note
The first balloon was inserted into the circumflex and marginal circumflex.Max pressure = 10 tamika. Total duration = 22 seconds.     Max pressure = 16 tamika. Total duration = 15 seconds.    Balloon reinflated a second time: Max pressure = 16 tamika. Total duration = 15 seconds.  Balloon reinflated a third time: Max pressure = 16 tamika. Total duration = 13 seconds.

## 2022-11-18 NOTE — PROCEDURES
"PICC Line Insertion Procedure Note  Pt. Name: Eduardo Laughlin  MRN:        [id number]    Procedure: Insertion of a  triple Lumen  5 fr  Bard SOLO (valved) Power PICC, Lot number [id number]    Indications: Vascular access    Contraindications : none    Procedure Details     Patient identified with 2 identifiers and \"Time Out\" conducted.  .     Central line insertion bundle followed: hand hygiene performed prior to procedure, site cleansed with cholraprep, hat, mask, sterile gloves, sterile gown worn, patient draped with maximum barrier head to toe drape, sterile field maintained.    The vein was assessed and found to be compressible and of adequate size.     Lidocaine 1% 2 ml administered sq to the insertion site. A 5 Fr PICC was inserted into the basilic vein of the left arm with ultrasound guidance. 1 attempt(s) required to access vein.   Catheter threaded without difficulty. Good blood return noted.    Modified Seldinger Technique used for insertion.    The 8 sharps that are included in the PICC insertion kit were accounted for and disposed of in the sharps container prior to breakdown of the sterile field.    Catheter secured with Statlock, biopatch and Tegaderm dressing applied.    Findings:    Total catheter length  52 cm, with 0 cm exposed. Mid upper arm circumference is 41 cm. Catheter was flushed with 30 cc NS. Patient  tolerated procedure well.    Tip placement verified by xray. Xray read by Tunde Balbuena  . Tip placement in the SVC.     CLABSI prevention brochure left at bedside.    Patient's primary RN notified PICC is ready for use.      Comments:        Mirtha Rahman BSN,RN,VA-BC  Vascular Access - [facility]        "

## 2022-11-18 NOTE — PROGRESS NOTES
PT continues on BIPAP with the settings of S/T 12/6 R18 30% with an SpO2 of 98%. Alternating between large face mask and under the nose mask.  No skin breakdown noted.  RT will continue to monitor.    Ignacio Lu, RT  11/18/2022

## 2022-11-18 NOTE — Clinical Note
Prepped: left groin and left radial. Prepped with: ChloraPrep. The patient was draped. .Pre-procedure site marking:N/A

## 2022-11-18 NOTE — Clinical Note
The first balloon was inserted into the circumflex and marginal circumflex.Max pressure = 12 tamika. Total duration = 51 seconds.     Max pressure = 10 tamika. Total duration = 19 seconds.    Balloon reinflated a second time: Max pressure = 10 tamika. Total duration = 19 seconds.

## 2022-11-18 NOTE — PHARMACY-ANTICOAGULATION SERVICE
Clinical Pharmacy - Warfarin Dosing Consult     Pharmacy has been consulted to manage this patient s warfarin therapy.  Indication: DVT/PE Prophylaxis  Therapy Goal: INR 2-3  Provider/Team: Da/critical care  Warfarin Prior to Admission: Yes  Warfarin PTA Regimen: 8mg MWF, 5mg all other days  Dose Comments: INR above goal range on admission (some uncertainty existed re: home dosing per PTA list)-no dose today    INR   Date Value Ref Range Status   11/18/2022 4.11 (H) 0.85 - 1.15 Final   11/08/2022 2.42 (H) 0.85 - 1.15 Final       Recommend no warfarin dose today.  Pharmacy will monitor Eduardo Laughlin daily and order warfarin doses to achieve specified goal.      Please contact pharmacy as soon as possible if the warfarin needs to be held for a procedure or if the warfarin goals change.

## 2022-11-18 NOTE — PROGRESS NOTES
Placed patient on BIPAP with the settings of S/T 12/6 R18 40% with an SpO2 of 98%.  BS diminished.  Placed on medium mask.  Liquicell on.  No skin breakdown.  RT will continue to monitor.    Ignacio Lu, RT  11/18/2022

## 2022-11-19 ENCOUNTER — APPOINTMENT (OUTPATIENT)
Dept: MRI IMAGING | Facility: HOSPITAL | Age: 82
DRG: 853 | End: 2022-11-19
Attending: NURSE PRACTITIONER
Payer: COMMERCIAL

## 2022-11-19 ENCOUNTER — APPOINTMENT (OUTPATIENT)
Dept: RADIOLOGY | Facility: HOSPITAL | Age: 82
DRG: 853 | End: 2022-11-19
Attending: NURSE PRACTITIONER
Payer: COMMERCIAL

## 2022-11-19 ENCOUNTER — TRANSFERRED RECORDS (OUTPATIENT)
Dept: MEDSURG UNIT | Facility: HOSPITAL | Age: 82
End: 2022-11-19

## 2022-11-19 ENCOUNTER — APPOINTMENT (OUTPATIENT)
Dept: PHYSICAL THERAPY | Facility: HOSPITAL | Age: 82
DRG: 853 | End: 2022-11-19
Attending: NURSE PRACTITIONER
Payer: COMMERCIAL

## 2022-11-19 ENCOUNTER — APPOINTMENT (OUTPATIENT)
Dept: OCCUPATIONAL THERAPY | Facility: HOSPITAL | Age: 82
DRG: 853 | End: 2022-11-19
Attending: NURSE PRACTITIONER
Payer: COMMERCIAL

## 2022-11-19 LAB
ANION GAP SERPL CALCULATED.3IONS-SCNC: 12 MMOL/L (ref 7–15)
ATRIAL RATE - MUSE: 111 BPM
BUN SERPL-MCNC: 60.5 MG/DL (ref 8–23)
CALCIUM SERPL-MCNC: 8.4 MG/DL (ref 8.8–10.2)
CHLORIDE SERPL-SCNC: 103 MMOL/L (ref 98–107)
CREAT SERPL-MCNC: 3.71 MG/DL (ref 0.67–1.17)
DEPRECATED HCO3 PLAS-SCNC: 22 MMOL/L (ref 22–29)
DIASTOLIC BLOOD PRESSURE - MUSE: NORMAL MMHG
ERYTHROCYTE [DISTWIDTH] IN BLOOD BY AUTOMATED COUNT: 14.9 % (ref 10–15)
GFR SERPL CREATININE-BSD FRML MDRD: 16 ML/MIN/1.73M2
GLUCOSE BLDC GLUCOMTR-MCNC: 151 MG/DL (ref 70–99)
GLUCOSE BLDC GLUCOMTR-MCNC: 157 MG/DL (ref 70–99)
GLUCOSE BLDC GLUCOMTR-MCNC: 159 MG/DL (ref 70–99)
GLUCOSE BLDC GLUCOMTR-MCNC: 160 MG/DL (ref 70–99)
GLUCOSE BLDC GLUCOMTR-MCNC: 162 MG/DL (ref 70–99)
GLUCOSE BLDC GLUCOMTR-MCNC: 174 MG/DL (ref 70–99)
GLUCOSE SERPL-MCNC: 165 MG/DL (ref 70–99)
HCT VFR BLD AUTO: 35.9 % (ref 40–53)
HGB BLD-MCNC: 12.2 G/DL (ref 13.3–17.7)
INR PPP: 4.76 (ref 0.85–1.15)
INTERPRETATION ECG - MUSE: NORMAL
MCH RBC QN AUTO: 31.9 PG (ref 26.5–33)
MCHC RBC AUTO-ENTMCNC: 34 G/DL (ref 31.5–36.5)
MCV RBC AUTO: 94 FL (ref 78–100)
P AXIS - MUSE: NORMAL DEGREES
PLATELET # BLD AUTO: 270 10E3/UL (ref 150–450)
POTASSIUM SERPL-SCNC: 4.5 MMOL/L (ref 3.4–5.3)
PR INTERVAL - MUSE: NORMAL MS
QRS DURATION - MUSE: 166 MS
QT - MUSE: 394 MS
QTC - MUSE: 527 MS
R AXIS - MUSE: -28 DEGREES
RBC # BLD AUTO: 3.82 10E6/UL (ref 4.4–5.9)
SODIUM SERPL-SCNC: 137 MMOL/L (ref 136–145)
SYSTOLIC BLOOD PRESSURE - MUSE: NORMAL MMHG
T AXIS - MUSE: 120 DEGREES
TROPONIN T SERPL HS-MCNC: 766 NG/L
VENTRICULAR RATE- MUSE: 108 BPM
WBC # BLD AUTO: 15.5 10E3/UL (ref 4–11)

## 2022-11-19 PROCEDURE — 97530 THERAPEUTIC ACTIVITIES: CPT | Mod: GP

## 2022-11-19 PROCEDURE — 250N000013 HC RX MED GY IP 250 OP 250 PS 637: Performed by: NURSE PRACTITIONER

## 2022-11-19 PROCEDURE — 97162 PT EVAL MOD COMPLEX 30 MIN: CPT | Mod: GP

## 2022-11-19 PROCEDURE — 97535 SELF CARE MNGMENT TRAINING: CPT | Mod: GO

## 2022-11-19 PROCEDURE — 85610 PROTHROMBIN TIME: CPT | Performed by: NURSE PRACTITIONER

## 2022-11-19 PROCEDURE — 99233 SBSQ HOSP IP/OBS HIGH 50: CPT | Performed by: NURSE PRACTITIONER

## 2022-11-19 PROCEDURE — A9585 GADOBUTROL INJECTION: HCPCS | Performed by: NURSE PRACTITIONER

## 2022-11-19 PROCEDURE — 70544 MR ANGIOGRAPHY HEAD W/O DYE: CPT

## 2022-11-19 PROCEDURE — 250N000011 HC RX IP 250 OP 636: Performed by: NURSE PRACTITIONER

## 2022-11-19 PROCEDURE — 999N000157 HC STATISTIC RCP TIME EA 10 MIN

## 2022-11-19 PROCEDURE — 85027 COMPLETE CBC AUTOMATED: CPT | Performed by: NURSE PRACTITIONER

## 2022-11-19 PROCEDURE — 200N000001 HC R&B ICU

## 2022-11-19 PROCEDURE — 97166 OT EVAL MOD COMPLEX 45 MIN: CPT | Mod: GO

## 2022-11-19 PROCEDURE — 80048 BASIC METABOLIC PNL TOTAL CA: CPT | Performed by: NURSE PRACTITIONER

## 2022-11-19 PROCEDURE — 93005 ELECTROCARDIOGRAM TRACING: CPT

## 2022-11-19 PROCEDURE — 70549 MR ANGIOGRAPH NECK W/O&W/DYE: CPT

## 2022-11-19 PROCEDURE — 70553 MRI BRAIN STEM W/O & W/DYE: CPT

## 2022-11-19 PROCEDURE — 250N000013 HC RX MED GY IP 250 OP 250 PS 637: Performed by: INTERNAL MEDICINE

## 2022-11-19 PROCEDURE — 255N000002 HC RX 255 OP 636: Performed by: NURSE PRACTITIONER

## 2022-11-19 PROCEDURE — 73060 X-RAY EXAM OF HUMERUS: CPT | Mod: LT

## 2022-11-19 PROCEDURE — 84484 ASSAY OF TROPONIN QUANT: CPT | Performed by: NURSE PRACTITIONER

## 2022-11-19 PROCEDURE — 94660 CPAP INITIATION&MGMT: CPT

## 2022-11-19 PROCEDURE — 93010 ELECTROCARDIOGRAM REPORT: CPT | Performed by: GENERAL ACUTE CARE HOSPITAL

## 2022-11-19 RX ORDER — HYDROCORTISONE 2.5 %
CREAM (GRAM) TOPICAL 2 TIMES DAILY
Status: DISCONTINUED | OUTPATIENT
Start: 2022-11-19 | End: 2022-12-01 | Stop reason: HOSPADM

## 2022-11-19 RX ORDER — BUMETANIDE 1 MG/1
1 TABLET ORAL DAILY
Status: DISCONTINUED | OUTPATIENT
Start: 2022-11-19 | End: 2022-11-23

## 2022-11-19 RX ORDER — GADOBUTROL 604.72 MG/ML
10 INJECTION INTRAVENOUS ONCE
Status: COMPLETED | OUTPATIENT
Start: 2022-11-19 | End: 2022-11-19

## 2022-11-19 RX ADMIN — INSULIN ASPART 1 UNITS: 100 INJECTION, SOLUTION INTRAVENOUS; SUBCUTANEOUS at 00:13

## 2022-11-19 RX ADMIN — MEROPENEM 500 MG: 500 INJECTION, POWDER, FOR SOLUTION INTRAVENOUS at 00:04

## 2022-11-19 RX ADMIN — BUMETANIDE 1 MG: 1 TABLET ORAL at 08:25

## 2022-11-19 RX ADMIN — MEROPENEM 500 MG: 500 INJECTION, POWDER, FOR SOLUTION INTRAVENOUS at 11:00

## 2022-11-19 RX ADMIN — MEROPENEM 500 MG: 500 INJECTION, POWDER, FOR SOLUTION INTRAVENOUS at 23:44

## 2022-11-19 RX ADMIN — INSULIN ASPART 1 UNITS: 100 INJECTION, SOLUTION INTRAVENOUS; SUBCUTANEOUS at 09:15

## 2022-11-19 RX ADMIN — ALLOPURINOL 200 MG: 100 TABLET ORAL at 08:25

## 2022-11-19 RX ADMIN — INSULIN ASPART 1 UNITS: 100 INJECTION, SOLUTION INTRAVENOUS; SUBCUTANEOUS at 03:35

## 2022-11-19 RX ADMIN — ACETAMINOPHEN 1000 MG: 500 TABLET ORAL at 14:45

## 2022-11-19 RX ADMIN — ACETAMINOPHEN 1000 MG: 500 TABLET ORAL at 21:44

## 2022-11-19 RX ADMIN — HYDROCORTISONE: 25 CREAM TOPICAL at 21:50

## 2022-11-19 RX ADMIN — ONDANSETRON 4 MG: 2 INJECTION INTRAMUSCULAR; INTRAVENOUS at 04:17

## 2022-11-19 RX ADMIN — PANTOPRAZOLE SODIUM 40 MG: 40 TABLET, DELAYED RELEASE ORAL at 08:26

## 2022-11-19 RX ADMIN — TRAZODONE HYDROCHLORIDE 100 MG: 50 TABLET ORAL at 21:44

## 2022-11-19 RX ADMIN — GADOBUTROL 10 ML: 604.72 INJECTION INTRAVENOUS at 19:04

## 2022-11-19 RX ADMIN — HYDROCORTISONE: 25 CREAM TOPICAL at 14:16

## 2022-11-19 ASSESSMENT — ACTIVITIES OF DAILY LIVING (ADL)
ADLS_ACUITY_SCORE: 27
ADLS_ACUITY_SCORE: 23
ADLS_ACUITY_SCORE: 23
ADLS_ACUITY_SCORE: 29
ADLS_ACUITY_SCORE: 27
ADLS_ACUITY_SCORE: 27
ADLS_ACUITY_SCORE: 23
ADLS_ACUITY_SCORE: 27
ADLS_ACUITY_SCORE: 27

## 2022-11-19 NOTE — PROGRESS NOTES
"ICU PROGRESS NOTE:      Assessment/Plan:     Changes for Today:      Neurosurgery Consult, appreciate.  No concerns related to L1 L2 and L3 transverse process fractures.    Will consult Neurology as patient has had vague complaints last night of numbness in left face, and numbness of tongue.    Clinically Significant Risk Factors Present on Admission            # Hyperkalemia: Highest K = 5.9 mmol/L (Ref range: 3.4-5.3) in last 2 days, will monitor as appropriate  # Hyponatremia: Lowest Na = 133 mmol/L (Ref range: 136-145) in last 2 days, will monitor as appropriate     # Anion Gap Metabolic Acidosis: Highest Anion Gap = 20 mmol/L (Ref range: 7-15) in last 2 days, will monitor and treat as appropriate   # Drug Induced Coagulation Defect: home medication list includes an anticoagulant medication   # Acute Kidney Injury, unspecified: based on a >150% or 0.3 mg/dL increase in last creatinine compared to past 90 day average, will monitor renal function    # Non-Invasive mechanical ventilation: current O2 Device: BiPAP/CPAP  # Acute hypoxic respiratory failure: continue supplemental O2 as needed   # DMII: A1C = 7.7 % (Ref range: <5.7 %) within past 3 months    # Obesity: Estimated body mass index is 35.15 kg/m  as calculated from the following:    Height as of this encounter: 1.778 m (5' 10\").    Weight as of this encounter: 111.1 kg (245 lb).        PLAN:     Neuro: Weakness at home.  Frequent episodes of standing too fast, becoming dizzy, and then falling.    CT scans of head and spine negative for acute injury or abnormalities.    Avoid narcotics or other sedating medications    Tylenol prn pain and or fever    Patient complains of difficulty moving and numbness of right arm.  He did recently fall on his elbow.  Consider Xray.     Overnight, tingling on left face/numbness.  Numbness of tongue.        Pulmonary:  Acute respiratory failure in the setting of possible left lower lobe pneumonia and pulmonary edema    BNP " elevated at 3,064    Lasix was not affective. Given one dose of IV Bumex with results. Will change to PO daily today.     Merrem for possible pneumonia, given PCN allergy as well as recent hospitalization     Will obtain sputum culture if able    SpO2 goal 92% or greater.  Bipap for work of breathing (has not needed since ED, discontinued).         CV:  Septic Shock in the setting of UTI and aspiration pneumonia and CHF exacerbation.  Elevation of Troponin-likely demand ischemia.   History of Paroxysmal A fib   Bradycardia-resolved        Recheck troponin, likely this is related to demand ischemia in setting of CHF and shock    Bradycardic on admission.  Given atropine times one for HR of 30's.  Likely secondary to hypothermia. K was also elevated at 5.9. Patient is also on metoprolol PTA  This has now resolved with resolution of prior mentioned.     Bumex 1 mg Q day.     Lactic acidosis cleared     MAP goal 65 or greater, at this point has been hemodynamically stable    Warfarin PTA for hx of DVT and hx of A fib     Patient is also on metoprolol at home.  His dose was recently reduced by his primary due to falls and dizziness at home.  Holding now.         GI:  No acute issues. Cardiac and Diabetic Diet Today         Renal:  Acute on Chronic Renal Failure.    Anion Gap Metabolic Acidosis  Lactic Acidosis.. Hyperkalemia (all resolved, renal functioning improving)    Sepsis in setting of UTI (urine with very elevated leukocyte esterase).  Patient has history of urostomy/bladder cancer.  Urine culture pending     Continue to follow renal function and avoid nephrotoxins.         ID:  Sepsis with urinary source.  Possible pneumonia.    Merrem as above, should cover for pneumonia and UTI    Try to obtain sputum as above    UC pending as above    Blood culture in process        Heme: Leukocytosis, in setting of UTI and possible pneumonia    Continue to trend    Antibiotics as above      Endocrine:  T2DM    ICU insulin  "protocol    Goal FSBG less than 180    sliding scale insulin, medium resistance scale to start, may require insulin gtt        ICU Prophylaxis:     PPI:  Protonix while NPO     Peridex:  Not required      Anticoagulation/DVT Prophylaxis:  Warfarin, pharmacy to dose              Lines/Drains/Tubes:  PIV, Day one  PICC Line, placed 11/18/22         CODE STATUS:  No CPR, Do Not Intubate       SUBJECTIVE:    Ccx:  \"I'm here,\" but \"doing ok.\"     HPI:    Eduardo Laughlin is a 82 year old male with medical history significant for bladder cancer, urostomy, CKD-stage III, DVT-on Coumadin, LBBB, NSTEMI, T2DM, and morbid obesity.  Patient was recently hospitalized for CHF exacerbation.  Today, patient presented to the emergency room with concerns for recent falls, generalized weakness, and shortness of breath.  He was found to be bradycardia, hypothermic, and hypotensive, with increased work of breathing.  He was placed on Bipap and started on antibiotics. Will be admitted to ICU.   Weaned off of BiPAP.  Electrolytes improved.  Bradycardia resolved.  Having numbness in various areas on left side-neurology consulted.       Past Medical History:   Diagnosis Date     Acute renal failure with acute tubular necrosis superimposed on stage 3 chronic kidney disease (H)      Arteriosclerotic cardiovascular disease (ASCVD)      Atrial fibrillation (H)      Bladder cancer (H)      BPH (benign prostatic hypertrophy)      Chronic kidney disease      Complicated UTI (urinary tract infection) 08/25/2019     Congestive heart failure (H)      Coronary artery disease      Diabetes mellitus (H)      Diabetic neuropathy (H)      E coli bacteremia      GERD (gastroesophageal reflux disease)      Gout      Hyperlipidemia      Hypertension      Hyponatremia      Iliac artery aneurysm, 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    Added automatically from request for surgery [id number]     Kidney stone      Osteoarthritis      Personal history of malignant neoplasm of " prostate 04/27/2020     Prostate cancer (H)      Sleep apnea     CPAP     Past Surgical History:   Procedure Laterality Date     CARDIAC CATHETERIZATION  03/28/2008    and coronary angios     CYSTECTOMY       CYSTOSCOPY       CYSTOSCOPY N/A 12/23/2015    Procedure: CYSTOSCOPY BLADDER BIOPSIES with Fulgeration and Placement of Otero ;  Surgeon: Gordon Bajwa MD;  [place] [facility] [place]  Service:      IR EXTREMITY ANGIOGRAM LEFT  3/22/2019     IR EXTREMITY ANGIOGRAM LEFT  3/22/2019     IR ILEAL CONDUIT INJECTION  12/23/2016     IR NEPHROSTOMY TUBE PLACEMENT RIGHT  8/18/2016     IR URETEROSTOMY TUBE CHANGE RIGHT  9/21/2016     KIDNEY STONE SURGERY  x4     LAMINECTOMY LUMBAR ONE LEVEL Bilateral 9/13/2022    Procedure: LUMBAR 4 - LUMBAR 5 BILATERAL MEDIAL FACETECTOMY AND DECOMPRESSION;  Surgeon: Everett Holland MD;  Location: [facility]     PICC TRIPLE LUMEN PLACEMENT  11/18/2022          PROSTATE SURGERY       Current Facility-Administered Medications   Medication     acetaminophen (TYLENOL) tablet 1,000 mg     allopurinol (ZYLOPRIM) tablet 200 mg     bisacodyl (DULCOLAX) EC tablet 5 mg    Or     bisacodyl (DULCOLAX) EC tablet 10 mg    Or     bisacodyl (DULCOLAX) EC tablet 15 mg     bumetanide (BUMEX) tablet 1 mg     glucose gel 15-30 g    Or     dextrose 50 % injection 25-50 mL    Or     glucagon injection 1 mg     glucose gel 15-30 g    Or     dextrose 50 % injection 25-50 mL    Or     glucagon injection 1 mg     insulin aspart (NovoLOG) injection (RAPID ACTING)     insulin aspart (NovoLOG) injection (RAPID ACTING)     lidocaine (LMX4) cream     lidocaine 1 % 0.1-5 mL     meropenem (MERREM) 500 mg vial to attach to  mL bag for ADULTS or 25 mL bag for PEDS     ondansetron (ZOFRAN ODT) ODT tab 4 mg    Or     ondansetron (ZOFRAN) injection 4 mg     pantoprazole (PROTONIX) EC tablet 40 mg     Patient is already receiving anticoagulation with heparin, enoxaparin (LOVENOX), warfarin  (COUMADIN)  or other anticoagulant medication     prochlorperazine (COMPAZINE) injection 5 mg    Or     prochlorperazine (COMPAZINE) tablet 5 mg    Or     prochlorperazine (COMPAZINE) suppository 12.5 mg     sodium chloride (PF) 0.9% PF flush 10-20 mL     sodium chloride (PF) 0.9% PF flush 10-40 mL     sodium chloride (PF) 0.9% PF flush 10-40 mL     sodium chloride (PF) 0.9% PF flush 10-40 mL     traZODone (DESYREL) tablet 100 mg     Warfarin Dose Required Daily - Pharmacist Managed     warfarin-No DOSE today        Allergies   Allergen Reactions     Metoprolol Succinate Er      Other reaction(s): low blood pressure     Pcn [Penicillins] Other (See Comments)     Childhood-doesn't know reactions     Statin Drugs [Hmg-Coa-R Inhibitors] Cramps     Uroxatral [Alfuzosin] Other (See Comments)     hypotension     Family History   Problem Relation Age of Onset     No Known Problems Mother      Prostate Cancer Father      Deep Vein Thrombosis Father      Cancer Sister      Heart Disease Sister      No Known Problems Daughter      Heart Disease Brother      Heart Disease Sister      No Known Problems Daughter          PHYSICAL ASSESSMENT:  General: appears stated age, alert, cooperative  Lungs:  Crackles improving. Diminished in bases.   Heart: RRR, S1 and S2   Abdomen: Soft, non-tender.  Bowel sounds present X 4 quadrants.  Skin: skin color normal, texture normal, no rashes or lesions.   Extremities:  No overt edema noted.   Pulses:  +2 BUE and +2 BLE  Neuro:  Intact.     Temp: 98.5  F (36.9  C) Temp src: Oral BP: 115/69 Pulse: 97   Resp: 27 SpO2: 92 % O2 Device: Nasal cannula Oxygen Delivery: 2 LPM        Intake/Output Summary (Last 24 hours) at 11/19/2022 1259  Last data filed at 11/19/2022 1100  Gross per 24 hour   Intake 275 ml   Output 2175 ml   Net -1900 ml     Temp: 98.5  F (36.9  C) Temp src: Oral BP: 115/69 Pulse: 97   Resp: 27 SpO2: 92 % O2 Device: Nasal cannula Oxygen Delivery: 2 LPM      Lab Results   Component  Value Date    WBC 15.5 11/19/2022    WBC 8.0 06/13/2020     Lab Results   Component Value Date    RBC 3.82 11/19/2022    RBC 4.22 06/13/2020     Lab Results   Component Value Date    HGB 12.2 11/19/2022    HGB 13.2 06/13/2020     Lab Results   Component Value Date    HCT 35.9 11/19/2022    HCT 39.8 06/13/2020     No components found for: MCT  Lab Results   Component Value Date    MCV 94 11/19/2022    MCV 94 06/13/2020     Lab Results   Component Value Date    MCH 31.9 11/19/2022    MCH 31.3 06/13/2020     Lab Results   Component Value Date    MCHC 34.0 11/19/2022    MCHC 33.2 06/13/2020     Lab Results   Component Value Date    RDW 14.9 11/19/2022    RDW 14.3 06/13/2020     Lab Results   Component Value Date     11/19/2022     06/13/2020       Last Comprehensive Metabolic Panel:  Sodium   Date Value Ref Range Status   11/19/2022 137 136 - 145 mmol/L Final   06/13/2020 140 133 - 144 mmol/L Final     Potassium   Date Value Ref Range Status   11/19/2022 4.5 3.4 - 5.3 mmol/L Final   03/03/2022 4.6 3.5 - 5.0 mmol/L Final   06/13/2020 4.4 3.4 - 5.3 mmol/L Final     Chloride   Date Value Ref Range Status   11/19/2022 103 98 - 107 mmol/L Final   03/03/2022 108 (H) 98 - 107 mmol/L Final   06/13/2020 111 (H) 94 - 109 mmol/L Final     Carbon Dioxide   Date Value Ref Range Status   06/13/2020 22 20 - 32 mmol/L Final     Carbon Dioxide (CO2)   Date Value Ref Range Status   11/19/2022 22 22 - 29 mmol/L Final   03/03/2022 20 (L) 22 - 31 mmol/L Final     Anion Gap   Date Value Ref Range Status   11/19/2022 12 7 - 15 mmol/L Final   03/03/2022 10 5 - 18 mmol/L Final   06/13/2020 7 3 - 14 mmol/L Final     Glucose   Date Value Ref Range Status   09/14/2022 227 (H) 70 - 125 mg/dL Final   06/13/2020 115 (H) 70 - 99 mg/dL Final     GLUCOSE BY METER POCT   Date Value Ref Range Status   11/19/2022 160 (H) 70 - 99 mg/dL Final     Urea Nitrogen   Date Value Ref Range Status   11/19/2022 60.5 (H) 8.0 - 23.0 mg/dL Final    03/03/2022 30 (H) 8 - 28 mg/dL Final   06/13/2020 30 7 - 30 mg/dL Final     Creatinine   Date Value Ref Range Status   11/19/2022 3.71 (H) 0.67 - 1.17 mg/dL Final   06/13/2020 2.07 (H) 0.66 - 1.25 mg/dL Final     GFR Estimate   Date Value Ref Range Status   11/19/2022 16 (L) >60 mL/min/1.73m2 Final     Comment:     Effective December 21, 2021 eGFRcr in adults is calculated using the 2021 CKD-EPI creatinine equation which includes age and gender (Roselyn et al., NEJ, DOI: 10.1056/DHOWvg4328474)   02/23/2021 26 (L) >60 mL/min/1.73m2 Final   06/13/2020 29 (L) >60 mL/min/[1.73_m2] Final     Comment:     Non  GFR Calc  Starting 12/18/2018, serum creatinine based estimated GFR (eGFR) will be   calculated using the Chronic Kidney Disease Epidemiology Collaboration   (CKD-EPI) equation.       GFR, ESTIMATED POCT   Date Value Ref Range Status   11/18/2022 13 (L) >60 mL/min/1.73m2 Final     Calcium   Date Value Ref Range Status   11/19/2022 8.4 (L) 8.8 - 10.2 mg/dL Final   06/13/2020 8.0 (L) 8.5 - 10.1 mg/dL Final       Last Arterial Blood Gas:  No results found for: PH, PCO2, PO2, HCO3, O2SAT, BASEEXCESS, HARPER, SAT    Troponin I ES   Date Value Ref Range Status   06/14/2020 <0.015 0.000 - 0.045 ug/L Final     Comment:     The 99th percentile for upper reference range is 0.045 ug/L.  Troponin values   in the range of 0.045 - 0.120 ug/L may be associated with risks of adverse   clinical events.       Reviewed imaging from this admission.     CARLOS Banuelos, CNP  Pulmonary Critical Care  Please contact through [employer] Secure Application     :

## 2022-11-19 NOTE — PROGRESS NOTES
11/19/22 9878   Appointment Info   Signing Clinician's Name / Credentials (PT) Gemma Delgado DPT   Living Environment   People in Home spouse   Current Living Arrangements apartment;independent living facility   Home Accessibility no concerns   Living Environment Comments tub shower, grab bars, shower seat. raised toilet seat, grab bars.   Self-Care   Usual Activity Tolerance moderate   Current Activity Tolerance poor   Equipment Currently Used at Home walker, rolling  (4WW)   Fall history within last six months yes   Number of times patient has fallen within last six months   (twice in last two days)   Activity/Exercise/Self-Care Comment cleaning every other week. Ind w/ ADLs, 5 meals a week from services (primarily for spouse).   General Information   Onset of Illness/Injury or Date of Surgery 11/18/22   Referring Physician CARLOS Banuelos   Patient/Family Therapy Goals Statement (PT) none stated   Pertinent History of Current Problem (include personal factors and/or comorbidities that impact the POC) medical history significant for bladder cancer, urostomy, CKD-stage III, DVT-on Coumadin, LBBB, NSTEMI, T2DM, and morbid obesity.  Patient was recently hospitalized for CHF exacerbation.  Today, patient presented to the emergency room with concerns for recent falls, generalized weakness, and shortness of breath.  He was found to be bradycardia, hypothermic, and hypotensive, with increased work of breathing.  He was placed on Bipap and started on antibiotics   Pain Assessment   Patient Currently in Pain Yes, see Vital Sign flowsheet  (L arm, BLEs)   Range of Motion (ROM)   Range of Motion ROM deficits secondary to weakness;ROM deficits secondary to pain   Strength (Manual Muscle Testing)   Strength (Manual Muscle Testing) Deficits observed during functional mobility   Strength Comments generalized weakness   Bed Mobility   Bed Mobility supine-sit;sit-supine   Supine-Sit Pine (Bed Mobility) maximum  assist (25% patient effort);2 person assist   Sit-Supine Rush (Bed Mobility) maximum assist (25% patient effort);2 person assist   Bed Mobility Limitations decreased ability to use arms for pushing/pulling;decreased ability to use legs for bridging/pushing;impaired ability to control trunk for mobility   Assistive Device (Bed Mobility) bed rails   Comment, (Bed Mobility) log roll technique   Transfers   Transfers sit-stand transfer   Sit-Stand Transfer   Sit-Stand Rush (Transfers) moderate assist (50% patient effort);2 person assist   Assistive Device (Sit-Stand Transfers) other (see comments)  (arm in arm of 2)   Balance   Balance other (describe)   Balance Comments posterior and R lean, unable to get to midline independently.   Sensory Examination   Sensory Perception other (describe)   Sensory Perception Comments peripheral neuropathy in BLEs, no change in sensation from baseline   Clinical Impression   Criteria for Skilled Therapeutic Intervention Yes, treatment indicated   PT Diagnosis (PT) impaired functional mobility, gait abnormality   Influenced by the following impairments decreased strength, decreased endurance, impaired sensation, decreased ROM   Functional limitations due to impairments transfers, gait, bed mobility   Clinical Presentation (PT Evaluation Complexity) Evolving/Changing   Clinical Presentation Rationale pt presents as medically diagnosed   Clinical Decision Making (Complexity) moderate complexity   Planned Therapy Interventions (PT) balance training;bed mobility training;gait training;home exercise program;neuromuscular re-education;patient/family education;strengthening;transfer training   Anticipated Equipment Needs at Discharge (PT) walker, rolling   Risk & Benefits of therapy have been explained evaluation/treatment results reviewed;patient   PT Total Evaluation Time   PT Luis, Moderate Complexity Minutes (61671) 10   Physical Therapy Goals   PT Frequency Daily   PT  Predicted Duration/Target Date for Goal Attainment 11/26/22   PT Goals Bed Mobility;Transfers;Gait   PT: Bed Mobility Minimal assist;Supine to/from sit;Rolling;Within precautions   PT: Transfers Minimal assist;Sit to/from stand;Bed to/from chair;Assistive device;Within precautions   PT: Gait Minimal assist;Rolling walker;Within precautions;Assistive device;25 feet   Therapeutic Activity   Therapeutic Activities: dynamic activities to improve functional performance Minutes (93117) 13   Symptoms Noted During/After Treatment Fatigue   Treatment Detail/Skilled Intervention supine > sit EOB maxA x 2 with log roll technique education. Inc pain, needing assist with trunk into upright. Sitting EOB x 5 minutes with SBA-CGA, heavy lean to R and posterior. Instructed in upright posture and midline, pt unable to achieve midline independently. Sit <> stand x 1 rep with modA x 2 arm in arm. Heavy lean onto R side, R knee blocked. Able to take 3 small steps to L with mod-maxA x 2 arm in arm. Sit > supine maxA x 2, inc assist for log roll technique   PT Discharge Planning   PT Plan supine <> sit with log roll, midline sitting tolerance/balance, sit <> stand w/ FWW A x 2.   PT Discharge Recommendation (DC Rec) Transitional Care Facility   PT Rationale for DC Rec currently needing A x 2 for all mobility.   PT Brief overview of current status all mobility mod-maxA x 2, unsteady, R heavy lean, unsafe for home d/c at this time   Total Session Time   Timed Code Treatment Minutes 13   Total Session Time (sum of timed and untimed services) 23

## 2022-11-19 NOTE — PROGRESS NOTES
Neurosurgery plan of care    Consult request received for Yuniel for subtle acute to subacute appearing lumbar transverse process fractures of right L1 L2 and L3. Surrounding sclerosis.    These types of fractures do not interfere with the stability of the spine and there is no specific treatment indicated. No surgery indicated. Some patients prefer to wear an off-the-shelf lumbar wrap brace prn comfort only. Activity as tolerated. If ongoing pain in this area, can place referral for spine center for conservative treatment such as PT, medications.     No indication for formal consult at this time. We will sign off    Kelly Jones FNP-C  [facility] Neurosurgery  O. [phone]

## 2022-11-19 NOTE — PROGRESS NOTES
Care Management Follow Up    Length of Stay (days): 1    Expected Discharge Date: 11/21/2022     Concerns to be Addressed:  Neuro consulted today, needs therapy recs      Patient plan of care discussed at interdisciplinary rounds: Yes    Anticipated Discharge Disposition:  TBD     Anticipated Discharge Services:  TBD  Anticipated Discharge DME:  TBD  Education Provided on the Discharge Plan:  Per Care Team   Patient/Family in Agreement with the Plan:  Yes    Referrals Placed by CM/SW:    Private pay costs discussed: Not applicable    Additional Information:  Chart reviewed.    Yuniel lives at [facility] with his wife. He is independent with ADLs at baseline. Wife to transport at discharge.     Came in to the hosp for recurrent falls, and left side facial numbness. Neuro and therapy consulted.    Waiting for further recommendations from the care team.     CM will continue to follow plan of care, review recommendations, and assist with any discharge needs anticipated.          Johanna Roach RN

## 2022-11-19 NOTE — PLAN OF CARE
[facility] - ICU    RN Progress Note:            Pertinent Assessments:      Please refer to flowsheet rows for full assessment     Very vague sensation and numbness assessment with pt throughout the day---- left arm is difficult for pt to lift up ( sort of drags it but some antigravity ) on the right hand the numbness is just finger tips--- feet currently are fine except tip of toes. Now stating the tip of his tongue. Still oriented X4 no new symptoms other than the tongue VSS         Mobility Level:     Level  2         Key Events - This Shift:     [facility] SAT / SBT Record: Delete if N/A    SAT Safety Screen    If FAILED why?    SAT Performed    If FAILED why?    SBT Safety Screen    If FAILED why?                     Plan:     Neuro surg. Cleared pt for any type of treatment and stated no formal consult needed --- Neurology is consulted as well.  Therapies this afternoon--daughter here and updated         Point of Contact Update        Goal Outcome Evaluation:      inconsistant neuro exams

## 2022-11-19 NOTE — SIGNIFICANT EVENT
"Significant Event Note    Time of event: 3:29 AM November 19, 2022    Description of event:  Notified of L sided facial numbness.     Known L sided weakness--stable per nursing assessment and per chart review. Though on initial admission was more concerned about weakness in R arm and numbness there.    Admitted for weakness and recurrent falls, CT scans of head and spine negative.    Tonight, states that L sided facial numbness is bothersome but has been present since presentation, he just didn't feel it was pertinent to mention.     Denies headache, dizziness. No changes in vision. No chest pain or shortness of breath.    /58   Pulse 90   Temp 98.2  F (36.8  C) (Oral)   Resp 26   Ht 1.778 m (5' 10\")   Wt 112.7 kg (248 lb 7.3 oz)   SpO2 93%   BMI 35.65 kg/m      General: Resting comfortably, conversing appropriately, alert and oriented x4.  HEENT: Normocephalic, atraumatic.  Extraoccular eye movements intact, anicteric sclera.   Neck: Supple.   Pulm: Non-labored breathing.  No use of accessory muscles.  Sating well on 2L NC.  Neuro:  Awake.  Oriented to person, place, time and situation.  Cranial nerves 2-12 grossly intact. No facial droop, facial muscle strength 5/5 and symmetric,  Endorsing numbness sensation with touch on L face in all three sensory nerve distributions when compared to touch on R--does not extend to scalp or below to neck. Moving all extremities.   strength is 4/5 in R and 4-/5 in L--nursing endorsing stable from previous exam. B/l lower extremity strength is 5/5 and symmetric.     Plan:  L sided facial numbness  Unclear etiology but due to persistence since presentation and lack of other new focal findings low suspicion for acute intracranial process. Will continue to monitor closely for other neurologic changes or development of deficit. Will need aggressive PT/OT for upper extremity weakness.    Discussed with: bedside nurse who will update supervising physician,  " Ariel.    Francia Rendon MD

## 2022-11-19 NOTE — PROGRESS NOTES
11/19/22 1500   Appointment Info   Signing Clinician's Name / Credentials (OT) Kaylee Maxwell MOT OTR/L CLT   Living Environment   People in Home spouse   Current Living Arrangements apartment;independent living facility   Home Accessibility no concerns   Living Environment Comments tub shower, grab bars, shower seat. raised toilet seat, grab bars.   Self-Care   Usual Activity Tolerance moderate   Current Activity Tolerance poor   Equipment Currently Used at Home walker, rolling   Fall history within last six months yes   Activity/Exercise/Self-Care Comment cleaning every other week. Ind w/ ADLs, 5 meals a week from services (primarily for spouse).   General Information   Onset of Illness/Injury or Date of Surgery 11/18/22   Referring Physician Shefali Kulkarni CNP   Additional Occupational Profile Info/Pertinent History of Current Problem Today, patient presented to the emergency room with concerns for recent falls, generalized weakness, and shortness of breath.  He was found to be bradycardia, hypothermic, and hypotensive, with increased work of breathing   Cognitive Status Examination   Affect/Mental Status (Cognitive) WNL   Cognitive Status Comments able to communicate but appears to have some slurring of speech   Visual Perception   Visual Impairment/Limitations WNL   Range of Motion Comprehensive   General Range of Motion no range of motion deficits identified   Comment, General Range of Motion LUE WFL- slow to reach 90degree   Strength Comprehensive (MMT)   Comment, General Manual Muscle Testing (MMT) Assessment LUE weakness, numbness and decreased coordination. Also notes DENISE has numbnes   Coordination   Fine Motor Coordination LUE impaired   Bed Mobility   Supine-Sit [place] (Bed Mobility) maximum assist (25% patient effort);2 person assist;verbal cues   Sit-Supine [place] (Bed Mobility) maximum assist (25% patient effort);2 person assist   Transfers   Transfers sit-stand transfer   Sit-Stand  Transfer   Sit-Stand [place] (Transfers) moderate assist (50% patient effort);2 person assist;maximum assist (25% patient effort)   Clinical Impression   Criteria for Skilled Therapeutic Interventions Met (OT) Yes, treatment indicated   OT Diagnosis decreased ADL indep   Assessment of Occupational Performance 3-5 Performance Deficits   Planned Therapy Interventions (OT) ADL retraining;balance training;transfer training;strengthening;ROM;neuromuscular re-education   Clinical Decision Making Complexity (OT) moderate complexity   Risk & Benefits of therapy have been explained care plan/treatment goals reviewed   OT Total Evaluation Time   OT Eval, Moderate Complexity Minutes (00502) 8   OT Goals   Therapy Frequency (OT) Daily   OT Predicted Duration/Target Date for Goal Attainment 11/26/22   OT Goals Hygiene/Grooming;Transfers;Toilet Transfer/Toileting   OT: Hygiene/Grooming minimal assist  (seated)   OT: Transfer Moderate assist   OT: Toilet Transfer/Toileting Moderate assist   Self-Care/Home Management   Self-Care/Home Mgmt/ADL, Compensatory, Meal Prep Minutes (84584) 8   Treatment Detail/Skilled Intervention additional sit to stand with mod A of 2, leaning to R, side steps to HOB with cues for technique and mod A of 2. EOB sitting for ~6 minutes with leaning to R and head turned to R. Cues for obtaining midline.   OT Discharge Planning   OT Plan EOB sitting, standing, A of 2 to chair?, PRAVEEN ex   OT Discharge Recommendation (DC Rec) Transitional Care Facility   OT Rationale for DC Rec A of 2   OT Brief overview of current status A of 2 for sit to stand, recommend nursing to use lift   Total Session Time   Timed Code Treatment Minutes 8   Total Session Time (sum of timed and untimed services) 16

## 2022-11-19 NOTE — PLAN OF CARE
[facility] - ICU    RN Progress Note:            Pertinent Assessments:      Please refer to flowsheet rows for full assessment     Pt remains A&Ox4, on 2L NC. HR hubert at times to ST. At times appears to be going into Afib very shirt term then back into a SR with AV block and BBB. BP soft at starte of shift but now WDL. Good UO.          Mobility Level:     *2         Key Events - This Shift:     *Pt complained to nurse of L facial numbness, pt states it has been going on since his arrival to the ED but did not feel it was important to let staff know. Also complaining of numbness in hands and weakness in L hand, nurse was told this in report. Resident contacted and came to assess pt. DR George also updated. No new orders at this time.               Plan:     PT and OT to evaluate.          Mayra Kinney, RN

## 2022-11-19 NOTE — PLAN OF CARE
[facility] - ICU    RN Progress Note:            Pertinent Assessments:      Please refer to flowsheet rows for full assessment     Bradycardia slowly improving throughout evening shift.  Arrived with heart rate in the 40's, but rate now 50's to low 60's.  Patient states his breathing is easier, and he is down to 1L/nasal cannula.  Left elbow pain improved with Tylenol.  Patient oriented x 4 when he arrived, but he became forgetful and restless after awaking from naps.  Asking for home Trazadone dose to help with sleep.           Mobility Level:     Bedrest, turning side to side.        Key Events - This Shift:        Admitted to ICU               Plan:     Labs as ordered, monitor urine output and respiratory status.         Point of Contact Update Daughter, Nakia at bedside.  Updated with samir.

## 2022-11-20 ENCOUNTER — APPOINTMENT (OUTPATIENT)
Dept: OCCUPATIONAL THERAPY | Facility: HOSPITAL | Age: 82
DRG: 853 | End: 2022-11-20
Attending: HOSPITALIST
Payer: COMMERCIAL

## 2022-11-20 ENCOUNTER — APPOINTMENT (OUTPATIENT)
Dept: PHYSICAL THERAPY | Facility: HOSPITAL | Age: 82
DRG: 853 | End: 2022-11-20
Payer: COMMERCIAL

## 2022-11-20 LAB
ALBUMIN SERPL BCG-MCNC: 2.9 G/DL (ref 3.5–5.2)
ALP SERPL-CCNC: 56 U/L (ref 40–129)
ALT SERPL W P-5'-P-CCNC: 60 U/L (ref 10–50)
ANION GAP SERPL CALCULATED.3IONS-SCNC: 9 MMOL/L (ref 7–15)
AST SERPL W P-5'-P-CCNC: 224 U/L (ref 10–50)
BACTERIA UR CULT: NORMAL
BILIRUB DIRECT SERPL-MCNC: <0.2 MG/DL (ref 0–0.3)
BILIRUB SERPL-MCNC: 0.4 MG/DL
BUN SERPL-MCNC: 59.9 MG/DL (ref 8–23)
CALCIUM SERPL-MCNC: 8.5 MG/DL (ref 8.8–10.2)
CHLORIDE SERPL-SCNC: 105 MMOL/L (ref 98–107)
COHGB MFR BLD: 1.5 % (ref 0–1.5)
CREAT SERPL-MCNC: 3.1 MG/DL (ref 0.67–1.17)
DEPRECATED HCO3 PLAS-SCNC: 25 MMOL/L (ref 22–29)
ERYTHROCYTE [DISTWIDTH] IN BLOOD BY AUTOMATED COUNT: 14.8 % (ref 10–15)
GFR SERPL CREATININE-BSD FRML MDRD: 19 ML/MIN/1.73M2
GLUCOSE BLDC GLUCOMTR-MCNC: 158 MG/DL (ref 70–99)
GLUCOSE BLDC GLUCOMTR-MCNC: 162 MG/DL (ref 70–99)
GLUCOSE BLDC GLUCOMTR-MCNC: 171 MG/DL (ref 70–99)
GLUCOSE BLDC GLUCOMTR-MCNC: 179 MG/DL (ref 70–99)
GLUCOSE SERPL-MCNC: 155 MG/DL (ref 70–99)
HCT VFR BLD AUTO: 35.8 % (ref 40–53)
HGB BLD-MCNC: 11.9 G/DL (ref 13.3–17.7)
INR PPP: 4.38 (ref 0.85–1.15)
MCH RBC QN AUTO: 31.8 PG (ref 26.5–33)
MCHC RBC AUTO-ENTMCNC: 33.2 G/DL (ref 31.5–36.5)
MCV RBC AUTO: 96 FL (ref 78–100)
PLATELET # BLD AUTO: 245 10E3/UL (ref 150–450)
POTASSIUM SERPL-SCNC: 4.6 MMOL/L (ref 3.4–5.3)
PROT SERPL-MCNC: 6.5 G/DL (ref 6.4–8.3)
RBC # BLD AUTO: 3.74 10E6/UL (ref 4.4–5.9)
SODIUM SERPL-SCNC: 139 MMOL/L (ref 136–145)
WBC # BLD AUTO: 11 10E3/UL (ref 4–11)

## 2022-11-20 PROCEDURE — 82248 BILIRUBIN DIRECT: CPT | Performed by: PSYCHIATRY & NEUROLOGY

## 2022-11-20 PROCEDURE — 85610 PROTHROMBIN TIME: CPT | Performed by: NURSE PRACTITIONER

## 2022-11-20 PROCEDURE — 250N000011 HC RX IP 250 OP 636: Performed by: NURSE PRACTITIONER

## 2022-11-20 PROCEDURE — 250N000013 HC RX MED GY IP 250 OP 250 PS 637: Performed by: NURSE PRACTITIONER

## 2022-11-20 PROCEDURE — 97535 SELF CARE MNGMENT TRAINING: CPT | Mod: GO

## 2022-11-20 PROCEDURE — 99223 1ST HOSP IP/OBS HIGH 75: CPT | Performed by: PSYCHIATRY & NEUROLOGY

## 2022-11-20 PROCEDURE — 82375 ASSAY CARBOXYHB QUANT: CPT | Performed by: PSYCHIATRY & NEUROLOGY

## 2022-11-20 PROCEDURE — 99233 SBSQ HOSP IP/OBS HIGH 50: CPT | Performed by: NURSE PRACTITIONER

## 2022-11-20 PROCEDURE — 97530 THERAPEUTIC ACTIVITIES: CPT | Mod: GP

## 2022-11-20 PROCEDURE — 120N000001 HC R&B MED SURG/OB

## 2022-11-20 PROCEDURE — 85027 COMPLETE CBC AUTOMATED: CPT | Performed by: NURSE PRACTITIONER

## 2022-11-20 PROCEDURE — 80053 COMPREHEN METABOLIC PANEL: CPT | Performed by: NURSE PRACTITIONER

## 2022-11-20 PROCEDURE — 250N000013 HC RX MED GY IP 250 OP 250 PS 637: Performed by: INTERNAL MEDICINE

## 2022-11-20 RX ADMIN — ACETAMINOPHEN 1000 MG: 500 TABLET ORAL at 16:18

## 2022-11-20 RX ADMIN — TRAZODONE HYDROCHLORIDE 100 MG: 50 TABLET ORAL at 20:36

## 2022-11-20 RX ADMIN — ALLOPURINOL 200 MG: 100 TABLET ORAL at 07:50

## 2022-11-20 RX ADMIN — ACETAMINOPHEN 1000 MG: 500 TABLET ORAL at 04:01

## 2022-11-20 RX ADMIN — MEROPENEM 500 MG: 500 INJECTION, POWDER, FOR SOLUTION INTRAVENOUS at 23:19

## 2022-11-20 RX ADMIN — HYDROCORTISONE: 25 CREAM TOPICAL at 07:49

## 2022-11-20 RX ADMIN — BUMETANIDE 1 MG: 1 TABLET ORAL at 07:50

## 2022-11-20 RX ADMIN — HYDROCORTISONE: 25 CREAM TOPICAL at 20:36

## 2022-11-20 RX ADMIN — ACETAMINOPHEN 1000 MG: 500 TABLET ORAL at 23:18

## 2022-11-20 RX ADMIN — PANTOPRAZOLE SODIUM 40 MG: 40 TABLET, DELAYED RELEASE ORAL at 07:49

## 2022-11-20 RX ADMIN — MEROPENEM 500 MG: 500 INJECTION, POWDER, FOR SOLUTION INTRAVENOUS at 12:35

## 2022-11-20 ASSESSMENT — ACTIVITIES OF DAILY LIVING (ADL)
ADLS_ACUITY_SCORE: 29

## 2022-11-20 NOTE — PLAN OF CARE
Goal Outcome Evaluation:  jpt is being transferred to  via W/C  Much , much stronger an d awake than yesterday. Eating and drinking well.  rreport given and pts belongs brougt to room by his daughter

## 2022-11-20 NOTE — CONSULTS
[facility]  Neurology Consultation    Patient Name:  Eduardo Laughlin  MRN:  [id number]    :  1940  Date of Service:  2022  Primary care provider:  Rafael Montelongo      Neurology consultation service was asked to see Eduardo Laughlin by CARLOS Banuelos to evaluate L sided abnormal sensation.    Chief Complaint:  L sided incoordinations    History of Present Illness:   Eduardo Laughlin is a 82 year old male with history of bladder cancer, urostomy, CKD stage IV, DVT on Coumadin, NSTEMI, type 2 diabetes who presents with bradycardia, hypothermia, hypotension and increased work of breathing.  He had a recent admission for CHF exacerbation and this was felt to be something similar.  Neurology was consulted for left-sided paresthesias.  Per chart it appears patient's symptoms are improving since admission.  He was admitted after a fall at home.  He has had frequent episodes of feeling dizzy and falling he describes it as a combination of lightheadedness and vertigo.  He denied any focality to his symptoms.  He did get skin tears over his left greater than right upper extremity.  He states around the time he was admitted he noticed tingling of his left face and numbness of the tongue.  He also felt his L arm more than his leg was uncoordinated and weak.  He states he does not remember the ambulance ride here after his fall.  He does not think he had the symptoms well at home, but upon having clear consciousness here noted symptoms.  He does not think they have been progressive but of also not improved.  His lactic acid was very high on admission at 6.9.  He denies any issues with his mentation.  He denies headache.  Denies vision changes.  He denies any suicide attempts or things such as methanol drinking.  He denies any new medication changes or toxic exposures.  He states he lives with his wife and his wife has not had any similar symptoms that he is  aware of although she does have her own health issues including vision difficulties.  He does not think symptoms involve his right side.    I spoke to wife who denies any neurologic symptoms, although she question if he could have gotten it from his motor vehicle.  He states he does a lot of driving and she never drives.  Does not believe that he is ever been in an enclosed space with the car running.    ROS  A comprehensive ROS was performed and pertinent findings were included in HPI.     PMH  Past Medical History:   Diagnosis Date     Acute renal failure with acute tubular necrosis superimposed on stage 3 chronic kidney disease (H)      Arteriosclerotic cardiovascular disease (ASCVD)      Atrial fibrillation (H)      Bladder cancer (H)      BPH (benign prostatic hypertrophy)      Chronic kidney disease      Complicated UTI (urinary tract infection) 08/25/2019     Congestive heart failure (H)      Coronary artery disease      Diabetes mellitus (H)      Diabetic neuropathy (H)      E coli bacteremia      GERD (gastroesophageal reflux disease)      Gout      Hyperlipidemia      Hypertension      Hyponatremia      Iliac artery aneurysm, 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    Added automatically from request for surgery [id number]     Kidney stone      Osteoarthritis      Personal history of malignant neoplasm of prostate 04/27/2020     Prostate cancer (H)      Sleep apnea     CPAP     Past Surgical History:   Procedure Laterality Date     CARDIAC CATHETERIZATION  03/28/2008    and coronary angios     CYSTECTOMY       CYSTOSCOPY       CYSTOSCOPY N/A 12/23/2015    Procedure: CYSTOSCOPY BLADDER BIOPSIES with Fulgeration and Placement of [place] ;  Surgeon: Gordon Bajwa MD;  Location: [facility];  Service:      IR EXTREMITY ANGIOGRAM LEFT  3/22/2019     IR EXTREMITY ANGIOGRAM LEFT  3/22/2019     IR ILEAL CONDUIT INJECTION  12/23/2016     IR NEPHROSTOMY TUBE PLACEMENT RIGHT  8/18/2016     IR URETEROSTOMY TUBE CHANGE RIGHT   "9/21/2016     KIDNEY STONE SURGERY  x4     LAMINECTOMY LUMBAR ONE LEVEL Bilateral 9/13/2022    Procedure: LUMBAR 4 - LUMBAR 5 BILATERAL MEDIAL FACETECTOMY AND DECOMPRESSION;  Surgeon: Everett Holland MD;  Location: [facility] OR     PICC TRIPLE LUMEN PLACEMENT  11/18/2022          PROSTATE SURGERY         Medications   I have personally reviewed the patient's medication list.     Allergies  I have personally reviewed the patient's allergy list.     Social History  Denies tobacco, alcohol and drug use.  Denies any toxic exposures, suicide attempts.    Family History    Denies family history of stroke.      Physical Examination   Vitals: /68   Pulse 91   Temp 98.2  F (36.8  C) (Oral)   Resp 18   Ht 1.778 m (5' 10\")   Wt 112.3 kg (247 lb 9.2 oz)   SpO2 92%   BMI 35.52 kg/m    General: Lying in bed, NAD  Head: NC/AT  Eyes: no icterus, op pink and moist  Cardiac: RRR. Extremities warm, no edema.   Respiratory: non-labored on oxygen Nasal cannula.  No wheezing but some coarse breath sounds heard  GI: Obese and soft  Skin: Skin tears most notable over the left elbow  Psych: Mood pleasant, affect congruent  Neuro:  Mental status: Awake, alert, attentive, oriented to self, time, place, and circumstance. Language is fluent and coherent with intact comprehension of complex commands, naming and repetition.  Able to tell me quarters in a dollar $0.25.  Name president.  Do three-step cross commands.  2 out of 3 and delayed recall  Cranial nerves:  PERRL, conjugate gaze, EOMI, facial sensation decreased throughout the left face symmetric, shoulder shrug strong, tongue/uvula midline, no dysarthria.   Motor: Normal bulk.  Tone normal.  Strength is somewhat pain limited after his fall.  Deltoid strength at least 4+ out of 5 bilaterally.  Biceps 5 out of 5 bilaterally.  Triceps strength limited by elbow pain.  Wrist extension strength 5 out of 5.  Finger extensors 4 out of 5 on left, 5 out of 5 on " right.  Similar for finger abductors.  Lower extremities with full strength throughout.  However he has significantly decreased finger tapping and toe tapping on his left side.  Reflexes: Deep tendon reflexes are hypoactive in the upper extremities.  1+ patella.  Absent Achilles.  No clonus.  Sensory: Reports decreased sensation in the left arm and face.  Not involving the leg.  Coordination: Finger-nose-finger without ataxia noted on the left.  Gait: Did not stand up due to fall risk    Investigations   I have personally reviewed pertinent labs, tests, and radiological imaging. Discussion of notable findings is included under Impression.     MRI brain personally reviewed  Lactic acid 6.9 that is subsequently improved    Was patient transferred from outside hospital?   No    Impression  #L sided dysmetria  #L parestheias  #Abnormal Brain MRI  #Bilateral Globus pallidus lesions.  #Possible carbon monoxide vs unwitnessed hypoglycemia    Mr. Eason 82-year-old male admitted for what was presumed CHF exacerbation as well as possible pneumonia in the setting of very high lactic acidosis.  Neurology consulted for left-sided paresthesias.  On exam he not only has left-sided paresthesias but has clear incoordination of the left upper and left lower extremity.  MRI is interesting and shows relatively symmetric bilateral globus pallidus hyperintensities.  This would suggest a toxic/metabolic etiology more likely than a vascular event.  1 consideration in the setting of very high lactic acid and dizziness would be carbon monoxide poisoning.  Although admittedly could be unusual for spouse at home to not be manifesting symptoms.  Would check carbon monoxide level..   Other causes of bilateral globus pallidus lesions include rare encephalitis of, methanol, cyanide, hepatic, renal, or hypoglycemia.  Appears most of his labs are near baseline and does not have risk factors for some of these.  Would be reasonable to get an MRV  when we are able to rule out sinus thrombosis although feel this is much less likely especially in the setting of supratherapeutic INR.  No hypoglycemia appreciated on labs.  Wife instructed to not drive car while awaiting levels    Recommendations  -Carbon monoxide level (ordered)  - LFT's (ordreed)  - MRV of brain when able  - Would hold off on additional studies such as LP at this time and we will continue to follow along    Thank you for involving Neurology in the care of Eduardo Laughlin.     Xavi Armenta, DO

## 2022-11-20 NOTE — PROGRESS NOTES
ICU PROGRESS NOTE:      Assessment/Plan:     Changes for Today:    Downgrade from ICU status    Sign off given to Dr. Augustin, Hospital Medicine Service.  Their team will be resuming care, appreciate.     PLAN:      Neuro: Weakness at home.  Frequent episodes of standing too fast, becoming dizzy, and then falling.    CT scans of head and spine negative for acute injury or abnormalities.    Avoid narcotics or other sedating medications    Tylenol prn pain and or fever    Patient complains of difficulty moving and numbness of right arm.  He did recently fall on his elbow. Xray negative for fracture.    Overnight, tingling on left face/numbness.  Numbness of tongue.  Neuro was consulted, appreciate their assistance on case.  MRI and MRA of neck and head performed 11/19.  Concerns for possible exposure to carbon monoxide, level ordered.  MRV when able.        Pulmonary:  Acute respiratory failure in the setting of possible left lower lobe pneumonia and pulmonary edema    BNP elevated at 3,064    Lasix was not affective. Given one dose of IV Bumex with results. Will change to PO daily today.     Merrem for possible pneumonia, given PCN allergy as well as recent hospitalization     Will obtain sputum culture if able    SpO2 goal 92% or greater.        CV:  Septic Shock in the setting of UTI and aspiration pneumonia and CHF exacerbation.  Elevation of Troponin-likely demand ischemia.   History of Paroxysmal A fib   Bradycardia-resolved        Bradycardic on admission.  Given atropine times one for HR of 30's.  Likely secondary to hypothermia. K was also elevated at 5.9. Patient is also on metoprolol PTA  This has now resolved with resolution of prior mentioned.     Bumex 1 mg Q day.     Lactic acidosis cleared     MAP goal 65 or greater, at this point has been hemodynamically stable    Warfarin PTA for hx of DVT and hx of A fib     Patient is also on metoprolol at home.  His dose was recently reduced by his primary due  to falls and dizziness at home.  Holding now.         GI:  No acute issues. Cardiac and Diabetic Diet Today         Renal:  Acute on Chronic Renal Failure.    Anion Gap Metabolic Acidosis  Lactic Acidosis.. Hyperkalemia (all resolved, renal functioning improving)    Sepsis in setting of UTI (urine with very elevated leukocyte esterase).  Patient has history of urostomy/bladder cancer.  Urine culture pending     Continue to follow renal function and avoid nephrotoxins.    Renal function continues to slowly improve.          ID:  Sepsis with urinary source.  Possible pneumonia.    Merrem as above, should cover for pneumonia and UTI    Try to obtain sputum as above    UC likely contaminated.     Blood culture in process, negative for one day.         Heme: Leukocytosis, in setting of UTI and possible pneumonia    Continue to trend    Antibiotics as above      Endocrine:  T2DM    ICU insulin protocol    Goal FSBG less than 180    sliding scale insulin, medium resistance scale to start, may require insulin gtt        ICU Prophylaxis:     PPI:  Protonix while NPO     Peridex:  Not required      Anticoagulation/DVT Prophylaxis:  Warfarin, pharmacy to dose             Lines/Drains/Tubes:  PIV, Day one  PICC Line, placed 11/18/22      CODE STATUS:  No CPR, Do not Intubate       SUBJECTIVE:    Ccx:  Feeling better today.     HPI:    Eduardo Laughlin is a 82 year old male with medical history significant for bladder cancer, urostomy, CKD-stage III, DVT-on Coumadin, LBBB, NSTEMI, T2DM, and morbid obesity.  Patient was recently hospitalized for CHF exacerbation.  Today, patient presented to the emergency room with concerns for recent falls, generalized weakness, and shortness of breath.  He was found to be bradycardia, hypothermic, and hypotensive, with increased work of breathing.  He was placed on Bipap and started on antibiotics. Will be admitted to ICU.   Weaned off of BiPAP.  Electrolytes improved.  Bradycardia  resolved.  Having numbness in various areas on left side-neurology consulted.         Past Medical History:   Diagnosis Date     Acute renal failure with acute tubular necrosis superimposed on stage 3 chronic kidney disease (H)      Arteriosclerotic cardiovascular disease (ASCVD)      Atrial fibrillation (H)      Bladder cancer (H)      BPH (benign prostatic hypertrophy)      Chronic kidney disease      Complicated UTI (urinary tract infection) 08/25/2019     Congestive heart failure (H)      Coronary artery disease      Diabetes mellitus (H)      Diabetic neuropathy (H)      E coli bacteremia      GERD (gastroesophageal reflux disease)      Gout      Hyperlipidemia      Hypertension      Hyponatremia      Iliac artery aneurysm, 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    Added automatically from request for surgery [id number]     Kidney stone      Osteoarthritis      Personal history of malignant neoplasm of prostate 04/27/2020     Prostate cancer (H)      Sleep apnea     CPAP     Past Surgical History:   Procedure Laterality Date     CARDIAC CATHETERIZATION  03/28/2008    and coronary angios     CYSTECTOMY       CYSTOSCOPY       CYSTOSCOPY N/A 12/23/2015    Procedure: CYSTOSCOPY BLADDER BIOPSIES with Fulgeration and Placement of Otero ;  Surgeon: Gordon Bajwa MD;  Location: [facility];  Service:      IR EXTREMITY ANGIOGRAM LEFT  3/22/2019     IR EXTREMITY ANGIOGRAM LEFT  3/22/2019     IR ILEAL CONDUIT INJECTION  12/23/2016     IR NEPHROSTOMY TUBE PLACEMENT RIGHT  8/18/2016     IR URETEROSTOMY TUBE CHANGE RIGHT  9/21/2016     KIDNEY STONE SURGERY  x4     LAMINECTOMY LUMBAR ONE LEVEL Bilateral 9/13/2022    Procedure: LUMBAR 4 - LUMBAR 5 BILATERAL MEDIAL FACETECTOMY AND DECOMPRESSION;  Surgeon: Everett Holland MD;  Location: [facility]     PICC TRIPLE LUMEN PLACEMENT  11/18/2022          PROSTATE SURGERY       Current Facility-Administered Medications   Medication     acetaminophen (TYLENOL) tablet 1,000  mg     allopurinol (ZYLOPRIM) tablet 200 mg     bisacodyl (DULCOLAX) EC tablet 5 mg    Or     bisacodyl (DULCOLAX) EC tablet 10 mg    Or     bisacodyl (DULCOLAX) EC tablet 15 mg     bumetanide (BUMEX) tablet 1 mg     glucose gel 15-30 g    Or     dextrose 50 % injection 25-50 mL    Or     glucagon injection 1 mg     glucose gel 15-30 g    Or     dextrose 50 % injection 25-50 mL    Or     glucagon injection 1 mg     hydrocortisone 2.5 % cream     insulin aspart (NovoLOG) injection (RAPID ACTING)     insulin aspart (NovoLOG) injection (RAPID ACTING)     lidocaine (LMX4) cream     lidocaine 1 % 0.1-5 mL     meropenem (MERREM) 500 mg vial to attach to  mL bag for ADULTS or 25 mL bag for PEDS     ondansetron (ZOFRAN ODT) ODT tab 4 mg    Or     ondansetron (ZOFRAN) injection 4 mg     pantoprazole (PROTONIX) EC tablet 40 mg     Patient is already receiving anticoagulation with heparin, enoxaparin (LOVENOX), warfarin (COUMADIN)  or other anticoagulant medication     prochlorperazine (COMPAZINE) injection 5 mg    Or     prochlorperazine (COMPAZINE) tablet 5 mg    Or     prochlorperazine (COMPAZINE) suppository 12.5 mg     sodium chloride (PF) 0.9% PF flush 10-20 mL     sodium chloride (PF) 0.9% PF flush 10-40 mL     sodium chloride (PF) 0.9% PF flush 10-40 mL     sodium chloride (PF) 0.9% PF flush 10-40 mL     traZODone (DESYREL) tablet 100 mg     Warfarin Dose Required Daily - Pharmacist Managed     warfarin-No DOSE today        Allergies   Allergen Reactions     Metoprolol Succinate Er      Other reaction(s): low blood pressure     Pcn [Penicillins] Other (See Comments)     Childhood-doesn't know reactions     Statin Drugs [Hmg-Coa-R Inhibitors] Cramps     Uroxatral [Alfuzosin] Other (See Comments)     hypotension     Family History   Problem Relation Age of Onset     No Known Problems Mother      Prostate Cancer Father      Deep Vein Thrombosis Father      Cancer Sister      Heart Disease Sister      No Known  Problems Daughter      Heart Disease Brother      Heart Disease Sister      No Known Problems Daughter          PHYSICAL ASSESSMENT:  General: appears stated age, alert, cooperative  Lungs:  CTA  Heart: RRR, S1 and S2   Abdomen: Soft, non-tender.  Bowel sounds present X 4 quadrants.  Skin: skin color normal, texture normal, no rashes or lesions.   Extremities:  No overt edema noted.   Pulses:  +2 BUE and +2 BLE  Neuro:  Arm movement on left about the same.  No other concerns today.  Alert and oriented times 4.     Temp: 98.2  F (36.8  C) Temp src: Oral BP: 118/82 Pulse: 95   Resp: 22 SpO2: 92 % O2 Device: Nasal cannula Oxygen Delivery: 1 LPM        Intake/Output Summary (Last 24 hours) at 11/20/2022 1159  Last data filed at 11/20/2022 1125  Gross per 24 hour   Intake 1180 ml   Output 2075 ml   Net -895 ml     Temp: 98.2  F (36.8  C) Temp src: Oral BP: 118/82 Pulse: 95   Resp: 22 SpO2: 92 % O2 Device: Nasal cannula Oxygen Delivery: 1 LPM      Lab Results   Component Value Date    WBC 11.0 11/20/2022    WBC 8.0 06/13/2020     Lab Results   Component Value Date    RBC 3.74 11/20/2022    RBC 4.22 06/13/2020     Lab Results   Component Value Date    HGB 11.9 11/20/2022    HGB 13.2 06/13/2020     Lab Results   Component Value Date    HCT 35.8 11/20/2022    HCT 39.8 06/13/2020     No components found for: MCT  Lab Results   Component Value Date    MCV 96 11/20/2022    MCV 94 06/13/2020     Lab Results   Component Value Date    MCH 31.8 11/20/2022    MCH 31.3 06/13/2020     Lab Results   Component Value Date    MCHC 33.2 11/20/2022    MCHC 33.2 06/13/2020     Lab Results   Component Value Date    RDW 14.8 11/20/2022    RDW 14.3 06/13/2020     Lab Results   Component Value Date     11/20/2022     06/13/2020       Last Comprehensive Metabolic Panel:  Sodium   Date Value Ref Range Status   11/20/2022 139 136 - 145 mmol/L Final   06/13/2020 140 133 - 144 mmol/L Final     Potassium   Date Value Ref Range Status    11/20/2022 4.6 3.4 - 5.3 mmol/L Final   03/03/2022 4.6 3.5 - 5.0 mmol/L Final   06/13/2020 4.4 3.4 - 5.3 mmol/L Final     Chloride   Date Value Ref Range Status   11/20/2022 105 98 - 107 mmol/L Final   03/03/2022 108 (H) 98 - 107 mmol/L Final   06/13/2020 111 (H) 94 - 109 mmol/L Final     Carbon Dioxide   Date Value Ref Range Status   06/13/2020 22 20 - 32 mmol/L Final     Carbon Dioxide (CO2)   Date Value Ref Range Status   11/20/2022 25 22 - 29 mmol/L Final   03/03/2022 20 (L) 22 - 31 mmol/L Final     Anion Gap   Date Value Ref Range Status   11/20/2022 9 7 - 15 mmol/L Final   03/03/2022 10 5 - 18 mmol/L Final   06/13/2020 7 3 - 14 mmol/L Final     Glucose   Date Value Ref Range Status   11/20/2022 155 (H) 70 - 99 mg/dL Final   09/14/2022 227 (H) 70 - 125 mg/dL Final   06/13/2020 115 (H) 70 - 99 mg/dL Final     GLUCOSE BY METER POCT   Date Value Ref Range Status   11/20/2022 162 (H) 70 - 99 mg/dL Final     Urea Nitrogen   Date Value Ref Range Status   11/20/2022 59.9 (H) 8.0 - 23.0 mg/dL Final   03/03/2022 30 (H) 8 - 28 mg/dL Final   06/13/2020 30 7 - 30 mg/dL Final     Creatinine   Date Value Ref Range Status   11/20/2022 3.10 (H) 0.67 - 1.17 mg/dL Final   06/13/2020 2.07 (H) 0.66 - 1.25 mg/dL Final     GFR Estimate   Date Value Ref Range Status   11/20/2022 19 (L) >60 mL/min/1.73m2 Final     Comment:     Effective December 21, 2021 eGFRcr in adults is calculated using the 2021 CKD-EPI creatinine equation which includes age and gender (Roselyn carter al., NEJM, DOI: 10.1056/TWYLyn3759025)   02/23/2021 26 (L) >60 mL/min/1.73m2 Final   06/13/2020 29 (L) >60 mL/min/[1.73_m2] Final     Comment:     Non  GFR Calc  Starting 12/18/2018, serum creatinine based estimated GFR (eGFR) will be   calculated using the Chronic Kidney Disease Epidemiology Collaboration   (CKD-EPI) equation.       GFR, ESTIMATED POCT   Date Value Ref Range Status   11/18/2022 13 (L) >60 mL/min/1.73m2 Final     Calcium   Date Value Ref  Range Status   11/20/2022 8.5 (L) 8.8 - 10.2 mg/dL Final   06/13/2020 8.0 (L) 8.5 - 10.1 mg/dL Final       Last Arterial Blood Gas:  No results found for: PH, PCO2, PO2, HCO3, O2SAT, BASEEXCESS, HARPER, SAT    Troponin I ES   Date Value Ref Range Status   06/14/2020 <0.015 0.000 - 0.045 ug/L Final     Comment:     The 99th percentile for upper reference range is 0.045 ug/L.  Troponin values   in the range of 0.045 - 0.120 ug/L may be associated with risks of adverse   clinical events.       MRA and MRI reviewed by neurology yesterday, appreciate.       CARLOS Banuelos, CNP  Pulmonary Critical Care  Please contact through Vocera Secure Application

## 2022-11-20 NOTE — PLAN OF CARE
[facility] - ICU    RN Progress Note:            Pertinent Assessments:      Please refer to flowsheet rows for full assessment     Pt remains A&Ox4. Slept well this shift. HR SR/ST into the low 100s with AV block and BBB.          Mobility Level:     2         Key Events - This Shift:     Pt complains of lower back pain, PRN tylenol given.               Plan:     Continue working with PT/OT, pain management. Neuro consult today.      Mayra Kinney RN

## 2022-11-20 NOTE — PROGRESS NOTES
Hospitalist Progress Note    Assessment/Plan    Principal Problem:    Lactic acidosis  Active Problems:    Acute respiratory failure with hypoxia (H)    Aspiration pneumonia of both lungs, unspecified aspiration pneumonia type, unspecified part of lung (H)    Eduardo is a 82-year-old male with history of bladder cancer, urostomy, CKD stage III, DVT on Coumadin, diabetes, obesity, NSTEMI, left bundle branch block who was recently hospitalized for CHF exacerbation.  Patient presented to [facility] on 11/18 with concerns for generalized weakness and shortness of breath.  On arrival to ER, patient was found to be bradycardic, hypothermic , hypotensive and hypoxic.  Patient was noted to have acute hypoxic respiratory failure, lactic acidosis and in septic shock requiring monitoring in ICU on pressors and BiPAP.  On admission patient also noted to be hyperkalemic with significantly elevated BNP and troponin.  UA showed evidence of UTI.  Work-up with CT head was unremarkable.  CT chest showed groundglass infiltrates in both lungs.  Patient currently is off BiPAP, patient now stable and transferring out of ICU on 11/20/2022.    Sepsis likely 2/2 to pneumonia and UTI  -On admission patient was found to be bradycardic, hypothermic, hypotensive and hypoxic. Also,  noted to have lactic acidosis.  -CT chest showed bilateral groundglass opacities, L>R.  -UA suggestive of UTI, urine culture shows no growth however sample appears to be contaminated.  -Blood cultures negative.  Sputum culture if able to collect sample  -Started on meropenem given history of penicillin allergy.  Continue meropenem for total of 7 days.  -Continue supplemental oxygen as needed.      CHF exacerbation  -Patient noted to have significantly elevated BNP>3000 on admission.  -Was given IV Lasix, with minimal response.  It was then switched to IV Bumex.  -Transition to Bumex p.o. now.   - Monitor intake/output, daily weight monitoring  -Echocardiogram  from 10/22 showed EF of 50-55% and WMA.    History of NSTEMI   Elevated troponin likely secondary to demand ischemia in the setting of sepsis and CHF exacerbation  -Continue to monitor on telemetry  -EKG showed no acute ST changes on admission.      Acute hypoxic respiratory failure likely secondary to pneumonia and CHF exacerbation  -Patient briefly needed BiPAP on admission, currently weaned off  -Continue supplemental oxygen, monitor saturations and wean O2 as able.    Bradycardia on admission was likely thought secondary to his metoprolol.  -Heart rate currently stable as metoprolol on hold.    Lactic acidosis-resolved currently.    History of DVT/history of paroxysmal A. Fib  Holding PTA metoprolol given his recent bradycardia  -Continue warfarin, pharmacy to dose      Left-sided numbness  -CT head unremarkable.  -MRI head and neck shows foci of restricted diffusion in bilateral globus pallidus, findings seen usually in carbon monoxide poisoning or other toxic metabolic etiologies.  -Neurology is following, appreciate input.   -Carbon monoxide levels ordered.  -MRV ordered to rule out sinus thrombosis.    Diabetes  -A1c 7.7  -Continue current sliding scale  -Monitor blood sugars, hypoglycemia protocol.    Hyperkalemia - resolved.    ROBERT on CKD  -Metabolic acidosis   -Likely In the setting of sepsis.  -Baseline  creat around 2.3, creat peaked to 3.7 this admission.  -Creat at 3.1 today.  Minimize nephrotoxins, continue to monitor.    Rib fracture  Acute fracture of right ninth, 10th and 11th ribs seen.  Questionable acute to subacute fracture of right 12th rib seen on lumbar spine CT.  -Acute to subacute right L1-L2 and L3 transverse process fractures seen.  -Will touch base with NSG tomorrow of this needs any intervention      Barriers to Discharge:  Multiple medical issues    Anticipated discharge date/Disposition: liekly will need placement    Subjective  Patient is new to me. Chart reviewed and events  noted.  Patient seen by ICU team today.  Chart check only, discussed with SUNSHINE Meehan.              Objective    Vital signs in last 24 hours  Temp:  [98.2  F (36.8  C)-98.6  F (37  C)] 98.2  F (36.8  C)  Pulse:  [] 91  Resp:  [18-28] 18  BP: ()/(58-85) 101/68  SpO2:  [90 %-95 %] 92 % [unfilled] O2 Device: Nasal cannula    Weight:   [unfilled] Weight change:     Intake/Output last 3 shifts  I/O last 3 completed shifts:  In: 795 [P.O.:795]  Out: 1575 [Urine:1575]  Body mass index is 35.52 kg/m .    Physical Exam    Deferred.  Please check ICU progress note for details on physical exam.                      Pertinent Labs   Lab Results: personally reviewed.   Recent Labs   Lab 11/20/22 0407 11/19/22 0352 11/18/22  1953    137 135*   CO2 25 22 20*   BUN 59.9* 60.5* 53.7*     Recent Labs   Lab 11/20/22 0407 11/19/22 0352 11/18/22  1025   WBC 11.0 15.5* 20.5*   HGB 11.9* 12.2* 13.4   HCT 35.8* 35.9* 41.9    270 322     No results for input(s): CKTOTAL, TROPONINI in the last 168 hours.    Invalid input(s): TROPONINT, CKMBINDEX  Invalid input(s): POCGLUFGR    Medications  Drug and lactation database from the [place] National Library of Medicine:  http://toxnet.nlm.nih.gov/cgi-bin/sis/htmlgen?LACT      Pertinent Radiology   Radiology Results:  Personally reviewed impressions    Reviewed labs in last 24 hours.    Advanced Care Planning:  Discharge Planning discussed with ICU team    This Note is created using dragon voice recognition software.  Errors in spelling or words which seems out of context are unintentional.  Sounds alike errors may have escaped editing.    Solange Augustin MD  Hospitalist

## 2022-11-20 NOTE — PROGRESS NOTES
Care Management Follow Up    Length of Stay (days): 2    Expected Discharge Date: 11/21/2022     Concerns to be Addressed: Neuro following      Patient plan of care discussed at interdisciplinary rounds: Yes    Anticipated Discharge Disposition:  Home      Anticipated Discharge Services: TCU possible    Anticipated Discharge DME: NARAYAN     Education Provided on the Discharge Plan: Per Care Team    Patient/Family in Agreement with the Plan: Yes     Referrals Placed by CM/SW:    Private pay costs discussed: Not applicable    Additional Information:  Chart reviewed.    Pt background:  Pt lives at [facility] with his wife. He is independent with ADLs at baseline. Wife to transport at discharge.     Came in to the hosp for recurrent falls, and left side facial numbness. Neuro and therapy consulted, and following.    Therapy recs TCU placement at discharge for A-2 transfers, and leaning.    Wife I agreeable to TCU , and would like [facility] as first choice, or any closer to home in [place].    RNCM sent referrals to TCU ( pending).       CM will continue to follow plan of care, review recommendations, and assist with any discharge needs anticipated.     Johanna Roach RN

## 2022-11-21 ENCOUNTER — APPOINTMENT (OUTPATIENT)
Dept: OCCUPATIONAL THERAPY | Facility: HOSPITAL | Age: 82
DRG: 853 | End: 2022-11-21
Payer: COMMERCIAL

## 2022-11-21 ENCOUNTER — APPOINTMENT (OUTPATIENT)
Dept: MRI IMAGING | Facility: HOSPITAL | Age: 82
DRG: 853 | End: 2022-11-21
Attending: HOSPITALIST
Payer: COMMERCIAL

## 2022-11-21 LAB
ALBUMIN SERPL BCG-MCNC: 3.1 G/DL (ref 3.5–5.2)
ALP SERPL-CCNC: 61 U/L (ref 40–129)
ALT SERPL W P-5'-P-CCNC: 53 U/L (ref 10–50)
ANION GAP SERPL CALCULATED.3IONS-SCNC: 8 MMOL/L (ref 7–15)
AST SERPL W P-5'-P-CCNC: 147 U/L (ref 10–50)
BILIRUB SERPL-MCNC: 0.9 MG/DL
BUN SERPL-MCNC: 53 MG/DL (ref 8–23)
CALCIUM SERPL-MCNC: 9 MG/DL (ref 8.8–10.2)
CHLORIDE SERPL-SCNC: 101 MMOL/L (ref 98–107)
CREAT SERPL-MCNC: 2.34 MG/DL (ref 0.67–1.17)
DEPRECATED HCO3 PLAS-SCNC: 25 MMOL/L (ref 22–29)
ERYTHROCYTE [DISTWIDTH] IN BLOOD BY AUTOMATED COUNT: 14.6 % (ref 10–15)
GFR SERPL CREATININE-BSD FRML MDRD: 27 ML/MIN/1.73M2
GLUCOSE BLDC GLUCOMTR-MCNC: 158 MG/DL (ref 70–99)
GLUCOSE BLDC GLUCOMTR-MCNC: 174 MG/DL (ref 70–99)
GLUCOSE BLDC GLUCOMTR-MCNC: 178 MG/DL (ref 70–99)
GLUCOSE BLDC GLUCOMTR-MCNC: 207 MG/DL (ref 70–99)
GLUCOSE SERPL-MCNC: 174 MG/DL (ref 70–99)
HCT VFR BLD AUTO: 38.9 % (ref 40–53)
HGB BLD-MCNC: 13 G/DL (ref 13.3–17.7)
INR PPP: 2.35 (ref 0.85–1.15)
MCH RBC QN AUTO: 31.9 PG (ref 26.5–33)
MCHC RBC AUTO-ENTMCNC: 33.4 G/DL (ref 31.5–36.5)
MCV RBC AUTO: 95 FL (ref 78–100)
PLATELET # BLD AUTO: 250 10E3/UL (ref 150–450)
POTASSIUM SERPL-SCNC: 4.5 MMOL/L (ref 3.4–5.3)
PROT SERPL-MCNC: 7.1 G/DL (ref 6.4–8.3)
RBC # BLD AUTO: 4.08 10E6/UL (ref 4.4–5.9)
SODIUM SERPL-SCNC: 134 MMOL/L (ref 136–145)
TROPONIN T SERPL HS-MCNC: 819 NG/L
WBC # BLD AUTO: 10.1 10E3/UL (ref 4–11)

## 2022-11-21 PROCEDURE — 250N000013 HC RX MED GY IP 250 OP 250 PS 637: Performed by: NURSE PRACTITIONER

## 2022-11-21 PROCEDURE — 84484 ASSAY OF TROPONIN QUANT: CPT | Performed by: HOSPITALIST

## 2022-11-21 PROCEDURE — 250N000013 HC RX MED GY IP 250 OP 250 PS 637: Performed by: HOSPITALIST

## 2022-11-21 PROCEDURE — 70546 MR ANGIOGRAPH HEAD W/O&W/DYE: CPT

## 2022-11-21 PROCEDURE — 85610 PROTHROMBIN TIME: CPT | Performed by: NURSE PRACTITIONER

## 2022-11-21 PROCEDURE — 99233 SBSQ HOSP IP/OBS HIGH 50: CPT | Performed by: HOSPITALIST

## 2022-11-21 PROCEDURE — 250N000011 HC RX IP 250 OP 636: Performed by: HOSPITALIST

## 2022-11-21 PROCEDURE — 82947 ASSAY GLUCOSE BLOOD QUANT: CPT | Performed by: HOSPITALIST

## 2022-11-21 PROCEDURE — 99232 SBSQ HOSP IP/OBS MODERATE 35: CPT | Performed by: PSYCHIATRY & NEUROLOGY

## 2022-11-21 PROCEDURE — A9585 GADOBUTROL INJECTION: HCPCS | Performed by: HOSPITALIST

## 2022-11-21 PROCEDURE — 255N000002 HC RX 255 OP 636: Performed by: HOSPITALIST

## 2022-11-21 PROCEDURE — 120N000001 HC R&B MED SURG/OB

## 2022-11-21 PROCEDURE — 85027 COMPLETE CBC AUTOMATED: CPT | Performed by: HOSPITALIST

## 2022-11-21 PROCEDURE — 80051 ELECTROLYTE PANEL: CPT | Performed by: HOSPITALIST

## 2022-11-21 PROCEDURE — 97110 THERAPEUTIC EXERCISES: CPT | Mod: GO

## 2022-11-21 RX ORDER — MEROPENEM 1 G/1
1 INJECTION, POWDER, FOR SOLUTION INTRAVENOUS EVERY 12 HOURS
Status: COMPLETED | OUTPATIENT
Start: 2022-11-21 | End: 2022-11-25

## 2022-11-21 RX ORDER — GADOBUTROL 604.72 MG/ML
11 INJECTION INTRAVENOUS ONCE
Status: COMPLETED | OUTPATIENT
Start: 2022-11-21 | End: 2022-11-21

## 2022-11-21 RX ADMIN — WARFARIN SODIUM 8 MG: 5 TABLET ORAL at 17:24

## 2022-11-21 RX ADMIN — HYDROCORTISONE: 25 CREAM TOPICAL at 21:07

## 2022-11-21 RX ADMIN — PANTOPRAZOLE SODIUM 40 MG: 40 TABLET, DELAYED RELEASE ORAL at 08:50

## 2022-11-21 RX ADMIN — GADOBUTROL 11 ML: 604.72 INJECTION INTRAVENOUS at 14:04

## 2022-11-21 RX ADMIN — BUMETANIDE 1 MG: 1 TABLET ORAL at 08:50

## 2022-11-21 RX ADMIN — ALLOPURINOL 200 MG: 100 TABLET ORAL at 08:49

## 2022-11-21 RX ADMIN — TRAZODONE HYDROCHLORIDE 100 MG: 50 TABLET ORAL at 22:09

## 2022-11-21 RX ADMIN — HYDROCORTISONE: 25 CREAM TOPICAL at 08:51

## 2022-11-21 RX ADMIN — MEROPENEM 1 G: 1 INJECTION, POWDER, FOR SOLUTION INTRAVENOUS at 12:53

## 2022-11-21 ASSESSMENT — ACTIVITIES OF DAILY LIVING (ADL)
ADLS_ACUITY_SCORE: 29

## 2022-11-21 NOTE — PLAN OF CARE
Problem: Plan of Care - These are the overarching goals to be used throughout the patient stay.    Goal: Plan of Care Review  Description: The Plan of Care Review/Shift note should be completed every shift.  The Outcome Evaluation is a brief statement about your assessment that the patient is improving, declining, or no change.  This information will be displayed automatically on your shift note.  Outcome: Progressing   Goal Outcome Evaluation:      Pt c/o lower back pain-pt did get some relief with prn tylenol. Pt sba of 2 when up.  Pt BG was checked before dinner and insulin was given per orders. Tele SR with 1st degree AV block and BBB. Pt on the falls program with call light within reach and bed alarm on.

## 2022-11-21 NOTE — PLAN OF CARE
"Goal Outcome Evaluation: Pt is a/o x 4. Cooperative with cares and medications. Dressing to the left arm skin tear change to mepilex due to drainage. C/o lower right back pain, recently received PRN Tylenol;  Ice pack applied and pt was reposition with good effect. Will continued to monitor as needed,    Problem: Plan of Care - These are the overarching goals to be used throughout the patient stay.    Goal: Patient-Specific Goal (Individualized)  Description: You can add care plan individualizations to a care plan. Examples of Individualization might be:  \"Parent requests to be called daily at 9am for status\", \"I have a hard time hearing out of my right ear\", or \"Do not touch me to wake me up as it startles me\".  Outcome: Progressing     Problem: Plan of Care - These are the overarching goals to be used throughout the patient stay.    Goal: Absence of Hospital-Acquired Illness or Injury  Outcome: Progressing  Intervention: Identify and Manage Fall Risk  Recent Flowsheet Documentation  Taken 11/20/2022 2039 by Enid Bain RN  Safety Promotion/Fall Prevention:   room door open   nonskid shoes/slippers when out of bed   lighting adjusted   clutter free environment maintained   bed alarm on  Intervention: Prevent Skin Injury  Recent Flowsheet Documentation  Taken 11/20/2022 2039 by Enid Bain RN  Body Position: position changed independently     Problem: Plan of Care - These are the overarching goals to be used throughout the patient stay.    Goal: Absence of Hospital-Acquired Illness or Injury  Intervention: Prevent Skin Injury  Recent Flowsheet Documentation  Taken 11/20/2022 2039 by Enid Bain RN  Body Position: position changed independently     Problem: Plan of Care - These are the overarching goals to be used throughout the patient stay.    Goal: Readiness for Transition of Care  Outcome: Progressing                           "

## 2022-11-21 NOTE — PROGRESS NOTES
Hospitalist Progress Note    Assessment/Plan    Principal Problem:    Lactic acidosis  Active Problems:    Acute respiratory failure with hypoxia (H)    Aspiration pneumonia of both lungs, unspecified aspiration pneumonia type, unspecified part of lung (H)    Eduardo is a 82-year-old male with history of bladder cancer, urostomy, CKD stage III, DVT on Coumadin, diabetes, obesity, NSTEMI, left bundle branch block who was recently hospitalized for CHF exacerbation.  Patient presented to [facility] on 11/18 with concerns for generalized weakness and shortness of breath.  On arrival to ER, patient was found to be bradycardic, hypothermic , hypotensive and hypoxic.  Patient was noted to have acute hypoxic respiratory failure, lactic acidosis and in sepsis requiring monitoring in ICU on BiPAP.  On admission patient also noted to be hyperkalemic with significantly elevated BNP and troponin.  UA showed evidence of UTI.  Work-up with CT head was unremarkable.  CT chest showed groundglass infiltrates in both lungs.  Patient currently is off BiPAP, patient now stable and transferred out of ICU on 11/20/2022.      Sepsis likely 2/2 to pneumonia and UTI  Metabolic encephalopathy - resolved.   -On admission patient was found to be bradycardic, hypothermic, hypotensive and hypoxic. Also,  noted to have lactic acidosis.  -CT chest showed bilateral groundglass opacities, L>R.  -UA suggestive of UTI, urine culture shows no growth however sample appears to be contaminated.  -Blood cultures negative.  Sputum culture if able to collect sample  -Started on meropenem given history of penicillin allergy.  Continue meropenem for total of 7 days.  -Continue supplemental oxygen as needed.      CHF exacerbation/Acute on chronic diastolic heart failure  -Patient noted to have significantly elevated BNP>3000 on admission.  -Was given IV Lasix, with minimal response.  It was then switched to IV Bumex.  -Transition to Bumex p.o. now.    -Monitor intake/output, daily weight monitoring  -Echocardiogram from 10/22 showed EF of 50-55% and  noWMA.    History of NSTEMI   Elevated troponin likely secondary to demand ischemia in the setting of sepsis and CHF exacerbation  -Continue to monitor on telemetry  -EKG showed no acute ST changes on admission.  -Reviewed recent ECHO.  -Repeat Trop today, if still high, consult cards.       Acute hypoxic respiratory failure likely secondary to pneumonia and CHF exacerbation  -Patient briefly needed BiPAP on admission, currently weaned off  -Continue supplemental oxygen, monitor saturations and wean O2 as able.    Bradycardia on admission was likely thought secondary to his metoprolol.  -Heart rate currently stable as metoprolol on hold.    Lactic acidosis-resolved currently.    History of DVT/history of paroxysmal A. Fib  Holding PTA metoprolol given his recent bradycardia  -Continue warfarin, pharmacy to dose. INR 2.35 today      Left-arm numbness  -CT head unremarkable.  -MRI head and neck shows foci of restricted diffusion in bilateral globus pallidus, findings seen usually in carbon monoxide poisoning or other toxic metabolic etiologies.  -Neurology is following, appreciate input.   -Carbon monoxide levels WNL  -MRV ordered to day torule out sinus thrombosis.    Diabetes  -A1c 7.7  -Continue current sliding scale  -Monitor blood sugars, hypoglycemia protocol.    Hyperkalemia - resolved.    ROBERT on CKD  -Metabolic acidosis   -Likely In the setting of sepsis.  -Baseline  creat around 2.3, creat peaked to 3.7 this admission.  -Creat at 2.34 today.  Minimize nephrotoxins, continue to monitor.    Transaminitis - improving. Monitor.     Rib fracture/Lumbar fracture  Acute fracture of right ninth, 10th and 11th ribs seen.  Questionable acute to subacute fracture of right 12th rib seen on lumbar spine CT.  -Acute to subacute right L1-L2 and L3 transverse process fractures seen.  -Seen by NSG,appears stable and pt can  follow up as out-pt if needed      Barriers to Discharge:  Multiple medical issues    Anticipated discharge date/Disposition: TCU on discharge.    Subjective  Patient is new to me. Chart reviewed and events noted.  Patient seen and examined.  In bed, states feels ok. Left arm numbness+. No chest pain, Exertional SOB+. No cough, fever. Feels weak and needs assistance to move around.       Physical Exam:  General: Pleasant, NAD  HEENT:EOMI, AT,NC  CVS:RRR, trace edema b/l LE  RS:CTAB  Abd: Soft, NT,ND  Neurology:Awake, alert, oriented, left arm numbness but ROM full.   Psy:Approrpiate affect        Objective    Vital signs in last 24 hours  Temp:  [97.8  F (36.6  C)-98.7  F (37.1  C)] 97.9  F (36.6  C)  Pulse:  [] 63  Resp:  [18-23] 21  BP: (103-151)/(71-99) 121/81  SpO2:  [91 %-95 %] 95 % [unfilled] O2 Device: Nasal cannula    Weight:   [unfilled] Weight change:     Intake/Output last 3 shifts  I/O last 3 completed shifts:  In: 460 [P.O.:460]  Out: 2350 [Urine:2350]  Body mass index is 35.52 kg/m .                      Pertinent Labs   Lab Results: personally reviewed.   Recent Labs   Lab 11/21/22 0903 11/20/22 0407 11/19/22  0352   * 139 137   CO2 25 25 22   BUN 53.0* 59.9* 60.5*   ALBUMIN 3.1* 2.9*  --    ALKPHOS 61 56  --    ALT 53* 60*  --    * 224*  --      Recent Labs   Lab 11/21/22  0903 11/20/22  0407 11/19/22  0352   WBC 10.1 11.0 15.5*   HGB 13.0* 11.9* 12.2*   HCT 38.9* 35.8* 35.9*    245 270     No results for input(s): CKTOTAL, TROPONINI in the last 168 hours.    Invalid input(s): TROPONINT, CKMBINDEX  Invalid input(s): POCGLUFGR    Medications  Drug and lactation database from the [place] [employer] Library of Medicine:  http://toxnet.nlm.nih.gov/cgi-bin/sis/htmlgen?LACT      Pertinent Radiology   Radiology Results:  Personally reviewed impressions    Reviewed labs in last 24 hours.    Advanced Care Planning:  Discharge Planning discussed with patient  Spent >25 mins in  care coordination. Discussed plan with patient, bedside RN, SW/CM    This Note is created using dragon voice recognition software.  Errors in spelling or words which seems out of context are unintentional.  Sounds alike errors may have escaped editing.    Solange Augustin MD  Hospitalist

## 2022-11-21 NOTE — PLAN OF CARE
"Goal Outcome Evaluation: Pt slept most of the night. Turn/reposition. Pain mostly rated 4/10.     Problem: Plan of Care - These are the overarching goals to be used throughout the patient stay.    Goal: Patient-Specific Goal (Individualized)  Description: You can add care plan individualizations to a care plan. Examples of Individualization might be:  \"Parent requests to be called daily at 9am for status\", \"I have a hard time hearing out of my right ear\", or \"Do not touch me to wake me up as it startles me\".  11/21/2022 0710 by Enid Bain RN  Outcome: Progressing  11/20/2022 2132 by Enid Bain RN  Outcome: Progressing     Problem: Plan of Care - These are the overarching goals to be used throughout the patient stay.    Goal: Absence of Hospital-Acquired Illness or Injury  11/21/2022 0710 by Enid Bain RN  Outcome: Progressing  11/20/2022 2132 by Enid Bain RN  Outcome: Progressing  Intervention: Identify and Manage Fall Risk  Recent Flowsheet Documentation  Taken 11/21/2022 0322 by Enid Bain RN  Safety Promotion/Fall Prevention:   room door open   nonskid shoes/slippers when out of bed   lighting adjusted   clutter free environment maintained   bed alarm on  Taken 11/20/2022 2312 by Enid Bain RN  Safety Promotion/Fall Prevention:   room door open   nonskid shoes/slippers when out of bed   lighting adjusted   clutter free environment maintained   bed alarm on  Taken 11/20/2022 2039 by Enid Bain RN  Safety Promotion/Fall Prevention:   room door open   nonskid shoes/slippers when out of bed   lighting adjusted   clutter free environment maintained   bed alarm on  Intervention: Prevent Skin Injury  Recent Flowsheet Documentation  Taken 11/21/2022 0322 by Enid Bain RN  Body Position: position changed independently  Taken 11/20/2022 2312 by Enid Bain RN  Body Position: position changed independently  Taken 11/20/2022 2039 by Enid Bain, " RN  Body Position: position changed independently     Problem: Plan of Care - These are the overarching goals to be used throughout the patient stay.    Goal: Absence of Hospital-Acquired Illness or Injury  Intervention: Prevent Skin Injury  Recent Flowsheet Documentation  Taken 11/21/2022 0322 by Enid Bain RN  Body Position: position changed independently  Taken 11/20/2022 2312 by Enid Bain RN  Body Position: position changed independently  Taken 11/20/2022 2039 by Enid Bain RN  Body Position: position changed independently

## 2022-11-22 ENCOUNTER — APPOINTMENT (OUTPATIENT)
Dept: CARDIOLOGY | Facility: HOSPITAL | Age: 82
DRG: 853 | End: 2022-11-22
Attending: INTERNAL MEDICINE
Payer: COMMERCIAL

## 2022-11-22 ENCOUNTER — APPOINTMENT (OUTPATIENT)
Dept: OCCUPATIONAL THERAPY | Facility: HOSPITAL | Age: 82
DRG: 853 | End: 2022-11-22
Payer: COMMERCIAL

## 2022-11-22 LAB
ANION GAP SERPL CALCULATED.3IONS-SCNC: 9 MMOL/L (ref 7–15)
BUN SERPL-MCNC: 52.7 MG/DL (ref 8–23)
CALCIUM SERPL-MCNC: 8.9 MG/DL (ref 8.8–10.2)
CHLORIDE SERPL-SCNC: 102 MMOL/L (ref 98–107)
CREAT SERPL-MCNC: 2.19 MG/DL (ref 0.67–1.17)
DEPRECATED HCO3 PLAS-SCNC: 24 MMOL/L (ref 22–29)
GFR SERPL CREATININE-BSD FRML MDRD: 29 ML/MIN/1.73M2
GLUCOSE BLDC GLUCOMTR-MCNC: 149 MG/DL (ref 70–99)
GLUCOSE BLDC GLUCOMTR-MCNC: 152 MG/DL (ref 70–99)
GLUCOSE BLDC GLUCOMTR-MCNC: 182 MG/DL (ref 70–99)
GLUCOSE BLDC GLUCOMTR-MCNC: 190 MG/DL (ref 70–99)
GLUCOSE BLDC GLUCOMTR-MCNC: 198 MG/DL (ref 70–99)
GLUCOSE SERPL-MCNC: 159 MG/DL (ref 70–99)
INR PPP: 2.03 (ref 0.85–1.15)
POTASSIUM SERPL-SCNC: 4.8 MMOL/L (ref 3.4–5.3)
SODIUM SERPL-SCNC: 135 MMOL/L (ref 136–145)

## 2022-11-22 PROCEDURE — 250N000013 HC RX MED GY IP 250 OP 250 PS 637: Performed by: NURSE PRACTITIONER

## 2022-11-22 PROCEDURE — 120N000001 HC R&B MED SURG/OB

## 2022-11-22 PROCEDURE — 255N000002 HC RX 255 OP 636: Performed by: HOSPITALIST

## 2022-11-22 PROCEDURE — 250N000011 HC RX IP 250 OP 636: Performed by: HOSPITALIST

## 2022-11-22 PROCEDURE — 99233 SBSQ HOSP IP/OBS HIGH 50: CPT | Performed by: HOSPITALIST

## 2022-11-22 PROCEDURE — 97535 SELF CARE MNGMENT TRAINING: CPT | Mod: GO

## 2022-11-22 PROCEDURE — 93306 TTE W/DOPPLER COMPLETE: CPT | Mod: 26 | Performed by: INTERNAL MEDICINE

## 2022-11-22 PROCEDURE — 80048 BASIC METABOLIC PNL TOTAL CA: CPT | Performed by: INTERNAL MEDICINE

## 2022-11-22 PROCEDURE — 99223 1ST HOSP IP/OBS HIGH 75: CPT | Performed by: INTERNAL MEDICINE

## 2022-11-22 PROCEDURE — 85610 PROTHROMBIN TIME: CPT | Performed by: NURSE PRACTITIONER

## 2022-11-22 RX ADMIN — MEROPENEM 1 G: 1 INJECTION, POWDER, FOR SOLUTION INTRAVENOUS at 12:34

## 2022-11-22 RX ADMIN — MEROPENEM 1 G: 1 INJECTION, POWDER, FOR SOLUTION INTRAVENOUS at 00:05

## 2022-11-22 RX ADMIN — TRAZODONE HYDROCHLORIDE 100 MG: 50 TABLET ORAL at 20:33

## 2022-11-22 RX ADMIN — PERFLUTREN 4 ML: 6.52 INJECTION, SUSPENSION INTRAVENOUS at 13:10

## 2022-11-22 RX ADMIN — HYDROCORTISONE: 25 CREAM TOPICAL at 20:33

## 2022-11-22 RX ADMIN — ALLOPURINOL 200 MG: 100 TABLET ORAL at 09:08

## 2022-11-22 RX ADMIN — HYDROCORTISONE: 25 CREAM TOPICAL at 09:09

## 2022-11-22 RX ADMIN — PANTOPRAZOLE SODIUM 40 MG: 40 TABLET, DELAYED RELEASE ORAL at 06:19

## 2022-11-22 RX ADMIN — ACETAMINOPHEN 1000 MG: 500 TABLET ORAL at 09:08

## 2022-11-22 ASSESSMENT — ACTIVITIES OF DAILY LIVING (ADL)
ADLS_ACUITY_SCORE: 29

## 2022-11-22 NOTE — CONSULTS
HEART CARE NOTE        Thank you, , for asking the [facility] team to see Eduardo Laughlin to evaluate ADHF c/b NSTEMI.      Assessment/Recommendations     1. HFpEF c/b ADHF  Assessment / Plan    Near euvolemia; denies HF symptoms of orthopnea, PND, fluid retention/edema - no changes to regimen at this time    GDMT as detailed below; mainstay of treatment for HFpEF includes diuretics and adequate BP control (class I) and SGLT2-I (class 2a); additional medical therapy (ARNI, MRA, ARB) demonstrated less robust evidence for indication but may be considered per guideline recommendations (2b); no indication for BBlockers     Current Pharmacotherapy AHA Guideline-Directed Medical Therapy   Losartan  - on hold given ROBERT ARNI/ARB   Spironolactone  - on hold  MRA   SGLT2 inhibitor -  Not started SGLT2-I    Bumetanide 1 mg daily - on hold Loop diuretic      2. NSTEMI  Assessment / Plan    High sensitivity troponin >700 on admission and has continued to trend-up; patient denies chest pain or anginal equivalent    Echo unremarkable on admission - will repeat today    Will hold warfarin in anticipation of coronary angiogram +/- PCI; I discussed this with him including potential risk of stroke, myocardial infarction, or death.  We also discussed the possibility of vascular injury, bleeding requiring blood transfusion, or reaction to x-ray dye     Will need nephrology consult to weigh in on potential for HD s/p contrast dye    3. DM2  Assessment / Plan    Management per primary team    4. ROBERT on CKD  Assessment / Plan    Likely large CRS component; crt continues to improve    Recommend renal consult to discuss implications of contrast dye on renal function    5. Sepsis  Assessment / Plan    resolved    Clinically Significant Risk Factors Present on Admission            # DMII: A1C = 7.7 % (Ref range: <5.7 %) within past 3 months  # Obesity: Estimated body mass index is 35.52 kg/m  as calculated from the  "following:    Height as of this encounter: 1.778 m (5' 10\").    Weight as of this encounter: 112.3 kg (247 lb 9.2 oz).    Cardiovascular: Shock: Shock, unspecified      History of Present Illness/Subjective    Mr. Eduardo Laughlin is a 82 year old male with a PMHx significant for (per Dr. Augustin's note) bladder cancer, urostomy, CKD stage III, DVT on Coumadin, diabetes, obesity, NSTEMI, left bundle branch block who was recently hospitalized for CHF exacerbation. Labs notable for significantly elevated troponin.NSTEMI     Today, Mr. Laughlin denies any acute cardiac events or complaints; He specifically denies orthopnea, PND, fluid retention, edema, chest pain/angina, palpitations; Management plan as detailed above    ECG: Personally reviewed. sinus tachycardia, LBBB.    ECHO (personnaly Reviewed):  The left ventricle is normal in size.  The visual ejection fraction is 50-55%.  Septal motion is consistent with conduction abnormality.  No regional wall motion abnormalities noted.  The right ventricular systolic function is normal.  Suspect mild AS  The ascending aorta is Moderately dilated.  There is mild aortic root enlargement.  IVC diameter <2.1 cm collapsing >50% with sniff suggests a normal RA pressure  of 3 mmHg.  Technically difficult, suboptimal study.        Physical Examination Review of Systems   BP (!) 137/90 (BP Location: Right arm)   Pulse 101   Temp 97.8  F (36.6  C) (Oral)   Resp 18   Ht 1.778 m (5' 10\")   Wt 112.3 kg (247 lb 9.2 oz)   SpO2 96%   BMI 35.52 kg/m    Body mass index is 35.52 kg/m .  Wt Readings from Last 3 Encounters:   11/19/22 112.3 kg (247 lb 9.2 oz)   11/08/22 111.2 kg (245 lb 2.4 oz)   11/04/22 114.3 kg (252 lb)     General Appearance:   no distress, normal body habitus   ENT/Mouth: membranes moist, no oral lesions or bleeding gums.      EYES:  no scleral icterus, normal conjunctivae   Neck: no carotid bruits or thyromegaly   Chest/Lungs:   lungs are clear to " auscultation, no rales or wheezing, equal chest wall expansion    Cardiovascular:   Regular. Normal first and second heart sounds with no murmurs, rubs, or gallops; the carotid, radial and posterior tibial pulses are intact, no JVD or LE edema bilaterally    Abdomen:  no organomegaly, masses, bruits, or tenderness; bowel sounds are present   Extremities: no cyanosis or clubbing   Skin: no xanthelasma, warm.    Neurologic: alert and oriented x3, calm     Psychiatric: alert and oriented x3, calm     A complete 10 systems ROS was reviewed  And is negative except what is listed in the HPI.          Medical History  Surgical History Family History Social History   Past Medical History:   Diagnosis Date     Acute renal failure with acute tubular necrosis superimposed on stage 3 chronic kidney disease (H)      Arteriosclerotic cardiovascular disease (ASCVD)      Atrial fibrillation (H)      Bladder cancer (H)      BPH (benign prostatic hypertrophy)      Chronic kidney disease      Complicated UTI (urinary tract infection) 08/25/2019     Congestive heart failure (H)      Coronary artery disease      Diabetes mellitus (H)      Diabetic neuropathy (H)      E coli bacteremia      GERD (gastroesophageal reflux disease)      Gout      Hyperlipidemia      Hypertension      Hyponatremia      Iliac artery aneurysm, 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    Added automatically from request for surgery [id number]     Kidney stone      Osteoarthritis      Personal history of malignant neoplasm of prostate 04/27/2020     Prostate cancer (H)      Sleep apnea     CPAP    Past Surgical History:   Procedure Laterality Date     CARDIAC CATHETERIZATION  03/28/2008    and coronary angios     CYSTECTOMY       CYSTOSCOPY       CYSTOSCOPY N/A 12/23/2015    Procedure: CYSTOSCOPY BLADDER BIOPSIES with Fulgeration and Placement of Otero ;  Surgeon: Gordon Bajwa MD;  [facility];  Service:      IR EXTREMITY ANGIOGRAM LEFT  3/22/2019     IR EXTREMITY  ANGIOGRAM LEFT  3/22/2019     IR ILEAL CONDUIT INJECTION  12/23/2016     IR NEPHROSTOMY TUBE PLACEMENT RIGHT  8/18/2016     IR URETEROSTOMY TUBE CHANGE RIGHT  9/21/2016     KIDNEY STONE SURGERY  x4     LAMINECTOMY LUMBAR ONE LEVEL Bilateral 9/13/2022    Procedure: LUMBAR 4 - LUMBAR 5 BILATERAL MEDIAL FACETECTOMY AND DECOMPRESSION;  Surgeon: Everett Holland MD;  Location: [facility] Main OR     PICC TRIPLE LUMEN PLACEMENT  11/18/2022          PROSTATE SURGERY      no family history of premature coronary artery disease Social History     Socioeconomic History     Marital status:      Spouse name: Not on file     Number of children: Not on file     Years of education: Not on file     Highest education level: Not on file   Occupational History     Not on file   Tobacco Use     Smoking status: Former     Smokeless tobacco: Never     Tobacco comments:     Quit smoking 30 years ago   Vaping Use     Vaping Use: Never used   Substance and Sexual Activity     Alcohol use: Not Currently     Drug use: Never     Sexual activity: Not Currently   Other Topics Concern     Not on file   Social History Narrative     Not on file     Social Determinants of Health     Financial Resource Strain: Not on file   Food Insecurity: Not on file   Transportation Needs: Not on file   Physical Activity: Not on file   Stress: Not on file   Social Connections: Not on file   Intimate Partner Violence: Not on file   Housing Stability: Not on file           Lab Results    Chemistry/lipid CBC Cardiac Enzymes/BNP/TSH/INR   Lab Results   Component Value Date    CHOL 168 03/03/2022    HDL 38 (L) 03/03/2022    TRIG 117 03/03/2022    BUN 53.0 (H) 11/21/2022     (L) 11/21/2022    CO2 25 11/21/2022    Lab Results   Component Value Date    WBC 10.1 11/21/2022    HGB 13.0 (L) 11/21/2022    HCT 38.9 (L) 11/21/2022    MCV 95 11/21/2022     11/21/2022    Lab Results   Component Value Date    TROPONINI 0.10 04/25/2020     (H)  04/25/2020    INR 2.03 (H) 11/22/2022     Lab Results   Component Value Date    TROPONINI 0.10 04/25/2020          Weight:    Wt Readings from Last 3 Encounters:   11/19/22 112.3 kg (247 lb 9.2 oz)   11/08/22 111.2 kg (245 lb 2.4 oz)   11/04/22 114.3 kg (252 lb)       Allergies  Allergies   Allergen Reactions     Metoprolol Succinate Er      Other reaction(s): low blood pressure     Pcn [Penicillins] Other (See Comments)     Childhood-doesn't know reactions     Statin Drugs [Hmg-Coa-R Inhibitors] Cramps     Uroxatral [Alfuzosin] Other (See Comments)     hypotension         Surgical History  Past Surgical History:   Procedure Laterality Date     CARDIAC CATHETERIZATION  03/28/2008    and coronary angios     CYSTECTOMY       CYSTOSCOPY       CYSTOSCOPY N/A 12/23/2015    Procedure: CYSTOSCOPY BLADDER BIOPSIES with Fulgeration and Placement of Otero ;  Surgeon: Gordon Bajwa MD;  [place] [facility] [place];  Service:      IR EXTREMITY ANGIOGRAM LEFT  3/22/2019     IR EXTREMITY ANGIOGRAM LEFT  3/22/2019     IR ILEAL CONDUIT INJECTION  12/23/2016     IR NEPHROSTOMY TUBE PLACEMENT RIGHT  8/18/2016     IR URETEROSTOMY TUBE CHANGE RIGHT  9/21/2016     KIDNEY STONE SURGERY  x4     LAMINECTOMY LUMBAR ONE LEVEL Bilateral 9/13/2022    Procedure: LUMBAR 4 - LUMBAR 5 BILATERAL MEDIAL FACETECTOMY AND DECOMPRESSION;  Surgeon: Everett Holland MD;  Location: [facility]     PICC TRIPLE LUMEN PLACEMENT  11/18/2022          PROSTATE SURGERY         Social History  Tobacco:   History   Smoking Status     Former   Smokeless Tobacco     Never    Alcohol:   Social History    Substance and Sexual Activity      Alcohol use: Not Currently   Illicit Drugs:   History   Drug Use Unknown       Family History  Family History   Problem Relation Age of Onset     No Known Problems Mother      Prostate Cancer Father      Deep Vein Thrombosis Father      Cancer Sister      Heart Disease Sister      No Known Problems Daughter       Heart Disease Brother      Heart Disease Sister      No Known Problems Daughter           Abdelrahman Mazariegos MD on 11/22/2022      cc: Rafael Montelongo,

## 2022-11-22 NOTE — PLAN OF CARE
Problem: Plan of Care - These are the overarching goals to be used throughout the patient stay.    Goal: Optimal Comfort and Wellbeing  Outcome: Progressing    Pt comfortable overnight, no pain reported. He says he feels much better today compared to yesterday. Urostomy in place, patent.   A/O 4x, makes needs known.

## 2022-11-22 NOTE — PLAN OF CARE
Problem: Plan of Care - These are the overarching goals to be used throughout the patient stay.    Goal: Absence of Hospital-Acquired Illness or Injury  Outcome: Progressing  Intervention: Identify and Manage Fall Risk  Recent Flowsheet Documentation  Taken 11/22/2022 0929 by Delfina Armenta, RN  Safety Promotion/Fall Prevention: activity supervised  Goal: Optimal Comfort and Wellbeing  Outcome: Progressing  Intervention: Monitor Pain and Promote Comfort  Recent Flowsheet Documentation  Taken 11/22/2022 0926 by Delfina Armenta, RN  Pain Management Interventions: medication (see MAR)     Problem: Diabetes Comorbidity  Goal: Blood Glucose Level Within Targeted Range  Outcome: Progressing   Goal Outcome Evaluation:  Patient refused lunch today. Continuing to monitor blood glucose.

## 2022-11-22 NOTE — PROGRESS NOTES
"[facility]  Neurology Consultation - Progress Note    Patient Name:  Eduardo Laughlin  Date of Service:  November 21, 2022    Subjective:    Patient denies any changes to his LUE symptoms.  Was transferred to the floor overnight.   MRV without venous sinus thrombosi.      Objective:    Vitals: /67 (BP Location: Left arm)   Pulse 104   Temp 98.1  F (36.7  C) (Oral)   Resp 20   Ht 1.778 m (5' 10\")   Wt 112.3 kg (247 lb 9.2 oz)   SpO2 94%   BMI 35.52 kg/m    General: Lying in bed, NAD  Head: Atraumatic, normocephalic   Cardiac: no lower extremity edema  EXtremiteis: Skin tear over L arm  Neurologic:  Awake, alert, attentive.  Speech clear and fluent.  Able to answer simple questions.  Gaze conjugate, no facial droop.  L pronator drift and satelliting with rapid arm roll.    Slow finger tappping on L and FNF with ataxia noted on L.      Pertinent Investigations:    I have personally reviewed most recent and pertinent labs, tests, and radiological images.     FINDINGS:  DURAL SINUSES: No significant stenosis or occlusion. Dominant left and smaller right transverse and sigmoid dural sinuses.     INTERNAL CEREBRAL VEINS: No significant stenosis or occlusion.       MAJOR CORTICAL VENOUS BRANCHES: No significant stenosis or occlusion.                                                                      IMPRESSION:  1.  No dural venous sinus thrombosis or significant stenosis.    Assessment  #L sided dysmetria  #L parestheias  #Abnormal Brain MRI  #Bilateral Globus pallidus lesions.  #Possible carbon monoxide vs unwitnessed hypoglycemia  #LFT elevation    MR. Laughlin is a 82 year old male admitted for possible pneumonia in the setting of lactic acidosis.  MRI shows diffusion restriction and FLAIR hyperintensity relatively symmetric in globus pallidus.   Interestingly only his L side is symptomatic.  He does have facial paresthesias so do not think it is spinal in " etiology.   Etiology of lesions in unclear.  Would favor CO vs unwitnessed hypoglycemic event.  CO was not checked on admission so cannot defintiively rule that out.  I asked them to have car exhaust checked prior to using.   His exam is stable for me and he feels much improved since admission.  Even if represented stroke he is already anticoagulated but again feel this is less likely.  At this time, will sign off.  He should follow up with neurology and get repeat MRI in 2-3 months.  If were to develop new symptoms would consider sooner interval imaging.      Recommendations:    - Instructed to have car exhaust checked.  - Medicine work up for LFT's.    - Follow up in neurology outpatient and repaet imaging in 2-3 months.  If exam worsens could consider sooner imaging and please re-consult neurology in that setting.    - Signing off.      Thank you for involving Neurology in the care of Eduardo ROSEN Qian.      Xavi Armenta, DO

## 2022-11-22 NOTE — PLAN OF CARE
Problem: Plan of Care - These are the overarching goals to be used throughout the patient stay.    Goal: Plan of Care Review  Description: The Plan of Care Review/Shift note should be completed every shift.  The Outcome Evaluation is a brief statement about your assessment that the patient is improving, declining, or no change.  This information will be displayed automatically on your shift note.  Outcome: Progressing   Goal Outcome Evaluation:             Pt up to chair this shift. Eating well for dinner. Assist of 1 to chair with gait belt and FWW. Urostomy intact putting out 1400+ urine. Troponin was drawn and found to be 819. MD ordered a cardiology consult to be done. INR 2.85 coumadin was restarted and pt was given 8 mg of coumadin this evening. MD aware that pt does not like the PCD's and is on coumadin, not concerned that he is not wearing his PCD's.  Tele junctional rhythm this am   MRV was performed and reviewed with neurology and pt as well as son at the bedside.  PICC left arm P/I  No BM this shift  Pt on the falls program with call light within reach and bed alarm on.  Will cont to monitor this pt and alert the MD of any status changes.

## 2022-11-22 NOTE — PROGRESS NOTES
Care Management Follow Up    Length of Stay (days): 4    Expected Discharge Date: 11/23/2022     Concerns to be Addressed:       Patient plan of care discussed at interdisciplinary rounds: Yes    Anticipated Discharge Disposition:  TCU     Anticipated Discharge Services:    Anticipated Discharge DME:      Patient/family educated on Medicare website which has current facility and service quality ratings:    Education Provided on the Discharge Plan:    Patient/Family in Agreement with the Plan:      Referrals Placed by CM/SW:    Private pay costs discussed: Not applicable    Additional Information:  See below       Citlali Farley RN          Per hospitalist, patient medically ready for TCU today.     Amy from [facility] accepted patient for tomorrow (earliest bed). Request afternoon admission     Met with patient. He is agreeable to TCU but says he is having a stent placed tomorrow    Message sent to hospitalist to clarify    Updated wife Ginny via telephone, she is agreeable to [facility] TCU upon discharge . Says she will transport with the help of son or daughter     12:08 PM  Wife left message stating they would like transport arranged to TCU

## 2022-11-22 NOTE — PROGRESS NOTES
Hospitalist Progress Note    Assessment/Plan    Principal Problem:    Lactic acidosis  Active Problems:    Acute respiratory failure with hypoxia (H)    Aspiration pneumonia of both lungs, unspecified aspiration pneumonia type, unspecified part of lung (H)    Edurado is a 82-year-old male with history of bladder cancer, urostomy, CKD stage III, DVT on Coumadin, diabetes, obesity, NSTEMI, left bundle branch block who was recently hospitalized for CHF exacerbation.  Patient presented to [facility] on 11/18 with concerns for generalized weakness and shortness of breath.  On arrival to ER, patient was found to be bradycardic, hypothermic , hypotensive and hypoxic.  Patient was noted to have acute hypoxic respiratory failure, lactic acidosis and in sepsis requiring monitoring in ICU on BiPAP.  On admission patient also noted to be hyperkalemic with significantly elevated BNP and troponin.  UA showed evidence of UTI.  Work-up with CT head was unremarkable.  CT chest showed groundglass infiltrates in both lungs.  Patient currently is off BiPAP, patient now stable and transferred out of ICU on 11/20/2022.      Sepsis likely 2/2 to pneumonia and UTI  Metabolic encephalopathy - resolved.   -On admission patient was found to be bradycardic, hypothermic, hypotensive and hypoxic. Also,  noted to have lactic acidosis.  -CT chest showed bilateral groundglass opacities, L>R.  -UA suggestive of UTI, urine culture shows no growth however sample appears to be contaminated.  -Blood cultures negative.  Sputum culture unable to collect sample.   -Started on meropenem given history of penicillin allergy.  Continue meropenem for total of 7 days.  -Continue supplemental oxygen as needed, currently on room air.      CHF exacerbation/Acute on chronic diastolic heart failure  -Patient noted to have significantly elevated BNP>3000 on admission.  -Was given IV Lasix, with minimal response.  It was then switched to IV Bumex.  -Transition to  Bumex p.o. now, on hold per cardiology  -Monitor intake/output, daily weight monitoring  -Echocardiogram from 10/22 showed EF of 50-55% and  noWMA.    History of NSTEMI   Elevated troponin likely secondary to demand ischemia in the setting of sepsis and CHF exacerbation  -Continue to monitor on telemetry  -EKG showed no acute ST changes on admission.  -Reviewed recent ECHO.  -Cardiology consulted, awaiting recommendations      Acute hypoxic respiratory failure likely secondary to pneumonia and CHF exacerbation  -Patient briefly needed BiPAP on admission, currently weaned off  -Continue supplemental oxygen, monitor saturations and wean O2 as able.    Bradycardia on admission was likely thought secondary to his metoprolol.  -Heart rate currently stable as metoprolol on hold.    Lactic acidosis-resolved currently.    History of DVT/history of paroxysmal A. Fib  Holding PTA metoprolol given his recent bradycardia  -Continue warfarin, pharmacy to dose. INR 2.35 today      Left-arm numbness  -CT head unremarkable.  -MRI head and neck shows foci of restricted diffusion in bilateral globus pallidus, findings seen usually in carbon monoxide poisoning or other toxic metabolic etiologies.  -Neurology following, appreciate input. Signed off now.   -Carbon monoxide levels WNL but however was checked few days after admission hence cannot be ruled out just based on levels.   -MRV neg for sinus thrombosis.    Diabetes  -A1c 7.7  -Continue current sliding scale  -Monitor blood sugars, hypoglycemia protocol.    Hyperkalemia - resolved.    ROBERT on CKD  -Metabolic acidosis   -Likely In the setting of sepsis.  -Baseline  creat around 2.3, creat peaked to 3.7 this admission.  -Las creat at 2.34. Minimize nephrotoxins, continue to monitor.    Transaminitis - improving. Monitor.     Rib fracture/Lumbar fracture  Acute fracture of right ninth, 10th and 11th ribs seen.  Questionable acute to subacute fracture of right 12th rib seen on lumbar  spine CT.  -Acute to subacute right L1-L2 and L3 transverse process fractures seen.  -Seen by NSG,appears stable and pt can follow up as out-pt if needed      Barriers to Discharge:  Cardiology eval,     Anticipated discharge date/Disposition: TCU on discharge.    Subjective  Chart reviewed and events noted.  Patient seen and examined.  In bed, states feels ok. States left arm numbness improved. No chest pain, SOB. No N/V. No fever.       Physical Exam:  General: Pleasant, NAD  HEENT:EOMI, AT,NC  CVS:RRR, trace edema b/l LE  RS:CTAB  Abd: Soft, NT,ND  Neurology:Awake, alert, oriented  Psy:Approrpiate affect        Objective    Vital signs in last 24 hours  Temp:  [97.7  F (36.5  C)-98.2  F (36.8  C)] 97.8  F (36.6  C)  Pulse:  [] 101  Resp:  [18-20] 18  BP: (103-137)/(67-98) 137/90  SpO2:  [94 %-98 %] 96 % [unfilled] O2 Device: Nasal cannula    Weight:   [unfilled] Weight change:     Intake/Output last 3 shifts  I/O last 3 completed shifts:  In: 480 [P.O.:480]  Out: 2050 [Urine:2050]  Body mass index is 35.52 kg/m .                      Pertinent Labs   Lab Results: personally reviewed.   Recent Labs   Lab 11/21/22 0903 11/20/22 0407 11/19/22  0352   * 139 137   CO2 25 25 22   BUN 53.0* 59.9* 60.5*   ALBUMIN 3.1* 2.9*  --    ALKPHOS 61 56  --    ALT 53* 60*  --    * 224*  --      Recent Labs   Lab 11/21/22  0903 11/20/22  0407 11/19/22  0352   WBC 10.1 11.0 15.5*   HGB 13.0* 11.9* 12.2*   HCT 38.9* 35.8* 35.9*    245 270     No results for input(s): CKTOTAL, TROPONINI in the last 168 hours.    Invalid input(s): TROPONINT, CKMBINDEX  Invalid input(s): POCGLUFGR    Medications  Drug and lactation database from the [place] National Library of Medicine:  http://toxnet.nlm.nih.gov/cgi-bin/sis/htmlgen?LACT      Pertinent Radiology   Radiology Results:  Personally reviewed impressions    Reviewed labs in last 24 hours.    Advanced Care Planning:  Discharge Planning discussed with  patient  Spent >35 mins in care coordination. Discussed plan with patient, SW/CM    This Note is created using dragon voice recognition software.  Errors in spelling or words which seems out of context are unintentional.  Sounds alike errors may have escaped editing.    Solange Augustin MD  Hospitalist

## 2022-11-23 ENCOUNTER — APPOINTMENT (OUTPATIENT)
Dept: PHYSICAL THERAPY | Facility: HOSPITAL | Age: 82
DRG: 853 | End: 2022-11-23
Payer: COMMERCIAL

## 2022-11-23 ENCOUNTER — APPOINTMENT (OUTPATIENT)
Dept: OCCUPATIONAL THERAPY | Facility: HOSPITAL | Age: 82
DRG: 853 | End: 2022-11-23
Payer: COMMERCIAL

## 2022-11-23 LAB
ALBUMIN SERPL BCG-MCNC: 3 G/DL (ref 3.5–5.2)
ALP SERPL-CCNC: 65 U/L (ref 40–129)
ALT SERPL W P-5'-P-CCNC: 54 U/L (ref 10–50)
ANION GAP SERPL CALCULATED.3IONS-SCNC: 9 MMOL/L (ref 7–15)
AST SERPL W P-5'-P-CCNC: 85 U/L (ref 10–50)
BACTERIA BLD CULT: NO GROWTH
BACTERIA BLD CULT: NO GROWTH
BILIRUB SERPL-MCNC: 1.3 MG/DL
BUN SERPL-MCNC: 54.8 MG/DL (ref 8–23)
CALCIUM SERPL-MCNC: 9 MG/DL (ref 8.8–10.2)
CHLORIDE SERPL-SCNC: 102 MMOL/L (ref 98–107)
CREAT SERPL-MCNC: 1.99 MG/DL (ref 0.67–1.17)
DEPRECATED HCO3 PLAS-SCNC: 24 MMOL/L (ref 22–29)
ERYTHROCYTE [DISTWIDTH] IN BLOOD BY AUTOMATED COUNT: 14.6 % (ref 10–15)
GFR SERPL CREATININE-BSD FRML MDRD: 33 ML/MIN/1.73M2
GLUCOSE BLDC GLUCOMTR-MCNC: 170 MG/DL (ref 70–99)
GLUCOSE BLDC GLUCOMTR-MCNC: 180 MG/DL (ref 70–99)
GLUCOSE BLDC GLUCOMTR-MCNC: 182 MG/DL (ref 70–99)
GLUCOSE BLDC GLUCOMTR-MCNC: 194 MG/DL (ref 70–99)
GLUCOSE SERPL-MCNC: 176 MG/DL (ref 70–99)
HCT VFR BLD AUTO: 39.2 % (ref 40–53)
HGB BLD-MCNC: 13.3 G/DL (ref 13.3–17.7)
INR PPP: 2.34 (ref 0.85–1.15)
MCH RBC QN AUTO: 32 PG (ref 26.5–33)
MCHC RBC AUTO-ENTMCNC: 33.9 G/DL (ref 31.5–36.5)
MCV RBC AUTO: 95 FL (ref 78–100)
PLATELET # BLD AUTO: 228 10E3/UL (ref 150–450)
POTASSIUM SERPL-SCNC: 4.7 MMOL/L (ref 3.4–5.3)
PROT SERPL-MCNC: 6.9 G/DL (ref 6.4–8.3)
RBC # BLD AUTO: 4.15 10E6/UL (ref 4.4–5.9)
SODIUM SERPL-SCNC: 135 MMOL/L (ref 136–145)
WBC # BLD AUTO: 9.9 10E3/UL (ref 4–11)

## 2022-11-23 PROCEDURE — 250N000013 HC RX MED GY IP 250 OP 250 PS 637: Performed by: NURSE PRACTITIONER

## 2022-11-23 PROCEDURE — 97535 SELF CARE MNGMENT TRAINING: CPT | Mod: GO

## 2022-11-23 PROCEDURE — 120N000001 HC R&B MED SURG/OB

## 2022-11-23 PROCEDURE — 85027 COMPLETE CBC AUTOMATED: CPT | Performed by: HOSPITALIST

## 2022-11-23 PROCEDURE — 250N000011 HC RX IP 250 OP 636: Performed by: NURSE PRACTITIONER

## 2022-11-23 PROCEDURE — 80053 COMPREHEN METABOLIC PANEL: CPT | Performed by: HOSPITALIST

## 2022-11-23 PROCEDURE — 97110 THERAPEUTIC EXERCISES: CPT | Mod: GP

## 2022-11-23 PROCEDURE — 250N000013 HC RX MED GY IP 250 OP 250 PS 637: Performed by: HOSPITALIST

## 2022-11-23 PROCEDURE — 85610 PROTHROMBIN TIME: CPT | Performed by: NURSE PRACTITIONER

## 2022-11-23 PROCEDURE — 99232 SBSQ HOSP IP/OBS MODERATE 35: CPT | Performed by: HOSPITALIST

## 2022-11-23 PROCEDURE — 250N000013 HC RX MED GY IP 250 OP 250 PS 637: Performed by: INTERNAL MEDICINE

## 2022-11-23 PROCEDURE — 99232 SBSQ HOSP IP/OBS MODERATE 35: CPT | Performed by: INTERNAL MEDICINE

## 2022-11-23 PROCEDURE — 250N000011 HC RX IP 250 OP 636: Performed by: HOSPITALIST

## 2022-11-23 PROCEDURE — 97116 GAIT TRAINING THERAPY: CPT | Mod: GP

## 2022-11-23 RX ORDER — EZETIMIBE 10 MG/1
10 TABLET ORAL DAILY
Status: DISCONTINUED | OUTPATIENT
Start: 2022-11-23 | End: 2022-12-01 | Stop reason: HOSPADM

## 2022-11-23 RX ORDER — LIDOCAINE 4 G/G
1 PATCH TOPICAL EVERY 24 HOURS
Status: DISCONTINUED | OUTPATIENT
Start: 2022-11-23 | End: 2022-12-01 | Stop reason: HOSPADM

## 2022-11-23 RX ORDER — TRAZODONE HYDROCHLORIDE 50 MG/1
100-200 TABLET, FILM COATED ORAL AT BEDTIME
Status: DISCONTINUED | OUTPATIENT
Start: 2022-11-23 | End: 2022-11-23

## 2022-11-23 RX ORDER — TRAZODONE HYDROCHLORIDE 50 MG/1
200 TABLET, FILM COATED ORAL AT BEDTIME
Status: DISCONTINUED | OUTPATIENT
Start: 2022-11-23 | End: 2022-12-01 | Stop reason: HOSPADM

## 2022-11-23 RX ORDER — LIDOCAINE 4 G/G
1 PATCH TOPICAL
Status: DISCONTINUED | OUTPATIENT
Start: 2022-11-23 | End: 2022-11-23

## 2022-11-23 RX ORDER — BUMETANIDE 1 MG/1
1 TABLET ORAL DAILY
Status: DISCONTINUED | OUTPATIENT
Start: 2022-11-23 | End: 2022-11-26

## 2022-11-23 RX ADMIN — TRAZODONE HYDROCHLORIDE 200 MG: 50 TABLET ORAL at 22:04

## 2022-11-23 RX ADMIN — ONDANSETRON 4 MG: 2 INJECTION INTRAMUSCULAR; INTRAVENOUS at 16:55

## 2022-11-23 RX ADMIN — MEROPENEM 1 G: 1 INJECTION, POWDER, FOR SOLUTION INTRAVENOUS at 01:03

## 2022-11-23 RX ADMIN — HYDROCORTISONE: 25 CREAM TOPICAL at 08:19

## 2022-11-23 RX ADMIN — ACETAMINOPHEN 1000 MG: 500 TABLET ORAL at 15:55

## 2022-11-23 RX ADMIN — HYDROCORTISONE: 25 CREAM TOPICAL at 22:05

## 2022-11-23 RX ADMIN — MEROPENEM 1 G: 1 INJECTION, POWDER, FOR SOLUTION INTRAVENOUS at 12:54

## 2022-11-23 RX ADMIN — LIDOCAINE PATCH 4% 1 PATCH: 40 PATCH TOPICAL at 12:54

## 2022-11-23 RX ADMIN — EZETIMIBE 10 MG: 10 TABLET ORAL at 08:26

## 2022-11-23 RX ADMIN — ALLOPURINOL 200 MG: 100 TABLET ORAL at 08:19

## 2022-11-23 RX ADMIN — BUMETANIDE 1 MG: 1 TABLET ORAL at 12:54

## 2022-11-23 RX ADMIN — PANTOPRAZOLE SODIUM 40 MG: 40 TABLET, DELAYED RELEASE ORAL at 06:48

## 2022-11-23 ASSESSMENT — ACTIVITIES OF DAILY LIVING (ADL)
ADLS_ACUITY_SCORE: 29

## 2022-11-23 NOTE — CONSULTS
NEPHROLOGY CONSULTATION - [facility]        REASON FOR CONSULTATION: CKD 4    HISTORY OF PRESENT ILLNESS: 83 yo male with h/o CKD 4, HTN. DM2, bladder cancer s/p cystectomy/urostomy admit with weakness and sob.  Required admit to ICU and bipap, treated for pneumonia with meropenem and diuresed.  Had NSTEMI and now with plan for angiogram on Friday.  His creatinine at baseline has been 2.4 recently but has frequent fluctuations, did have ROBERT on admit with creat of 3.7, has now improved to 1.99.  He reports recent issues with falls and orthostatic hypotension, near syncope.      REVIEW OF SYSTEMS:Comprehensive review of systems obtained and otherwise negative.    Past Medical History:   Diagnosis Date     Acute renal failure with acute tubular necrosis superimposed on stage 3 chronic kidney disease (H)      Arteriosclerotic cardiovascular disease (ASCVD)      Atrial fibrillation (H)      Bladder cancer (H)      BPH (benign prostatic hypertrophy)      Chronic kidney disease      Complicated UTI (urinary tract infection) 08/25/2019     Congestive heart failure (H)      Coronary artery disease      Diabetes mellitus (H)      Diabetic neuropathy (H)      E coli bacteremia      GERD (gastroesophageal reflux disease)      Gout      Hyperlipidemia      Hypertension      Hyponatremia      Iliac artery aneurysm, 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    Added automatically from request for surgery [id number]     Kidney stone      Osteoarthritis      Personal history of malignant neoplasm of prostate 04/27/2020     Prostate cancer (H)      Sleep apnea     CPAP       Social History     Socioeconomic History     Marital status:      Spouse name: Not on file     Number of children: Not on file     Years of education: Not on file     Highest education level: Not on file   Occupational History     Not on file   Tobacco Use     Smoking status: Former     Smokeless tobacco: Never     Tobacco comments:     Quit smoking 30 years ago  "  Vaping Use     Vaping Use: Never used   Substance and Sexual Activity     Alcohol use: Not Currently     Drug use: Never     Sexual activity: Not Currently   Other Topics Concern     Not on file   Social History Narrative     Not on file     Social Determinants of Health     Financial Resource Strain: Not on file   Food Insecurity: Not on file   Transportation Needs: Not on file   Physical Activity: Not on file   Stress: Not on file   Social Connections: Not on file   Intimate Partner Violence: Not on file   Housing Stability: Not on file       Family History   Problem Relation Age of Onset     No Known Problems Mother      Prostate Cancer Father      Deep Vein Thrombosis Father      Cancer Sister      Heart Disease Sister      No Known Problems Daughter      Heart Disease Brother      Heart Disease Sister      No Known Problems Daughter        Allergies   Allergen Reactions     Metoprolol Succinate Er      Other reaction(s): low blood pressure     Pcn [Penicillins] Other (See Comments)     Childhood-doesn't know reactions     Statin Drugs [Hmg-Coa-R Inhibitors] Cramps     Uroxatral [Alfuzosin] Other (See Comments)     hypotension       MEDICATIONS:    allopurinol  200 mg Oral Daily     bumetanide  1 mg Oral Daily     ezetimibe  10 mg Oral Daily     hydrocortisone   Topical BID     insulin aspart  1-12 Units Subcutaneous TID w/meals     insulin aspart  1-7 Units Subcutaneous At Bedtime     lidocaine  1 patch Transdermal Q24H     lidocaine   Transdermal Q8H YUSRA     meropenem  1 g Intravenous Q12H     pantoprazole  40 mg Oral QAM AC     sodium chloride (PF)  10-40 mL Intracatheter Q7 Days     traZODone  200 mg Oral At Bedtime     [Held by provider] Warfarin Therapy Reminder  1 each Oral See Admin Instructions         PHYSICAL EXAM    BP (!) 123/96 (BP Location: Right arm)   Pulse 88   Temp 98  F (36.7  C) (Oral)   Resp 18   Ht 1.778 m (5' 10\")   Wt 109.7 kg (241 lb 14.4 oz)   SpO2 95%   BMI 34.71 kg/m  "         Gen: NAD  HEENT: No icterus, NC/AT  CARDIOVASCULAR: tachycardic,  1+ diffuse edema  PULMONARY: CTA ant No cyanosis  GASTROINTESTINAL: soft, NT/ND  MSK: Diffuse muscle atrophy, warm  NEURO: Alert, no gross focal findings  PSYCHIATRIC: Normal affect, answers questions appropriately  SKIN: Pale, no jaundice, no rash. Scattered skin tears/bruising    LABORATORIES    Recent Labs   Lab 11/23/22  0650 11/21/22  0903 11/20/22  0407   WBC 9.9 10.1 11.0   HGB 13.3 13.0* 11.9*   HCT 39.2* 38.9* 35.8*    250 245     Recent Labs   Lab 11/23/22  0650 11/22/22  1244 11/21/22  0903 11/20/22  0407   * 135* 134* 139   CO2 24 24 25 25   BUN 54.8* 52.7* 53.0* 59.9*   ALKPHOS 65  --  61 56   ALT 54*  --  53* 60*   AST 85*  --  147* 224*         ASSESSMENT:   81 yo male with h/o CKD 4, HTN. DM2, bladder cancer s/p cystectomy/urostomy admit with pneumonia, CHF, now with plan for angiogram    PLAN:  1. CKD 4 - has baseline CKD due to atrophic kidney, DM2, HTN, vascular disease, also with cystectomy/urostomy.  Has had very frequent fluctuations in creatinine associated with diuresis.  Currently renal function improved.  At increased risk for contrast nephropathy with angiogram on Friday  -would give IVF with NS for 6 hrs before angiogram to ensure intravascularly volume replete, hold bumex on Friday morning  -daily bmp, ins/outs  2. HTN - with recent orthostatic hypotension  -controlled now, no change in meds  3. NSTEMI - plan for angiogram Friday, see above          Jodie Lee MD  [facility]  [phone]

## 2022-11-23 NOTE — PROGRESS NOTES
Nephrology consult noted.  Patient follows with [facility]. I will forward the consult to their group for continuity of care.      Puja Adam MD  [facility]  [address] [place] [address], [address]  [place] [address]  Phone# [phone]  Fax# [phone]

## 2022-11-23 NOTE — PROGRESS NOTES
Hospitalist Progress Note    Assessment/Plan    Principal Problem:    Lactic acidosis  Active Problems:    Acute respiratory failure with hypoxia (H)    Aspiration pneumonia of both lungs, unspecified aspiration pneumonia type, unspecified part of lung (H)    Eduardo is a 82-year-old male with history of bladder cancer, urostomy, CKD stage III, DVT on Coumadin, diabetes, obesity, NSTEMI, left bundle branch block who was recently hospitalized for CHF exacerbation.  Patient presented to [facility] on 11/18 with concerns for generalized weakness and shortness of breath.  On arrival to ER, patient was found to be bradycardic, hypothermic , hypotensive and hypoxic.  Patient was noted to have acute hypoxic respiratory failure, lactic acidosis and in sepsis requiring monitoring in ICU on BiPAP.  On admission patient also noted to be hyperkalemic with significantly elevated BNP and troponin.  UA showed evidence of UTI.  Work-up with CT head was unremarkable.  CT chest showed groundglass infiltrates in both lungs.  Patient currently is off BiPAP, patient now stable and transferred out of ICU on 11/20/2022.      Sepsis likely 2/2 to pneumonia and UTI  Metabolic encephalopathy - resolved.   -On admission patient was found to be bradycardic, hypothermic, hypotensive and hypoxic. Also,  noted to have lactic acidosis.  -CT chest showed bilateral groundglass opacities, L>R.  -UA suggestive of UTI, urine culture shows no growth however sample appears to be contaminated.  -Blood cultures negative.  Sputum culture unable to collect sample.   -Started on meropenem given history of penicillin allergy.  Continue meropenem for total of 7 days.  -Continue supplemental oxygen as needed, currently on room air.      CHF exacerbation/Acute on chronic diastolic heart failure  -Patient noted to have significantly elevated BNP>3000 on admission.  -Was given IV Lasix, with minimal response.  It was then switched to IV Bumex.  -On PO Bumex  now, managed by cardiology  -Monitor intake/output, daily weight monitoring  -Echocardiogram from 10/22 showed EF of 50-55% and  noWMA.    History of NSTEMI   Elevated troponin likely secondary to demand ischemia in the setting of sepsis and CHF exacerbation  -Continue to monitor on telemetry  -EKG showed no acute ST changes on admission.  -Repeat ECHO on 11/22 showed EF 45% and abnormal septal motion  -Cardiology consulted, planned for angiogram on 11/25.      Acute hypoxic respiratory failure likely secondary to pneumonia and CHF exacerbation  -Patient briefly needed BiPAP on admission, currently weaned off  -Continue supplemental oxygen, monitor saturations and wean O2 as able.    Bradycardia on admission was likely thought secondary to his metoprolol.  -Heart rate currently stable as metoprolol on hold.    Lactic acidosis-resolved currently.    History of DVT/history of paroxysmal A. Fib  Holding PTA metoprolol given his recent bradycardia  On PTA warfarin, currently on hold in anticipation of angiogram on 11/25.  INR 2.34 today      Left-arm numbness  -CT head unremarkable.  -MRI head and neck shows foci of restricted diffusion in bilateral globus pallidus, findings seen usually in carbon monoxide poisoning or other toxic metabolic etiologies.  -Neurology following, appreciate input. Signed off now.   -Carbon monoxide levels WNL but however was checked few days after admission hence cannot be ruled out just based on levels.   -MRV neg for sinus thrombosis.    Diabetes  -A1c 7.7  -Continue current sliding scale  -Monitor blood sugars, hypoglycemia protocol.    Hyperkalemia - resolved.    ROBERT on CKD  -Metabolic acidosis   -Likely In the setting of sepsis.  -Baseline  creat around 2.3, creat peaked to 3.7 this admission.  -Creat 1.99 today.  -Minimize nephrotoxins, continue to monitor.  -Consulting renal in anticipation of contrast load for upcoming angiogram    Transaminitis - improving. Monitor.     Rib  fracture/Lumbar fracture  Acute fracture of right ninth, 10th and 11th ribs seen.  Questionable acute to subacute fracture of right 12th rib seen on lumbar spine CT.  -Acute to subacute right L1-L2 and L3 transverse process fractures seen.  -Seen by NSG,appears stable and pt can follow up as out-pt if needed  -Lidocaine patch ordered for pain control.       Barriers to Discharge:  Angiogram, renal eval, clinical progress    Anticipated discharge date/Disposition: TCU on discharge.    Subjective  Chart reviewed and events noted.  Patient seen and examined.  Sitting up in chair, states hasnt been sleeping well due to not getting his bedtime temazepam. This med not mentioned on his home med list. Also states takes trazodone 200mg at bedtime. Some R upper back pain. No SOB, abd pain, N/V.       Physical Exam:  General: Pleasant, NAD  HEENT:EOMI, AT,NC  CVS:RRR, trace LE edema  RS:CTAB  Abd: Soft, NT,ND  Neurology:Awake, alert, oriented  Psy:Approrpiate affect        Objective    Vital signs in last 24 hours  Temp:  [97.4  F (36.3  C)-98.6  F (37  C)] 98  F (36.7  C)  Pulse:  [] 88  Resp:  [18] 18  BP: (107-146)/(75-96) 123/96  SpO2:  [93 %-97 %] 95 % [unfilled] O2 Device: Nasal cannula    Weight:   [unfilled] Weight change:     Intake/Output last 3 shifts  I/O last 3 completed shifts:  In: 360 [P.O.:360]  Out: 1225 [Urine:1225]  Body mass index is 34.71 kg/m .                      Pertinent Labs   Lab Results: personally reviewed.   Recent Labs   Lab 11/23/22  0650 11/22/22  1244 11/21/22  0903 11/20/22  0407   * 135* 134* 139   CO2 24 24 25 25   BUN 54.8* 52.7* 53.0* 59.9*   ALBUMIN 3.0*  --  3.1* 2.9*   ALKPHOS 65  --  61 56   ALT 54*  --  53* 60*   AST 85*  --  147* 224*     Recent Labs   Lab 11/23/22  0650 11/21/22  0903 11/20/22  0407   WBC 9.9 10.1 11.0   HGB 13.3 13.0* 11.9*   HCT 39.2* 38.9* 35.8*    250 245     No results for input(s): CKTOTAL, TROPONINI in the last 168 hours.    Invalid  input(s): TROPONINT, CKMBINDEX  Invalid input(s): POCGLUFGR    Medications  Drug and lactation database from the [place] National Library of Medicine:  http://toxnet.nlm.nih.gov/cgi-bin/sis/htmlgen?LACT      Pertinent Radiology   Radiology Results:  Personally reviewed impressions    Reviewed labs in last 24 hours.    Advanced Care Planning:  Discharge Planning discussed with patient and family by bedside  Spent >35 mins in care coordination. Discussed plan with patient, family by bedside, SW/CM    This Note is created using dragon voice recognition software.  Errors in spelling or words which seems out of context are unintentional.  Sounds alike errors may have escaped editing.    Solange Augustin MD  Hospitalist

## 2022-11-23 NOTE — PROGRESS NOTES
HEART CARE NOTE          Assessment/Recommendations     1. HFpEF c/b ADHF  Assessment / Plan    Near euvolemia; denies HF symptoms of orthopnea, PND, fluid retention/edema - will resume oral diuretic therapy    GDMT as detailed below; mainstay of treatment for HFpEF includes diuretics and adequate BP control (class I) and SGLT2-I (class 2a); additional medical therapy (ARNI, MRA, ARB) demonstrated less robust evidence for indication but may be considered per guideline recommendations (2b); no indication for BBlockers      Current Pharmacotherapy AHA Guideline-Directed Medical Therapy   Losartan  - on hold given ROBERT ARNI/ARB   Spironolactone  - on hold  MRA   SGLT2 inhibitor -  Not started SGLT2-I    Bumetanide 1 mg daily Loop diuretic       2. NSTEMI  Assessment / Plan    High sensitivity troponin >700 on admission and has continued to trend-up; patient denies chest pain or anginal equivalent    Echo unremarkable on admission - will repeat today    Will hold warfarin in anticipation of coronary angiogram +/- PCI; I discussed this with him including potential risk of stroke, myocardial infarction, or death.  We also discussed the possibility of vascular injury, bleeding requiring blood transfusion, or reaction to x-ray dye        3. DM2  Assessment / Plan    Management per primary team     4. ROBERT on CKD  Assessment / Plan    Likely large CRS component; crt continues to improve    Recommend renal consult to discuss implications of contrast dye on renal function     5. Sepsis  Assessment / Plan    resolved      History of Present Illness/Subjective      Mr. Eduardo Laughlin is a 82 year old male with a PMHx significant for (per Dr. Augustin's note) bladder cancer, urostomy, CKD stage III, DVT on Coumadin, diabetes, obesity, NSTEMI, left bundle branch block who was recently hospitalized for CHF exacerbation. Labs notable for significantly elevated troponin.NSTEMI      Today, Mr. Laughlin denies any acute cardiac  "events or complaints; Management plan as detailed above     ECG: Personally reviewed. sinus tachycardia, LBBB.     ECHO (personnaly Reviewed):  The left ventricle is normal in size.  The visual ejection fraction is 50-55%.  Septal motion is consistent with conduction abnormality.  No regional wall motion abnormalities noted.  The right ventricular systolic function is normal.  Suspect mild AS  The ascending aorta is Moderately dilated.  There is mild aortic root enlargement.  IVC diameter <2.1 cm collapsing >50% with sniff suggests a normal RA pressure  of 3 mmHg.  Technically difficult, suboptimal study.          Physical Examination Review of Systems   /83 (BP Location: Right arm)   Pulse 112   Temp 98.6  F (37  C) (Oral)   Resp 18   Ht 1.778 m (5' 10\")   Wt 109.7 kg (241 lb 14.4 oz)   SpO2 94%   BMI 34.71 kg/m    Body mass index is 34.71 kg/m .  Wt Readings from Last 3 Encounters:   11/22/22 109.7 kg (241 lb 14.4 oz)   11/08/22 111.2 kg (245 lb 2.4 oz)   11/04/22 114.3 kg (252 lb)     General Appearance:   no distress, normal body habitus   ENT/Mouth: membranes moist, no oral lesions or bleeding gums.          Neck: no carotid bruits or thyromegaly   Chest/Lungs:   lungs are clear to auscultation, no rales or wheezing, equal chest wall expansion    Cardiovascular:   Regular. Normal first and second heart sounds with no murmurs, rubs, or gallops; the carotid, radial and posterior tibial pulses are intact, No JVD or LE edema bilaterally    Abdomen:  no organomegaly, masses, bruits, or tenderness; bowel sounds are present   Extremities: no cyanosis or clubbing   Skin: no xanthelasma, warm.    Neurologic: alert and oriented x3, calm     Psychiatric: alert and oriented x3, calm     A complete 10 systems ROS was reviewed  And is negative except what is listed in the HPI.          Medical History  Surgical History Family History Social History   Past Medical History:   Diagnosis Date     Acute renal failure " with acute tubular necrosis superimposed on stage 3 chronic kidney disease (H)      Arteriosclerotic cardiovascular disease (ASCVD)      Atrial fibrillation (H)      Bladder cancer (H)      BPH (benign prostatic hypertrophy)      Chronic kidney disease      Complicated UTI (urinary tract infection) 08/25/2019     Congestive heart failure (H)      Coronary artery disease      Diabetes mellitus (H)      Diabetic neuropathy (H)      E coli bacteremia      GERD (gastroesophageal reflux disease)      Gout      Hyperlipidemia      Hypertension      Hyponatremia      Iliac artery aneurysm, 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    Added automatically from request for surgery [id number]     Kidney stone      Osteoarthritis      Personal history of malignant neoplasm of prostate 04/27/2020     Prostate cancer (H)      Sleep apnea     CPAP    Past Surgical History:   Procedure Laterality Date     CARDIAC CATHETERIZATION  03/28/2008    and coronary angios     CYSTECTOMY       CYSTOSCOPY       CYSTOSCOPY N/A 12/23/2015    Procedure: CYSTOSCOPY BLADDER BIOPSIES with Fulgeration and Placement of Otero ;  Surgeon: Gordon Bajwa MD;  Location: [facility] [place];  Service:      IR EXTREMITY ANGIOGRAM LEFT  3/22/2019     IR EXTREMITY ANGIOGRAM LEFT  3/22/2019     IR ILEAL CONDUIT INJECTION  12/23/2016     IR NEPHROSTOMY TUBE PLACEMENT RIGHT  8/18/2016     IR URETEROSTOMY TUBE CHANGE RIGHT  9/21/2016     KIDNEY STONE SURGERY  x4     LAMINECTOMY LUMBAR ONE LEVEL Bilateral 9/13/2022    Procedure: LUMBAR 4 - LUMBAR 5 BILATERAL MEDIAL FACETECTOMY AND DECOMPRESSION;  Surgeon: Everett Holland MD;  Location: [facility]     PICC TRIPLE LUMEN PLACEMENT  11/18/2022          PROSTATE SURGERY      no family history of premature coronary artery disease Social History     Socioeconomic History     Marital status:      Spouse name: Not on file     Number of children: Not on file     Years of education: Not on file     Highest education  level: Not on file   Occupational History     Not on file   Tobacco Use     Smoking status: Former     Smokeless tobacco: Never     Tobacco comments:     Quit smoking 30 years ago   Vaping Use     Vaping Use: Never used   Substance and Sexual Activity     Alcohol use: Not Currently     Drug use: Never     Sexual activity: Not Currently   Other Topics Concern     Not on file   Social History Narrative     Not on file     Social Determinants of Health     Financial Resource Strain: Not on file   Food Insecurity: Not on file   Transportation Needs: Not on file   Physical Activity: Not on file   Stress: Not on file   Social Connections: Not on file   Intimate Partner Violence: Not on file   Housing Stability: Not on file           Lab Results    Chemistry/lipid CBC Cardiac Enzymes/BNP/TSH/INR   Lab Results   Component Value Date    CHOL 168 03/03/2022    HDL 38 (L) 03/03/2022    TRIG 117 03/03/2022    BUN 52.7 (H) 11/22/2022     (L) 11/22/2022    CO2 24 11/22/2022    Lab Results   Component Value Date    WBC 10.1 11/21/2022    HGB 13.0 (L) 11/21/2022    HCT 38.9 (L) 11/21/2022    MCV 95 11/21/2022     11/21/2022    Lab Results   Component Value Date    TROPONINI 0.10 04/25/2020     (H) 04/25/2020    INR 2.03 (H) 11/22/2022     Lab Results   Component Value Date    TROPONINI 0.10 04/25/2020          Weight:    Wt Readings from Last 3 Encounters:   11/22/22 109.7 kg (241 lb 14.4 oz)   11/08/22 111.2 kg (245 lb 2.4 oz)   11/04/22 114.3 kg (252 lb)       Allergies  Allergies   Allergen Reactions     Metoprolol Succinate Er      Other reaction(s): low blood pressure     Pcn [Penicillins] Other (See Comments)     Childhood-doesn't know reactions     Statin Drugs [Hmg-Coa-R Inhibitors] Cramps     Uroxatral [Alfuzosin] Other (See Comments)     hypotension         Surgical History  Past Surgical History:   Procedure Laterality Date     CARDIAC CATHETERIZATION  03/28/2008    and coronary angios     CYSTECTOMY        CYSTOSCOPY       CYSTOSCOPY N/A 12/23/2015    Procedure: CYSTOSCOPY BLADDER BIOPSIES with Fulgeration and Placement of Otreo ;  Surgeon: Gordon Bajwa MD;  Location: [facility] [place];  Service:      IR EXTREMITY ANGIOGRAM LEFT  3/22/2019     IR EXTREMITY ANGIOGRAM LEFT  3/22/2019     IR ILEAL CONDUIT INJECTION  12/23/2016     IR NEPHROSTOMY TUBE PLACEMENT RIGHT  8/18/2016     IR URETEROSTOMY TUBE CHANGE RIGHT  9/21/2016     KIDNEY STONE SURGERY  x4     LAMINECTOMY LUMBAR ONE LEVEL Bilateral 9/13/2022    Procedure: LUMBAR 4 - LUMBAR 5 BILATERAL MEDIAL FACETECTOMY AND DECOMPRESSION;  Surgeon: Everett Holland MD;  Location: [facility]     PICC TRIPLE LUMEN PLACEMENT  11/18/2022          PROSTATE SURGERY         Social History  Tobacco:   History   Smoking Status     Former   Smokeless Tobacco     Never    Alcohol:   Social History    Substance and Sexual Activity      Alcohol use: Not Currently   Illicit Drugs:   History   Drug Use Unknown       Family History  Family History   Problem Relation Age of Onset     No Known Problems Mother      Prostate Cancer Father      Deep Vein Thrombosis Father      Cancer Sister      Heart Disease Sister      No Known Problems Daughter      Heart Disease Brother      Heart Disease Sister      No Known Problems Daughter           Abdelrahman Mazariegos MD on 11/23/2022      cc: Rafael Montelongo

## 2022-11-23 NOTE — PROGRESS NOTES
Care Management Follow Up    Length of Stay (days): 5    Expected Discharge Date: 11/24/2022     Concerns to be Addressed:  Needs stent placed, CARDS following      Patient plan of care discussed at interdisciplinary rounds: Yes    Anticipated Discharge Disposition:  TBD     Anticipated Discharge Services:  TCU  Anticipated Discharge DME:  TBD      Education Provided on the Discharge Plan:  Per Care Team   Patient/Family in Agreement with the Plan:  Yes    Referrals Placed by CM/SW:    Private pay costs discussed: Not applicable    Additional Information:  Chart reviewed.    RNCM spoke with the doctor following this morning. Patient is going to get stent placed, and CARDS is following so not discharge ready at this point to TCU.    RNCM called and updated the patient's wife.    RNCM called and left   for admissions at [facility] to updated, and see if they would be able to accept the patient later this week. Awaiting response.    When closer to discharge ride will need to be set up.    10:27- Per Doctor stent placement is going to be Friday now, so TCU placement will be more like early next week.     Per Amy at [facility], bed would be available next Tues, and to follow up Monday morning 11/28.    CM will continue to follow plan of care, review recommendations, and assist with any discharge needs anticipated.       Johanna Roach RN

## 2022-11-23 NOTE — PLAN OF CARE
Problem: Plan of Care - These are the overarching goals to be used throughout the patient stay.    Goal: Optimal Comfort and Wellbeing  Outcome: Progressing     Pt comfortable overnight, no pain reported.  Unable to sleep the last 2 nights. He says he is not receiving one of his nightly sleep meds which is causing this.   A/O 4x, makes needs known. Urostomy in place, patent.

## 2022-11-24 ENCOUNTER — APPOINTMENT (OUTPATIENT)
Dept: PHYSICAL THERAPY | Facility: HOSPITAL | Age: 82
DRG: 853 | End: 2022-11-24
Payer: COMMERCIAL

## 2022-11-24 LAB
ALBUMIN SERPL BCG-MCNC: 3.1 G/DL (ref 3.5–5.2)
ALP SERPL-CCNC: 64 U/L (ref 40–129)
ALT SERPL W P-5'-P-CCNC: 55 U/L (ref 10–50)
ANION GAP SERPL CALCULATED.3IONS-SCNC: 14 MMOL/L (ref 7–15)
AST SERPL W P-5'-P-CCNC: 69 U/L (ref 10–50)
BILIRUB SERPL-MCNC: 1.2 MG/DL
BUN SERPL-MCNC: 67.8 MG/DL (ref 8–23)
CALCIUM SERPL-MCNC: 8.7 MG/DL (ref 8.8–10.2)
CHLORIDE SERPL-SCNC: 98 MMOL/L (ref 98–107)
CREAT SERPL-MCNC: 2.67 MG/DL (ref 0.67–1.17)
DEPRECATED HCO3 PLAS-SCNC: 23 MMOL/L (ref 22–29)
ERYTHROCYTE [DISTWIDTH] IN BLOOD BY AUTOMATED COUNT: 14.7 % (ref 10–15)
GFR SERPL CREATININE-BSD FRML MDRD: 23 ML/MIN/1.73M2
GLUCOSE BLDC GLUCOMTR-MCNC: 151 MG/DL (ref 70–99)
GLUCOSE BLDC GLUCOMTR-MCNC: 172 MG/DL (ref 70–99)
GLUCOSE BLDC GLUCOMTR-MCNC: 178 MG/DL (ref 70–99)
GLUCOSE BLDC GLUCOMTR-MCNC: 235 MG/DL (ref 70–99)
GLUCOSE SERPL-MCNC: 179 MG/DL (ref 70–99)
HCT VFR BLD AUTO: 39.1 % (ref 40–53)
HGB BLD-MCNC: 12.7 G/DL (ref 13.3–17.7)
INR PPP: 2.17 (ref 0.85–1.15)
MCH RBC QN AUTO: 31.6 PG (ref 26.5–33)
MCHC RBC AUTO-ENTMCNC: 32.5 G/DL (ref 31.5–36.5)
MCV RBC AUTO: 97 FL (ref 78–100)
PLATELET # BLD AUTO: 204 10E3/UL (ref 150–450)
POTASSIUM SERPL-SCNC: 4.4 MMOL/L (ref 3.4–5.3)
PROT SERPL-MCNC: 6.5 G/DL (ref 6.4–8.3)
RBC # BLD AUTO: 4.02 10E6/UL (ref 4.4–5.9)
SODIUM SERPL-SCNC: 135 MMOL/L (ref 136–145)
WBC # BLD AUTO: 10.1 10E3/UL (ref 4–11)

## 2022-11-24 PROCEDURE — 250N000013 HC RX MED GY IP 250 OP 250 PS 637: Performed by: INTERNAL MEDICINE

## 2022-11-24 PROCEDURE — 250N000013 HC RX MED GY IP 250 OP 250 PS 637: Performed by: HOSPITALIST

## 2022-11-24 PROCEDURE — 97116 GAIT TRAINING THERAPY: CPT | Mod: GP

## 2022-11-24 PROCEDURE — 97530 THERAPEUTIC ACTIVITIES: CPT | Mod: GP

## 2022-11-24 PROCEDURE — 120N000001 HC R&B MED SURG/OB

## 2022-11-24 PROCEDURE — 250N000011 HC RX IP 250 OP 636: Performed by: INTERNAL MEDICINE

## 2022-11-24 PROCEDURE — 80053 COMPREHEN METABOLIC PANEL: CPT | Performed by: HOSPITALIST

## 2022-11-24 PROCEDURE — 99232 SBSQ HOSP IP/OBS MODERATE 35: CPT | Performed by: INTERNAL MEDICINE

## 2022-11-24 PROCEDURE — 99232 SBSQ HOSP IP/OBS MODERATE 35: CPT | Performed by: HOSPITALIST

## 2022-11-24 PROCEDURE — 85027 COMPLETE CBC AUTOMATED: CPT | Performed by: HOSPITALIST

## 2022-11-24 PROCEDURE — P9047 ALBUMIN (HUMAN), 25%, 50ML: HCPCS | Performed by: INTERNAL MEDICINE

## 2022-11-24 PROCEDURE — 97110 THERAPEUTIC EXERCISES: CPT | Mod: GP

## 2022-11-24 PROCEDURE — 250N000011 HC RX IP 250 OP 636: Performed by: HOSPITALIST

## 2022-11-24 PROCEDURE — 250N000013 HC RX MED GY IP 250 OP 250 PS 637: Performed by: NURSE PRACTITIONER

## 2022-11-24 PROCEDURE — 85610 PROTHROMBIN TIME: CPT | Performed by: NURSE PRACTITIONER

## 2022-11-24 RX ORDER — ALBUMIN (HUMAN) 12.5 G/50ML
25 SOLUTION INTRAVENOUS ONCE
Status: COMPLETED | OUTPATIENT
Start: 2022-11-24 | End: 2022-11-24

## 2022-11-24 RX ORDER — SODIUM CHLORIDE 9 MG/ML
INJECTION, SOLUTION INTRAVENOUS CONTINUOUS
Status: ACTIVE | OUTPATIENT
Start: 2022-11-25 | End: 2022-11-25

## 2022-11-24 RX ORDER — TEMAZEPAM 15 MG/1
15 CAPSULE ORAL
Status: DISCONTINUED | OUTPATIENT
Start: 2022-11-24 | End: 2022-12-01 | Stop reason: HOSPADM

## 2022-11-24 RX ORDER — TEMAZEPAM 15 MG/1
15 CAPSULE ORAL
Status: ON HOLD | COMMUNITY
End: 2022-12-07

## 2022-11-24 RX ADMIN — MEROPENEM 1 G: 1 INJECTION, POWDER, FOR SOLUTION INTRAVENOUS at 00:39

## 2022-11-24 RX ADMIN — PANTOPRAZOLE SODIUM 40 MG: 40 TABLET, DELAYED RELEASE ORAL at 06:59

## 2022-11-24 RX ADMIN — HYDROCORTISONE: 25 CREAM TOPICAL at 09:23

## 2022-11-24 RX ADMIN — BUMETANIDE 1 MG: 1 TABLET ORAL at 09:26

## 2022-11-24 RX ADMIN — ALBUMIN HUMAN 25 G: 0.25 SOLUTION INTRAVENOUS at 13:16

## 2022-11-24 RX ADMIN — EZETIMIBE 10 MG: 10 TABLET ORAL at 09:22

## 2022-11-24 RX ADMIN — HYDROCORTISONE: 25 CREAM TOPICAL at 22:12

## 2022-11-24 RX ADMIN — ALLOPURINOL 200 MG: 100 TABLET ORAL at 09:20

## 2022-11-24 RX ADMIN — LIDOCAINE PATCH 4% 1 PATCH: 40 PATCH TOPICAL at 10:24

## 2022-11-24 RX ADMIN — MEROPENEM 1 G: 1 INJECTION, POWDER, FOR SOLUTION INTRAVENOUS at 12:28

## 2022-11-24 RX ADMIN — TRAZODONE HYDROCHLORIDE 200 MG: 50 TABLET ORAL at 22:11

## 2022-11-24 ASSESSMENT — ACTIVITIES OF DAILY LIVING (ADL)
ADLS_ACUITY_SCORE: 29

## 2022-11-24 NOTE — PROGRESS NOTES
Hospitalist Progress Note    Assessment/Plan    Principal Problem:    Lactic acidosis  Active Problems:    Acute respiratory failure with hypoxia (H)    Aspiration pneumonia of both lungs, unspecified aspiration pneumonia type, unspecified part of lung (H)    Eduardo is a 82-year-old male with history of bladder cancer, urostomy, CKD stage III, DVT on Coumadin, diabetes, obesity, NSTEMI, left bundle branch block who was recently hospitalized for CHF exacerbation.  Patient presented to [facility] on 11/18 with concerns for generalized weakness and shortness of breath.  On arrival to ER, patient was found to be bradycardic, hypothermic , hypotensive and hypoxic.  Patient was noted to have acute hypoxic respiratory failure, lactic acidosis and in sepsis requiring monitoring in ICU on BiPAP.  On admission patient also noted to be hyperkalemic with significantly elevated BNP and troponin.  UA showed evidence of UTI.  Work-up with CT head was unremarkable.  CT chest showed groundglass infiltrates in both lungs.  Patient currently is off BiPAP, patient now stable and transferred out of ICU on 11/20/2022.      Sepsis likely 2/2 to pneumonia and UTI  Metabolic encephalopathy - resolved.   -On admission patient was found to be bradycardic, hypothermic, hypotensive and hypoxic. Also,  noted to have lactic acidosis.  -CT chest showed bilateral groundglass opacities, L>R.  -UA suggestive of UTI, urine culture shows no growth however sample appears to be contaminated.  -Blood cultures negative.  Sputum culture unable to collect sample.   -Started on meropenem given history of penicillin allergy.  Continue meropenem for total of 7 days.  -Continue supplemental oxygen as needed, currently on room air.      CHF exacerbation/Acute on chronic diastolic heart failure  -Patient noted to have significantly elevated BNP>3000 on admission.  -Was given IV Lasix, with minimal response.  It was then switched to IV Bumex.  -On PO Bumex  now, managed by cardiology. Holding for morning of angiogram.  -Monitor intake/output, daily weight monitoring  -Echocardiogram from 10/22 showed EF of 50-55%     History of NSTEMI   Elevated troponin likely secondary to demand ischemia in the setting of sepsis and CHF exacerbation  -Continue to monitor on telemetry  -EKG showed no acute ST changes on admission.  -Repeat ECHO on 11/22 showed EF 45% and abnormal septal motion  -Cardiology consulted, planned for angiogram tomorrow.      Acute hypoxic respiratory failure likely secondary to pneumonia and CHF exacerbation  -Patient briefly needed BiPAP on admission, currently weaned off  -Continue supplemental oxygen, monitor saturations and wean O2 as able.    Bradycardia on admission was likely thought secondary to his metoprolol.  -Heart rate currently stable as metoprolol on hold.    Lactic acidosis-resolved currently.    History of DVT/history of paroxysmal A. Fib  Holding PTA metoprolol given his recent bradycardia  On PTA warfarin, currently on hold in anticipation of angiogram tomorrow. 11/25.       Left-arm numbness  -CT head unremarkable.  -MRI head and neck shows foci of restricted diffusion in bilateral globus pallidus, findings seen usually in carbon monoxide poisoning or other toxic metabolic etiologies.  -Neurology following, appreciate input. Signed off now.   -Carbon monoxide levels WNL but however was checked few days after admission hence cannot be ruled out just based on levels.   -MRV neg for sinus thrombosis.    Diabetes  -A1c 7.7  -Continue current sliding scale  -Monitor blood sugars, hypoglycemia protocol.    Hyperkalemia - resolved.    ROBERT on CKD  -Metabolic acidosis   -Likely In the setting of sepsis.  -Baseline  creat around 2.3, creat peaked to 3.7 this admission.  -Creat 2.67 today.  -Minimize nephrotoxins, continue to monitor.  -Renal following. Holding bumex and will start NS prior to angio as recommended.     Transaminitis - improving.  Monitor.     Rib fracture/Lumbar fracture  Acute fracture of right ninth, 10th and 11th ribs seen.Questionable acute to subacute fracture of right 12th rib seen on lumbar spine CT.  -Acute to subacute right L1-L2 and L3 transverse process fractures seen.  -Seen by NSG,appears stable and pt can follow up as out-pt if needed  -Lidocaine patch ordered for pain control.       Barriers to Discharge:  Angiogram,placement    Anticipated discharge date/Disposition: TCU on discharge.    Subjective  Chart reviewed and events noted.  Patient seen and examined.  Sitting up in bed. Reports pain R rib in back. Denies any chest pain , SOB. No new issues overnight.       Physical Exam:  General: Pleasant, NAD  HEENT:EOMI, AT,NC  CVS:RRR, trace LE edema  RS:CTAB  Abd: Soft, NT,ND  Neurology:Awake, alert, oriented  Psy:Approrpiate affect        Objective    Vital signs in last 24 hours  Temp:  [97.4  F (36.3  C)-98.7  F (37.1  C)] 97.8  F (36.6  C)  Pulse:  [] 106  Resp:  [18-20] 18  BP: ()/(57-86) 125/74  SpO2:  [93 %-96 %] 94 % [unfilled] O2 Device: Nasal cannula    Weight:   [unfilled] Weight change:     Intake/Output last 3 shifts  I/O last 3 completed shifts:  In: 1205 [P.O.:1080; I.V.:125]  Out: 2175 [Urine:2175]  Body mass index is 34.71 kg/m .                      Pertinent Labs   Lab Results: personally reviewed.   Recent Labs   Lab 11/24/22  0544 11/23/22  0650 11/22/22  1244 11/21/22  0903   * 135* 135* 134*   CO2 23 24 24 25   BUN 67.8* 54.8* 52.7* 53.0*   ALBUMIN 3.1* 3.0*  --  3.1*   ALKPHOS 64 65  --  61   ALT 55* 54*  --  53*   AST 69* 85*  --  147*     Recent Labs   Lab 11/24/22  0544 11/23/22  0650 11/21/22  0903   WBC 10.1 9.9 10.1   HGB 12.7* 13.3 13.0*   HCT 39.1* 39.2* 38.9*    228 250     No results for input(s): CKTOTAL, TROPONINI in the last 168 hours.    Invalid input(s): TROPONINT, CKMBINDEX  Invalid input(s): POCGLUFGR    Medications  Drug and lactation database from the [facility] National Library of Medicine:  http://toxnet.nlm.nih.gov/cgi-bin/sis/htmlgen?LACT      Pertinent Radiology   Radiology Results:  Personally reviewed impressions    Reviewed labs in last 24 hours.    Advanced Care Planning:  Discharge Planning discussed with patient   Spent >35 mins in care coordination. Discussed plan with patient, SW/CM    This Note is created using dragon voice recognition software.  Errors in spelling or words which seems out of context are unintentional.  Sounds alike errors may have escaped editing.    Solange Augustin MD  Hospitalist

## 2022-11-24 NOTE — PLAN OF CARE
Goal Outcome Evaluation:         Problem: Plan of Care - These are the overarching goals to be used throughout the patient stay.    Goal: Optimal Comfort and Wellbeing  Outcome: Progressing  Intervention: Monitor Pain and Promote Comfort  Recent Flowsheet Documentation  Taken 11/23/2022 1951 by Latonya Lan, RN  Pain Management Interventions:   repositioned   rest     Problem: Diabetes Comorbidity  Goal: Blood Glucose Level Within Targeted Range  Outcome: Progressing      Tylenol given at start of shift for lower back pain; helpful. Patient up in chair until after dinner. Patient changed urostomy appliance with family at bedside. Urostomy patent and draining. Blood sugars 180 and 182. Sinus tachycardia with BBB on telemetry.

## 2022-11-24 NOTE — PROGRESS NOTES
SPIRITUAL HEALTH SERVICES NOTE  [facility]; P2    SPIRITUAL ASSESSMENT    Questions about his procedure tomorrow    Goal is to stay in his home; does not want to go to a TCU    CARE PROVIDED  Provided calm, empathic listening and presence  Normalized/validated his/her feelings of concern  Explored/identified sources of strength  Prayer shared    SPIRITUAL CARE NOTE  Saw Yuniel due to admission screening response/patient request upon admission. Yuniel was accompanied by his daughter Mansi. He shares that he has had weakness, SOB, and dizziness, which have led to falls. He is having a procedure tomorrow, but was not clear if it is an angiogram, or angioplasty. Yuniel states that he had an angioplasty 20 years ago. He thinks that they were unable to place stents at that time because his vessels were too thin. He would like to speak with the cardiologist about this before his procedure tomorrow.     Mansi notes that Yuniel is concerned about going to a TCU. He would much prefer to return home, where he and his wife life in a senior independent living building. Mansi [place] out of state, but is willing to stay with her parents for a few weeks to help her dad recover. While we were talking, Yuniel's lunch arrived. Prayer shared.     Time Spent with Patient/Family: 15 minutes    Plan of Care: Available for further follow-up as requested by patient, family or staff.      Jennie Zhu M.Div.      Office: [phone] (for non-urgent requests)  Please Vocera or page through [facility] for time-sensitive requests

## 2022-11-24 NOTE — PLAN OF CARE
"  Problem: Plan of Care - These are the overarching goals to be used throughout the patient stay.    Goal: Plan of Care Review  Description: The Plan of Care Review/Shift note should be completed every shift.  The Outcome Evaluation is a brief statement about your assessment that the patient is improving, declining, or no change.  This information will be displayed automatically on your shift note.  Outcome: Progressing  Goal: Patient-Specific Goal (Individualized)  Description: You can add care plan individualizations to a care plan. Examples of Individualization might be:  \"Parent requests to be called daily at 9am for status\", \"I have a hard time hearing out of my right ear\", or \"Do not touch me to wake me up as it startles me\".  Outcome: Progressing  Goal: Absence of Hospital-Acquired Illness or Injury  Outcome: Progressing  Intervention: Identify and Manage Fall Risk  Recent Flowsheet Documentation  Taken 11/24/2022 0900 by Ramona Alvarez RN  Safety Promotion/Fall Prevention: activity supervised  Goal: Optimal Comfort and Wellbeing  Outcome: Progressing  Goal: Readiness for Transition of Care  Outcome: Progressing   Goal Outcome Evaluation:         Patient up in chair most of afternoon and tolerating well.  LE and left arm edema.  Patient denies pain.  No complaints of nausea. Albumin given per MD order.                 "

## 2022-11-24 NOTE — PLAN OF CARE
Problem: Risk for Delirium  Goal: Improved Sleep  Outcome: Progressing  Patient slept in between cares.   Left elbow skin tear draining, serous drain, dressing changed. V/S stable. Denied pain. SR with 1st degree AVB, BBB.  Will continue to monitor closely.

## 2022-11-24 NOTE — PROGRESS NOTES
"    Cardiology Progress Note    Assessment:  Pneumonia/sepsis improved  Chronic heart failure /borderline depressed LV systolic function most likely due to dyssynchrony from left bundle branch block  Moderate coronary calcification consistent with coronary atherosclerosis  Non-ST segment myocardial infarction likely type II chest pain-free  Acute on chronic kidney failure, rising creatinine likely due to diuresis and transient hypotension  Diabetes mellitus  Paroxysmal atrial fibrillation on chronic anticoagulation with warfarin    Plan:  Coronary angiogram tomorrow unless creatinine continues to climb  Hold diuretics  Renal protective measures by nephrologist        Subjective:   Denies chest pains or increasing shortness of breath today    Objective:   /71 (BP Location: Right arm)   Pulse 105   Temp 97  F (36.1  C) (Oral)   Resp 18   Ht 1.778 m (5' 10\")   Wt 109.7 kg (241 lb 14.4 oz)   SpO2 95%   BMI 34.71 kg/m      Intake/Output Summary (Last 24 hours) at 11/24/2022 1257  Last data filed at 11/24/2022 0700  Gross per 24 hour   Intake 1135 ml   Output 1525 ml   Net -390 ml         Physical Exam:  GENERAL: no distress  NECK: No JVD  LUNGS: Clear to auscultation.  CARDIAC: regular  rhythm, S1 & S2 normal.  No heaves, thrills, gallops or murmurs.  ABDOMEN: flat, negative hepatosplenomegaly, soft and non-tender.  EXTREMITIES: No evidence of cyanosis, clubbing or edema.    Current Facility-Administered Medications Ordered in Epic   Medication Dose Route Frequency Provider Last Rate Last Admin     acetaminophen (TYLENOL) tablet 1,000 mg  1,000 mg Oral Q6H PRN Shefali Kulkarni APRN CNP   1,000 mg at 11/23/22 1555     albumin human 25 % injection 25 g  25 g Intravenous Once Tariq Angela DO         allopurinol (ZYLOPRIM) tablet 200 mg  200 mg Oral Daily Shefali Kulkarni APRN CNP   200 mg at 11/24/22 0920     bisacodyl (DULCOLAX) EC tablet 5 mg  5 mg Oral Daily PRN Shefali Kulkarni APRN CNP        Or     " bisacodyl (DULCOLAX) EC tablet 10 mg  10 mg Oral Daily PRN Shefali Kulkarni APRN CNP        Or     bisacodyl (DULCOLAX) EC tablet 15 mg  15 mg Oral Daily PRN Shefali Kulkarni APRN CNP         [Held by provider] bumetanide (BUMEX) tablet 1 mg  1 mg Oral Daily Abdelrahman Mazariegos MD   1 mg at 11/24/22 0926     glucose gel 15-30 g  15-30 g Oral Q15 Min PRN Shefali Kulkarni APRN CNP        Or     dextrose 50 % injection 25-50 mL  25-50 mL Intravenous Q15 Min PRN Shefali Kulkarni APRN CNP        Or     glucagon injection 1 mg  1 mg Subcutaneous Q15 Min PRN Shefali Kulkarni APRN CNP         glucose gel 15-30 g  15-30 g Oral Q15 Min PRN Shefali Kulkarni APRN CNP        Or     dextrose 50 % injection 25-50 mL  25-50 mL Intravenous Q15 Min PRShefali Rome APRN CNP        Or     glucagon injection 1 mg  1 mg Subcutaneous Q15 Min PRN Shefali Kulkarni APRN CNP         ezetimibe (ZETIA) tablet 10 mg  10 mg Oral Daily Solange Augustin MD   10 mg at 11/24/22 0922     hydrocortisone 2.5 % cream   Topical BID Shefali Kulkarni APRN CNP   Given at 11/24/22 0923     insulin aspart (NovoLOG) injection (RAPID ACTING)  1-12 Units Subcutaneous TID w/meals Shefali Kulkarni APRN CNP   4 Units at 11/24/22 1223     insulin aspart (NovoLOG) injection (RAPID ACTING)  1-7 Units Subcutaneous At Bedtime Shefali Kulkarni APRN CNP   1 Units at 11/21/22 2124     Lidocaine (LIDOCARE) 4 % Patch 1 patch  1 patch Transdermal Q24H Solange Augustin MD   1 patch at 11/24/22 1024     lidocaine patch in PLACE   Transdermal Q8H [facility] Solange Augustin MD         meropenem (MERREM) 1 g vial to attach to  mL bag  1 g Intravenous Q12H Solange Augustin  mL/hr at 11/22/22 1234 1 g at 11/24/22 1228     ondansetron (ZOFRAN ODT) ODT tab 4 mg  4 mg Oral Q6H PRN Shefali Kulkarni APRN CNP        Or     ondansetron (ZOFRAN) injection 4 mg  4 mg Intravenous Q6H PRN Shefali Kulkarni APRN CNP   4 mg at 11/23/22 1653     pantoprazole (PROTONIX) EC tablet 40 mg   40 mg Oral QAM AC Shefali Kulkarni APRN CNP   40 mg at 11/24/22 0659     Patient is already receiving anticoagulation with heparin, enoxaparin (LOVENOX), warfarin (COUMADIN)  or other anticoagulant medication   Does not apply Continuous PRN Shefali Kulkarni APRN CNP         prochlorperazine (COMPAZINE) injection 5 mg  5 mg Intravenous Q6H PRN Shefali Kulkarni APRN CNP        Or     prochlorperazine (COMPAZINE) tablet 5 mg  5 mg Oral Q6H PRN Shefali Kulkarni APRN CNP        Or     prochlorperazine (COMPAZINE) suppository 12.5 mg  12.5 mg Rectal Q12H PRN Shefali Kulkarni APRN CNP         sodium chloride (PF) 0.9% PF flush 10-20 mL  10-20 mL Intracatheter q1 min prn Shefali Kulkarni APRN CNP         sodium chloride (PF) 0.9% PF flush 10-40 mL  10-40 mL Intracatheter Q7 Days Shefali Kulkarni APRN CNP   10 mL at 11/18/22 1936     sodium chloride (PF) 0.9% PF flush 10-40 mL  10-40 mL Intracatheter Q1H PRN Sehfali Kulkarni APRN CNP         [START ON 11/25/2022] sodium chloride 0.9% infusion   Intravenous Continuous Solange Augustin MD         temazepam (RESTORIL) capsule 15 mg  15 mg Oral At Bedtime PRN Solange Augustin MD         traZODone (DESYREL) tablet 200 mg  200 mg Oral At Bedtime Solange Augustin MD   200 mg at 11/23/22 2204     [Held by provider] Warfarin Dose Required Daily - Pharmacist Managed  1 each Oral See Admin Instructions Shefali Kulkarni APRN CNP         No current Epic-ordered outpatient medications on file.       Cardiographics:      ECG: Sinus rhythm with a left bundle branch block  Echocardiogram:   1. Technically difficult study despite utilization of ultrasound enhancing  agent.  2. The left ventricle is normal in size. Image quality does not provide for  detailed assessment of LV systolic function, but is felt to be mildly  decreased with a visually estimated ejection fraction of roughly 45%.  3. There is mild global reduction in left ventricular systolic performance.  4. There is abnormal septal  motion likely related to altered electrical  activation due to bundle branch block.  5. There is mild aortic stenosis.  6. Probable normal right ventricular size and systolic performance though  right-sided structures are not clearly visualized on all views on this study.  7. There is mild enlargement of the proximal ascending aorta.    Stress test: August 2021  No perfusion defect   Lab Results    Chemistry/lipid CBC Cardiac Enzymes/BNP/TSH/INR   Recent Labs   Lab Test 03/03/22  0921   CHOL 168   HDL 38*      TRIG 117     Recent Labs   Lab Test 03/03/22  0921 08/27/21  1041 02/23/21  1118    110 115     Recent Labs   Lab Test 11/24/22  1217 11/24/22  0852 11/24/22  0544   NA  --   --  135*   POTASSIUM  --   --  4.4   CHLORIDE  --   --  98   CO2  --   --  23   *   < > 179*   BUN  --   --  67.8*   CR  --   --  2.67*   GFRESTIMATED  --   --  23*   GALLO  --   --  8.7*    < > = values in this interval not displayed.     Recent Labs   Lab Test 11/24/22  0544 11/23/22  0650 11/22/22  1244   CR 2.67* 1.99* 2.19*     Recent Labs   Lab Test 10/12/22  1802 04/25/20  0655 08/25/19  0619   A1C 7.7* 5.9* 6.3*          Recent Labs   Lab Test 11/24/22  0544   WBC 10.1   HGB 12.7*   HCT 39.1*   MCV 97        Recent Labs   Lab Test 11/24/22  0544 11/23/22  0650 11/21/22  0903   HGB 12.7* 13.3 13.0*    Recent Labs   Lab Test 04/25/20  0655 04/25/20  0009 04/24/20  2049   TROPONINI 0.10 0.14 0.14     Recent Labs   Lab Test 11/18/22  1025 11/11/22  1320 11/05/22  2145 10/12/22  1802 04/25/20  0655 04/24/20  1427   BNP  --   --   --   --  258* 86*   NTBNPI 3,064*  --  2,226* 5,303*  --   --    NTBNP  --  1,108  --   --   --   --      No results for input(s): TSH in the last 09604 hours.  Recent Labs   Lab Test 11/24/22  0544 11/23/22  0650 11/22/22  0612   INR 2.17* 2.34* 2.03*

## 2022-11-24 NOTE — PLAN OF CARE
Problem: Heart Failure Comorbidity  Goal: Maintenance of Heart Failure Symptom Control  Outcome: Progressing     Problem: Plan of Care - These are the overarching goals to be used throughout the patient stay.    Goal: Optimal Comfort and Wellbeing  Outcome: Progressing   Goal Outcome Evaluation:       Pt is alert oriented x4. VSS. Pt denies pain. Assist of one to and fro bed and chair and tolerated well. Pt seemed to enjoy visit with daughter this vincenzo. Questions about pt's status  and upcoming procedure/angio entertained with answers to pt and daughter. MD updated with asymptomatic  HR in low 100's. No new orders.  Will cont to monitor.

## 2022-11-24 NOTE — PROGRESS NOTES
"  '    RENAL ([facility]) progress note  CC: F/U ROBERT  S: Since last visit, up in chair. No sob. No pain. Eatig alright. No swelling. Discussed labs and risk for BERENICE with angio.     A/P:   Principal Problem:    Lactic acidosis  Active Problems:    Acute respiratory failure with hypoxia (H)    Aspiration pneumonia of both lungs, unspecified aspiration pneumonia type, unspecified part of lung (H)    ASSESSMENT:   81 yo male with h/o CKD 4, HTN. DM2, bladder cancer s/p cystectomy/urostomy admit with pneumonia, CHF, now with plan for angiogram     PLAN:  1. CKD 4 - has baseline CKD due to atrophic kidney, DM2, HTN, vascular disease, also with cystectomy/urostomy.  Has had very frequent fluctuations in creatinine associated with diuresis.  Currently renal function improved.  At increased risk for contrast nephropathy with angiogram on Friday  -Worsening today, noted hypotension yesterday along with recent diuresis  holding Bumex  -IV albumin today.   -If worsening tomorrow need to consider holding angio if possible based on risk of BERENICE and HD.   -would give IVF with NS for 6 hrs before angiogram to ensure intravascularly volume replete, hold bumex on Friday morning    2. HTN - with recent orthostatic hypotension  -controlled now, no change in meds    3. NSTEMI - plan for angiogram Friday, see above    Tariq Angela,   [facility]  [phone] (off)       No interval changes to past medical history, social history or family history to report.    /71 (BP Location: Right arm)   Pulse 105   Temp 97  F (36.1  C) (Oral)   Resp 18   Ht 1.778 m (5' 10\")   Wt 109.7 kg (241 lb 14.4 oz)   SpO2 95%   BMI 34.71 kg/m      I/O last 3 completed shifts:  In: 1205 [P.O.:1080; I.V.:125]  Out: 2175 [Urine:2175]    Physical Exam:   Gen: NAD  HEENT: No icterus, NC/AT  CARDIOVASCULAR: RRR, trace  diffuse edema  PULMONARY: CTA ant No cyanosis  GASTROINTESTINAL: soft, NT/ND  MSK: Diffuse muscle atrophy, " warm  NEURO: Alert, no gross focal findings  PSYCHIATRIC: Normal affect, answers questions appropriately  SKIN: Pale, no jaundice, no rash. Scattered skin tears/bruising    Recent Labs   Lab 11/24/22  0544 11/23/22  0650 11/22/22  1244 11/21/22  0903 11/20/22  0407 11/19/22  0352 11/18/22  1953 11/18/22  1041 11/18/22  1025   * 135* 135* 134* 139 137 135*   < > 133*   POTASSIUM 4.4 4.7 4.8 4.5 4.6 4.5 4.9   < > 5.9*   CHLORIDE 98 102 102 101 105 103 102   < > 99   CO2 23 24 24 25 25 22 20*   < > 14*   BUN 67.8* 54.8* 52.7* 53.0* 59.9* 60.5* 53.7*   < > 50.8*   CR 2.67* 1.99* 2.19* 2.34* 3.10* 3.71* 3.74*   < > 3.46*   GFRESTIMATED 23* 33* 29* 27* 19* 16* 15*   < > 17*   GALLO 8.7* 9.0 8.9 9.0 8.5* 8.4* 8.6*   < > 8.6*   MAG  --   --   --   --   --   --   --   --  2.4*   ALBUMIN 3.1* 3.0*  --  3.1* 2.9*  --   --   --   --     < > = values in this interval not displayed.       Recent Labs   Lab 11/24/22  0544 11/23/22  0650 11/21/22  0903 11/20/22  0407 11/19/22  0352 11/18/22  1025   WBC 10.1 9.9 10.1 11.0 15.5* 20.5*   HGB 12.7* 13.3 13.0* 11.9* 12.2* 13.4   HCT 39.1* 39.2* 38.9* 35.8* 35.9* 41.9   MCV 97 95 95 96 94 98    228 250 245 270 322             Current Facility-Administered Medications:      acetaminophen (TYLENOL) tablet 1,000 mg, 1,000 mg, Oral, Q6H PRN, Da, Shefali A, APRN CNP, 1,000 mg at 11/23/22 1555     albumin human 25 % injection 25 g, 25 g, Intravenous, Once, Tariq Angela DO     allopurinol (ZYLOPRIM) tablet 200 mg, 200 mg, Oral, Daily, Shefali Kulkarni APRN CNP, 200 mg at 11/24/22 0920     bisacodyl (DULCOLAX) EC tablet 5 mg, 5 mg, Oral, Daily PRN **OR** bisacodyl (DULCOLAX) EC tablet 10 mg, 10 mg, Oral, Daily PRN **OR** bisacodyl (DULCOLAX) EC tablet 15 mg, 15 mg, Oral, Daily PRN, Shefali Kulkarni APRN CNP     [Held by provider] bumetanide (BUMEX) tablet 1 mg, 1 mg, Oral, Daily, Abdelrahman Mazariegos MD, 1 mg at 11/24/22 0926     glucose gel 15-30 g, 15-30 g, Oral, Q15 Min PRN  **OR** dextrose 50 % injection 25-50 mL, 25-50 mL, Intravenous, Q15 Min PRN **OR** glucagon injection 1 mg, 1 mg, Subcutaneous, Q15 Min PRN, Shefali Kulkarni APRN CNP     glucose gel 15-30 g, 15-30 g, Oral, Q15 Min PRN **OR** dextrose 50 % injection 25-50 mL, 25-50 mL, Intravenous, Q15 Min PRN **OR** glucagon injection 1 mg, 1 mg, Subcutaneous, Q15 Min PRN, Shefali Kulkarni APRN CNP     ezetimibe (ZETIA) tablet 10 mg, 10 mg, Oral, Daily, Solange Augustin MD, 10 mg at 11/24/22 0922     hydrocortisone 2.5 % cream, , Topical, BID, Shefali Kulkarni APRN CNP, Given at 11/24/22 0923     insulin aspart (NovoLOG) injection (RAPID ACTING), 1-12 Units, Subcutaneous, TID w/meals, Shefali Kulkarni APRN CNP, 4 Units at 11/24/22 1223     insulin aspart (NovoLOG) injection (RAPID ACTING), 1-7 Units, Subcutaneous, At Bedtime, Shefali Kulkarni APRN CNP, 1 Units at 11/21/22 2124     Lidocaine (LIDOCARE) 4 % Patch 1 patch, 1 patch, Transdermal, Q24H, Solange Augustin MD, 1 patch at 11/24/22 1024     lidocaine patch in PLACE, , Transdermal, Q8H [facility], Solange Augustin MD     meropenem (MERREM) 1 g vial to attach to  mL bag, 1 g, Intravenous, Q12H, Solange Augustin MD, Last Rate: 200 mL/hr at 11/22/22 1234, 1 g at 11/24/22 1228     ondansetron (ZOFRAN ODT) ODT tab 4 mg, 4 mg, Oral, Q6H PRN **OR** ondansetron (ZOFRAN) injection 4 mg, 4 mg, Intravenous, Q6H PRN, Shefali Kulkarni APRN CNP, 4 mg at 11/23/22 1655     pantoprazole (PROTONIX) EC tablet 40 mg, 40 mg, Oral, QAM AC, Shefali Kulkarni APRN CNP, 40 mg at 11/24/22 0659     Patient is already receiving anticoagulation with heparin, enoxaparin (LOVENOX), warfarin (COUMADIN)  or other anticoagulant medication, , Does not apply, Continuous PRN, Shefali Kulkarni, APRN CNP     prochlorperazine (COMPAZINE) injection 5 mg, 5 mg, Intravenous, Q6H PRN **OR** prochlorperazine (COMPAZINE) tablet 5 mg, 5 mg, Oral, Q6H PRN **OR** prochlorperazine (COMPAZINE) suppository 12.5 mg, 12.5 mg,  Rectal, Q12H PRN, Shefali Kulkarni APRN CNP     sodium chloride (PF) 0.9% PF flush 10-20 mL, 10-20 mL, Intracatheter, q1 min prn, Shefali Kulkarni APRN CNP     sodium chloride (PF) 0.9% PF flush 10-40 mL, 10-40 mL, Intracatheter, Q7 Days, Shefali Kulkarni APRN CNP, 10 mL at 11/18/22 1936     sodium chloride (PF) 0.9% PF flush 10-40 mL, 10-40 mL, Intracatheter, Q1H PRN, Shefali Kulkarni APRN CNP     [START ON 11/25/2022] sodium chloride 0.9% infusion, , Intravenous, Continuous, Solange Augustin MD     temazepam (RESTORIL) capsule 15 mg, 15 mg, Oral, At Bedtime PRN, Solange Augustin MD     traZODone (DESYREL) tablet 200 mg, 200 mg, Oral, At Bedtime, Solange Augustin MD, 200 mg at 11/23/22 2204     [Held by provider] Warfarin Dose Required Daily - Pharmacist Managed, 1 each, Oral, See Admin Instructions, Shefali Kulkarni APRN CNP      Labs personally reviewed today during this evaluation at 12:42 PM

## 2022-11-25 ENCOUNTER — APPOINTMENT (OUTPATIENT)
Dept: PHYSICAL THERAPY | Facility: HOSPITAL | Age: 82
DRG: 853 | End: 2022-11-25
Payer: COMMERCIAL

## 2022-11-25 ENCOUNTER — TRANSFERRED RECORDS (OUTPATIENT)
Dept: MEDSURG UNIT | Facility: HOSPITAL | Age: 82
End: 2022-11-25

## 2022-11-25 ENCOUNTER — APPOINTMENT (OUTPATIENT)
Dept: ULTRASOUND IMAGING | Facility: HOSPITAL | Age: 82
DRG: 853 | End: 2022-11-25
Payer: COMMERCIAL

## 2022-11-25 LAB
ALBUMIN SERPL BCG-MCNC: 3 G/DL (ref 3.5–5.2)
ALP SERPL-CCNC: 62 U/L (ref 40–129)
ALT SERPL W P-5'-P-CCNC: 44 U/L (ref 10–50)
ANION GAP SERPL CALCULATED.3IONS-SCNC: 13 MMOL/L (ref 7–15)
ANION GAP SERPL CALCULATED.3IONS-SCNC: 8 MMOL/L (ref 7–15)
AST SERPL W P-5'-P-CCNC: 42 U/L (ref 10–50)
BILIRUB SERPL-MCNC: 1 MG/DL
BUN SERPL-MCNC: 66.9 MG/DL (ref 8–23)
BUN SERPL-MCNC: 69.1 MG/DL (ref 8–23)
CALCIUM SERPL-MCNC: 8.5 MG/DL (ref 8.8–10.2)
CALCIUM SERPL-MCNC: 8.8 MG/DL (ref 8.8–10.2)
CHLORIDE SERPL-SCNC: 101 MMOL/L (ref 98–107)
CHLORIDE SERPL-SCNC: 102 MMOL/L (ref 98–107)
CREAT SERPL-MCNC: 2.31 MG/DL (ref 0.67–1.17)
CREAT SERPL-MCNC: 2.52 MG/DL (ref 0.67–1.17)
DEPRECATED HCO3 PLAS-SCNC: 21 MMOL/L (ref 22–29)
DEPRECATED HCO3 PLAS-SCNC: 25 MMOL/L (ref 22–29)
ERYTHROCYTE [DISTWIDTH] IN BLOOD BY AUTOMATED COUNT: 14.5 % (ref 10–15)
GFR SERPL CREATININE-BSD FRML MDRD: 25 ML/MIN/1.73M2
GFR SERPL CREATININE-BSD FRML MDRD: 28 ML/MIN/1.73M2
GLUCOSE BLDC GLUCOMTR-MCNC: 137 MG/DL (ref 70–99)
GLUCOSE BLDC GLUCOMTR-MCNC: 159 MG/DL (ref 70–99)
GLUCOSE BLDC GLUCOMTR-MCNC: 174 MG/DL (ref 70–99)
GLUCOSE BLDC GLUCOMTR-MCNC: 189 MG/DL (ref 70–99)
GLUCOSE SERPL-MCNC: 157 MG/DL (ref 70–99)
GLUCOSE SERPL-MCNC: 202 MG/DL (ref 70–99)
HCT VFR BLD AUTO: 34.6 % (ref 40–53)
HGB BLD-MCNC: 11.2 G/DL (ref 13.3–17.7)
INR PPP: 1.97 (ref 0.85–1.15)
INR PPP: 2.07 (ref 0.85–1.15)
MAGNESIUM SERPL-MCNC: 2 MG/DL (ref 1.7–2.3)
MCH RBC QN AUTO: 31.5 PG (ref 26.5–33)
MCHC RBC AUTO-ENTMCNC: 32.4 G/DL (ref 31.5–36.5)
MCV RBC AUTO: 98 FL (ref 78–100)
PHOSPHATE SERPL-MCNC: 3.2 MG/DL (ref 2.5–4.5)
PLATELET # BLD AUTO: 169 10E3/UL (ref 150–450)
POTASSIUM SERPL-SCNC: 4.1 MMOL/L (ref 3.4–5.3)
POTASSIUM SERPL-SCNC: 4.4 MMOL/L (ref 3.4–5.3)
PROT SERPL-MCNC: 6.1 G/DL (ref 6.4–8.3)
RBC # BLD AUTO: 3.55 10E6/UL (ref 4.4–5.9)
SODIUM SERPL-SCNC: 135 MMOL/L (ref 136–145)
SODIUM SERPL-SCNC: 135 MMOL/L (ref 136–145)
TROPONIN T SERPL HS-MCNC: 273 NG/L
WBC # BLD AUTO: 7.1 10E3/UL (ref 4–11)

## 2022-11-25 PROCEDURE — P9047 ALBUMIN (HUMAN), 25%, 50ML: HCPCS | Performed by: INTERNAL MEDICINE

## 2022-11-25 PROCEDURE — 97116 GAIT TRAINING THERAPY: CPT | Mod: GP

## 2022-11-25 PROCEDURE — 84484 ASSAY OF TROPONIN QUANT: CPT

## 2022-11-25 PROCEDURE — 82040 ASSAY OF SERUM ALBUMIN: CPT | Performed by: HOSPITALIST

## 2022-11-25 PROCEDURE — 97110 THERAPEUTIC EXERCISES: CPT | Mod: GP

## 2022-11-25 PROCEDURE — 250N000013 HC RX MED GY IP 250 OP 250 PS 637: Performed by: INTERNAL MEDICINE

## 2022-11-25 PROCEDURE — 99233 SBSQ HOSP IP/OBS HIGH 50: CPT | Performed by: INTERNAL MEDICINE

## 2022-11-25 PROCEDURE — 36415 COLL VENOUS BLD VENIPUNCTURE: CPT | Performed by: HOSPITALIST

## 2022-11-25 PROCEDURE — 85027 COMPLETE CBC AUTOMATED: CPT | Performed by: HOSPITALIST

## 2022-11-25 PROCEDURE — 36415 COLL VENOUS BLD VENIPUNCTURE: CPT

## 2022-11-25 PROCEDURE — 83735 ASSAY OF MAGNESIUM: CPT

## 2022-11-25 PROCEDURE — 93005 ELECTROCARDIOGRAM TRACING: CPT

## 2022-11-25 PROCEDURE — 250N000013 HC RX MED GY IP 250 OP 250 PS 637: Performed by: HOSPITALIST

## 2022-11-25 PROCEDURE — 85610 PROTHROMBIN TIME: CPT | Performed by: NURSE PRACTITIONER

## 2022-11-25 PROCEDURE — 120N000001 HC R&B MED SURG/OB

## 2022-11-25 PROCEDURE — 93971 EXTREMITY STUDY: CPT | Mod: LT

## 2022-11-25 PROCEDURE — 258N000003 HC RX IP 258 OP 636: Performed by: HOSPITALIST

## 2022-11-25 PROCEDURE — 85610 PROTHROMBIN TIME: CPT | Performed by: HOSPITALIST

## 2022-11-25 PROCEDURE — 250N000011 HC RX IP 250 OP 636: Performed by: HOSPITALIST

## 2022-11-25 PROCEDURE — 93010 ELECTROCARDIOGRAM REPORT: CPT | Performed by: INTERNAL MEDICINE

## 2022-11-25 PROCEDURE — 85610 PROTHROMBIN TIME: CPT

## 2022-11-25 PROCEDURE — 99232 SBSQ HOSP IP/OBS MODERATE 35: CPT | Performed by: HOSPITALIST

## 2022-11-25 PROCEDURE — 250N000011 HC RX IP 250 OP 636: Performed by: INTERNAL MEDICINE

## 2022-11-25 PROCEDURE — 250N000013 HC RX MED GY IP 250 OP 250 PS 637: Performed by: NURSE PRACTITIONER

## 2022-11-25 PROCEDURE — 80053 COMPREHEN METABOLIC PANEL: CPT | Performed by: HOSPITALIST

## 2022-11-25 PROCEDURE — 85027 COMPLETE CBC AUTOMATED: CPT

## 2022-11-25 PROCEDURE — 250N000011 HC RX IP 250 OP 636

## 2022-11-25 PROCEDURE — 84100 ASSAY OF PHOSPHORUS: CPT

## 2022-11-25 RX ORDER — ASPIRIN 325 MG
325 TABLET ORAL ONCE
Status: COMPLETED | OUTPATIENT
Start: 2022-11-25 | End: 2022-11-25

## 2022-11-25 RX ORDER — FUROSEMIDE 10 MG/ML
40 INJECTION INTRAMUSCULAR; INTRAVENOUS ONCE
Status: COMPLETED | OUTPATIENT
Start: 2022-11-25 | End: 2022-11-25

## 2022-11-25 RX ORDER — HEPARIN SOD,PORCINE/0.9 % NACL 5K/1000 ML
INTRAVENOUS SOLUTION INTRAVENOUS CONTINUOUS
Status: DISCONTINUED | OUTPATIENT
Start: 2022-11-26 | End: 2022-11-26 | Stop reason: ALTCHOICE

## 2022-11-25 RX ORDER — MULTIVITAMIN,THERAPEUTIC
1 TABLET ORAL DAILY
Status: DISCONTINUED | OUTPATIENT
Start: 2022-11-25 | End: 2022-12-01 | Stop reason: HOSPADM

## 2022-11-25 RX ORDER — ALBUMIN (HUMAN) 12.5 G/50ML
50 SOLUTION INTRAVENOUS ONCE
Status: COMPLETED | OUTPATIENT
Start: 2022-11-25 | End: 2022-11-25

## 2022-11-25 RX ADMIN — HYDROCORTISONE: 25 CREAM TOPICAL at 22:15

## 2022-11-25 RX ADMIN — ACETAMINOPHEN 1000 MG: 500 TABLET ORAL at 19:00

## 2022-11-25 RX ADMIN — EZETIMIBE 10 MG: 10 TABLET ORAL at 07:53

## 2022-11-25 RX ADMIN — MEROPENEM 1 G: 1 INJECTION, POWDER, FOR SOLUTION INTRAVENOUS at 01:33

## 2022-11-25 RX ADMIN — THERA TABS 1 TABLET: TAB at 17:08

## 2022-11-25 RX ADMIN — MEROPENEM 1 G: 1 INJECTION, POWDER, FOR SOLUTION INTRAVENOUS at 12:38

## 2022-11-25 RX ADMIN — FUROSEMIDE 40 MG: 10 INJECTION, SOLUTION INTRAMUSCULAR; INTRAVENOUS at 22:50

## 2022-11-25 RX ADMIN — BISACODYL 15 MG: 5 TABLET, COATED ORAL at 15:07

## 2022-11-25 RX ADMIN — PANTOPRAZOLE SODIUM 40 MG: 40 TABLET, DELAYED RELEASE ORAL at 06:45

## 2022-11-25 RX ADMIN — TRAZODONE HYDROCHLORIDE 200 MG: 50 TABLET ORAL at 22:06

## 2022-11-25 RX ADMIN — ALBUMIN HUMAN 50 G: 0.25 SOLUTION INTRAVENOUS at 09:27

## 2022-11-25 RX ADMIN — HYDROCORTISONE: 25 CREAM TOPICAL at 07:58

## 2022-11-25 RX ADMIN — SODIUM CHLORIDE, PRESERVATIVE FREE: 5 INJECTION INTRAVENOUS at 01:33

## 2022-11-25 RX ADMIN — ALLOPURINOL 200 MG: 100 TABLET ORAL at 07:54

## 2022-11-25 RX ADMIN — ASPIRIN 325 MG: 325 TABLET ORAL at 02:23

## 2022-11-25 ASSESSMENT — ACTIVITIES OF DAILY LIVING (ADL)
ADLS_ACUITY_SCORE: 31
ADLS_ACUITY_SCORE: 29

## 2022-11-25 NOTE — PROGRESS NOTES
Hospitalist Progress Note    Assessment/Plan    Principal Problem:    Lactic acidosis  Active Problems:    Acute respiratory failure with hypoxia (H)    Aspiration pneumonia of both lungs, unspecified aspiration pneumonia type, unspecified part of lung (H)    Eduardo is a 82-year-old male with history of bladder cancer, urostomy, CKD stage III, DVT on Coumadin, diabetes, obesity, NSTEMI, left bundle branch block who was recently hospitalized for CHF exacerbation.  Patient presented to [facility] on 11/18 with concerns for generalized weakness and shortness of breath.  On arrival to ER, patient was found to be bradycardic, hypothermic , hypotensive and hypoxic.  Patient was noted to have acute hypoxic respiratory failure, lactic acidosis and in sepsis requiring monitoring in ICU on BiPAP.  On admission patient also noted to be hyperkalemic with significantly elevated BNP and troponin.  UA showed evidence of UT, however urine cx no growth however sample contaminated. Work-up with CT head was unremarkable.  CT chest showed groundglass infiltrates in both lungs.  Patient currently is off BiPAP, patient now stable and transferred out of ICU on 11/20/2022.  Noted to be troponin elevated, pt has no anginal symptoms. Cardiology planning for angiogram on 11/28.       Sepsis likely 2/2 to pneumonia and UTI  Metabolic encephalopathy - resolved.   -On admission patient was found to be bradycardic, hypothermic, hypotensive and hypoxic. Also,  noted to have lactic acidosis.  -CT chest showed bilateral groundglass opacities, L>R.  -UA suggestive of UTI, urine culture shows no growth however sample appears to be contaminated.  -Blood cultures negative.  Sputum culture unable to collect sample.   -Started on meropenem given history of penicillin allergy.  Continue meropenem for total of 7 days.  -Continue supplemental oxygen as needed, currently on room air.      CHF exacerbation/Acute on chronic diastolic heart  failure  -Patient noted to have significantly elevated BNP>3000 on admission.  -Was given IV Lasix, with minimal response.  It was then switched to IV Bumex.  -On PO Bumex now, managed by cardiology. Holding for now in anticipation of angiogram.  -Monitor intake/output, daily weight monitoring  -Echocardiogram from 10/22 showed EF of 50-55%     History of NSTEMI   Elevated troponin likely secondary to demand ischemia in the setting of sepsis and CHF exacerbation  -Continue to monitor on telemetry  -EKG showed no acute ST changes on admission.  -Repeat ECHO on 11/22 showed EF 45% and abnormal septal motion  -Cardiology consulted, planned for angiogram on 11/28. Was scheduled for today but cancelled given elevated INR      Acute hypoxic respiratory failure likely secondary to pneumonia and CHF exacerbation  -Patient briefly needed BiPAP on admission, currently weaned off  -Continue supplemental oxygen prn    Bradycardia on admission was likely thought secondary to his metoprolol.  -Heart rate currently stable as metoprolol on hold.    Lactic acidosis-resolved currently.    History of DVT/history of paroxysmal A. Fib  Holding PTA metoprolol given his recent bradycardia  On PTA warfarin, currently on hold in anticipation of angiogram 11/28      Left-arm numbness  -CT head unremarkable.  -MRI head and neck shows foci of restricted diffusion in bilateral globus pallidus, findings seen usually in carbon monoxide poisoning or other toxic metabolic etiologies.  -Neurology following, appreciate input. Signed off now.   -Carbon monoxide levels WNL but however was checked few days after admission hence cannot be ruled out just based on levels.   -MRV neg for sinus thrombosis.    Diabetes  -A1c 7.7  -Continue current sliding scale  -Monitor blood sugars, hypoglycemia protocol.    Hyperkalemia - resolved.    ROBERT on CKD  -Metabolic acidosis   -Likely In the setting of sepsis.  -Baseline creat around 2.3, creat peaked to 3.7 this  admission.  -Creat 2.52 today.  -Minimize nephrotoxins, continue to monitor.  -Renal following. Holding bumex , hydrate prior to angiogram.    Transaminitis - resolved.     Rib fracture/Lumbar fracture  Acute fracture of right ninth, 10th and 11th ribs seen.Questionable acute to subacute fracture of right 12th rib seen on lumbar spine CT.  -Acute to subacute right L1-L2 and L3 transverse process fractures seen.  -Seen by NSG,appears stable and pt can follow up as out-pt if needed  -Lidocaine patch ordered for pain control.       Barriers to Discharge:  Angiogram,placement    Anticipated discharge date/Disposition: TCU on discharge in 3-4 days    Subjective  Chart reviewed and events noted.  Patient seen and examined.  States no new issues ok. No chest pain, SOB. Is not happy that angiogram got cancelled today.       Physical Exam:  General: Pleasant, NAD  HEENT:EOMI, AT,NC  CVS:RRR,No LE edema  RS:CTAB  Abd: Soft, NT,ND  Neurology:Awake, alert, oriented  Psy:Approrpiate affect        Objective    Vital signs in last 24 hours  Temp:  [97  F (36.1  C)-98.4  F (36.9  C)] 97.7  F (36.5  C)  Pulse:  [] 95  Resp:  [18] 18  BP: (102-145)/(71-86) 145/86  SpO2:  [94 %-96 %] 94 % [unfilled] O2 Device: Nasal cannula    Weight:   [unfilled] Weight change:     Intake/Output last 3 shifts  I/O last 3 completed shifts:  In: 1230 [P.O.:1230]  Out: 1950 [Urine:1950]  Body mass index is 34.71 kg/m .                      Pertinent Labs   Lab Results: personally reviewed.   Recent Labs   Lab 11/25/22  0634 11/24/22  0544 11/23/22  0650   * 135* 135*   CO2 25 23 24   BUN 69.1* 67.8* 54.8*   ALBUMIN 3.0* 3.1* 3.0*   ALKPHOS 62 64 65   ALT 44 55* 54*   AST 42 69* 85*     Recent Labs   Lab 11/25/22  0634 11/24/22  0544 11/23/22  0650   WBC 7.1 10.1 9.9   HGB 11.2* 12.7* 13.3   HCT 34.6* 39.1* 39.2*    204 228     No results for input(s): CKTOTAL, TROPONINI in the last 168 hours.    Invalid input(s): TROPONINT,  CKMBINDEX  Invalid input(s): POCGLUFGR    Medications  Drug and lactation database from the [place] [employer] Medicine:  http://toxnet.nlm.nih.gov/cgi-bin/sis/htmlgen?LACT      Pertinent Radiology   Radiology Results:  Personally reviewed impressions    Reviewed labs in last 24 hours.    Advanced Care Planning:  Discharge Planning discussed with patient   Spent >35 mins in care coordination. Discussed plan with patient, bedside RN, SW/CM    This Note is created using dragon voice recognition software.  Errors in spelling or words which seems out of context are unintentional.  Sounds alike errors may have escaped editing.    Solange Augustin MD  Hospitalist

## 2022-11-25 NOTE — PLAN OF CARE
Problem: Plan of Care - These are the overarching goals to be used throughout the patient stay.    Goal: Optimal Comfort and Wellbeing  11/25/2022 0531 by Elsa Cochran RN  Outcome: Progressing  11/24/2022 1645 by Elsa Cochran RN  Outcome: Progressing  Intervention: Provide Person-Centered Care  Recent Flowsheet Documentation  Taken 11/24/2022 2000 by Elsa Cochran RN  Trust Relationship/Rapport:    care explained    questions encouraged    questions answered  Taken 11/24/2022 1630 by Elsa Cochran RN  Trust Relationship/Rapport:    care explained    questions encouraged    questions answered     Problem: Heart Failure Comorbidity  Goal: Maintenance of Heart Failure Symptom Control  Intervention: Maintain Heart Failure Management  Recent Flowsheet Documentation  Taken 11/24/2022 2000 by Elsa Cochran RN  Medication Review/Management: medications reviewed  Taken 11/24/2022 1630 by Elsa Cochran RN  Medication Review/Management: medications reviewed   Goal Outcome Evaluation:       Pt had uneventful night. VSS.  No c/o pain. Pt is currently in NSR with HR in the mid to upper 80's. Pt has been NPO except for sips with med. since midnight for scheduled Angio this morning. Will cont to monitor.

## 2022-11-25 NOTE — PLAN OF CARE
Problem: Plan of Care - These are the overarching goals to be used throughout the patient stay.    Goal: Optimal Comfort and Wellbeing  Outcome: Progressing     Problem: Heart Failure Comorbidity  Goal: Maintenance of Heart Failure Symptom Control  Outcome: Progressing  Intervention: Maintain Heart Failure Management  Recent Flowsheet Documentation  Taken 11/25/2022 0800 by Ashleigh Dumont RN  Medication Review/Management: medications reviewed   Denies discomfort and shortness of breath today.  Remains in SR with 1st degree AV block and BBB.  Improved peripheral edema in LE's.  Pt aware of reasons for postponement of coronary angiography today.  Has remained in chair since therapy this morning.  Appetite good.

## 2022-11-25 NOTE — PROGRESS NOTES
HEART CARE NOTE          Assessment/Recommendations     1. HFpEF c/b ADHF  Assessment / Plan    Near euvolemia; denies HF symptoms of orthopnea, PND, fluid retention/edema - oral diuretic therapy on hold in the setting of progressive ROBERT    GDMT as detailed below; mainstay of treatment for HFpEF includes diuretics and adequate BP control (class I) and SGLT2-I (class 2a); additional medical therapy (ARNI, MRA, ARB) demonstrated less robust evidence for indication but may be considered per guideline recommendations (2b); no indication for BBlockers      Current Pharmacotherapy AHA Guideline-Directed Medical Therapy   Losartan  - on hold given ORBERT ARNI/ARB   Spironolactone  - on hold  MRA   SGLT2 inhibitor -  Not started SGLT2-I    Bumetanide 1 mg daily - on hold Loop diuretic       2. NSTEMI  Assessment / Plan    High sensitivity troponin >700 on admission and has continued to trend-up; patient denies chest pain or anginal equivalent    Echo unremarkable on admission - will repeat today    Will hold warfarin in anticipation of coronary angiogram +/- PCI; I discussed this with him including potential risk of stroke, myocardial infarction, or death.  We also discussed the possibility of vascular injury, bleeding requiring blood transfusion, or reaction to x-ray dye - will defer until Monday given renal compromise and INR >2        3. DM2  Assessment / Plan    Management per primary team     4. ROBERT on CKD  Assessment / Plan    Likely large CRS component; crt continues to improve    Recommend renal consult to discuss implications of contrast dye on renal function     5. Sepsis  Assessment / Plan    Resolved    History of Present Illness/Subjective      Mr. Eduardo Laughlin is a 82 year old male with a PMHx significant for (per Dr. Augustin's note) bladder cancer, urostomy, CKD stage III, DVT on Coumadin, diabetes, obesity, NSTEMI, left bundle branch block who was recently hospitalized for CHF exacerbation. Labs  "notable for significantly elevated troponin.NSTEMI      Today, Mr. Laughlin denies any acute cardiac events or complaints; Management plan as detailed above     ECG: Personally reviewed. sinus tachycardia, LBBB.     ECHO (personnaly Reviewed):  The left ventricle is normal in size.  The visual ejection fraction is 50-55%.  Septal motion is consistent with conduction abnormality.  No regional wall motion abnormalities noted.  The right ventricular systolic function is normal.  Suspect mild AS  The ascending aorta is Moderately dilated.  There is mild aortic root enlargement.  IVC diameter <2.1 cm collapsing >50% with sniff suggests a normal RA pressure  of 3 mmHg.  Technically difficult, suboptimal study.          Physical Examination Review of Systems   /81 (BP Location: Right arm)   Pulse 93   Temp 98.2  F (36.8  C) (Oral)   Resp 18   Ht 1.778 m (5' 10\")   Wt 109.7 kg (241 lb 14.4 oz)   SpO2 94%   BMI 34.71 kg/m    Body mass index is 34.71 kg/m .  Wt Readings from Last 3 Encounters:   11/22/22 109.7 kg (241 lb 14.4 oz)   11/08/22 111.2 kg (245 lb 2.4 oz)   11/04/22 114.3 kg (252 lb)     General Appearance:   no distress, normal body habitus   ENT/Mouth: membranes moist, no oral lesions or bleeding gums.      EYES:  no scleral icterus, normal conjunctivae   Neck: no carotid bruits or thyromegaly   Chest/Lungs:   lungs are clear to auscultation, no rales or wheezing, equal chest wall expansion    Cardiovascular:   Regular. Normal first and second heart sounds with no murmurs, rubs, or gallops; the carotid, radial and posterior tibial pulses are intact, no JVD or LE edema bilaterally    Abdomen:  no organomegaly, masses, bruits, or tenderness; bowel sounds are present   Extremities: no cyanosis or clubbing   Skin: no xanthelasma, warm.    Neurologic: alert and oriented x3, calm     Psychiatric: alert and oriented x3, calm     A complete 10 systems ROS was reviewed  And is negative except what is listed " in the HPI.          Medical History  Surgical History Family History Social History   Past Medical History:   Diagnosis Date     Acute renal failure with acute tubular necrosis superimposed on stage 3 chronic kidney disease (H)      Arteriosclerotic cardiovascular disease (ASCVD)      Atrial fibrillation (H)      Bladder cancer (H)      BPH (benign prostatic hypertrophy)      Chronic kidney disease      Complicated UTI (urinary tract infection) 08/25/2019     Congestive heart failure (H)      Coronary artery disease      Diabetes mellitus (H)      Diabetic neuropathy (H)      E coli bacteremia      GERD (gastroesophageal reflux disease)      Gout      Hyperlipidemia      Hypertension      Hyponatremia      Iliac artery aneurysm, 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    Added automatically from request for surgery [id number]     Kidney stone      Osteoarthritis      Personal history of malignant neoplasm of prostate 04/27/2020     Prostate cancer (H)      Sleep apnea     CPAP    Past Surgical History:   Procedure Laterality Date     CARDIAC CATHETERIZATION  03/28/2008    and coronary angios     CYSTECTOMY       CYSTOSCOPY       CYSTOSCOPY N/A 12/23/2015    Procedure: CYSTOSCOPY BLADDER BIOPSIES with Fulgeration and Placement of Otero ;  Surgeon: Gordon Bajwa MD;  Location: [facility];  Service:      IR EXTREMITY ANGIOGRAM LEFT  3/22/2019     IR EXTREMITY ANGIOGRAM LEFT  3/22/2019     IR ILEAL CONDUIT INJECTION  12/23/2016     IR NEPHROSTOMY TUBE PLACEMENT RIGHT  8/18/2016     IR URETEROSTOMY TUBE CHANGE RIGHT  9/21/2016     KIDNEY STONE SURGERY  x4     LAMINECTOMY LUMBAR ONE LEVEL Bilateral 9/13/2022    Procedure: LUMBAR 4 - LUMBAR 5 BILATERAL MEDIAL FACETECTOMY AND DECOMPRESSION;  Surgeon: Everett Holland MD;  Location: [facility]     PICC TRIPLE LUMEN PLACEMENT  11/18/2022          PROSTATE SURGERY      no family history of premature coronary artery disease Social History     Socioeconomic History      Marital status:      Spouse name: Not on file     Number of children: Not on file     Years of education: Not on file     Highest education level: Not on file   Occupational History     Not on file   Tobacco Use     Smoking status: Former     Smokeless tobacco: Never     Tobacco comments:     Quit smoking 30 years ago   Vaping Use     Vaping Use: Never used   Substance and Sexual Activity     Alcohol use: Not Currently     Drug use: Never     Sexual activity: Not Currently   Other Topics Concern     Not on file   Social History Narrative     Not on file     Social Determinants of Health     Financial Resource Strain: Not on file   Food Insecurity: Not on file   Transportation Needs: Not on file   Physical Activity: Not on file   Stress: Not on file   Social Connections: Not on file   Intimate Partner Violence: Not on file   Housing Stability: Not on file           Lab Results    Chemistry/lipid CBC Cardiac Enzymes/BNP/TSH/INR   Lab Results   Component Value Date    CHOL 168 03/03/2022    HDL 38 (L) 03/03/2022    TRIG 117 03/03/2022    BUN 67.8 (H) 11/24/2022     (L) 11/24/2022    CO2 23 11/24/2022    Lab Results   Component Value Date    WBC 10.1 11/24/2022    HGB 12.7 (L) 11/24/2022    HCT 39.1 (L) 11/24/2022    MCV 97 11/24/2022     11/24/2022    Lab Results   Component Value Date    TROPONINI 0.10 04/25/2020     (H) 04/25/2020    INR 2.17 (H) 11/24/2022     Lab Results   Component Value Date    TROPONINI 0.10 04/25/2020          Weight:    Wt Readings from Last 3 Encounters:   11/22/22 109.7 kg (241 lb 14.4 oz)   11/08/22 111.2 kg (245 lb 2.4 oz)   11/04/22 114.3 kg (252 lb)       Allergies  Allergies   Allergen Reactions     Metoprolol Succinate Er      Other reaction(s): low blood pressure     Pcn [Penicillins] Other (See Comments)     Childhood-doesn't know reactions     Statin Drugs [Hmg-Coa-R Inhibitors] Cramps     Uroxatral [Alfuzosin] Other (See Comments)     hypotension          Surgical History  Past Surgical History:   Procedure Laterality Date     CARDIAC CATHETERIZATION  03/28/2008    and coronary angios     CYSTECTOMY       CYSTOSCOPY       CYSTOSCOPY N/A 12/23/2015    Procedure: CYSTOSCOPY BLADDER BIOPSIES with Fulgeration and Placement of Otero ;  Surgeon: Gordon Bajwa MD;  Location: [facility];  Service:      IR EXTREMITY ANGIOGRAM LEFT  3/22/2019     IR EXTREMITY ANGIOGRAM LEFT  3/22/2019     IR ILEAL CONDUIT INJECTION  12/23/2016     IR NEPHROSTOMY TUBE PLACEMENT RIGHT  8/18/2016     IR URETEROSTOMY TUBE CHANGE RIGHT  9/21/2016     KIDNEY STONE SURGERY  x4     LAMINECTOMY LUMBAR ONE LEVEL Bilateral 9/13/2022    Procedure: LUMBAR 4 - LUMBAR 5 BILATERAL MEDIAL FACETECTOMY AND DECOMPRESSION;  Surgeon: Everett Holland MD;  Location: [facility]     PICC TRIPLE LUMEN PLACEMENT  11/18/2022          PROSTATE SURGERY         Social History  Tobacco:   History   Smoking Status     Former   Smokeless Tobacco     Never    Alcohol:   Social History    Substance and Sexual Activity      Alcohol use: Not Currently   Illicit Drugs:   History   Drug Use Unknown       Family History  Family History   Problem Relation Age of Onset     No Known Problems Mother      Prostate Cancer Father      Deep Vein Thrombosis Father      Cancer Sister      Heart Disease Sister      No Known Problems Daughter      Heart Disease Brother      Heart Disease Sister      No Known Problems Daughter           Abdelrahman Mazariegos MD on 11/25/2022      cc: Rafael Montelongo

## 2022-11-25 NOTE — PROGRESS NOTES
"CLINICAL NUTRITION SERVICES - ASSESSMENT NOTE     Nutrition Prescription    RECOMMENDATIONS FOR MDs/PROVIDERS TO ORDER:  Recommend Mvi w/minerals d/t not meeting RDIs x 1 week    Malnutrition Status:    Severe in acute illness    Recommendations already ordered by Registered Dietitian (RD):  Glucerna with meals = 660 kcal , 30 g protein total   Add high consistent CHO diet     Future/Additional Recommendations:  Adjust supplement pending intake, tolerance, acceptance, labs, bg, weight     REASON FOR ASSESSMENT  Eduardo Laughlin is a/an 82 year old male assessed by the dietitian for LOS    Pt presents with sepsis, PNA, UTI, chf exacerbation, respiratory failure, ROBERT on ckd4  Hx MI, DM2, rib/lumbar fxs    NUTRITION HISTORY  Lives in ILF with wife    Pt reports decreased intake since admit d/t \"numb toungue\", also burned the roof of his mouth on coffee, and feeling constipated  Pt reports eating WNL PTA    CURRENT NUTRITION ORDERS  Diet: Low Saturated Fat/2400 mg Sodium    Intake/Tolerance: variable, fair. % of 2-3 meal/day  [place] intake 11/23 948 kcal, 51 g protein meeting 45% of estimated kcal and 70% of estimated protein needs   Pt ordering 850-1000 kcal, 46-53 g protein/day     Pt reports difficulty eating solids he needs to chew d/t numb tongue. Liquids and soft solids go down the best. Discussed foods to relieve constipation. Pt willing to try supplements    LABS  Labs reviewed   BuN 69 (H), increased-renal holding diuresis  CR 6.9 (H), increased  -235 mg/dl past 24 hours, in fair control    MEDICATIONS  Medications reviewed  Iv albumin today, ssi, protonix    ANTHROPOMETRICS  Height: 177.8 cm (5' 10\")  Most Recent Weight: 109.7 kg (241 lb 14.4 oz)  11/22 -2.8% weight loss x 4 days but pt also diuresing  112.7 kg (248 lb 7.3 oz) 11/18  IBW: 75.4 kg  BMI: Obesity Grade I BMI 30-34.9  Weight History:   Wt Readings from Last 10 Encounters:   11/22/22 109.7 kg (241 lb 14.4 oz)   11/08/22 111.2 kg " (245 lb 2.4 oz)   11/04/22 114.3 kg (252 lb)   10/19/22 112.4 kg (247 lb 11.2 oz)   09/13/22 117.8 kg (259 lb 11.2 oz)   10/04/21 118.8 kg (262 lb)   08/13/21 111.6 kg (246 lb)   06/13/20 111.1 kg (245 lb)   12/04/19 115.6 kg (254 lb 14.4 oz)   04/17/19 100.7 kg (221 lb 14.4 oz)   Weight fluctuates    Dosing Weight: 89.4 kg adjusted weight    ASSESSED NUTRITION NEEDS  Estimated Energy Needs: 3303-8040 kcals/day (25 - 30 kcals/kg)  Justification: Maintenance  Estimated Protein Needs: 71-89 grams protein/day (0.8 - 1 grams of pro/kg)  Justification: CKD  Estimated Fluid Needs: 7733-5916 mL/day (1 mL/kcal)   Justification: Maintenance and Per provider pending fluid status    PHYSICAL FINDINGS  See malnutrition section below.  GI - last BM 11/20-constipated    MALNUTRITION:  Did not preform physical assessment d/t PT in session  % Weight Loss:  > 2% in 1 week (severe malnutrition)- pt also diuresing but weight below baseline  % Intake:  </= 50% for >/= 5 days (severe malnutrition)  Subcutaneous Fat Loss:  Orbital region mild to moderate depletion  Muscle Loss:  Unable to assess  Fluid Retention:  Mild +1 LE edema    Malnutrition Diagnosis: Severe malnutrition  In Context of:  Acute illness or injury    NUTRITION DIAGNOSIS  Malnutrition related to sepsis, CHF, PNA, mouth numbness, constipation as evidenced by intake meeting 50% of kcal needs x 1 week, 2.8% weight loss x 1 week      INTERVENTIONS  Implementation  Recommend Mvi w/minerals d/t not meeting RDIs x 1 week  Glucerna with meals = 660 kcal , 30 g protein total   Add high consistent CHO diet   RN made aware of pt constipation - prn bowel meds are available.     Goals  Meet estimated nutrition needs  BG   Maintain weight     Monitoring/Evaluation  Progress toward goals will be monitored and evaluated per protocol.

## 2022-11-25 NOTE — PROGRESS NOTES
"  '    RENAL ([facility]) progress note  CC: F/U ROBERT  S: Up in chair. No sob. No leg swelling. Eating alright. Discussed labs with patient and family.     A/P:   Principal Problem:    Lactic acidosis  Active Problems:    Acute respiratory failure with hypoxia (H)    Aspiration pneumonia of both lungs, unspecified aspiration pneumonia type, unspecified part of lung (H)    ASSESSMENT:   83 yo male with h/o CKD 4, HTN. DM2, bladder cancer s/p cystectomy/urostomy admit with pneumonia, CHF, now with plan for angiogram     PLAN:  1. CKD 4 - has baseline CKD due to atrophic kidney, DM2, HTN, vascular disease, also with cystectomy/urostomy.  Has had very frequent fluctuations in creatinine associated with diuresis.  Currently renal function improved.  At increased risk for contrast nephropathy with angiogram on Friday  -IVF this am. Renal functio slightly better. Agree with angio Monday.  -Hold bumex today(last dose on 11/24)   -would give IVF with NS for 6 hrs before and 4 hours after angiogram to ensure intravascularly volume replete    2. HTN - with recent orthostatic hypotension  -controlled now, no change in meds    3. NSTEMI - plan for angiogram Monday, see above    Tariq Angela, DO  [facility]  [phone] (off)       No interval changes to past medical history, social history or family history to report.    /74 (BP Location: Right arm)   Pulse 94   Temp 97.7  F (36.5  C) (Oral)   Resp 18   Ht 1.778 m (5' 10\")   Wt 109.7 kg (241 lb 14.4 oz)   SpO2 94%   BMI 34.71 kg/m      I/O last 3 completed shifts:  In: 1230 [P.O.:1230]  Out: 1950 [Urine:1950]    Physical Exam:   Gen: NAD  HEENT: No icterus, NC/AT  CARDIOVASCULAR: trace diffuse edema  PULMONARY:  No cyanosis  GASTROINTESTINAL: ND  MSK: Diffuse muscle atrophy, warm  NEURO: Alert, no gross focal findings  PSYCHIATRIC: Normal affect, answers questions appropriately  SKIN: Pale, no jaundice, no rash. Scattered skin " tears/bruising    Recent Labs   Lab 11/25/22  0634 11/24/22  0544 11/23/22  0650 11/22/22  1244 11/21/22  0903 11/20/22  0407 11/19/22  0352   * 135* 135* 135* 134* 139 137   POTASSIUM 4.4 4.4 4.7 4.8 4.5 4.6 4.5   CHLORIDE 102 98 102 102 101 105 103   CO2 25 23 24 24 25 25 22   BUN 69.1* 67.8* 54.8* 52.7* 53.0* 59.9* 60.5*   CR 2.52* 2.67* 1.99* 2.19* 2.34* 3.10* 3.71*   GFRESTIMATED 25* 23* 33* 29* 27* 19* 16*   GALLO 8.5* 8.7* 9.0 8.9 9.0 8.5* 8.4*   ALBUMIN 3.0* 3.1* 3.0*  --  3.1* 2.9*  --        Recent Labs   Lab 11/25/22  0634 11/24/22  0544 11/23/22  0650 11/21/22  0903 11/20/22  0407 11/19/22  0352   WBC 7.1 10.1 9.9 10.1 11.0 15.5*   HGB 11.2* 12.7* 13.3 13.0* 11.9* 12.2*   HCT 34.6* 39.1* 39.2* 38.9* 35.8* 35.9*   MCV 98 97 95 95 96 94    204 228 250 245 270             Current Facility-Administered Medications:      acetaminophen (TYLENOL) tablet 1,000 mg, 1,000 mg, Oral, Q6H PRN, Shefali Kulkarni APRN CNP, 1,000 mg at 11/23/22 1555     allopurinol (ZYLOPRIM) tablet 200 mg, 200 mg, Oral, Daily, Shefali Kulkarni APRN CNP, 200 mg at 11/25/22 0104     bisacodyl (DULCOLAX) EC tablet 5 mg, 5 mg, Oral, Daily PRN **OR** bisacodyl (DULCOLAX) EC tablet 10 mg, 10 mg, Oral, Daily PRN **OR** bisacodyl (DULCOLAX) EC tablet 15 mg, 15 mg, Oral, Daily PRN, Shefali Kulkarni APRN CNP     [Held by provider] bumetanide (BUMEX) tablet 1 mg, 1 mg, Oral, Daily, Abdelrahman Mazariegos MD, 1 mg at 11/24/22 0926     glucose gel 15-30 g, 15-30 g, Oral, Q15 Min PRN **OR** dextrose 50 % injection 25-50 mL, 25-50 mL, Intravenous, Q15 Min PRN **OR** glucagon injection 1 mg, 1 mg, Subcutaneous, Q15 Min PRN, Shefali Kulkarni APRN CNP     ezetimibe (ZETIA) tablet 10 mg, 10 mg, Oral, Daily, Solange Augustin MD, 10 mg at 11/25/22 0753     HOLD: metformin and metformin containing medications, , Does not apply, HOLD, Abdelrahman Mazariegos MD     hydrocortisone 2.5 % cream, , Topical, BID, Shefali Kulkarni APRN CNP, Given at  11/25/22 0758     insulin aspart (NovoLOG) injection (RAPID ACTING), 1-12 Units, Subcutaneous, TID w/meals, Shefali Kulkarni APRN CNP, 1 Units at 11/25/22 1252     insulin aspart (NovoLOG) injection (RAPID ACTING), 1-7 Units, Subcutaneous, At Bedtime, Shefali Kulkarni APRN CNP, 1 Units at 11/21/22 2124     Lidocaine (LIDOCARE) 4 % Patch 1 patch, 1 patch, Transdermal, Q24H, Solange Augustin MD, 1 patch at 11/24/22 1024     lidocaine patch in PLACE, , Transdermal, Q8H [facility], Solange Augustin MD     ondansetron (ZOFRAN ODT) ODT tab 4 mg, 4 mg, Oral, Q6H PRN **OR** ondansetron (ZOFRAN) injection 4 mg, 4 mg, Intravenous, Q6H PRN, Shefali Kulkarni APRN CNP, 4 mg at 11/23/22 1655     pantoprazole (PROTONIX) EC tablet 40 mg, 40 mg, Oral, QAM AC, Shefali Kulkarni APRN CNP, 40 mg at 11/25/22 0645     Patient is already receiving anticoagulation with heparin, enoxaparin (LOVENOX), warfarin (COUMADIN)  or other anticoagulant medication, , Does not apply, Continuous PRN, Shefali Kulkarni APRN CNP     prochlorperazine (COMPAZINE) injection 5 mg, 5 mg, Intravenous, Q6H PRN **OR** prochlorperazine (COMPAZINE) tablet 5 mg, 5 mg, Oral, Q6H PRN **OR** prochlorperazine (COMPAZINE) suppository 12.5 mg, 12.5 mg, Rectal, Q12H PRN, Shefali Kulkarni APRN CNP     sodium chloride (PF) 0.9% PF flush 10-20 mL, 10-20 mL, Intracatheter, q1 min prn, Shefali Kulkarni APRN CNP     sodium chloride (PF) 0.9% PF flush 10-40 mL, 10-40 mL, Intracatheter, Q7 Days, Shefali Kulkarni APRN CNP, 10 mL at 11/18/22 1936     sodium chloride (PF) 0.9% PF flush 10-40 mL, 10-40 mL, Intracatheter, Q1H PRN, Shefali Kulkarni APRN CNP     temazepam (RESTORIL) capsule 15 mg, 15 mg, Oral, At Bedtime PRN, Solange Augustin MD     traZODone (DESYREL) tablet 200 mg, 200 mg, Oral, At Bedtime, Solange Augustin MD, 200 mg at 11/24/22 2211     [Held by provider] Warfarin Dose Required Daily - Pharmacist Managed, 1 each, Oral, See Admin Instructions, Shefali Kulkarni APRN  CNP      Labs personally reviewed today during this evaluation at 12:42 PM

## 2022-11-26 ENCOUNTER — APPOINTMENT (OUTPATIENT)
Dept: OCCUPATIONAL THERAPY | Facility: HOSPITAL | Age: 82
DRG: 853 | End: 2022-11-26
Payer: COMMERCIAL

## 2022-11-26 LAB
ALBUMIN SERPL BCG-MCNC: 3.9 G/DL (ref 3.5–5.2)
ALP SERPL-CCNC: 65 U/L (ref 40–129)
ALT SERPL W P-5'-P-CCNC: 41 U/L (ref 10–50)
ANION GAP SERPL CALCULATED.3IONS-SCNC: 13 MMOL/L (ref 7–15)
AST SERPL W P-5'-P-CCNC: 43 U/L (ref 10–50)
BILIRUB SERPL-MCNC: 1 MG/DL
BUN SERPL-MCNC: 65.1 MG/DL (ref 8–23)
CALCIUM SERPL-MCNC: 9.1 MG/DL (ref 8.8–10.2)
CHLORIDE SERPL-SCNC: 99 MMOL/L (ref 98–107)
CREAT SERPL-MCNC: 2.16 MG/DL (ref 0.67–1.17)
DEPRECATED HCO3 PLAS-SCNC: 23 MMOL/L (ref 22–29)
ERYTHROCYTE [DISTWIDTH] IN BLOOD BY AUTOMATED COUNT: 14.6 % (ref 10–15)
ERYTHROCYTE [DISTWIDTH] IN BLOOD BY AUTOMATED COUNT: 14.8 % (ref 10–15)
GFR SERPL CREATININE-BSD FRML MDRD: 30 ML/MIN/1.73M2
GLUCOSE BLDC GLUCOMTR-MCNC: 133 MG/DL (ref 70–99)
GLUCOSE BLDC GLUCOMTR-MCNC: 170 MG/DL (ref 70–99)
GLUCOSE BLDC GLUCOMTR-MCNC: 177 MG/DL (ref 70–99)
GLUCOSE BLDC GLUCOMTR-MCNC: 238 MG/DL (ref 70–99)
GLUCOSE SERPL-MCNC: 173 MG/DL (ref 70–99)
HCT VFR BLD AUTO: 33.8 % (ref 40–53)
HCT VFR BLD AUTO: 35.2 % (ref 40–53)
HGB BLD-MCNC: 11.1 G/DL (ref 13.3–17.7)
HGB BLD-MCNC: 11.7 G/DL (ref 13.3–17.7)
HOLD SPECIMEN: NORMAL
INR PPP: 1.88 (ref 0.85–1.15)
INR PPP: 1.91 (ref 0.85–1.15)
MCH RBC QN AUTO: 31.8 PG (ref 26.5–33)
MCH RBC QN AUTO: 32.2 PG (ref 26.5–33)
MCHC RBC AUTO-ENTMCNC: 32.8 G/DL (ref 31.5–36.5)
MCHC RBC AUTO-ENTMCNC: 33.2 G/DL (ref 31.5–36.5)
MCV RBC AUTO: 96 FL (ref 78–100)
MCV RBC AUTO: 98 FL (ref 78–100)
PLATELET # BLD AUTO: 154 10E3/UL (ref 150–450)
PLATELET # BLD AUTO: 179 10E3/UL (ref 150–450)
POTASSIUM SERPL-SCNC: 3.7 MMOL/L (ref 3.4–5.3)
PROT SERPL-MCNC: 6.9 G/DL (ref 6.4–8.3)
RBC # BLD AUTO: 3.45 10E6/UL (ref 4.4–5.9)
RBC # BLD AUTO: 3.68 10E6/UL (ref 4.4–5.9)
SODIUM SERPL-SCNC: 135 MMOL/L (ref 136–145)
UFH PPP CHRO-ACNC: 0.81 IU/ML
UFH PPP CHRO-ACNC: <0.1 IU/ML
UFH PPP CHRO-ACNC: >1.1 IU/ML
WBC # BLD AUTO: 7.3 10E3/UL (ref 4–11)
WBC # BLD AUTO: 7.3 10E3/UL (ref 4–11)

## 2022-11-26 PROCEDURE — 250N000013 HC RX MED GY IP 250 OP 250 PS 637: Performed by: INTERNAL MEDICINE

## 2022-11-26 PROCEDURE — 250N000011 HC RX IP 250 OP 636

## 2022-11-26 PROCEDURE — 97535 SELF CARE MNGMENT TRAINING: CPT | Mod: GO

## 2022-11-26 PROCEDURE — 36415 COLL VENOUS BLD VENIPUNCTURE: CPT | Performed by: HOSPITALIST

## 2022-11-26 PROCEDURE — 250N000013 HC RX MED GY IP 250 OP 250 PS 637: Performed by: HOSPITALIST

## 2022-11-26 PROCEDURE — 250N000013 HC RX MED GY IP 250 OP 250 PS 637: Performed by: NURSE PRACTITIONER

## 2022-11-26 PROCEDURE — 120N000001 HC R&B MED SURG/OB

## 2022-11-26 PROCEDURE — 85027 COMPLETE CBC AUTOMATED: CPT | Performed by: HOSPITALIST

## 2022-11-26 PROCEDURE — 85520 HEPARIN ASSAY: CPT | Performed by: INTERNAL MEDICINE

## 2022-11-26 PROCEDURE — 80053 COMPREHEN METABOLIC PANEL: CPT | Performed by: HOSPITALIST

## 2022-11-26 PROCEDURE — 85520 HEPARIN ASSAY: CPT

## 2022-11-26 PROCEDURE — 85610 PROTHROMBIN TIME: CPT | Performed by: NURSE PRACTITIONER

## 2022-11-26 PROCEDURE — 99233 SBSQ HOSP IP/OBS HIGH 50: CPT | Performed by: INTERNAL MEDICINE

## 2022-11-26 PROCEDURE — 99232 SBSQ HOSP IP/OBS MODERATE 35: CPT | Mod: 25 | Performed by: INTERNAL MEDICINE

## 2022-11-26 RX ORDER — BUMETANIDE 1 MG/1
1 TABLET ORAL DAILY
Status: DISCONTINUED | OUTPATIENT
Start: 2022-11-26 | End: 2022-12-01

## 2022-11-26 RX ORDER — HEPARIN SODIUM 10000 [USP'U]/100ML
0-5000 INJECTION, SOLUTION INTRAVENOUS CONTINUOUS
Status: DISCONTINUED | OUTPATIENT
Start: 2022-11-26 | End: 2022-11-28

## 2022-11-26 RX ADMIN — BUMETANIDE 1 MG: 1 TABLET ORAL at 14:25

## 2022-11-26 RX ADMIN — HEPARIN SODIUM AND DEXTROSE 1800 UNITS/HR: 10000; 5 INJECTION INTRAVENOUS at 01:19

## 2022-11-26 RX ADMIN — LIDOCAINE PATCH 4% 1 PATCH: 40 PATCH TOPICAL at 11:53

## 2022-11-26 RX ADMIN — THERA TABS 1 TABLET: TAB at 08:53

## 2022-11-26 RX ADMIN — TRAZODONE HYDROCHLORIDE 200 MG: 50 TABLET ORAL at 20:50

## 2022-11-26 RX ADMIN — PANTOPRAZOLE SODIUM 40 MG: 40 TABLET, DELAYED RELEASE ORAL at 06:03

## 2022-11-26 RX ADMIN — ALLOPURINOL 200 MG: 100 TABLET ORAL at 08:53

## 2022-11-26 RX ADMIN — HEPARIN SODIUM AND DEXTROSE 1500 UNITS/HR: 10000; 5 INJECTION INTRAVENOUS at 09:56

## 2022-11-26 RX ADMIN — EZETIMIBE 10 MG: 10 TABLET ORAL at 08:53

## 2022-11-26 RX ADMIN — ACETAMINOPHEN 1000 MG: 500 TABLET ORAL at 20:50

## 2022-11-26 RX ADMIN — HYDROCORTISONE: 25 CREAM TOPICAL at 20:50

## 2022-11-26 RX ADMIN — HYDROCORTISONE: 25 CREAM TOPICAL at 08:53

## 2022-11-26 ASSESSMENT — ACTIVITIES OF DAILY LIVING (ADL)
ADLS_ACUITY_SCORE: 31

## 2022-11-26 NOTE — PLAN OF CARE
"  Problem: Plan of Care - These are the overarching goals to be used throughout the patient stay.    Goal: Patient-Specific Goal (Individualized)  Description: You can add care plan individualizations to a care plan. Examples of Individualization might be:  \"Parent requests to be called daily at 9am for status\", \"I have a hard time hearing out of my right ear\", or \"Do not touch me to wake me up as it startles me\".  Outcome: Progressing    Problem: Plan of Care - These are the overarching goals to be used throughout the patient stay.    Goal: Absence of Hospital-Acquired Illness or Injury  Intervention: Prevent Skin Injury  Recent Flowsheet Documentation  Taken 11/26/2022 1131 by Tony Valdes RN  Body Position: position changed independently     Problem: Plan of Care - These are the overarching goals to be used throughout the patient stay.    Goal: Absence of Hospital-Acquired Illness or Injury  Intervention: Prevent and Manage VTE (Venous Thromboembolism) Risk  Recent Flowsheet Documentation  Taken 11/26/2022 1131 by Tony Valdes RN  VTE Prevention/Management: SCDs (sequential compression devices) off     Problem: Plan of Care - These are the overarching goals to be used throughout the patient stay.    Goal: Optimal Comfort and Wellbeing  Outcome: Progressing     Problem: Plan of Care - These are the overarching goals to be used throughout the patient stay.    Goal: Optimal Comfort and Wellbeing  Outcome: Progressing     Problem: Risk for Delirium  Goal: Optimal Coping  Outcome: Progressing     Problem: Risk for Delirium  Goal: Improved Behavioral Control  Outcome: Progressing     Problem: Risk for Delirium  Goal: Improved Attention and Thought Clarity  Outcome: Progressing     Problem: Risk for Delirium  Goal: Improved Sleep  Outcome: Progressing     Problem: Diabetes Comorbidity  Goal: Blood Glucose Level Within Targeted Range  Outcome: Progressing     Problem: Heart Failure Comorbidity  Goal: Maintenance of Heart " Failure Symptom Control  Outcome: Progressing

## 2022-11-26 NOTE — PROGRESS NOTES
[facility]    Medicine Progress Note - Hospitalist Service    Date of Admission:  11/18/2022    Assessment & Plan      Eduardo is a 82-year-old male with history of bladder cancer, urostomy, CKD stage III, DVT on Coumadin, diabetes, obesity, NSTEMI, left bundle branch block who was recently hospitalized for CHF exacerbation.  Patient presented to [facility] on 11/18 with concerns for generalized weakness and shortness of breath.  On arrival to ER, patient was found to be bradycardic, hypothermic , hypotensive and hypoxic.  Patient was noted to have acute hypoxic respiratory failure, lactic acidosis and in sepsis requiring monitoring in ICU on BiPAP.  On admission patient also noted to be hyperkalemic with significantly elevated BNP and troponin.  UA showed evidence of UT, however urine cx no growth however sample contaminated. Work-up with CT head was unremarkable.  CT chest showed groundglass infiltrates in both lungs.  Patient currently is off BiPAP, patient now stable and transferred out of ICU on 11/20/2022.  Noted to be troponin elevated, pt has no anginal symptoms. Cardiology planning for angiogram on 11/28.     Overnight: episode of cp; started on heparin gtt. No cp now.         Sepsis likely 2/2 to pneumonia and UTI  Metabolic encephalopathy - resolved.   -On admission patient was found to be bradycardic, hypothermic, hypotensive and hypoxic. Also,  noted to have lactic acidosis.  -CT chest showed bilateral groundglass opacities, L>R.  -UA suggestive of UTI, urine culture shows no growth however sample appears to be contaminated.  -Blood cultures negative.  Sputum culture unable to collect sample.   -Started on meropenem given history of penicillin allergy.  Continue meropenem for total of 7 days.  -Continue supplemental oxygen as needed, currently on room air.        CHF exacerbation/Acute on chronic diastolic heart failure  -Patient noted to have significantly elevated BNP>3000  on admission.  -Was given IV Lasix, with minimal response.  It was then switched to IV Bumex.  -On PO Bumex now, managed by cardiology. Holding for now in anticipation of angiogram.  -Monitor intake/output, daily weight monitoring  -Echocardiogram from 10/22 showed EF of 50-55%      History of NSTEMI   Elevated troponin likely secondary to demand ischemia in the setting of sepsis and CHF exacerbation  -Continue to monitor on telemetry  -EKG showed no acute ST changes on admission.  -Repeat ECHO on 11/22 showed EF 45% and abnormal septal motion  -Cardiology consulted, planned for angiogram on 11/28.   -cp overnight on 11/25; heparin gtt started        Acute hypoxic respiratory failure likely secondary to pneumonia and CHF exacerbation  -Patient briefly needed BiPAP on admission, currently weaned off  -Continue supplemental oxygen prn     Bradycardia on admission was likely thought secondary to his metoprolol.  -Heart rate currently stable as metoprolol on hold.     Lactic acidosis-resolved currently.     History of DVT/history of paroxysmal A. Fib  Holding PTA metoprolol given his recent bradycardia  On PTA warfarin, currently on hold in anticipation of angiogram 11/28        Left-arm numbness  -CT head unremarkable.  -MRI head and neck shows foci of restricted diffusion in bilateral globus pallidus, findings seen usually in carbon monoxide poisoning or other toxic metabolic etiologies.  -Neurology following, appreciate input. Signed off now.   -Carbon monoxide levels WNL but however was checked few days after admission hence cannot be ruled out just based on levels.   -MRV neg for sinus thrombosis.     Diabetes  -A1c 7.7  -Continue current sliding scale  -Monitor blood sugars, hypoglycemia protocol.     Hyperkalemia - resolved.     ROBERT on CKD  -Metabolic acidosis   -Likely In the setting of sepsis.  -Baseline creat around 2.3, creat peaked to 3.7 this admission.  -Creat 2.52 today.  -Minimize nephrotoxins, continue  to monitor.  -Renal following. Holding bumex , hydrate prior to angiogram.     Transaminitis - resolved.      Rib fracture/Lumbar fracture  Acute fracture of right ninth, 10th and 11th ribs seen.Questionable acute to subacute fracture of right 12th rib seen on lumbar spine CT.  -Acute to subacute right L1-L2 and L3 transverse process fractures seen.  -Seen by NSG,appears stable and pt can follow up as out-pt if needed  -Lidocaine patch ordered for pain control.         Barriers to Discharge:  Angiogram,placement     Anticipated discharge date/Disposition: TCU on discharge in 3-4 days     Diet: NPO per Anesthesia Guidelines for Procedure/Surgery Except for: Meds  Snacks/Supplements Adult: Glucerna; With Meals  Combination Diet High Consistent Carb (75 g CHO per Meal) Diet; Low Saturated Fat Na <2400mg Diet    DVT Prophylaxis: heparin gtt  Otero Catheter: Not present  Central Lines: PRESENT  PICC 11/18/22 Triple Lumen Left Basilic Vascular Access-Site Assessment: WDL  Cardiac Monitoring: ACTIVE order. Indication: Electrolyte Imbalance (24 hours)- Magnesium <1.3 mg/ml; Potassium < =2.8 or > 5.5 mg/ml  Code Status: No CPR- Do NOT Intubate      Disposition Plan     Expected Discharge Date: 11/28/2022    Discharge Delays: Specialist Consult (enter specialist & decision needed in comments)  Procedure Pending (enter procedure & time in comments)    Discharge Comments: IV abx  need INR low enough  then bridge        The patient's care was discussed with the Bedside Nurse.    Cassie Leone MD  Hospitalist Service  [facility]  Securely message with the Vocera Web Console (learn more here)  Text page via [employer] Paging/Directory         Clinically Significant Risk Factors         # Hyponatremia: Lowest Na = 135 mmol/L (Ref range: 136-145) in last 2 days, will monitor as appropriate      # Hypoalbuminemia: Lowest albumin = 2.9 g/dL (Ref range: 3.5-5.2) at 11/20/2022  4:07 AM, will monitor as appropriate    "      # DMII: A1C = 7.7 % (Ref range: <5.7 %) within past 3 months   # Obesity: Estimated body mass index is 34.71 kg/m  as calculated from the following:    Height as of this encounter: 1.778 m (5' 10\").    Weight as of this encounter: 109.7 kg (241 lb 14.4 oz).   # Severe Malnutrition: based on nutrition assessment        ______________________________________________________________________    Interval History   C/o back pain, denies cp/sob, no f/c, no n/v; short run of VT while on physical therapy    Data reviewed today: I reviewed all medications, new labs and imaging results over the last 24 hours.     Physical Exam   Vital Signs: Temp: 98.1  F (36.7  C) Temp src: Oral BP: 134/77 Pulse: 94   Resp: 18 SpO2: 92 % O2 Device: None (Room air)    Weight: 241 lbs 14.4 oz  General.  Awake alert oriented not in acute distress.  HEENT.  Pupils equal round react to light, anicteric, EOM intact.  Neck supple no JVD.  CVS regular rhythm no murmur gallops.  Lungs.  Clear to auscultation bilateral no wheezing or rales.  Abdomen.  Soft nontender bowel sounds present.  Extremities.  No edema no calf tenderness.  Neurological.  Awake and alert. No focal deficit. General weakness  Skin no rash. No pallor.  Psych. Normal mood.       Data   Recent Labs   Lab 11/26/22  1225 11/26/22  0833 11/26/22  0634 11/25/22  2344 11/25/22  2312 11/25/22  2034 11/25/22  1840 11/25/22  0730 11/25/22  0634   WBC  --   --  7.3 7.3  --   --   --   --  7.1   HGB  --   --  11.7* 11.1*  --   --   --   --  11.2*   MCV  --   --  96 98  --   --   --   --  98   PLT  --   --  179 154  --   --   --   --  169   INR  --   --  1.88* 1.91* 1.97*  --   --   --  2.07*   NA  --   --  135*  --   --   --  135*  --  135*   POTASSIUM  --   --  3.7  --   --   --  4.1  --  4.4   CHLORIDE  --   --  99  --   --   --  101  --  102   CO2  --   --  23  --   --   --  21*  --  25   BUN  --   --  65.1*  --   --   --  66.9*  --  69.1*   CR  --   --  2.16*  --   --   --  2.31*  " --  2.52*   ANIONGAP  --   --  13  --   --   --  13  --  8   GALLO  --   --  9.1  --   --   --  8.8  --  8.5*   * 177* 173*  --   --    < > 202*   < > 157*   ALBUMIN  --   --  3.9  --   --   --   --   --  3.0*   PROTTOTAL  --   --  6.9  --   --   --   --   --  6.1*   BILITOTAL  --   --  1.0  --   --   --   --   --  1.0   ALKPHOS  --   --  65  --   --   --   --   --  62   ALT  --   --  41  --   --   --   --   --  44   AST  --   --  43  --   --   --   --   --  42    < > = values in this interval not displayed.     Recent Results (from the past 24 hour(s))   US Upper Extremity Venous Duplex Left    Narrative    EXAM: US UPPER EXTREMITY VENOUS DUPLEX LEFT  LOCATION: [facility]  DATE/TIME: 11/25/2022 9:31 PM    INDICATION: L arm swelling  COMPARISON: None.  TECHNIQUE: Venous Duplex ultrasound of the left upper extremity with (when possible) and without compression, augmentation, and duplex. Color flow and spectral Doppler with waveform analysis performed.    FINDINGS: Ultrasound includes evaluation of the internal jugular vein, innominate vein, subclavian vein, axillary vein, and brachial vein. The superficial cephalic and basilic veins were also evaluated where seen.     LEFT: Partially occlusive thrombus involving the left mid subclavian vein and axillary veins. Nonocclusive thrombus in the basilic vein in the upper arm.       Impression    IMPRESSION:   1.  Partially occlusive deep venous thrombosis involving the left subclavian and axillary veins.    2.  Superficial thrombus involving the left basilic vein.     Medications     heparin 1,500 Units/hr (11/26/22 0956)     - MEDICATION INSTRUCTIONS -         allopurinol  200 mg Oral Daily     bumetanide  1 mg Oral Daily     ezetimibe  10 mg Oral Daily     hydrocortisone   Topical BID     insulin aspart  1-12 Units Subcutaneous TID w/meals     insulin aspart  1-7 Units Subcutaneous At Bedtime     lidocaine  1 patch Transdermal Q24H      lidocaine   Transdermal Q8H YUSRA     multivitamin, therapeutic  1 tablet Oral Daily     pantoprazole  40 mg Oral QAM AC     sodium chloride (PF)  10-40 mL Intracatheter Q7 Days     traZODone  200 mg Oral At Bedtime     [Held by provider] Warfarin Therapy Reminder  1 each Oral See Admin Instructions

## 2022-11-26 NOTE — PROGRESS NOTES
"  '    [facility] ([facility]) progress note  CC: F/U ROBERT  S: Sitting up in bed. Notes sob with activity improved from overnight. Ongoing left arm swelling. Fatigued. Good urine output. Creatinine trending down. Received lasix overnight and US LUE with DVT.     A/P:   Principal Problem:    Lactic acidosis  Active Problems:    Acute respiratory failure with hypoxia (H)    Aspiration pneumonia of both lungs, unspecified aspiration pneumonia type, unspecified part of lung (H)    ASSESSMENT:   83 yo male with h/o CKD 4, HTN. DM2, bladder cancer s/p cystectomy/urostomy admit with pneumonia, CHF, now with plan for angiogram     PLAN:  1. CKD 4 - has baseline CKD due to atrophic kidney, DM2, HTN, vascular disease, also with cystectomy/urostomy.  Has had very frequent fluctuations in creatinine associated with diuresis.  Currently renal function improved.  At increased risk for contrast nephropathy with angiogram   -resume bumex today. Received IV lasix overnight with good response. No daily weight recorded.   -would give IVF with NS for 6 hrs before and 4 hours after angiogram to ensure intravascularly volume replete    2. HTN - with recent orthostatic hypotension  -controlled now, no change in meds    3. NSTEMI - plan for angiogram Monday, see above    Tariq Angela, DO  [facility]  [phone] (off)       No interval changes to past medical history, social history or family history to report.    /73 (BP Location: Right arm)   Pulse 99   Temp 98.1  F (36.7  C) (Oral)   Resp 20   Ht 1.778 m (5' 10\")   Wt 109.7 kg (241 lb 14.4 oz)   SpO2 92%   BMI 34.71 kg/m      I/O last 3 completed shifts:  In: 1070 [P.O.:422; I.V.:648]  Out: 2250 [Urine:2250]    Physical Exam:   Gen: NAD  HEENT: No icterus, NC/AT  CARDIOVASCULAR: +1 LE edema  PULMONARY: CTAB,  No cyanosis  GASTROINTESTINAL: ND  MSK: Diffuse muscle atrophy, warm  NEURO: Alert, no gross focal findings  PSYCHIATRIC: Normal affect, answers " questions appropriately  SKIN: Pale, no jaundice, no rash. Scattered skin tears/bruising    Recent Labs   Lab 11/26/22  0634 11/25/22  1840 11/25/22  0634 11/24/22  0544 11/23/22  0650 11/22/22  1244 11/21/22  0903 11/20/22  0407   * 135* 135* 135* 135* 135* 134* 139   POTASSIUM 3.7 4.1 4.4 4.4 4.7 4.8 4.5 4.6   CHLORIDE 99 101 102 98 102 102 101 105   CO2 23 21* 25 23 24 24 25 25   BUN 65.1* 66.9* 69.1* 67.8* 54.8* 52.7* 53.0* 59.9*   CR 2.16* 2.31* 2.52* 2.67* 1.99* 2.19* 2.34* 3.10*   GFRESTIMATED 30* 28* 25* 23* 33* 29* 27* 19*   GALLO 9.1 8.8 8.5* 8.7* 9.0 8.9 9.0 8.5*   MAG  --  2.0  --   --   --   --   --   --    ALBUMIN 3.9  --  3.0* 3.1* 3.0*  --  3.1* 2.9*       Recent Labs   Lab 11/26/22  0634 11/25/22  2344 11/25/22  0634 11/24/22  0544 11/23/22  0650 11/21/22  0903 11/20/22  0407   WBC 7.3 7.3 7.1 10.1 9.9 10.1 11.0   HGB 11.7* 11.1* 11.2* 12.7* 13.3 13.0* 11.9*   HCT 35.2* 33.8* 34.6* 39.1* 39.2* 38.9* 35.8*   MCV 96 98 98 97 95 95 96    154 169 204 228 250 245             Current Facility-Administered Medications:      acetaminophen (TYLENOL) tablet 1,000 mg, 1,000 mg, Oral, Q6H PRN, Darey, CARLOS Xiao CNP, 1,000 mg at 11/25/22 1900     allopurinol (ZYLOPRIM) tablet 200 mg, 200 mg, Oral, Daily, Shefali Kulkarni APRN CNP, 200 mg at 11/26/22 0853     bisacodyl (DULCOLAX) EC tablet 5 mg, 5 mg, Oral, Daily PRN **OR** bisacodyl (DULCOLAX) EC tablet 10 mg, 10 mg, Oral, Daily PRN **OR** bisacodyl (DULCOLAX) EC tablet 15 mg, 15 mg, Oral, Daily PRN, Shefali Kulkarni APRN CNP, 15 mg at 11/25/22 1507     [Held by provider] bumetanide (BUMEX) tablet 1 mg, 1 mg, Oral, Daily, Abdelrahman Mazariegos MD, 1 mg at 11/24/22 0926     glucose gel 15-30 g, 15-30 g, Oral, Q15 Min PRN **OR** dextrose 50 % injection 25-50 mL, 25-50 mL, Intravenous, Q15 Min PRN **OR** glucagon injection 1 mg, 1 mg, Subcutaneous, Q15 Min PRN, Shefali Kulkarni, APRN CNP     ezetimibe (ZETIA) tablet 10 mg, 10 mg, Oral, Daily,  Solange Augustin MD, 10 mg at 11/26/22 0853     heparin infusion 25,000 units in D5W 250 mL ANTICOAGULANT, 0-5,000 Units/hr, Intravenous, Continuous, Nory Bustamante MD, Last Rate: 15 mL/hr at 11/26/22 0956, 1,500 Units/hr at 11/26/22 0956     HOLD: metformin and metformin containing medications, , Does not apply, HOLD, Abdelrahman Mazariegos MD     hydrocortisone 2.5 % cream, , Topical, BID, Shefali Kulkarni APRN CNP, Given at 11/26/22 0853     insulin aspart (NovoLOG) injection (RAPID ACTING), 1-12 Units, Subcutaneous, TID w/meals, Shefali Kulkarni APRN CNP, 2 Units at 11/26/22 1231     insulin aspart (NovoLOG) injection (RAPID ACTING), 1-7 Units, Subcutaneous, At Bedtime, Shefali Kulkarni APRN CNP, 1 Units at 11/21/22 2124     Lidocaine (LIDOCARE) 4 % Patch 1 patch, 1 patch, Transdermal, Q24H, Solange Augustin MD, 1 patch at 11/26/22 1153     lidocaine patch in PLACE, , Transdermal, Q8H [facility], Solange Augustin MD     multivitamin, therapeutic (THERA-VIT) tablet 1 tablet, 1 tablet, Oral, Daily, Solange Augustin MD, 1 tablet at 11/26/22 0853     ondansetron (ZOFRAN ODT) ODT tab 4 mg, 4 mg, Oral, Q6H PRN **OR** ondansetron (ZOFRAN) injection 4 mg, 4 mg, Intravenous, Q6H PRN, Shefali Kulkarni APRN CNP, 4 mg at 11/23/22 1655     pantoprazole (PROTONIX) EC tablet 40 mg, 40 mg, Oral, QAM AC, Shefali Kulkarni APRN CNP, 40 mg at 11/26/22 0603     Patient is already receiving anticoagulation with heparin, enoxaparin (LOVENOX), warfarin (COUMADIN)  or other anticoagulant medication, , Does not apply, Continuous PRN, Shefali Kulkarni APRN CNP     prochlorperazine (COMPAZINE) injection 5 mg, 5 mg, Intravenous, Q6H PRN **OR** prochlorperazine (COMPAZINE) tablet 5 mg, 5 mg, Oral, Q6H PRN **OR** prochlorperazine (COMPAZINE) suppository 12.5 mg, 12.5 mg, Rectal, Q12H PRN, Shefali Kulkarni APRN CNP     sodium chloride (PF) 0.9% PF flush 10-20 mL, 10-20 mL, Intracatheter, q1 min prn, Shefali Kulkarni APRN CNP     sodium chloride (PF)  0.9% PF flush 10-40 mL, 10-40 mL, Intracatheter, Q7 Days, Shefali Kulkarni APRN CNP, 30 mL at 11/25/22 1708     sodium chloride (PF) 0.9% PF flush 10-40 mL, 10-40 mL, Intracatheter, Q1H PRN, Shefali Kulkarni APRN CNP     temazepam (RESTORIL) capsule 15 mg, 15 mg, Oral, At Bedtime PRN, Solange Augustin MD     traZODone (DESYREL) tablet 200 mg, 200 mg, Oral, At Bedtime, Solange Augustin MD, 200 mg at 11/25/22 2206     [Held by provider] Warfarin Dose Required Daily - Pharmacist Managed, 1 each, Oral, See Admin Instructions, Shefali Kulkarni APRN CNP      Labs personally reviewed today during this evaluation at 12:42 PM

## 2022-11-26 NOTE — PROGRESS NOTES
"    Cardiology Progress Note    Assessment:    Pneumonia/sepsis improved  Chronic heart failure /borderline depressed LV systolic function most likely due to dyssynchrony from left bundle branch block, appears to be fluid overloaded  Moderate coronary calcification consistent with coronary atherosclerosis  Non-ST segment myocardial infarction likely type II chest pain-free  Acute on chronic kidney failure, creatinine is trending down now  Diabetes mellitus  Paroxysmal atrial fibrillation on chronic anticoagulation with warfarin    Plan:  Agree with reinitiation of diuretics namely Bumex  Continue to hold warfarin  Coronary angiogram on 11/28/2022    Subjective:   Denies chest pain or complaints orthopnea    Objective:   /73 (BP Location: Right arm)   Pulse 99   Temp 98.1  F (36.7  C) (Oral)   Resp 20   Ht 1.778 m (5' 10\")   Wt 108.1 kg (238 lb 6.4 oz)   SpO2 92%   BMI 34.21 kg/m      Intake/Output Summary (Last 24 hours) at 11/26/2022 1422  Last data filed at 11/26/2022 0958  Gross per 24 hour   Intake 631 ml   Output 3550 ml   Net -2919 ml         Physical Exam:  GENERAL: no distress  NECK: JVD is not visible  LUNGS: Crackles bilateral  CARDIAC: regular  rhythm, S1 & S2 normal.  No heaves, thrills, gallops or murmurs.  ABDOMEN: flat, negative hepatosplenomegaly, soft and non-tender.  EXTREMITIES: No evidence of cyanosis, clubbing 1+ edema    Current Facility-Administered Medications Ordered in Epic   Medication Dose Route Frequency Provider Last Rate Last Admin     acetaminophen (TYLENOL) tablet 1,000 mg  1,000 mg Oral Q6H PRN Shefali Kulkarni APRN CNP   1,000 mg at 11/25/22 1900     allopurinol (ZYLOPRIM) tablet 200 mg  200 mg Oral Daily Shefali Kulkarni APRN CNP   200 mg at 11/26/22 0853     bisacodyl (DULCOLAX) EC tablet 5 mg  5 mg Oral Daily PRN Shefali Kulkarni APRN CNP        Or     bisacodyl (DULCOLAX) EC tablet 10 mg  10 mg Oral Daily PRN Shefali Kulkarni APRN CNP        Or     bisacodyl " (DULCOLAX) EC tablet 15 mg  15 mg Oral Daily PRN Shefali Kulkarni APRN CNP   15 mg at 11/25/22 1507     bumetanide (BUMEX) tablet 1 mg  1 mg Oral Daily Tariq Angela DO         glucose gel 15-30 g  15-30 g Oral Q15 Min PRN Shefali Kulkarni APRN CNP        Or     dextrose 50 % injection 25-50 mL  25-50 mL Intravenous Q15 Min PRN Shefali Kulkarni APRN CNP        Or     glucagon injection 1 mg  1 mg Subcutaneous Q15 Min PRN Shefali Kulkarni APRN CNP         ezetimibe (ZETIA) tablet 10 mg  10 mg Oral Daily Solange Augustin MD   10 mg at 11/26/22 0853     heparin infusion 25,000 units in D5W 250 mL ANTICOAGULANT  0-5,000 Units/hr Intravenous Continuous oNry Bustamante MD 15 mL/hr at 11/26/22 0956 1,500 Units/hr at 11/26/22 0956     HOLD: metformin and metformin containing medications   Does not apply HOLD Abdelrahman Mazariegos MD         hydrocortisone 2.5 % cream   Topical BID Shefali Kulkarni APRN CNP   Given at 11/26/22 0853     insulin aspart (NovoLOG) injection (RAPID ACTING)  1-12 Units Subcutaneous TID w/meals Shefali Kulkarni APRN CNP   2 Units at 11/26/22 1231     insulin aspart (NovoLOG) injection (RAPID ACTING)  1-7 Units Subcutaneous At Bedtime Shefali Kulkarni APRN CNP   1 Units at 11/21/22 2124     Lidocaine (LIDOCARE) 4 % Patch 1 patch  1 patch Transdermal Q24H Solange Augustin MD   1 patch at 11/26/22 1153     lidocaine patch in PLACE   Transdermal Q8H [facility] Solange Augustin MD         multivitamin, therapeutic (THERA-VIT) tablet 1 tablet  1 tablet Oral Daily Solange Augustin MD   1 tablet at 11/26/22 0853     ondansetron (ZOFRAN ODT) ODT tab 4 mg  4 mg Oral Q6H PRN Shefali Kulkarni APRN CNP        Or     ondansetron (ZOFRAN) injection 4 mg  4 mg Intravenous Q6H PRN Shefali Kulkarni APRN CNP   4 mg at 11/23/22 1655     pantoprazole (PROTONIX) EC tablet 40 mg  40 mg Oral QAM Shefali Vigil APRN CNP   40 mg at 11/26/22 0603     Patient is already receiving anticoagulation with heparin, enoxaparin (LOVENOX),  warfarin (COUMADIN)  or other anticoagulant medication   Does not apply Continuous PRN Shefali Kulkarni APRN CNP         prochlorperazine (COMPAZINE) injection 5 mg  5 mg Intravenous Q6H PRN Shefali Kulkarni APRN CNP        Or     prochlorperazine (COMPAZINE) tablet 5 mg  5 mg Oral Q6H PRN Shefali Kulkarni APRN CNP        Or     prochlorperazine (COMPAZINE) suppository 12.5 mg  12.5 mg Rectal Q12H PRN Shefali Kulkarni APRN CNP         sodium chloride (PF) 0.9% PF flush 10-20 mL  10-20 mL Intracatheter q1 min prn Shefali Kulkarni APRN CNP         sodium chloride (PF) 0.9% PF flush 10-40 mL  10-40 mL Intracatheter Q7 Days Shefali Kulkarni APRN CNP   30 mL at 11/25/22 1708     sodium chloride (PF) 0.9% PF flush 10-40 mL  10-40 mL Intracatheter Q1H PRN Shefali Kulkarni APRN CNP         temazepam (RESTORIL) capsule 15 mg  15 mg Oral At Bedtime PRN Solange Augustin MD         traZODone (DESYREL) tablet 200 mg  200 mg Oral At Bedtime Solange Augustin MD   200 mg at 11/25/22 2206     [Held by provider] Warfarin Dose Required Daily - Pharmacist Managed  1 each Oral See Admin Instructions Shefali Kulkarni APRN CNP         No current Epic-ordered outpatient medications on file.       Cardiographics:    ECG: Sinus rhythm with a left bundle branch block  Echocardiogram:   1. Technically difficult study despite utilization of ultrasound enhancing  agent.  2. The left ventricle is normal in size. Image quality does not provide for  detailed assessment of LV systolic function, but is felt to be mildly  decreased with a visually estimated ejection fraction of roughly 45%.  3. There is mild global reduction in left ventricular systolic performance.  4. There is abnormal septal motion likely related to altered electrical  activation due to bundle branch block.  5. There is mild aortic stenosis.  6. Probable normal right ventricular size and systolic performance though  right-sided structures are not clearly visualized on all views on  this study.  7. There is mild enlargement of the proximal ascending aorta.     Stress test: August 2021  No perfusion defect     Lab Results    Chemistry/lipid CBC Cardiac Enzymes/BNP/TSH/INR   Recent Labs   Lab Test 03/03/22  0921   CHOL 168   HDL 38*      TRIG 117     Recent Labs   Lab Test 03/03/22  0921 08/27/21  1041 02/23/21  1118    110 115     Recent Labs   Lab Test 11/26/22  1225 11/26/22  0833 11/26/22  0634   NA  --   --  135*   POTASSIUM  --   --  3.7   CHLORIDE  --   --  99   CO2  --   --  23   *   < > 173*   BUN  --   --  65.1*   CR  --   --  2.16*   GFRESTIMATED  --   --  30*   GALLO  --   --  9.1    < > = values in this interval not displayed.     Recent Labs   Lab Test 11/26/22  0634 11/25/22  1840 11/25/22  0634   CR 2.16* 2.31* 2.52*     Recent Labs   Lab Test 10/12/22  1802 04/25/20  0655 08/25/19  0619   A1C 7.7* 5.9* 6.3*          Recent Labs   Lab Test 11/26/22  0634   WBC 7.3   HGB 11.7*   HCT 35.2*   MCV 96        Recent Labs   Lab Test 11/26/22  0634 11/25/22  2344 11/25/22  0634   HGB 11.7* 11.1* 11.2*    Recent Labs   Lab Test 04/25/20  0655 04/25/20  0009 04/24/20  2049   TROPONINI 0.10 0.14 0.14     Recent Labs   Lab Test 11/18/22  1025 11/11/22  1320 11/05/22  2145 10/12/22  1802 04/25/20  0655 04/24/20  1427   BNP  --   --   --   --  258* 86*   NTBNPI 3,064*  --  2,226* 5,303*  --   --    NTBNP  --  1,108  --   --   --   --      No results for input(s): TSH in the last 10130 hours.  Recent Labs   Lab Test 11/26/22  0634 11/25/22  2344 11/25/22  2312   INR 1.88* 1.91* 1.97*

## 2022-11-26 NOTE — PROGRESS NOTES
SWCM reviewed chart; therapy recommending home with home care. SWCM met with pt to discuss said recommendations and is agreeable to that as he prefers to discharge home. Pt noted that he does not have a preference of home care agency; referral sent to [facility] (pending). Pt noted that he has not been sleeping well and he feels that impacts his strength during sessions with therapy. Pt is scheduled to have  Angiogram on 11/28. [facility] following for discharge planning and recommendations.  2:06 PM    LISBETH Greco  11/26/2022   .

## 2022-11-26 NOTE — SIGNIFICANT EVENT
"Significant Event Note    Time of event: 7:26 PM November 25, 2022    Description of event:  Paged by RN regarding new onset chest pain and shortness of breath. Patient admitted for sepsis secondary to pneumonia but also with elevated troponins and NSTEMI in setting of HFrEF with exacerbation. 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 Had been diuresing, held for planned angiogram earlier today. Angiogram cancelled earlier today due to elevated INR of 2.0. Now, patient complaining of 5/10 chest pressure and pain under the sternum and shortness of breath. SOB worsened while lying down, better when sitting up. Also has notable swelling of left arm in comparison to right arm. No numbness or tingling to either arm. No headache, dizziness, sore throat, nausea, vomiting, abdominal pain, or urinary problems.     Vitals:  /58 (BP Location: Right arm, Patient Position: Sitting)   Pulse 65   Temp 97.7  F (36.5  C) (Oral)   Resp 22   Ht 1.778 m (5' 10\")   Wt 109.7 kg (241 lb 14.4 oz)   SpO2 93%   BMI 34.71 kg/m       Physical exam:  General: Alert and oriented x 3, no acute distress, appears fatigued  Skin: clean, dry, and intact  HEENT: normocephalic, atraumatic, PERRL, EOMI, no conjunctival injection or icterus, ears and nose normal, no LAD or masses  Resp: clear to ausculation bilaterally, no rales or wheezes, labored speech  Cardio: irregularly irregular, S1 and S2 present.   Abdomen: soft, nontender, nondistended, bowel sounds present x 4, no masses, no hepatosplenomegaly  Extremities: Notable erythema and 2+ pitting edema of the left arm distal to PIV site. 2+ pitting edema of lower extremities without erythema.   Psych: normal mood, normal mentation      Plan:  - BMP, Mg, Phos ordered stat, results normal.  - EKG ordered stat, showed wide QRS tachycardia with frequent premature ventricular complexes, changed from his previous of SVT on 11/19/2022  - Troponin high sensitivity ordered stat, result is elevated " however downtrending from previous  - Venous Doppler ultrasound left upper extremity ordered    At this point, appears that patient is volume overloaded secondary to stopping diuresis. In light of his NSTEMI, will touch base with cardiologist for further recommendations. PRAVEEN likely infiltrated from PIV, will get Doppler to check status.      Addendum, 10:19pm 11/25/2022  Venous doppler of PRAVEEN showed partially occlusive deep venous thrombosis involving the left subclavian and axillary veins as well as superficial thrombus involving the left basilic vein. Per cardiology, will give one dose IV Lasix now. Will redo INR, to start heparin gtt once INR results come back.  - IV lasix dose now  - INR now, will start heparin gtt once done    Discussed with: bedside nurse, Dr. Mcguire (cardiologist)      Nory Bustamante MD

## 2022-11-26 NOTE — PLAN OF CARE
Problem: Diabetes Comorbidity  Goal: Blood Glucose Level Within Targeted Range  11/25/2022 2342 by Latonya Lan, RN  Outcome: Not Progressing  11/25/2022 2254 by Latonya Lan RN  Outcome: Progressing     Problem: Plan of Care - These are the overarching goals to be used throughout the patient stay.    Goal: Optimal Comfort and Wellbeing  11/25/2022 2342 by Latonya Lan RN  Outcome: Progressing  11/25/2022 2254 by Latonya Lan RN  Outcome: Progressing  Intervention: Monitor Pain and Promote Comfort  Recent Flowsheet Documentation  Taken 11/25/2022 1902 by Latonya Lan, RN  Pain Management Interventions:   medication (see MAR)   repositioned   pillow support provided  Taken 11/25/2022 1550 by Latonya Lan RN  Pain Management Interventions:   repositioned   rest     Problem: Risk for Delirium  Goal: Improved Attention and Thought Clarity  Outcome: Progressing   Goal Outcome Evaluation:       Patient denied chest tightness after coming back from ultrasound. Labs drawn per orders. Blood sugars not needing insulin coverage. Up with standby assist. Tachycardic with BBB on telemetry. Urostomy patent and draining.

## 2022-11-26 NOTE — PLAN OF CARE
Goal Outcome Evaluation:    Slept well. Denies pain and nausea. VSS- sats >92% on room air. BP's 130s/70s.     Lung sounds diminished throughout. No cough.     Initiated heparin gtt infusion at start of shift- bolus administered, and starting rate is at 1800 units/hr. Anti-xa to be drawn at 0615. Left arm continues to be 3+ pitting edema, tender, and with multiple weeping and bleeding sores. Dressing changed at 0500.     Tele- NSR with BBB. Plan for angio Monday. Cardiology and nephrology following.     Unable to get blood return on PICC line this am for labs. Lab called and will send someone to collect.     -- anti-xa returned elevated at >1.10- per protocol pause x1 hr, resume at 300 unit(s)/hr decreased rate and re-check anti-xa at 1430.

## 2022-11-27 ENCOUNTER — APPOINTMENT (OUTPATIENT)
Dept: PHYSICAL THERAPY | Facility: HOSPITAL | Age: 82
DRG: 853 | End: 2022-11-27
Payer: COMMERCIAL

## 2022-11-27 ENCOUNTER — APPOINTMENT (OUTPATIENT)
Dept: OCCUPATIONAL THERAPY | Facility: HOSPITAL | Age: 82
DRG: 853 | End: 2022-11-27
Payer: COMMERCIAL

## 2022-11-27 LAB
ALBUMIN SERPL BCG-MCNC: 3.5 G/DL (ref 3.5–5.2)
ALP SERPL-CCNC: 62 U/L (ref 40–129)
ALT SERPL W P-5'-P-CCNC: 36 U/L (ref 10–50)
ANION GAP SERPL CALCULATED.3IONS-SCNC: 11 MMOL/L (ref 7–15)
AST SERPL W P-5'-P-CCNC: 40 U/L (ref 10–50)
ATRIAL RATE - MUSE: 78 BPM
BILIRUB SERPL-MCNC: 1 MG/DL
BUN SERPL-MCNC: 65.9 MG/DL (ref 8–23)
CALCIUM SERPL-MCNC: 8.9 MG/DL (ref 8.8–10.2)
CHLORIDE SERPL-SCNC: 96 MMOL/L (ref 98–107)
CREAT SERPL-MCNC: 2.13 MG/DL (ref 0.67–1.17)
DEPRECATED HCO3 PLAS-SCNC: 25 MMOL/L (ref 22–29)
DIASTOLIC BLOOD PRESSURE - MUSE: NORMAL MMHG
ERYTHROCYTE [DISTWIDTH] IN BLOOD BY AUTOMATED COUNT: 14.7 % (ref 10–15)
GFR SERPL CREATININE-BSD FRML MDRD: 30 ML/MIN/1.73M2
GLUCOSE BLDC GLUCOMTR-MCNC: 163 MG/DL (ref 70–99)
GLUCOSE BLDC GLUCOMTR-MCNC: 168 MG/DL (ref 70–99)
GLUCOSE BLDC GLUCOMTR-MCNC: 175 MG/DL (ref 70–99)
GLUCOSE BLDC GLUCOMTR-MCNC: 213 MG/DL (ref 70–99)
GLUCOSE SERPL-MCNC: 159 MG/DL (ref 70–99)
HCT VFR BLD AUTO: 34.7 % (ref 40–53)
HGB BLD-MCNC: 11.6 G/DL (ref 13.3–17.7)
INR PPP: 1.56 (ref 0.85–1.15)
INTERPRETATION ECG - MUSE: NORMAL
MCH RBC QN AUTO: 32.2 PG (ref 26.5–33)
MCHC RBC AUTO-ENTMCNC: 33.4 G/DL (ref 31.5–36.5)
MCV RBC AUTO: 96 FL (ref 78–100)
P AXIS - MUSE: NORMAL DEGREES
PLATELET # BLD AUTO: 174 10E3/UL (ref 150–450)
POTASSIUM SERPL-SCNC: 3.8 MMOL/L (ref 3.4–5.3)
PR INTERVAL - MUSE: NORMAL MS
PROT SERPL-MCNC: 6.6 G/DL (ref 6.4–8.3)
QRS DURATION - MUSE: 168 MS
QT - MUSE: 256 MS
QTC - MUSE: 370 MS
R AXIS - MUSE: -31 DEGREES
RBC # BLD AUTO: 3.6 10E6/UL (ref 4.4–5.9)
SODIUM SERPL-SCNC: 132 MMOL/L (ref 136–145)
SYSTOLIC BLOOD PRESSURE - MUSE: NORMAL MMHG
T AXIS - MUSE: 147 DEGREES
UFH PPP CHRO-ACNC: 0.38 IU/ML
UFH PPP CHRO-ACNC: 0.52 IU/ML
UFH PPP CHRO-ACNC: 0.67 IU/ML
UFH PPP CHRO-ACNC: 0.78 IU/ML
VENTRICULAR RATE- MUSE: 125 BPM
WBC # BLD AUTO: 7.7 10E3/UL (ref 4–11)

## 2022-11-27 PROCEDURE — 85520 HEPARIN ASSAY: CPT | Performed by: INTERNAL MEDICINE

## 2022-11-27 PROCEDURE — 250N000013 HC RX MED GY IP 250 OP 250 PS 637: Performed by: NURSE PRACTITIONER

## 2022-11-27 PROCEDURE — 97116 GAIT TRAINING THERAPY: CPT | Mod: GP

## 2022-11-27 PROCEDURE — 250N000013 HC RX MED GY IP 250 OP 250 PS 637: Performed by: INTERNAL MEDICINE

## 2022-11-27 PROCEDURE — 80053 COMPREHEN METABOLIC PANEL: CPT | Performed by: HOSPITALIST

## 2022-11-27 PROCEDURE — 85610 PROTHROMBIN TIME: CPT | Performed by: NURSE PRACTITIONER

## 2022-11-27 PROCEDURE — 97110 THERAPEUTIC EXERCISES: CPT | Mod: GP

## 2022-11-27 PROCEDURE — 250N000013 HC RX MED GY IP 250 OP 250 PS 637: Performed by: HOSPITALIST

## 2022-11-27 PROCEDURE — 85027 COMPLETE CBC AUTOMATED: CPT | Performed by: HOSPITALIST

## 2022-11-27 PROCEDURE — 97535 SELF CARE MNGMENT TRAINING: CPT | Mod: GO

## 2022-11-27 PROCEDURE — 250N000011 HC RX IP 250 OP 636

## 2022-11-27 PROCEDURE — 99232 SBSQ HOSP IP/OBS MODERATE 35: CPT | Performed by: INTERNAL MEDICINE

## 2022-11-27 PROCEDURE — 99233 SBSQ HOSP IP/OBS HIGH 50: CPT | Performed by: INTERNAL MEDICINE

## 2022-11-27 PROCEDURE — 120N000001 HC R&B MED SURG/OB

## 2022-11-27 RX ORDER — METOPROLOL TARTRATE 25 MG/1
25 TABLET, FILM COATED ORAL 2 TIMES DAILY
Status: DISCONTINUED | OUTPATIENT
Start: 2022-11-27 | End: 2022-12-01 | Stop reason: HOSPADM

## 2022-11-27 RX ORDER — POLYETHYLENE GLYCOL 3350 17 G/17G
17 POWDER, FOR SOLUTION ORAL DAILY PRN
Status: DISCONTINUED | OUTPATIENT
Start: 2022-11-27 | End: 2022-12-01 | Stop reason: HOSPADM

## 2022-11-27 RX ADMIN — THERA TABS 1 TABLET: TAB at 08:34

## 2022-11-27 RX ADMIN — BISACODYL 15 MG: 5 TABLET, COATED ORAL at 13:44

## 2022-11-27 RX ADMIN — EZETIMIBE 10 MG: 10 TABLET ORAL at 08:34

## 2022-11-27 RX ADMIN — HEPARIN SODIUM AND DEXTROSE 1400 UNITS/HR: 10000; 5 INJECTION INTRAVENOUS at 04:32

## 2022-11-27 RX ADMIN — METOPROLOL TARTRATE 25 MG: 25 TABLET, FILM COATED ORAL at 13:55

## 2022-11-27 RX ADMIN — POLYETHYLENE GLYCOL 3350 17 G: 17 POWDER, FOR SOLUTION ORAL at 18:23

## 2022-11-27 RX ADMIN — ALLOPURINOL 200 MG: 100 TABLET ORAL at 08:33

## 2022-11-27 RX ADMIN — HYDROCORTISONE: 25 CREAM TOPICAL at 21:28

## 2022-11-27 RX ADMIN — METOPROLOL TARTRATE 25 MG: 25 TABLET, FILM COATED ORAL at 21:30

## 2022-11-27 RX ADMIN — LIDOCAINE PATCH 4% 1 PATCH: 40 PATCH TOPICAL at 12:29

## 2022-11-27 RX ADMIN — TRAZODONE HYDROCHLORIDE 200 MG: 50 TABLET ORAL at 21:30

## 2022-11-27 RX ADMIN — HYDROCORTISONE: 25 CREAM TOPICAL at 08:35

## 2022-11-27 RX ADMIN — PANTOPRAZOLE SODIUM 40 MG: 40 TABLET, DELAYED RELEASE ORAL at 06:00

## 2022-11-27 RX ADMIN — BUMETANIDE 1 MG: 1 TABLET ORAL at 08:36

## 2022-11-27 ASSESSMENT — ACTIVITIES OF DAILY LIVING (ADL)
ADLS_ACUITY_SCORE: 31
ADLS_ACUITY_SCORE: 32
ADLS_ACUITY_SCORE: 32
ADLS_ACUITY_SCORE: 31
ADLS_ACUITY_SCORE: 32
ADLS_ACUITY_SCORE: 31
ADLS_ACUITY_SCORE: 31
ADLS_ACUITY_SCORE: 32
ADLS_ACUITY_SCORE: 31

## 2022-11-27 NOTE — PLAN OF CARE
- Alert and orientedx4. Complained of back pain , PRN tylenol given and its been effective. Left upper arm remains swollen +3, Heparin drip running in PICC line able to get a blood return but its been sluggish. House officer and Hospitalist aware and continue to monitor.

## 2022-11-27 NOTE — PLAN OF CARE
Goal Outcome Evaluation:    Slept well. Denies pain, outside of some tenderness left elbow/arm.     No new respiratory issues overnight.     Tele- afib w/ BBB.     Anti-xa 0.67, first result WNL- re-draw with am labs. Continued at 14 ml/hr rate.     Cardiology planning for angiogram Monday am.

## 2022-11-27 NOTE — PROGRESS NOTES
"    Cardiology Progress Note    Assessment:    Pneumonia/sepsis improved  Chronic heart failure /borderline depressed LV systolic function most likely due to dyssynchrony from left bundle branch block/CAD, improved volume status  Moderate coronary calcification consistent with coronary atherosclerosis  Non-ST segment myocardial infarction likely type II chest pain-free  Acute on chronic kidney failure, creatinine is trending down now  Diabetes mellitus  Paroxysmal atrial fibrillation on chronic anticoagulation with warfarin  Nonsustained monomorphic VT  Aortic stenosis, mild    Plan:  Restart metoprolol 25 mg twice a day for antiischemic and antiarrhythmic effect  Coronary angiogram tomorrow      Subjective:   Continues to complain of discomfort in the center of the chest.  The discomfort gets worse when he lays down.  He has difficulties being more precise about it feeling.  He has not had heart palpitations.  He was noted to have a single run of SVT earlier today.  He was unaware of any heart rhythm disturbances    Objective:   /83 (BP Location: Right arm)   Pulse 96   Temp 97.6  F (36.4  C) (Oral)   Resp 18   Ht 1.778 m (5' 10\")   Wt 108 kg (238 lb 1.6 oz)   SpO2 95%   BMI 34.16 kg/m      Intake/Output Summary (Last 24 hours) at 11/27/2022 1225  Last data filed at 11/27/2022 1102  Gross per 24 hour   Intake 1080 ml   Output 1650 ml   Net -570 ml         Physical Exam:  GENERAL: no distress  NECK: JVD is not visible  LUNGS: Crackles bilaterally  CARDIAC: regular  rhythm, S1 & S2 normal.  No heaves, thrills, gallops 2/6 systolic murmur at aortic area and apex  ABDOMEN: flat, negative hepatosplenomegaly, soft and non-tender.  EXTREMITIES: No evidence of cyanosis, clubbing 1+ edema.    Current Facility-Administered Medications Ordered in Epic   Medication Dose Route Frequency Provider Last Rate Last Admin     acetaminophen (TYLENOL) tablet 1,000 mg  1,000 mg Oral Q6H PRN Shefali Kulkarni, CARLOS CNP   1,000 " mg at 11/26/22 2050     allopurinol (ZYLOPRIM) tablet 200 mg  200 mg Oral Daily Shefali Kulkarni APRN CNP   200 mg at 11/27/22 0833     bisacodyl (DULCOLAX) EC tablet 5 mg  5 mg Oral Daily PRN Shefali Kulkarni APRN CNP        Or     bisacodyl (DULCOLAX) EC tablet 10 mg  10 mg Oral Daily PRN Shefali Kulkarni APRN CNP        Or     bisacodyl (DULCOLAX) EC tablet 15 mg  15 mg Oral Daily PRN Shefali Kulkarni APRN CNP   15 mg at 11/25/22 1507     [Held by provider] bumetanide (BUMEX) tablet 1 mg  1 mg Oral Daily Tariq Angela DO   1 mg at 11/27/22 0836     glucose gel 15-30 g  15-30 g Oral Q15 Min PRN Shefali Kulkarni APRN CNP        Or     dextrose 50 % injection 25-50 mL  25-50 mL Intravenous Q15 Min PRN Shefali Kulkarni APRN CNP        Or     glucagon injection 1 mg  1 mg Subcutaneous Q15 Min PRN Shefali Kulkarni APRN CNP         ezetimibe (ZETIA) tablet 10 mg  10 mg Oral Daily Solange Augustin MD   10 mg at 11/27/22 0834     heparin infusion 25,000 units in D5W 250 mL ANTICOAGULANT  0-5,000 Units/hr Intravenous Continuous Nory Bustamante MD 13 mL/hr at 11/27/22 0836 1,300 Units/hr at 11/27/22 0836     HOLD: metformin and metformin containing medications   Does not apply HOLD Abdelrahman Mazariegos MD         hydrocortisone 2.5 % cream   Topical BID Shefali Kulkarni APRN CNP   Given at 11/27/22 0835     insulin aspart (NovoLOG) injection (RAPID ACTING)  1-12 Units Subcutaneous TID w/meals Shefali Kulkarni APRN CNP   2 Units at 11/27/22 0835     insulin aspart (NovoLOG) injection (RAPID ACTING)  1-7 Units Subcutaneous At Bedtime Shefali Kulkarni APRN CNP   1 Units at 11/21/22 2124     Lidocaine (LIDOCARE) 4 % Patch 1 patch  1 patch Transdermal Q24H Solange Augustin MD   1 patch at 11/26/22 1153     lidocaine patch in PLACE   Transdermal Q8H [facility] Solange Augustin MD         metoprolol tartrate (LOPRESSOR) tablet 25 mg  25 mg Oral BID Kike Ramon MD         multivitamin, therapeutic (THERA-VIT) tablet 1 tablet  1 tablet  Oral Daily Solange Augustin MD   1 tablet at 11/27/22 0834     ondansetron (ZOFRAN ODT) ODT tab 4 mg  4 mg Oral Q6H PRN Shefali Kulkarni APRN CNP        Or     ondansetron (ZOFRAN) injection 4 mg  4 mg Intravenous Q6H PRN Shefali Kulkarni APRN CNP   4 mg at 11/23/22 1655     pantoprazole (PROTONIX) EC tablet 40 mg  40 mg Oral QAM AC Shefali Kulkarni APRN CNP   40 mg at 11/27/22 0600     Patient is already receiving anticoagulation with heparin, enoxaparin (LOVENOX), warfarin (COUMADIN)  or other anticoagulant medication   Does not apply Continuous PRN Shefali Kulkarni APRN CNP         prochlorperazine (COMPAZINE) injection 5 mg  5 mg Intravenous Q6H PRN Shefali Kulkarni APRN CNP        Or     prochlorperazine (COMPAZINE) tablet 5 mg  5 mg Oral Q6H PRN Shefali Kulkarni APRN CNP        Or     prochlorperazine (COMPAZINE) suppository 12.5 mg  12.5 mg Rectal Q12H PRN Shefali Kulkarni APRN CNP         sodium chloride (PF) 0.9% PF flush 10-20 mL  10-20 mL Intracatheter q1 min prn Shefali Kulkarni APRN CNP         sodium chloride (PF) 0.9% PF flush 10-40 mL  10-40 mL Intracatheter Q7 Days Shefali Kulkarni APRN CNP   30 mL at 11/25/22 1708     sodium chloride (PF) 0.9% PF flush 10-40 mL  10-40 mL Intracatheter Q1H PRN Shefali Kulkarni APRN CNP         temazepam (RESTORIL) capsule 15 mg  15 mg Oral At Bedtime PRN Solange Augustin MD         traZODone (DESYREL) tablet 200 mg  200 mg Oral At Bedtime Solange Augustin MD   200 mg at 11/26/22 2050     [Held by provider] Warfarin Dose Required Daily - Pharmacist Managed  1 each Oral See Admin Instructions Shefali Kulkarni APRN CNP         No current [facility]-ordered outpatient medications on file.       Cardiographics:    ECG: Sinus rhythm with  left bundle branch block, frequent PVCs, 1 run of monomorphic ventricular tachycardia approximately 30 beats    Echocardiogram:   1. Technically difficult study despite utilization of ultrasound enhancing  agent.  2. The left ventricle is  normal in size. Image quality does not provide for  detailed assessment of LV systolic function, but is felt to be mildly  decreased with a visually estimated ejection fraction of roughly 45%.  3. There is mild global reduction in left ventricular systolic performance.  4. There is abnormal septal motion likely related to altered electrical  activation due to bundle branch block.  5. There is mild aortic stenosis.  6. Probable normal right ventricular size and systolic performance though  right-sided structures are not clearly visualized on all views on this study.  7. There is mild enlargement of the proximal ascending aorta.     Stress test: August 2021  No perfusion defect     Lab Results    Chemistry/lipid CBC Cardiac Enzymes/BNP/TSH/INR   Recent Labs   Lab Test 03/03/22  0921   CHOL 168   HDL 38*      TRIG 117     Recent Labs   Lab Test 03/03/22  0921 08/27/21  1041 02/23/21  1118    110 115     Recent Labs   Lab Test 11/27/22  0814 11/27/22  0609   NA  --  132*   POTASSIUM  --  3.8   CHLORIDE  --  96*   CO2  --  25   * 159*   BUN  --  65.9*   CR  --  2.13*   GFRESTIMATED  --  30*   GALLO  --  8.9     Recent Labs   Lab Test 11/27/22  0609 11/26/22  0634 11/25/22  1840   CR 2.13* 2.16* 2.31*     Recent Labs   Lab Test 10/12/22  1802 04/25/20  0655 08/25/19  0619   A1C 7.7* 5.9* 6.3*          Recent Labs   Lab Test 11/27/22  0609   WBC 7.7   HGB 11.6*   HCT 34.7*   MCV 96        Recent Labs   Lab Test 11/27/22  0609 11/26/22  0634 11/25/22  2344   HGB 11.6* 11.7* 11.1*    Recent Labs   Lab Test 04/25/20  0655 04/25/20  0009 04/24/20  2049   TROPONINI 0.10 0.14 0.14     Recent Labs   Lab Test 11/18/22  1025 11/11/22  1320 11/05/22  2145 10/12/22  1802 04/25/20  0655 04/24/20  1427   BNP  --   --   --   --  258* 86*   NTBNPI 3,064*  --  2,226* 5,303*  --   --    NTBNP  --  1,108  --   --   --   --      No results for input(s): TSH in the last 98991 hours.  Recent Labs   Lab Test  11/27/22  0609 11/26/22  0634 11/25/22  2344   INR 1.56* 1.88* 1.91*

## 2022-11-27 NOTE — PLAN OF CARE
Problem: Plan of Care - These are the overarching goals to be used throughout the patient stay.    Goal: Optimal Comfort and Wellbeing  Outcome: Progressing   Goal Outcome Evaluation:       Anti XA 0.81 at 1436, Heparin infusion held for 1 hour then resumed at a decreased rate to 1400 units/hour.  Next Anti XA at 2315.    Kristy Gallagher RN

## 2022-11-27 NOTE — PLAN OF CARE
"  Problem: Plan of Care - These are the overarching goals to be used throughout the patient stay.    Goal: Patient-Specific Goal (Individualized)  Description: You can add care plan individualizations to a care plan. Examples of Individualization might be:  \"Parent requests to be called daily at 9am for status\", \"I have a hard time hearing out of my right ear\", or \"Do not touch me to wake me up as it startles me\".  Outcome: Progressing     Problem: Plan of Care - These are the overarching goals to be used throughout the patient stay.    Goal: Absence of Hospital-Acquired Illness or Injury  Outcome: Progressing  Intervention: Identify and Manage Fall Risk  Recent Flowsheet Documentation  Taken 11/27/2022 1021 by Tony Valdes RN  Safety Promotion/Fall Prevention:   activity supervised   assistive device/personal items within reach   mobility aid in reach   lighting adjusted  Intervention: Prevent Skin Injury  Recent Flowsheet Documentation  Taken 11/27/2022 1021 by Tony Valdes RN  Body Position: position changed independently  Intervention: Prevent and Manage VTE (Venous Thromboembolism) Risk  Recent Flowsheet Documentation  Taken 11/27/2022 1021 by Tony Valdes RN  VTE Prevention/Management: SCDs (sequential compression devices) off     Problem: Plan of Care - These are the overarching goals to be used throughout the patient stay.    Goal: Absence of Hospital-Acquired Illness or Injury  Intervention: Prevent Skin Injury  Recent Flowsheet Documentation  Taken 11/27/2022 1021 by Tony Valdes RN  Body Position: position changed independently     Problem: Plan of Care - These are the overarching goals to be used throughout the patient stay.    Goal: Absence of Hospital-Acquired Illness or Injury  Intervention: Prevent and Manage VTE (Venous Thromboembolism) Risk  Recent Flowsheet Documentation  Taken 11/27/2022 1021 by Tony Valdes RN  VTE Prevention/Management: SCDs (sequential compression devices) off     Problem: " Risk for Delirium  Goal: Improved Behavioral Control  Outcome: Progressing     Problem: Risk for Delirium  Goal: Improved Attention and Thought Clarity  Outcome: Progressing     Problem: Heart Failure Comorbidity  Goal: Maintenance of Heart Failure Symptom Control  Outcome: Progressing     Problem: Diabetes Comorbidity  Goal: Blood Glucose Level Within Targeted Range  Outcome: Progressing   Goal Outcome Evaluation:

## 2022-11-27 NOTE — PROGRESS NOTES
[facility]    Medicine Progress Note - Hospitalist Service    Date of Admission:  11/18/2022    Assessment & Plan   Eduardo is a 82-year-old male with history of bladder cancer, urostomy, CKD stage III, DVT on Coumadin, diabetes, obesity, NSTEMI, left bundle branch block who was recently hospitalized for CHF exacerbation.  Patient presented to [facility] on 11/18 with concerns for generalized weakness and shortness of breath.  On arrival to ER, patient was found to be bradycardic, hypothermic , hypotensive and hypoxic.  Patient was noted to have acute hypoxic respiratory failure, lactic acidosis and in sepsis requiring monitoring in ICU on BiPAP.  On admission patient also noted to be hyperkalemic with significantly elevated BNP and troponin.  UA showed evidence of UT, however urine cx no growth however sample contaminated. Work-up with CT head was unremarkable.  CT chest showed groundglass infiltrates in both lungs.  Patient currently is off BiPAP, patient now stable and transferred out of ICU on 11/20/2022.  Noted to be troponin elevated, pt has no anginal symptoms. Cardiology planning for angiogram on 11/28.      Sepsis likely 2/2 to pneumonia and UTI  Metabolic encephalopathy - resolved.   -On admission patient was found to be bradycardic, hypothermic, hypotensive and hypoxic. Also,  noted to have lactic acidosis.  -CT chest showed bilateral groundglass opacities, L>R.  -UA suggestive of UTI, urine culture shows no growth however sample appears to be contaminated.  -Blood cultures negative.  Sputum culture unable to collect sample.   -Started on meropenem given history of penicillin allergy.  Continue meropenem for total of 7 days.  -Continue supplemental oxygen as needed, currently on room air.        CHF exacerbation/Acute on chronic diastolic heart failure  -Patient noted to have significantly elevated BNP>3000 on admission.  -Was given IV Lasix, with minimal response.  It was then  switched to IV Bumex.  -On PO Bumex now, managed by cardiology. Holding for now in anticipation of angiogram.  -Monitor intake/output, daily weight monitoring  -Echocardiogram from 10/22 showed EF of 50-55%      History of NSTEMI   Elevated troponin likely secondary to demand ischemia in the setting of sepsis and CHF exacerbation  -Continue to monitor on telemetry  -EKG showed no acute ST changes on admission.  -Repeat ECHO on 11/22 showed EF 45% and abnormal septal motion  -Cardiology consulted, planned for angiogram on 11/28.   -cp overnight on 11/25; heparin gtt started        Acute hypoxic respiratory failure likely secondary to pneumonia and CHF exacerbation  -Patient briefly needed BiPAP on admission, currently weaned off  -Continue supplemental oxygen prn     Bradycardia on admission was likely thought secondary to his metoprolol.  -Heart rate currently stable as metoprolol on hold.     Lactic acidosis-resolved currently.     History of DVT/history of paroxysmal A. Fib  Holding PTA metoprolol given his recent bradycardia  On PTA warfarin, currently on hold in anticipation of angiogram 11/28        Left-arm numbness  -CT head unremarkable.  -MRI head and neck shows foci of restricted diffusion in bilateral globus pallidus, findings seen usually in carbon monoxide poisoning or other toxic metabolic etiologies.  -Neurology following, appreciate input. Signed off now.   -Carbon monoxide levels WNL but however was checked few days after admission hence cannot be ruled out just based on levels.   -MRV neg for sinus thrombosis.     Diabetes  -A1c 7.7  -Continue current sliding scale  -Monitor blood sugars, hypoglycemia protocol.     Hyperkalemia - resolved.     ROBERT on CKD  -Metabolic acidosis   -Likely In the setting of sepsis.  -Baseline creat around 2.3, creat peaked to 3.7 this admission.  -Creat 2.52 today.  -Minimize nephrotoxins, continue to monitor.  -Renal following. Holding bumex , hydrate prior to  "angiogram.     Transaminitis - resolved.      Rib fracture/Lumbar fracture  Acute fracture of right ninth, 10th and 11th ribs seen.Questionable acute to subacute fracture of right 12th rib seen on lumbar spine CT.  -Acute to subacute right L1-L2 and L3 transverse process fractures seen.  -Seen by NSG,appears stable and pt can follow up as out-pt if needed  -Lidocaine patch ordered for pain control.         Barriers to Discharge:  Angiogram,placement     Anticipated discharge date/Disposition: TCU on discharge in 3-4 days       Diet: NPO per Anesthesia Guidelines for Procedure/Surgery Except for: Meds  Snacks/Supplements Adult: Glucerna; With Meals    DVT Prophylaxis: heparin gtt  Otero Catheter: Not present  Central Lines: PRESENT  PICC 11/18/22 Triple Lumen Left Basilic Vascular Access-Site Assessment: WDL  Cardiac Monitoring: ACTIVE order. Indication: Chest pain/ ACS rule out (24 hours)  Code Status: No CPR- Do NOT Intubate      Disposition Plan     Expected Discharge Date: 11/28/2022    Discharge Delays: Specialist Consult (enter specialist & decision needed in comments)  Procedure Pending (enter procedure & time in comments)    Discharge Comments: IV abx  need INR low enough  then bridge        The patient's care was discussed with the Patient.    Cassie Leone MD  Hospitalist Service  [facility]  Securely message with the Vocera Web Console (learn more here)  Text page via [employer] Paging/Directory         Clinically Significant Risk Factors         # Hyponatremia: Lowest Na = 132 mmol/L (Ref range: 136-145) in last 2 days, will monitor as appropriate      # Hypoalbuminemia: Lowest albumin = 2.9 g/dL (Ref range: 3.5-5.2) at 11/20/2022  4:07 AM, will monitor as appropriate         # DMII: A1C = 7.7 % (Ref range: <5.7 %) within past 3 months   # Obesity: Estimated body mass index is 34.21 kg/m  as calculated from the following:    Height as of this encounter: 1.778 m (5' 10\").    Weight as " of this encounter: 108.1 kg (238 lb 6.4 oz).   # Severe Malnutrition: based on nutrition assessment        ______________________________________________________________________    Interval History   Feeling better with pain, no cp/sob, no n/v, no f/c.     Picc line site edema+    Data reviewed today: I reviewed all medications, new labs and imaging results over the last 24 hours.     Physical Exam   Vital Signs: Temp: 98.2  F (36.8  C) Temp src: Oral BP: 135/82 Pulse: 87   Resp: 18 SpO2: 94 % O2 Device: None (Room air)    Weight: 238 lbs 6.4 oz  General.  Awake alert oriented not in acute distress. obese  HEENT.  Pupils equal round react to light, anicteric, EOM intact.  Neck supple no JVD.  CVS regular rhythm no murmur gallops.  Lungs.  Clear to auscultation bilateral no wheezing or rales.  Abdomen.  Soft nontender bowel sounds present.  Extremities.  No edema no calf tenderness.  Neurological.  Awake and alert. No focal deficit.general weakness  Skin no rash. No pallor.  Psych. Normal mood.       Data   Recent Labs   Lab 11/27/22  0814 11/27/22  0609 11/26/22  2049 11/26/22  0833 11/26/22  0634 11/25/22  2344 11/25/22  2034 11/25/22  1840   WBC  --  7.7  --   --  7.3 7.3  --   --    HGB  --  11.6*  --   --  11.7* 11.1*  --   --    MCV  --  96  --   --  96 98  --   --    PLT  --  174  --   --  179 154  --   --    INR  --  1.56*  --   --  1.88* 1.91*   < >  --    NA  --  132*  --   --  135*  --   --  135*   POTASSIUM  --  3.8  --   --  3.7  --   --  4.1   CHLORIDE  --  96*  --   --  99  --   --  101   CO2  --  25  --   --  23  --   --  21*   BUN  --  65.9*  --   --  65.1*  --   --  66.9*   CR  --  2.13*  --   --  2.16*  --   --  2.31*   ANIONGAP  --  11  --   --  13  --   --  13   GALLO  --  8.9  --   --  9.1  --   --  8.8   * 159* 133*   < > 173*  --    < > 202*   ALBUMIN  --  3.5  --   --  3.9  --   --   --    PROTTOTAL  --  6.6  --   --  6.9  --   --   --    BILITOTAL  --  1.0  --   --  1.0  --   --   --     ALKPHOS  --  62  --   --  65  --   --   --    ALT  --  36  --   --  41  --   --   --    AST  --  40  --   --  43  --   --   --     < > = values in this interval not displayed.     No results found for this or any previous visit (from the past 24 hour(s)).  Medications     heparin 1,300 Units/hr (11/27/22 0836)     - MEDICATION INSTRUCTIONS -         allopurinol  200 mg Oral Daily     bumetanide  1 mg Oral Daily     ezetimibe  10 mg Oral Daily     hydrocortisone   Topical BID     insulin aspart  1-12 Units Subcutaneous TID w/meals     insulin aspart  1-7 Units Subcutaneous At Bedtime     lidocaine  1 patch Transdermal Q24H     lidocaine   Transdermal Q8H YUSRA     multivitamin, therapeutic  1 tablet Oral Daily     pantoprazole  40 mg Oral QAM AC     sodium chloride (PF)  10-40 mL Intracatheter Q7 Days     traZODone  200 mg Oral At Bedtime     [Held by provider] Warfarin Therapy Reminder  1 each Oral See Admin Instructions

## 2022-11-27 NOTE — PROGRESS NOTES
"  '    RENAL ([facility]) progress note  CC: F/U ROBERT  S: Sitting up in chair. Left arm swelling slightly better. Breathing improved. Eating alright. No other issues. 3L net negative yesterday.     A/P:   Principal Problem:    Lactic acidosis  Active Problems:    Acute respiratory failure with hypoxia (H)    Aspiration pneumonia of both lungs, unspecified aspiration pneumonia type, unspecified part of lung (H)    ASSESSMENT:   83 yo male with h/o CKD 4, HTN. DM2, bladder cancer s/p cystectomy/urostomy admit with pneumonia, CHF, now with plan for angiogram     PLAN:  1. CKD 4 - has baseline CKD due to atrophic kidney, DM2, HTN, vascular disease, also with cystectomy/urostomy.  Has had very frequent fluctuations in creatinine associated with diuresis.  Currently renal function improved.  At increased risk for contrast nephropathy with angiogram   -bumex today but hold tomorrow am with planned angio.   -NS 50 ml 2 hour prior to angio and 4 hours after. Agion planned for tomorrow afternoon  - will not order fluid currently.     2. HTN - with recent orthostatic hypotension  -controlled now, no change in meds    3. NSTEMI - plan for angiogram Monday, see above    Tariq Angela, DO  [facility]  [phone] (off)       No interval changes to past medical history, social history or family history to report.    /83 (BP Location: Right arm)   Pulse 96   Temp 97.6  F (36.4  C) (Oral)   Resp 18   Ht 1.778 m (5' 10\")   Wt 108 kg (238 lb 1.6 oz)   SpO2 95%   BMI 34.16 kg/m      I/O last 3 completed shifts:  In: 569 [P.O.:360; Other:209]  Out: 2650 [Urine:2650]    Physical Exam:   Gen: NAD  HEENT: No icterus, NC/AT  CARDIOVASCULAR: trace LE edema  PULMONARY: CTAB,  No cyanosis  GASTROINTESTINAL: ND  MSK: Diffuse muscle atrophy, warm  NEURO: Alert, no gross focal findings  PSYCHIATRIC: Normal affect, answers questions appropriately  SKIN: Pale, no jaundice, no rash. Scattered skin " tears/bruising    Recent Labs   Lab 11/27/22  0609 11/26/22  0634 11/25/22  1840 11/25/22  0634 11/24/22  0544 11/23/22  0650 11/22/22  1244 11/21/22  0903   * 135* 135* 135* 135* 135* 135* 134*   POTASSIUM 3.8 3.7 4.1 4.4 4.4 4.7 4.8 4.5   CHLORIDE 96* 99 101 102 98 102 102 101   CO2 25 23 21* 25 23 24 24 25   BUN 65.9* 65.1* 66.9* 69.1* 67.8* 54.8* 52.7* 53.0*   CR 2.13* 2.16* 2.31* 2.52* 2.67* 1.99* 2.19* 2.34*   GFRESTIMATED 30* 30* 28* 25* 23* 33* 29* 27*   GALLO 8.9 9.1 8.8 8.5* 8.7* 9.0 8.9 9.0   MAG  --   --  2.0  --   --   --   --   --    ALBUMIN 3.5 3.9  --  3.0* 3.1* 3.0*  --  3.1*       Recent Labs   Lab 11/27/22  0609 11/26/22  0634 11/25/22  2344 11/25/22  0634 11/24/22  0544 11/23/22  0650 11/21/22  0903   WBC 7.7 7.3 7.3 7.1 10.1 9.9 10.1   HGB 11.6* 11.7* 11.1* 11.2* 12.7* 13.3 13.0*   HCT 34.7* 35.2* 33.8* 34.6* 39.1* 39.2* 38.9*   MCV 96 96 98 98 97 95 95    179 154 169 204 228 250             Current Facility-Administered Medications:      acetaminophen (TYLENOL) tablet 1,000 mg, 1,000 mg, Oral, Q6H PRN, Shefali Kulkarni APRN CNP, 1,000 mg at 11/26/22 2050     allopurinol (ZYLOPRIM) tablet 200 mg, 200 mg, Oral, Daily, Shefali Kulkarni APRN CNP, 200 mg at 11/27/22 0833     bisacodyl (DULCOLAX) EC tablet 5 mg, 5 mg, Oral, Daily PRN **OR** bisacodyl (DULCOLAX) EC tablet 10 mg, 10 mg, Oral, Daily PRN **OR** bisacodyl (DULCOLAX) EC tablet 15 mg, 15 mg, Oral, Daily PRN, Shefali Kulkarni APRN CNP, 15 mg at 11/25/22 1507     bumetanide (BUMEX) tablet 1 mg, 1 mg, Oral, Daily, Tariq Angela, DO, 1 mg at 11/27/22 0836     glucose gel 15-30 g, 15-30 g, Oral, Q15 Min PRN **OR** dextrose 50 % injection 25-50 mL, 25-50 mL, Intravenous, Q15 Min PRN **OR** glucagon injection 1 mg, 1 mg, Subcutaneous, Q15 Min PRN, Shefali Kulkarni, CARLOS CNP     ezetimibe (ZETIA) tablet 10 mg, 10 mg, Oral, Daily, Solange Augustin MD, 10 mg at 11/27/22 0834     heparin infusion 25,000 units in D5W 250 mL ANTICOAGULANT,  0-5,000 Units/hr, Intravenous, Continuous, Nory Bustamante MD, Last Rate: 13 mL/hr at 11/27/22 0836, 1,300 Units/hr at 11/27/22 0836     HOLD: metformin and metformin containing medications, , Does not apply, HOLD, Abdelrahman Mazariegos MD     hydrocortisone 2.5 % cream, , Topical, BID, Shefali Kulkarni APRN CNP, Given at 11/27/22 0835     insulin aspart (NovoLOG) injection (RAPID ACTING), 1-12 Units, Subcutaneous, TID w/meals, Shefali Kulkarni APRN CNP, 2 Units at 11/27/22 0835     insulin aspart (NovoLOG) injection (RAPID ACTING), 1-7 Units, Subcutaneous, At Bedtime, Shefali Kulkarni APRN CNP, 1 Units at 11/21/22 2124     Lidocaine (LIDOCARE) 4 % Patch 1 patch, 1 patch, Transdermal, Q24H, Solange Augustin MD, 1 patch at 11/26/22 1153     lidocaine patch in PLACE, , Transdermal, Q8H YUSRA, Solange Augustin MD     multivitamin, therapeutic (THERA-VIT) tablet 1 tablet, 1 tablet, Oral, Daily, Solange Augustin MD, 1 tablet at 11/27/22 0834     ondansetron (ZOFRAN ODT) ODT tab 4 mg, 4 mg, Oral, Q6H PRN **OR** ondansetron (ZOFRAN) injection 4 mg, 4 mg, Intravenous, Q6H PRN, Shefali Kulkarni APRN CNP, 4 mg at 11/23/22 1655     pantoprazole (PROTONIX) EC tablet 40 mg, 40 mg, Oral, QAM AC, Shefali Kulkarni APRN CNP, 40 mg at 11/27/22 0600     Patient is already receiving anticoagulation with heparin, enoxaparin (LOVENOX), warfarin (COUMADIN)  or other anticoagulant medication, , Does not apply, Continuous PRN, Shefali Kulkarni APRN CNP     prochlorperazine (COMPAZINE) injection 5 mg, 5 mg, Intravenous, Q6H PRN **OR** prochlorperazine (COMPAZINE) tablet 5 mg, 5 mg, Oral, Q6H PRN **OR** prochlorperazine (COMPAZINE) suppository 12.5 mg, 12.5 mg, Rectal, Q12H PRN, Shefali Kulkarni APRN CNP     sodium chloride (PF) 0.9% PF flush 10-20 mL, 10-20 mL, Intracatheter, q1 min prn, Shefali Kulkarni APRN CNP     sodium chloride (PF) 0.9% PF flush 10-40 mL, 10-40 mL, Intracatheter, Q7 Days, Shefali Kulkarni APRN CNP, 30 mL at 11/25/22 3193      sodium chloride (PF) 0.9% PF flush 10-40 mL, 10-40 mL, Intracatheter, Q1H PRN, Shefali Kulkarni APRN CNP     temazepam (RESTORIL) capsule 15 mg, 15 mg, Oral, At Bedtime PRN, Solange Augustin MD     traZODone (DESYREL) tablet 200 mg, 200 mg, Oral, At Bedtime, Solange Augustin MD, 200 mg at 11/26/22 2050     [Held by provider] Warfarin Dose Required Daily - Pharmacist Managed, 1 each, Oral, See Admin Instructions, Shefali Kulkarni APRN CNP      Labs personally reviewed today during this evaluation at 12:42 PM     [place] Website --- [email or web address] website --- [email or web address]

## 2022-11-27 NOTE — PROGRESS NOTES
Angiogram planning for 11/28. Anticipate discharge home with [facility] home care (RN PT OT); pt accepted 11/27. [facility] following for discharge planning. Family to transport at discharge.  9:04 AM    LISBETH Greco  11/27/2022

## 2022-11-27 NOTE — PROVIDER NOTIFICATION
Angio cancelled today d/t INR of 2.07 per Dr. Mazariegos. Pt will be NPO after midnight on 11/27 for Angio on Monday. Dr. Augustin notified.   
House officer notified about the US Left upper extremity venous duplex results.  
Informed cardiologist about V. Tach on bed side tele per primary MD.  
Pt with slight ST elevation in V2 & slight ST depression in II.   
Writer was notified about patient having vtach on telemetry. House officer notified about patient's report of chest tightness and SOB. Patient was helped transfer on a chair which patient claimed helpful in breathing better. House officer came to bedside.  
Home

## 2022-11-28 ENCOUNTER — APPOINTMENT (OUTPATIENT)
Dept: OCCUPATIONAL THERAPY | Facility: HOSPITAL | Age: 82
DRG: 853 | End: 2022-11-28
Payer: COMMERCIAL

## 2022-11-28 ENCOUNTER — TRANSFERRED RECORDS (OUTPATIENT)
Dept: MEDSURG UNIT | Facility: HOSPITAL | Age: 82
End: 2022-11-28

## 2022-11-28 ENCOUNTER — APPOINTMENT (OUTPATIENT)
Dept: PHYSICAL THERAPY | Facility: HOSPITAL | Age: 82
DRG: 853 | End: 2022-11-28
Payer: COMMERCIAL

## 2022-11-28 LAB
ACT BLD: 292 SECONDS (ref 74–150)
ALBUMIN SERPL BCG-MCNC: 3.5 G/DL (ref 3.5–5.2)
ALP SERPL-CCNC: 62 U/L (ref 40–129)
ALT SERPL W P-5'-P-CCNC: 34 U/L (ref 10–50)
ANION GAP SERPL CALCULATED.3IONS-SCNC: 9 MMOL/L (ref 7–15)
AST SERPL W P-5'-P-CCNC: 30 U/L (ref 10–50)
BILIRUB SERPL-MCNC: 1 MG/DL
BUN SERPL-MCNC: 67.9 MG/DL (ref 8–23)
CALCIUM SERPL-MCNC: 9 MG/DL (ref 8.8–10.2)
CHLORIDE SERPL-SCNC: 100 MMOL/L (ref 98–107)
CREAT SERPL-MCNC: 2.16 MG/DL (ref 0.67–1.17)
DEPRECATED HCO3 PLAS-SCNC: 26 MMOL/L (ref 22–29)
ERYTHROCYTE [DISTWIDTH] IN BLOOD BY AUTOMATED COUNT: 14.9 % (ref 10–15)
GFR SERPL CREATININE-BSD FRML MDRD: 30 ML/MIN/1.73M2
GLUCOSE BLDC GLUCOMTR-MCNC: 131 MG/DL (ref 70–99)
GLUCOSE BLDC GLUCOMTR-MCNC: 170 MG/DL (ref 70–99)
GLUCOSE BLDC GLUCOMTR-MCNC: 171 MG/DL (ref 70–99)
GLUCOSE SERPL-MCNC: 160 MG/DL (ref 70–99)
HCT VFR BLD AUTO: 34.1 % (ref 40–53)
HGB BLD-MCNC: 11.1 G/DL (ref 13.3–17.7)
INR PPP: 1.34 (ref 0.85–1.15)
MCH RBC QN AUTO: 31.5 PG (ref 26.5–33)
MCHC RBC AUTO-ENTMCNC: 32.6 G/DL (ref 31.5–36.5)
MCV RBC AUTO: 97 FL (ref 78–100)
PLATELET # BLD AUTO: 185 10E3/UL (ref 150–450)
POTASSIUM SERPL-SCNC: 3.9 MMOL/L (ref 3.4–5.3)
PROT SERPL-MCNC: 6.8 G/DL (ref 6.4–8.3)
RBC # BLD AUTO: 3.52 10E6/UL (ref 4.4–5.9)
SODIUM SERPL-SCNC: 135 MMOL/L (ref 136–145)
UFH PPP CHRO-ACNC: 0.55 IU/ML
WBC # BLD AUTO: 7.8 10E3/UL (ref 4–11)

## 2022-11-28 PROCEDURE — B211YZZ FLUOROSCOPY OF MULTIPLE CORONARY ARTERIES USING OTHER CONTRAST: ICD-10-PCS | Performed by: INTERNAL MEDICINE

## 2022-11-28 PROCEDURE — 97535 SELF CARE MNGMENT TRAINING: CPT | Mod: GO

## 2022-11-28 PROCEDURE — 99233 SBSQ HOSP IP/OBS HIGH 50: CPT | Performed by: INTERNAL MEDICINE

## 2022-11-28 PROCEDURE — 99152 MOD SED SAME PHYS/QHP 5/>YRS: CPT | Performed by: INTERNAL MEDICINE

## 2022-11-28 PROCEDURE — 250N000011 HC RX IP 250 OP 636: Performed by: INTERNAL MEDICINE

## 2022-11-28 PROCEDURE — 97110 THERAPEUTIC EXERCISES: CPT | Mod: GP

## 2022-11-28 PROCEDURE — 272N000001 HC OR GENERAL SUPPLY STERILE: Performed by: INTERNAL MEDICINE

## 2022-11-28 PROCEDURE — 99153 MOD SED SAME PHYS/QHP EA: CPT | Performed by: INTERNAL MEDICINE

## 2022-11-28 PROCEDURE — 85027 COMPLETE CBC AUTOMATED: CPT | Performed by: HOSPITALIST

## 2022-11-28 PROCEDURE — 85347 COAGULATION TIME ACTIVATED: CPT

## 2022-11-28 PROCEDURE — 93005 ELECTROCARDIOGRAM TRACING: CPT | Performed by: INTERNAL MEDICINE

## 2022-11-28 PROCEDURE — 250N000013 HC RX MED GY IP 250 OP 250 PS 637: Performed by: HOSPITALIST

## 2022-11-28 PROCEDURE — 85520 HEPARIN ASSAY: CPT | Performed by: INTERNAL MEDICINE

## 2022-11-28 PROCEDURE — 250N000013 HC RX MED GY IP 250 OP 250 PS 637: Performed by: INTERNAL MEDICINE

## 2022-11-28 PROCEDURE — C1725 CATH, TRANSLUMIN NON-LASER: HCPCS | Performed by: INTERNAL MEDICINE

## 2022-11-28 PROCEDURE — C9600 PERC DRUG-EL COR STENT SING: HCPCS | Mod: LC | Performed by: INTERNAL MEDICINE

## 2022-11-28 PROCEDURE — 82040 ASSAY OF SERUM ALBUMIN: CPT | Performed by: HOSPITALIST

## 2022-11-28 PROCEDURE — 250N000009 HC RX 250: Performed by: INTERNAL MEDICINE

## 2022-11-28 PROCEDURE — 255N000002 HC RX 255 OP 636: Performed by: INTERNAL MEDICINE

## 2022-11-28 PROCEDURE — C1874 STENT, COATED/COV W/DEL SYS: HCPCS | Performed by: INTERNAL MEDICINE

## 2022-11-28 PROCEDURE — 93005 ELECTROCARDIOGRAM TRACING: CPT

## 2022-11-28 PROCEDURE — 93010 ELECTROCARDIOGRAM REPORT: CPT | Performed by: INTERNAL MEDICINE

## 2022-11-28 PROCEDURE — 250N000013 HC RX MED GY IP 250 OP 250 PS 637: Performed by: NURSE PRACTITIONER

## 2022-11-28 PROCEDURE — 4A023N7 MEASUREMENT OF CARDIAC SAMPLING AND PRESSURE, LEFT HEART, PERCUTANEOUS APPROACH: ICD-10-PCS | Performed by: INTERNAL MEDICINE

## 2022-11-28 PROCEDURE — 80053 COMPREHEN METABOLIC PANEL: CPT | Performed by: HOSPITALIST

## 2022-11-28 PROCEDURE — C1769 GUIDE WIRE: HCPCS | Performed by: INTERNAL MEDICINE

## 2022-11-28 PROCEDURE — 258N000003 HC RX IP 258 OP 636: Performed by: INTERNAL MEDICINE

## 2022-11-28 PROCEDURE — C1887 CATHETER, GUIDING: HCPCS | Performed by: INTERNAL MEDICINE

## 2022-11-28 PROCEDURE — 97116 GAIT TRAINING THERAPY: CPT | Mod: GP

## 2022-11-28 PROCEDURE — 250N000011 HC RX IP 250 OP 636

## 2022-11-28 PROCEDURE — 027034Z DILATION OF CORONARY ARTERY, ONE ARTERY WITH DRUG-ELUTING INTRALUMINAL DEVICE, PERCUTANEOUS APPROACH: ICD-10-PCS | Performed by: INTERNAL MEDICINE

## 2022-11-28 PROCEDURE — C1894 INTRO/SHEATH, NON-LASER: HCPCS | Performed by: INTERNAL MEDICINE

## 2022-11-28 PROCEDURE — 210N000001 HC R&B IMCU HEART CARE

## 2022-11-28 PROCEDURE — 93458 L HRT ARTERY/VENTRICLE ANGIO: CPT | Mod: 26 | Performed by: INTERNAL MEDICINE

## 2022-11-28 PROCEDURE — 99232 SBSQ HOSP IP/OBS MODERATE 35: CPT | Performed by: INTERNAL MEDICINE

## 2022-11-28 PROCEDURE — 92928 PRQ TCAT PLMT NTRAC ST 1 LES: CPT | Mod: LC | Performed by: INTERNAL MEDICINE

## 2022-11-28 PROCEDURE — 85610 PROTHROMBIN TIME: CPT | Performed by: NURSE PRACTITIONER

## 2022-11-28 PROCEDURE — 93458 L HRT ARTERY/VENTRICLE ANGIO: CPT | Performed by: INTERNAL MEDICINE

## 2022-11-28 DEVICE — STENT RESOLUTE ONYX DE 2.7FR 3.50X15MM RONYX DE35015UX: Type: IMPLANTABLE DEVICE | Site: CORONARY | Status: FUNCTIONAL

## 2022-11-28 RX ORDER — ASPIRIN 81 MG/1
81 TABLET ORAL DAILY
Status: DISCONTINUED | OUTPATIENT
Start: 2022-11-29 | End: 2022-12-01 | Stop reason: HOSPADM

## 2022-11-28 RX ORDER — FENTANYL CITRATE 50 UG/ML
INJECTION, SOLUTION INTRAMUSCULAR; INTRAVENOUS
Status: DISCONTINUED | OUTPATIENT
Start: 2022-11-28 | End: 2022-11-28 | Stop reason: HOSPADM

## 2022-11-28 RX ORDER — NALOXONE HYDROCHLORIDE 0.4 MG/ML
0.2 INJECTION, SOLUTION INTRAMUSCULAR; INTRAVENOUS; SUBCUTANEOUS
Status: ACTIVE | OUTPATIENT
Start: 2022-11-28 | End: 2022-11-29

## 2022-11-28 RX ORDER — FENTANYL CITRATE 50 UG/ML
25 INJECTION, SOLUTION INTRAMUSCULAR; INTRAVENOUS
Status: DISCONTINUED | OUTPATIENT
Start: 2022-11-28 | End: 2022-11-28 | Stop reason: HOSPADM

## 2022-11-28 RX ORDER — OXYCODONE HYDROCHLORIDE 5 MG/1
10 TABLET ORAL EVERY 4 HOURS PRN
Status: DISCONTINUED | OUTPATIENT
Start: 2022-11-28 | End: 2022-12-01 | Stop reason: HOSPADM

## 2022-11-28 RX ORDER — HYDRALAZINE HYDROCHLORIDE 20 MG/ML
10 INJECTION INTRAMUSCULAR; INTRAVENOUS EVERY 4 HOURS PRN
Status: DISCONTINUED | OUTPATIENT
Start: 2022-11-28 | End: 2022-11-30

## 2022-11-28 RX ORDER — ACETAMINOPHEN 325 MG/1
650 TABLET ORAL EVERY 4 HOURS PRN
Status: DISCONTINUED | OUTPATIENT
Start: 2022-11-28 | End: 2022-12-01 | Stop reason: HOSPADM

## 2022-11-28 RX ORDER — ATROPINE SULFATE 0.1 MG/ML
0.5 INJECTION INTRAVENOUS
Status: ACTIVE | OUTPATIENT
Start: 2022-11-28 | End: 2022-11-29

## 2022-11-28 RX ORDER — ATORVASTATIN CALCIUM 40 MG/1
40 TABLET, FILM COATED ORAL DAILY
Qty: 90 TABLET | Refills: 3 | Status: ON HOLD | OUTPATIENT
Start: 2022-11-28 | End: 2022-12-07

## 2022-11-28 RX ORDER — OXYCODONE HYDROCHLORIDE 5 MG/1
5 TABLET ORAL EVERY 4 HOURS PRN
Status: DISCONTINUED | OUTPATIENT
Start: 2022-11-28 | End: 2022-12-01 | Stop reason: HOSPADM

## 2022-11-28 RX ORDER — NALOXONE HYDROCHLORIDE 0.4 MG/ML
0.4 INJECTION, SOLUTION INTRAMUSCULAR; INTRAVENOUS; SUBCUTANEOUS
Status: ACTIVE | OUTPATIENT
Start: 2022-11-28 | End: 2022-11-29

## 2022-11-28 RX ORDER — CLOPIDOGREL BISULFATE 75 MG/1
TABLET ORAL
Status: DISCONTINUED | OUTPATIENT
Start: 2022-11-28 | End: 2022-11-28 | Stop reason: HOSPADM

## 2022-11-28 RX ORDER — METOPROLOL TARTRATE 1 MG/ML
5 INJECTION, SOLUTION INTRAVENOUS
Status: COMPLETED | OUTPATIENT
Start: 2022-11-28 | End: 2022-11-30

## 2022-11-28 RX ORDER — SODIUM CHLORIDE 9 MG/ML
INJECTION, SOLUTION INTRAVENOUS CONTINUOUS
Status: DISCONTINUED | OUTPATIENT
Start: 2022-11-28 | End: 2022-11-28 | Stop reason: HOSPADM

## 2022-11-28 RX ORDER — ASPIRIN 325 MG
325 TABLET ORAL ONCE
Status: COMPLETED | OUTPATIENT
Start: 2022-11-28 | End: 2022-11-28

## 2022-11-28 RX ORDER — HYDRALAZINE HYDROCHLORIDE 20 MG/ML
10 INJECTION INTRAMUSCULAR; INTRAVENOUS EVERY 6 HOURS PRN
Status: DISCONTINUED | OUTPATIENT
Start: 2022-11-28 | End: 2022-11-28 | Stop reason: DRUGHIGH

## 2022-11-28 RX ORDER — HEPARIN SODIUM 1000 [USP'U]/ML
INJECTION, SOLUTION INTRAVENOUS; SUBCUTANEOUS
Status: DISCONTINUED | OUTPATIENT
Start: 2022-11-28 | End: 2022-11-28 | Stop reason: HOSPADM

## 2022-11-28 RX ORDER — CLOPIDOGREL BISULFATE 75 MG/1
75 TABLET ORAL DAILY
Qty: 90 TABLET | Refills: 3 | Status: ON HOLD | OUTPATIENT
Start: 2022-11-29 | End: 2022-12-07

## 2022-11-28 RX ORDER — SODIUM CHLORIDE 9 MG/ML
INJECTION, SOLUTION INTRAVENOUS CONTINUOUS
Status: ACTIVE | OUTPATIENT
Start: 2022-11-28 | End: 2022-11-28

## 2022-11-28 RX ORDER — FLUMAZENIL 0.1 MG/ML
0.2 INJECTION, SOLUTION INTRAVENOUS
Status: ACTIVE | OUTPATIENT
Start: 2022-11-28 | End: 2022-11-29

## 2022-11-28 RX ORDER — FENTANYL CITRATE 50 UG/ML
25 INJECTION, SOLUTION INTRAMUSCULAR; INTRAVENOUS
Status: DISCONTINUED | OUTPATIENT
Start: 2022-11-28 | End: 2022-11-28

## 2022-11-28 RX ORDER — LORAZEPAM 2 MG/ML
0.5 INJECTION INTRAMUSCULAR EVERY 6 HOURS PRN
Status: DISCONTINUED | OUTPATIENT
Start: 2022-11-28 | End: 2022-12-01 | Stop reason: HOSPADM

## 2022-11-28 RX ORDER — LIDOCAINE 40 MG/G
CREAM TOPICAL
Status: DISCONTINUED | OUTPATIENT
Start: 2022-11-28 | End: 2022-11-28 | Stop reason: HOSPADM

## 2022-11-28 RX ORDER — NITROGLYCERIN 0.4 MG/1
0.4 TABLET SUBLINGUAL EVERY 5 MIN PRN
Status: DISCONTINUED | OUTPATIENT
Start: 2022-11-28 | End: 2022-12-01 | Stop reason: HOSPADM

## 2022-11-28 RX ORDER — SODIUM CHLORIDE 9 MG/ML
INJECTION, SOLUTION INTRAVENOUS CONTINUOUS
Status: DISCONTINUED | OUTPATIENT
Start: 2022-11-28 | End: 2022-11-29

## 2022-11-28 RX ORDER — ASPIRIN 81 MG/1
243 TABLET, CHEWABLE ORAL ONCE
Status: COMPLETED | OUTPATIENT
Start: 2022-11-28 | End: 2022-11-28

## 2022-11-28 RX ORDER — CLOPIDOGREL BISULFATE 75 MG/1
75 TABLET ORAL DAILY
Status: DISCONTINUED | OUTPATIENT
Start: 2022-11-29 | End: 2022-12-01 | Stop reason: HOSPADM

## 2022-11-28 RX ORDER — ONDANSETRON 4 MG/1
4 TABLET, ORALLY DISINTEGRATING ORAL EVERY 6 HOURS PRN
Status: DISCONTINUED | OUTPATIENT
Start: 2022-11-28 | End: 2022-11-28

## 2022-11-28 RX ORDER — IODIXANOL 320 MG/ML
INJECTION, SOLUTION INTRAVASCULAR
Status: DISCONTINUED | OUTPATIENT
Start: 2022-11-28 | End: 2022-11-28 | Stop reason: HOSPADM

## 2022-11-28 RX ORDER — ONDANSETRON 2 MG/ML
4 INJECTION INTRAMUSCULAR; INTRAVENOUS EVERY 6 HOURS PRN
Status: DISCONTINUED | OUTPATIENT
Start: 2022-11-28 | End: 2022-11-28

## 2022-11-28 RX ADMIN — NITROGLYCERIN 0.4 MG: 0.4 TABLET, ORALLY DISINTEGRATING SUBLINGUAL at 18:35

## 2022-11-28 RX ADMIN — PANTOPRAZOLE SODIUM 40 MG: 40 TABLET, DELAYED RELEASE ORAL at 06:08

## 2022-11-28 RX ADMIN — TRAZODONE HYDROCHLORIDE 200 MG: 50 TABLET ORAL at 21:57

## 2022-11-28 RX ADMIN — HYDROCORTISONE: 25 CREAM TOPICAL at 22:01

## 2022-11-28 RX ADMIN — HYDRALAZINE HYDROCHLORIDE 10 MG: 20 INJECTION, SOLUTION INTRAMUSCULAR; INTRAVENOUS at 16:16

## 2022-11-28 RX ADMIN — SODIUM CHLORIDE: 9 INJECTION, SOLUTION INTRAVENOUS at 11:27

## 2022-11-28 RX ADMIN — HYDROCORTISONE: 25 CREAM TOPICAL at 08:10

## 2022-11-28 RX ADMIN — SODIUM CHLORIDE: 9 INJECTION, SOLUTION INTRAVENOUS at 17:25

## 2022-11-28 RX ADMIN — METOPROLOL TARTRATE 25 MG: 25 TABLET, FILM COATED ORAL at 08:08

## 2022-11-28 RX ADMIN — LORAZEPAM 0.5 MG: 2 INJECTION INTRAMUSCULAR; INTRAVENOUS at 17:43

## 2022-11-28 RX ADMIN — ASPIRIN 325 MG: 325 TABLET ORAL at 12:48

## 2022-11-28 RX ADMIN — NITROGLYCERIN 0.4 MG: 0.4 TABLET, ORALLY DISINTEGRATING SUBLINGUAL at 18:41

## 2022-11-28 RX ADMIN — METOPROLOL TARTRATE 25 MG: 25 TABLET, FILM COATED ORAL at 21:10

## 2022-11-28 RX ADMIN — METOPROLOL TARTRATE 5 MG: 5 INJECTION INTRAVENOUS at 18:16

## 2022-11-28 RX ADMIN — HEPARIN SODIUM AND DEXTROSE 1300 UNITS/HR: 10000; 5 INJECTION INTRAVENOUS at 01:25

## 2022-11-28 ASSESSMENT — ACTIVITIES OF DAILY LIVING (ADL)
ADLS_ACUITY_SCORE: 32
ADLS_ACUITY_SCORE: 32
ADLS_ACUITY_SCORE: 30
ADLS_ACUITY_SCORE: 32
ADLS_ACUITY_SCORE: 32
ADLS_ACUITY_SCORE: 30
ADLS_ACUITY_SCORE: 32
ADLS_ACUITY_SCORE: 30
ADLS_ACUITY_SCORE: 30
ADLS_ACUITY_SCORE: 32

## 2022-11-28 ASSESSMENT — EJECTION FRACTION: EF_VALUE: .18

## 2022-11-28 NOTE — PLAN OF CARE
Goal Outcome Evaluation:      Plan of Care Reviewed With: patient    Overall Patient Progress: improvingOverall Patient Progress: improving         Problem: Plan of Care - These are the overarching goals to be used throughout the patient stay.    Goal: Plan of Care Review  Description: The Plan of Care Review/Shift note should be completed every shift.  The Outcome Evaluation is a brief statement about your assessment that the patient is improving, declining, or no change.  This information will be displayed automatically on your shift note.  Outcome: Progressing  Flowsheets (Taken 11/28/2022 1515)  Plan of Care Reviewed With: patient  Overall Patient Progress: improving     Pt transported to cath lab via NA for angiogram. Pt understands plan of care. Denies pain. Wanting to get the procedure done so he can eat. No family at bedside. NSR with 1st AVB, BBB and freq PVC's. Up with assist of one and walker. Multiple skin tears and left arm from fall. Urostomy draining clear yellow urine.

## 2022-11-28 NOTE — PLAN OF CARE
Heart Failure Care Map  GOALS TO BE MET BEFORE DISCHARGE:    1. Decrease congestion and/or edema with diuretic therapy to achieve near optimal volume status.     Dyspnea improved: No, further care required to meet this goal. Please explain patient arrived [facility] at 1430.  At rest he is breathing 20 a minute, but I do not know if he is SOB with ambulation   Edema improved: No, further care required to meet this goal. Please explain Patient has severe edema at his left arm, and mild edema at his legs.        Last 24 hour I/O:   Intake/Output Summary (Last 24 hours) at 11/28/2022 1552  Last data filed at 11/28/2022 1031  Gross per 24 hour   Intake 360 ml   Output 1350 ml   Net -990 ml           Net I/O and Weights since admission:   10/29 2300 - 11/28 2259  In: 7665 [P.O.:6467; I.V.:773]  Out: 20000 [Urine:64159]  Net: -81232     Vitals:    11/18/22 1101 11/18/22 1620 11/19/22 0400 11/22/22 1455   Weight: 111.1 kg (245 lb) 112.7 kg (248 lb 7.3 oz) 112.3 kg (247 lb 9.2 oz) 109.7 kg (241 lb 14.4 oz)    11/26/22 1411 11/27/22 1021 11/28/22 1214   Weight: 108.1 kg (238 lb 6.4 oz) 108 kg (238 lb 1.6 oz) 108 kg (238 lb)       2.  O2 sats > 90% on room air, or at prior home O2 therapy level.      Able to wean O2 this shift to keep sats above 90%?: Yes, satisfactory for discharge.   Does patient use Home O2? No          Current oxygenation status:   SpO2: 96 %     O2 Device: None (Room air), Oxygen Delivery: 2 LPM    3.  Tolerates ambulation and mobility near baseline.     Ambulation: No, further care required to meet this goal. Please explain Patient is currently bedrest since surgery.    Times patient ambulated with staff this shift: 0    Please review the Heart Failure Care Map for additional HF goal outcomes.      Osbaldo Anne RN  11/28/2022

## 2022-11-28 NOTE — PRE-PROCEDURE
GENERAL PRE-PROCEDURE:   Procedure:  Coronary angiogram with possible PCI  Date/Time:  11/28/2022 10:21 AM    Written consent obtained?: Yes    Risks and benefits: Risks, benefits and alternatives were discussed    Consent given by:  Patient  Patient states understanding of procedure being performed: Yes    Patient's understanding of procedure matches consent: Yes    Procedure consent matches procedure scheduled: Yes    Expected level of sedation:  Moderate  Appropriately NPO:  Yes  ASA Class:  4 (elevated troponins, HFpEF, LBBB, CKD Stage III, bladder CA, PAF, DVT; on warfarin, DM type II)  Mallampati  :  Grade 2- soft palate, base of uvula, tonsillar pillars, and portion of posterior pharyngeal wall visible  Lungs:  Lungs clear with good breath sounds bilaterally  Heart:  Systolic murmur  History & Physical reviewed:  History and physical reviewed and no updates needed  Statement of review:  I have reviewed the lab findings, diagnostic data, medications, and the plan for sedation    Patient is a DNR/DNI and understands that this will be suspended for today's procedure

## 2022-11-28 NOTE — PLAN OF CARE
Goal Outcome Evaluation:        Problem: Risk for Delirium  Goal: Optimal Coping  Outcome: Progressing     Problem: Heart Failure Comorbidity  Goal: Maintenance of Heart Failure Symptom Control  Outcome: Progressing       NPO since midnight, no complaints of chest pain. Planned Angio this afternoon.     Mayra Kinney RN

## 2022-11-28 NOTE — PROGRESS NOTES
[facility]    Medicine Progress Note - Hospitalist Service    Date of Admission:  11/18/2022    Assessment & Plan     Eduardo is a 82-year-old male with history of bladder cancer, urostomy, CKD stage III, DVT on Coumadin, diabetes, obesity, NSTEMI, left bundle branch block who was recently hospitalized for CHF exacerbation.  Patient presented to [facility] on 11/18 with concerns for generalized weakness and shortness of breath.  On arrival to ER, patient was found to be bradycardic, hypothermic , hypotensive and hypoxic.  Patient was noted to have acute hypoxic respiratory failure, lactic acidosis and in sepsis requiring monitoring in ICU on BiPAP.  On admission patient also noted to be hyperkalemic with significantly elevated BNP and troponin.  UA showed evidence of UT, however urine cx no growth however sample contaminated. Work-up with CT head was unremarkable.  CT chest showed groundglass infiltrates in both lungs.  Patient currently is off BiPAP, patient now stable and transferred out of ICU on 11/20/2022.  Noted to be troponin elevated, pt has no anginal symptoms. Cardiology planning for angiogram on 11/28.      NSTEMI   -Continue to monitor on telemetry  -EKG showed no acute ST changes on admission.  -Repeat ECHO on 11/22 showed EF 45% and abnormal septal motion  -Cardiology consulted, planned for angiogram on 11/28.   -cp overnight on 11/25; heparin gtt started      CHF exacerbation/Acute on chronic diastolic heart failure  -Patient noted to have significantly elevated BNP>3000 on admission.  -Was given IV Lasix, with minimal response.  It was then switched to IV Bumex.  -On PO Bumex now, managed by cardiology. Holding for now in anticipation of angiogram.  -Monitor intake/output, daily weight monitoring  -Echocardiogram from 10/22 showed EF of 50-55%      Sepsis likely 2/2 to pneumonia and UTI  Metabolic encephalopathy - resolved.   -On admission patient was found to be  bradycardic, hypothermic, hypotensive and hypoxic. Also,  noted to have lactic acidosis.  -CT chest showed bilateral groundglass opacities, L>R.  -UA suggestive of UTI, urine culture shows no growth however sample appears to be contaminated.  -Blood cultures negative.  Sputum culture unable to collect sample.   -Started on meropenem given history of penicillin allergy.  Continue meropenem for total of 7 days.  -Continue supplemental oxygen as needed, currently on room air.        Acute hypoxic respiratory failure likely secondary to pneumonia and CHF exacerbation  -Patient briefly needed BiPAP on admission, currently weaned off  -Continue supplemental oxygen prn     Bradycardia on admission was likely thought secondary to his metoprolol.  -Heart rate currently stable as metoprolol on hold.     Lactic acidosis-resolved currently.     History of DVT/history of paroxysmal A. Fib  Holding PTA metoprolol given his recent bradycardia  On PTA warfarin, currently on hold in anticipation of angiogram 11/28        Left-arm numbness  -CT head unremarkable.  -MRI head and neck shows foci of restricted diffusion in bilateral globus pallidus, findings seen usually in carbon monoxide poisoning or other toxic metabolic etiologies.  -Neurology following, appreciate input. Signed off now.   -Carbon monoxide levels WNL but however was checked few days after admission hence cannot be ruled out just based on levels.   -MRV neg for sinus thrombosis.     Diabetes  -A1c 7.7  -Continue current sliding scale  -Monitor blood sugars, hypoglycemia protocol.     Hyperkalemia - resolved.     ROBERT on CKD  -Metabolic acidosis   -Likely In the setting of sepsis.  -Baseline creat around 2.3, creat peaked to 3.7 this admission.  -Creat 2.52 today.  -Minimize nephrotoxins, continue to monitor.  -Renal following. Holding bumex , hydrate prior to angiogram.     Transaminitis - resolved.      Rib fracture/Lumbar fracture  Acute fracture of right ninth,  "10th and 11th ribs seen.Questionable acute to subacute fracture of right 12th rib seen on lumbar spine CT.  -Acute to subacute right L1-L2 and L3 transverse process fractures seen.  -Seen by NSG,appears stable and pt can follow up as out-pt if needed  -Lidocaine patch ordered for pain control.         Barriers to Discharge:  Angiogram,placement      Diet: Snacks/Supplements Adult: Glucerna; With Meals  NPO for Medical/Clinical Reasons Except for: Meds, Ice Chips    DVT Prophylaxis: Heparin   Otero Catheter: Not present  Central Lines: PRESENT  PICC 11/18/22 Triple Lumen Left Basilic Vascular Access-Site Assessment: WDL except;Bleeding  Cardiac Monitoring: ACTIVE order. Indication: Chest pain/ ACS rule out (24 hours)  Code Status: No CPR- Do NOT Intubate      Disposition Plan      Expected Discharge Date: 11/29/2022    Discharge Delays: Specialist Consult (enter specialist & decision needed in comments)  Procedure Pending (enter procedure & time in comments)    Discharge Comments: Angio 11/28. Heparin drip  need INR low enough  then bridge        The patient's care was discussed with the Patient.    Cassie Leone MD  Hospitalist Service  [facility]  Securely message with the Vocera Web Console (learn more here)  Text page via [employer] Paging/Directory         Clinically Significant Risk Factors         # Hyponatremia: Lowest Na = 132 mmol/L (Ref range: 136-145) in last 2 days, will monitor as appropriate      # Hypoalbuminemia: Lowest albumin = 2.9 g/dL (Ref range: 3.5-5.2) at 11/20/2022  4:07 AM, will monitor as appropriate         # DMII: A1C = 7.7 % (Ref range: <5.7 %) within past 3 months   # Obesity: Estimated body mass index is 34.16 kg/m  as calculated from the following:    Height as of this encounter: 1.778 m (5' 10\").    Weight as of this encounter: 108 kg (238 lb 1.6 oz).   # Severe Malnutrition: based on nutrition assessment    "     ______________________________________________________________________    Interval History   No cp/sob, no f/c, no n/v    Data reviewed today: I reviewed all medications, new labs and imaging results over the last 24 hours.     Physical Exam   Vital Signs: Temp: 98.3  F (36.8  C) Temp src: Oral BP: 115/72 Pulse: 71   Resp: 16 SpO2: 95 % O2 Device: None (Room air)    Weight: 238 lbs 1.6 oz  General.  Awake alert oriented not in acute distress.  HEENT.  Pupils equal round react to light, anicteric, EOM intact.  Neck supple no JVD.  CVS regular rhythm no murmur gallops.  Lungs.  Clear to auscultation bilateral no wheezing or rales.  Abdomen.  Soft nontender bowel sounds present.  Extremities.  No edema no calf tenderness.  Neurological.  Awake and alert. No focal deficit.  Skin no rash. No pallor.  Psych. Normal mood.       Data   Recent Labs   Lab 11/28/22  0736 11/28/22  0608 11/27/22  2123 11/27/22  0814 11/27/22  0609 11/26/22  0833 11/26/22  0634   WBC  --  7.8  --   --  7.7  --  7.3   HGB  --  11.1*  --   --  11.6*  --  11.7*   MCV  --  97  --   --  96  --  96   PLT  --  185  --   --  174  --  179   INR  --  1.34*  --   --  1.56*  --  1.88*   NA  --  135*  --   --  132*  --  135*   POTASSIUM  --  3.9  --   --  3.8  --  3.7   CHLORIDE  --  100  --   --  96*  --  99   CO2  --  26  --   --  25  --  23   BUN  --  67.9*  --   --  65.9*  --  65.1*   CR  --  2.16*  --   --  2.13*  --  2.16*   ANIONGAP  --  9  --   --  11  --  13   GALLO  --  9.0  --   --  8.9  --  9.1   * 160* 168*   < > 159*   < > 173*   ALBUMIN  --  3.5  --   --  3.5  --  3.9   PROTTOTAL  --  6.8  --   --  6.6  --  6.9   BILITOTAL  --  1.0  --   --  1.0  --  1.0   ALKPHOS  --  62  --   --  62  --  65   ALT  --  34  --   --  36  --  41   AST  --  30  --   --  40  --  43    < > = values in this interval not displayed.     No results found for this or any previous visit (from the past 24 hour(s)).  Medications     heparin 1,300 Units/hr  (11/28/22 0125)     - MEDICATION INSTRUCTIONS -         allopurinol  200 mg Oral Daily     [Held by provider] bumetanide  1 mg Oral Daily     ezetimibe  10 mg Oral Daily     hydrocortisone   Topical BID     insulin aspart  1-12 Units Subcutaneous TID w/meals     insulin aspart  1-7 Units Subcutaneous At Bedtime     lidocaine  1 patch Transdermal Q24H     lidocaine   Transdermal Q8H YUSRA     metoprolol tartrate  25 mg Oral BID     multivitamin, therapeutic  1 tablet Oral Daily     pantoprazole  40 mg Oral QAM AC     sodium chloride (PF)  10-40 mL Intracatheter Q7 Days     traZODone  200 mg Oral At Bedtime     [Held by provider] Warfarin Therapy Reminder  1 each Oral See Admin Instructions

## 2022-11-28 NOTE — PROGRESS NOTES
"  '    [facility] ([facility]) progress note  CC: F/U ROBERT  S: No new complaints overnight. Anticipating cor angio this afternoon. IVF ordered pre+Post. No CP, dyspnea, dizziness or other new complaints.    A/P:   ASSESSMENT:   83 yo male with h/o CKD 4, HTN. DM2, bladder cancer s/p cystectomy/urostomy admit with pneumonia, CHF, now with plan for angiogram     PLAN:  1. CKD 4 - has baseline CKD due to atrophic kidney, DM2, HTN, vascular disease, also with cystectomy/urostomy.  Has had labile fluctuations in creatinine associated with diuresis.  Currently renal function improved.  At increased risk for contrast nephropathy with angiogram   - Holding Bumex temporarily + IVF given for 11/28 angio.   - Trend daily labs    2. HTN - with recent orthostatic hypotension  -controlled now, no change in meds    3. NSTEMI - plan for angiogram 11/28, see above    Lonnie Hunt MD  [facility]  [phone] (off)          No interval changes to past medical history, social history or family history to report.    /64 (BP Location: Right arm, Patient Position: Semi-Wills's)   Pulse 68   Temp 97.6  F (36.4  C) (Oral)   Resp 18   Ht 1.778 m (5' 10\")   Wt 108 kg (238 lb 1.6 oz)   SpO2 93%   BMI 34.16 kg/m      I/O last 3 completed shifts:  In: 1196 [P.O.:1080; Other:116]  Out: 1350 [Urine:1350]    Physical Exam:   Gen: NAD, alert  HEENT: No icterus, NC/AT  CARDIOVASCULAR: trace LE edema  PULMONARY: CTAB,  No cyanosis  GASTROINTESTINAL: ND  MSK: Diffuse muscle atrophy, warm  NEURO: Alert, no gross focal findings  PSYCHIATRIC: Normal affect, answers questions appropriately  SKIN: Pale,  no rash. Scattered skin tears/bruising    Recent Labs   Lab 11/28/22  0608 11/27/22  0609 11/26/22  0634 11/25/22  1840 11/25/22  0634 11/24/22  0544 11/23/22  0650   * 132* 135* 135* 135* 135* 135*   POTASSIUM 3.9 3.8 3.7 4.1 4.4 4.4 4.7   CHLORIDE 100 96* 99 101 102 98 102   CO2 26 25 23 21* 25 23 24   BUN 67.9* 65.9* " 65.1* 66.9* 69.1* 67.8* 54.8*   CR 2.16* 2.13* 2.16* 2.31* 2.52* 2.67* 1.99*   GFRESTIMATED 30* 30* 30* 28* 25* 23* 33*   GALLO 9.0 8.9 9.1 8.8 8.5* 8.7* 9.0   MAG  --   --   --  2.0  --   --   --    ALBUMIN 3.5 3.5 3.9  --  3.0* 3.1* 3.0*       Recent Labs   Lab 11/28/22  0608 11/27/22  0609 11/26/22  0634 11/25/22  2344 11/25/22  0634 11/24/22  0544 11/23/22  0650   WBC 7.8 7.7 7.3 7.3 7.1 10.1 9.9   HGB 11.1* 11.6* 11.7* 11.1* 11.2* 12.7* 13.3   HCT 34.1* 34.7* 35.2* 33.8* 34.6* 39.1* 39.2*   MCV 97 96 96 98 98 97 95    174 179 154 169 204 228             Current Facility-Administered Medications:      acetaminophen (TYLENOL) tablet 1,000 mg, 1,000 mg, Oral, Q6H PRN, Shefali Kulkarni APRN CNP, 1,000 mg at 11/26/22 2050     allopurinol (ZYLOPRIM) tablet 200 mg, 200 mg, Oral, Daily, Shefali Kulkarni APRN CNP, 200 mg at 11/27/22 0833     aspirin (ASA) tablet 325 mg, 325 mg, Oral, Once **OR** aspirin (ASA) chewable tablet 243 mg, 243 mg, Oral, Once, Milla Neff, CNP     bisacodyl (DULCOLAX) EC tablet 5 mg, 5 mg, Oral, Daily PRN **OR** bisacodyl (DULCOLAX) EC tablet 10 mg, 10 mg, Oral, Daily PRN **OR** bisacodyl (DULCOLAX) EC tablet 15 mg, 15 mg, Oral, Daily PRN, Shefali Kulkarni APRN CNP, 15 mg at 11/27/22 1344     [Held by provider] bumetanide (BUMEX) tablet 1 mg, 1 mg, Oral, Daily, Tariq Angela DO, 1 mg at 11/27/22 0836     glucose gel 15-30 g, 15-30 g, Oral, Q15 Min PRN **OR** dextrose 50 % injection 25-50 mL, 25-50 mL, Intravenous, Q15 Min PRN **OR** glucagon injection 1 mg, 1 mg, Subcutaneous, Q15 Min PRN, Shefali Kulkarni, APRN CNP     ezetimibe (ZETIA) tablet 10 mg, 10 mg, Oral, Daily, Solange Augustin MD, 10 mg at 11/27/22 0834     fentaNYL (PF) (SUBLIMAZE) injection 25 mcg, 25 mcg, Intravenous, Once PRN, Milla Neff, CNP     Give 1/2 (50%) dose of long acting insulin. Insulin glargine (LANTUS, TOUJEO) or insulin detemir (LEVEMIR) the morning of the cardiac procedure, , Does not  apply, Continuous PRN, Milla Neff, CNP     Give 1/2 (50%) of the usual morning dose of intermediate acting insulin NPH (N) the morning of the cardiac procedure. PHARMACIST to enter an order for 1/2 (50%) of the usual morning dose of intermediate acting insulin NPH (N)., , Does not apply, Continuous PRN, Aishwarya Milla C, CNP     heparin infusion 25,000 units in D5W 250 mL ANTICOAGULANT, 0-5,000 Units/hr, Intravenous, Continuous, Nory Bustamante MD, Last Rate: 13 mL/hr at 11/28/22 1033, 1,300 Units/hr at 11/28/22 1033     Hold:  enoxaparin (LOVENOX) pre-procedure, , Does not apply, HOLDAishwarya Nicole C, CNP     Hold:  heparin IV on arrival to the cardiac cath lab procedure, , Does not apply, HOLD, Milla Neff C, CNP     HOLD: All rapid/short acting insulin (aspart, lispro, regular), , Does not apply, HOLD, Aishwarya Milla C, CNP     HOLD: apixaban (ELIQUIS) 48 hours pre-procedure, , Does not apply, HOLD, Milla Neff C, CNP     HOLD: dabigatran (PRADAXA) 48 hours pre-procedure (IF CrCl LESS than 50 mL/min), , Does not apply, HOLD, Aishwarya Milla C, CNP     Hold: Metformin and metformin containing medications on day of the procedure and for 48 hours after IV contrast given- Patients with acute kidney injury or severe chronic kidney disease (stage IV or stage V; i.e., eGFR less than 30), , Does not apply, HOLD, iAshwarya Milla C, CNP     HOLD: metformin and metformin containing medications, , Does not apply, HOLD, Abdelrahman Mazariegos MD     HOLD: rivaroxaban (XARELTO) 48 hours pre-procedure, , Does not apply, HOLD, Aishwarya Milla C, CNP     Hold: warfarin (COUMADIN) pre-procedure, , Does not apply, HOLD, Milla Neff C, CNP     hydrocortisone 2.5 % cream, , Topical, BID, Shefali Kulkarni APRN CNP, Given at 11/28/22 0810     insulin aspart (NovoLOG) injection (RAPID ACTING), 1-12 Units, Subcutaneous, TID w/meals, Shefali Kulkarni, CARLOS CNP,  2 Units at 11/28/22 0808     insulin aspart (NovoLOG) injection (RAPID ACTING), 1-7 Units, Subcutaneous, At Bedtime, Shefali Kulkarni APRN CNP, 1 Units at 11/21/22 2124     Lidocaine (LIDOCARE) 4 % Patch 1 patch, 1 patch, Transdermal, Q24H, Solange Augustin MD, 1 patch at 11/27/22 1229     lidocaine (LMX4) cream, , Topical, Q1H PRN, Milla Neff CNP     lidocaine 1 % 0.1-1 mL, 0.1-1 mL, Other, Q1H PRN, Milla Neff CNP     lidocaine patch in PLACE, , Transdermal, Q8H [facility], Solange Augustin MD     metoprolol tartrate (LOPRESSOR) tablet 25 mg, 25 mg, Oral, BID, Kike Ramon MD, 25 mg at 11/28/22 0808     midazolam (VERSED) injection 0.5 mg, 0.5 mg, Intravenous, Once PRN, Milla Neff CNP     multivitamin, therapeutic (THERA-VIT) tablet 1 tablet, 1 tablet, Oral, Daily, Solange Augustin MD, 1 tablet at 11/27/22 0834     ondansetron (ZOFRAN ODT) ODT tab 4 mg, 4 mg, Oral, Q6H PRN **OR** ondansetron (ZOFRAN) injection 4 mg, 4 mg, Intravenous, Q6H PRN, Shefali Kulkarni APRN CNP, 4 mg at 11/23/22 1655     pantoprazole (PROTONIX) EC tablet 40 mg, 40 mg, Oral, QAM AC, Shefali Kulkarni APRN CNP, 40 mg at 11/28/22 0608     Patient is already receiving anticoagulation with heparin, enoxaparin (LOVENOX), warfarin (COUMADIN)  or other anticoagulant medication, , Does not apply, Continuous PRN, Shefali Kulkarni APRN CNP     Patient is NOT allergic to contrast dye, , Does not apply, DOES NOT GO TO MARAishwarya Nicole C, CNP     Patient may administer subcutaneous insulin via a patient's personal insulin infusion pump prior to the cath lab procedure and prior to patient sedation medications being given IF the provider approves. Nurse to document the doses/rates the patient is receiving prior to the procedure. Once sedation medications are given, the patient will no longer independently control the personal insulin infusion pump., , Does not apply, Continuous PRN, Milla Neff,  CNP     polyethylene glycol (MIRALAX) Packet 17 g, 17 g, Oral, Daily PRN, Cassie Leone MD, 17 g at 11/27/22 1823     prochlorperazine (COMPAZINE) injection 5 mg, 5 mg, Intravenous, Q6H PRN **OR** prochlorperazine (COMPAZINE) tablet 5 mg, 5 mg, Oral, Q6H PRN **OR** prochlorperazine (COMPAZINE) suppository 12.5 mg, 12.5 mg, Rectal, Q12H PRN, Shefali Kulkarni, APRN CNP     sodium chloride (PF) 0.9% PF flush 10-20 mL, 10-20 mL, Intracatheter, q1 min prn, Shefali Kulkarni APRN CNP     sodium chloride (PF) 0.9% PF flush 10-40 mL, 10-40 mL, Intracatheter, Q7 Days, Shefali Kulkarni, APRN CNP, 30 mL at 11/25/22 1708     sodium chloride (PF) 0.9% PF flush 10-40 mL, 10-40 mL, Intracatheter, Q1H PRN, Shefali uKlkarni, APRN CNP     sodium chloride (PF) 0.9% PF flush 3 mL, 3 mL, Intracatheter, Q8H, Aishwarya Milla C, CNP     sodium chloride (PF) 0.9% PF flush 3 mL, 3 mL, Intracatheter, Q1H PRN, Kal[place]iotis, Milla C, CNP     sodium chloride (PF) 0.9% PF flush 3 mL, 3 mL, Intracatheter, q1 min prn, [place]iocarmen, Milla C, CNP     sodium chloride 0.9% infusion, , Intravenous, Continuous, [place]iotis, Milla C, CNP     sodium chloride 0.9% infusion, , Intravenous, Continuous, Lonnie Hunt MD, Last Rate: 100 mL/hr at 11/28/22 1127, New Bag at 11/28/22 1127     temazepam (RESTORIL) capsule 15 mg, 15 mg, Oral, At Bedtime PRN, Solange Augustin MD     traZODone (DESYREL) tablet 200 mg, 200 mg, Oral, At Bedtime, Solange Augustin MD, 200 mg at 11/27/22 2130     [Held by provider] Warfarin Dose Required Daily - Pharmacist Managed, 1 each, Oral, See Admin Instructions, Shefali Kulkarni, CARLOS CNP      Labs personally reviewed today during this evaluation at 12:42 PM

## 2022-11-28 NOTE — PROGRESS NOTES
HEART CARE NOTE          Assessment/Recommendations     1. HFpEF c/b ADHF  Assessment / Plan    Near euvolemia; renal function improved/stable - oral diuretic therapy on hold for coronary angiogram    GDMT as detailed below; mainstay of treatment for HFpEF includes diuretics and adequate BP control (class I) and SGLT2-I (class 2a); additional medical therapy (ARNI, MRA, ARB) demonstrated less robust evidence for indication but may be considered per guideline recommendations (2b); no indication for BBlockers      Current Pharmacotherapy AHA Guideline-Directed Medical Therapy   Losartan  - on hold given ROBERT ARNI/ARB   Spironolactone  - on hold  MRA   SGLT2 inhibitor -  Not started SGLT2-I    Bumetanide 1 mg daily - on hold Loop diuretic       2. NSTEMI  Assessment / Plan    High sensitivity troponin >700 on admission and has continued to trend-up; patient denies chest pain or anginal equivalent    Echo unremarkable on admission - will repeat today    Will hold warfarin in anticipation of coronary angiogram +/- PCI; I discussed this with him including potential risk of stroke, myocardial infarction, or death.  We also discussed the possibility of vascular injury, bleeding requiring blood transfusion, or reaction to x-ray dye - will defer until Monday given renal compromise and INR >2        3. DM2  Assessment / Plan    Management per primary team     4. ROBERT on CKD  Assessment / Plan    Likely large CRS component; crt continues to improve     5. Sepsis  Assessment / Plan    Resolved      History of Present Illness/Subjective      Mr. Eduardo Laughiln is a 82 year old male with a PMHx significant for (per Dr. Augustin's note) bladder cancer, urostomy, CKD stage III, DVT on Coumadin, diabetes, obesity, NSTEMI, left bundle branch block who was recently hospitalized for CHF exacerbation. Labs notable for significantly elevated troponin.NSTEMI      Today, Mr. Laughlin denies any acute cardiac events or  "complaints; Management plan as detailed above     ECG: Personally reviewed. sinus tachycardia, LBBB.     ECHO (personnaly Reviewed):  The left ventricle is normal in size.  The visual ejection fraction is 50-55%.  Septal motion is consistent with conduction abnormality.  No regional wall motion abnormalities noted.  The right ventricular systolic function is normal.  Suspect mild AS  The ascending aorta is Moderately dilated.  There is mild aortic root enlargement.  IVC diameter <2.1 cm collapsing >50% with sniff suggests a normal RA pressure  of 3 mmHg.  Technically difficult, suboptimal study.          Physical Examination Review of Systems   /72 (BP Location: Right arm)   Pulse 71   Temp 98.3  F (36.8  C) (Oral)   Resp 16   Ht 1.778 m (5' 10\")   Wt 108 kg (238 lb 1.6 oz)   SpO2 95%   BMI 34.16 kg/m    Body mass index is 34.16 kg/m .  Wt Readings from Last 3 Encounters:   11/27/22 108 kg (238 lb 1.6 oz)   11/08/22 111.2 kg (245 lb 2.4 oz)   11/04/22 114.3 kg (252 lb)     General Appearance:   no distress, normal body habitus   ENT/Mouth: membranes moist, no oral lesions or bleeding gums.      EYES:  no scleral icterus, normal conjunctivae   Neck: no carotid bruits or thyromegaly   Chest/Lungs:   lungs are clear to auscultation, no rales or wheezing, equal chest wall expansion    Cardiovascular:   Regular. Normal first and second heart sounds with no murmurs, rubs, or gallops; the carotid, radial and posterior tibial pulses are intact, no JVD or LE edema bilaterally    Abdomen:  no organomegaly, masses, bruits, or tenderness; bowel sounds are present   Extremities: no cyanosis or clubbing   Skin: no xanthelasma, warm.    Neurologic: alert and oriented x3, calm     Psychiatric: alert and oriented x3, calm     A complete 10 systems ROS was reviewed  And is negative except what is listed in the HPI.          Medical History  Surgical History Family History Social History   Past Medical History: "   Diagnosis Date     Acute renal failure with acute tubular necrosis superimposed on stage 3 chronic kidney disease (H)      Arteriosclerotic cardiovascular disease (ASCVD)      Atrial fibrillation (H)      Bladder cancer (H)      BPH (benign prostatic hypertrophy)      Chronic kidney disease      Complicated UTI (urinary tract infection) 08/25/2019     Congestive heart failure (H)      Coronary artery disease      Diabetes mellitus (H)      Diabetic neuropathy (H)      E coli bacteremia      GERD (gastroesophageal reflux disease)      Gout      Hyperlipidemia      Hypertension      Hyponatremia      Iliac artery aneurysm, 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    Added automatically from request for surgery [id number]     Kidney stone      Osteoarthritis      Personal history of malignant neoplasm of prostate 04/27/2020     Prostate cancer (H)      Sleep apnea     CPAP    Past Surgical History:   Procedure Laterality Date     CARDIAC CATHETERIZATION  03/28/2008    and coronary angios     CYSTECTOMY       CYSTOSCOPY       CYSTOSCOPY N/A 12/23/2015    Procedure: CYSTOSCOPY BLADDER BIOPSIES with Fulgeration and Placement of Otero ;  Surgeon: Gordon Bajwa MD;  Location: [facility] [place];  Service:      IR EXTREMITY ANGIOGRAM LEFT  3/22/2019     IR EXTREMITY ANGIOGRAM LEFT  3/22/2019     IR ILEAL CONDUIT INJECTION  12/23/2016     IR NEPHROSTOMY TUBE PLACEMENT RIGHT  8/18/2016     IR URETEROSTOMY TUBE CHANGE RIGHT  9/21/2016     KIDNEY STONE SURGERY  x4     LAMINECTOMY LUMBAR ONE LEVEL Bilateral 9/13/2022    Procedure: LUMBAR 4 - LUMBAR 5 BILATERAL MEDIAL FACETECTOMY AND DECOMPRESSION;  Surgeon: Everett Holland MD;  Location: [facility]     PICC TRIPLE LUMEN PLACEMENT  11/18/2022          PROSTATE SURGERY      no family history of premature coronary artery disease Social History     Socioeconomic History     Marital status:      Spouse name: Not on file     Number of children: Not on file     Years of  education: Not on file     Highest education level: Not on file   Occupational History     Not on file   Tobacco Use     Smoking status: Former     Smokeless tobacco: Never     Tobacco comments:     Quit smoking 30 years ago   Vaping Use     Vaping Use: Never used   Substance and Sexual Activity     Alcohol use: Not Currently     Drug use: Never     Sexual activity: Not Currently   Other Topics Concern     Not on file   Social History Narrative     Not on file     Social Determinants of Health     Financial Resource Strain: Not on file   Food Insecurity: Not on file   Transportation Needs: Not on file   Physical Activity: Not on file   Stress: Not on file   Social Connections: Not on file   Intimate Partner Violence: Not on file   Housing Stability: Not on file           Lab Results    Chemistry/lipid CBC Cardiac Enzymes/BNP/TSH/INR   Lab Results   Component Value Date    CHOL 168 03/03/2022    HDL 38 (L) 03/03/2022    TRIG 117 03/03/2022    BUN 65.9 (H) 11/27/2022     (L) 11/27/2022    CO2 25 11/27/2022    Lab Results   Component Value Date    WBC 7.7 11/27/2022    HGB 11.6 (L) 11/27/2022    HCT 34.7 (L) 11/27/2022    MCV 96 11/27/2022     11/27/2022    Lab Results   Component Value Date    TROPONINI 0.10 04/25/2020     (H) 04/25/2020    INR 1.56 (H) 11/27/2022     Lab Results   Component Value Date    TROPONINI 0.10 04/25/2020          Weight:    Wt Readings from Last 3 Encounters:   11/27/22 108 kg (238 lb 1.6 oz)   11/08/22 111.2 kg (245 lb 2.4 oz)   11/04/22 114.3 kg (252 lb)       Allergies  Allergies   Allergen Reactions     Metoprolol Succinate Er      Other reaction(s): low blood pressure     Pcn [Penicillins] Other (See Comments)     Childhood-doesn't know reactions     Statin Drugs [Hmg-Coa-R Inhibitors] Cramps     Uroxatral [Alfuzosin] Other (See Comments)     hypotension         Surgical History  Past Surgical History:   Procedure Laterality Date     CARDIAC CATHETERIZATION   03/28/2008    and coronary angios     CYSTECTOMY       CYSTOSCOPY       CYSTOSCOPY N/A 12/23/2015    Procedure: CYSTOSCOPY BLADDER BIOPSIES with Fulgeration and Placement of Otero ;  Surgeon: Gordon Bajwa MD;  Location: [facility] [place];  Service:      IR EXTREMITY ANGIOGRAM LEFT  3/22/2019     IR EXTREMITY ANGIOGRAM LEFT  3/22/2019     IR ILEAL CONDUIT INJECTION  12/23/2016     IR NEPHROSTOMY TUBE PLACEMENT RIGHT  8/18/2016     IR URETEROSTOMY TUBE CHANGE RIGHT  9/21/2016     KIDNEY STONE SURGERY  x4     LAMINECTOMY LUMBAR ONE LEVEL Bilateral 9/13/2022    Procedure: LUMBAR 4 - LUMBAR 5 BILATERAL MEDIAL FACETECTOMY AND DECOMPRESSION;  Surgeon: Everett Holland MD;  Location: [facility]     PICC TRIPLE LUMEN PLACEMENT  11/18/2022          PROSTATE SURGERY         Social History  Tobacco:   History   Smoking Status     Former   Smokeless Tobacco     Never    Alcohol:   Social History    Substance and Sexual Activity      Alcohol use: Not Currently   Illicit Drugs:   History   Drug Use Unknown       Family History  Family History   Problem Relation Age of Onset     No Known Problems Mother      Prostate Cancer Father      Deep Vein Thrombosis Father      Cancer Sister      Heart Disease Sister      No Known Problems Daughter      Heart Disease Brother      Heart Disease Sister      No Known Problems Daughter           Abdelrahman Mazariegos MD on 11/28/2022      cc: Rafael Montelongo

## 2022-11-29 ENCOUNTER — APPOINTMENT (OUTPATIENT)
Dept: OCCUPATIONAL THERAPY | Facility: HOSPITAL | Age: 82
DRG: 853 | End: 2022-11-29
Attending: INTERNAL MEDICINE
Payer: COMMERCIAL

## 2022-11-29 ENCOUNTER — APPOINTMENT (OUTPATIENT)
Dept: PHYSICAL THERAPY | Facility: HOSPITAL | Age: 82
DRG: 853 | End: 2022-11-29
Payer: COMMERCIAL

## 2022-11-29 ENCOUNTER — TRANSFERRED RECORDS (OUTPATIENT)
Dept: MEDSURG UNIT | Facility: HOSPITAL | Age: 82
End: 2022-11-29

## 2022-11-29 LAB
ALBUMIN SERPL BCG-MCNC: 3.2 G/DL (ref 3.5–5.2)
ALP SERPL-CCNC: 61 U/L (ref 40–129)
ALT SERPL W P-5'-P-CCNC: 34 U/L (ref 10–50)
ANION GAP SERPL CALCULATED.3IONS-SCNC: 9 MMOL/L (ref 7–15)
AST SERPL W P-5'-P-CCNC: 33 U/L (ref 10–50)
ATRIAL RATE - MUSE: 74 BPM
ATRIAL RATE - MUSE: 83 BPM
ATRIAL RATE - MUSE: 85 BPM
BILIRUB SERPL-MCNC: 1.4 MG/DL
BUN SERPL-MCNC: 53.9 MG/DL (ref 8–23)
CALCIUM SERPL-MCNC: 9 MG/DL (ref 8.8–10.2)
CHLORIDE SERPL-SCNC: 105 MMOL/L (ref 98–107)
CREAT SERPL-MCNC: 1.92 MG/DL (ref 0.67–1.17)
DEPRECATED HCO3 PLAS-SCNC: 24 MMOL/L (ref 22–29)
DIASTOLIC BLOOD PRESSURE - MUSE: NORMAL MMHG
ERYTHROCYTE [DISTWIDTH] IN BLOOD BY AUTOMATED COUNT: 14.8 % (ref 10–15)
GFR SERPL CREATININE-BSD FRML MDRD: 34 ML/MIN/1.73M2
GLUCOSE BLDC GLUCOMTR-MCNC: 143 MG/DL (ref 70–99)
GLUCOSE BLDC GLUCOMTR-MCNC: 146 MG/DL (ref 70–99)
GLUCOSE BLDC GLUCOMTR-MCNC: 153 MG/DL (ref 70–99)
GLUCOSE BLDC GLUCOMTR-MCNC: 166 MG/DL (ref 70–99)
GLUCOSE SERPL-MCNC: 142 MG/DL (ref 70–99)
HCT VFR BLD AUTO: 32.5 % (ref 40–53)
HGB BLD-MCNC: 10.7 G/DL (ref 13.3–17.7)
INR PPP: 1.29 (ref 0.85–1.15)
INTERPRETATION ECG - MUSE: NORMAL
MCH RBC QN AUTO: 32 PG (ref 26.5–33)
MCHC RBC AUTO-ENTMCNC: 32.9 G/DL (ref 31.5–36.5)
MCV RBC AUTO: 97 FL (ref 78–100)
P AXIS - MUSE: -1 DEGREES
P AXIS - MUSE: 36 DEGREES
P AXIS - MUSE: 36 DEGREES
PLATELET # BLD AUTO: 195 10E3/UL (ref 150–450)
POTASSIUM SERPL-SCNC: 4 MMOL/L (ref 3.4–5.3)
PR INTERVAL - MUSE: 236 MS
PR INTERVAL - MUSE: 242 MS
PR INTERVAL - MUSE: 244 MS
PROT SERPL-MCNC: 6.4 G/DL (ref 6.4–8.3)
QRS DURATION - MUSE: 174 MS
QRS DURATION - MUSE: 176 MS
QRS DURATION - MUSE: 180 MS
QT - MUSE: 446 MS
QT - MUSE: 452 MS
QT - MUSE: 466 MS
QTC - MUSE: 517 MS
QTC - MUSE: 530 MS
QTC - MUSE: 531 MS
R AXIS - MUSE: -23 DEGREES
R AXIS - MUSE: -30 DEGREES
R AXIS - MUSE: -36 DEGREES
RBC # BLD AUTO: 3.34 10E6/UL (ref 4.4–5.9)
SODIUM SERPL-SCNC: 138 MMOL/L (ref 136–145)
SYSTOLIC BLOOD PRESSURE - MUSE: NORMAL MMHG
T AXIS - MUSE: 121 DEGREES
T AXIS - MUSE: 126 DEGREES
T AXIS - MUSE: 127 DEGREES
VENTRICULAR RATE- MUSE: 74 BPM
VENTRICULAR RATE- MUSE: 83 BPM
VENTRICULAR RATE- MUSE: 85 BPM
WBC # BLD AUTO: 7.8 10E3/UL (ref 4–11)

## 2022-11-29 PROCEDURE — 97116 GAIT TRAINING THERAPY: CPT | Mod: GP

## 2022-11-29 PROCEDURE — 93005 ELECTROCARDIOGRAM TRACING: CPT

## 2022-11-29 PROCEDURE — 80053 COMPREHEN METABOLIC PANEL: CPT | Performed by: HOSPITALIST

## 2022-11-29 PROCEDURE — 250N000013 HC RX MED GY IP 250 OP 250 PS 637: Performed by: INTERNAL MEDICINE

## 2022-11-29 PROCEDURE — 250N000011 HC RX IP 250 OP 636: Performed by: INTERNAL MEDICINE

## 2022-11-29 PROCEDURE — 85027 COMPLETE CBC AUTOMATED: CPT | Performed by: HOSPITALIST

## 2022-11-29 PROCEDURE — 210N000001 HC R&B IMCU HEART CARE

## 2022-11-29 PROCEDURE — 93010 ELECTROCARDIOGRAM REPORT: CPT | Performed by: INTERNAL MEDICINE

## 2022-11-29 PROCEDURE — 97110 THERAPEUTIC EXERCISES: CPT | Mod: GO

## 2022-11-29 PROCEDURE — 85610 PROTHROMBIN TIME: CPT | Performed by: NURSE PRACTITIONER

## 2022-11-29 PROCEDURE — 99233 SBSQ HOSP IP/OBS HIGH 50: CPT | Performed by: INTERNAL MEDICINE

## 2022-11-29 PROCEDURE — G0463 HOSPITAL OUTPT CLINIC VISIT: HCPCS

## 2022-11-29 PROCEDURE — 250N000013 HC RX MED GY IP 250 OP 250 PS 637: Performed by: NURSE PRACTITIONER

## 2022-11-29 PROCEDURE — 97535 SELF CARE MNGMENT TRAINING: CPT | Mod: GO

## 2022-11-29 PROCEDURE — 250N000013 HC RX MED GY IP 250 OP 250 PS 637: Performed by: HOSPITALIST

## 2022-11-29 PROCEDURE — 99207 PR CDG-CUT & PASTE-POTENTIAL IMPACT ON LEVEL: CPT | Performed by: INTERNAL MEDICINE

## 2022-11-29 PROCEDURE — 99232 SBSQ HOSP IP/OBS MODERATE 35: CPT | Performed by: INTERNAL MEDICINE

## 2022-11-29 RX ORDER — ATORVASTATIN CALCIUM 40 MG/1
80 TABLET, FILM COATED ORAL EVERY EVENING
Status: DISCONTINUED | OUTPATIENT
Start: 2022-11-29 | End: 2022-12-01 | Stop reason: HOSPADM

## 2022-11-29 RX ADMIN — HYDROCORTISONE: 25 CREAM TOPICAL at 08:57

## 2022-11-29 RX ADMIN — EZETIMIBE 10 MG: 10 TABLET ORAL at 08:55

## 2022-11-29 RX ADMIN — ALLOPURINOL 200 MG: 100 TABLET ORAL at 08:55

## 2022-11-29 RX ADMIN — CLOPIDOGREL BISULFATE 75 MG: 75 TABLET ORAL at 08:55

## 2022-11-29 RX ADMIN — METOPROLOL TARTRATE 25 MG: 25 TABLET, FILM COATED ORAL at 08:54

## 2022-11-29 RX ADMIN — THERA TABS 1 TABLET: TAB at 08:55

## 2022-11-29 RX ADMIN — Medication 81 MG: at 08:56

## 2022-11-29 RX ADMIN — ATORVASTATIN CALCIUM 80 MG: 40 TABLET, FILM COATED ORAL at 21:46

## 2022-11-29 RX ADMIN — LORAZEPAM 0.5 MG: 2 INJECTION INTRAMUSCULAR; INTRAVENOUS at 00:46

## 2022-11-29 RX ADMIN — LIDOCAINE PATCH 4% 1 PATCH: 40 PATCH TOPICAL at 08:45

## 2022-11-29 RX ADMIN — METOPROLOL TARTRATE 25 MG: 25 TABLET, FILM COATED ORAL at 21:46

## 2022-11-29 RX ADMIN — PANTOPRAZOLE SODIUM 40 MG: 40 TABLET, DELAYED RELEASE ORAL at 06:23

## 2022-11-29 RX ADMIN — TRAZODONE HYDROCHLORIDE 200 MG: 50 TABLET ORAL at 21:47

## 2022-11-29 ASSESSMENT — ACTIVITIES OF DAILY LIVING (ADL)
ADLS_ACUITY_SCORE: 30

## 2022-11-29 NOTE — PROGRESS NOTES
[facility]    Medicine Progress Note - Hospitalist Service    Date of Admission:  11/18/2022    Assessment & Plan   Eduardo is a 82-year-old male with history of bladder cancer, urostomy, CKD stage III, DVT on Coumadin, diabetes, obesity, NSTEMI, left bundle branch block who was recently hospitalized for CHF exacerbation.  Patient presented to [facility] on 11/18 with concerns for generalized weakness and shortness of breath.  On arrival to ER, patient was found to be bradycardic, hypothermic , hypotensive and hypoxic.  Patient was noted to have acute hypoxic respiratory failure, lactic acidosis and in sepsis requiring monitoring in ICU on BiPAP.  On admission patient also noted to be hyperkalemic with significantly elevated BNP and troponin.  UA showed evidence of UT, however urine cx no growth however sample contaminated. Work-up with CT head was unremarkable.  CT chest showed groundglass infiltrates in both lungs.  Patient currently is off BiPAP, patient now stable and transferred out of ICU on 11/20/2022.  Noted to be troponin elevated, pt has no anginal symptoms. Card angiogram and PCI on 11/28.      NSTEMI s/p PCI  -Continue to monitor on telemetry  -EKG showed no acute ST changes on admission.  -Repeat ECHO on 11/22 showed EF 45% and abnormal septal motion  -Cardiology consulted,angiogram and PCI to OM1 with CORA on 11/28.   -defer to card for warfarin, Plavix and ASA      CHF exacerbation/Acute on chronic diastolic heart failure  -Patient noted to have significantly elevated BNP>3000 on admission.  -Was given IV Lasix, with minimal response.  It was then switched to IV Bumex.  -PO Bumex, managed by cardiology.   -Monitor intake/output, daily weight monitoring  -Echocardiogram from 10/22 showed EF of 50-55%      Sepsis likely 2/2 to pneumonia and UTI  Metabolic encephalopathy - resolved.   -On admission patient was found to be bradycardic, hypothermic, hypotensive and hypoxic. Also,   noted to have lactic acidosis.  -CT chest showed bilateral groundglass opacities, L>R.  -UA suggestive of UTI, urine culture shows no growth however sample appears to be contaminated.  -Blood cultures negative.  Sputum culture unable to collect sample.   -Started on meropenem given history of penicillin allergy.  Continue meropenem for total of 7 days.  -Continue supplemental oxygen as needed, currently on room air.        Acute hypoxic respiratory failure likely secondary to pneumonia and CHF exacerbation  -Patient briefly needed BiPAP on admission, currently weaned off  -Continue supplemental oxygen prn     Bradycardia on admission was likely thought secondary to his metoprolol.  -Heart rate currently stable as metoprolol on hold.     Lactic acidosis-resolved currently.     History of DVT/history of paroxysmal A. Fib  Holding PTA metoprolol given his recent bradycardia  On PTA warfarin, currently on hold due to angiogram 11/28; restart when ok with card        Left-arm numbness  -CT head unremarkable.  -MRI head and neck shows foci of restricted diffusion in bilateral globus pallidus, findings seen usually in carbon monoxide poisoning or other toxic metabolic etiologies.  -Neurology following, appreciate input. Signed off now.   -Carbon monoxide levels WNL but however was checked few days after admission hence cannot be ruled out just based on levels.   -MRV neg for sinus thrombosis.     Diabetes  -A1c 7.7  -Continue current sliding scale  -Monitor blood sugars, hypoglycemia protocol.     Hyperkalemia - resolved.     ROBERT on CKD  -Metabolic acidosis   -Likely In the setting of sepsis.  -Baseline creat around 2.3, creat peaked to 3.7 this admission.  -Creat 2.52 today.  -Minimize nephrotoxins, continue to monitor.  -Renal following.      Transaminitis - resolved.      Rib fracture/Lumbar fracture  Acute fracture of right ninth, 10th and 11th ribs seen.Questionable acute to subacute fracture of right 12th rib seen  "on lumbar spine CT.  -Acute to subacute right L1-L2 and L3 transverse process fractures seen.  -Seen by NSG,appears stable and pt can follow up as out-pt if needed  -Lidocaine patch ordered for pain control.       Diet: Snacks/Supplements Adult: Glucerna; With Meals  Low Saturated Fat Na <2400 mg    DVT Prophylaxis: Pneumatic Compression Devices; defer to card to restart Warfarin  Otero Catheter: Not present  Central Lines: PRESENT  PICC 11/18/22 Triple Lumen Left Basilic Vascular Access-Site Assessment: WDL except (old drainage)  Cardiac Monitoring: ACTIVE order. Indication: Post- PCI/Angiogram (24 hours)  Code Status: Full Code      Disposition Plan      Expected Discharge Date: 11/30/2022    Discharge Delays: Specialist Consult (enter specialist & decision needed in comments)  Procedure Pending (enter procedure & time in comments)    Discharge Comments: Angio 11/28. Heparin drip  need INR low enough  then bridge     Await card/nephrologist to clear for discharge to home with [facility]     The patient's care was discussed with the Patient.    Cassie Leone MD  Hospitalist Service  [facility] [place]  Securely message with the Vocera Web Console (learn more here)  Text page via [employer] Paging/Directory         Clinically Significant Risk Factors         # Hyponatremia: Lowest Na = 135 mmol/L (Ref range: 136-145) in last 2 days, will monitor as appropriate      # Hypoalbuminemia: Lowest albumin = 2.9 g/dL (Ref range: 3.5-5.2) at 11/20/2022  4:07 AM, will monitor as appropriate         # DMII: A1C = 7.7 % (Ref range: <5.7 %) within past 3 months   # Obesity: Estimated body mass index is 34.35 kg/m  as calculated from the following:    Height as of this encounter: 1.778 m (5' 10\").    Weight as of this encounter: 108.6 kg (239 lb 6.4 oz).   # Severe Malnutrition: based on nutrition assessment        ______________________________________________________________________    Interval History   S/p angiogram and " PCI, anxiety and elevated bp noticed; improved with hydralazine, Ativan. Currently denies cp/sob. Right wrist bruise and mild tenderness.     Data reviewed today: I reviewed all medications, new labs and imaging results over the last 24 hours..    Physical Exam   Vital Signs: Temp: 98.2  F (36.8  C) Temp src: Oral BP: 123/85 Pulse: 79   Resp: 18 SpO2: 95 % O2 Device: None (Room air) Oxygen Delivery: 2 LPM  Weight: 239 lbs 6.4 oz  General.  Awake alert oriented not in acute distress. Obese  HEENT.  Pupils equal round react to light, anicteric, EOM intact.  Neck supple no JVD.  CVS regular rhythm no murmur gallops.  Lungs.  Clear to auscultation bilateral no wheezing or rales.  Abdomen.  Soft nontender bowel sounds present.  Extremities.  Right wrist bruise with tenderness.  Neurological.  Awake and alert. No focal deficit.  Skin no rash. No pallor.  Psych. Normal mood.       Data   Recent Labs   Lab 11/29/22  0800 11/29/22  0453 11/28/22  2121 11/28/22  0736 11/28/22  0608 11/27/22  0814 11/27/22  0609   WBC  --  7.8  --   --  7.8  --  7.7   HGB  --  10.7*  --   --  11.1*  --  11.6*   MCV  --  97  --   --  97  --  96   PLT  --  195  --   --  185  --  174   INR  --  1.29*  --   --  1.34*  --  1.56*   NA  --  138  --   --  135*  --  132*   POTASSIUM  --  4.0  --   --  3.9  --  3.8   CHLORIDE  --  105  --   --  100  --  96*   CO2  --  24  --   --  26  --  25   BUN  --  53.9*  --   --  67.9*  --  65.9*   CR  --  1.92*  --   --  2.16*  --  2.13*   ANIONGAP  --  9  --   --  9  --  11   GALLO  --  9.0  --   --  9.0  --  8.9   * 142* 170*   < > 160*   < > 159*   ALBUMIN  --  3.2*  --   --  3.5  --  3.5   PROTTOTAL  --  6.4  --   --  6.8  --  6.6   BILITOTAL  --  1.4*  --   --  1.0  --  1.0   ALKPHOS  --  61  --   --  62  --  62   ALT  --  34  --   --  34  --  36   AST  --  33  --   --  30  --  40    < > = values in this interval not displayed.     Recent Results (from the past 24 hour(s))   Cardiac Catheterization     Narrative      1st Mrg lesion is 90% stenosed.     LAD irregular but no severe stenoses.  LCX huge dominant vessel, with high-grade  lesion of large OM1 (likely   culprit lesion), diffuse moderate plaque in second order  Branches. 80%   stenosis mid-distal LPDA  RCA smaller system, 30-50% mid narrowing into RV marginal branch.     No gradient across aortic valve.    Successful PCI with CORA to mid OM1       Medications     - MEDICATION INSTRUCTIONS -       - MEDICATION INSTRUCTIONS -       - MEDICATION INSTRUCTIONS -       Percutaneous Coronary Intervention orders placed (this is information for BPA alerting)         allopurinol  200 mg Oral Daily     aspirin  81 mg Oral Daily     atorvastatin  80 mg Oral QPM     [Held by provider] bumetanide  1 mg Oral Daily     clopidogrel  75 mg Oral Daily     ezetimibe  10 mg Oral Daily     hydrocortisone   Topical BID     insulin aspart  1-12 Units Subcutaneous TID w/meals     insulin aspart  1-7 Units Subcutaneous At Bedtime     lidocaine  1 patch Transdermal Q24H     lidocaine   Transdermal Q8H YUSRA     metoprolol tartrate  25 mg Oral BID     multivitamin, therapeutic  1 tablet Oral Daily     pantoprazole  40 mg Oral QAM AC     sodium chloride (PF)  10-40 mL Intracatheter Q7 Days     traZODone  200 mg Oral At Bedtime     [Held by provider] Warfarin Therapy Reminder  1 each Oral See Admin Instructions

## 2022-11-29 NOTE — PROGRESS NOTES
HEART CARE NOTE          Assessment/Recommendations     1. HFpEF c/b ADHF  Assessment / Plan    Near euvolemia; renal function improved/stable - oral diuretic therapy on hold s/p coronary angiogram    GDMT as detailed below; mainstay of treatment for HFpEF includes diuretics and adequate BP control (class I) and SGLT2-I (class 2a); additional medical therapy (ARNI, MRA, ARB) demonstrated less robust evidence for indication but may be considered per guideline recommendations (2b); no indication for BBlockers      Current Pharmacotherapy AHA Guideline-Directed Medical Therapy   Losartan  - on hold given ROBERT ARNI/ARB   Spironolactone  - on hold  MRA   SGLT2 inhibitor -  Not started SGLT2-I    Bumetanide 1 mg daily - on hold Loop diuretic       2. NSTEMI  Assessment / Plan    High sensitivity troponin >700 on admission and has continued to trend-up; patient denies chest pain or anginal equivalent    S/p coronary angiogram and PCI to OM1 with CORA    Continue ASA, metoprolol, high in tensity atorvastatin; clopidogrel     3. DM2  Assessment / Plan    Management per primary team     4. ROBERT on CKD  Assessment / Plan    Likely large CRS component, but now compounded by contrast dye load - continue to hold diuretics for today    Nephrology following - appreciate recs     5. Sepsis  Assessment / Plan    Resolved      History of Present Illness/Subjective      Mr. Eduardo Laughlin is a 82 year old male with a PMHx significant for (per Dr. Augustin's note) bladder cancer, urostomy, CKD stage III, DVT on Coumadin, diabetes, obesity, NSTEMI, left bundle branch block who was recently hospitalized for CHF exacerbation. Labs notable for significantly elevated troponin.NSTEMI      Today, Mr. Laughlin denies any acute cardiac events or complaints; Management plan as detailed above     ECG: Personally reviewed. sinus tachycardia, LBBB.     ECHO (personnaly Reviewed):  The left ventricle is normal in size.  The visual ejection  "fraction is 50-55%.  Septal motion is consistent with conduction abnormality.  No regional wall motion abnormalities noted.  The right ventricular systolic function is normal.  Suspect mild AS  The ascending aorta is Moderately dilated.  There is mild aortic root enlargement.  IVC diameter <2.1 cm collapsing >50% with sniff suggests a normal RA pressure  of 3 mmHg.  Technically difficult, suboptimal study.          Physical Examination Review of Systems   /73 (BP Location: Right leg)   Pulse 71   Temp 97.9  F (36.6  C) (Oral)   Resp 20   Ht 1.778 m (5' 10\")   Wt 108.6 kg (239 lb 6.4 oz)   SpO2 93%   BMI 34.35 kg/m    Body mass index is 34.35 kg/m .  Wt Readings from Last 3 Encounters:   11/29/22 108.6 kg (239 lb 6.4 oz)   11/08/22 111.2 kg (245 lb 2.4 oz)   11/04/22 114.3 kg (252 lb)     General Appearance:   no distress, normal body habitus   ENT/Mouth: membranes moist, no oral lesions or bleeding gums.      EYES:  no scleral icterus, normal conjunctivae   Neck: no carotid bruits or thyromegaly   Chest/Lungs:   lungs are clear to auscultation, no rales or wheezing, equal chest wall expansion    Cardiovascular:   Regular. Normal first and second heart sounds with no murmurs, rubs, or gallops; the carotid, radial and posterior tibial pulses are intact, no JVD   Abdomen:  no organomegaly, masses, bruits, or tenderness; bowel sounds are present   Extremities: no cyanosis or clubbing   Skin: no xanthelasma, warm.    Neurologic: alert and oriented x3, calm     Psychiatric: alert and oriented x3, calm     A complete 10 systems ROS was reviewed  And is negative except what is listed in the HPI.          Medical History  Surgical History Family History Social History   Past Medical History:   Diagnosis Date     Acute renal failure with acute tubular necrosis superimposed on stage 3 chronic kidney disease (H)      Arteriosclerotic cardiovascular disease (ASCVD)      Atrial fibrillation (H)      Bladder cancer " (H)      BPH (benign prostatic hypertrophy)      Chronic kidney disease      Complicated UTI (urinary tract infection) 08/25/2019     Congestive heart failure (H)      Coronary artery disease      Diabetes mellitus (H)      Diabetic neuropathy (H)      E coli bacteremia      GERD (gastroesophageal reflux disease)      Gout      Hyperlipidemia      Hypertension      Hyponatremia      Iliac artery aneurysm, 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    Added automatically from request for surgery [id number]     Kidney stone      Osteoarthritis      Personal history of malignant neoplasm of prostate 04/27/2020     Prostate cancer (H)      Sleep apnea     CPAP    Past Surgical History:   Procedure Laterality Date     CARDIAC CATHETERIZATION  03/28/2008    and coronary angios     CYSTECTOMY       CYSTOSCOPY       CYSTOSCOPY N/A 12/23/2015    Procedure: CYSTOSCOPY BLADDER BIOPSIES with Fulgeration and Placement of Otero ;  Surgeon: Gordon Bajwa MD;  Location: [facility];  Service:      IR EXTREMITY ANGIOGRAM LEFT  3/22/2019     IR EXTREMITY ANGIOGRAM LEFT  3/22/2019     IR ILEAL CONDUIT INJECTION  12/23/2016     IR NEPHROSTOMY TUBE PLACEMENT RIGHT  8/18/2016     IR URETEROSTOMY TUBE CHANGE RIGHT  9/21/2016     KIDNEY STONE SURGERY  x4     LAMINECTOMY LUMBAR ONE LEVEL Bilateral 9/13/2022    Procedure: LUMBAR 4 - LUMBAR 5 BILATERAL MEDIAL FACETECTOMY AND DECOMPRESSION;  Surgeon: Everett Holland MD;  Location: [facility]     PICC TRIPLE LUMEN PLACEMENT  11/18/2022          PROSTATE SURGERY      no family history of premature coronary artery disease Social History     Socioeconomic History     Marital status:      Spouse name: Not on file     Number of children: Not on file     Years of education: Not on file     Highest education level: Not on file   Occupational History     Not on file   Tobacco Use     Smoking status: Former     Smokeless tobacco: Never     Tobacco comments:     Quit smoking 30 years ago    Vaping Use     Vaping Use: Never used   Substance and Sexual Activity     Alcohol use: Not Currently     Drug use: Never     Sexual activity: Not Currently   Other Topics Concern     Not on file   Social History Narrative     Not on file     Social Determinants of Health     Financial Resource Strain: Not on file   Food Insecurity: Not on file   Transportation Needs: Not on file   Physical Activity: Not on file   Stress: Not on file   Social Connections: Not on file   Intimate Partner Violence: Not on file   Housing Stability: Not on file           Lab Results    Chemistry/lipid CBC Cardiac Enzymes/BNP/TSH/INR   Lab Results   Component Value Date    CHOL 168 03/03/2022    HDL 38 (L) 03/03/2022    TRIG 117 03/03/2022    BUN 67.9 (H) 11/28/2022     (L) 11/28/2022    CO2 26 11/28/2022    Lab Results   Component Value Date    WBC 7.8 11/28/2022    HGB 11.1 (L) 11/28/2022    HCT 34.1 (L) 11/28/2022    MCV 97 11/28/2022     11/28/2022    Lab Results   Component Value Date    TROPONINI 0.10 04/25/2020     (H) 04/25/2020    INR 1.34 (H) 11/28/2022     Lab Results   Component Value Date    TROPONINI 0.10 04/25/2020          Weight:    Wt Readings from Last 3 Encounters:   11/29/22 108.6 kg (239 lb 6.4 oz)   11/08/22 111.2 kg (245 lb 2.4 oz)   11/04/22 114.3 kg (252 lb)       Allergies  Allergies   Allergen Reactions     Metoprolol Succinate Er      Other reaction(s): low blood pressure     Pcn [Penicillins] Other (See Comments)     Childhood-doesn't know reactions     Statin Drugs [Hmg-Coa-R Inhibitors] Cramps     Uroxatral [Alfuzosin] Other (See Comments)     hypotension         Surgical History  Past Surgical History:   Procedure Laterality Date     CARDIAC CATHETERIZATION  03/28/2008    and coronary angios     CYSTECTOMY       CYSTOSCOPY       CYSTOSCOPY N/A 12/23/2015    Procedure: CYSTOSCOPY BLADDER BIOPSIES with Fulgeration and Placement of Otero ;  Surgeon: Gordon Bajwa MD;  Location[facility];  Service:      IR EXTREMITY ANGIOGRAM LEFT  3/22/2019     IR EXTREMITY ANGIOGRAM LEFT  3/22/2019     IR ILEAL CONDUIT INJECTION  12/23/2016     IR NEPHROSTOMY TUBE PLACEMENT RIGHT  8/18/2016     IR URETEROSTOMY TUBE CHANGE RIGHT  9/21/2016     KIDNEY STONE SURGERY  x4     LAMINECTOMY LUMBAR ONE LEVEL Bilateral 9/13/2022    Procedure: LUMBAR 4 - LUMBAR 5 BILATERAL MEDIAL FACETECTOMY AND DECOMPRESSION;  Surgeon: Everett Holland MD;  Location: [facility] OR     PICC TRIPLE LUMEN PLACEMENT  11/18/2022          PROSTATE SURGERY         Social History  Tobacco:   History   Smoking Status     Former   Smokeless Tobacco     Never    Alcohol:   Social History    Substance and Sexual Activity      Alcohol use: Not Currently   Illicit Drugs:   History   Drug Use Unknown       Family History  Family History   Problem Relation Age of Onset     No Known Problems Mother      Prostate Cancer Father      Deep Vein Thrombosis Father      Cancer Sister      Heart Disease Sister      No Known Problems Daughter      Heart Disease Brother      Heart Disease Sister      No Known Problems Daughter           Abdelrahman Mazariegos MD on 11/29/2022      cc: Rafael Montelongo

## 2022-11-29 NOTE — PLAN OF CARE
Problem: Cardiac Catheterization (Diagnostic/Interventional)  Goal: Stable Heart Rate and Rhythm  Outcome: Progressing  Goal: Absence of Bleeding  Outcome: Progressing     Problem: Plan of Care - These are the overarching goals to be used throughout the patient stay.    Goal: Absence of Hospital-Acquired Illness or Injury  Intervention: Identify and Manage Fall Risk  Recent Flowsheet Documentation  Taken 11/29/2022 1240 by Osbaldo Anne RN  Safety Promotion/Fall Prevention:   chair alarm on   clutter free environment maintained   fall prevention program maintained   increased rounding and observation   lighting adjusted   nonskid shoes/slippers when out of bed   patient and family education  Taken 11/29/2022 0858 by Osbaldo Anne, RN  Safety Promotion/Fall Prevention:   chair alarm on   clutter free environment maintained   fall prevention program maintained   increased rounding and observation   lighting adjusted   nonskid shoes/slippers when out of bed   patient and family education  Intervention: Prevent Skin Injury  Recent Flowsheet Documentation  Taken 11/29/2022 1239 by Osbaldo Anne, RN  Body Position: supine  Intervention: Prevent and Manage VTE (Venous Thromboembolism) Risk  Recent Flowsheet Documentation  Taken 11/29/2022 1240 by Osbaldo Anne RN  VTE Prevention/Management: SCDs (sequential compression devices) off  Taken 11/29/2022 0858 by Osbaldo Anne RN  VTE Prevention/Management: SCDs (sequential compression devices) off  Goal: Optimal Comfort and Wellbeing  Intervention: Provide Person-Centered Care  Recent Flowsheet Documentation  Taken 11/29/2022 1240 by Osbaldo Anne RN  Trust Relationship/Rapport:   care explained   choices provided  Taken 11/29/2022 0858 by Osbaldo Anne RN  Trust Relationship/Rapport:   care explained   choices provided     Problem: Risk for Delirium  Goal: Improved Behavioral Control  Intervention: Minimize Safety Risk  Recent Flowsheet  Documentation  Taken 11/29/2022 1240 by Osbaldo Anne RN  Enhanced Safety Measures: chair alarm set  Trust Relationship/Rapport:   care explained   choices provided  Taken 11/29/2022 0858 by Osbaldo Anne RN  Enhanced Safety Measures: chair alarm set  Trust Relationship/Rapport:   care explained   choices provided     Problem: Cardiac Catheterization (Diagnostic/Interventional)  Goal: Absence of Embolism Signs and Symptoms  Intervention: Prevent or Manage Embolism  Recent Flowsheet Documentation  Taken 11/29/2022 1240 by Osbaldo Anne RN  VTE Prevention/Management: SCDs (sequential compression devices) off  Taken 11/29/2022 0858 by Osbaldo Anne RN  VTE Prevention/Management: SCDs (sequential compression devices) off  Goal: Absence of Vascular Access Complication  Intervention: Prevent Access Site Complications  Recent Flowsheet Documentation  Taken 11/29/2022 1240 by Osbaldo Anne RN  Activity Management: up in chair  Taken 11/29/2022 1239 by Osbaldo Anne RN  Activity Management: up in chair  Head of Bed (HOB) Positioning: HOB at 20-30 degrees  Taken 11/29/2022 0858 by Osbaldo Anne RN  Activity Management: up in chair    Patient is A/O, on room air, has denied pain, and there have been no reports of chest discomfort this shift.  The patient's BP has been in the 120s systolic.  A WOCN visited the patient at approximately 1200 and changed his urostomy bag.  Currently the patient has a discharge plan for home with [facility] assisting.  His wife was updated on this, and to what extend will be relayed once determined.    Melvin Anne RN

## 2022-11-29 NOTE — CONSULTS
[facility] Nurse Inpatient Assessment     Consulted for: established urostomy    Patient History (according to provider note(s):      Eduardo Laughlin is a 82 year old male with medical history significant for bladder cancer, urostomy, CKD-stage III, DVT-on Coumadin, LBBB, NSTEMI, T2DM, and morbid obesity.  Patient was recently hospitalized for CHF exacerbation.  Today, patient presented to the emergency room with concerns for recent falls, generalized weakness, and shortness of breath.  He was found to be bradycardia, hypothermic, and hypotensive, with increased work of breathing.  He was placed on Bipap and started on antibiotics. Will be admitted to ICU.      Neuro: Weakness at home.  Frequent episodes of standing too fast, becoming dizzy, and then falling.    Areas Assessed:      Assessment of established end Ileal conduit urinary diversion:  Diagnosis Pertinent to Stoma: Cancer - Bladder      Surgery Date: established   Surgeon:established        Hospital: [facility]  Pouching system in place on assessment today: [place] one piece, pre-cut and convex   Pouch barrier status: 100% melted  Pouch last changed/wear time: 5 days ago   Reason for pouch change today: routine schedule  Effectiveness of current pouching/ supply plan: Recommend continuing current plan  Change made with ostomy management today: Yes  Pouching system placed today: [place] two piece, cut to fit, flat, barrier ring and skin prep   Supplies: gathered, discussed with RN and discussed with patient   Last photo: none taken   Stoma location: RUQ  Stoma size: 1 x 1/2 inches,  Stoma appearance: oval, moist, flabby, flat and retracted  Mucocutaneous junction: established    Peristomal complication(s):   Moisture-Associated Skin Damage (MASD) due to leakage, mechanical damage and pressure injury   Output: thin, yellow and urine   Output volume emptied during visit: 10ml  Ostomy education  "assessment:  Participant of teaching session today: patient  and nurse  Education completed today: Ostomy accessory product use  and Pouch emptying return demonstration  Educational materials/methods: Verbal and Demonstration  Education still needed: other pouching options   Learning Comprehension:   Psychosocial assessment: alert not concerned about peristomal skin or the amount of time he wears his pouch   Patient readiness for education today: attentive  Preparation for discharge completed: Placed prescription recommendations in discharge navigator for MD to sign  Preparation for discharge still needed: Discussed how and when to make an outpatient WOC nurse appointment after discharge  Pt support system on discharge: spouse   WO recommend home care? No  Face to face time: 55 minutes    Treatment Plan:     RUQ Ileal conduit urinary diversion pouching plan:   Pouching system: ostomy supplies pouches: Pateros 57 URINE ([id number]) ostomy supplies barrier: Pateros 57mm FLAT ([id number])  Accessories used: [employer] ostomy accessories: 2\" Adapt Barrier Ring ([id number]), Urostomy Adapter ([id number]), Night Drainage Bottle ([id number]) and Cavilon no sting barrier film ([id number])   Frequency of pouch changes: Twice weekly  WOC follow up plan: As needed   Bedside RN interventions: Change pouch PRN if leaking using the supplies above      Orders: Written    RECOMMEND PRIMARY TEAM ORDER: None, at this time  Education provided: plan of care and Moisture management  Discussed plan of care with: Patient and Nurse  WOC nurse follow-up plan: weekly  Notify WOC if wound(s) deteriorate.  Nursing to notify the Provider(s) and re-consult the WOC Nurse if new skin concern.    DATA:     Current support surface: Standard  Standard gel/foam mattress (IsoFlex, Atmos air, etc)  Containment of urine/stool: Brief and Urostomy pouch  BMI: Body mass index is 34.35 kg/m .   Active diet order: Orders Placed This Encounter      Low Saturated Fat Na <2400 mg   "   Output: I/O last 3 completed shifts:  In: 1590 [P.O.:690; I.V.:900]  Out: 2075 [Urine:2075]     Labs: Recent Labs   Lab 11/29/22  0453   ALBUMIN 3.2*   HGB 10.7*   INR 1.29*   WBC 7.8     Pressure injury risk assessment:   Sensory Perception: 4-->no impairment  Moisture: 3-->occasionally moist  Activity: 3-->walks occasionally  Mobility: 3-->slightly limited  Nutrition: 4-->excellent  Friction and Shear: 2-->potential problem  Guillermo Score: 19    Francia Bashir, JACQUES BS CWOCN

## 2022-11-29 NOTE — PLAN OF CARE
Problem: Heart Failure Comorbidity  Goal: Maintenance of Heart Failure Symptom Control  Outcome: Progressing  Intervention: Maintain Heart Failure Management  Recent Flowsheet Documentation  Taken 11/29/2022 0040 by Becca Fritz RN  Medication Review/Management: medications reviewed     Problem: Cardiac Catheterization (Diagnostic/Interventional)  Goal: Stable Heart Rate and Rhythm  Outcome: Progressing  Goal: Absence of Bleeding  Outcome: Progressing  Goal: Absence of Contrast-Induced Injury  Outcome: Progressing  Goal: Absence of Embolism Signs and Symptoms  Outcome: Progressing  Goal: Acceptable Pain Control  Outcome: Progressing  Intervention: Prevent or Manage Pain  Recent Flowsheet Documentation  Taken 11/29/2022 0040 by Becca Fritz RN  Pain Management Interventions: (Patient says he has anxiety. Pain is better with talking and prn ativan) declines  Goal: Absence of Vascular Access Complication  Outcome: Progressing  Intervention: Prevent Access Site Complications  Recent Flowsheet Documentation  Taken 11/29/2022 0415 by Becca Fritz RN  Activity Management: activity adjusted per tolerance  Head of Bed (HOB) Positioning: HOB at 30 degrees  Taken 11/29/2022 0040 by Becca Fritz RN  Activity Management: activity adjusted per tolerance  Head of Bed (HOB) Positioning: HOB at 30-45 degrees       Goal Outcome Evaluation: Patient is aox4. Patient c/o chest discomfort with breathing. Patient states he is anxious. He states chest discomfort is better with talking and prn IV Ativan. ECG at midnight showed no significant changes. Has SR with 1st degree AV block, BBB, PACs, and PVCs. Patient denied SOB, lightheadedness, and dizziness. Angiogram site/ right radial has bruising. Site marked and remains unchanged. CMS intact. Continue to monitor.

## 2022-11-29 NOTE — PLAN OF CARE
Heart Failure Care Map  GOALS TO BE MET BEFORE DISCHARGE:    1. Decrease congestion and/or edema with diuretic therapy to achieve near optimal volume status.     Dyspnea improved: Yes, satisfactory for discharge.   Edema improved: No, further care required to meet this goal. Please explain : Generalized edema is mild,  LUE +4 edema.        Last 24 hour I/O:   Intake/Output Summary (Last 24 hours) at 11/28/2022 2210  Last data filed at 11/28/2022 1031  Gross per 24 hour   Intake 0 ml   Output 1050 ml   Net -1050 ml           Net I/O and Weights since admission:   10/29 2300 - 11/28 2259  In: 7665 [P.O.:6467; I.V.:773]  Out: 20000 [Urine:84358]  Net: -92330     Vitals:    11/18/22 1101 11/18/22 1620 11/19/22 0400 11/22/22 1455   Weight: 111.1 kg (245 lb) 112.7 kg (248 lb 7.3 oz) 112.3 kg (247 lb 9.2 oz) 109.7 kg (241 lb 14.4 oz)    11/26/22 1411 11/27/22 1021 11/28/22 1214   Weight: 108.1 kg (238 lb 6.4 oz) 108 kg (238 lb 1.6 oz) 108 kg (238 lb)       2.  O2 sats > 90% on room air, or at prior home O2 therapy level.      Able to wean O2 this shift to keep sats above 90%?: Yes, satisfactory for discharge.   Does patient use Home O2? No          Current oxygenation status:   SpO2: 95 %     O2 Device: None (Room air),  3.  Tolerates ambulation and mobility near baseline.     Ambulation: [place] [facility] [place] with R-wrist approach.  The TR band has been in place most of the evening 2/2 oozing.   TR band removed at 2130.    Times patient ambulated with staff this shift: 0    Please review the Heart Failure Care Map for additional HF goal outcomes.    Geovanna Mesa RN  11/28/2022

## 2022-11-29 NOTE — SIGNIFICANT EVENT
Pt began to report mid chest discomfort around 1830. Per pt the chest discomfort was described as pressure and dull; intermittent, non-radiating. Cardiology SEAN Loyola, Dr. Bustamante (Resident MD) and MD Amaya were all notified of chest discomfort. EKG obtained; 2 tabs of SL nitroglycerin given with no relief. BP dropped briefly to 80/50; placed in trendelenburg, IV fluid continued.  BP returned to normal.   Dr. Bustamante came to the bedside and assessed pt. Ativan IV given x 1 for anxiety.

## 2022-11-29 NOTE — DISCHARGE INSTRUCTIONS
"[facility]  WO Nurse Discharge Instructions for New Urostomy / Ileal Conduit     When you get home   - Upon arrival home call the WO Nurses to schedule an outpatient follow up appoint for a few weeks after discharge from hospital.  The appointment is to assess pouching plan, organize supply ordering, etc.    Call the WOCN office at [phone]  - Upon arrival home, call the WO Nurses to schedule an outpatient follow up appoint in a few weeks after discharge from hospital.  The appointment is to assess pouching plan, organize supply ordering, etc.   Call the WOCN office at   [facility] [phone]  [facility]      [phone]      Pouching   1. Change appliance 2x / wk and as needed for any leakage.  Notify the WO if needing to change pouch more than daily and / or if skin is itchy.   2. Empty pouch when no more than 1/3 to half full  3. May shower with pouch on or off.  If showering with pouch on then towel dry or blow dry (on cool setting) the tape barrier and cloth backing on pouch   The pouching procedure:   1.  Use the  Pouch Change Instruction  sheet to gather and organize supplies  2.  Remove old pouch, observe for any leakage  3.  Clean skin with water and paper towel   4.  Compare stoma with old pattern.    Trace and cut out new opening on new barrier.   Remove plastic backing.   If using ring or paste apply to cut barrier now.   5.  Fold back edge of paper that covers the tape margin to create a \"tab\". This assists with paper removal   6.  Set prepared barrier aside   7.   Assure skin is still clean and dry, clean as necessary  8.   Apply prepared barrier to the clean skin and press into place.  9.   Attach pouch to the barrier (if you like you may put the barrier and pouch on as a one piece vs. barrier then pouch- if able to visualize)      10.  Hold hand on pouch/ over stoma to warm pouch into place x 3-4 minutes "   11.  Make sure end of pouch is closed or connect to a drainage bag ( [id number]) to prevent urine from backing up into the pouch.  Cleaning Instructions for the night drainage ba. Empty urine from night drainage bag  2. Rinse bag and tubing with water  3. Soak bag and tubing for 20 minutes in mixture of 1/2 vinegar and 1/2 water.   4. Rinse with cold water and hang over tub or shower to dry  5. IF tubing does not come clean after rinsing and soaking you should replace tubing and/ or Replace drainage bag monthly.      Your Pouching Plan / Supplies                       NOTE:  Once your supply company has been determined call your supply company with supply issues      BARRIER/  POUCH [place] [employer]    BARRIER: Flextend, convex, cut to fit, with tape  #[id number]   (5 per box,  10/month)  POUCH: Ultra clear, urinary#[id number] *  (10/ box and 1 bx/ mo)   ACCESSORIES [employer] Bedside Drainage Bag    1 bag / month  Adapt Barrier Rings # [id number]  10/bx = 1bx/month        Activity     Walk short distances & increase as your strength allows  You may climb stairs  Do not do strenuous exercise or activity such as golfing or heavy lifting  Do not drive until approved by your doctor  Carry an extra pouching system with you or in your car, be prepared!  Bathing:  You may shower with your pouch on or off.  Removing pouch/ barrier down to the skin is ok. Just note that you cannot control output.     Diet and Hydration   Reducing your risk of UTI  Drink 8-10 glasses of fluid each day ( Limit Caffeine)  Drink 2-3 glasses of cranberry or grape juice everyday (caution if taking warfarin)  Take Vitamin C  Wash your hands before changing urostomy  Store supplies in clean and dry area  Do not let pouch get too full  Use drainage bag when sleeping to prevent pouch getting to full  Clean night drainage bag everyday, as instructed.      Avoiding dehydration  Drink plenty of fluids, minimally  eight glasses of water per day  plus if you empty your  pouch, have another glass of fluid  Remember caffeine and alcohol makes you more dehydrated, be sure to add in more fluids if consuming caffeine or alcohol     Follow up   1. Physician - follow instructions from MD for follow ups with them  Contact your physician if you have the following signs or symptoms:  Pain in your incision that is not relieved by a short rest or ordered pain medications  Chills or temperature of 101 or higher  Redness or swelling in your incision    2. WOC Nurses (ostomy nurses)        When you get home make an outpatient appointment with the WOCN nurses to assure your pouching plan is still a good plan, etc  [facility] [phone]  For [facility] appointments you will be instructed to check in at the Registration Desk in the [place] lobby and wait in the lobby for the WO nurse to find you  [facility]      [phone]

## 2022-11-29 NOTE — PROGRESS NOTES
"  '    RENAL ([facility]) progress note  CC: F/U ROBERT  S: No new complaints overnight. Angio results noted. No CP. Bruising at right wrist access site. No dyspnea.      A/P:   ASSESSMENT:   81 yo male with h/o CKD 4, HTN. DM2, bladder cancer s/p cystectomy/urostomy admit with pneumonia, CHF, now with plan for angiogram     PLAN:  1. CKD 4 - has baseline CKD due to atrophic kidney, DM2, HTN, vascular disease, also with cystectomy/urostomy.  Has had labile fluctuations in creatinine associated with diuresis.  Currently renal function improved and no BERENICE noted after 11/28 angiogram.  - OK to resume maintenance diuretics   - Trend daily labs    2. HTN - with recent orthostatic hypotension. Overall stable.   - OK to resume diuretics     3. NSTEMI - S/P CORA to OM-1 on 11/28.     4. Anemia: Hgb drifting down. Trend for now.     Lonnie Hunt MD  [facility]  [phone] (off)          No interval changes to past medical history, social history or family history to report.    BP (!) 164/71 (BP Location: Right leg)   Pulse 74   Temp 98  F (36.7  C) (Oral)   Resp 18   Ht 1.778 m (5' 10\")   Wt 108.6 kg (239 lb 6.4 oz)   SpO2 98%   BMI 34.35 kg/m      I/O last 3 completed shifts:  In: 1950 [P.O.:1050; I.V.:900]  Out: 1425 [Urine:1425]    Physical Exam:   Gen: NAD, alert  HEENT: No icterus, NC/AT  CARDIOVASCULAR: RRR, trace LE edema  PULMONARY: CTAB,  No cyanosis  GASTROINTESTINAL: BS present, soft, no tenderness  NEURO: Alert, no gross focal findings  PSYCHIATRIC: appropriate mood and affect  SKIN: Pale, no rash. Scattered skin tears/bruising    Recent Labs   Lab 11/29/22  0453 11/28/22  0608 11/27/22  0609 11/26/22  0634 11/25/22  1840 11/25/22  0634 11/24/22  0544 11/23/22  0650    135* 132* 135* 135* 135* 135* 135*   POTASSIUM 4.0 3.9 3.8 3.7 4.1 4.4 4.4 4.7   CHLORIDE 105 100 96* 99 101 102 98 102   CO2 24 26 25 23 21* 25 23 24   BUN 53.9* 67.9* 65.9* 65.1* 66.9* 69.1* 67.8* 54.8*   CR 1.92* " 2.16* 2.13* 2.16* 2.31* 2.52* 2.67* 1.99*   GFRESTIMATED 34* 30* 30* 30* 28* 25* 23* 33*   GALLO 9.0 9.0 8.9 9.1 8.8 8.5* 8.7* 9.0   MAG  --   --   --   --  2.0  --   --   --    ALBUMIN 3.2* 3.5 3.5 3.9  --  3.0* 3.1* 3.0*       Recent Labs   Lab 11/29/22  0453 11/28/22  0608 11/27/22  0609 11/26/22  0634 11/25/22  2344 11/25/22  0634 11/24/22  0544   WBC 7.8 7.8 7.7 7.3 7.3 7.1 10.1   HGB 10.7* 11.1* 11.6* 11.7* 11.1* 11.2* 12.7*   HCT 32.5* 34.1* 34.7* 35.2* 33.8* 34.6* 39.1*   MCV 97 97 96 96 98 98 97    185 174 179 154 169 204             Current Facility-Administered Medications:      acetaminophen (TYLENOL) tablet 1,000 mg, 1,000 mg, Oral, Q6H PRN, Shefali Kulkarni APRN CNP, 1,000 mg at 11/26/22 2050     acetaminophen (TYLENOL) tablet 650 mg, 650 mg, Oral, Q4H PRN, Gonzales Hager MD     allopurinol (ZYLOPRIM) tablet 200 mg, 200 mg, Oral, Daily, Shefali Kulkarni APRN CNP, 200 mg at 11/29/22 0855     aspirin EC tablet 81 mg, 81 mg, Oral, Daily, Gonzales Hager MD, 81 mg at 11/29/22 0856     atorvastatin (LIPITOR) tablet 80 mg, 80 mg, Oral, QPM, Abdelrahman Mazariegos MD     bisacodyl (DULCOLAX) EC tablet 5 mg, 5 mg, Oral, Daily PRN **OR** bisacodyl (DULCOLAX) EC tablet 10 mg, 10 mg, Oral, Daily PRN **OR** bisacodyl (DULCOLAX) EC tablet 15 mg, 15 mg, Oral, Daily PRN, Shefali Kulkarni APRN CNP, 15 mg at 11/27/22 1344     [Held by provider] bumetanide (BUMEX) tablet 1 mg, 1 mg, Oral, Daily, Tariq Angela DO, 1 mg at 11/27/22 0836     clopidogrel (PLAVIX) tablet 75 mg, 75 mg, Oral, Daily, Gonzales Hager MD, 75 mg at 11/29/22 0855     Continuing beta blocker from home medication list OR beta blocker order already placed during this visit, , Does not apply, DOES NOT GO TO Madan ROSADO Kenneth W, MD     Continuing statin from home medication list OR statin order already placed during this visit, , Does not apply, DOES NOT GO TO Madan ROSADO Kenneth W, MD     glucose gel 15-30 g, 15-30 g, Oral, Q15 Min PRN  **OR** dextrose 50 % injection 25-50 mL, 25-50 mL, Intravenous, Q15 Min PRN **OR** glucagon injection 1 mg, 1 mg, Subcutaneous, Q15 Min PRN, Shefali Kulkarni APRN CNP     ezetimibe (ZETIA) tablet 10 mg, 10 mg, Oral, Daily, Solange Augustin MD, 10 mg at 11/29/22 0855     HOLD: Metformin and metformin containing medications on day of procedure and 48 hours after IV contrast given for patients with acute kidney injury or severe chronic kidney disease (stage IV or stage V; i.e., eGFR less than 30), , Does not apply, HOLD, Gonzales Hager MD     hydrALAZINE (APRESOLINE) injection 10 mg, 10 mg, Intravenous, Q4H PRN, Gonzales Hager MD     hydrocortisone 2.5 % cream, , Topical, BID, Shefali Kulkarni APRN CNP, Given at 11/29/22 0857     insulin aspart (NovoLOG) injection (RAPID ACTING), 1-12 Units, Subcutaneous, TID w/meals, Shefali Kulkarni APRN CNP, 2 Units at 11/29/22 1235     insulin aspart (NovoLOG) injection (RAPID ACTING), 1-7 Units, Subcutaneous, At Bedtime, Shefali Kulkarni APRN CNP, 1 Units at 11/21/22 2124     Lidocaine (LIDOCARE) 4 % Patch 1 patch, 1 patch, Transdermal, Q24H, Solange Augustin MD, 1 patch at 11/29/22 0845     lidocaine patch in PLACE, , Transdermal, Q8H [facility], Solange Augustin MD     LORazepam (ATIVAN) injection 0.5 mg, 0.5 mg, Intravenous, Q6H PRN, Cassie Leone MD, 0.5 mg at 11/29/22 0046     metoprolol (LOPRESSOR) injection 5 mg, 5 mg, Intravenous, Q15 Min PRN, Gonzales Hager MD, 5 mg at 11/28/22 1816     metoprolol tartrate (LOPRESSOR) tablet 25 mg, 25 mg, Oral, BID, Cassie Leone MD, 25 mg at 11/29/22 0854     multivitamin, therapeutic (THERA-VIT) tablet 1 tablet, 1 tablet, Oral, Daily, Solange Augustin MD, 1 tablet at 11/29/22 0855     nitroGLYcerin (NITROSTAT) sublingual tablet 0.4 mg, 0.4 mg, Sublingual, Q5 Min PRN, Gonzales Hager MD, 0.4 mg at 11/28/22 1841     ondansetron (ZOFRAN ODT) ODT tab 4 mg, 4 mg, Oral, Q6H PRN **OR** ondansetron (ZOFRAN) injection 4 mg, 4 mg,  Intravenous, Q6H PRN, Shefali Kulkarni APRN CNP, 4 mg at 11/23/22 1655     oxyCODONE (ROXICODONE) tablet 5 mg, 5 mg, Oral, Q4H PRN **OR** oxyCODONE (ROXICODONE) tablet 10 mg, 10 mg, Oral, Q4H PRN, Gonzales Hager MD     pantoprazole (PROTONIX) EC tablet 40 mg, 40 mg, Oral, QAM , Shefali Kulkarni APRN CNP, 40 mg at 11/29/22 0623     Patient is already receiving anticoagulation with heparin, enoxaparin (LOVENOX), warfarin (COUMADIN)  or other anticoagulant medication, , Does not apply, Continuous PRN, Shefali Kulkarni APRN CNP     Percutaneous Coronary Intervention orders placed (this is information for BPA alerting), , Does not apply, DOES NOT GO TO Madan ROSADO Kenneth W, MD     polyethylene glycol (MIRALAX) Packet 17 g, 17 g, Oral, Daily PRN, Cassie Leone MD, 17 g at 11/27/22 1823     prochlorperazine (COMPAZINE) injection 5 mg, 5 mg, Intravenous, Q6H PRN **OR** prochlorperazine (COMPAZINE) tablet 5 mg, 5 mg, Oral, Q6H PRN **OR** prochlorperazine (COMPAZINE) suppository 12.5 mg, 12.5 mg, Rectal, Q12H PRN, Shefali Kulkarni APRN CNP     sodium chloride (PF) 0.9% PF flush 10-20 mL, 10-20 mL, Intracatheter, q1 min prn, Shefali Kulkarni APRN CNP     sodium chloride (PF) 0.9% PF flush 10-40 mL, 10-40 mL, Intracatheter, Q7 Days, Shefali Kulkarni APRN CNP, 30 mL at 11/25/22 1708     sodium chloride (PF) 0.9% PF flush 10-40 mL, 10-40 mL, Intracatheter, Q1H PRN, Shefali Kulkarni APRN CNP     temazepam (RESTORIL) capsule 15 mg, 15 mg, Oral, At Bedtime PRN, Solange Augustin MD     traZODone (DESYREL) tablet 200 mg, 200 mg, Oral, At Bedtime, Solange Augustin MD, 200 mg at 11/28/22 2157     [Held by provider] Warfarin Dose Required Daily - Pharmacist Managed, 1 each, Oral, See Admin Instructions, Shefali Kulkarni APRN CNP      Labs personally reviewed today during this evaluation at 12:42 PM

## 2022-11-29 NOTE — CONSULTS
CLINICAL NUTRITION SERVICES - EDUCATION NOTE    Received diet education consult for heart healthy eating     Current diet: Low Saturated Fat Na < 2400 mg  Supplements - Glucerna with meals ordered - pt has not received since coming to the floor (not in health touch - RD added).  He would like one a day with supper.  Intake: 75% of meals, pt says his tongue is tingling from previous hot coffee burn so it interferes with eating and he hasn't felt well.    NUTRITION HISTORY:  Pt eats 3 meals daily  Breakfast - coffee and a roll  Lunch -soup - chicken noodle or clam chowder  Dinner- pt likes steak he also eats chicken, he will eat green beans but, doesn't like many vegetables.  Pt says he does avoid some foods due to DM  ?  NUTRITION DIAGNOSIS:  Food- and nutrition-related knowledge deficit related to heart healthy eating as evidenced by NSTEMI, CHF exacerbation      INTERVENTIONS:  Provided instruction on heart healthy eating- we talked about low fat, low sodium - encouraged pt to buy no salt added soups, eat vegetables and fruit    Provided  the following handouts: Food Choice Guidelines for Heart Healthy Eating with DM    Goals:  Patient verbalizes understanding of diet by listening to information     Follow Up:  Patient to ask any further nutrition-related questions before discharge.     Pt would benefit from outpatient diet education when he is feeling better.

## 2022-11-29 NOTE — PROGRESS NOTES
Patient c/o shortness of breath; on room air with O2 sats 94, 93%.  Denies pain. LCA. Appears a little anxious but in no acute distress. Says feels better after being reassured. Night RN informed of this and that patient has PRN order for Ativan.

## 2022-11-30 ENCOUNTER — APPOINTMENT (OUTPATIENT)
Dept: OCCUPATIONAL THERAPY | Facility: HOSPITAL | Age: 82
DRG: 853 | End: 2022-11-30
Payer: COMMERCIAL

## 2022-11-30 ENCOUNTER — APPOINTMENT (OUTPATIENT)
Dept: PHYSICAL THERAPY | Facility: HOSPITAL | Age: 82
DRG: 853 | End: 2022-11-30
Payer: COMMERCIAL

## 2022-11-30 LAB
ALBUMIN SERPL BCG-MCNC: 3.3 G/DL (ref 3.5–5.2)
ALP SERPL-CCNC: 64 U/L (ref 40–129)
ALT SERPL W P-5'-P-CCNC: 35 U/L (ref 10–50)
ANION GAP SERPL CALCULATED.3IONS-SCNC: 9 MMOL/L (ref 7–15)
AST SERPL W P-5'-P-CCNC: 24 U/L (ref 10–50)
BILIRUB SERPL-MCNC: 1.2 MG/DL
BUN SERPL-MCNC: 45.2 MG/DL (ref 8–23)
CALCIUM SERPL-MCNC: 9 MG/DL (ref 8.8–10.2)
CHLORIDE SERPL-SCNC: 105 MMOL/L (ref 98–107)
CREAT SERPL-MCNC: 1.79 MG/DL (ref 0.67–1.17)
DEPRECATED HCO3 PLAS-SCNC: 23 MMOL/L (ref 22–29)
ERYTHROCYTE [DISTWIDTH] IN BLOOD BY AUTOMATED COUNT: 15.2 % (ref 10–15)
GFR SERPL CREATININE-BSD FRML MDRD: 37 ML/MIN/1.73M2
GLUCOSE BLDC GLUCOMTR-MCNC: 161 MG/DL (ref 70–99)
GLUCOSE BLDC GLUCOMTR-MCNC: 168 MG/DL (ref 70–99)
GLUCOSE BLDC GLUCOMTR-MCNC: 180 MG/DL (ref 70–99)
GLUCOSE SERPL-MCNC: 156 MG/DL (ref 70–99)
HCT VFR BLD AUTO: 32.8 % (ref 40–53)
HGB BLD-MCNC: 10.6 G/DL (ref 13.3–17.7)
INR PPP: 1.27 (ref 0.85–1.15)
MCH RBC QN AUTO: 31.7 PG (ref 26.5–33)
MCHC RBC AUTO-ENTMCNC: 32.3 G/DL (ref 31.5–36.5)
MCV RBC AUTO: 98 FL (ref 78–100)
PLATELET # BLD AUTO: 175 10E3/UL (ref 150–450)
POTASSIUM SERPL-SCNC: 4.1 MMOL/L (ref 3.4–5.3)
PROT SERPL-MCNC: 6.5 G/DL (ref 6.4–8.3)
RBC # BLD AUTO: 3.34 10E6/UL (ref 4.4–5.9)
SODIUM SERPL-SCNC: 137 MMOL/L (ref 136–145)
WBC # BLD AUTO: 8.1 10E3/UL (ref 4–11)

## 2022-11-30 PROCEDURE — 97110 THERAPEUTIC EXERCISES: CPT | Mod: GO

## 2022-11-30 PROCEDURE — 80053 COMPREHEN METABOLIC PANEL: CPT | Performed by: HOSPITALIST

## 2022-11-30 PROCEDURE — 85027 COMPLETE CBC AUTOMATED: CPT | Performed by: HOSPITALIST

## 2022-11-30 PROCEDURE — 250N000013 HC RX MED GY IP 250 OP 250 PS 637: Performed by: INTERNAL MEDICINE

## 2022-11-30 PROCEDURE — 250N000011 HC RX IP 250 OP 636: Performed by: INTERNAL MEDICINE

## 2022-11-30 PROCEDURE — 99232 SBSQ HOSP IP/OBS MODERATE 35: CPT | Performed by: INTERNAL MEDICINE

## 2022-11-30 PROCEDURE — 97116 GAIT TRAINING THERAPY: CPT | Mod: GP

## 2022-11-30 PROCEDURE — 250N000013 HC RX MED GY IP 250 OP 250 PS 637: Performed by: NURSE PRACTITIONER

## 2022-11-30 PROCEDURE — 97110 THERAPEUTIC EXERCISES: CPT | Mod: GP

## 2022-11-30 PROCEDURE — 120N000004 HC R&B MS OVERFLOW

## 2022-11-30 PROCEDURE — 250N000009 HC RX 250: Performed by: INTERNAL MEDICINE

## 2022-11-30 PROCEDURE — 97535 SELF CARE MNGMENT TRAINING: CPT | Mod: GO

## 2022-11-30 PROCEDURE — 99239 HOSP IP/OBS DSCHRG MGMT >30: CPT | Performed by: INTERNAL MEDICINE

## 2022-11-30 PROCEDURE — 85610 PROTHROMBIN TIME: CPT | Performed by: NURSE PRACTITIONER

## 2022-11-30 PROCEDURE — 82040 ASSAY OF SERUM ALBUMIN: CPT | Performed by: HOSPITALIST

## 2022-11-30 PROCEDURE — 250N000013 HC RX MED GY IP 250 OP 250 PS 637: Performed by: HOSPITALIST

## 2022-11-30 RX ORDER — PANTOPRAZOLE SODIUM 40 MG/1
40 TABLET, DELAYED RELEASE ORAL
Qty: 14 TABLET | Refills: 0 | Status: ON HOLD | OUTPATIENT
Start: 2022-12-01 | End: 2022-12-07

## 2022-11-30 RX ORDER — ALLOPURINOL 100 MG/1
200 TABLET ORAL DAILY
Qty: 60 TABLET | Refills: 0 | Status: ON HOLD | OUTPATIENT
Start: 2022-11-30 | End: 2022-12-07

## 2022-11-30 RX ORDER — BUMETANIDE 1 MG/1
1 TABLET ORAL DAILY
Qty: 30 TABLET | Refills: 1 | Status: SHIPPED | OUTPATIENT
Start: 2022-11-30 | End: 2022-12-01

## 2022-11-30 RX ORDER — NITROGLYCERIN 0.4 MG/1
TABLET SUBLINGUAL
Qty: 30 TABLET | Refills: 1 | Status: ON HOLD | OUTPATIENT
Start: 2022-11-30 | End: 2022-12-07

## 2022-11-30 RX ORDER — WARFARIN SODIUM 5 MG/1
5 TABLET ORAL
Status: COMPLETED | OUTPATIENT
Start: 2022-11-30 | End: 2022-11-30

## 2022-11-30 RX ORDER — MULTIVITAMIN,THERAPEUTIC
1 TABLET ORAL DAILY
Qty: 30 TABLET | Refills: 3 | Status: ON HOLD | OUTPATIENT
Start: 2022-12-01 | End: 2022-12-07

## 2022-11-30 RX ORDER — HYDRALAZINE HYDROCHLORIDE 20 MG/ML
10 INJECTION INTRAMUSCULAR; INTRAVENOUS EVERY 4 HOURS PRN
Status: DISCONTINUED | OUTPATIENT
Start: 2022-11-30 | End: 2022-12-01 | Stop reason: HOSPADM

## 2022-11-30 RX ORDER — WARFARIN SODIUM 5 MG/1
5 TABLET ORAL DAILY
Qty: 30 TABLET | Refills: 0 | Status: ON HOLD | OUTPATIENT
Start: 2022-11-30 | End: 2022-12-07

## 2022-11-30 RX ORDER — POLYETHYLENE GLYCOL 3350 17 G/17G
17 POWDER, FOR SOLUTION ORAL DAILY
Qty: 510 G | Refills: 1 | Status: ON HOLD | COMMUNITY
Start: 2022-11-30 | End: 2022-12-07

## 2022-11-30 RX ADMIN — CLOPIDOGREL BISULFATE 75 MG: 75 TABLET ORAL at 09:25

## 2022-11-30 RX ADMIN — ATORVASTATIN CALCIUM 80 MG: 40 TABLET, FILM COATED ORAL at 21:55

## 2022-11-30 RX ADMIN — METOPROLOL TARTRATE 25 MG: 25 TABLET, FILM COATED ORAL at 21:55

## 2022-11-30 RX ADMIN — TRAZODONE HYDROCHLORIDE 200 MG: 50 TABLET ORAL at 21:55

## 2022-11-30 RX ADMIN — ALLOPURINOL 200 MG: 100 TABLET ORAL at 09:25

## 2022-11-30 RX ADMIN — METOPROLOL TARTRATE 25 MG: 25 TABLET, FILM COATED ORAL at 09:25

## 2022-11-30 RX ADMIN — LORAZEPAM 0.5 MG: 2 INJECTION INTRAMUSCULAR; INTRAVENOUS at 07:46

## 2022-11-30 RX ADMIN — THERA TABS 1 TABLET: TAB at 09:32

## 2022-11-30 RX ADMIN — EZETIMIBE 10 MG: 10 TABLET ORAL at 09:25

## 2022-11-30 RX ADMIN — LIDOCAINE PATCH 4% 1 PATCH: 40 PATCH TOPICAL at 09:33

## 2022-11-30 RX ADMIN — PANTOPRAZOLE SODIUM 40 MG: 40 TABLET, DELAYED RELEASE ORAL at 07:06

## 2022-11-30 RX ADMIN — METOPROLOL TARTRATE 5 MG: 5 INJECTION INTRAVENOUS at 07:46

## 2022-11-30 RX ADMIN — WARFARIN SODIUM 5 MG: 5 TABLET ORAL at 18:11

## 2022-11-30 RX ADMIN — Medication 81 MG: at 09:32

## 2022-11-30 ASSESSMENT — ACTIVITIES OF DAILY LIVING (ADL)
ADLS_ACUITY_SCORE: 30

## 2022-11-30 NOTE — PLAN OF CARE
Problem: Cardiac Catheterization (Diagnostic/Interventional)  Goal: Stable Heart Rate and Rhythm  Outcome: Progressing  Goal: Absence of Bleeding  Outcome: Progressing  Goal: Absence of Contrast-Induced Injury  Outcome: Progressing  Goal: Absence of Embolism Signs and Symptoms  Outcome: Progressing  Goal: Acceptable Pain Control  Outcome: Progressing  Goal: Absence of Vascular Access Complication  Outcome: Progressing  Intervention: Prevent Access Site Complications  Recent Flowsheet Documentation  Taken 11/30/2022 0030 by Becca Fritz RN  Activity Management: activity adjusted per tolerance  Head of Bed (HOB) Positioning: HOB at 30 degrees     Problem: Heart Failure Comorbidity  Goal: Maintenance of Heart Failure Symptom Control  Outcome: Progressing  Intervention: Maintain Heart Failure Management  Recent Flowsheet Documentation  Taken 11/30/2022 0030 by Becca Fritz RN  Medication Review/Management: medications reviewed      Goal Outcome Evaluation: Patient is aox4. Able to make needs known. VSS except for BP. SBP in the 140s, 150s. Lung sounds clear, on room air. SpO2 at 95%. Patient has SR with 1st degree AVB, BBB, and prolonged Qtc. Has frequent PVCs and PACs. Denied chest pain this shift. Urostomy intact. Output 575 ml. Creatinine trending down. Cr 1.79 today. Continue to monitor.

## 2022-11-30 NOTE — PROGRESS NOTES
"Addendum:    As per cardiologist and nephrologist, pt can be discharged.  Patient wishes to go home. But he still need 1-2 assists.   Patient had anxiety attack this am with sbp>190.   Patient feels not comfortable about the changes of medications.  No pt/ot/sw notes I can see after 11/27 regarding disposition plan.  Nurse reported that she saw \"next step is TCU\" in disposition plan (which I cannot find) and not very comfortable discharging patient home.     Will hold off discharge tonight. Need to get input from pt/ot/sw for placement plan tomorrow. And observe for HTN/anxiety overnight.   "

## 2022-11-30 NOTE — PROGRESS NOTES
HEART CARE NOTE          Assessment/Recommendations     1. HFpEF c/b ADHF  Assessment / Plan    Near euvolemia; renal function continues to improved - will resume oral diuretic therapy     GDMT as detailed below; mainstay of treatment for HFpEF includes diuretics and adequate BP control (class I) and SGLT2-I (class 2a); additional medical therapy (ARNI, MRA, ARB) demonstrated less robust evidence for indication but may be considered per guideline recommendations (2b); no indication for BBlockers      Current Pharmacotherapy AHA Guideline-Directed Medical Therapy   Losartan  - on hold given ROBERT ARNI/ARB   Spironolactone  - on hold  MRA   SGLT2 inhibitor -  Not started SGLT2-I    Bumetanide 1 mg daily Loop diuretic       2. NSTEMI  Assessment / Plan    High sensitivity troponin >700 on admission and has continued to trend-up; patient denies chest pain or anginal equivalent    S/p coronary angiogram and PCI to OM1 with CORA    Continue ASA, metoprolol, high in tensity atorvastatin; clopidogrel     3. DM2  Assessment / Plan    Management per primary team     4. ROBERT on CKD  Assessment / Plan    Likely large CRS component, but now compounded by contrast dye load - continue to hold diuretics for today    Nephrology following - appreciate recs     5. Sepsis  Assessment / Plan    Resolved    Ok for discharge from a cardiology standpoint. Cardiology team will sign-off for now. Please do not hesitate to consult us again if new questions or concerns arise. Follow-up appointment will be arranged by C[facility]/HF clinic.     History of Present Illness/Subjective      Mr. Eduardo Laughlin is a 82 year old male with a PMHx significant for (per Dr. Augustin's note) bladder cancer, urostomy, CKD stage III, DVT on Coumadin, diabetes, obesity, NSTEMI, left bundle branch block who was recently hospitalized for CHF exacerbation. Labs notable for significantly elevated troponin/NSTEMI      Today, Mr. Laughlin denies any acute cardiac  "events or complaints; Management plan as detailed above     ECG: Personally reviewed. sinus tachycardia, LBBB.     ECHO (personnaly Reviewed):  The left ventricle is normal in size.  The visual ejection fraction is 50-55%.  Septal motion is consistent with conduction abnormality.  No regional wall motion abnormalities noted.  The right ventricular systolic function is normal.  Suspect mild AS  The ascending aorta is Moderately dilated.  There is mild aortic root enlargement.  IVC diameter <2.1 cm collapsing >50% with sniff suggests a normal RA pressure  of 3 mmHg.  Technically difficult, suboptimal study.          Physical Examination Review of Systems   BP (!) 153/73 (BP Location: Right leg)   Pulse 68   Temp 98  F (36.7  C) (Oral)   Resp 18   Ht 1.778 m (5' 10\")   Wt 105.1 kg (231 lb 9.6 oz)   SpO2 95%   BMI 33.23 kg/m    Body mass index is 33.23 kg/m .  Wt Readings from Last 3 Encounters:   11/30/22 105.1 kg (231 lb 9.6 oz)   11/08/22 111.2 kg (245 lb 2.4 oz)   11/04/22 114.3 kg (252 lb)     General Appearance:   no distress, normal body habitus   ENT/Mouth: membranes moist, no oral lesions or bleeding gums.      EYES:  no scleral icterus, normal conjunctivae   Neck: no carotid bruits or thyromegaly   Chest/Lungs:   lungs are clear to auscultation, no rales or wheezing, equal chest wall expansion    Cardiovascular:   Regular. Normal first and second heart sounds with no murmurs, rubs, or gallops; the carotid, radial and posterior tibial pulses are intact, no JVD and trace LE edema bilaterally    Abdomen:  no organomegaly, masses, bruits, or tenderness; bowel sounds are present   Extremities: no cyanosis or clubbing   Skin: no xanthelasma, warm.    Neurologic: alert and oriented x3, calm     Psychiatric: alert and oriented x3, calm     A complete 10 systems ROS was reviewed  And is negative except what is listed in the HPI.          Medical History  Surgical History Family History Social History   Past " Medical History:   Diagnosis Date     Acute renal failure with acute tubular necrosis superimposed on stage 3 chronic kidney disease (H)      Arteriosclerotic cardiovascular disease (ASCVD)      Atrial fibrillation (H)      Bladder cancer (H)      BPH (benign prostatic hypertrophy)      Chronic kidney disease      Complicated UTI (urinary tract infection) 08/25/2019     Congestive heart failure (H)      Coronary artery disease      Diabetes mellitus (H)      Diabetic neuropathy (H)      E coli bacteremia      GERD (gastroesophageal reflux disease)      Gout      Hyperlipidemia      Hypertension      Hyponatremia      Iliac artery aneurysm, 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    Added automatically from request for surgery [id number]     Kidney stone      Osteoarthritis      Personal history of malignant neoplasm of prostate 04/27/2020     Prostate cancer (H)      Sleep apnea     CPAP    Past Surgical History:   Procedure Laterality Date     CARDIAC CATHETERIZATION  03/28/2008    and coronary angios     CV CORONARY ANGIOGRAM N/A 11/28/2022    Procedure: CV CORONARY ANGIOGRAM;  Surgeon: Gonzales Hager MD;  Location: [facility] [place] CV     CV LEFT HEART CATH N/A 11/28/2022    Procedure: Left Heart Catheterization;  Surgeon: Gonzales Hager MD;  Location: [facility] LAB CV     CV PCI STENT DRUG ELUTING N/A 11/28/2022    Procedure: Percutaneous Coronary Intervention Stent;  Surgeon: Gonzales Hager MD;  Location: [facility] LAB CV     CYSTECTOMY       CYSTOSCOPY       CYSTOSCOPY N/A 12/23/2015    Procedure: CYSTOSCOPY BLADDER BIOPSIES with Fulgeration and Placement of Otero ;  Surgeon: Gordon Bajwa MD;  Location: [facility] [place];  Service:      IR EXTREMITY ANGIOGRAM LEFT  3/22/2019     IR EXTREMITY ANGIOGRAM LEFT  3/22/2019     IR ILEAL CONDUIT INJECTION  12/23/2016     IR NEPHROSTOMY TUBE PLACEMENT RIGHT  8/18/2016     IR URETEROSTOMY TUBE CHANGE RIGHT  9/21/2016     KIDNEY STONE SURGERY  x4     LAMINECTOMY LUMBAR  ONE LEVEL Bilateral 9/13/2022    Procedure: LUMBAR 4 - LUMBAR 5 BILATERAL MEDIAL FACETECTOMY AND DECOMPRESSION;  Surgeon: Everett Holland MD;  Location: [facility] OR     PICC TRIPLE LUMEN PLACEMENT  11/18/2022          PROSTATE SURGERY      no family history of premature coronary artery disease Social History     Socioeconomic History     Marital status:      Spouse name: Not on file     Number of children: Not on file     Years of education: Not on file     Highest education level: Not on file   Occupational History     Not on file   Tobacco Use     Smoking status: Former     Smokeless tobacco: Never     Tobacco comments:     Quit smoking 30 years ago   Vaping Use     Vaping Use: Never used   Substance and Sexual Activity     Alcohol use: Not Currently     Drug use: Never     Sexual activity: Not Currently   Other Topics Concern     Not on file   Social History Narrative     Not on file     Social Determinants of Health     Financial Resource Strain: Not on file   Food Insecurity: Not on file   Transportation Needs: Not on file   Physical Activity: Not on file   Stress: Not on file   Social Connections: Not on file   Intimate Partner Violence: Not on file   Housing Stability: Not on file           Lab Results    Chemistry/lipid CBC Cardiac Enzymes/BNP/TSH/INR   Lab Results   Component Value Date    CHOL 168 03/03/2022    HDL 38 (L) 03/03/2022    TRIG 117 03/03/2022    BUN 53.9 (H) 11/29/2022     11/29/2022    CO2 24 11/29/2022    Lab Results   Component Value Date    WBC 8.1 11/30/2022    HGB 10.6 (L) 11/30/2022    HCT 32.8 (L) 11/30/2022    MCV 98 11/30/2022     11/30/2022    Lab Results   Component Value Date    TROPONINI 0.10 04/25/2020     (H) 04/25/2020    INR 1.27 (H) 11/30/2022     Lab Results   Component Value Date    TROPONINI 0.10 04/25/2020          Weight:    Wt Readings from Last 3 Encounters:   11/30/22 105.1 kg (231 lb 9.6 oz)   11/08/22 111.2 kg (245 lb  2.4 oz)   11/04/22 114.3 kg (252 lb)       Allergies  Allergies   Allergen Reactions     Metoprolol Succinate Er      Other reaction(s): low blood pressure     Pcn [Penicillins] Other (See Comments)     Childhood-doesn't know reactions     Statin Drugs [Hmg-Coa-R Inhibitors] Cramps     Uroxatral [Alfuzosin] Other (See Comments)     hypotension         Surgical History  Past Surgical History:   Procedure Laterality Date     CARDIAC CATHETERIZATION  03/28/2008    and coronary angios     CV CORONARY ANGIOGRAM N/A 11/28/2022    Procedure: CV CORONARY ANGIOGRAM;  Surgeon: Gonzales Hager MD;  Location: [facility] CV     CV LEFT HEART CATH N/A 11/28/2022    Procedure: Left Heart Catheterization;  Surgeon: Gonzales Hager MD;  Location: [facility] CV     CV PCI STENT DRUG ELUTING N/A 11/28/2022    Procedure: Percutaneous Coronary Intervention Stent;  Surgeon: Gonzales Hager MD;  Location: [facility] CV     CYSTECTOMY       CYSTOSCOPY       CYSTOSCOPY N/A 12/23/2015    Procedure: CYSTOSCOPY BLADDER BIOPSIES with Fulgeration and Placement of Otero ;  Surgeon: Gordon Bajwa MD;  Location: [facility] [place];  Service:      IR EXTREMITY ANGIOGRAM LEFT  3/22/2019     IR EXTREMITY ANGIOGRAM LEFT  3/22/2019     IR ILEAL CONDUIT INJECTION  12/23/2016     IR NEPHROSTOMY TUBE PLACEMENT RIGHT  8/18/2016     IR URETEROSTOMY TUBE CHANGE RIGHT  9/21/2016     KIDNEY STONE SURGERY  x4     LAMINECTOMY LUMBAR ONE LEVEL Bilateral 9/13/2022    Procedure: LUMBAR 4 - LUMBAR 5 BILATERAL MEDIAL FACETECTOMY AND DECOMPRESSION;  Surgeon: Everett Holland MD;  Location: [facility]     PICC TRIPLE LUMEN PLACEMENT  11/18/2022          PROSTATE SURGERY         Social History  Tobacco:   History   Smoking Status     Former   Smokeless Tobacco     Never    Alcohol:   Social History    Substance and Sexual Activity      Alcohol use: Not Currently   Illicit Drugs:   History   Drug Use Unknown        Family History  Family History   Problem Relation Age of Onset     No Known Problems Mother      Prostate Cancer Father      Deep Vein Thrombosis Father      Cancer Sister      Heart Disease Sister      No Known Problems Daughter      Heart Disease Brother      Heart Disease Sister      No Known Problems Daughter           Abdelrahman Mazariegos MD on 11/30/2022      cc: Rafael Montelongo

## 2022-11-30 NOTE — PLAN OF CARE
Goal Outcome Evaluation:         At shift change this AM, pt reported high anxiety 10/10.  VS were checked and SBP was in the 200's. PRN Metoprolol and Ativan was given with good results,blood pressure BP at this time 135/84. Pt denies any anxiety. Will continue POC.  Writer reviewed Metoprolol with [facility] = Okay to give PO dose after having had IV Metoprolol. Will continue POC.

## 2022-11-30 NOTE — PROGRESS NOTES
Heart Failure Care Map  GOALS TO BE MET BEFORE DISCHARGE:    1. Decrease congestion and/or edema with diuretic therapy to achieve near optimal volume status.     Dyspnea improved: No, further care required to meet this goal. Please explain Not short of breath at rest but did not ambulate this shift   Edema improved: No, further care required to meet this goal. Please explain Still has generalized edema and LUE is + 3-4        Last 24 hour I/O:   Intake/Output Summary (Last 24 hours) at 11/29/2022 2214  Last data filed at 11/29/2022 2200  Gross per 24 hour   Intake 2640 ml   Output 2225 ml   Net 415 ml           Net I/O and Weights since admission:   10/30 2300 - 11/29 2259  In: 08424 [P.O.:8207; I.V.:1673]  Out: 01921 [Urine:10441]  Net: -25174     Vitals:    11/18/22 1101 11/18/22 1620 11/19/22 0400 11/22/22 1455   Weight: 111.1 kg (245 lb) 112.7 kg (248 lb 7.3 oz) 112.3 kg (247 lb 9.2 oz) 109.7 kg (241 lb 14.4 oz)    11/26/22 1411 11/27/22 1021 11/28/22 1214 11/29/22 0403   Weight: 108.1 kg (238 lb 6.4 oz) 108 kg (238 lb 1.6 oz) 108 kg (238 lb) 108.6 kg (239 lb 6.4 oz)       2.  O2 sats > 90% on room air, or at prior home O2 therapy level.      Able to wean O2 this shift to keep sats above 90%?: Yes, satisfactory for discharge.   Does patient use Home O2? No          Current oxygenation status:   SpO2: 95 %     O2 Device: None (Room air),      3.  Tolerates ambulation and mobility near baseline.     Ambulation: No, further care required to meet this goal. Please explain did not ambulate this shift.    Times patient ambulated with staff this shift: 0    Please review the Heart Failure Care Map for additional HF goal outcomes.    Marnie Bullock RN  11/29/2022

## 2022-11-30 NOTE — PLAN OF CARE
Problem: Plan of Care - These are the overarching goals to be used throughout the patient stay.    Goal: Plan of Care Review  Description: The Plan of Care Review/Shift note should be completed every shift.  The Outcome Evaluation is a brief statement about your assessment that the patient is improving, declining, or no change.  This information will be displayed automatically on your shift note.  Outcome: Met   Goal Outcome Evaluation:       Pt anxiety improved from this AM.  VSS, denies pain, good po intake. Pt has possibility of discharge later today. Dr Mazariegos was updated this AM regarding BP's, see previous note. Dr Leone has been updated this afternoon.  Will await further orders.  Assist 1-2 with walker and gait belt has been used.  Pt uses call light appropriately.

## 2022-11-30 NOTE — DISCHARGE SUMMARY
[facility]  Hospitalist Discharge Summary      Date of Admission:  11/18/2022  Date of Discharge:  11/30/2022  Discharging Provider: Cassie Leone MD  Discharge Service: Hospitalist Service    Discharge Diagnoses   NSTEMI  S/p PCI    Follow-ups Needed After Discharge   Follow-up Appointments     Follow-up and recommended labs and tests       Follow up with primary care provider, Rafael Montelongo, within 7 days to   evaluate medication change, to evaluate treatment change, to evaluate   after surgery, for hospital follow- up, and regarding new diagnosis.  The   following labs/tests are recommended: cbc, bmp and INR.    Follow up with cardiologist and nephrologist as instructed             Unresulted Labs Ordered in the Past 30 Days of this Admission     No orders found from 10/19/2022 to 11/19/2022.          Discharge Disposition   Discharged to home with RN/PT/OT  Condition at discharge: [facility]    Hospital Course   Eduardo is a 82-year-old male with history of bladder cancer, urostomy, CKD stage III, DVT on Coumadin, diabetes, obesity, NSTEMI, left bundle branch block who was recently hospitalized for CHF exacerbation.  Patient presented to [facility] on 11/18 with concerns for generalized weakness and shortness of breath.  On arrival to ER, patient was found to be bradycardic, hypothermic , hypotensive and hypoxic.  Patient was noted to have acute hypoxic respiratory failure, lactic acidosis and in sepsis requiring monitoring in ICU on BiPAP.  On admission patient also noted to be hyperkalemic with significantly elevated BNP and troponin.  UA showed evidence of UT, however urine cx no growth however sample contaminated. Work-up with CT head was unremarkable.  CT chest showed groundglass infiltrates in both lungs.  Patient currently is off BiPAP, patient now stable and transferred out of ICU on 11/20/2022.  Noted to be troponin elevated, pt has no anginal symptoms. Card angiogram and PCI  on 11/28.      NSTEMI s/p PCI  -telemetry  -EKG showed no acute ST changes on admission.  -Repeat ECHO on 11/22 showed EF 45% and abnormal septal motion  -Cardiology consulted,angiogram and PCI to OM1 with CORA on 11/28.   -card: restart warfarin, Plavix and ASA after PCI  -discharge home on RN/PT/OT      CHF exacerbation/Acute on chronic diastolic heart failure  -Patient noted to have significantly elevated BNP>3000 on admission.  -Was given IV Lasix, with minimal response.  It was then switched to IV Bumex.  -PO Bumex, managed by cardiology.   -Monitor intake/output, daily weight monitoring  -Echocardiogram from 10/22 showed EF of 50-55%      Sepsis likely 2/2 to pneumonia and UTI  Metabolic encephalopathy - resolved.   -On admission patient was found to be bradycardic, hypothermic, hypotensive and hypoxic. Also,  noted to have lactic acidosis.  -CT chest showed bilateral groundglass opacities, L>R.  -UA suggestive of UTI, urine culture shows no growth however sample appears to be contaminated.  -Blood cultures negative.  Sputum culture unable to collect sample.   -Started on meropenem given history of penicillin allergy.  Finished meropenem for total of 7 days.  -Continue supplemental oxygen as needed, currently on room air.        Acute hypoxic respiratory failure likely secondary to pneumonia and CHF exacerbation  -Patient briefly needed BiPAP on admission, currently weaned off  -Continue supplemental oxygen prn  -now on room air     Bradycardia on admission was likely thought secondary to his metoprolol.  -Heart rate currently stable  - metoprolol      Lactic acidosis-resolved currently.     History of DVT/history of paroxysmal A. Fib  Holding PTA metoprolol given his recent bradycardia  On PTA warfarin, currently on hold due to angiogram 11/28; restart when ok with card        Left-arm numbness  -CT head unremarkable.  -MRI head and neck shows foci of restricted diffusion in bilateral globus pallidus, findings  seen usually in carbon monoxide poisoning or other toxic metabolic etiologies.  -Neurology following, appreciate input. Signed off now.   -Carbon monoxide levels WNL but however was checked few days after admission hence cannot be ruled out just based on levels.   -MRV neg for sinus thrombosis.     Diabetes  -A1c 7.7  -Continue current sliding scale  -Monitor blood sugars, hypoglycemia protocol.     Hyperkalemia - resolved.     ROBERT on CKD  -Metabolic acidosis   -Likely In the setting of sepsis.  -Baseline creat around 2.3, creat peaked to 3.7 this admission.  -Creat improved  -Minimize nephrotoxins, continue to monitor.  -Renal following.      Transaminitis - resolved.      Rib fracture/Lumbar fracture  Acute fracture of right ninth, 10th and 11th ribs seen.Questionable acute to subacute fracture of right 12th rib seen on lumbar spine CT.  -Acute to subacute right L1-L2 and L3 transverse process fractures seen.  -Seen by NSG,appears stable and pt can follow up as out-pt if needed  -Lidocaine patch ordered for pain control.        Consultations This Hospital Stay   CARE MANAGEMENT / SOCIAL WORK IP CONSULT  VASCULAR ACCESS ADULT IP CONSULT  PHARMACY TO DOSE WARFARIN  PHYSICAL THERAPY ADULT IP CONSULT  OCCUPATIONAL THERAPY ADULT IP CONSULT  NEUROSURGERY IP CONSULT  NEUROLOGY IP CONSULT  NEUROLOGY IP CONSULT  HOSPITALIST IP CONSULT  PHYSICAL THERAPY ADULT IP CONSULT  OCCUPATIONAL THERAPY ADULT IP CONSULT  CARDIOLOGY IP CONSULT  NEPHROLOGY IP CONSULT  PHARMACY IP CONSULT  PHARMACY IP CONSULT  NUTRITION SERVICES ADULT IP CONSULT  CARDIAC REHAB IP CONSULT  PHARMACY IP CONSULT  WOUND OSTOMY CONTINENCE NURSE  IP CONSULT  SMOKING CESSATION PROGRAM IP CONSULT    Code Status   Full Code    Time Spent on this Encounter   I, Cassie Leone MD, personally saw the patient today and spent greater than 30 minutes discharging this patient.       Cassie Leone MD  [facility]  [address]  [facility] [address]  Phone: [phone]  Fax: [phone]  ______________________________________________________________________    Physical Exam   Vital Signs: Temp: 97.7  F (36.5  C) Temp src: Oral BP: 138/72 Pulse: 82   Resp: 18 SpO2: 97 % O2 Device: None (Room air)    Weight: 231 lbs 9.6 oz  General.  Awake alert oriented not in acute distress. Obese  HEENT.  Pupils equal round react to light, anicteric, EOM intact.  Neck supple no JVD.  CVS regular rhythm no murmur gallops.  Lungs.  Clear to auscultation bilateral no wheezing or rales.  Abdomen.  Soft nontender bowel sounds present.  Extremities.  Right wrist bruise with tenderness.left arm edema (chronic as per pt due to DVT)  Neurological.  Awake and alert. No focal deficit.  Skin no rash. No pallor.  Psych. Normal mood.        Primary Care Physician   Rafael Montelongo    Discharge Orders      INR     Medication Therapy Management Referral      Ambulatory CARDIAC REHAB REFERRAL      Home Care Referral      Reason aspirin not prescribed from this order set     Reason for your hospital stay    NSTEMI s/p PCI     Follow-up and recommended labs and tests     Follow up with primary care provider, Rafael Montelongo, within 7 days to evaluate medication change, to evaluate treatment change, to evaluate after surgery, for hospital follow- up, and regarding new diagnosis.  The following labs/tests are recommended: cbc, bmp and INR.    Follow up with cardiologist and nephrologist as instructed     Activity    Your activity upon discharge: activity as tolerated; fall precuation     When to contact your care team    Call your primary doctor if you have any of the following: temperature greater than 100.5f or less than 96f,  increased shortness of breath, increased swelling, increased pain, or any concerns.     Diet    Follow this diet upon discharge: Orders Placed This Encounter      Snacks/Supplements Adult: Glucerna; With Meals      Low Saturated Fat Na  <2400 mg  Diabetic       Significant Results and Procedures   Most Recent 3 CBC's:Recent Labs   Lab Test 11/30/22  0507 11/29/22  0453 11/28/22  0608   WBC 8.1 7.8 7.8   HGB 10.6* 10.7* 11.1*   MCV 98 97 97    195 185     Most Recent 3 BMP's:Recent Labs   Lab Test 11/30/22  1248 11/30/22  0802 11/30/22  0507 11/29/22  0800 11/29/22  0453 11/28/22  0736 11/28/22  0608   NA  --   --  137  --  138  --  135*   POTASSIUM  --   --  4.1  --  4.0  --  3.9   CHLORIDE  --   --  105  --  105  --  100   CO2  --   --  23  --  24  --  26   BUN  --   --  45.2*  --  53.9*  --  67.9*   CR  --   --  1.79*  --  1.92*  --  2.16*   ANIONGAP  --   --  9  --  9  --  9   GALLO  --   --  9.0  --  9.0  --  9.0   * 161* 156*   < > 142*   < > 160*    < > = values in this interval not displayed.     Most Recent 3 INR's:Recent Labs   Lab Test 11/30/22  0507 11/29/22  0453 11/28/22  0608   INR 1.27* 1.29* 1.34*     7-Day Micro Results     No results found for the last 168 hours.      ,   Results for orders placed or performed during the hospital encounter of 11/18/22   Head CT w/o contrast    Narrative    EXAM: CT HEAD W/O CONTRAST  LOCATION: [facility]  DATE/TIME: 11/18/2022 11:04 AM    INDICATION: Recurrent falls, weakness, anticoagulation.  COMPARISON: Head CT 5/12/2020.  TECHNIQUE: Routine CT Head without IV contrast. Multiplanar reformats. Dose reduction techniques were used.    FINDINGS:  INTRACRANIAL CONTENTS: No acute intracranial hemorrhage. No mass effect or midline shift. Mild diffuse parenchymal volume loss. Patchy periventricular white matter hypodensities are nonspecific, but most likely related to chronic microvascular ischemic   disease. Chronic-appearing infarct in the left insular cortex, also present on prior. Small chronic lacunar infarct versus dilated perivascular space in the left basal ganglia.    VISUALIZED ORBITS/SINUSES/MASTOIDS: No intraorbital abnormality. No paranasal sinus  mucosal disease. No middle ear or mastoid effusion.    BONES/SOFT TISSUES: No acute abnormality.      Impression    IMPRESSION:  1.  No acute intracranial hemorrhage, mass, or herniation.  2.  Mild diffuse parenchymal volume loss and white matter changes most likely due to chronic microvascular ischemic disease.  3.  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                 Cervical spine CT w/o contrast    Narrative    EXAM: CT CERVICAL SPINE W/O CONTRAST  LOCATION: [facility]  DATE/TIME: 11/18/2022 11:06 AM    INDICATION: Fall, weakness, AMS.  COMPARISON: Cervical spine CT 8/24/2019.  TECHNIQUE: Routine CT Cervical Spine without IV contrast. Multiplanar reformats. Dose reduction techniques were used.    FINDINGS:  VERTEBRA: Rotation of C1 on C2 which is presumably positional. No acute lucent fracture line visualized. No significant loss of vertebral body height. Marked degenerative endplate changes and loss of disc height in the cervical spine particularly at   C4-5, C5-6, and C6-7. Fusion of the right C2 and C3 facets. Right greater than left facet hypertrophy throughout the cervical spine.      CANAL/FORAMINA: No significant spinal canal narrowing at any level. Multiple levels of neural foraminal narrowing, particularly at C4-5, C5-6, and C6-7.    PARASPINAL: Groundglass opacities in the visualized left lung, better described on CT chest, abdomen and pelvis. Atherosclerotic calcifications of the carotid arteries.      Impression    IMPRESSION:  1.  No fracture or posttraumatic subluxation.  2.  Multilevel degenerative changes in the cervical spine as detailed above.   CT Thoracic Spine w/o Contrast    Narrative    EXAM: CT THORACIC SPINE W/O CONTRAST, CT LUMBAR SPINE W/O CONTRAST  LOCATION: [facility]  DATE/TIME: 11/18/2022 11:07 AM    INDICATION: Fall, back pain, very limited historian.  COMPARISON: None.  TECHNIQUE:  1) Routine CT Thoracic  Spine without IV contrast. Multiplanar reformats. Dose reduction techniques were used.   2) Routine CT Lumbar Spine without IV contrast. Multiplanar reformats. Dose reduction techniques were used.     FINDINGS:    THORACIC SPINE CT:  VERTEBRA: Thoracic spine alignment is grossly within normal limits. No significant loss of thoracic vertebral body height. No acute lucent fracture lines appreciated in the thoracic vertebral bodies.    Subtle cortical step-off and fracture line through the medial right 11th rib (series 4 image 135). Very subtle cortical irregularity also of the medial right 10th and ninth ribs which could represent subtle fractures. Subtle cortical irregularity and   sclerosis in the right 12th rib.    Mild degenerative endplate changes and loss of disc height throughout the thoracic spine. Prominent anterior osteophytic spurring in the thoracic spine. No significant disc herniation appreciated. No significant facet hypertrophy.    CANAL/FORAMINA: No high-grade spinal canal or neural foraminal narrowing.    PARASPINAL: Groundglass opacities throughout the lungs, these are better described on CT chest abdomen pelvis report.    LUMBAR SPINE CT:  VERTEBRA: Leftward listhesis of L2 on L3. Mild grade 1 retrolisthesis of L2 on L3. Grade 1 anterolisthesis of L4 on L5. Sequela of previous left-sided laminectomy at L4-L5. Lucency through the left L4 inferior facet with sclerotic margins is likely   chronic and may be related to surgery at that site.    Subtle cortical step-off with sclerosis of the right L1, L2, and L3 transverse processes which may represent subacute fractures. No other lucent fracture lines appreciated. No significant loss of lumbar vertebral body height.    Marked degenerative endplate changes and loss of disc height at L2-L3. Moderate degenerative endplate changes and loss of disc height at the other levels. Multiple disc herniations throughout the lumbar spine particularly at L2-L3 and  asymmetric towards   the left at L3-L4, L4-L5, and L5-S1. Facet hypertrophy in the lower lumbar spine.    CANAL/FORAMINA:   Moderate spinal canal narrowings at L2-L3 and L3-L4. Multiple levels of neural foraminal narrowing most pronounced right greater than left at L2-L3 and left greater than right at L3-L4, L4-L5, and L5-S1.    PARASPINAL: Vascular stents in place.      Impression    IMPRESSION:  THORACIC SPINE CT:  1.  Acute appearing fracture of the medial right 11th rib. Probable subtle acute fractures also of the medial right 10th and ninth ribs. Question acute to subacute fracture of the right 12th rib.  2.  No fractures within the thoracic vertebral bodies. Thoracic spine alignment is within normal limits.  3.  Degenerative changes in the thoracic spine without significant spinal canal or neural foraminal narrowing.    LUMBAR SPINE CT:  1.  Subtle acute to subacute appearing fractures of the right L1, L2, and L3 transverse processes with minimal cortical step-off and surrounding sclerosis.  2.  No other acute fractures appreciated in the lumbar spine. No significant loss of lumbar vertebral body height.  3.  Multilevel degenerative changes in the lumbar spine as detailed above. Sequela of previous left-sided laminectomy at L4-L5.     Lumbar spine CT w/o contrast    Narrative    EXAM: CT THORACIC SPINE W/O CONTRAST, CT LUMBAR SPINE W/O CONTRAST  LOCATION: [facility]  DATE/TIME: 11/18/2022 11:07 AM    INDICATION: Fall, back pain, very limited historian.  COMPARISON: None.  TECHNIQUE:  1) Routine CT Thoracic Spine without IV contrast. Multiplanar reformats. Dose reduction techniques were used.   2) Routine CT Lumbar Spine without IV contrast. Multiplanar reformats. Dose reduction techniques were used.     FINDINGS:    THORACIC SPINE CT:  VERTEBRA: Thoracic spine alignment is grossly within normal limits. No significant loss of thoracic vertebral body height. No acute lucent fracture  lines appreciated in the thoracic vertebral bodies.    Subtle cortical step-off and fracture line through the medial right 11th rib (series 4 image 135). Very subtle cortical irregularity also of the medial right 10th and ninth ribs which could represent subtle fractures. Subtle cortical irregularity and   sclerosis in the right 12th rib.    Mild degenerative endplate changes and loss of disc height throughout the thoracic spine. Prominent anterior osteophytic spurring in the thoracic spine. No significant disc herniation appreciated. No significant facet hypertrophy.    CANAL/FORAMINA: No high-grade spinal canal or neural foraminal narrowing.    PARASPINAL: Groundglass opacities throughout the lungs, these are better described on CT chest abdomen pelvis report.    LUMBAR SPINE CT:  VERTEBRA: Leftward listhesis of L2 on L3. Mild grade 1 retrolisthesis of L2 on L3. Grade 1 anterolisthesis of L4 on L5. Sequela of previous left-sided laminectomy at L4-L5. Lucency through the left L4 inferior facet with sclerotic margins is likely   chronic and may be related to surgery at that site.    Subtle cortical step-off with sclerosis of the right L1, L2, and L3 transverse processes which may represent subacute fractures. No other lucent fracture lines appreciated. No significant loss of lumbar vertebral body height.    Marked degenerative endplate changes and loss of disc height at L2-L3. Moderate degenerative endplate changes and loss of disc height at the other levels. Multiple disc herniations throughout the lumbar spine particularly at L2-L3 and asymmetric towards   the left at L3-L4, L4-L5, and L5-S1. Facet hypertrophy in the lower lumbar spine.    CANAL/FORAMINA:   Moderate spinal canal narrowings at L2-L3 and L3-L4. Multiple levels of neural foraminal narrowing most pronounced right greater than left at L2-L3 and left greater than right at L3-L4, L4-L5, and L5-S1.    PARASPINAL: Vascular stents in place.      Impression     IMPRESSION:  THORACIC SPINE CT:  1.  Acute appearing fracture of the medial right 11th rib. Probable subtle acute fractures also of the medial right 10th and ninth ribs. Question acute to subacute fracture of the right 12th rib.  2.  No fractures within the thoracic vertebral bodies. Thoracic spine alignment is within normal limits.  3.  Degenerative changes in the thoracic spine without significant spinal canal or neural foraminal narrowing.    LUMBAR SPINE CT:  1.  Subtle acute to subacute appearing fractures of the right L1, L2, and L3 transverse processes with minimal cortical step-off and surrounding sclerosis.  2.  No other acute fractures appreciated in the lumbar spine. No significant loss of lumbar vertebral body height.  3.  Multilevel degenerative changes in the lumbar spine as detailed above. Sequela of previous left-sided laminectomy at L4-L5.     POC US ABDOMEN LIMITED    Narrative    Ildefonso Pickens MD     11/18/2022 11:04 AM  POC US ABDOMEN LIMITED    Date/Time: 11/18/2022 11:01 AM  Performed by: Ildefonso Pickens MD  Authorized by: Ildefonso Pickens MD     Procedure details:     Indications comment:  Fall, lightheadedness, low blood pressure in the   field  Comments:      Views obtained: Right upper quadrant, left upper quadrant, suprapubic,   subxiphoid cardiac, bilateral thoracic.  Findings: There is no evidence of intra-abdominal fluid.  The right kidney   is irregular consistent with the patient's history of chronic right-sided   hydronephrosis.  Patient has also had a cystectomy, no visible lower   abdominal fluid collections.  There is no evidence of pericardial   effusion.  There is bilateral lung sliding.    Impression: Negative E fast exam    Attempted to save images in PACS.  I was able to pull up the patient's   exam, but I am sure if these images are going to be able to properly load   due to recent technical difficulties.   CT Chest Abdomen Pelvis w/o Contrast    Narrative    EXAM:  CT CHEST ABDOMEN PELVIS W/O CONTRAST  LOCATION: [facility]  DATE/TIME: 11/18/2022 11:13 AM    INDICATION: Severe weakness, dyspnea, sepsis evaluation, fall  COMPARISON: CT abdomen pelvis 03/25/2022, CT chest 12/09/2019  TECHNIQUE: CT scan of the chest, abdomen, and pelvis was performed without IV contrast. Multiplanar reformats were obtained. Dose reduction techniques were used.   CONTRAST: None.    FINDINGS:   LUNGS AND PLEURA: Patchy and nodular airspace opacities throughout the upper and lower lobes bilaterally. Bronchial wall thickening and endobronchial debris in the left lower lobe. No pleural effusion or pneumothorax.     MEDIASTINUM/AXILLAE: Normal.    CORONARY ARTERY CALCIFICATION: Moderate.    HEPATOBILIARY: Normal.    PANCREAS: Normal.    SPLEEN: Normal.    ADRENAL GLANDS: Normal.    KIDNEYS/BLADDER: Atrophic right kidney with unchanged mild collecting system dilation to the urinary diversion. Cystectomy with ileal diversion. Simple cysts, which do not require follow-up.    BOWEL: Diverticulosis of the colon. No acute inflammatory change. No obstruction.     LYMPH NODES: Normal.    VASCULATURE: Moderate atherosclerotic calcification with stents in the left common, external, and internal iliac arteries. Patency not assessed without IV contrast.    PELVIC ORGANS: Absent.    MUSCULOSKELETAL: Osteopenia and degenerative changes in the spine. No aggressive or destructive lesion. No acute fracture.      Impression    IMPRESSION:  1.  Airspace opacities in both lungs, left greater than right, concerning for multifocal pneumonia associated endobronchial debris and bronchial wall thickening in the left lower lobe suggests superimposed aspiration.   XR Chest Port 1 View    Narrative    EXAM: XR CHEST PORT 1 VIEW  LOCATION: [facility]  DATE/TIME: 11/18/2022 5:28 PM    INDICATION: PICC tip placement.  COMPARISON: 11/05/2022.      Impression    IMPRESSION: Left upper  extremity PICC tip overlies the region of the superior vena cava.    Nonspecific bilateral interstitial opacities are nonspecific left retrocardiac opacity. Possible small left pleural effusion. No pneumothorax.    Unchanged cardiomegaly.   XR Humerus Port Left G/E 2 Views    Narrative    EXAM: XR HUMERUS PORTABLE LEFT G/E 2 VIEWS  LOCATION: [facility]  DATE/TIME: 11/19/2022, 10:34 AM    INDICATION: Left humerus pain.  COMPARISON: None.      Impression    IMPRESSION:  1.  Unremarkable left humerus.  2.  No fracture.  3.  Partially visualized left-sided PICC.     MRA Brain ([place]) wo Contrast    Narrative    EXAM: MR BRAIN W/O and W CONTRAST, MRA NECK (CAROT[facility]) W/O and W CONTRAST, MRA BRAIN ([place]) W/O CONTRAST  LOCATION: [facility]  DATE/TIME: 11/19/2022 7:16 PM    INDICATION: tingling and weakness on left side  COMPARISON: 11/18/2022  CONTRAST: 10 ml gadavist  TECHNIQUE:   1) Routine multiplanar multisequence head MRI without and with intravenous contrast.  2) 3D time-of-flight head MRA without intravenous contrast.  3) Neck MRA without and with IV contrast. Stenosis measurements made according to NASCET criteria unless otherwise specified.    FINDINGS:  HEAD MRI:  INTRACRANIAL CONTENTS: Relatively symmetric subcentimeter foci restricted diffusion in the bilateral globus pallidus. No mass, acute hemorrhage, or extra-axial fluid collections. Patchy nonspecific T2/FLAIR hyperintensities within the cerebral white   matter most consistent with mild to moderate chronic microvascular ischemic change. Chronic lacunar infarcts left cerebellum, left basal ganglia and bilateral centrum semiovale.. Mild generalized cerebral atrophy. No hydrocephalus. Normal position of the   cerebellar tonsils. No pathologic contrast enhancement.    SELLA: No abnormality accounting for technique.    OSSEOUS STRUCTURES/SOFT TISSUES: No suspicious marrow lesions. T1  hypointense lesion in the anterior left parietal bone without enhancement. The major intracranial vascular flow voids are maintained.     ORBITS: No abnormality accounting for technique.     SINUSES/MASTOIDS: No paranasal sinus mucosal disease. No middle ear or mastoid effusion.     HEAD MRA:   ANTERIOR CIRCULATION: No stenosis/occlusion, aneurysm, or high flow vascular malformation. Standard [place] anatomy.    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 No branch occlusion, aneurysm or AVM. Dominant left and smaller right vertebral artery contribute to a normal basilar artery.     NECK MRA:   RIGHT CAROTID: No measurable stenosis or dissection.    LEFT CAROTID: No measurable stenosis or dissection.    VERTEBRAL ARTERIES: No focal stenosis or dissection. Balanced vertebral arteries.    AORTIC ARCH: Classic aortic arch anatomy with no significant stenosis at the origin of the great vessels.      Impression    IMPRESSION:  HEAD MRI:   1.  Symmetric subcentimeter foci of restricted diffusion in the bilateral globus pallidus. Findings can be seen in carbon monoxide poisoning and other toxic metabolic etiologies. Correlate clinically.  2.  Age-related changes.    HEAD MRA:   1.  No branch occlusion, aneurysm or AVM.  2.  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    NECK MRA:  1.  No measurable stenosis or dissection.   MR Brain w/o & w Contrast    Narrative    EXAM: MR BRAIN W/O and W CONTRAST, MRA NECK (CAROTIDS) W/O and W CONTRAST, MRA BRAIN ([place]) W/O CONTRAST  LOCATION: [facility]  DATE/TIME: 11/19/2022 7:16 PM    INDICATION: tingling and weakness on left side  COMPARISON: 11/18/2022  CONTRAST: 10 ml gadavist  TECHNIQUE:   1) Routine multiplanar multisequence head MRI without and with intravenous contrast.  2) 3D time-of-flight head MRA without intravenous contrast.  3) Neck MRA without and with IV contrast. Stenosis  measurements made according to NASCET criteria unless otherwise specified.    FINDINGS:  HEAD MRI:  INTRACRANIAL CONTENTS: Relatively symmetric subcentimeter foci restricted diffusion in the bilateral globus pallidus. No mass, acute hemorrhage, or extra-axial fluid collections. Patchy nonspecific T2/FLAIR hyperintensities within the cerebral white   matter most consistent with mild to moderate chronic microvascular ischemic change. Chronic lacunar infarcts left cerebellum, left basal ganglia and bilateral centrum semiovale.. Mild generalized cerebral atrophy. No hydrocephalus. Normal position of the   cerebellar tonsils. No pathologic contrast enhancement.    SELLA: No abnormality accounting for technique.    OSSEOUS STRUCTURES/SOFT TISSUES: No suspicious marrow lesions. T1 hypointense lesion in the anterior left parietal bone without enhancement. The major intracranial vascular flow voids are maintained.     ORBITS: No abnormality accounting for technique.     SINUSES/MASTOIDS: No paranasal sinus mucosal disease. No middle ear or mastoid effusion.     HEAD MRA:   ANTERIOR CIRCULATION: No stenosis/occlusion, aneurysm, or high flow vascular malformation. Standard [place] anatomy.    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 No branch occlusion, aneurysm or AVM. Dominant left and smaller right vertebral artery contribute to a normal basilar artery.     NECK MRA:   RIGHT CAROTID: No measurable stenosis or dissection.    LEFT CAROTID: No measurable stenosis or dissection.    VERTEBRAL ARTERIES: No focal stenosis or dissection. Balanced vertebral arteries.    AORTIC ARCH: Classic aortic arch anatomy with no significant stenosis at the origin of the great vessels.      Impression    IMPRESSION:  HEAD MRI:   1.  Symmetric subcentimeter foci of restricted diffusion in the bilateral globus pallidus. Findings can be seen in carbon monoxide poisoning and other toxic metabolic  etiologies. Correlate clinically.  2.  Age-related changes.    HEAD MRA:   1.  No branch occlusion, aneurysm or AVM.  2.  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    NECK MRA:  1.  No measurable stenosis or dissection.   MRA Neck (Carotids) wo & w Contrast    Narrative    EXAM: MR BRAIN W/O and W CONTRAST, MRA NECK (CAROTIDS) W/O and W CONTRAST, MRA BRAIN ([place]) W/O CONTRAST  LOCATION: [facility]  DATE/TIME: 11/19/2022 7:16 PM    INDICATION: tingling and weakness on left side  COMPARISON: 11/18/2022  CONTRAST: 10 ml gadavist  TECHNIQUE:   1) Routine multiplanar multisequence head MRI without and with intravenous contrast.  2) 3D time-of-flight head MRA without intravenous contrast.  3) Neck MRA without and with IV contrast. Stenosis measurements made according to NASCET criteria unless otherwise specified.    FINDINGS:  HEAD MRI:  INTRACRANIAL CONTENTS: Relatively symmetric subcentimeter foci restricted diffusion in the bilateral globus pallidus. No mass, acute hemorrhage, or extra-axial fluid collections. Patchy nonspecific T2/FLAIR hyperintensities within the cerebral white   matter most consistent with mild to moderate chronic microvascular ischemic change. Chronic lacunar infarcts left cerebellum, left basal ganglia and bilateral centrum semiovale.. Mild generalized cerebral atrophy. No hydrocephalus. Normal position of the   cerebellar tonsils. No pathologic contrast enhancement.    SELLA: No abnormality accounting for technique.    OSSEOUS STRUCTURES/SOFT TISSUES: No suspicious marrow lesions. T1 hypointense lesion in the anterior left parietal bone without enhancement. The major intracranial vascular flow voids are maintained.     ORBITS: No abnormality accounting for technique.     SINUSES/MASTOIDS: No paranasal sinus mucosal disease. No middle ear or mastoid effusion.     HEAD MRA:   ANTERIOR CIRCULATION: No stenosis/occlusion, aneurysm, or high flow  vascular malformation. Standard [place] anatomy.    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 No branch occlusion, aneurysm or AVM. Dominant left and smaller right vertebral artery contribute to a normal basilar artery.     NECK MRA:   RIGHT CAROTID: No measurable stenosis or dissection.    LEFT CAROTID: No measurable stenosis or dissection.    VERTEBRAL ARTERIES: No focal stenosis or dissection. Balanced vertebral arteries.    AORTIC ARCH: Classic aortic arch anatomy with no significant stenosis at the origin of the great vessels.      Impression    IMPRESSION:  HEAD MRI:   1.  Symmetric subcentimeter foci of restricted diffusion in the bilateral globus pallidus. Findings can be seen in carbon monoxide poisoning and other toxic metabolic etiologies. Correlate clinically.  2.  Age-related changes.    HEAD MRA:   1.  No branch occlusion, aneurysm or AVM.  2.  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    NECK MRA:  1.  No measurable stenosis or dissection.   MRV Brain wo & w Contrast    Narrative    EXAM: MRV BRAIN  W/O and W CONTRAST  LOCATION: [facility]  DATE/TIME: 11/21/2022 2:17 PM    INDICATION: rule out venous thrombosis. Left-sided tingling and weakness.  COMPARISON: No prior MRV available for comparison at the time of this dictation.  CONTRAST: 11ml gadavist  TECHNIQUE: Phase contrast and 2-D time-of-flight head MRV performed after administration of intravenous contrast.    FINDINGS:  DURAL SINUSES: No significant stenosis or occlusion. Dominant left and smaller right transverse and sigmoid dural sinuses.    INTERNAL CEREBRAL VEINS: No significant stenosis or occlusion.      MAJOR CORTICAL VENOUS BRANCHES: No significant stenosis or occlusion.      Impression    IMPRESSION:  1.  No dural venous sinus thrombosis or significant stenosis.   US Upper Extremity Venous Duplex Left    Narrative    EXAM: US UPPER EXTREMITY VENOUS  DUPLEX LEFT  LOCATION: [facility]  DATE/TIME: 2022 9:31 PM    INDICATION: L arm swelling  COMPARISON: None.  TECHNIQUE: Venous Duplex ultrasound of the left upper extremity with (when possible) and without compression, augmentation, and duplex. Color flow and spectral Doppler with waveform analysis performed.    FINDINGS: Ultrasound includes evaluation of the internal jugular vein, innominate vein, subclavian vein, axillary vein, and brachial vein. The superficial cephalic and basilic veins were also evaluated where seen.     LEFT: Partially occlusive thrombus involving the left mid subclavian vein and axillary veins. Nonocclusive thrombus in the basilic vein in the upper arm.       Impression    IMPRESSION:   1.  Partially occlusive deep venous thrombosis involving the left subclavian and axillary veins.    2.  Superficial thrombus involving the left basilic vein.   Echocardiogram Complete    Narrative    [id number]  [id number]  [id number]  [id number]^OXANA^[place] [address]     Name: ROXANNA PITT  MRN: [id number]  : 1940  Study Date: 2022 12:29 PM  Age: 82 yrs  Gender: Male  Patient Location: [facility]  Reason For Study: CHF  Ordering Physician: ALETA DAIGLE  Performed By: GEOVANI/JULIO     BSA: 2.3 m2  Height: 70 in  Weight: 247 lb  HR: 96  BP: 137/90 mmHg  ______________________________________________________________________________  Procedure  Complete Portable Echo Adult. Definity (NDC #11423-901) given intravenously.  ______________________________________________________________________________  Interpretation Summary     1. Technically difficult study despite utilization of ultrasound enhancing  agent.  2. The left ventricle is normal in size. Image quality does not provide for  detailed assessment of LV systolic function, but is felt to be mildly  decreased with a visually estimated ejection fraction of  roughly 45%.  3. There is mild global reduction in left ventricular systolic performance.  4. There is abnormal septal motion likely related to altered electrical  activation due to bundle branch block.  5. There is mild aortic stenosis.  6. Probable normal right ventricular size and systolic performance though  right-sided structures are not clearly visualized on all views on this study.  7. There is mild enlargement of the proximal ascending aorta.     The acoustic quality of this study is quite poor. If a more accurate  assessment of left ventricular systolicperformance, regional wall motion, and  other morphology/function is required; would recommend cardiac MRI or other  alternative imaging modality for further evaluation.     When compared to the prior real-time echocardiogram dated 13 October 2022, the  findings are felt to be fairly similar on both examinations though accurate  comparison is somewhat difficult due to the poor acoustic quality on both  studies.  ______________________________________________________________________________  Left ventricle:  The left ventricle is normal in size. Image quality does not provide for  detailed assessment of LV systolic function, but is felt to be mildly  decreased with a visually estimated ejection fraction of roughly 45%. There is  mild global reduction in left ventricular systolic performance. There is  abnormal septal motion likely related to altered electrical activation due to  bundle branch block. Left ventricular wall thickness is normal.     Assessment of left atrial pressure (LAP): The cumulative findings are  indeterminate in evaluating left atrial pressure.     Right ventricle:  Probable normal right ventricular size and systolic performance though right-  sided structures are not clearly visualized on all views on this study.     Left atrium:  The left atrium is not well visualized.     Right atrium:  The right atrium is not well visualized.      IVC:  The IVC is not well-visualized.     Aortic valve:  The aortic valve is not well visualized, but suspected to be comprised of  three cusps. There is mild aortic stenosis. There is no significant aortic  insufficiency.     Mitral valve:  The mitral valve appears morphologically normal. There is probable trace  mitral insufficiency.     Tricuspid valve:  The tricuspid valve is grossly morphologically normal. There is probable trace  tricuspid insufficiency.     Pulmonic valve:  The pulmonic valve is not well visualized.     Thoracic aorta:  There is mild enlargement of the proximal ascending aorta.     Pericardium:  There is no significant pericardial effusion. There are intrapericardial  echoes suggestive of adipose tissue.  ______________________________________________________________________________  ______________________________________________________________________________  MMode/2D Measurements & Calculations  Ao root diam: 4.0 cm  asc Aorta Diam: 4.3 cm  LVOT diam: 2.0 cm  LVOT area: 3.1 cm2     Time Measurements  MM HR: 98.0 BPM     Doppler Measurements & Calculations  MV E max micha: 102.2 cm/sec  MV dec time: 0.15 sec  Ao V2 max: 202.0 cm/sec  Ao max P.0 mmHg  Ao V2 mean: 153.7 cm/sec  Ao mean PG: 10.8 mmHg  Ao V2 VTI: 29.2 cm  EMILIANO(I,D): 2.2 cm2  EMILIANO(V,D): 2.1 cm2  LV V1 max P.4 mmHg  LV V1 max: 136.2 cm/sec  LV V1 VTI: 21.1 cm  SV(LVOT): 65.4 ml  SI(LVOT): 28.6 ml/m2  PA acc time: 0.13 sec     AV Micha Ratio (DI): 0.67  EMILIANO Index (cm2/m2): 0.98  E/E' av.5  Lateral E/e': 20.0  Medial E/e': 17.1     ______________________________________________________________________________  Report approved by: Rufina Angulo 2022 02:00 PM         Cardiac Catheterization    Narrative      1st Mrg lesion is 90% stenosed.     LAD irregular but no severe stenoses.  LCX huge dominant vessel, with high-grade  lesion of large OM1 (likely   culprit lesion), diffuse moderate plaque in second  order  Branches. 80%   stenosis mid-distal LPDA  RCA smaller system, 30-50% mid narrowing into RV marginal branch.     No gradient across aortic valve.    Successful PCI with CORA to mid OM1           Discharge Medications   Current Discharge Medication List      START taking these medications    Details   aspirin (ASA) 81 MG EC tablet Take 1 tablet (81 mg) by mouth daily  Qty: 14 tablet, Refills: 0    Associated Diagnoses: NSTEMI (non-ST elevated myocardial infarction) (H); Coronary artery disease involving native coronary artery of native heart with unstable angina pectoris (H)      atorvastatin (LIPITOR) 40 MG tablet Take 1 tablet (40 mg) by mouth daily  Qty: 90 tablet, Refills: 3    Associated Diagnoses: Coronary artery disease involving native coronary artery of native heart with unstable angina pectoris (H)      bumetanide (BUMEX) 1 MG tablet Take 1 tablet (1 mg) by mouth daily  Qty: 30 tablet, Refills: 1    Associated Diagnoses: Chronic heart failure with preserved ejection fraction (H)      clopidogrel (PLAVIX) 75 MG tablet Take 1 tablet (75 mg) by mouth daily Dose to start tomorrow.  Qty: 90 tablet, Refills: 3    Associated Diagnoses: NSTEMI (non-ST elevated myocardial infarction) (H)      multivitamin, therapeutic (THERA-VIT) TABS tablet Take 1 tablet by mouth daily  Qty: 30 tablet, Refills: 3    Associated Diagnoses: Malnutrition, unspecified type (H)      nitroGLYcerin (NITROSTAT) 0.4 MG sublingual tablet For chest pain place 1 tablet under the tongue every 5 minutes for 3 doses. If symptoms persist 5 minutes after 1st dose call 911.  Qty: 30 tablet, Refills: 1    Associated Diagnoses: NSTEMI (non-ST elevated myocardial infarction) (H); Coronary artery disease involving native coronary artery of native heart with unstable angina pectoris (H)      pantoprazole (PROTONIX) 40 MG EC tablet Take 1 tablet (40 mg) by mouth every morning (before breakfast)  Qty: 14 tablet, Refills: 0    Associated Diagnoses:  Epigastric pain      polyethylene glycol (MIRALAX) 17 GM/Dose powder Take 17 g by mouth daily  Qty: 510 g, Refills: 1    Associated Diagnoses: Slow transit constipation         CONTINUE these medications which have CHANGED    Details   allopurinol (ZYLOPRIM) 100 MG tablet Take 2 tablets (200 mg) by mouth daily  Qty: 60 tablet, Refills: 0    Associated Diagnoses: Chronic gout without tophus, unspecified cause, unspecified site         CONTINUE these medications which have NOT CHANGED    Details   ezetimibe (ZETIA) 10 MG tablet Take 10 mg by mouth daily      glimepiride (AMARYL) 1 MG tablet Take 1 mg by mouth daily      metoprolol succinate ER (TOPROL XL) 25 MG 24 hr tablet Take 1 tablet (25 mg) by mouth daily for 30 days  Qty: 30 tablet, Refills: 0    Associated Diagnoses: Acute on chronic congestive heart failure, unspecified heart failure type (H); Primary hypertension      temazepam (RESTORIL) 15 MG capsule Take 15 mg by mouth nightly as needed for sleep      traZODone (DESYREL) 100 MG tablet Take 100-200 mg by mouth At Bedtime      !! warfarin ANTICOAGULANT (COUMADIN) 5 MG tablet Take 5-8 mg by mouth See Admin Instructions 8 mg MWF and 5 mg all other days      acetaminophen (TYLENOL) 500 MG tablet Take 1,000 mg by mouth every 6 hours as needed for mild pain      !! warfarin ANTICOAGULANT (COUMADIN) 3 MG tablet Used to be taken with 5 mg tablets on M, W, and F for a total of 8 mg.  Qty: 1 tablet, Refills: 0    Associated Diagnoses: Chronic anticoagulation       !! - Potential duplicate medications found. Please discuss with provider.      STOP taking these medications       doxycycline hyclate (VIBRAMYCIN) 50 MG capsule Comments:   Reason for Stopping:         furosemide (LASIX) 20 MG tablet Comments:   Reason for Stopping:             Allergies   Allergies   Allergen Reactions     Metoprolol Succinate Er      Other reaction(s): low blood pressure     Pcn [Penicillins] Other (See Comments)     Childhood-doesn't  know reactions     Statin Drugs [Hmg-Coa-R Inhibitors] Cramps     Uroxatral [Alfuzosin] Other (See Comments)     hypotension

## 2022-11-30 NOTE — PROGRESS NOTES
"  '    RENAL ([facility]) progress note  CC: F/U ROBERT  S: No new complaints overnight. Patient ambulated in [place] today without dyspnea/CP. No nausea/dizziness.       A/P:   ASSESSMENT:   81 yo male with h/o CKD 4, HTN. DM2, bladder cancer s/p cystectomy/urostomy admit with pneumonia, CHF, now with plan for angiogram     PLAN:  1. CKD 4 - has baseline CKD due to atrophic kidney, DM2, HTN, vascular disease, also with cystectomy/urostomy.  Has had labile fluctuations in creatinine associated with diuresis.  Currently renal function improved and no BERENICE noted after 11/28 angiogram.  - OK to resume maintenance diuretics (Changed to Bumex 1 mg daily by Cards)  - Trend daily labs    2. HTN - with recent orthostatic hypotension. Overall stable.   - OK to resume diuretics     3. NSTEMI - S/P CORA to OM-1 on 11/28.     4. Anemia: Hgb drifting down. Trend for now.     OK to discharge from renal standpoint      Lonnie Hunt MD  [facility]  [phone] (off)          No interval changes to past medical history, social history or family history to report.    /72 (BP Location: Right arm)   Pulse 82   Temp 97.7  F (36.5  C) (Oral)   Resp 18   Ht 1.778 m (5' 10\")   Wt 105.1 kg (231 lb 9.6 oz)   SpO2 97%   BMI 33.23 kg/m      I/O last 3 completed shifts:  In: 1290 [P.O.:1290]  Out: 1375 [Urine:1375]    Physical Exam:   Gen: NAD, alert  HEENT: No icterus, NC/AT  CARDIOVASCULAR: RRR, trace LE edema  PULMONARY: CTAB,  No cyanosis  GASTROINTESTINAL: BS present, soft, no tenderness  NEURO: Alert, no gross focal findings  PSYCHIATRIC: appropriate mood and affect  SKIN: Pale, no rash. Scattered skin tears/bruising    Recent Labs   Lab 11/30/22  0507 11/29/22  0453 11/28/22  0608 11/27/22  0609 11/26/22  0634 11/25/22  1840 11/25/22  0634 11/24/22  0544    138 135* 132* 135* 135* 135* 135*   POTASSIUM 4.1 4.0 3.9 3.8 3.7 4.1 4.4 4.4   CHLORIDE 105 105 100 96* 99 101 102 98   CO2 23 24 26 25 23 21* 25 23 "   BUN 45.2* 53.9* 67.9* 65.9* 65.1* 66.9* 69.1* 67.8*   CR 1.79* 1.92* 2.16* 2.13* 2.16* 2.31* 2.52* 2.67*   GFRESTIMATED 37* 34* 30* 30* 30* 28* 25* 23*   GALLO 9.0 9.0 9.0 8.9 9.1 8.8 8.5* 8.7*   MAG  --   --   --   --   --  2.0  --   --    ALBUMIN 3.3* 3.2* 3.5 3.5 3.9  --  3.0* 3.1*       Recent Labs   Lab 11/30/22  0507 11/29/22  0453 11/28/22  0608 11/27/22  0609 11/26/22  0634 11/25/22  2344 11/25/22  0634   WBC 8.1 7.8 7.8 7.7 7.3 7.3 7.1   HGB 10.6* 10.7* 11.1* 11.6* 11.7* 11.1* 11.2*   HCT 32.8* 32.5* 34.1* 34.7* 35.2* 33.8* 34.6*   MCV 98 97 97 96 96 98 98    195 185 174 179 154 169             Current Facility-Administered Medications:      acetaminophen (TYLENOL) tablet 1,000 mg, 1,000 mg, Oral, Q6H PRN, Shefali Kulkarni APRN CNP, 1,000 mg at 11/26/22 2050     acetaminophen (TYLENOL) tablet 650 mg, 650 mg, Oral, Q4H PRN, Gonzales Hager MD     allopurinol (ZYLOPRIM) tablet 200 mg, 200 mg, Oral, Daily, Shefali Kulkarni APRN CNP, 200 mg at 11/30/22 0925     aspirin EC tablet 81 mg, 81 mg, Oral, Daily, Gonzales Hager MD, 81 mg at 11/30/22 0932     atorvastatin (LIPITOR) tablet 80 mg, 80 mg, Oral, QPM, Abdelrahman Mazariegos MD, 80 mg at 11/29/22 2146     bisacodyl (DULCOLAX) EC tablet 5 mg, 5 mg, Oral, Daily PRN **OR** bisacodyl (DULCOLAX) EC tablet 10 mg, 10 mg, Oral, Daily PRN **OR** bisacodyl (DULCOLAX) EC tablet 15 mg, 15 mg, Oral, Daily PRN, Shefali Kulkarni, CARLOS CNP, 15 mg at 11/27/22 1344     bumetanide (BUMEX) tablet 1 mg, 1 mg, Oral, Daily, Tariq Angela DO, 1 mg at 11/27/22 0836     clopidogrel (PLAVIX) tablet 75 mg, 75 mg, Oral, Daily, Gonzales Hager MD, 75 mg at 11/30/22 0925     Continuing beta blocker from home medication list OR beta blocker order already placed during this visit, , Does not apply, DOES NOT GO TO [facility] ROS[facility]O Kenneth W, MD     Continuing statin from home medication list OR statin order already placed during this visit, , Does not apply, DOES NOT GO TO [facility] ROSADO,  Gonzales THAKUR MD     glucose gel 15-30 g, 15-30 g, Oral, Q15 Min PRN **OR** dextrose 50 % injection 25-50 mL, 25-50 mL, Intravenous, Q15 Min PRN **OR** glucagon injection 1 mg, 1 mg, Subcutaneous, Q15 Min PRN, Shefali Kulkarni APRN CNP     ezetimibe (ZETIA) tablet 10 mg, 10 mg, Oral, Daily, Solange Augustin MD, 10 mg at 11/30/22 0925     HOLD: Metformin and metformin containing medications on day of procedure and 48 hours after IV contrast given for patients with acute kidney injury or severe chronic kidney disease (stage IV or stage V; i.e., eGFR less than 30), , Does not apply, HOLD, Gonzales Hager MD     hydrALAZINE (APRESOLINE) injection 10 mg, 10 mg, Intravenous, Q4H PRN, Cassie Leone MD     hydrocortisone 2.5 % cream, , Topical, BID, Shefali Kulkarni APRN CNP, Given at 11/29/22 0857     insulin aspart (NovoLOG) injection (RAPID ACTING), 1-12 Units, Subcutaneous, TID w/meals, Shefali Kulkarni APRN CNP, 1 Units at 11/30/22 0932     insulin aspart (NovoLOG) injection (RAPID ACTING), 1-7 Units, Subcutaneous, At Bedtime, Shefali Kulkarni APRN CNP, 1 Units at 11/21/22 2124     Lidocaine (LIDOCARE) 4 % Patch 1 patch, 1 patch, Transdermal, Q24H, Solange Augustin MD, 1 patch at 11/30/22 0933     lidocaine patch in PLACE, , Transdermal, Q8H YUSRA, Solange Augustin MD     LORazepam (ATIVAN) injection 0.5 mg, 0.5 mg, Intravenous, Q6H PRN, Cassie Leone MD, 0.5 mg at 11/30/22 0746     metoprolol tartrate (LOPRESSOR) tablet 25 mg, 25 mg, Oral, BID, Cassie Lenoe MD, 25 mg at 11/30/22 0925     multivitamin, therapeutic (THERA-VIT) tablet 1 tablet, 1 tablet, Oral, Daily, Solange Augustin MD, 1 tablet at 11/30/22 0932     nitroGLYcerin (NITROSTAT) sublingual tablet 0.4 mg, 0.4 mg, Sublingual, Q5 Min PRN, Gonzales Hager MD, 0.4 mg at 11/28/22 1841     ondansetron (ZOFRAN ODT) ODT tab 4 mg, 4 mg, Oral, Q6H PRN **OR** ondansetron (ZOFRAN) injection 4 mg, 4 mg, Intravenous, Q6H PRN, Shefali Kulkarni, APRN CNP, 4 mg at  11/23/22 1655     oxyCODONE (ROXICODONE) tablet 5 mg, 5 mg, Oral, Q4H PRN **OR** oxyCODONE (ROXICODONE) tablet 10 mg, 10 mg, Oral, Q4H PRN, Gonzales Hager MD     pantoprazole (PROTONIX) EC tablet 40 mg, 40 mg, Oral, QAM AC, Shefali Kulkarni APRN CNP, 40 mg at 11/30/22 0706     Patient is already receiving anticoagulation with heparin, enoxaparin (LOVENOX), warfarin (COUMADIN)  or other anticoagulant medication, , Does not apply, Continuous PRN, Shefali Kulkarni APRN CNP     Percutaneous Coronary Intervention orders placed (this is information for BPA alerting), , Does not apply, DOES NOT GO TO Mad[facility] ROSADO Kenneth W, MD     polyethylene glycol (MIRALAX) Packet 17 g, 17 g, Oral, Daily PRN, Cassie Leone MD, 17 g at 11/27/22 1823     prochlorperazine (COMPAZINE) injection 5 mg, 5 mg, Intravenous, Q6H PRN **OR** prochlorperazine (COMPAZINE) tablet 5 mg, 5 mg, Oral, Q6H PRN **OR** prochlorperazine (COMPAZINE) suppository 12.5 mg, 12.5 mg, Rectal, Q12H PRN, Shefali Kulkarni APRN CNP     sodium chloride (PF) 0.9% PF flush 10-20 mL, 10-20 mL, Intracatheter, q1 min prn, Shefali Kulkarni APRN CNP     sodium chloride (PF) 0.9% PF flush 10-40 mL, 10-40 mL, Intracatheter, Q7 Days, Shefali Kulkarni APRN CNP, 30 mL at 11/25/22 1708     sodium chloride (PF) 0.9% PF flush 10-40 mL, 10-40 mL, Intracatheter, Q1H PRN, Shefali Kulkarni APRN CNP     temazepam (RESTORIL) capsule 15 mg, 15 mg, Oral, At Bedtime PRN, Solange Augustin MD     traZODone (DESYREL) tablet 200 mg, 200 mg, Oral, At Bedtime, Solange Augustin MD, 200 mg at 11/29/22 0008     warfarin ANTICOAGULANT (COUMADIN) tablet 5 mg, 5 mg, Oral, ONCE at 18:00, Cassie Leone MD     Warfarin Dose Required Daily - Pharmacist Managed, 1 each, Oral, See Admin Instructions, Shefali Kulkarni APRN CNP      Labs personally reviewed today during this evaluation at 12:42 PM

## 2022-12-01 ENCOUNTER — APPOINTMENT (OUTPATIENT)
Dept: PHYSICAL THERAPY | Facility: HOSPITAL | Age: 82
DRG: 853 | End: 2022-12-01
Payer: COMMERCIAL

## 2022-12-01 VITALS
TEMPERATURE: 97.6 F | WEIGHT: 235.5 LBS | BODY MASS INDEX: 33.71 KG/M2 | HEART RATE: 66 BPM | RESPIRATION RATE: 20 BRPM | OXYGEN SATURATION: 97 % | SYSTOLIC BLOOD PRESSURE: 146 MMHG | DIASTOLIC BLOOD PRESSURE: 77 MMHG | HEIGHT: 70 IN

## 2022-12-01 LAB
ALBUMIN SERPL BCG-MCNC: 3.2 G/DL (ref 3.5–5.2)
ALP SERPL-CCNC: 64 U/L (ref 40–129)
ALT SERPL W P-5'-P-CCNC: 31 U/L (ref 10–50)
ANION GAP SERPL CALCULATED.3IONS-SCNC: 7 MMOL/L (ref 7–15)
AST SERPL W P-5'-P-CCNC: 25 U/L (ref 10–50)
BILIRUB SERPL-MCNC: 0.9 MG/DL
BUN SERPL-MCNC: 42.9 MG/DL (ref 8–23)
CALCIUM SERPL-MCNC: 8.9 MG/DL (ref 8.8–10.2)
CHLORIDE SERPL-SCNC: 106 MMOL/L (ref 98–107)
CREAT SERPL-MCNC: 1.82 MG/DL (ref 0.67–1.17)
DEPRECATED HCO3 PLAS-SCNC: 24 MMOL/L (ref 22–29)
ERYTHROCYTE [DISTWIDTH] IN BLOOD BY AUTOMATED COUNT: 14.7 % (ref 10–15)
GFR SERPL CREATININE-BSD FRML MDRD: 37 ML/MIN/1.73M2
GLUCOSE BLDC GLUCOMTR-MCNC: 119 MG/DL (ref 70–99)
GLUCOSE BLDC GLUCOMTR-MCNC: 157 MG/DL (ref 70–99)
GLUCOSE BLDC GLUCOMTR-MCNC: 237 MG/DL (ref 70–99)
GLUCOSE SERPL-MCNC: 148 MG/DL (ref 70–99)
HCT VFR BLD AUTO: 31.7 % (ref 40–53)
HGB BLD-MCNC: 10.1 G/DL (ref 13.3–17.7)
INR PPP: 1.24 (ref 0.85–1.15)
MCH RBC QN AUTO: 31.9 PG (ref 26.5–33)
MCHC RBC AUTO-ENTMCNC: 31.9 G/DL (ref 31.5–36.5)
MCV RBC AUTO: 100 FL (ref 78–100)
PLATELET # BLD AUTO: 157 10E3/UL (ref 150–450)
POTASSIUM SERPL-SCNC: 4.2 MMOL/L (ref 3.4–5.3)
PROT SERPL-MCNC: 6.2 G/DL (ref 6.4–8.3)
RBC # BLD AUTO: 3.17 10E6/UL (ref 4.4–5.9)
SODIUM SERPL-SCNC: 137 MMOL/L (ref 136–145)
WBC # BLD AUTO: 8.8 10E3/UL (ref 4–11)

## 2022-12-01 PROCEDURE — 97116 GAIT TRAINING THERAPY: CPT | Mod: GP

## 2022-12-01 PROCEDURE — 250N000013 HC RX MED GY IP 250 OP 250 PS 637: Performed by: INTERNAL MEDICINE

## 2022-12-01 PROCEDURE — 85610 PROTHROMBIN TIME: CPT | Performed by: NURSE PRACTITIONER

## 2022-12-01 PROCEDURE — 99239 HOSP IP/OBS DSCHRG MGMT >30: CPT | Performed by: INTERNAL MEDICINE

## 2022-12-01 PROCEDURE — 85027 COMPLETE CBC AUTOMATED: CPT | Performed by: HOSPITALIST

## 2022-12-01 PROCEDURE — 36415 COLL VENOUS BLD VENIPUNCTURE: CPT | Performed by: HOSPITALIST

## 2022-12-01 PROCEDURE — 97110 THERAPEUTIC EXERCISES: CPT | Mod: GP

## 2022-12-01 PROCEDURE — 80053 COMPREHEN METABOLIC PANEL: CPT | Performed by: HOSPITALIST

## 2022-12-01 PROCEDURE — 250N000013 HC RX MED GY IP 250 OP 250 PS 637: Performed by: HOSPITALIST

## 2022-12-01 PROCEDURE — 250N000013 HC RX MED GY IP 250 OP 250 PS 637: Performed by: NURSE PRACTITIONER

## 2022-12-01 RX ORDER — LORAZEPAM 0.5 MG/1
0.5 TABLET ORAL ONCE
Status: COMPLETED | OUTPATIENT
Start: 2022-12-01 | End: 2022-12-01

## 2022-12-01 RX ORDER — LORAZEPAM 0.5 MG/1
0.5 TABLET ORAL DAILY PRN
Qty: 10 TABLET | Refills: 0 | Status: ON HOLD | OUTPATIENT
Start: 2022-12-01 | End: 2022-12-07

## 2022-12-01 RX ORDER — BUMETANIDE 2 MG/1
2 TABLET ORAL DAILY
Status: DISCONTINUED | OUTPATIENT
Start: 2022-12-01 | End: 2022-12-01 | Stop reason: HOSPADM

## 2022-12-01 RX ORDER — BUMETANIDE 2 MG/1
2 TABLET ORAL DAILY
Qty: 14 TABLET | Refills: 0 | Status: ON HOLD | OUTPATIENT
Start: 2022-12-02 | End: 2022-12-07

## 2022-12-01 RX ADMIN — ALLOPURINOL 200 MG: 100 TABLET ORAL at 09:04

## 2022-12-01 RX ADMIN — THERA TABS 1 TABLET: TAB at 09:05

## 2022-12-01 RX ADMIN — LORAZEPAM 0.5 MG: 0.5 TABLET ORAL at 11:22

## 2022-12-01 RX ADMIN — Medication 81 MG: at 09:05

## 2022-12-01 RX ADMIN — LIDOCAINE PATCH 4% 1 PATCH: 40 PATCH TOPICAL at 11:33

## 2022-12-01 RX ADMIN — PANTOPRAZOLE SODIUM 40 MG: 40 TABLET, DELAYED RELEASE ORAL at 09:05

## 2022-12-01 RX ADMIN — EZETIMIBE 10 MG: 10 TABLET ORAL at 09:05

## 2022-12-01 RX ADMIN — METOPROLOL TARTRATE 25 MG: 25 TABLET, FILM COATED ORAL at 09:04

## 2022-12-01 RX ADMIN — CLOPIDOGREL BISULFATE 75 MG: 75 TABLET ORAL at 09:04

## 2022-12-01 RX ADMIN — BUMETANIDE 2 MG: 2 TABLET ORAL at 09:05

## 2022-12-01 ASSESSMENT — ACTIVITIES OF DAILY LIVING (ADL)
ADLS_ACUITY_SCORE: 30
ADLS_ACUITY_SCORE: 34
ADLS_ACUITY_SCORE: 34
ADLS_ACUITY_SCORE: 30

## 2022-12-01 NOTE — PLAN OF CARE
Pt put on call light and reported having a panic attack, rating his anxiety 7/10.  MD was notified. Writer encouraged slow deep breathing and applied 2L of oxygen.  Writer stayed with pt to slow breathing down.  RR 28 and shallow. One time dose of PO Ativan given as ordered. Pt expressed that it's situational anxiety, having multiple sick family members (2 having covid) who are their resources at home. Pt is worried about his wife being alone and no-one to take care of her as he is the primary care-giver.  Will continue to calm pt at this time

## 2022-12-01 NOTE — PLAN OF CARE
Assumed care 1900 to 0730. A&O x 4. Assist x 1 with a walker and gait belt. Denies pain. Urostomy in place, bag below bladder. Call light within reach, able to make needs known. Bed alarm on for safety.    Problem: Cardiac Catheterization (Diagnostic/Interventional)  Goal: Absence of Vascular Access Complication  Intervention: Prevent Access Site Complications  Recent Flowsheet Documentation  Taken 12/1/2022 0400 by SAHY BALDERRAMA  Activity Management: activity adjusted per tolerance  Head of Bed (HOB) Positioning: HOB at 20-30 degrees

## 2022-12-01 NOTE — DISCHARGE SUMMARY
[facility] MEDICINE  DISCHARGE SUMMARY     Primary Care Physician: Rafael Montelongo  Admission Date: 11/18/2022   Discharge Provider: Lukasz Ko DO,  Discharge Date: 12/1/2022   Diet:   Active Diet and Nourishment Order   Procedures     Snacks/Supplements Adult: Glucerna; With Meals     Low Saturated Fat Na <2400 mg     Diet       Code Status: Full Code   Activity: DCACTIVITY: Activity as tolerated        Condition at Discharge: Stable     REASON FOR PRESENTATION(See Admission Note for Details)   Refer to h&p    PRINCIPAL & ACTIVE DISCHARGE DIAGNOSES     Principal Problem:    Lactic acidosis  Active Problems:    Acute respiratory failure with hypoxia (H)    Aspiration pneumonia of both lungs, unspecified aspiration pneumonia type, unspecified part of lung (H)      PENDING LABS     Unresulted Labs Ordered in the Past 30 Days of this Admission     No orders found from 10/19/2022 to 11/19/2022.            PROCEDURES ( this hospitalization only)      Procedure(s):  CV CORONARY ANGIOGRAM  Left Heart Catheterization  Percutaneous Coronary Intervention Stent    RECOMMENDATIONS TO OUTPATIENT PROVIDER FOR F/U VISIT     Follow-up Appointments     Add follow up information to the AVS prior to printing      Follow-up and recommended labs and tests       Follow up with primary care provider, Rafael Montelongo, within 7 days to   evaluate medication change, to evaluate treatment change, to evaluate   after surgery, for hospital follow- up, and regarding new diagnosis.  The   following labs/tests are recommended: cbc, bmp and INR.    Follow up with cardiologist and nephrologist as instructed                 DISPOSITION     Home with home care    SUMMARY OF HOSPITAL COURSE:    82-year-old male with history of bladder cancer, urostomy, CKD stage III, DVT on Coumadin, diabetes, obesity, NSTEMI, left bundle branch block who was recently hospitalized for CHF exacerbation.  Patient  presented to [facility] on 11/18 with concerns for generalized weakness and shortness of breath.  On arrival to ER, patient was found to be bradycardic, hypothermic , hypotensive and hypoxic.  Patient was noted to have acute hypoxic respiratory failure, lactic acidosis and in sepsis requiring monitoring in ICU on BiPAP.  On admission patient also noted to be hyperkalemic with significantly elevated BNP and troponin.  UA showed evidence of UT, however urine cx no growth however sample contaminated. Work-up with CT head was unremarkable.  CT chest showed groundglass infiltrates in both lungs.  Patient temporarily required BiPAP, stabilized and transferred out of ICU on 11/20/2022.  Discharged home with home care on 12/1     NSTEMI s/p PCI  -Repeat ECHO on 11/22 showed EF 45% and abnormal septal motion  -Cardiology consulted, and coronary angiogram and PCI to OM1 with CORA on 11/28. Remainder of [facility] showed: -LAD irregular but no severe stenoses.  -LCX huge dominant vessel, with high-grade  lesion of large OM1 (likely culprit lesion), diffuse moderate plaque in second order  Branches.   -80% stenosis mid-distal LPDA  -RCA smaller system, 30-50% mid narrowing into RV marginal branch.  -warfarin, plavix, ASA ordered by Cardiology and continued on discharge.  On review of [facility] the Cardiology reported Warfarin and Plavix.  Plavix x 12 months the stop and start ASA.  This will need clarification in follow-up w/ cardiology outpt as that is not what they ordered on discharge.  -cleared by cardiology for discharge 12/1      HFrEF  w/ ADCF  -Patient noted to have significantly elevated BNP>3000 on admission.  -TTE (11/18/22) LVEF 45% compared to 50-55% on 10/13/223.  The quality of TTE was poor per interpreting cardiologist  -IV diuresis implemented and changed to PO Bumex per Cardiology on discharge.  -Echocardiogram from 10/22 showed EF of 50-55%   -BBAdenikex  -CHF clinic f/u     Sepsis likely 2/2 to pneumonia and  UTI  Metabolic encephalopathy - resolved.   -On admission patient was found to be bradycardic, hypothermic, hypotensive and hypoxic. Also,  noted to have lactic acidosis.  -CT chest showed bilateral groundglass opacities, L>R.  -UA suggestive of UTI, urine culture shows no growth however sample appears to be contaminated.  -Blood cultures negative.  Sputum culture unable to collect sample.   -Started on meropenem given history of penicillin allergy.  Finished meropenem for total of 7 days.     Acute hypoxic respiratory failure likely secondary to pneumonia and CHF exacerbation  -Patient briefly needed BiPAP on admission, currently weaned off  -hypoxia resolved on d/c     Bradycardia on admission:  initially thought secondary to metoprolol so held.  Per Cardiology, metoprolol resumed on 11/27 for antiischemic and antiarrhythmic effect on   -Heart rate remained stable with resumption of BB     Lactic acidosis-resolved currently.     Paroxysmal A. Fib  -metoprolol initially held on admission for bradycardia.  Resumed on 11/27 by Cardiology as discussed below.  -Warfarin     Nonsustained monomorphic VT  -BB  -LVEF 45% per above    H/O DVT:  -warfarin    Left-arm numbness  -CT head unremarkable.  -MRI head and neck shows foci of restricted diffusion in bilateral globus pallidus, findings seen usually in carbon monoxide poisoning or other toxic metabolic etiologies.  Carbon monoxide levels WNL, but was checked few days after admission hence cannot be ruled out just based on levels.   -MRV neg for sinus thrombosis.  -neurology consulted inpt then s/o.     Diabetes  -A1c 7.7  -pcp f/u     Hyperkalemia - resolved.     ROBERT on CKD  -Metabolic acidosis   -Likely In the setting of sepsis.  -Baseline creat around 2.3, creat peaked to 3.7 this admission. Discharge creatinine 1.82.  -cleared by nephrology for d/c      Transaminitis - resolved.      Rib fracture/Lumbar fracture  Acute fracture of right ninth, 10th and 11th ribs  seen. Questionable acute to subacute fracture of right 12th rib seen on lumbar spine CT.  -Acute to subacute right L1-L2 and L3 transverse process fractures seen.  -Seen by NSG,appears stable and pt can follow up as out-pt if needed  -Lidocaine patch ordered for pain control.           Discharge Medications with Med changes:        Medication List      Started    aspirin 81 MG EC tablet  Commonly known as: ASA  81 mg, Oral, DAILY     atorvastatin 40 MG tablet  Commonly known as: LIPITOR  40 mg, Oral, DAILY     bumetanide 2 MG tablet  Commonly known as: BUMEX  2 mg, Oral, DAILY  Start taking on: December 2, 2022     clopidogrel 75 MG tablet  Commonly known as: PLAVIX  75 mg, Oral, DAILY, Dose to start tomorrow.     LORazepam 0.5 MG tablet  Commonly known as: ATIVAN  0.5 mg, Oral, DAILY PRN     multivitamin, therapeutic Tabs tablet  1 tablet, Oral, DAILY     nitroGLYcerin 0.4 MG sublingual tablet  Commonly known as: NITROSTAT  For chest pain place 1 tablet under the tongue every 5 minutes for 3 doses. If symptoms persist 5 minutes after 1st dose call 911.     pantoprazole 40 MG EC tablet  Commonly known as: PROTONIX  40 mg, Oral, EVERY MORNING BEFORE BREAKFAST     polyethylene glycol 17 GM/Dose powder  Commonly known as: MIRALAX  17 g, Oral, DAILY        Modified    warfarin ANTICOAGULANT 5 MG tablet  Commonly known as: COUMADIN  5 mg, Oral, DAILY  What changed:   how much to take  when to take this  additional instructions  Another medication with the same name was removed. Continue taking this medication, and follow the directions you see here.        Discontinued    doxycycline hyclate 50 MG capsule  Commonly known as: VIBRAMYCIN     furosemide 20 MG tablet  Commonly known as: LASIX                  Rationale for medication changes:              Consults       CARE MANAGEMENT / SOCIAL WORK IP CONSULT  VASCULAR ACCESS ADULT IP CONSULT  PHARMACY TO DOSE WARFARIN  PHYSICAL THERAPY ADULT IP CONSULT  OCCUPATIONAL  THERAPY ADULT IP CONSULT  NEUROSURGERY IP CONSULT  NEUROLOGY IP CONSULT  NEUROLOGY IP CONSULT  HOSPITALIST IP CONSULT  PHYSICAL THERAPY ADULT IP CONSULT  OCCUPATIONAL THERAPY ADULT IP CONSULT  CARDIOLOGY IP CONSULT  NEPHROLOGY IP CONSULT  PHARMACY IP CONSULT  PHARMACY IP CONSULT  NUTRITION SERVICES ADULT IP CONSULT  CARDIAC REHAB IP CONSULT  PHARMACY IP CONSULT  WOUND OSTOMY CONTINENCE NURSE  IP CONSULT  SMOKING CESSATION PROGRAM IP CONSULT    Immunizations given this encounter     Most Recent Immunizations   Administered Date(s) Administered     COVID-19 Vaccine 18+ (Moderna) 12/07/2021     FLU 6-35 months 11/03/2008     FLUAD(HD)65+ QUAD 09/23/2022     Flu 65+ Years 09/14/2020     Influenza (H1N1) 01/21/2010     Influenza (High Dose) 3 valent vaccine 10/15/2019     Influenza (IIV3) PF 11/14/2013     Influenza Vaccine, 6+MO IM (QUADRIVALENT W/PRESERVATIVES) 10/18/2018     Pneumo Conj 13-V (2010&after) 07/29/2015     Pneumococcal 23 valent 12/19/2005     TD (ADULT, 7+) 11/13/2006     Tdap (Adacel,Boostrix) 04/07/2016     Varicella 06/25/2010           Anticoagulation Information      Recent INR results:   Recent Labs   Lab 12/01/22  0448 11/30/22  0507 11/29/22  0453 11/28/22  0608 11/27/22  0609 11/26/22  0634 11/25/22  2344   INR 1.24* 1.27* 1.29* 1.34* 1.56* 1.88* 1.91*         SIGNIFICANT IMAGING FINDINGS     Results for orders placed or performed during the hospital encounter of 11/18/22   Head CT w/o contrast    Impression    IMPRESSION:  1.  No acute intracranial hemorrhage, mass, or herniation.  2.  Mild diffuse parenchymal volume loss and white matter changes most likely due to chronic microvascular ischemic disease.  3.  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                 Cervical spine CT w/o contrast    Impression    IMPRESSION:  1.  No fracture or posttraumatic subluxation.  2.  Multilevel degenerative changes in the cervical spine as detailed above.   CT Thoracic Spine w/o  Contrast    Impression    IMPRESSION:  THORACIC SPINE CT:  1.  Acute appearing fracture of the medial right 11th rib. Probable subtle acute fractures also of the medial right 10th and ninth ribs. Question acute to subacute fracture of the right 12th rib.  2.  No fractures within the thoracic vertebral bodies. Thoracic spine alignment is within normal limits.  3.  Degenerative changes in the thoracic spine without significant spinal canal or neural foraminal narrowing.    LUMBAR SPINE CT:  1.  Subtle acute to subacute appearing fractures of the right L1, L2, and L3 transverse processes with minimal cortical step-off and surrounding sclerosis.  2.  No other acute fractures appreciated in the lumbar spine. No significant loss of lumbar vertebral body height.  3.  Multilevel degenerative changes in the lumbar spine as detailed above. Sequela of previous left-sided laminectomy at L4-L5.     Lumbar spine CT w/o contrast    Impression    IMPRESSION:  THORACIC SPINE CT:  1.  Acute appearing fracture of the medial right 11th rib. Probable subtle acute fractures also of the medial right 10th and ninth ribs. Question acute to subacute fracture of the right 12th rib.  2.  No fractures within the thoracic vertebral bodies. Thoracic spine alignment is within normal limits.  3.  Degenerative changes in the thoracic spine without significant spinal canal or neural foraminal narrowing.    LUMBAR SPINE CT:  1.  Subtle acute to subacute appearing fractures of the right L1, L2, and L3 transverse processes with minimal cortical step-off and surrounding sclerosis.  2.  No other acute fractures appreciated in the lumbar spine. No significant loss of lumbar vertebral body height.  3.  Multilevel degenerative changes in the lumbar spine as detailed above. Sequela of previous left-sided laminectomy at L4-L5.     CT Chest Abdomen Pelvis w/o Contrast    Impression    IMPRESSION:  1.  Airspace opacities in both lungs, left greater than right,  concerning for multifocal pneumonia associated endobronchial debris and bronchial wall thickening in the left lower lobe suggests superimposed aspiration.   XR Chest Port 1 View    Impression    IMPRESSION: Left upper extremity PICC tip overlies the region of the superior vena cava.    Nonspecific bilateral interstitial opacities are nonspecific left retrocardiac opacity. Possible small left pleural effusion. No pneumothorax.    Unchanged cardiomegaly.   XR Humerus Port Left G/E 2 Views    Impression    IMPRESSION:  1.  Unremarkable left humerus.  2.  No fracture.  3.  Partially visualized left-sided PICC.     MRA Brain ([facility]) wo Contrast    Impression    IMPRESSION:  HEAD MRI:   1.  Symmetric subcentimeter foci of restricted diffusion in the bilateral globus pallidus. Findings can be seen in carbon monoxide poisoning and other toxic metabolic etiologies. Correlate clinically.  2.  Age-related changes.    HEAD MRA:   1.  No branch occlusion, aneurysm or AVM.  2.  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    NECK MRA:  1.  No measurable stenosis or dissection.   MR Brain w/o & w Contrast    Impression    IMPRESSION:  HEAD MRI:   1.  Symmetric subcentimeter foci of restricted diffusion in the bilateral globus pallidus. Findings can be seen in carbon monoxide poisoning and other toxic metabolic etiologies. Correlate clinically.  2.  Age-related changes.    HEAD MRA:   1.  No branch occlusion, aneurysm or AVM.  2.  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    NECK MRA:  1.  No measurable stenosis or dissection.   MRA Neck (Carotids) wo & w Contrast    Impression    IMPRESSION:  HEAD MRI:   1.  Symmetric subcentimeter foci of restricted diffusion in the bilateral globus pallidus. Findings can be seen in carbon monoxide poisoning and other toxic metabolic etiologies. Correlate clinically.  2.  Age-related changes.    HEAD MRA:   1.  No branch occlusion, aneurysm or AVM.  2.   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    NECK MRA:  1.  No measurable stenosis or dissection.   MRV Brain wo & w Contrast    Impression    IMPRESSION:  1.  No dural venous sinus thrombosis or significant stenosis.   US Upper Extremity Venous Duplex Left    Impression    IMPRESSION:   1.  Partially occlusive deep venous thrombosis involving the left subclavian and axillary veins.    2.  Superficial thrombus involving the left basilic vein.       SIGNIFICANT LABORATORY FINDINGS         Discharge Orders        INR     Medication Therapy Management Referral      Ambulatory CARDIAC REHAB REFERRAL      Home Care Referral      Primary Care - Care Coordination Referral      Reason aspirin not prescribed from this order set     Reason for your hospital stay    NSTEMI s/p PCI     Follow-up and recommended labs and tests     Follow up with primary care provider, Rafael Montelongo, within 7 days to evaluate medication change, to evaluate treatment change, to evaluate after surgery, for hospital follow- up, and regarding new diagnosis.  The following labs/tests are recommended: cbc, bmp and INR.    Follow up with cardiologist and nephrologist as instructed     Activity    Your activity upon discharge: activity as tolerated; fall precuation     When to contact your care team    Call your primary doctor if you have any of the following: temperature greater than 100.5f or less than 96f,  increased shortness of breath, increased swelling, increased pain, or any concerns.     Monitor and record    Weigh yourself every morning     Discharge Follow Up - with Primary Care clinic within 2 WEEKS (RN to schedule prior to d/c for HE/[facility] primary care     Add follow up information to the AVS prior to printing     When to contact your care team    Contact your care team If symptoms get worse, If increased shortness of breath, If waking up at night with difficulty breathing, If unable to lie down for sleep due to symptoms,  If weight gain of 2 pounds a day for 2 days in a row OR 5 pounds in 1 week., Increased swelling in your ankles or legs, and Dizziness or lightheadedness     Diet    Follow this diet upon discharge: Orders Placed This Encounter      Snacks/Supplements Adult: Glucerna; With Meals      Low Saturated Fat Na <2400 mg  Diabetic       Examination   Physical Exam   Temp:  [97.5  F (36.4  C)-98.2  F (36.8  C)] 97.6  F (36.4  C)  Pulse:  [67-82] 74  Resp:  [18-22] 20  BP: (109-180)/(61-78) 159/71  SpO2:  [92 %-98 %] 93 %  Wt Readings from Last 1 Encounters:   12/01/22 106.8 kg (235 lb 8 oz)   gen nad  cv rrr  Lungs cta  Neuro a&o    Total unit/floor time 31minutes.  Time consisted of examination of patient, reviewing the record, lab results, imaging results, completing documentation.  Coordination of care 20minutes discussing  with nursing, care management teams, and Physicians involved directly in the care of this patient.  Counseling time 11minutes consisted of discharge planning.      Lukasz Ko DO, DO  [facility]    CC:Rafael Montelongo

## 2022-12-01 NOTE — PROGRESS NOTES
Care Management Discharge Note    Discharge Date: 12/01/2022       Discharge Disposition: Home, Home Care    Discharge Services:  (Accepted with [facility] for home RN/PT/OT)    Discharge DME:      Discharge Transportation: family or friend will provide    Private pay costs discussed: Not applicable    Education Provided on the Discharge Plan:    Persons Notified of Discharge Plans: MD to place orders when appropriate  Patient/Family in Agreement with the Plan:      Handoff Referral Completed: Yes    Additional Information:    Plan for pt to return home with [facility]. Family transport. Bedside RN comfortable with plan and therapy in agreement. MD paged. CM following for any additional needs.     LILLI Lomas        Care Management Follow Up    Length of Stay (days): 13    Expected Discharge Date: 12/01/2022     Concerns to be Addressed:       Patient plan of care discussed at interdisciplinary rounds: Yes    Anticipated Discharge Disposition:  Home      Anticipated Discharge Services:  Accepted with [facility] for home RN/PT/OT  Anticipated Discharge DME:     Referrals Placed by CM/SW:    Private pay costs discussed: Not applicable    Additional Information:  Chart reviewed.     Therapy RECs are now Home Care. Previous RECs included TCU but, per notes, pt has had decreased shortness of breath and increased walking (over 150-200'). Plan for pt to return home with home care, pend other staffing/pt concerns. Family transport.     CM following.       LILLI Lomas

## 2022-12-01 NOTE — PROGRESS NOTES
11/30/22 1130   Appointment Info   Signing Clinician's Name / Credentials (PT) Gloria Tanner PT   Gait/Stairs (Locomotion)   Distance in Feet (Gait) 150,250 feet   Therapeutic Procedure/Exercise   Ther. Procedure: strength, endurance, ROM, flexibillity Minutes (33159) 10   Symptoms Noted During/After Treatment fatigue   Treatment Detail/Skilled Intervention supine cami L/E ex x 10 reps,all I   Therapeutic Activity   Therapeutic Activities: dynamic activities to improve functional performance Minutes (93763) 5   Treatment Detail/Skilled Intervention supine-sit mod I with bed rail ,sit -stand from bed and fron chair with FWW ,SBA   Gait Training   Gait Training Minutes (87126) 15   Symptoms Noted During/After Treatment (Gait Training) fatigue   Treatment Detail/Skilled Intervention amb out on hallway   [place] Level (Gait Training) stand-by assist   Physical Assistance Level (Gait Training) supervision   Assistive Device (Gait Training) rolling walker   Weight Bearing (Gait Training) full weight-bearing   Pattern Analysis (Gait Training) swing-through gait   PT Discharge Planning   PT Plan inde with bed mobility ,distance amb with FWW   PT Discharge Recommendation (DC Rec) home with assist;home with home care physical therapy   PT Rationale for DC Rec lives with spouse,improving mobility -should be safe to return home   PT Brief overview of current status SBA for mobility ,Amb 150/250 feet with FWW ,SBA   Total Session Time   Timed Code Treatment Minutes 30   Total Session Time (sum of timed and untimed services) 30

## 2022-12-01 NOTE — PLAN OF CARE
Occupational Therapy Discharge Summary    Reason for therapy discharge:    Discharged to home with home therapy.    Progress towards therapy goal(s). See goals on Care Plan in [facility] electronic health record for goal details.  Goals partially met.  Barriers to achieving goals:   limited tolerance for therapy.    Therapy recommendation(s):    Continued therapy is recommended.  Rationale/Recommendations:  for futher endurance building and ADL independence and safety..

## 2022-12-01 NOTE — PROGRESS NOTES
"  '    RENAL ([facility]) progress note  CC: F/U ROBERT  S: In bed. Was anxious this am. Not on oxygen. Renal function stable. Eating alright.       A/P:   ASSESSMENT:   83 yo male with h/o CKD 4, HTN. DM2, bladder cancer s/p cystectomy/urostomy admit with pneumonia, CHF, now with plan for angiogram     PLAN:  1. CKD 4 - has baseline CKD due to atrophic kidney, DM2, HTN, vascular disease, also with cystectomy/urostomy.  Has had labile fluctuations in creatinine associated with diuresis.  Currently renal function improved and no BERENICE noted after 11/28 angiogram.  - OK to resume maintenance diuretics (Changed to Bumex 1 mg daily by Cards)  - Trend daily labs    2. HTN - with recent orthostatic hypotension. Overall stable     3. NSTEMI - S/P CORA to OM-1 on 11/28.     4. Anemia: Hgb drifting down. Trend for now.     OK to discharge from renal standpoint    Tariq Angela DO  [facility]  [phone] (off)        No interval changes to past medical history, social history or family history to report.    BP (!) 146/77 (BP Location: Left leg)   Pulse 66   Temp 97.6  F (36.4  C) (Oral)   Resp 20   Ht 1.778 m (5' 10\")   Wt 106.8 kg (235 lb 8 oz)   SpO2 100%   BMI 33.79 kg/m      I/O last 3 completed shifts:  In: 360 [P.O.:360]  Out: 550 [Urine:550]    Physical Exam:   Gen: NAD, alert  HEENT: No icterus,   CARDIOVASCULAR:  trace LE edema  PULMONARY:   No cyanosis  GASTROINTESTINAL: ND  NEURO: Alert, no gross focal findings  PSYCHIATRIC: appropriate mood and affect  SKIN: Pale, no rash. Scattered skin tears/bruising    Recent Labs   Lab 12/01/22  0448 11/30/22  0507 11/29/22  0453 11/28/22  0608 11/27/22  0609 11/26/22  0634 11/25/22  1840 11/25/22  0634    137 138 135* 132* 135* 135* 135*   POTASSIUM 4.2 4.1 4.0 3.9 3.8 3.7 4.1 4.4   CHLORIDE 106 105 105 100 96* 99 101 102   CO2 24 23 24 26 25 23 21* 25   BUN 42.9* 45.2* 53.9* 67.9* 65.9* 65.1* 66.9* 69.1*   CR 1.82* 1.79* 1.92* 2.16* 2.13* 2.16* " 2.31* 2.52*   GFRESTIMATED 37* 37* 34* 30* 30* 30* 28* 25*   GALLO 8.9 9.0 9.0 9.0 8.9 9.1 8.8 8.5*   MAG  --   --   --   --   --   --  2.0  --    ALBUMIN 3.2* 3.3* 3.2* 3.5 3.5 3.9  --  3.0*       Recent Labs   Lab 12/01/22  0448 11/30/22  0507 11/29/22  0453 11/28/22  0608 11/27/22  0609 11/26/22  0634 11/25/22  2344   WBC 8.8 8.1 7.8 7.8 7.7 7.3 7.3   HGB 10.1* 10.6* 10.7* 11.1* 11.6* 11.7* 11.1*   HCT 31.7* 32.8* 32.5* 34.1* 34.7* 35.2* 33.8*    98 97 97 96 96 98    175 195 185 174 179 154             Current Facility-Administered Medications:      acetaminophen (TYLENOL) tablet 1,000 mg, 1,000 mg, Oral, Q6H PRN, Shefali Kulkarni APRN CNP, 1,000 mg at 11/26/22 2050     acetaminophen (TYLENOL) tablet 650 mg, 650 mg, Oral, Q4H PRN, Gonzales Hager MD     allopurinol (ZYLOPRIM) tablet 200 mg, 200 mg, Oral, Daily, Shefali Kulkarni APRN CNP, 200 mg at 12/01/22 0904     aspirin EC tablet 81 mg, 81 mg, Oral, Daily, Gonzales Hager MD, 81 mg at 12/01/22 0905     atorvastatin (LIPITOR) tablet 80 mg, 80 mg, Oral, QPM, Abdelrahman Mazariegos MD, 80 mg at 11/30/22 2155     bisacodyl (DULCOLAX) EC tablet 5 mg, 5 mg, Oral, Daily PRN **OR** bisacodyl (DULCOLAX) EC tablet 10 mg, 10 mg, Oral, Daily PRN **OR** bisacodyl (DULCOLAX) EC tablet 15 mg, 15 mg, Oral, Daily PRN, Shefali Kulkarni, APRN CNP, 15 mg at 11/27/22 1344     bumetanide (BUMEX) tablet 2 mg, 2 mg, Oral, Daily, Abdelrahman Mazariegos MD, 2 mg at 12/01/22 0905     clopidogrel (PLAVIX) tablet 75 mg, 75 mg, Oral, Daily, Gonzales Hager MD, 75 mg at 12/01/22 0904     Continuing beta blocker from home medication list OR beta blocker order already placed during this visit, , Does not apply, DOES NOT GO TO [facility] ROSADO Kenneth W, MD     Continuing statin from home medication list OR statin order already placed during this visit, , Does not apply, DOES NOT GO TO Madan ROSADO Kenneth W, MD     glucose gel 15-30 g, 15-30 g, Oral, Q15 Min PRN **OR** dextrose 50 %  injection 25-50 mL, 25-50 mL, Intravenous, Q15 Min PRN **OR** glucagon injection 1 mg, 1 mg, Subcutaneous, Q15 Min PRN, Shefali Kulkarni APRN CNP     ezetimibe (ZETIA) tablet 10 mg, 10 mg, Oral, Daily, Solange Augustin MD, 10 mg at 12/01/22 0905     hydrALAZINE (APRESOLINE) injection 10 mg, 10 mg, Intravenous, Q4H PRN, Cassie Leone MD     hydrocortisone 2.5 % cream, , Topical, BID, Shefali Kulkarni APRN CNP, Given at 11/29/22 0857     insulin aspart (NovoLOG) injection (RAPID ACTING), 1-12 Units, Subcutaneous, TID w/meals, Shefali Kulkarni APRN CNP, 1 Units at 12/01/22 0905     insulin aspart (NovoLOG) injection (RAPID ACTING), 1-7 Units, Subcutaneous, At Bedtime, Shefali Kulkarni APRN CNP, 1 Units at 11/21/22 2124     Lidocaine (LIDOCARE) 4 % Patch 1 patch, 1 patch, Transdermal, Q24H, Solange Augustin MD, 1 patch at 12/01/22 1133     lidocaine patch in PLACE, , Transdermal, Q8H YUSRA, Solange Augustin MD     LORazepam (ATIVAN) injection 0.5 mg, 0.5 mg, Intravenous, Q6H PRN, Cassie Leone MD, 0.5 mg at 11/30/22 0746     metoprolol tartrate (LOPRESSOR) tablet 25 mg, 25 mg, Oral, BID, Cassie Leone MD, 25 mg at 12/01/22 0904     multivitamin, therapeutic (THERA-VIT) tablet 1 tablet, 1 tablet, Oral, Daily, Solange Augustin MD, 1 tablet at 12/01/22 0905     nitroGLYcerin (NITROSTAT) sublingual tablet 0.4 mg, 0.4 mg, Sublingual, Q5 Min PRN, Gonzales Hager MD, 0.4 mg at 11/28/22 1841     ondansetron (ZOFRAN ODT) ODT tab 4 mg, 4 mg, Oral, Q6H PRN **OR** ondansetron (ZOFRAN) injection 4 mg, 4 mg, Intravenous, Q6H PRN, Shefali Kulkarni APRN CNP, 4 mg at 11/23/22 1655     oxyCODONE (ROXICODONE) tablet 5 mg, 5 mg, Oral, Q4H PRN **OR** oxyCODONE (ROXICODONE) tablet 10 mg, 10 mg, Oral, Q4H PRN, Gonzales Hager MD     pantoprazole (PROTONIX) EC tablet 40 mg, 40 mg, [facility], [facility], Shefali Kulkarni APRN CNP, 40 mg at 12/01/22 0905     Patient is already receiving anticoagulation with heparin, enoxaparin (LOVENOX), warfarin  (COUMADIN)  or other anticoagulant medication, , Does not apply, Continuous PRN, Shefali Kulkarni APRN CNP     Percutaneous Coronary Intervention orders placed (this is information for BPA alerting), , Does not apply, DOES NOT GO TO Madan ROSADO Kenneth W, MD     polyethylene glycol (MIRALAX) Packet 17 g, 17 g, Oral, Daily PRN, Cassie Leone MD, 17 g at 11/27/22 1823     prochlorperazine (COMPAZINE) injection 5 mg, 5 mg, Intravenous, Q6H PRN **OR** prochlorperazine (COMPAZINE) tablet 5 mg, 5 mg, Oral, Q6H PRN **OR** prochlorperazine (COMPAZINE) suppository 12.5 mg, 12.5 mg, Rectal, Q12H PRN, Shefali Kulkarni APRN CNP     sodium chloride (PF) 0.9% PF flush 10-20 mL, 10-20 mL, Intracatheter, q1 min prn, Shefali Kulkarni APRN CNP     sodium chloride (PF) 0.9% PF flush 10-40 mL, 10-40 mL, Intracatheter, Q7 Days, Shefali Kulkarni APRN CNP, 30 mL at 11/25/22 1708     sodium chloride (PF) 0.9% PF flush 10-40 mL, 10-40 mL, Intracatheter, Q1H PRN, Shefali Kulkarni APRN CNP     temazepam (RESTORIL) capsule 15 mg, 15 mg, Oral, At Bedtime PRN, Solange Augustin MD     traZODone (DESYREL) tablet 200 mg, 200 mg, Oral, At Bedtime, Solange Augustin MD, 200 mg at 11/30/22 2155     warfarin ANTICOAGULANT (COUMADIN) tablet 8 mg, 8 mg, Oral, ONCE at 18:00, Lukasz Ko,      Warfarin Dose Required Daily - Pharmacist Managed, 1 each, Oral, See Admin Instructions, Shefali Kulkarni APRN CNP      Labs personally reviewed today during this evaluation at 12:42 PM

## 2022-12-01 NOTE — PROGRESS NOTES
Discharge paperwork reviewed with Yuniel and family member Kenroy. No questions currently. Per patient he will call to make f/u appointments when he gets home. All belongings returned

## 2022-12-01 NOTE — PLAN OF CARE
Physical Therapy Discharge Summary    Reason for therapy discharge:    Discharged to home with home therapy.    Progress towards therapy goal(s). See goals on Care Plan in [facility] electronic health record for goal details.  Goals met    Therapy recommendation(s):    Continued therapy is recommended.  Rationale/Recommendations:  to improve functional mobility and strength.

## 2022-12-02 ENCOUNTER — TRANSFERRED RECORDS (OUTPATIENT)
Dept: MEDSURG UNIT | Facility: HOSPITAL | Age: 82
End: 2022-12-02

## 2022-12-02 ENCOUNTER — APPOINTMENT (OUTPATIENT)
Dept: CT IMAGING | Facility: HOSPITAL | Age: 82
DRG: 091 | End: 2022-12-02
Attending: EMERGENCY MEDICINE
Payer: COMMERCIAL

## 2022-12-02 ENCOUNTER — HOSPITAL ENCOUNTER (INPATIENT)
Facility: HOSPITAL | Age: 82
LOS: 2 days | Discharge: NURSING, REHAB OR LONG-TERM CARE | DRG: 091 | End: 2022-12-07
Attending: EMERGENCY MEDICINE | Admitting: FAMILY MEDICINE
Payer: COMMERCIAL

## 2022-12-02 DIAGNOSIS — S09.90XA INJURY OF HEAD, INITIAL ENCOUNTER: ICD-10-CM

## 2022-12-02 DIAGNOSIS — K59.00 CONSTIPATION, UNSPECIFIED CONSTIPATION TYPE: ICD-10-CM

## 2022-12-02 DIAGNOSIS — R52 PAIN: Primary | ICD-10-CM

## 2022-12-02 DIAGNOSIS — I25.110 CORONARY ARTERY DISEASE INVOLVING NATIVE CORONARY ARTERY OF NATIVE HEART WITH UNSTABLE ANGINA PECTORIS (H): Chronic | ICD-10-CM

## 2022-12-02 DIAGNOSIS — G47.00 INSOMNIA, UNSPECIFIED TYPE: ICD-10-CM

## 2022-12-02 DIAGNOSIS — K21.9 GASTRO-ESOPHAGEAL REFLUX DISEASE WITHOUT ESOPHAGITIS: Chronic | ICD-10-CM

## 2022-12-02 DIAGNOSIS — S01.112S LACERATION OF LEFT EYEBROW, SEQUELA: ICD-10-CM

## 2022-12-02 DIAGNOSIS — M1A.9XX0 CHRONIC GOUT WITHOUT TOPHUS, UNSPECIFIED CAUSE, UNSPECIFIED SITE: ICD-10-CM

## 2022-12-02 DIAGNOSIS — I50.32 CHRONIC HEART FAILURE WITH PRESERVED EJECTION FRACTION (H): Chronic | ICD-10-CM

## 2022-12-02 DIAGNOSIS — F41.9 ANXIETY: ICD-10-CM

## 2022-12-02 DIAGNOSIS — N17.9 ACUTE KIDNEY INJURY (H): ICD-10-CM

## 2022-12-02 DIAGNOSIS — Z79.01 CHRONIC ANTICOAGULATION: ICD-10-CM

## 2022-12-02 DIAGNOSIS — E11.610 TYPE 2 DIABETES MELLITUS WITH DIABETIC NEUROPATHIC ARTHROPATHY, UNSPECIFIED WHETHER LONG TERM INSULIN USE (H): Chronic | ICD-10-CM

## 2022-12-02 DIAGNOSIS — E46 MALNUTRITION, UNSPECIFIED TYPE (H): ICD-10-CM

## 2022-12-02 DIAGNOSIS — I50.9 ACUTE CONGESTIVE HEART FAILURE, UNSPECIFIED HEART FAILURE TYPE (H): ICD-10-CM

## 2022-12-02 DIAGNOSIS — I21.4 NSTEMI (NON-ST ELEVATED MYOCARDIAL INFARCTION) (H): ICD-10-CM

## 2022-12-02 DIAGNOSIS — S01.81XA FACIAL LACERATION, INITIAL ENCOUNTER: ICD-10-CM

## 2022-12-02 LAB
ALBUMIN SERPL BCG-MCNC: 3.7 G/DL (ref 3.5–5.2)
ALP SERPL-CCNC: 74 U/L (ref 40–129)
ALT SERPL W P-5'-P-CCNC: 29 U/L (ref 10–50)
ANION GAP SERPL CALCULATED.3IONS-SCNC: 12 MMOL/L (ref 7–15)
APTT PPP: 23 SECONDS (ref 22–38)
AST SERPL W P-5'-P-CCNC: 20 U/L (ref 10–50)
BILIRUB SERPL-MCNC: 1 MG/DL
BUN SERPL-MCNC: 51.5 MG/DL (ref 8–23)
CALCIUM SERPL-MCNC: 8.9 MG/DL (ref 8.8–10.2)
CHLORIDE SERPL-SCNC: 101 MMOL/L (ref 98–107)
CREAT SERPL-MCNC: 2.49 MG/DL (ref 0.67–1.17)
DEPRECATED HCO3 PLAS-SCNC: 22 MMOL/L (ref 22–29)
ERYTHROCYTE [DISTWIDTH] IN BLOOD BY AUTOMATED COUNT: 14.7 % (ref 10–15)
GFR SERPL CREATININE-BSD FRML MDRD: 25 ML/MIN/1.73M2
GLUCOSE BLDC GLUCOMTR-MCNC: 150 MG/DL (ref 70–99)
GLUCOSE SERPL-MCNC: 127 MG/DL (ref 70–99)
HCT VFR BLD AUTO: 34.3 % (ref 40–53)
HGB BLD-MCNC: 11.2 G/DL (ref 13.3–17.7)
INR PPP: 1.35 (ref 0.85–1.15)
MAGNESIUM SERPL-MCNC: 2 MG/DL (ref 1.7–2.3)
MCH RBC QN AUTO: 31.9 PG (ref 26.5–33)
MCHC RBC AUTO-ENTMCNC: 32.7 G/DL (ref 31.5–36.5)
MCV RBC AUTO: 98 FL (ref 78–100)
PLATELET # BLD AUTO: 196 10E3/UL (ref 150–450)
POTASSIUM SERPL-SCNC: 4.1 MMOL/L (ref 3.4–5.3)
PROT SERPL-MCNC: 7 G/DL (ref 6.4–8.3)
RBC # BLD AUTO: 3.51 10E6/UL (ref 4.4–5.9)
SARS-COV-2 RNA RESP QL NAA+PROBE: NEGATIVE
SODIUM SERPL-SCNC: 135 MMOL/L (ref 136–145)
WBC # BLD AUTO: 12.5 10E3/UL (ref 4–11)

## 2022-12-02 PROCEDURE — 99285 EMERGENCY DEPT VISIT HI MDM: CPT | Mod: 25

## 2022-12-02 PROCEDURE — 85730 THROMBOPLASTIN TIME PARTIAL: CPT | Performed by: PHYSICIAN ASSISTANT

## 2022-12-02 PROCEDURE — 36415 COLL VENOUS BLD VENIPUNCTURE: CPT | Performed by: PHYSICIAN ASSISTANT

## 2022-12-02 PROCEDURE — 999N000285 HC STATISTIC VASC ACCESS LAB DRAW WITH PIV START

## 2022-12-02 PROCEDURE — G0378 HOSPITAL OBSERVATION PER HR: HCPCS

## 2022-12-02 PROCEDURE — 250N000011 HC RX IP 250 OP 636: Performed by: EMERGENCY MEDICINE

## 2022-12-02 PROCEDURE — C9803 HOPD COVID-19 SPEC COLLECT: HCPCS

## 2022-12-02 PROCEDURE — 0HQ1XZZ REPAIR FACE SKIN, EXTERNAL APPROACH: ICD-10-PCS | Performed by: EMERGENCY MEDICINE

## 2022-12-02 PROCEDURE — 85610 PROTHROMBIN TIME: CPT | Performed by: PHYSICIAN ASSISTANT

## 2022-12-02 PROCEDURE — 999N000127 HC STATISTIC PERIPHERAL IV START W US GUIDANCE

## 2022-12-02 PROCEDURE — 72125 CT NECK SPINE W/O DYE: CPT

## 2022-12-02 PROCEDURE — 99220 PR INITIAL OBSERVATION CARE,LEVEL III: CPT | Performed by: FAMILY MEDICINE

## 2022-12-02 PROCEDURE — U0003 INFECTIOUS AGENT DETECTION BY NUCLEIC ACID (DNA OR RNA); SEVERE ACUTE RESPIRATORY SYNDROME CORONAVIRUS 2 (SARS-COV-2) (CORONAVIRUS DISEASE [COVID-19]), AMPLIFIED PROBE TECHNIQUE, MAKING USE OF HIGH THROUGHPUT TECHNOLOGIES AS DESCRIBED BY CMS-2020-01-R: HCPCS | Performed by: PHYSICIAN ASSISTANT

## 2022-12-02 PROCEDURE — 82962 GLUCOSE BLOOD TEST: CPT

## 2022-12-02 PROCEDURE — 85027 COMPLETE CBC AUTOMATED: CPT | Performed by: PHYSICIAN ASSISTANT

## 2022-12-02 PROCEDURE — 83735 ASSAY OF MAGNESIUM: CPT | Performed by: PHYSICIAN ASSISTANT

## 2022-12-02 PROCEDURE — 80053 COMPREHEN METABOLIC PANEL: CPT | Performed by: PHYSICIAN ASSISTANT

## 2022-12-02 PROCEDURE — 70450 CT HEAD/BRAIN W/O DYE: CPT

## 2022-12-02 PROCEDURE — 85041 AUTOMATED RBC COUNT: CPT | Performed by: PHYSICIAN ASSISTANT

## 2022-12-02 PROCEDURE — 74177 CT ABD & PELVIS W/CONTRAST: CPT

## 2022-12-02 RX ORDER — EZETIMIBE 10 MG/1
10 TABLET ORAL DAILY
Status: DISCONTINUED | OUTPATIENT
Start: 2022-12-03 | End: 2022-12-07 | Stop reason: HOSPADM

## 2022-12-02 RX ORDER — NITROGLYCERIN 0.4 MG/1
0.4 TABLET SUBLINGUAL EVERY 5 MIN PRN
Status: DISCONTINUED | OUTPATIENT
Start: 2022-12-02 | End: 2022-12-07 | Stop reason: HOSPADM

## 2022-12-02 RX ORDER — POLYETHYLENE GLYCOL 3350 17 G/17G
17 POWDER, FOR SOLUTION ORAL DAILY
Status: DISCONTINUED | OUTPATIENT
Start: 2022-12-03 | End: 2022-12-07 | Stop reason: HOSPADM

## 2022-12-02 RX ORDER — GLIMEPIRIDE 1 MG/1
1 TABLET ORAL DAILY
Status: DISCONTINUED | OUTPATIENT
Start: 2022-12-03 | End: 2022-12-07 | Stop reason: HOSPADM

## 2022-12-02 RX ORDER — METOPROLOL SUCCINATE 25 MG/1
25 TABLET, EXTENDED RELEASE ORAL DAILY
Status: DISCONTINUED | OUTPATIENT
Start: 2022-12-03 | End: 2022-12-07 | Stop reason: HOSPADM

## 2022-12-02 RX ORDER — ASPIRIN 81 MG/1
81 TABLET ORAL DAILY
Status: DISCONTINUED | OUTPATIENT
Start: 2022-12-03 | End: 2022-12-07 | Stop reason: HOSPADM

## 2022-12-02 RX ORDER — BUMETANIDE 2 MG/1
2 TABLET ORAL DAILY
Status: DISCONTINUED | OUTPATIENT
Start: 2022-12-03 | End: 2022-12-07 | Stop reason: HOSPADM

## 2022-12-02 RX ORDER — DEXTROSE MONOHYDRATE 25 G/50ML
25-50 INJECTION, SOLUTION INTRAVENOUS
Status: DISCONTINUED | OUTPATIENT
Start: 2022-12-02 | End: 2022-12-07 | Stop reason: HOSPADM

## 2022-12-02 RX ORDER — LORAZEPAM 0.5 MG/1
0.5 TABLET ORAL DAILY PRN
Status: DISCONTINUED | OUTPATIENT
Start: 2022-12-02 | End: 2022-12-07 | Stop reason: HOSPADM

## 2022-12-02 RX ORDER — CLOPIDOGREL BISULFATE 75 MG/1
75 TABLET ORAL DAILY
Status: DISCONTINUED | OUTPATIENT
Start: 2022-12-03 | End: 2022-12-07 | Stop reason: HOSPADM

## 2022-12-02 RX ORDER — ACETAMINOPHEN 500 MG
1000 TABLET ORAL EVERY 6 HOURS PRN
Status: DISCONTINUED | OUTPATIENT
Start: 2022-12-02 | End: 2022-12-07 | Stop reason: HOSPADM

## 2022-12-02 RX ORDER — PANTOPRAZOLE SODIUM 40 MG/1
40 TABLET, DELAYED RELEASE ORAL
Status: DISCONTINUED | OUTPATIENT
Start: 2022-12-03 | End: 2022-12-07 | Stop reason: HOSPADM

## 2022-12-02 RX ORDER — IOPAMIDOL 755 MG/ML
80 INJECTION, SOLUTION INTRAVASCULAR ONCE
Status: COMPLETED | OUTPATIENT
Start: 2022-12-02 | End: 2022-12-02

## 2022-12-02 RX ORDER — ATORVASTATIN CALCIUM 40 MG/1
40 TABLET, FILM COATED ORAL DAILY
Status: DISCONTINUED | OUTPATIENT
Start: 2022-12-03 | End: 2022-12-07 | Stop reason: HOSPADM

## 2022-12-02 RX ORDER — LIDOCAINE 40 MG/G
CREAM TOPICAL
Status: DISCONTINUED | OUTPATIENT
Start: 2022-12-02 | End: 2022-12-07 | Stop reason: HOSPADM

## 2022-12-02 RX ORDER — MULTIVITAMIN,THERAPEUTIC
1 TABLET ORAL DAILY
Status: DISCONTINUED | OUTPATIENT
Start: 2022-12-03 | End: 2022-12-07 | Stop reason: HOSPADM

## 2022-12-02 RX ORDER — NICOTINE POLACRILEX 4 MG
15-30 LOZENGE BUCCAL
Status: DISCONTINUED | OUTPATIENT
Start: 2022-12-02 | End: 2022-12-07 | Stop reason: HOSPADM

## 2022-12-02 RX ORDER — ALLOPURINOL 100 MG/1
200 TABLET ORAL DAILY
Status: DISCONTINUED | OUTPATIENT
Start: 2022-12-03 | End: 2022-12-07 | Stop reason: HOSPADM

## 2022-12-02 RX ADMIN — IOPAMIDOL 80 ML: 755 INJECTION, SOLUTION INTRAVENOUS at 19:17

## 2022-12-02 ASSESSMENT — ACTIVITIES OF DAILY LIVING (ADL)
ADLS_ACUITY_SCORE: 35
ADLS_ACUITY_SCORE: 37
ADLS_ACUITY_SCORE: 37
ADLS_ACUITY_SCORE: 35

## 2022-12-02 NOTE — ED PROVIDER NOTES
EMERGENCY DEPARTMENT NOTE     Name: Eduardo Laughlin    Age/Sex: 82 year old male   MRN: [id number]   Evaluation Date & Time:  12/2/2022  4:49 PM    PCP:    Rafael Montelongo   ED Provider: Matheus Rivas D.O.       CHIEF COMPLAINT    Trauma and Fall       DIAGNOSIS & DISPOSITION     1. Acute kidney injury (H)    2. Injury of head, initial encounter    3. Facial laceration, initial encounter      DISPOSITION: [facility] to [facility] [place]    At the conclusion of the encounter I discussed the results of all of the tests and the disposition. The questions were answered. The patient or family acknowledged understanding and was agreeable with the care plan.    TOTAL CRITICAL CARE TIME (EXCLUDING PROCEDURES): Not applicable    PROCEDURES:   None    EMERGENCY DEPARTMENT COURSE/MEDICAL DECISION MAKING   5:14 PM I met with the patient to gather history and to perform my initial exam.  We discussed treatment options and the plan for care while in the Emergency Department.    Eduardo Laughlin is a 82 year old year old male with a relevant past history of hypertension, hyperlipidemia, paroxysmal atrial fibrillation, left bundle branch block, CHF, CAD, CKD stage 3, type II diabetes, prostate cancer, bladder cancer, and chronic anticoagulation, who presents to this ED via ambulance for evaluation of fall.    ED Course as of 12/02/22 2351   Fri Dec 02, 2022   1804  recent discharge summary from 11/30/2022 reviewed       Triage note reviewed:  Patient arrives via EMS. Per medic report, patient fell after reaching to pick something up from floor. No LOC, but patient hit head, on warfarin. Bleeding not controlled on arrival to ED - lac on face dressed. Trauma alert called.  Medics report hypotension en route, 500 mL bolus given. 1L bolus infusing on arrival. Patient is A&O. Provider at bedside.     Triage Assessment     Row Name 12/02/22 9269       Cognitive/Neuro/Behavioral WDL    Cognitive/Neuro/Behavioral WDL WDL                 Differential  diagnosis considered included but not limited to intracranial hemorrhage subarachnoid hemorrhage, epidural or subdural hematoma, facial bone fracture, cervical spine fracture.  Reported hypotension consider GI bleed, ACS, sepsis from multiple sources including pneumonia   Vital signs:BP (!) 141/81   Pulse 72   Temp 98.3  F (36.8  C) (Axillary)   Resp 22   SpO2 96%   Pertinent physical exam findings:  General: Alert, normal mental status  HEENT: Laceration of the left eyebrow with active bleeding.  Left periorbital ecchymosis.  No hyphema.  Patient reports no infraorbital tenderness and extraocular muscles are intact without evidence of entrapment  Cardiac: Regular rate and rhythm S1-S2 without murmur rub  Pulmonary: Lungs are clear to ascultation bilaterally with good breath sounds  Abdomen: Soft nontender, positive bowel sounds.  No organomegaly or mass.  Patient does have some old ecchymosis on the abdominal wall.  They have conduit with urine collection bag right lower quadrant  Skin: Multiple areas of ecchymosis.  Skin tear of the right occipital forearm.  No bony tenderness of the wrist, elbow or shoulder, pelvis and hips nontender, thoracic and lumbar spine nontender  Diagnostic studies:  Imaging:  CT Chest/Abdomen/Pelvis w Contrast   Final Result   IMPRESSION:   1.  There are acute, nondisplaced right posterior ninth through 11th rib fractures at the costovertebral junction. No hydropneumothorax.   2.  Minimal stranding of the subcutaneous fat in the anterior right upper abdomen and over the right mid pelvis. No soft tissue hematoma.   3.  No evidence of solid organ injury in the abdomen or pelvis.   4.  Atrophied right kidney with mild to moderate right-sided hydroureter and hydronephrosis, unchanged. Cystectomy with ileal conduit.   5.  Resolution of the previously noted pneumonia.   6.  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   7.  Other noncritical findings  as noted above.      Cervical spine CT w/o contrast   Final Result   IMPRESSION:   HEAD CT:   1.  No CT finding of a mass, hemorrhage or focal area suggestive of acute infarct.   2.  Stable diffuse age related changes.      CERVICAL SPINE CT:   1.  No CT evidence for acute fracture or post traumatic subluxation.   2.  Diffuse neural foraminal narrowing without canal compromise as described above.      Head CT w/o contrast   Final Result   IMPRESSION:   HEAD CT:   1.  No CT finding of a mass, hemorrhage or focal area suggestive of acute infarct.   2.  Stable diffuse age related changes.      CERVICAL SPINE CT:   1.  No CT evidence for acute fracture or post traumatic subluxation.   2.  Diffuse neural foraminal narrowing without canal compromise as described above.         Lab:  Labs Ordered and Resulted from Time of ED Arrival to Time of ED Departure   CBC WITH PLATELETS - Abnormal       Result Value    WBC Count 12.5 (*)     RBC Count 3.51 (*)     Hemoglobin 11.2 (*)     Hematocrit 34.3 (*)     MCV 98      MCH 31.9      MCHC 32.7      RDW 14.7      Platelet Count 196     INR - Abnormal    INR 1.35 (*)    COMPREHENSIVE METABOLIC PANEL - Abnormal    Sodium 135 (*)     Potassium 4.1      Chloride 101      Carbon Dioxide (CO2) 22      Anion Gap 12      Urea Nitrogen 51.5 (*)     Creatinine 2.49 (*)     Calcium 8.9      Glucose 127 (*)     Alkaline Phosphatase 74      AST 20      ALT 29      Protein Total 7.0      Albumin 3.7      Bilirubin Total 1.0      GFR Estimate 25 (*)    GLUCOSE BY METER - Abnormal    GLUCOSE BY METER POCT 150 (*)    PARTIAL THROMBOPLASTIN TIME - Normal    aPTT 23     MAGNESIUM - Normal    Magnesium 2.0     COVID-19 VIRUS (CORONAVIRUS) BY PCR - Normal    SARS CoV2 PCR Negative     GLUCOSE MONITOR NURSING POCT      Interventions: Laceration repair by the physicians assistant resolving  Medical decision making: Laboratory evaluation was significant for ROBERT with creatinine 2.5 and GFR 25 decreased  over baseline.  Hemoglobin 11.2, WBC 12.5, INR 1.4.  ET of the head and cervical spine without traumatic findings.  CTA chest abdomen pelvis showed subacute rib fractures, no humeral or pneumothorax.  Abdominal portion with chronic atrophy and hydronephrosis right kidney unchanged from previous without acute traumatic findings.  Discussed case with the physicians assistant who has discussed case with the hospitalist and agree  with current plan with ROBERT noted and frequent falls for admission for further evaluation    Medical Decision Making    History:  Supplemental history from: EMS  External Record(s) reviewed: Documented in HPI, if applicable.    Work Up:  Chart documentation includes differential considered and any EKGs or imaging interpreted by provider.  In additional to work up documented, I considered the following work up: See chart documentation, if applicable.    External consultation:  Discussion of management with another provider: See chart documentation, if applicable    Complicating factors:  Care impacted by chronic illness: Other: Chronic kidney disease  Care affected by social determinants of health: N/A    Disposition considerations: Admit.      ED INTERVENTIONS     Medications   lidocaine 1 % 0.1-1 mL (has no administration in time range)   lidocaine (LMX4) cream (has no administration in time range)   sodium chloride (PF) 0.9% PF flush 3 mL (3 mLs Intracatheter Given 12/2/22 5350)   sodium chloride (PF) 0.9% PF flush 3 mL (has no administration in time range)   melatonin tablet 1 mg (has no administration in time range)   glucose gel 15-30 g (has no administration in time range)     Or   dextrose 50 % injection 25-50 mL (has no administration in time range)     Or   glucagon injection 1 mg (has no administration in time range)   insulin aspart (NovoLOG) injection (RAPID ACTING) (has no administration in time range)   acetaminophen (TYLENOL) tablet 1,000 mg (has no administration in time range)    allopurinol (ZYLOPRIM) tablet 200 mg (has no administration in time range)   aspirin EC tablet 81 mg (has no administration in time range)   atorvastatin (LIPITOR) tablet 40 mg (has no administration in time range)   bumetanide (BUMEX) tablet 2 mg (has no administration in time range)   clopidogrel (PLAVIX) tablet 75 mg (has no administration in time range)   ezetimibe (ZETIA) tablet 10 mg (has no administration in time range)   glimepiride (AMARYL) tablet 1 mg (has no administration in time range)   LORazepam (ATIVAN) tablet 0.5 mg (has no administration in time range)   metoprolol succinate ER (TOPROL XL) 24 hr tablet 25 mg (has no administration in time range)   multivitamin, therapeutic (THERA-VIT) tablet 1 tablet (has no administration in time range)   nitroGLYcerin (NITROSTAT) sublingual tablet 0.4 mg (has no administration in time range)   pantoprazole (PROTONIX) EC tablet 40 mg (has no administration in time range)   polyethylene glycol (MIRALAX) Packet 17 g (has no administration in time range)   insulin aspart (NovoLOG) injection (RAPID ACTING) ( Subcutaneous Not Given 12/2/22 2321)   iopamidol (ISOVUE-370) solution 80 mL (80 mLs Intravenous Given 12/2/22 1917)       DISCHARGE MEDICATIONS        Review of your medicines      UNREVIEWED medicines. Ask your doctor about these medicines      Dose / Directions   acetaminophen 500 MG tablet  Commonly known as: TYLENOL      Dose: 1,000 mg  Take 1,000 mg by mouth every 6 hours as needed for mild pain  Refills: 0     allopurinol 100 MG tablet  Commonly known as: ZYLOPRIM  Used for: Chronic gout without tophus, unspecified cause, unspecified site      Dose: 200 mg  Take 2 tablets (200 mg) by mouth daily  Quantity: 60 tablet  Refills: 0     aspirin 81 MG EC tablet  Commonly known as: ASA  Used for: NSTEMI (non-ST elevated myocardial infarction) (H), Coronary artery disease involving native coronary artery of native heart with unstable angina pectoris (H)       Dose: 81 mg  Take 1 tablet (81 mg) by mouth daily  Quantity: 14 tablet  Refills: 0     atorvastatin 40 MG tablet  Commonly known as: LIPITOR  Used for: Coronary artery disease involving native coronary artery of native heart with unstable angina pectoris (H)      Dose: 40 mg  Take 1 tablet (40 mg) by mouth daily  Quantity: 90 tablet  Refills: 3     bumetanide 2 MG tablet  Commonly known as: BUMEX  Used for: Acute congestive heart failure, unspecified heart failure type (H)      Dose: 2 mg  Take 1 tablet (2 mg) by mouth daily for 14 days  Quantity: 14 tablet  Refills: 0     clopidogrel 75 MG tablet  Commonly known as: PLAVIX  Used for: NSTEMI (non-ST elevated myocardial infarction) (H)      Dose: 75 mg  Take 1 tablet (75 mg) by mouth daily Dose to start tomorrow.  Quantity: 90 tablet  Refills: 3     ezetimibe 10 MG tablet  Commonly known as: ZETIA      Dose: 10 mg  Take 10 mg by mouth daily  Refills: 0     glimepiride 1 MG tablet  Commonly known as: AMARYL      Dose: 1 mg  Take 1 mg by mouth daily  Refills: 0     LORazepam 0.5 MG tablet  Commonly known as: ATIVAN  Used for: Anxiety      Dose: 0.5 mg  Take 1 tablet (0.5 mg) by mouth daily as needed for anxiety  Quantity: 10 tablet  Refills: 0     metoprolol succinate ER 25 MG 24 hr tablet  Commonly known as: TOPROL XL  Used for: Acute on chronic congestive heart failure, unspecified heart failure type (H), Primary hypertension      Dose: 25 mg  Take 1 tablet (25 mg) by mouth daily for 30 days  Quantity: 30 tablet  Refills: 0     multivitamin, therapeutic Tabs tablet  Used for: Malnutrition, unspecified type (H)      Dose: 1 tablet  Take 1 tablet by mouth daily  Quantity: 30 tablet  Refills: 3     nitroGLYcerin 0.4 MG sublingual tablet  Commonly known as: NITROSTAT  Used for: NSTEMI (non-ST elevated myocardial infarction) (H), Coronary artery disease involving native coronary artery of native heart with unstable angina pectoris (H)      For chest pain place 1 tablet  under the tongue every 5 minutes for 3 doses. If symptoms persist 5 minutes after 1st dose call 911.  Quantity: 30 tablet  Refills: 1     pantoprazole 40 MG EC tablet  Commonly known as: PROTONIX  Used for: Epigastric pain      Dose: 40 mg  Take 1 tablet (40 mg) by mouth every morning (before breakfast)  Quantity: 14 tablet  Refills: 0     polyethylene glycol 17 GM/Dose powder  Commonly known as: MIRALAX  Used for: Slow transit constipation      Dose: 17 g  Take 17 g by mouth daily  Quantity: 510 g  Refills: 1     temazepam 15 MG capsule  Commonly known as: RESTORIL      Dose: 15 mg  Take 15 mg by mouth nightly as needed for sleep  Refills: 0     traZODone 100 MG tablet  Commonly known as: DESYREL      Dose: 100-200 mg  Take 100-200 mg by mouth At Bedtime  Refills: 0     warfarin ANTICOAGULANT 5 MG tablet  Commonly known as: COUMADIN  Used for: Chronic anticoagulation      Dose: 5 mg  Take 1 tablet (5 mg) by mouth daily  Quantity: 30 tablet  Refills: 0              INFORMATION SOURCE AND LIMITATIONS    History/Exam limitations: N/A  Patient information was obtained from: EMS  Use of : N/A    HISTORY OF PRESENT ILLNESS   Eduardo Laughlin is a 82 year old year old male with a relevant past history of hypertension, hyperlipidemia, paroxysmal atrial fibrillation, left bundle branch block, CHF, CAD, CKD stage 3, type II diabetes, prostate cancer, bladder cancer, and chronic anticoagulation, who presents to this ED via ambulance for evaluation of fall.    Per EMS, patient fell after reaching to pick something up from floor. Patient reports a head injury but denies loss of consciousness. Patient currently on warfarin. En route, patient was hypotensive and given 500 mL of bolus. On arrival, 1 L of bolus infusing. Patient is alert and oriented.       Patient was leaning over a coffee table and lost his balance, hitting his left eyebrow.  Started bleeding right away.  EMS was called.  Apparently in route per EMS  report, he was hypotensive and they gave 500 cc of fluid.  He was also hypoxic so he was put on a nonrebreather.  He was also noted to be in A. fib with RVR and given 2 doses of 2.5 verapamil.     On arrival here the patient states he is feeling okay including feeling like his breathing is controlled.  He has a bulky dressing on his left forehead.  Reports that he was just discharged from [facility] yesterday.  Had been doing okay at home although notes frequent falls in general over the last several months.     REVIEW OF SYSTEMS:   Constitutional: Negative for fever.   HENT: Negative for URI symptoms or sore throat.    Cardiac: Negative for  chest pain,palpitations, near syncope or syncope  Respiratory: Negative for cough and shortness of breath.    Gastrointestinal: Negative for abdominal pain, nausea, vomiting, constipation, diarrhea, rectal bleeding or melena.  Genitourinary: Negative for dysuria, flank pain and hematuria.   Musculoskeletal: Negative for back pain.   Skin: Negative for rash. Positive for head wound (active bleeding)  Neurological: Negative for dizziness, headache, syncope, speech difficulty, unilateral weakness or imbalance with walking.   Hematological: Negative for adenopathy. Does not bruise/bleed easily.   Psychiatric/Behavioral: Negative for confusion.     PATIENT HISTORY     Past Medical History:   Diagnosis Date     Acute renal failure with acute tubular necrosis superimposed on stage 3 chronic kidney disease (H)      Arteriosclerotic cardiovascular disease (ASCVD)      Atrial fibrillation (H)      Bladder cancer (H)      BPH (benign prostatic hypertrophy)      Chronic kidney disease      Complicated UTI (urinary tract infection) 08/25/2019     Congestive heart failure (H)      Coronary artery disease      Diabetes mellitus (H)      Diabetic neuropathy (H)      E coli bacteremia      GERD (gastroesophageal reflux disease)      Gout      Hyperlipidemia      Hypertension      Hyponatremia       Iliac artery aneurysm, 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     Kidney stone      Osteoarthritis      Personal history of malignant neoplasm of prostate 04/27/2020     Prostate cancer (H)      Sleep apnea      Patient Active Problem List   Diagnosis     Bladder cancer (H)     Hydronephrosis of right kidney     Coronary artery disease     S/P ileal conduit (H)     HTN (hypertension)     CKD (chronic kidney disease) stage 3, GFR 30-59 ml/min (H)     Chronic deep vein thrombosis (DVT) of femoral vein of right lower extremity (H)     Paroxysmal atrial fibrillation (H)     LBBB (left bundle branch block)     Deep vein thrombosis (DVT) of proximal vein of both lower extremities, unspecified chronicity (H)     Gastro-esophageal reflux disease without esophagitis     Idiopathic chronic gout without tophus     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)     Morbid obesity (H)     Status post spinal surgery     Chronic heart failure with preserved ejection fraction (H)     GINNY (obstructive sleep apnea)     NSTEMI (non-ST elevated myocardial infarction) (H)     Acute congestive heart failure, unspecified heart failure type (H)     Syncope     Chronic anticoagulation     Lactic acidosis     Acute respiratory failure with hypoxia (H)     Aspiration pneumonia of both lungs, unspecified aspiration pneumonia type, unspecified part of lung (H)     Past Surgical History:   Procedure Laterality Date     CARDIAC CATHETERIZATION  03/28/2008    and coronary angios     CV CORONARY ANGIOGRAM N/A 11/28/2022    Procedure: CV CORONARY ANGIOGRAM;  Surgeon: Gonzales Hager MD;  [facility] CV     CV LEFT HEART CATH N/A 11/28/2022    Procedure: Left Heart Catheterization;  Surgeon: Gonzales Hager MD;  Location: [facility] LAB CV     CV PCI STENT DRUG ELUTING N/A 11/28/2022    Procedure: Percutaneous Coronary Intervention Stent;  Surgeon: Madan  Gonzales THAKUR MD;  Location: [facility] CATH LAB CV     CYSTECTOMY       CYSTOSCOPY       CYSTOSCOPY N/A 12/23/2015    Procedure: CYSTOSCOPY BLADDER BIOPSIES with Fulgeration and Placement of Otero ;  Surgeon: Gordon Bajwa MD;  [place] [facility] [place];  Service:      IR EXTREMITY ANGIOGRAM LEFT  3/22/2019     IR EXTREMITY ANGIOGRAM LEFT  3/22/2019     IR ILEAL CONDUIT INJECTION  12/23/2016     IR NEPHROSTOMY TUBE PLACEMENT RIGHT  8/18/2016     IR URETEROSTOMY TUBE CHANGE RIGHT  9/21/2016     KIDNEY STONE SURGERY  x4     LAMINECTOMY LUMBAR ONE LEVEL Bilateral 9/13/2022    Procedure: LUMBAR 4 - LUMBAR 5 BILATERAL MEDIAL FACETECTOMY AND DECOMPRESSION;  Surgeon: Everett Holland MD;  Location: [facility]     PICC TRIPLE LUMEN PLACEMENT  11/18/2022          PROSTATE SURGERY       Social Histrory  Smoking:None  Alcohol Use:None    Allergies   Allergen Reactions     Metoprolol Succinate Er      Other reaction(s): low blood pressure     Pcn [Penicillins] Other (See Comments)     Childhood-doesn't know reactions     Statin Drugs [Hmg-Coa-R Inhibitors] Cramps     Uroxatral [Alfuzosin] Other (See Comments)     hypotension       OUTPATIENT MEDICATIONS     New Prescriptions    No medications on file      Vitals:    12/02/22 2000 12/02/22 2100 12/02/22 2230 12/02/22 2300   BP: 121/77 127/81 (!) 141/81    Pulse: 82 86 77 72   Resp: 24 24 26 22   Temp:       TempSrc:       SpO2: 100% 99% 97% 96%       Physical Exam   Constitutional: Oriented to person, place, and time. Appears well-developed and well-nourished.   HEENT:    Head: Left frontal forehead laceration just above the eyebrow.  Left periorbital ecchymosis.  No hyphema, extraocular muscles are intact  Neck:   .  Cervical spine nontender  Cardiovascular: Normal rate, regular rhythm and normal heart sounds.    Pulmonary/Chest: Normal effort  and breath sounds normal.   Abdominal: Soft. Bowel sounds are normal.  Urostomy bag right lower quadrant  without hematuria.  Several areas of bruising noted on the abdomen but appear to be old  Musculoskeletal: Normal range of motion.   Neurological: Alert and oriented to person, place, and time. Normal strength.No sensory deficit. No cranial nerve deficit .   Skin: Anterior right proximal forearm with multiple areas of ecchymosis  Psychiatric: Normal mood and affect. Behavior is normal. Thought content normal.     DIAGNOSTICS    LABORATORY FINDINGS (REVIEWED AND INTERPRETED):  Labs Ordered and Resulted from Time of ED Arrival to Time of ED Departure   CBC WITH PLATELETS - Abnormal       Result Value    WBC Count 12.5 (*)     RBC Count 3.51 (*)     Hemoglobin 11.2 (*)     Hematocrit 34.3 (*)     MCV 98      MCH 31.9      MCHC 32.7      RDW 14.7      Platelet Count 196     INR - Abnormal    INR 1.35 (*)    COMPREHENSIVE METABOLIC PANEL - Abnormal    Sodium 135 (*)     Potassium 4.1      Chloride 101      Carbon Dioxide (CO2) 22      Anion Gap 12      Urea Nitrogen 51.5 (*)     Creatinine 2.49 (*)     Calcium 8.9      Glucose 127 (*)     Alkaline Phosphatase 74      AST 20      ALT 29      Protein Total 7.0      Albumin 3.7      Bilirubin Total 1.0      GFR Estimate 25 (*)    GLUCOSE BY METER - Abnormal    GLUCOSE BY METER POCT 150 (*)    PARTIAL THROMBOPLASTIN TIME - Normal    aPTT 23     MAGNESIUM - Normal    Magnesium 2.0     COVID-19 VIRUS (CORONAVIRUS) BY PCR - Normal    SARS CoV2 PCR Negative     GLUCOSE MONITOR NURSING POCT         IMAGING (REVIEWED AND INTERPRETED):  CT Chest/Abdomen/Pelvis w Contrast   Final Result   IMPRESSION:   1.  There are acute, nondisplaced right posterior ninth through 11th rib fractures at the costovertebral junction. No hydropneumothorax.   2.  Minimal stranding of the subcutaneous fat in the anterior right upper abdomen and over the right mid pelvis. No soft tissue hematoma.   3.  No evidence of solid organ injury in the abdomen or pelvis.   4.  Atrophied right kidney with mild to  moderate right-sided hydroureter and hydronephrosis, unchanged. Cystectomy with ileal conduit.   5.  Resolution of the previously noted pneumonia.   6.  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   7.  Other noncritical findings as noted above.      Cervical spine CT w/o contrast   Final Result   IMPRESSION:   HEAD CT:   1.  No CT finding of a mass, hemorrhage or focal area suggestive of acute infarct.   2.  Stable diffuse age related changes.      CERVICAL SPINE CT:   1.  No CT evidence for acute fracture or post traumatic subluxation.   2.  Diffuse neural foraminal narrowing without canal compromise as described above.      Head CT w/o contrast   Final Result   IMPRESSION:   HEAD CT:   1.  No CT finding of a mass, hemorrhage or focal area suggestive of acute infarct.   2.  Stable diffuse age related changes.      CERVICAL SPINE CT:   1.  No CT evidence for acute fracture or post traumatic subluxation.   2.  Diffuse neural foraminal narrowing without canal compromise as described above.            I, Curtis Vitale, am serving as a scribe to document services personally performed by Matheus Rivas D.O., based on my observation and the provider s statements to me.    I, Matheus Rivas D.O., attest that Curtis Vitale is acting in a scribe capacity, has observed my performance of the services and has documented them in accordance with my direction.    Matheus Rivas D.O.  EMERGENCY MEDICINE   12/02/22  [facility] EMERGENCY DEPARTMENT  [facility] [address]  [phone]  Dept: [phone]     Matheus Rivas DO  12/03/22 0007

## 2022-12-02 NOTE — ED PROVIDER NOTES
ED PROVIDER NOTE    EMERGENCY DEPARTMENT ENCOUNTER      NAME: Eduardo Laughlin  AGE: 82 year old male  YOB: 1940  MRN: [id number]  EVALUATION DATE & TIME: 12/2/2022  4:49 PM    PCP: Rafael Montelongo    ED PROVIDER: Marta Hall PA-C      Chief Complaint   Patient presents with     Trauma     Fall         FINAL IMPRESSION:  1. Acute kidney injury (H)    2. Injury of head, initial encounter    3. Facial laceration, initial encounter          MEDICAL DECISION MAKING:    Pertinent Labs & Imaging studies reviewed. (See chart for details)    Eduardo Laughlin is a 82 year old year old male with a relevant past history of hypertension, hyperlipidemia, paroxysmal atrial fibrillation, left bundle branch block, CHF, CAD, CKD stage 3, type II diabetes, prostate cancer, bladder cancer, and chronic anticoagulation, who presents to this ED via ambulance for evaluation of fall.  He was leaning forward to get something from the coffee table when he lost his balance and fell forward, hitting his head.    In route he was reportedly hypoxic, hypotensive and in A. fib with RVR.  Received fluid bolus, verapamil x2, and was placed on a nonrebreather.  During his ED stay he with us, his vitals were quite normal.  He eventually was weaned off oxygen.    On exam he had a laceration over the left eyebrow that was persistently bleeding.  Lighter with epinephrine was injected and pressure was held.  The laceration was ultimately closed with sutures and bleeding was well controlled.    CT of the head and C-spine were obtained given head injury especially given on Plavix and warfarin.  We also obtained a CT chest abdomen pelvis with his abnormal vital signs in route and rather diffuse bruising.    CT head and C-spine did not reveal any acute findings.  CT CAP revealed ninth through 11th rib fractures which were seen on previous admission.    Lab work reveals some ROBERT.  Creatinine today is 2.49 and yesterday on discharge was  1.82.  I do think with his significant head injury on warfarin and dual platelet treatment as well as significantly and stable vitals in route with EMS and his ROBERT that at least observation overnight is most appropriate.  Spoke with the hospitalist who agreed with admission.    At the conclusion of the encounter I discussed the results of all of the tests and the disposition. The questions were answered. The patient or family acknowledged understanding and was agreeable with the care plan.       Medical Decision Making    History:    Supplemental history from: Family Member/Significant Other    External Record(s) reviewed: Outpatient Record: [facility]    Work Up:    Chart documentation includes differential considered and any EKGs or imaging interpreted by provider.    In additional to work up documented, I considered the following work up: See chart documentation, if applicable.    External consultation:    Discussion of management with another provider: See chart documentation, if applicable    Complicating factors:    Care impacted by chronic illness: Heart Disease    Care affected by social determinants of health: N/A    Disposition considerations: Admit.      ED COURSE  5:08 PM  Met and evaluated patient. Discussed ED plan.   5:20 PM Staffed with Dr. Rivas.  5:30 PM Laceration was cleaned and repaired on left forehead as well as two skin tears on right arm  8:30 PM updated patient and family member on plan for admission  9:29 PM Spoke with hospitalist for admission.     MEDICATIONS GIVEN IN THE EMERGENCY:  Medications - No data to display    NEW PRESCRIPTIONS STARTED AT TODAY'S ER VISIT  New Prescriptions    No medications on file          =================================================================    HPI    Patient information was obtained from: Patient and sister-in-law    Use of : None    Eduardo Laughlin is a 82 year old year old male with a relevant past history of  hypertension, hyperlipidemia, paroxysmal atrial fibrillation, left bundle branch block, CHF, CAD, CKD stage 3, type II diabetes, prostate cancer, bladder cancer, and chronic anticoagulation, who presents to this ED via ambulance for evaluation of fall.    Patient was leaning over a coffee table and lost his balance, hitting his left eyebrow.  Started bleeding right away.  EMS was called.  Apparently in route per EMS report, he was hypotensive and they gave 500 cc of fluid.  He was also hypoxic so he was put on a nonrebreather.  He was also noted to be in A. fib with RVR and given 2 doses of 2.5 verapamil.    On arrival here the patient states he is feeling okay including feeling like his breathing is controlled.  He has a bulky dressing on his left forehead.  Reports that he was just discharged from [facility] yesterday.  Had been doing okay at home although notes frequent falls in general over the last several months.     Admitted from 11/18 - 12/1 to [place] and treated for CHF exacerbation and NSTEMI. Admitted with hypoxic resp failure and sepsis requiring monitoring in ICU on BiPAP.    EF 45%.  Angiogram and PCI to OM1 with CORA on 11/28.  Restarted on wafarin, plavix and ASA started post PCI    REVIEW OF SYSTEMS   Constitutional: Negative for fevers, chills.  Vision: Negative for vision changes  HENT:  + facial trauma  Pulmonary: Negative for recent shortness of breath  Cardiac: Negative for recent chest pain  GI: No new abdominal pain  : Negative for urinary symptoms  Musculoskeletal: + diffuse bruising. No specific new extremity pain  Skin: + skin laceration  Neuro: Negative for focal weakness, numbness    All other systems reviewed and are negative        PAST MEDICAL HISTORY:  Past Medical History:   Diagnosis Date     Acute renal failure with acute tubular necrosis superimposed on stage 3 chronic kidney disease (H)      Arteriosclerotic cardiovascular disease (ASCVD)      Atrial fibrillation (H)       Bladder cancer (H)      BPH (benign prostatic hypertrophy)      Chronic kidney disease      Complicated UTI (urinary tract infection) 08/25/2019     Congestive heart failure (H)      Coronary artery disease      Diabetes mellitus (H)      Diabetic neuropathy (H)      E coli bacteremia      GERD (gastroesophageal reflux disease)      Gout      Hyperlipidemia      Hypertension      Hyponatremia      Iliac artery aneurysm, 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    Added automatically from request for surgery [id number]     Kidney stone      Osteoarthritis      Personal history of malignant neoplasm of prostate 04/27/2020     Prostate cancer (H)      Sleep apnea     CPAP       PAST SURGICAL HISTORY:  Past Surgical History:   Procedure Laterality Date     CARDIAC CATHETERIZATION  03/28/2008    and coronary angios     CV CORONARY ANGIOGRAM N/A 11/28/2022    Procedure: CV CORONARY ANGIOGRAM;  Surgeon: Gonzales Hager MD;  Location: [facility] CV     CV LEFT HEART CATH N/A 11/28/2022    Procedure: Left Heart Catheterization;  Surgeon: Gonzales Hager MD;  Location: [facility]     CV PCI STENT DRUG ELUTING N/A 11/28/2022    Procedure: Percutaneous Coronary Intervention Stent;  Surgeon: Gonzales Hager MD;  Location: [facility] CV     CYSTECTOMY       CYSTOSCOPY       CYSTOSCOPY N/A 12/23/2015    Procedure: CYSTOSCOPY BLADDER BIOPSIES with Fulgeration and Placement of Otero ;  Surgeon: Gordon Bajwa MD;  Location: [facility];  Service:      IR EXTREMITY ANGIOGRAM LEFT  3/22/2019     IR EXTREMITY ANGIOGRAM LEFT  3/22/2019     IR ILEAL CONDUIT INJECTION  12/23/2016     IR NEPHROSTOMY TUBE PLACEMENT RIGHT  8/18/2016     IR URETEROSTOMY TUBE CHANGE RIGHT  9/21/2016     KIDNEY STONE SURGERY  x4     LAMINECTOMY LUMBAR ONE LEVEL Bilateral 9/13/2022    Procedure: LUMBAR 4 - LUMBAR 5 BILATERAL MEDIAL FACETECTOMY AND DECOMPRESSION;  Surgeon: Everett Holland MD;  Location: [facility]  TRIPLE LUMEN PLACEMENT  11/18/2022          PROSTATE SURGERY             CURRENT MEDICATIONS:    No current facility-administered medications for this encounter.    Current Outpatient Medications:      acetaminophen (TYLENOL) 500 MG tablet, Take 1,000 mg by mouth every 6 hours as needed for mild pain, Disp: , Rfl:      allopurinol (ZYLOPRIM) 100 MG tablet, Take 2 tablets (200 mg) by mouth daily, Disp: 60 tablet, Rfl: 0     aspirin (ASA) 81 MG EC tablet, Take 1 tablet (81 mg) by mouth daily, Disp: 14 tablet, Rfl: 0     atorvastatin (LIPITOR) 40 MG tablet, Take 1 tablet (40 mg) by mouth daily, Disp: 90 tablet, Rfl: 3     bumetanide (BUMEX) 2 MG tablet, Take 1 tablet (2 mg) by mouth daily for 14 days, Disp: 14 tablet, Rfl: 0     clopidogrel (PLAVIX) 75 MG tablet, Take 1 tablet (75 mg) by mouth daily Dose to start tomorrow., Disp: 90 tablet, Rfl: 3     ezetimibe (ZETIA) 10 MG tablet, Take 10 mg by mouth daily, Disp: , Rfl:      glimepiride (AMARYL) 1 MG tablet, Take 1 mg by mouth daily, Disp: , Rfl:      LORazepam (ATIVAN) 0.5 MG tablet, Take 1 tablet (0.5 mg) by mouth daily as needed for anxiety, Disp: 10 tablet, Rfl: 0     metoprolol succinate ER (TOPROL XL) 25 MG 24 hr tablet, Take 1 tablet (25 mg) by mouth daily for 30 days, Disp: 30 tablet, Rfl: 0     multivitamin, therapeutic (THERA-VIT) TABS tablet, Take 1 tablet by mouth daily, Disp: 30 tablet, Rfl: 3     nitroGLYcerin (NITROSTAT) 0.4 MG sublingual tablet, For chest pain place 1 tablet under the tongue every 5 minutes for 3 doses. If symptoms persist 5 minutes after 1st dose call 911., Disp: 30 tablet, Rfl: 1     pantoprazole (PROTONIX) 40 MG EC tablet, Take 1 tablet (40 mg) by mouth every morning (before breakfast), Disp: 14 tablet, Rfl: 0     polyethylene glycol (MIRALAX) 17 GM/Dose powder, Take 17 g by mouth daily, Disp: 510 g, Rfl: 1     temazepam (RESTORIL) 15 MG capsule, Take 15 mg by mouth nightly as needed for sleep, Disp: , Rfl:      traZODone (DESYREL)  100 MG tablet, Take 100-200 mg by mouth At Bedtime, Disp: , Rfl:      warfarin ANTICOAGULANT (COUMADIN) 5 MG tablet, Take 1 tablet (5 mg) by mouth daily, Disp: 30 tablet, Rfl: 0    ALLERGIES:  Allergies   Allergen Reactions     Metoprolol Succinate Er      Other reaction(s): low blood pressure     Pcn [Penicillins] Other (See Comments)     Childhood-doesn't know reactions     Statin Drugs [Hmg-Coa-R Inhibitors] Cramps     Uroxatral [Alfuzosin] Other (See Comments)     hypotension       FAMILY HISTORY:  Family History   Problem Relation Age of Onset     No Known Problems Mother      Prostate Cancer Father      Deep Vein Thrombosis Father      Cancer Sister      Heart Disease Sister      No Known Problems Daughter      Heart Disease Brother      Heart Disease Sister      No Known Problems Daughter        SOCIAL HISTORY:   Lives at home with wife    VITALS:  Vitals:    12/02/22 1653   BP: 111/71   Pulse: 84   Resp: 22   Temp: 98.3  F (36.8  C)   TempSrc: Axillary   SpO2: 100%       PHYSICAL EXAM    General Appearance:  Alert, cooperative, on non-rebreather but talking well, no distress, appears stated age  Head: Normocephalic without obvious deformity. Forehead laceration as below  Eyes: Conjunctiva clear, Lids normal. PERRL. + periorbital swelling and eccymosis on left  Ear, Nose, Throat: 3cm laceration just above left eyebrow - active bleeding, moist mucus membranes.  Neck: Supple. No cervical tenderness.  Lungs: On non-rebreather. No distress. Lungs clear to ausculation bilaterally.  Heart:: Afib. Strong, equal distal pulses.    Abdomen: Soft, nontender.  No rebound or guarding.    Musculoskeletal: Normal ROM. No edema. Moving all extremities.  Tenderness over right chest wall  Skin: Multiple scattered bruises.  Two superficial skin tears on right mid arm.  Left forehead laceration as above  Neurologic: GCS 15 Alert and orientated appropriately. No focal deficits.  Psych: Normal mood and affect      LAB:  Labs  Ordered and Resulted from Time of ED Arrival to Time of ED Departure - No data to display    RADIOLOGY:  CT Chest/Abdomen/Pelvis w Contrast   Final Result   IMPRESSION:   1.  There are acute, nondisplaced right posterior ninth through 11th rib fractures at the costovertebral junction. No hydropneumothorax.   2.  Minimal stranding of the subcutaneous fat in the anterior right upper abdomen and over the right mid pelvis. No soft tissue hematoma.   3.  No evidence of solid organ injury in the abdomen or pelvis.   4.  Atrophied right kidney with mild to moderate right-sided hydroureter and hydronephrosis, unchanged. Cystectomy with ileal conduit.   5.  Resolution of the previously noted pneumonia.   6.  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   7.  Other noncritical findings as noted above.      Cervical spine CT w/o contrast   Final Result   IMPRESSION:   HEAD CT:   1.  No CT finding of a mass, hemorrhage or focal area suggestive of acute infarct.   2.  Stable diffuse age related changes.      CERVICAL SPINE CT:   1.  No CT evidence for acute fracture or post traumatic subluxation.   2.  Diffuse neural foraminal narrowing without canal compromise as described above.      Head CT w/o contrast   Final Result   IMPRESSION:   HEAD CT:   1.  No CT finding of a mass, hemorrhage or focal area suggestive of acute infarct.   2.  Stable diffuse age related changes.      CERVICAL SPINE CT:   1.  No CT evidence for acute fracture or post traumatic subluxation.   2.  Diffuse neural foraminal narrowing without canal compromise as described above.          EKG:    Performed at: 1657  Impression: Afib, rate 77.  LBBB. Abnormal ECG. Afib has replaced sinus rhythm from Nov 29 2022  Dr. Rivas and I have independently reviewed and interpreted the EKG(s) documented above.    PROCEDURES:   PROCEDURE: Laceration Repair   INDICATIONS: Laceration   PROCEDURE PROVIDER: Marta Hall PA-C   SITE: left   TYPE/SIZE:  simple, complex, clean and no foreign body visualized  2.5 cm (total length)   FUNCTIONAL ASSESSMENT: Distal sensation, circulation and motor intact   MEDICATION: 2.5 mLs of 1% Lidocaine with epinephrine   PREPARATION: irrigation with Hibiclens   DEBRIDEMENT: no debridement   CLOSURE:  Superficial layer closed with 6 stitches of 5-0 Ethilon simple interrupted    Total number of sutures/staples placed: 6             Marta Hall PA-C   Emergency Medicine           Marta Hall PA-C  12/02/22 1435

## 2022-12-02 NOTE — ED TRIAGE NOTES
Patient arrives via EMS. Per medic report, patient fell after reaching to pick something up from floor. No LOC, but patient hit head, on warfarin. Bleeding not controlled on arrival to ED - lac on face dressed. Trauma alert called.  Medics report hypotension en route, 500 mL bolus given. 1L bolus infusing on arrival. Patient is A&O. Provider at bedside.     Triage Assessment     Row Name 12/02/22 5125       Cognitive/Neuro/Behavioral WDL    Cognitive/Neuro/Behavioral WDL WDL

## 2022-12-02 NOTE — ED NOTES
Bed: [place] [address]  Expected date: 12/2/22  Expected time: 4:43 PM  Means of arrival: Ambulance  Comments:  [facility] - 82yom fall, lac face, bleeding not controlled, + thinners, no loc, afib 90/72

## 2022-12-03 ENCOUNTER — APPOINTMENT (OUTPATIENT)
Dept: OCCUPATIONAL THERAPY | Facility: HOSPITAL | Age: 82
DRG: 091 | End: 2022-12-03
Attending: INTERNAL MEDICINE
Payer: COMMERCIAL

## 2022-12-03 ENCOUNTER — APPOINTMENT (OUTPATIENT)
Dept: CT IMAGING | Facility: HOSPITAL | Age: 82
DRG: 091 | End: 2022-12-03
Attending: INTERNAL MEDICINE
Payer: COMMERCIAL

## 2022-12-03 ENCOUNTER — APPOINTMENT (OUTPATIENT)
Dept: PHYSICAL THERAPY | Facility: HOSPITAL | Age: 82
DRG: 091 | End: 2022-12-03
Attending: INTERNAL MEDICINE
Payer: COMMERCIAL

## 2022-12-03 LAB
ANION GAP SERPL CALCULATED.3IONS-SCNC: 13 MMOL/L (ref 7–15)
BASOPHILS # BLD AUTO: 0 10E3/UL (ref 0–0.2)
BASOPHILS NFR BLD AUTO: 1 %
BUN SERPL-MCNC: 49.6 MG/DL (ref 8–23)
CALCIUM SERPL-MCNC: 8.9 MG/DL (ref 8.8–10.2)
CHLORIDE SERPL-SCNC: 105 MMOL/L (ref 98–107)
CREAT SERPL-MCNC: 2.27 MG/DL (ref 0.67–1.17)
DEPRECATED HCO3 PLAS-SCNC: 19 MMOL/L (ref 22–29)
EOSINOPHIL # BLD AUTO: 0.1 10E3/UL (ref 0–0.7)
EOSINOPHIL NFR BLD AUTO: 1 %
ERYTHROCYTE [DISTWIDTH] IN BLOOD BY AUTOMATED COUNT: 14.8 % (ref 10–15)
GFR SERPL CREATININE-BSD FRML MDRD: 28 ML/MIN/1.73M2
GLUCOSE BLDC GLUCOMTR-MCNC: 100 MG/DL (ref 70–99)
GLUCOSE BLDC GLUCOMTR-MCNC: 129 MG/DL (ref 70–99)
GLUCOSE BLDC GLUCOMTR-MCNC: 140 MG/DL (ref 70–99)
GLUCOSE BLDC GLUCOMTR-MCNC: 217 MG/DL (ref 70–99)
GLUCOSE SERPL-MCNC: 133 MG/DL (ref 70–99)
HCT VFR BLD AUTO: 30.3 % (ref 40–53)
HGB BLD-MCNC: 10 G/DL (ref 13.3–17.7)
IMM GRANULOCYTES # BLD: 0.1 10E3/UL
IMM GRANULOCYTES NFR BLD: 2 %
LYMPHOCYTES # BLD AUTO: 1.5 10E3/UL (ref 0.8–5.3)
LYMPHOCYTES NFR BLD AUTO: 18 %
MCH RBC QN AUTO: 32.2 PG (ref 26.5–33)
MCHC RBC AUTO-ENTMCNC: 33 G/DL (ref 31.5–36.5)
MCV RBC AUTO: 97 FL (ref 78–100)
MONOCYTES # BLD AUTO: 0.8 10E3/UL (ref 0–1.3)
MONOCYTES NFR BLD AUTO: 10 %
NEUTROPHILS # BLD AUTO: 6 10E3/UL (ref 1.6–8.3)
NEUTROPHILS NFR BLD AUTO: 68 %
NRBC # BLD AUTO: 0 10E3/UL
NRBC BLD AUTO-RTO: 0 /100
PLATELET # BLD AUTO: 165 10E3/UL (ref 150–450)
POTASSIUM SERPL-SCNC: 4.7 MMOL/L (ref 3.4–5.3)
RBC # BLD AUTO: 3.11 10E6/UL (ref 4.4–5.9)
SODIUM SERPL-SCNC: 137 MMOL/L (ref 136–145)
WBC # BLD AUTO: 8.5 10E3/UL (ref 4–11)

## 2022-12-03 PROCEDURE — 85025 COMPLETE CBC W/AUTO DIFF WBC: CPT | Performed by: FAMILY MEDICINE

## 2022-12-03 PROCEDURE — 97161 PT EVAL LOW COMPLEX 20 MIN: CPT | Mod: GP

## 2022-12-03 PROCEDURE — 36415 COLL VENOUS BLD VENIPUNCTURE: CPT | Performed by: FAMILY MEDICINE

## 2022-12-03 PROCEDURE — 99226 PR SUBSEQUENT OBSERVATION CARE,LEVEL III: CPT | Performed by: INTERNAL MEDICINE

## 2022-12-03 PROCEDURE — 97166 OT EVAL MOD COMPLEX 45 MIN: CPT | Mod: GO

## 2022-12-03 PROCEDURE — 82962 GLUCOSE BLOOD TEST: CPT

## 2022-12-03 PROCEDURE — 96374 THER/PROPH/DIAG INJ IV PUSH: CPT

## 2022-12-03 PROCEDURE — 250N000011 HC RX IP 250 OP 636: Performed by: INTERNAL MEDICINE

## 2022-12-03 PROCEDURE — G0378 HOSPITAL OBSERVATION PER HR: HCPCS

## 2022-12-03 PROCEDURE — 70450 CT HEAD/BRAIN W/O DYE: CPT

## 2022-12-03 PROCEDURE — 96372 THER/PROPH/DIAG INJ SC/IM: CPT | Performed by: FAMILY MEDICINE

## 2022-12-03 PROCEDURE — 82310 ASSAY OF CALCIUM: CPT | Performed by: FAMILY MEDICINE

## 2022-12-03 PROCEDURE — 250N000013 HC RX MED GY IP 250 OP 250 PS 637: Performed by: FAMILY MEDICINE

## 2022-12-03 PROCEDURE — 250N000012 HC RX MED GY IP 250 OP 636 PS 637: Performed by: FAMILY MEDICINE

## 2022-12-03 RX ORDER — HYDRALAZINE HYDROCHLORIDE 20 MG/ML
10 INJECTION INTRAMUSCULAR; INTRAVENOUS EVERY 6 HOURS PRN
Status: DISCONTINUED | OUTPATIENT
Start: 2022-12-03 | End: 2022-12-07 | Stop reason: HOSPADM

## 2022-12-03 RX ADMIN — EZETIMIBE 10 MG: 10 TABLET ORAL at 09:43

## 2022-12-03 RX ADMIN — LORAZEPAM 0.5 MG: 0.5 TABLET ORAL at 01:00

## 2022-12-03 RX ADMIN — INSULIN ASPART 2 UNITS: 100 INJECTION, SOLUTION INTRAVENOUS; SUBCUTANEOUS at 12:03

## 2022-12-03 RX ADMIN — GLIMEPIRIDE 1 MG: 1 TABLET ORAL at 09:44

## 2022-12-03 RX ADMIN — PANTOPRAZOLE SODIUM 40 MG: 40 TABLET, DELAYED RELEASE ORAL at 08:55

## 2022-12-03 RX ADMIN — THERA TABS 1 TABLET: TAB at 08:55

## 2022-12-03 RX ADMIN — CLOPIDOGREL BISULFATE 75 MG: 75 TABLET ORAL at 08:55

## 2022-12-03 RX ADMIN — ATORVASTATIN CALCIUM 40 MG: 40 TABLET, FILM COATED ORAL at 08:55

## 2022-12-03 RX ADMIN — ACETAMINOPHEN 1000 MG: 500 TABLET ORAL at 11:00

## 2022-12-03 RX ADMIN — Medication 1 MG: at 01:00

## 2022-12-03 RX ADMIN — INSULIN ASPART 1 UNITS: 100 INJECTION, SOLUTION INTRAVENOUS; SUBCUTANEOUS at 09:05

## 2022-12-03 RX ADMIN — METOPROLOL SUCCINATE 25 MG: 25 TABLET, EXTENDED RELEASE ORAL at 08:55

## 2022-12-03 RX ADMIN — HYDRALAZINE HYDROCHLORIDE 10 MG: 20 INJECTION, SOLUTION INTRAMUSCULAR; INTRAVENOUS at 10:19

## 2022-12-03 RX ADMIN — BUMETANIDE 2 MG: 2 TABLET ORAL at 09:43

## 2022-12-03 RX ADMIN — Medication 81 MG: at 08:56

## 2022-12-03 RX ADMIN — POLYETHYLENE GLYCOL 3350 17 G: 17 POWDER, FOR SOLUTION ORAL at 08:55

## 2022-12-03 RX ADMIN — ALLOPURINOL 200 MG: 100 TABLET ORAL at 09:42

## 2022-12-03 ASSESSMENT — ACTIVITIES OF DAILY LIVING (ADL)
ADLS_ACUITY_SCORE: 37
ADLS_ACUITY_SCORE: 37
ADLS_ACUITY_SCORE: 38
ADLS_ACUITY_SCORE: 37
ADLS_ACUITY_SCORE: 38
ADLS_ACUITY_SCORE: 37
DEPENDENT_IADLS:: INDEPENDENT
ADLS_ACUITY_SCORE: 38
ADLS_ACUITY_SCORE: 37
ADLS_ACUITY_SCORE: 38

## 2022-12-03 NOTE — PHARMACY-ADMISSION MEDICATION HISTORY
Pharmacy Note - Admission Medication History    Pertinent Provider Information: Had all morning medications today, including warfarin.     ______________________________________________________________________    Prior To Admission (PTA) med list completed and updated in EMR.       PTA Med List   Medication Sig Last Dose     acetaminophen (TYLENOL) 500 MG tablet Take 1,000 mg by mouth every 6 hours as needed for mild pain Unknown     allopurinol (ZYLOPRIM) 100 MG tablet Take 2 tablets (200 mg) by mouth daily 12/2/2022 at am     aspirin (ASA) 81 MG EC tablet Take 1 tablet (81 mg) by mouth daily 12/2/2022 at am     atorvastatin (LIPITOR) 40 MG tablet Take 1 tablet (40 mg) by mouth daily 12/2/2022 at am     bumetanide (BUMEX) 2 MG tablet Take 1 tablet (2 mg) by mouth daily for 14 days 12/2/2022 at am     clopidogrel (PLAVIX) 75 MG tablet Take 1 tablet (75 mg) by mouth daily Dose to start tomorrow. 12/2/2022 at am     ezetimibe (ZETIA) 10 MG tablet Take 10 mg by mouth daily 12/2/2022 at am     glimepiride (AMARYL) 1 MG tablet Take 1 mg by mouth daily 12/2/2022 at am     LORazepam (ATIVAN) 0.5 MG tablet Take 1 tablet (0.5 mg) by mouth daily as needed for anxiety Unknown     metoprolol succinate ER (TOPROL XL) 25 MG 24 hr tablet Take 1 tablet (25 mg) by mouth daily for 30 days 12/2/2022 at am     multivitamin, therapeutic (THERA-VIT) TABS tablet Take 1 tablet by mouth daily 12/2/2022 at am     nitroGLYcerin (NITROSTAT) 0.4 MG sublingual tablet For chest pain place 1 tablet under the tongue every 5 minutes for 3 doses. If symptoms persist 5 minutes after 1st dose call 911. Unknown     pantoprazole (PROTONIX) 40 MG EC tablet Take 1 tablet (40 mg) by mouth every morning (before breakfast) 12/2/2022 at am     polyethylene glycol (MIRALAX) 17 GM/Dose powder Take 17 g by mouth daily 12/2/2022 at am     temazepam (RESTORIL) 15 MG capsule Take 15 mg by mouth nightly as needed for sleep 12/1/2022 at pm     traZODone (DESYREL) 100  MG tablet Take 100-200 mg by mouth At Bedtime 12/1/2022 at pm     warfarin ANTICOAGULANT (COUMADIN) 5 MG tablet Take 1 tablet (5 mg) by mouth daily 12/2/2022 at am       Information source(s): Patient and Hospital records  Method of interview communication: in-person    Summary of Changes to PTA Med List  New: none  Discontinued: none  Changed: none    Patient was asked about OTC/herbal products specifically.  PTA med list reflects this.    In the past week, patient estimated taking medication this percent of the time:  greater than 90%.    Allergies were reviewed, assessed, and updated with the patient.      Patient does not use any multi-dose medications prior to admission.    The information provided in this note is only as accurate as the sources available at the time of the update(s).    Thank you for the opportunity to participate in the care of this patient.    Maddy Boland RPH  12/2/2022 8:33 PM

## 2022-12-03 NOTE — PLAN OF CARE
Problem: Fall Injury Risk  Goal: Absence of Fall and Fall-Related Injury  Outcome: Progressing  Intervention: Identify and Manage Contributors  Recent Flowsheet Documentation  Taken 12/3/2022 0859 by Irma Dinero RN  Medication Review/Management: medications reviewed  Intervention: Promote Injury-Free Environment  Recent Flowsheet Documentation  Taken 12/3/2022 0859 by Irma Dinero RN  Safety Promotion/Fall Prevention:   activity supervised   assistive device/personal items within reach   fall prevention program maintained   safety round/check completed   Goal Outcome Evaluation:         Patient denied having pain for most of shift. Reported headache x1, prn tylenol given and was effective.   SBP 180s, prn hydralyzine given, -120. Weaned from 2L NC to RA. Urostomy intact.   Patient taken for follow up CT to head.

## 2022-12-03 NOTE — PROGRESS NOTES
[facility]    PROGRESS NOTE - Hospitalist Service    Assessment and Plan    Active Problems:    * No active hospital problems. *    Eduardo Laughlin is a 82 year old male with a past medical history of recent Non-STEMI, CHF, A.fib, HTN, CKD 4, DVT, bladder cancer who was discharged from [facility] on 12/1/2022 with diagnosis of Non-STEMI, CHF exacerbation and pneumonia who returns to hospital today s/p fall at home.  He was readmitted on 12/2/2022.      Recurrent falls..   - S/P Mechanical fall versus syncope   -Admission CT head negative.   - Patient has had at least 4 falls at home in past 5 weeks.  - PT evaluate and treat. May need Inpatient Rehabilitation/ SNF placement.   - Will make further recommendations based on clinical course.     Acute head trauma  - Forehead laceration sutured in ED.   - No ICH on CT head.   - Monitor closely due to hx of multiple anticoagulants.  - Repeat CT head again today     Recent acute kidney injury  - Patient has underlying CKD stage IV.   - Nephrology note and recommendations from 12/01/22 reviewed. Avoid nephrotoxic agents.  - Continue Bumex due to underlying CHF. Repeat renal function in a.m.   - Recommend Nephrologist reconsultation if Creatinine continues to increase.     A.fib with RVR  -- Patient has known A.fib and is chronically anticoagulated with warfarin.  - INR current subtherapeutic.   - Rate currently controlled.   - Continue home dose of Metoprolol with BP and HR parameters.  - Plan to restart warfarin today if repeated CT head is negative for acute changes     Hypotension  - Resolved.   - Home medications reviewed.   - Monitor vitals. Check orthostatics.  -Recent echo on 11/2022 shows EF of 45%     Acute hypoxia  - Resolved.   - O2 sat=98 % on RA while I was at bedside.   - CT chest no PNA appreciated.   - COVID negative. Monitor vitals closely.     Leukocytosis  - Possible stress induced reaction.   - Afebrile. No PNA on CT  chest.   -Unremarkable repeat CBC today     .Acute Right Rib fractures 9th-11th  - Secondary to fall  - Also present on imaging from recent hospitalization.  - Patient says he has had at least 4 falls at home in the past 5 weeks.   - Currently pain free.  - Recommend Incentive spirometer.  -Continues Tylenol as needed     Diabetes mellitus type 2  - Restart home glimepiride  - Cover with insulin sliding scale  - Last hemoglobin A1c 7.7      VTE prophylaxis:  Warfarin  DIET: Orders Placed This Encounter      Combination Diet Low Consistent Carb (45 g CHO per Meal) Diet; 2 gm NA Diet; Low Saturated Fat Na <2400mg Diet, No Caffeine Diet      Disposition/Barriers to discharge: Repeat CT head, monitor renal function and PT/OT evaluation  Code Status: Full Code    Subjective:  Eduardo is feeling better today, denies any chest pain or shortness of breath.  No nausea or vomiting.  Still feeling weak.    PHYSICAL EXAM  There were no vitals filed for this visit.  B/P:187/99 T:97.6 P:83 R:20     Intake/Output Summary (Last 24 hours) at 12/3/2022 1305  Last data filed at 12/3/2022 0645  Gross per 24 hour   Intake --   Output 800 ml   Net -800 ml      There is no height or weight on file to calculate BMI.    Constitutional: awake, alert, cooperative, no apparent distress, and appears stated age  Eyes: Positive bruises on left forehead and upper eyelid.  ENT: Normocephalic, without obvious abnormality, atraumatic, sinuses nontender on palpation, external ears without lesions, oral pharynx with moist mucous membranes, tonsils without erythema or exudates, gums normal and good dentition.  Respiratory: Decreased breath sounds lung base with scattered rhonchi.  No wheezing.  Cardiovascular: Normal apical impulse, regular rate and rhythm, normal S1 and S2, no S3 or S4, and no murmur noted  GI: No scars, normal bowel sounds, soft, non-distended, non-tender, no masses palpated, no hepatosplenomegally  Skin: no bruising or bleeding and  normal skin color, texture, turgor  Musculoskeletal: Multiple bruises at different stages on his upper and lower extremities.  Neurologic: Awake, alert, oriented to name, place and time.  Cranial nerves II-XII are grossly intact.  Motor is 5 out of 5 bilaterally.   Sensory is intact.    Neuropsychiatric: Appropriate with examiner      PERTINENT LABS/IMAGING:  Recent Labs   Lab 12/03/22  1202 12/03/22  0847 12/03/22  0742 12/02/22  2316 12/02/22  1817 12/01/22  0823 12/01/22  0448 11/30/22  0802 11/30/22  0507   WBC  --   --  8.5  --  12.5*  --  8.8  --  8.1   HGB  --   --  10.0*  --  11.2*  --  10.1*  --  10.6*   MCV  --   --  97  --  98  --  100  --  98   PLT  --   --  165  --  196  --  157  --  175   INR  --   --   --   --  1.35*  --  1.24*  --  1.27*   NA  --   --  137  --  135*  --  137  --  137   POTASSIUM  --   --  4.7  --  4.1  --  4.2  --  4.1   CHLORIDE  --   --  105  --  101  --  106  --  105   CO2  --   --  19*  --  22  --  24  --  23   BUN  --   --  49.6*  --  51.5*  --  42.9*  --  45.2*   CR  --   --  2.27*  --  2.49*  --  1.82*  --  1.79*   ANIONGAP  --   --  13  --  12  --  7  --  9   GALLO  --   --  8.9  --  8.9  --  8.9  --  9.0   * 140* 133*   < > 127*   < > 148*   < > 156*   ALBUMIN  --   --   --   --  3.7  --  3.2*  --  3.3*   PROTTOTAL  --   --   --   --  7.0  --  6.2*  --  6.5   BILITOTAL  --   --   --   --  1.0  --  0.9  --  1.2   ALKPHOS  --   --   --   --  74  --  64  --  64   ALT  --   --   --   --  29  --  31  --  35   AST  --   --   --   --  20  --  25  --  24    < > = values in this interval not displayed.     Recent Results (from the past 24 hour(s))   Head CT w/o contrast    Narrative    EXAM: CT HEAD W/O CONTRAST, CT CERVICAL SPINE W/O CONTRAST  LOCATION: [facility]  DATE/TIME: 12/2/2022 7:20 PM    INDICATION: head injury on coumadin and plavix with neck pain.  COMPARISON: 11/18/2022.  TECHNIQUE:   1) Routine CT Head without IV contrast. Multiplanar  reformats. Dose reduction techniques were used.  2) Routine CT Cervical Spine without IV contrast. Multiplanar reformats. Dose reduction techniques were used.    FINDINGS:   HEAD CT:   INTRACRANIAL CONTENTS: No intracranial hemorrhage, extraaxial collection, or mass effect.  No CT evidence of acute infarct. Scattered diffuse low attenuation within the periventricular and subcortical white matter consistent with diffuse small vessel   ischemic disease. 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 The ventricular system, basal cisterns and the cortical sulci are consistent with diffuse volume loss.     VISUALIZED ORBITS/SINUSES/MASTOIDS: No intraorbital abnormality. No paranasal sinus mucosal disease. No middle ear or mastoid effusion.    BONES/SOFT TISSUES: No acute abnormality.    CERVICAL SPINE CT:   VERTEBRA: Normal vertebral body heights and alignment. No fracture or posttraumatic subluxation.     CANAL/FORAMINA: There is degenerative disc disease primarily from the C4-C5 to the C6-C7 disc space levels. These levels have a moderate loss of disc space heights, endplate changes and small anterior osteophyte formations. There is mild diffuse facet   arthropathy. There is diffuse facet arthropathy visualized.    At the C3-C4 disc space level there is mild right neural foraminal narrowing.    At the C4-C5 and the C5-C6 disc space level there is mild bilateral neural foraminal narrowing.    There is no significant canal compromise throughout the cervical spine.    PARASPINAL: No extraspinal abnormality. Visualized lung fields are clear.      Impression    IMPRESSION:  HEAD CT:  1.  No CT finding of a mass, hemorrhage or focal area suggestive of acute infarct.  2.  Stable diffuse age related changes.    CERVICAL SPINE CT:  1.  No CT evidence for acute fracture or post traumatic subluxation.  2.  Diffuse neural foraminal narrowing without canal compromise as described above.   Cervical spine CT w/o contrast     Narrative    EXAM: CT HEAD W/O CONTRAST, CT CERVICAL SPINE W/O CONTRAST  LOCATION: [facility]  DATE/TIME: 12/2/2022 7:20 PM    INDICATION: head injury on coumadin and plavix with neck pain.  COMPARISON: 11/18/2022.  TECHNIQUE:   1) Routine CT Head without IV contrast. Multiplanar reformats. Dose reduction techniques were used.  2) Routine CT Cervical Spine without IV contrast. Multiplanar reformats. Dose reduction techniques were used.    FINDINGS:   HEAD CT:   INTRACRANIAL CONTENTS: No intracranial hemorrhage, extraaxial collection, or mass effect.  No CT evidence of acute infarct. Scattered diffuse low attenuation within the periventricular and subcortical white matter consistent with diffuse small vessel   ischemic disease. 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 The ventricular system, basal cisterns and the cortical sulci are consistent with diffuse volume loss.     VISUALIZED ORBITS/SINUSES/MASTOIDS: No intraorbital abnormality. No paranasal sinus mucosal disease. No middle ear or mastoid effusion.    BONES/SOFT TISSUES: No acute abnormality.    CERVICAL SPINE CT:   VERTEBRA: Normal vertebral body heights and alignment. No fracture or posttraumatic subluxation.     CANAL/FORAMINA: There is degenerative disc disease primarily from the C4-C5 to the C6-C7 disc space levels. These levels have a moderate loss of disc space heights, endplate changes and small anterior osteophyte formations. There is mild diffuse facet   arthropathy. There is diffuse facet arthropathy visualized.    At the C3-C4 disc space level there is mild right neural foraminal narrowing.    At the C4-C5 and the C5-C6 disc space level there is mild bilateral neural foraminal narrowing.    There is no significant canal compromise throughout the cervical spine.    PARASPINAL: No extraspinal abnormality. Visualized lung fields are clear.      Impression    IMPRESSION:  HEAD CT:  1.  No CT finding of a  mass, hemorrhage or focal area suggestive of acute infarct.  2.  Stable diffuse age related changes.    CERVICAL SPINE CT:  1.  No CT evidence for acute fracture or post traumatic subluxation.  2.  Diffuse neural foraminal narrowing without canal compromise as described above.   CT Chest/Abdomen/Pelvis w Contrast    Narrative    EXAM: CT CHEST/ABDOMEN/PELVIS W CONTRAST  LOCATION: [facility]  DATE/TIME: 12/2/2022 7:30 PM    INDICATION: Fall. On warfarin with bleeding. Pain.  COMPARISON: 05/18/2022 and older studies.  TECHNIQUE: CT scan of the chest, abdomen, and pelvis was performed following injection of IV contrast. Multiplanar reformats were obtained. Dose reduction techniques were used.   CONTRAST: isovue 370 75ml    FINDINGS:   LUNGS AND PLEURA: No hydropneumothorax. Marked clearing of the predominantly left-sided groundglass and nodular opacities. Minimal subpleural atelectasis in the left base.    MEDIASTINUM/AXILLAE: No mediastinal hematoma. No central pulmonary emboli. Ascending aorta measures 4 cm and is unchanged.    CORONARY ARTERY CALCIFICATION: Previous intervention (stents or CABG).    HEPATOBILIARY: Normal.    PANCREAS: Normal.    SPLEEN: Normal.    ADRENAL GLANDS: Normal.    KIDNEYS/BLADDER: Atrophied right kidney with moderate distention of the right intrarenal collecting system and ureter. Small left renal cysts again noted which needs no follow-up. No calculi. Cystectomy with ileal diversion. No conduit calcifications.    BOWEL: Redundant colon with moderate distal colonic diverticuli. No inflammatory changes.    LYMPH NODES: Normal.    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    PELVIC ORGANS: Removed.    MUSCULOSKELETAL: Areas of subcutaneous fat stranding anteriorly in the right upper quadrant and inferiorly overlying the right lower pelvis. No discrete hematoma.    There are acute nondisplaced  fractures of the left posterior 9th-11th ribs at the costovertebral junctions. Bone island in the right humeral head.      Impression    IMPRESSION:  1.  There are acute, nondisplaced right posterior ninth through 11th rib fractures at the costovertebral junction. No hydropneumothorax.  2.  Minimal stranding of the subcutaneous fat in the anterior right upper abdomen and over the right mid pelvis. No soft tissue hematoma.  3.  No evidence of solid organ injury in the abdomen or pelvis.  4.  Atrophied right kidney with mild to moderate right-sided hydroureter and hydronephrosis, unchanged. Cystectomy with ileal conduit.  5.  Resolution of the previously noted pneumonia.  6.  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  7.  Other noncritical findings as noted above.       Discussed with patient, family,, nursing staff and discharge planner    Hernesto Mazariegos MD  [facility] Medicine Service  [phone]

## 2022-12-03 NOTE — CONSULTS
"Care Management Initial Consult    General Information  Assessment completed with: Patient, Family, Yuniel and Amita sister in law  Type of CM/SW Visit: Initial Assessment    Primary Care Provider verified and updated as needed: Yes   Readmission within the last 30 days: previous discharge plan unsuccessful (11/5/22 - 11/8/22 and again 11/18/22 - 12/1/22)   Return Category: New Diagnosis  Reason for Consult: discharge planning  Advance Care Planning: Advance Care Planning Reviewed: no concerns identified          Communication Assessment  Patient's communication style: spoken language (English or Bilingual)             Living Environment:   People in home: spouse     Current living Arrangements: apartment, independent living facility ([facility])      Able to return to prior arrangements: other (see comments) (likely needs TCU)       Family/Social Support:  Care provided by: self, child(gene)  Provides care for: no one  Marital Status:   Wife, Children, Sibling(s)  Ginny       Description of Support System: Supportive, Involved    Support Assessment: Adequate family and caregiver support, Adequate social supports, Patient communicates needs well met    Current Resources:   Patient receiving home care services: Yes  Skilled Home Care Services:  (\"[facility] was supposed to start 12/2/22 but he had come back to the hospital before they could start services\".)  Community Resources: Home Care, DME, Housekeeping/Chore Agency, Other (see comment) (\"Housekeeping help every 2 weeks. Meals 5 days a week, but mostly for my wife\".)  Equipment currently used at home: walker, rolling, grab bar, tub/shower, raised toilet seat, shower chair (\"4ww that I use some of the time\")  Supplies currently used at home: Oxygen Tubing/Supplies, Other (\"CPAP that I don't use; reading glasses\")    Employment/Financial:  Employment Status: retired     Employment/ Comments: \"no  " "benefits\"  Financial Concerns:     Referral to Financial Worker: No       Lifestyle & Psychosocial Needs:  Social Determinants of Health     Tobacco Use: Medium Risk     Smoking Tobacco Use: Former     Smokeless Tobacco Use: Never     Passive Exposure: Not on file   Alcohol Use: Not on file   Financial Resource Strain: Not on file   Food Insecurity: Not on file   Transportation Needs: Not on file   Physical Activity: Not on file   Stress: Not on file   Social Connections: Not on file   Intimate Partner Violence: Not on file   Depression: Not on file   Housing Stability: Not on file       Functional Status:  Prior to admission patient needed assistance:   Dependent ADLs:: Independent (\"at least 4 falls this past week\")  Dependent IADLs:: Independent (\"wife doesn't drive. I drive or my daughter or son or sister in law can help\")  Assesssment of Functional Status: Not at baseline with ADL Functioning, Not at baseline with mobility, Not at  functional baseline, Needs placement in a SNF/TCF for rehabilitation    Mental Health Status:          Chemical Dependency Status:                Values/Beliefs:  Spiritual, Cultural Beliefs, Orthodoxy Practices, Values that affect care:                 Additional Information:  Yuniel was admitted 11/5/22 - 11/8/22 and again 11/18/22 - 12/1/22. He left last time with the plan for [facility] help. They were going to come out on 12/2/22, but he was already back at the hospital.    Yuniel lives in at [facility] with his wife. He is independent with most ADLs, but many falls over the past months. He drives and his wife does not drive. He states, \"I drive or my daughter or son or sister in law can help\".    At home he has help with \"housekeeping help every 2 weeks. And also Meals 5 days a week, but mostly for my wife\".    He has a \"4ww that I use some of the time\".    He wants TCU at [facility] or [facility] in " "[place] [facility] - referrals sent to both. I spoke to Yuniel and his sister in law Amita about the benefits of TCU for his falls vs home care. He and she both agree that \"TCU would be best since he is falling so much\". They were both made aware we might need to send referrals to additional places if these TCUs are full.    Family to transport at discharge.    Sharee Boland RN      "

## 2022-12-03 NOTE — PLAN OF CARE
"Goal Outcome Evaluation:                      PRIMARY DIAGNOSIS: \"GENERIC\" NURSING  OUTPATIENT/OBSERVATION GOALS TO BE MET BEFORE DISCHARGE:  ADLs back to baseline: No    Activity and level of assistance: Up with maximum assistance. Consider SW and/or PT evaluation.     Pain status: Improved-controlled with oral pain medications.    Return to near baseline physical activity: No     Discharge Planner Nurse   Safe discharge environment identified: No  Barriers to discharge: Yes Needs PT d/t weakness, multiple falls while on blood thinning medications       Entered by: Irma Dinero RN 12/03/2022 3:46 PM     Please review provider order for any additional goals.   Nurse to notify provider when observation goals have been met and patient is ready for discharge.  "

## 2022-12-03 NOTE — H&P
[facility]    History and Physical - Hospitalist Service       Date of Admission:  12/2/2022    Assessment & Plan      Eduardo Laughlin is a 82 year old male with PMH significant for recent Non-STEMI, CHF, A.fib, HTN, CKD 4, DVT, bladder cancer who was discharged from [facility] yesterday s/p Non-STEMI, CHF exacerbation and PNA who returns to hospital today s/p fall at home. Admitted on 12/2/2022.     1. Fall- Admit patient. Mechanical fall vs Syncope. CT head negative. Patient has had at least 4 falls at home in past 5 weeks. Check orthostatics. PT evaluate and treat. May need Inpatient Rehabilitation/ SNF placement. Will make further recommendations based on clinical course.    2. Head trauma- Forehead laceration sutured in ED. No ICH on CT head. Monitor closely due to hx of multiple anticoagulants.    3. ROBERT- Patient has underlying CKD stage IV. Nephrology note and recommendations from 12/01/22 reviewed. Avoid nephrotoxic agents. Continue Bumex due to underlying CHF. Repeat renal function in a.m. Recommend Nephrologist reconsultation if Creatinine continues to increase.    4. A.fib with RVR- Patient has known A.fib and is chronically anticoagulated with warfarin. INR current subtherapeutic. Rate currently controlled. Continue home dose of Metoprolol with BP and HR parameters.    5. Hypotension- Resolved. Home medications reviewed. Monitor vitals. Check orthostatics.    6. Hypoxia- Resolved. O2 sat=98 % on RA while I was at bedside. CT chest no PNA appreciated. COVID negative. Monitor vitals closely.    7. Leukocytosis- Possible stress induced reaction. Afebrile. No PNA on CT chest. Repeat CBC in a.m.    8.Acute Right Rib fractures 9th-11th- Also present on imaging from recent hospitalization. Patient says he has had at least 4 falls at home in the past 5 weeks. Currently pain free. Recommend Incentive spirometer.         Diet:   Cardiac/ Diabetic/2g Na restriction  DVT  "Prophylaxis: Warfarin  Otero Catheter: Not present  Central Lines: None  Cardiac Monitoring: None  Code Status:   Full    Clinically Significant Risk Factors Present on Admission         # Hyponatremia: Lowest Na = 135 mmol/L (Ref range: 136-145) in last 2 days, will monitor as appropriate       # Drug Induced Coagulation Defect: home medication list includes an anticoagulant medication  # Drug Induced Platelet Defect: home medication list includes an antiplatelet medication      # DMII: A1C = 7.7 % (Ref range: <5.7 %) within past 3 months    # Obesity: Estimated body mass index is 33.79 kg/m  as calculated from the following:    Height as of 11/28/22: 1.778 m (5' 10\").    Weight as of 12/1/22: 106.8 kg (235 lb 8 oz).           Disposition Plan     Expected Discharge: 12/04/22        The patient's care was discussed with the Patient and Emergency Medicine PA.    Lisa Beltrán MD  Hospitalist Service  [facility]  Securely message with the [employer] Web Console (learn more here)  Text page via [facility] Paging/Directory         ______________________________________________________________________    Chief Complaint   Fall at home    History is obtained from the patient    History of Present Illness   Eduardo Laughlin is a 82 year old male with PMH significant for recent Non-STEMI, CHF, A.fib, HTN, CKD 4, DVT, bladder cancer who was discharged from [facility] yesterday s/p Non-STEMI, CHF exacerbation and PNA who returns to hospital today s/p fall at home. Patient states that he was bending over to  his eyeglassed from the floor. He was unable to stop his momentum, continued to fall forward and hit his forehead on the coffee table. He denies any LOC and says that he got himself onto the couch.  He had a large amount of bleed from wound he sustained on his left forehead.  After approximately 15 minutes of applying pressure and not having any decrease of bleeding, his wife called " 911.  Per ED physician assistant, EMS found patient to be hypotensive, hypoxic (down to 60s-70s) and in A.fib with RVR. He was treated with Verapamil.  HR now controlled. BP WNL. He was weaned off of O2 in the ED and is now 98% on room air. Patient denies any lightheadedness, dizziness, chest pain, palpitations, SOB, nausea, vomiting or diarrhea. Forehead was sutured in ED and compression dressing in place. Patient has a black eye (left) and currently appears in no acute distress.  Review of Systems    The 10 point Review of Systems is negative other than noted in the HPI.  Past Medical History    I have reviewed this patient's medical history and updated it with pertinent information if needed.   Past Medical History:   Diagnosis Date     Acute renal failure with acute tubular necrosis superimposed on stage 3 chronic kidney disease (H)      Arteriosclerotic cardiovascular disease (ASCVD)      Atrial fibrillation (H)      Bladder cancer (H)      BPH (benign prostatic hypertrophy)      Chronic kidney disease      Complicated UTI (urinary tract infection) 08/25/2019     Congestive heart failure (H)      Coronary artery disease      Diabetes mellitus (H)      Diabetic neuropathy (H)      E coli bacteremia      GERD (gastroesophageal reflux disease)      Gout      Hyperlipidemia      Hypertension      Hyponatremia      Iliac artery aneurysm, 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  Past Surgical History:   Procedure Laterality Date     CARDIAC CATHETERIZATION  03/28/2008    and coronary angios     CV CORONARY ANGIOGRAM N/A 11/28/2022    Procedure: CV CORONARY ANGIOGRAM;  Surgeon: Gonzales Hager MD;   Location: [facility] LAB CV     CV LEFT HEART CATH N/A 11/28/2022    Procedure: Left Heart Catheterization;  Surgeon: Gonzales Hager MD;  Location: [facility] CV     CV PCI STENT DRUG ELUTING N/A 11/28/2022    Procedure: Percutaneous Coronary Intervention Stent;  Surgeon: Gonzales Hager MD;  Location: [facility] CV     CYSTECTOMY       CYSTOSCOPY       CYSTOSCOPY N/A 12/23/2015    Procedure: CYSTOSCOPY BLADDER BIOPSIES with Fulgeration and Placement of Otero ;  Surgeon: Gordon Bajwa MD;  Location: [facility];  Service:      IR EXTREMITY ANGIOGRAM LEFT  3/22/2019     IR EXTREMITY ANGIOGRAM LEFT  3/22/2019     IR ILEAL CONDUIT INJECTION  12/23/2016     IR NEPHROSTOMY TUBE PLACEMENT RIGHT  8/18/2016     IR URETEROSTOMY TUBE CHANGE RIGHT  9/21/2016     KIDNEY STONE SURGERY  x4     LAMINECTOMY LUMBAR ONE LEVEL Bilateral 9/13/2022    Procedure: LUMBAR 4 - LUMBAR 5 BILATERAL MEDIAL FACETECTOMY AND DECOMPRESSION;  Surgeon: Everett Holland MD;  Location: [facility]     PICC TRIPLE LUMEN PLACEMENT  11/18/2022          PROSTATE SURGERY         Social History   I have reviewed this patient's social history and updated it with pertinent information if needed.  Social History     Tobacco Use     Smoking status: Former     Smokeless tobacco: Never     Tobacco comments:     Quit smoking 30 years ago   Vaping Use     Vaping Use: Never used   Substance Use Topics     Alcohol use: Not Currently     Drug use: Never       Family History   I have reviewed this patient's family history and updated it with pertinent information if needed.  Family History   Problem Relation Age of Onset     No Known Problems Mother      Prostate Cancer Father      Deep Vein Thrombosis Father      Cancer Sister      Heart Disease Sister      No Known Problems Daughter      Heart Disease Brother      Heart Disease Sister      No Known Problems Daughter        Prior to Admission Medications   Prior to  Admission Medications   Prescriptions Last Dose Informant Patient Reported? Taking?   LORazepam (ATIVAN) 0.5 MG tablet Unknown  No Yes   Sig: Take 1 tablet (0.5 mg) by mouth daily as needed for anxiety   acetaminophen (TYLENOL) 500 MG tablet Unknown Self Yes Yes   Sig: Take 1,000 mg by mouth every 6 hours as needed for mild pain   allopurinol (ZYLOPRIM) 100 MG tablet 12/2/2022 at am  No Yes   Sig: Take 2 tablets (200 mg) by mouth daily   aspirin (ASA) 81 MG EC tablet 12/2/2022 at am  No Yes   Sig: Take 1 tablet (81 mg) by mouth daily   atorvastatin (LIPITOR) 40 MG tablet 12/2/2022 at am  No Yes   Sig: Take 1 tablet (40 mg) by mouth daily   bumetanide (BUMEX) 2 MG tablet 12/2/2022 at am  No Yes   Sig: Take 1 tablet (2 mg) by mouth daily for 14 days   clopidogrel (PLAVIX) 75 MG tablet 12/2/2022 at am  No Yes   Sig: Take 1 tablet (75 mg) by mouth daily Dose to start tomorrow.   ezetimibe (ZETIA) 10 MG tablet 12/2/2022 at am Self Yes Yes   Sig: Take 10 mg by mouth daily   glimepiride (AMARYL) 1 MG tablet 12/2/2022 at am Self Yes Yes   Sig: Take 1 mg by mouth daily   metoprolol succinate ER (TOPROL XL) 25 MG 24 hr tablet 12/2/2022 at am  No Yes   Sig: Take 1 tablet (25 mg) by mouth daily for 30 days   multivitamin, therapeutic (THERA-VIT) TABS tablet 12/2/2022 at am  No Yes   Sig: Take 1 tablet by mouth daily   nitroGLYcerin (NITROSTAT) 0.4 MG sublingual tablet Unknown  No Yes   Sig: For chest pain place 1 tablet under the tongue every 5 minutes for 3 doses. If symptoms persist 5 minutes after 1st dose call 911.   pantoprazole (PROTONIX) 40 MG EC tablet 12/2/2022 at am  No Yes   Sig: Take 1 tablet (40 mg) by mouth every morning (before breakfast)   polyethylene glycol (MIRALAX) 17 GM/Dose powder 12/2/2022 at am  No Yes   Sig: Take 17 g by mouth daily   temazepam (RESTORIL) 15 MG capsule 12/1/2022 at pm  Yes Yes   Sig: Take 15 mg by mouth nightly as needed for sleep   traZODone (DESYREL) 100 MG tablet 12/1/2022 at pm Self  Yes Yes   Sig: Take 100-200 mg by mouth At Bedtime   warfarin ANTICOAGULANT (COUMADIN) 5 MG tablet 12/2/2022 at am  No Yes   Sig: Take 1 tablet (5 mg) by mouth daily      Facility-Administered Medications: None     Allergies   Allergies   Allergen Reactions     Metoprolol Succinate Er      Other reaction(s): low blood pressure     Pcn [Penicillins] Other (See Comments)     Childhood-doesn't know reactions     Statin Drugs [Hmg-Coa-R Inhibitors] Cramps     Uroxatral [Alfuzosin] Other (See Comments)     hypotension       Physical Exam   Vital Signs: Temp: 98.3  F (36.8  C) Temp src: Axillary BP: 127/81 Pulse: 86   Resp: 24 SpO2: 99 % O2 Device: Nasal cannula Oxygen Delivery: 4 LPM  Weight: 0 lbs 0 oz    General Appearance: AAOx3   Eyes: Left orbit hematoma, conjunctiva clear  HEENT: Moist mucous membranes  Respiratory: clear, diminished breath sounds in bases bilaterally  Cardiovascular: Irregular rhythm, rate controlled, s1s2  GI: +BS, + urostomy bag right abdomen, soft, NT, ND.  Genitourinary: Urostomy bag without hematuria  Skin: Multiple scattered bruises on face and upper extremities bilaterally  Musculoskeletal: No pedal edema  Neurologic: No focal deficits    Data   Data reviewed today: I reviewed all medications, new labs and imaging results over the last 24 hours. I personally reviewed the head CT image(s) showing no ICH.    Recent Labs   Lab 12/02/22  1817 12/01/22  1155 12/01/22  0823 12/01/22  0448 11/30/22  0802 11/30/22  0507   WBC 12.5*  --   --  8.8  --  8.1   HGB 11.2*  --   --  10.1*  --  10.6*   MCV 98  --   --  100  --  98     --   --  157  --  175   INR 1.35*  --   --  1.24*  --  1.27*   *  --   --  137  --  137   POTASSIUM 4.1  --   --  4.2  --  4.1   CHLORIDE 101  --   --  106  --  105   CO2 22  --   --  24  --  23   BUN 51.5*  --   --  42.9*  --  45.2*   CR 2.49*  --   --  1.82*  --  1.79*   ANIONGAP 12  --   --  7  --  9   GALLO 8.9  --   --  8.9  --  9.0   * 237* 157* 148*    < > 156*   ALBUMIN 3.7  --   --  3.2*  --  3.3*   PROTTOTAL 7.0  --   --  6.2*  --  6.5   BILITOTAL 1.0  --   --  0.9  --  1.2   ALKPHOS 74  --   --  64  --  64   ALT 29  --   --  31  --  35   AST 20  --   --  25  --  24    < > = values in this interval not displayed.

## 2022-12-03 NOTE — PROGRESS NOTES
[facility]  OUTPATIENT PHYSICAL THERAPY EVALUATION  PLAN OF TREATMENT FOR OUTPATIENT REHABILITATION  (COMPLETE FOR INITIAL CLAIMS ONLY)  Patient's Last Name, First Name, M.I.  YOB: 1940  QianEduardo ROSEN                        Provider's Name  [facility] Medical Record No.  [id number]                             Onset Date:  12/02/22   Start of Care Date:  12/03/22   Type:     _X_PT   ___OT   ___SLP Medical Diagnosis:  Recurrent falls              PT Diagnosis:  Impaired functional mobility Visits from SOC:  1     See note for plan of treatment, functional goals and certification details    I CERTIFY THE NEED FOR THESE SERVICES FURNISHED UNDER        THIS PLAN OF TREATMENT AND WHILE UNDER MY CARE     (Physician co-signature of this document indicates review and certification of the therapy plan).                12/03/22 1500   Appointment Info   Signing Clinician's Name / Credentials (PT) Delfina Aguilera, PT, DPT   Quick Adds   Quick Adds Certification   Living Environment   People in Home spouse   Current Living Arrangements independent living facility   Home Accessibility no concerns   Transportation Anticipated family or friend will provide   Living Environment Comments Pt lives in an ILF with his wife.   Self-Care   Usual Activity Tolerance moderate   Current Activity Tolerance fair   Equipment Currently Used at Home walker, rolling  (4WW)   Fall history within last six months yes   Number of times patient has fallen within last six months   (Multiple)   Activity/Exercise/Self-Care Comment Patient fell at home while attempting to  glasses off floor.   General Information   Onset of Illness/Injury or Date of Surgery 12/02/22   Referring Physician Hernesto Mazariegos MD   Patient/Family Therapy Goals Statement (PT) Go to TCU   Pertinent History of Current Problem (include personal factors and/or comorbidities that impact the POC)  Recurrent falls   Existing Precautions/Restrictions fall   Weight-Bearing Status - LLE full weight-bearing   Weight-Bearing Status - RLE full weight-bearing   Range of Motion (ROM)   Range of Motion ROM deficits secondary to weakness   Strength (Manual Muscle Testing)   Strength (Manual Muscle Testing) Deficits observed during functional mobility   Bed Mobility   Bed Mobility supine-sit;sit-supine   Supine-Sit [place] (Bed Mobility) moderate assist (50% patient effort);2 person assist   Sit-Supine [place] (Bed Mobility) moderate assist (50% patient effort);2 person assist   Bed Mobility Limitations decreased ability to use arms for pushing/pulling;decreased ability to use legs for bridging/pushing;impaired ability to control trunk for mobility   Impairments Contributing to Impaired Bed Mobility impaired balance;decreased strength   Assistive Device (Bed Mobility) bed rails   Transfers   Transfers sit-stand transfer   Impairments Contributing to Impaired Transfers impaired balance;decreased strength   Sit-Stand Transfer   Sit-Stand [place] (Transfers) moderate assist (50% patient effort);2 person assist   Assistive Device (Sit-Stand Transfers) walker, front-wheeled   Gait/Stairs (Locomotion)   [place] Level (Gait) minimum assist (75% patient effort);2 person assist   Assistive Device (Gait) walker, front-wheeled   Comment, (Gait/Stairs) Side stepping along the side of the bed.   Clinical Impression   Criteria for Skilled Therapeutic Intervention Yes, treatment indicated   PT Diagnosis (PT) Impaired functional mobility   Influenced by the following impairments Impaired balance, decreased strength, recurrent falls   Functional limitations due to impairments Bed mobility, transfers, gait   Clinical Presentation (PT Evaluation Complexity) Stable/Uncomplicated   Clinical Presentation Rationale Pt presents as medically diagnosed.   Clinical Decision Making (Complexity) moderate complexity   Planned  Therapy Interventions (PT) balance training;bed mobility training;gait training;home exercise program;neuromuscular re-education;patient/family education;strengthening;transfer training;home program guidelines   Anticipated Equipment Needs at Discharge (PT) walker, rolling  (4WW)   Risk & Benefits of therapy have been explained evaluation/treatment results reviewed;care plan/treatment goals reviewed;patient   PT Total Evaluation Time   PT Eval, Low Complexity Minutes (13222) 15   Therapy Certification   Start of care date 12/03/22   Certification date from 12/03/22   Certification date to 12/10/22   Medical Diagnosis Recurrent falls   Physical Therapy Goals   PT Frequency Daily   PT Predicted Duration/Target Date for Goal Attainment 12/10/22   PT Goals Bed Mobility;Transfers;Gait   PT: Bed Mobility Supervision/stand-by assist;Supine to/from sit   PT: Transfers Supervision/stand-by assist;Sit to/from stand;Assistive device   PT: Gait Supervision/stand-by assist;Assistive device;Rolling walker;150 feet   PT Discharge Planning   PT Plan Bed mobility, transfers, gait with FWW vs. 4WW   PT Discharge Recommendation (DC Rec) Transitional Care Facility   PT Rationale for DC Rec Assist of 2 for all mobility. Recurrent falls at home.   PT Brief overview of current status Assist of 2 for all mobility.   Total Session Time   Total Session Time (sum of timed and untimed services) 15

## 2022-12-03 NOTE — PROGRESS NOTES
Pt states he has worn CPAP in the past but doesn't anymore, he just couldn't get used to it.  Does not want to wear while in hospital.  DC'd order

## 2022-12-03 NOTE — PROGRESS NOTES
12/03/22 1415   Appointment Info   Signing Clinician's Name / Credentials (OT) Peg Schmidt OTR/L   Quick Adds   Quick Adds Certification   Living Environment   People in Home spouse   Current Living Arrangements assisted living   Living Environment Comments Patient was ambulating with FWW   Self-Care   Equipment Currently Used at Home walker, rolling;grab bar, tub/shower;raised toilet seat;shower chair   Fall history within last six months yes   Activity/Exercise/Self-Care Comment Patient fell at home while attempting to  glassess off floor.   General Information   Onset of Illness/Injury or Date of Surgery 12/02/22   Referring Physician Hernesto Mazariegos MD   Patient/Family Therapy Goal Statement (OT) go to TCU   Additional Occupational Profile Info/Pertinent History of Current Problem Eduardo Laughlin is a 82 year old male with a past medical history of recent Non-STEMI, CHF, A.fib, HTN, CKD 4, DVT, bladder cancer who was discharged from [facility] on 12/1/2022 with diagnosis of Non-STEMI, CHF exacerbation and pneumonia who returns to hospital today s/p fall at home.  He was readmitted on 12/2/2022. X-ray show right 9-11 rib fractures.   Existing Precautions/Restrictions fall   Cognitive Status Examination   Orientation Status orientation to person, place and time   Range of Motion Comprehensive   General Range of Motion no range of motion deficits identified   Strength Comprehensive (MMT)   General Manual Muscle Testing (MMT) Assessment no strength deficits identified   Bed Mobility   Supine-Sit [place] (Bed Mobility) moderate assist (50% patient effort);2 person assist   Sit-Supine [place] (Bed Mobility) moderate assist (50% patient effort);2 person assist   Transfers   Transfers sit-stand transfer   Transfer Comments Dependent for donning socks.   Sit-Stand Transfer   Sit-Stand [place] (Transfers) moderate assist (50% patient effort);2 person assist;maximum assist (25%  patient effort)   Sit/Stand Transfer Comments Side step to HOB with FWW and Min A x2   Clinical Impression   Criteria for Skilled Therapeutic Interventions Met (OT) Yes, treatment indicated   OT Diagnosis Decreased ADL independence   Influenced by the following impairments fall, right 9th-11th rib fracture   OT Problem List-Impairments impacting ADL problems related to;activity tolerance impaired;balance;strength   Assessment of Occupational Performance 3-5 Performance Deficits   Identified Performance Deficits dressing, trsfs, bed mobility, toileting   Planned Therapy Interventions (OT) ADL retraining;balance training;bed mobility training;cognition;strengthening;transfer training   Clinical Decision Making Complexity (OT) moderate complexity   Risk & Benefits of therapy have been explained evaluation/treatment results reviewed;care plan/treatment goals reviewed   OT Total Evaluation Time   OT Eval, Moderate Complexity Minutes (87627) 20   Therapy Certification   Medical Diagnosis Rib fracture   Start of Care Date 12/03/22   Certification date from 12/03/22   Certification date to 12/10/22   OT Goals   Therapy Frequency (OT) Daily   OT Predicted Duration/Target Date for Goal Attainment 12/10/22   OT Goals Hygiene/Grooming;Toilet Transfer/Toileting;Transfers   OT: Hygiene/Grooming supervision/stand-by assist;while standing   OT: Transfer Supervision/stand-by assist   OT: Toilet Transfer/Toileting Supervision/stand-by assist   OT Discharge Planning   OT Plan Trsfs, bed mobility, standing g/h   OT Discharge Recommendation (DC Rec) Transitional Care Facility   OT Rationale for DC Rec Patient currently assistance of 2 for ADLs, limited mobility.   OT Brief overview of current status Max x2   Total Session Time   Total Session Time (sum of timed and untimed services) 20    [facility]  OUTPATIENT OCCUPATIONAL THERAPY  EVALUATION  PLAN OF TREATMENT FOR OUTPATIENT REHABILITATION  (COMPLETE FOR  INITIAL CLAIMS ONLY)  Patient's Last Name, First Name, M.I.  YOB: 1940  Eduardo Laughlin                          Provider's Name  [facility] Medical Record No.  [id number]                             Onset Date:  12/02/22   Start of Care Date:  12/03/22   Type:     ___PT   _X_OT   ___SLP Medical Diagnosis:  Rib fracture                    OT Diagnosis:  Decreased ADL independence Visits from SOC:  1     See note for plan of treatment, functional goals and certification details    I CERTIFY THE NEED FOR THESE SERVICES FURNISHED UNDER        THIS PLAN OF TREATMENT AND WHILE UNDER MY CARE     (Physician co-signature of this document indicates review and certification of the therapy plan).

## 2022-12-04 ENCOUNTER — APPOINTMENT (OUTPATIENT)
Dept: CARDIOLOGY | Facility: HOSPITAL | Age: 82
DRG: 091 | End: 2022-12-04
Attending: INTERNAL MEDICINE
Payer: COMMERCIAL

## 2022-12-04 ENCOUNTER — APPOINTMENT (OUTPATIENT)
Dept: OCCUPATIONAL THERAPY | Facility: HOSPITAL | Age: 82
DRG: 091 | End: 2022-12-04
Payer: COMMERCIAL

## 2022-12-04 LAB
ALBUMIN SERPL BCG-MCNC: 3.4 G/DL (ref 3.5–5.2)
ALP SERPL-CCNC: 74 U/L (ref 40–129)
ALT SERPL W P-5'-P-CCNC: 21 U/L (ref 10–50)
ANION GAP SERPL CALCULATED.3IONS-SCNC: 12 MMOL/L (ref 7–15)
AST SERPL W P-5'-P-CCNC: 25 U/L (ref 10–50)
BILIRUB SERPL-MCNC: 0.8 MG/DL
BUN SERPL-MCNC: 51.9 MG/DL (ref 8–23)
CALCIUM SERPL-MCNC: 9.1 MG/DL (ref 8.8–10.2)
CHLORIDE SERPL-SCNC: 104 MMOL/L (ref 98–107)
CREAT SERPL-MCNC: 2.3 MG/DL (ref 0.67–1.17)
DEPRECATED HCO3 PLAS-SCNC: 20 MMOL/L (ref 22–29)
ERYTHROCYTE [DISTWIDTH] IN BLOOD BY AUTOMATED COUNT: 14.8 % (ref 10–15)
GFR SERPL CREATININE-BSD FRML MDRD: 28 ML/MIN/1.73M2
GLUCOSE BLDC GLUCOMTR-MCNC: 137 MG/DL (ref 70–99)
GLUCOSE BLDC GLUCOMTR-MCNC: 141 MG/DL (ref 70–99)
GLUCOSE BLDC GLUCOMTR-MCNC: 148 MG/DL (ref 70–99)
GLUCOSE BLDC GLUCOMTR-MCNC: 152 MG/DL (ref 70–99)
GLUCOSE BLDC GLUCOMTR-MCNC: 191 MG/DL (ref 70–99)
GLUCOSE SERPL-MCNC: 143 MG/DL (ref 70–99)
HCT VFR BLD AUTO: 32.7 % (ref 40–53)
HGB BLD-MCNC: 10.7 G/DL (ref 13.3–17.7)
INR PPP: 1.42 (ref 0.85–1.15)
LVEF ECHO: NORMAL
MAGNESIUM SERPL-MCNC: 2.2 MG/DL (ref 1.7–2.3)
MCH RBC QN AUTO: 31.9 PG (ref 26.5–33)
MCHC RBC AUTO-ENTMCNC: 32.7 G/DL (ref 31.5–36.5)
MCV RBC AUTO: 98 FL (ref 78–100)
PLATELET # BLD AUTO: 191 10E3/UL (ref 150–450)
POTASSIUM SERPL-SCNC: 3.9 MMOL/L (ref 3.4–5.3)
PROT SERPL-MCNC: 6.9 G/DL (ref 6.4–8.3)
RBC # BLD AUTO: 3.35 10E6/UL (ref 4.4–5.9)
SODIUM SERPL-SCNC: 136 MMOL/L (ref 136–145)
TROPONIN T SERPL HS-MCNC: 180 NG/L
TROPONIN T SERPL HS-MCNC: 184 NG/L
TROPONIN T SERPL HS-MCNC: 200 NG/L
WBC # BLD AUTO: 9.5 10E3/UL (ref 4–11)

## 2022-12-04 PROCEDURE — 999N000208 ECHOCARDIOGRAM LIMITED

## 2022-12-04 PROCEDURE — 84484 ASSAY OF TROPONIN QUANT: CPT | Performed by: INTERNAL MEDICINE

## 2022-12-04 PROCEDURE — 36415 COLL VENOUS BLD VENIPUNCTURE: CPT | Performed by: INTERNAL MEDICINE

## 2022-12-04 PROCEDURE — 93325 DOPPLER ECHO COLOR FLOW MAPG: CPT | Mod: 26 | Performed by: INTERNAL MEDICINE

## 2022-12-04 PROCEDURE — 99214 OFFICE O/P EST MOD 30 MIN: CPT | Performed by: INTERNAL MEDICINE

## 2022-12-04 PROCEDURE — 250N000013 HC RX MED GY IP 250 OP 250 PS 637: Performed by: FAMILY MEDICINE

## 2022-12-04 PROCEDURE — 97110 THERAPEUTIC EXERCISES: CPT | Mod: GO

## 2022-12-04 PROCEDURE — 96361 HYDRATE IV INFUSION ADD-ON: CPT

## 2022-12-04 PROCEDURE — G0378 HOSPITAL OBSERVATION PER HR: HCPCS

## 2022-12-04 PROCEDURE — 82962 GLUCOSE BLOOD TEST: CPT

## 2022-12-04 PROCEDURE — 85610 PROTHROMBIN TIME: CPT | Performed by: INTERNAL MEDICINE

## 2022-12-04 PROCEDURE — 96372 THER/PROPH/DIAG INJ SC/IM: CPT

## 2022-12-04 PROCEDURE — 93308 TTE F-UP OR LMTD: CPT | Mod: 26 | Performed by: INTERNAL MEDICINE

## 2022-12-04 PROCEDURE — 83735 ASSAY OF MAGNESIUM: CPT | Performed by: INTERNAL MEDICINE

## 2022-12-04 PROCEDURE — 85027 COMPLETE CBC AUTOMATED: CPT | Performed by: INTERNAL MEDICINE

## 2022-12-04 PROCEDURE — 80053 COMPREHEN METABOLIC PANEL: CPT | Performed by: INTERNAL MEDICINE

## 2022-12-04 PROCEDURE — 258N000003 HC RX IP 258 OP 636: Performed by: INTERNAL MEDICINE

## 2022-12-04 PROCEDURE — 255N000002 HC RX 255 OP 636: Performed by: INTERNAL MEDICINE

## 2022-12-04 PROCEDURE — 250N000013 HC RX MED GY IP 250 OP 250 PS 637: Performed by: INTERNAL MEDICINE

## 2022-12-04 PROCEDURE — 250N000013 HC RX MED GY IP 250 OP 250 PS 637: Performed by: HOSPITALIST

## 2022-12-04 PROCEDURE — C8924 2D TTE W OR W/O FOL W/CON,FU: HCPCS

## 2022-12-04 PROCEDURE — 99226 PR SUBSEQUENT OBSERVATION CARE,LEVEL III: CPT | Performed by: INTERNAL MEDICINE

## 2022-12-04 PROCEDURE — 97530 THERAPEUTIC ACTIVITIES: CPT | Mod: GO

## 2022-12-04 PROCEDURE — 93321 DOPPLER ECHO F-UP/LMTD STD: CPT | Mod: 26 | Performed by: INTERNAL MEDICINE

## 2022-12-04 RX ORDER — TRAZODONE HYDROCHLORIDE 100 MG/1
100-200 TABLET ORAL AT BEDTIME
Status: DISCONTINUED | OUTPATIENT
Start: 2022-12-04 | End: 2022-12-07 | Stop reason: HOSPADM

## 2022-12-04 RX ADMIN — Medication 81 MG: at 09:34

## 2022-12-04 RX ADMIN — TRAZODONE HYDROCHLORIDE 200 MG: 100 TABLET ORAL at 01:51

## 2022-12-04 RX ADMIN — BUMETANIDE 2 MG: 2 TABLET ORAL at 08:43

## 2022-12-04 RX ADMIN — GLIMEPIRIDE 1 MG: 1 TABLET ORAL at 08:43

## 2022-12-04 RX ADMIN — METOPROLOL SUCCINATE 25 MG: 25 TABLET, EXTENDED RELEASE ORAL at 09:35

## 2022-12-04 RX ADMIN — SODIUM CHLORIDE 500 ML: 9 INJECTION, SOLUTION INTRAVENOUS at 09:49

## 2022-12-04 RX ADMIN — TRAZODONE HYDROCHLORIDE 200 MG: 100 TABLET ORAL at 21:17

## 2022-12-04 RX ADMIN — PERFLUTREN 2 ML: 6.52 INJECTION, SUSPENSION INTRAVENOUS at 13:27

## 2022-12-04 RX ADMIN — THERA TABS 1 TABLET: TAB at 09:34

## 2022-12-04 RX ADMIN — POLYETHYLENE GLYCOL 3350 17 G: 17 POWDER, FOR SOLUTION ORAL at 09:36

## 2022-12-04 RX ADMIN — ALLOPURINOL 200 MG: 100 TABLET ORAL at 08:44

## 2022-12-04 RX ADMIN — WARFARIN SODIUM 7.5 MG: 5 TABLET ORAL at 18:45

## 2022-12-04 RX ADMIN — CLOPIDOGREL BISULFATE 75 MG: 75 TABLET ORAL at 09:35

## 2022-12-04 RX ADMIN — INSULIN ASPART 1 UNITS: 100 INJECTION, SOLUTION INTRAVENOUS; SUBCUTANEOUS at 12:24

## 2022-12-04 RX ADMIN — EZETIMIBE 10 MG: 10 TABLET ORAL at 08:46

## 2022-12-04 RX ADMIN — ATORVASTATIN CALCIUM 40 MG: 40 TABLET, FILM COATED ORAL at 09:35

## 2022-12-04 RX ADMIN — PANTOPRAZOLE SODIUM 40 MG: 40 TABLET, DELAYED RELEASE ORAL at 06:44

## 2022-12-04 RX ADMIN — INSULIN ASPART 1 UNITS: 100 INJECTION, SOLUTION INTRAVENOUS; SUBCUTANEOUS at 08:47

## 2022-12-04 ASSESSMENT — ACTIVITIES OF DAILY LIVING (ADL)
ADLS_ACUITY_SCORE: 38
ADLS_ACUITY_SCORE: 34

## 2022-12-04 NOTE — ED NOTES
"[facility] ED Handoff Report    ED Chief Complaint: fall    ED Diagnosis:  (N17.9) Acute kidney injury (H)  Comment:   Plan: blood draw creat.    (S09.90XA) Injury of head, initial encounter  Comment:   Plan: imaging completted    (S01.81XA) Facial laceration, initial encounter  Comment:   Plan: assess skin integrity       PMH:    Past Medical History:   Diagnosis Date    Acute renal failure with acute tubular necrosis superimposed on stage 3 chronic kidney disease (H)     Arteriosclerotic cardiovascular disease (ASCVD)     Atrial fibrillation (H)     Bladder cancer (H)     BPH (benign prostatic hypertrophy)     Chronic kidney disease     Complicated UTI (urinary tract infection) 08/25/2019    Congestive heart failure (H)     Coronary artery disease     Diabetes mellitus (H)     Diabetic neuropathy (H)     E coli bacteremia     GERD (gastroesophageal reflux disease)     Gout     Hyperlipidemia     Hypertension     Hyponatremia     Iliac artery aneurysm, 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     Needed: No    Vital Signs:  BP (!) 150/70   Pulse 96   Temp 97.9  F (36.6  C) (Oral)   Resp 24   Ht 1.778 m (5' 10\")   Wt 105.2 kg (232 lb)   SpO2 96%   BMI 33.29 kg/m       Cardiac Rhythm: a fib    Pain Score: 0/10    Is the Patient Confused:  No    Last Food or Drink: 12/04/22 at 1300    Focused Assessment:  multiple bruises, dizziness when standing, orthostatic changes.    Tests Performed: Done: Labs and Imaging    Treatments Provided:  bumex, frequent repositioning, urostomy care    Family " Dynamics/Concerns: no    Family Updated On Visitor Policy: Yes    Plan of Care Communicated to Family: No    daughter    Belongings Checklist Done and Signed by Patient: No    Medications sent with patient:     Covid: asymptomatic , negative    Additional Information:joelle Hernandez RN: \12/4/2022 2:43 PM

## 2022-12-04 NOTE — UTILIZATION REVIEW
Concurrent stay review; Secondary Review Determination    [facility]        Under the authority of the Utilization Management Committee, the utilization review process indicated a secondary review on the above patient.  The review outcome is based on review of the medical records, discussions with staff, and applying clinical experience noted on the date of the review.        (x) Observation/outpatient Status Appropriate - Concurrent stay review       RATIONALE FOR DETERMINATION:     Patient delayed discharge is related to disposition, there is no medical necessity for inpatient admission at the time of this review. If there is a change in patient status, please resend for review.    The information on this document is developed by the utilization review team in order for the business office to ensure compliance.  This only denotes the appropriateness of proper admission status and does not reflect the quality of care rendered.       The definitions of Inpatient Status and Observation Status used in making the determination above are those provided in the CMS Coverage Manual, Chapter 1 and Chapter 6, section 70.4.       Sincerely,    Kaylee Aguirre MD

## 2022-12-04 NOTE — PLAN OF CARE
Problem: Plan of Care - These are the overarching goals to be used throughout the patient stay.    Goal: Plan of Care Review  Description: The Plan of Care Review/Shift note should be completed every shift.  The Outcome Evaluation is a brief statement about your assessment that the patient is improving, declining, or no change.  This information will be displayed automatically on your shift note.  Outcome: Progressing     Problem: Fall Injury Risk  Goal: Absence of Fall and Fall-Related Injury  Outcome: Progressing  Intervention: Identify and Manage Contributors  Recent Flowsheet Documentation  Taken 12/4/2022 0012 by Jovita Martell, RN  Medication Review/Management: medications reviewed  Intervention: Promote Injury-Free Environment  Recent Flowsheet Documentation  Taken 12/4/2022 0012 by Jovita Martell, RN  Safety Promotion/Fall Prevention:   patient and family education   nonskid shoes/slippers when out of bed   lighting adjusted   fall prevention program maintained   clutter free environment maintained   Goal Outcome Evaluation:    Patient denies pain, Dr Magan engel and ordered Trazadone 100-200 mg's and given at 0151 for sleep. Bp=92/59 and 142/80. Patient has urostomy in place with cloudy & sediment in urine. Rhythm = NSR with BBB and occasional PVC's. Will cont to monitor.

## 2022-12-04 NOTE — PHARMACY-ANTICOAGULATION SERVICE
Clinical Pharmacy - Warfarin Dosing Consult     Pharmacy has been consulted to manage this patient s warfarin therapy.  Indication: DVT/ PE Treatment  Therapy Goal: INR 2-3  Warfarin Prior to Admission: Yes  Warfarin PTA Regimen: 5 mg daily  Significant drug interactions: Clopidogrel, aspirin, allopurinol, trazodone  Recent documented change in oral intake/nutrition: Unknown  Dose Comments: INR upon admission subtherapeutic despite patient compliance with 5 mg daily. Will give higher dose this evening.    INR   Date Value Ref Range Status   12/04/2022 1.42 (H) 0.85 - 1.15 Final   12/02/2022 1.35 (H) 0.85 - 1.15 Final       Recommend warfarin 7.5 mg today.  Pharmacy will monitor Eduardo Laughlin daily and order warfarin doses to achieve specified goal.      Please contact pharmacy as soon as possible if the warfarin needs to be held for a procedure or if the warfarin goals change.

## 2022-12-04 NOTE — ED NOTES
DR. Mazariegos paged via [facility] , r/t pt with sudden onset dizziness, hr 122. Ekg performed.

## 2022-12-04 NOTE — PLAN OF CARE
Problem: Plan of Care - These are the overarching goals to be used throughout the patient stay.    Goal: Absence of Hospital-Acquired Illness or Injury  Outcome: Progressing  Intervention: Identify and Manage Fall Risk  Recent Flowsheet Documentation  Taken 12/3/2022 1800 by Whitney Sena RN  Safety Promotion/Fall Prevention:   activity supervised   clutter free environment maintained   nonskid shoes/slippers when out of bed   safety round/check completed     Problem: Fall Injury Risk  Goal: Absence of Fall and Fall-Related Injury  Outcome: Progressing  Intervention: Identify and Manage Contributors  Recent Flowsheet Documentation  Taken 12/3/2022 1800 by Whitney Sena RN  Medication Review/Management: medications reviewed  Intervention: Promote Injury-Free Environment  Recent Flowsheet Documentation  Taken 12/3/2022 1800 by Whitney Sena RN  Safety Promotion/Fall Prevention:   activity supervised   clutter free environment maintained   nonskid shoes/slippers when out of bed   safety round/check completed     Problem: Plan of Care - These are the overarching goals to be used throughout the patient stay.    Goal: Optimal Comfort and Wellbeing  Intervention: Provide Person-Centered Care  Recent Flowsheet Documentation  Taken 12/3/2022 1800 by Whitney Sena RN  Trust Relationship/Rapport: care explained   Goal Outcome Evaluation:

## 2022-12-04 NOTE — PROGRESS NOTES
[facility]    PROGRESS NOTE - Hospitalist Service    Assessment and Plan    Active Problems:    * No active hospital problems. *    82 year old male with a past medical history of recent Non-STEMI, CHF, A.fib, HTN, CKD 4, DVT, bladder cancer who was discharged from [facility] on 12/1/2022 with diagnosis of Non-STEMI, CHF exacerbation and pneumonia who returns to hospital today s/p fall at home.  He was readmitted on 12/2/2022.      Recurrent falls..   - S/P Mechanical fall versus syncope   - Admission CT head negative.   - Patient has had at least 4 falls at home in past 5 weeks.  - PT evaluate and treat. May need Inpatient Rehabilitation/ SNF placement.   - Anticipate TCU on discharge     Acute head trauma  - Forehead laceration sutured in ED.   - No ICH on CT head.   - Monitor closely due to hx of multiple anticoagulants.  - Repeat CT head shows no significant acute changes.  - Restart warfarin     Recent acute kidney injury  - Patient has underlying CKD stage IV.   - Nephrology note and recommendations from 12/01/22 reviewed. Avoid nephrotoxic agents.  - Continue Bumex due to underlying CHF. Repeat renal function in a.m.   - Recommend Nephrologist reconsultation if Creatinine continues to increase.     A.fib with RVR  -- Patient has known A.fib and is chronically anticoagulated with warfarin.  - INR current subtherapeutic.   - Rate currently controlled.   - Continue home dose of Metoprolol with BP and HR parameters.  - Plan to restart warfarin today as repeated CT head is negative for acute changes    Elevated troponin  - History of coronary artery disease  - Patient had dizziness episode with EKG shows QRS complex  - Recent NSTEMI s/p angiogram last week  - Cardiology consult, appreciate input  - Continue to monitor troponin and telemetry  - Resume aspirin, metoprolol, statin and Plavix     Hypotension  - Resolved.   - Home medications reviewed.   - Monitor vitals. Check  orthostatics.  - Recent echo on 11/2022 shows EF of 45%     Acute hypoxia  - Resolved.   - O2 sat=98 % on RA while I was at bedside.   - CT chest no PNA appreciated.   - COVID negative. Monitor vitals closely.     Leukocytosis  - Possible stress induced reaction.   - Afebrile. No PNA on CT chest.   -Unremarkable repeat CBC today     .Acute Right Rib fractures 9th-11th  - Secondary to fall  - Also present on imaging from recent hospitalization.  - Patient says he has had at least 4 falls at home in the past 5 weeks.   - Currently pain free.  - Recommend Incentive spirometer.  -Continues Tylenol as needed     Diabetes mellitus type 2  - Restart home glimepiride  - Cover with insulin sliding scale  - Last hemoglobin A1c 7.7        VTE prophylaxis:  Warfarin  DIET: Orders Placed This Encounter      Combination Diet Low Consistent Carb (45 g CHO per Meal) Diet; 2 gm NA Diet; Low Saturated Fat Na <2400mg Diet, No Caffeine Diet      Disposition/Barriers to discharge: Monitor troponin, monitor blood pressure.  Code Status: Full Code    Subjective:  Eduardo had dizziness episodes this morning, denies any chest pain or shortness of breath.  No nausea or vomiting.    PHYSICAL EXAM  Vitals:    12/03/22 1642   Weight: 105.2 kg (232 lb)     B/P:150/70 T:97.9 P:96 R:24     Intake/Output Summary (Last 24 hours) at 12/4/2022 1400  Last data filed at 12/4/2022 1157  Gross per 24 hour   Intake 1283 ml   Output 2100 ml   Net -817 ml      Body mass index is 33.29 kg/m .    Constitutional: awake, alert, cooperative, no apparent distress, and appears stated age  Eyes: Multiple bruises at different stages on his front head and left upper eyelid.    Respiratory: No increased work of breathing, good air exchange, clear to auscultation bilaterally, no crackles or wheezing  Cardiovascular: Normal apical impulse, regular rate and rhythm, normal S1 and S2, no S3 or S4, and no murmur noted  GI: No scars, normal bowel sounds, soft, non-distended,  non-tender, no masses palpated, no hepatosplenomegally  Skin: no bruising or bleeding and normal skin color, texture, turgor  Musculoskeletal: Multiple bruises at upper and lower extremities.  Neurologic: Awake, alert, oriented to name, place and time.  Cranial nerves II-XII are grossly intact.  Motor is 5 out of 5 bilaterally.   Sensory is intact.    Neuropsychiatric: Appropriate with examiner      PERTINENT LABS/IMAGING:  Recent Labs   Lab 12/04/22  1215 12/04/22  0953 12/04/22  0825 12/04/22  0659 12/03/22  0847 12/03/22  0742 12/02/22  2316 12/02/22  1817 12/01/22  0823 12/01/22  0448   WBC  --   --   --  9.5  --  8.5  --  12.5*  --  8.8   HGB  --   --   --  10.7*  --  10.0*  --  11.2*  --  10.1*   MCV  --   --   --  98  --  97  --  98  --  100   PLT  --   --   --  191  --  165  --  196  --  157   INR  --  1.42*  --   --   --   --   --  1.35*  --  1.24*   NA  --   --   --  136  --  137  --  135*  --  137   POTASSIUM  --   --   --  3.9  --  4.7  --  4.1  --  4.2   CHLORIDE  --   --   --  104  --  105  --  101  --  106   CO2  --   --   --  20*  --  19*  --  22  --  24   BUN  --   --   --  51.9*  --  49.6*  --  51.5*  --  42.9*   CR  --   --   --  2.30*  --  2.27*  --  2.49*  --  1.82*   ANIONGAP  --   --   --  12  --  13  --  12  --  7   GALLO  --   --   --  9.1  --  8.9  --  8.9  --  8.9   *  --  141* 143*   < > 133*   < > 127*   < > 148*   ALBUMIN  --   --   --  3.4*  --   --   --  3.7  --  3.2*   PROTTOTAL  --   --   --  6.9  --   --   --  7.0  --  6.2*   BILITOTAL  --   --   --  0.8  --   --   --  1.0  --  0.9   ALKPHOS  --   --   --  74  --   --   --  74  --  64   ALT  --   --   --  21  --   --   --  29  --  31   AST  --   --   --  25  --   --   --  20  --  25    < > = values in this interval not displayed.     No results found for this or any previous visit (from the past 24 hour(s)).    Discussed with patient, family, nursing staff and discharge planner    Hernesto Mazariegos MD  [facility]  Service  [phone]

## 2022-12-04 NOTE — CONSULTS
HEART CARE NOTE        Thank you, Dr. Mazariegos, for asking the [facility] team to see Eduardo Laughlin to evaluate elevated troponin.      Assessment/Recommendations     1. HFpEF c/b ADHF  Assessment / Plan    Near euvolemia - no changes to diuretic regimen at this time    GDMT as detailed below; mainstay of treatment for HFpEF includes diuretics and adequate BP control (class I) and SGLT2-I (class 2a); additional medical therapy (ARNI, MRA, ARB) demonstrated less robust evidence for indication but may be considered per guideline recommendations (2b); no indication for BBlockers      Current Pharmacotherapy AHA Guideline-Directed Medical Therapy   Losartan  - on hold given ROBERT ARNI/ARB   Spironolactone  - on hold  MRA   SGLT2 inhibitor -  Not started SGLT2-I    Bumetanide 2 mg daily Loop diuretic       2. Hx NSTEMI now presenting with elevated troponin  Assessment / Plan    High sensitivity troponin >700 during recent admission from 11/18-12/1; now s/p coronary angiogram and PCI to OM1 with CORA; troponin noted to be trending down since and was noted to [id number] on admission; patient denies any chest pain or anginal equivalents; continue to cycle cardiac enzymes    Continue ASA, metoprolol, high in tensity atorvastatin; clopidogrel     3. DM2  Assessment / Plan    Management per primary team     4. CKD  Assessment / Plan    Continue to monitor UOP and renal function closely       Clinically Significant Risk Factors Present on Admission        # Hyponatremia: Lowest Na = 135 mmol/L (Ref range: 136-145) in last 2 days, will monitor as appropriate      # Hypoalbuminemia: Lowest albumin = 3.4 g/dL (Ref range: 3.5-5.2) at 12/4/2022  6:59 AM, will monitor as appropriate   # Drug Induced Coagulation Defect: home medication list includes an anticoagulant medication  # Drug Induced Platelet Defect: home medication list includes an antiplatelet medication   # DMII: A1C = 7.7 % (Ref range: <5.7 %) within past 3  "months  # Obesity: Estimated body mass index is 33.29 kg/m  as calculated from the following:    Height as of this encounter: 1.778 m (5' 10\").    Weight as of this encounter: 105.2 kg (232 lb).    Nephrology: CKD POA List: Stage 4 (GFR 15-29)      History of Present Illness/Subjective    Mr. Eduardo Laughlin is a 82 year old male with a PMHx significant for (per Dr. Phillips's note) recent Non-STEMI, CHF, A.fib, HTN, CKD 4, DVT, bladder cancer who was discharged from [facility] yesterday s/p Non-STEMI, CHF exacerbation and PNA who returns to hospital today s/p fall at home.    Today, Mr. Motta denies any acute cardiac events or complaints; he denies chest pain, palpitations, orthopnea, PND, fluid retention, edema, lightheadedness, presyncope or syncope; Management plan as detailed above.     ECG: Personally reviewed. atrial fibrillation, RVR, LBBB.    ECHO (personnaly Reviewed) 11/18/22:  1. Technically difficult study despite utilization of ultrasound enhancing  agent.  2. The left ventricle is normal in size. Image quality does not provide for  detailed assessment of LV systolic function, but is felt to be mildly  decreased with a visually estimated ejection fraction of roughly 45%.  3. There is mild global reduction in left ventricular systolic performance.  4. There is abnormal septal motion likely related to altered electrical  activation due to bundle branch block.  5. There is mild aortic stenosis.  6. Probable normal right ventricular size and systolic performance though  right-sided structures are not clearly visualized on all views on this study.  7. There is mild enlargement of the proximal ascending aorta.     The acoustic quality of this study is quite poor. If a more accurate  assessment of left ventricular systolicperformance, regional wall motion, and  other morphology/function is required; would recommend cardiac MRI or other  alternative imaging modality for further evaluation.   " "  When compared to the prior real-time echocardiogram dated 13 October 2022, the  findings are felt to be fairly similar on both examinations though accurate  comparison is somewhat difficult due to the poor acoustic quality on both  studies.        Physical Examination Review of Systems   BP 99/69 (BP Location: Right arm, Patient Position: Semi-Wills's, Cuff Size: Adult Regular)   Pulse 96   Temp 97.5  F (36.4  C) (Oral)   Resp 22   Ht 1.778 m (5' 10\")   Wt 105.2 kg (232 lb)   SpO2 96%   BMI 33.29 kg/m    Body mass index is 33.29 kg/m .  Wt Readings from Last 3 Encounters:   12/03/22 105.2 kg (232 lb)   12/01/22 106.8 kg (235 lb 8 oz)   11/08/22 111.2 kg (245 lb 2.4 oz)     General Appearance:   no distress, normal body habitus   ENT/Mouth: membranes moist, no oral lesions or bleeding gums.      EYES:  no scleral icterus, normal conjunctivae   Neck: no carotid bruits or thyromegaly   Chest/Lungs:   lungs are clear to auscultation, no rales or wheezing, equal chest wall expansion    Cardiovascular:   Regular. Normal first and second heart sounds with no murmurs, rubs, or gallops; the carotid, radial and posterior tibial pulses are intact, no JVD or LE edema bilaterally    Abdomen:  no organomegaly, masses, bruits, or tenderness; bowel sounds are present   Extremities: no cyanosis or clubbing   Skin: no xanthelasma, warm.    Neurologic: alert and oriented x3, calm     Psychiatric: alert and oriented x3, calm     A complete 10 systems ROS was reviewed  And is negative except what is listed in the HPI.          Medical History  Surgical History Family History Social History   Past Medical History:   Diagnosis Date     Acute renal failure with acute tubular necrosis superimposed on stage 3 chronic kidney disease (H)      Arteriosclerotic cardiovascular disease (ASCVD)      Atrial fibrillation (H)      Bladder cancer (H)      BPH (benign prostatic hypertrophy)      Chronic kidney disease      Complicated UTI " (urinary tract infection) 08/25/2019     Congestive heart failure (H)      Coronary artery disease      Diabetes mellitus (H)      Diabetic neuropathy (H)      E coli bacteremia      GERD (gastroesophageal reflux disease)      Gout      Hyperlipidemia      Hypertension      Hyponatremia      Iliac artery aneurysm, 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    Added automatically from request for surgery [id number]     Kidney stone      Osteoarthritis      Personal history of malignant neoplasm of prostate 04/27/2020     Prostate cancer (H)      Sleep apnea     CPAP    Past Surgical History:   Procedure Laterality Date     CARDIAC CATHETERIZATION  03/28/2008    and coronary angios     CV CORONARY ANGIOGRAM N/A 11/28/2022    Procedure: CV CORONARY ANGIOGRAM;  Surgeon: Gonzales Hager MD;  Location: [facility] CV     CV LEFT HEART CATH N/A 11/28/2022    Procedure: Left Heart Catheterization;  Surgeon: Gonzales Hager MD;  Location: [facility] CV     CV PCI STENT DRUG ELUTING N/A 11/28/2022    Procedure: Percutaneous Coronary Intervention Stent;  Surgeon: Gonzales Hager MD;  Location: [facility] CV     CYSTECTOMY       CYSTOSCOPY       CYSTOSCOPY N/A 12/23/2015    Procedure: CYSTOSCOPY BLADDER BIOPSIES with Fulgeration and Placement of Otero ;  Surgeon: Gordon Bajwa MD;  Location: [facility];  Service:      IR EXTREMITY ANGIOGRAM LEFT  3/22/2019     IR EXTREMITY ANGIOGRAM LEFT  3/22/2019     IR ILEAL CONDUIT INJECTION  12/23/2016     IR NEPHROSTOMY TUBE PLACEMENT RIGHT  8/18/2016     IR URETEROSTOMY TUBE CHANGE RIGHT  9/21/2016     KIDNEY STONE SURGERY  x4     LAMINECTOMY LUMBAR ONE LEVEL Bilateral 9/13/2022    Procedure: LUMBAR 4 - LUMBAR 5 BILATERAL MEDIAL FACETECTOMY AND DECOMPRESSION;  Surgeon: Everett Holland MD;  Location: [facility]     PICC TRIPLE LUMEN PLACEMENT  11/18/2022          PROSTATE SURGERY      no family history of premature coronary artery disease Social History      Socioeconomic History     Marital status:      Spouse name: Not on file     Number of children: Not on file     Years of education: Not on file     Highest education level: Not on file   Occupational History     Not on file   Tobacco Use     Smoking status: Former     Smokeless tobacco: Never     Tobacco comments:     Quit smoking 30 years ago   Vaping Use     Vaping Use: Never used   Substance and Sexual Activity     Alcohol use: Not Currently     Drug use: Never     Sexual activity: Not Currently   Other Topics Concern     Not on file   Social History Narrative     Not on file     Social Determinants of Health     Financial Resource Strain: Not on file   Food Insecurity: Not on file   Transportation Needs: Not on file   Physical Activity: Not on file   Stress: Not on file   Social Connections: Not on file   Intimate Partner Violence: Not on file   Housing Stability: Not on file           Lab Results    Chemistry/lipid CBC Cardiac Enzymes/BNP/TSH/INR   Lab Results   Component Value Date    CHOL 168 03/03/2022    HDL 38 (L) 03/03/2022    TRIG 117 03/03/2022    BUN 51.9 (H) 12/04/2022     12/04/2022    CO2 20 (L) 12/04/2022    Lab Results   Component Value Date    WBC 9.5 12/04/2022    HGB 10.7 (L) 12/04/2022    HCT 32.7 (L) 12/04/2022    MCV 98 12/04/2022     12/04/2022    Lab Results   Component Value Date    TROPONINI 0.10 04/25/2020     (H) 04/25/2020    INR 1.42 (H) 12/04/2022     Lab Results   Component Value Date    TROPONINI 0.10 04/25/2020          Weight:    Wt Readings from Last 3 Encounters:   12/03/22 105.2 kg (232 lb)   12/01/22 106.8 kg (235 lb 8 oz)   11/08/22 111.2 kg (245 lb 2.4 oz)       Allergies  Allergies   Allergen Reactions     Metoprolol Succinate Er      Other reaction(s): low blood pressure     Pcn [Penicillins] Other (See Comments)     Childhood-doesn't know reactions     Statin Drugs [Hmg-Coa-R Inhibitors] Cramps     Uroxatral [Alfuzosin] Other (See  Comments)     hypotension         Surgical History  Past Surgical History:   Procedure Laterality Date     CARDIAC CATHETERIZATION  03/28/2008    and coronary angios     CV CORONARY ANGIOGRAM N/A 11/28/2022    Procedure: CV CORONARY ANGIOGRAM;  Surgeon: Gonzales Hager MD;  Location: [facility] CV     CV LEFT HEART CATH N/A 11/28/2022    Procedure: Left Heart Catheterization;  Surgeon: Gonzales Hager MD;  Location: [facility] CV     CV PCI STENT DRUG ELUTING N/A 11/28/2022    Procedure: Percutaneous Coronary Intervention Stent;  Surgeon: Gonzales Hager MD;  Location: [facility] CV     CYSTECTOMY       CYSTOSCOPY       CYSTOSCOPY N/A 12/23/2015    Procedure: CYSTOSCOPY BLADDER BIOPSIES with Fulgeration and Placement of Otero ;  Surgeon: Gordon Bajwa MD;  Location: [facility];  Service:      IR EXTREMITY ANGIOGRAM LEFT  3/22/2019     IR EXTREMITY ANGIOGRAM LEFT  3/22/2019     IR ILEAL CONDUIT INJECTION  12/23/2016     IR NEPHROSTOMY TUBE PLACEMENT RIGHT  8/18/2016     IR URETEROSTOMY TUBE CHANGE RIGHT  9/21/2016     KIDNEY STONE SURGERY  x4     LAMINECTOMY LUMBAR ONE LEVEL Bilateral 9/13/2022    Procedure: LUMBAR 4 - LUMBAR 5 BILATERAL MEDIAL FACETECTOMY AND DECOMPRESSION;  Surgeon: Everett Holland MD;  Location: [facility]     PICC TRIPLE LUMEN PLACEMENT  11/18/2022          PROSTATE SURGERY         Social History  Tobacco:   History   Smoking Status     Former   Smokeless Tobacco     Never    Alcohol:   Social History    Substance and Sexual Activity      Alcohol use: Not Currently   Illicit Drugs:   History   Drug Use Unknown       Family History  Family History   Problem Relation Age of Onset     No Known Problems Mother      Prostate Cancer Father      Deep Vein Thrombosis Father      Cancer Sister      Heart Disease Sister      No Known Problems Daughter      Heart Disease Brother      Heart Disease Sister      No Known Problems Daughter            Abdelrahman Mazariegos MD on 12/4/2022      cc: Rafael Montelongo,

## 2022-12-05 ENCOUNTER — TRANSFERRED RECORDS (OUTPATIENT)
Dept: HEALTH INFORMATION MANAGEMENT | Facility: CLINIC | Age: 82
End: 2022-12-05

## 2022-12-05 ENCOUNTER — APPOINTMENT (OUTPATIENT)
Dept: PHYSICAL THERAPY | Facility: HOSPITAL | Age: 82
DRG: 091 | End: 2022-12-05
Payer: COMMERCIAL

## 2022-12-05 PROBLEM — S09.90XA INJURY OF HEAD, INITIAL ENCOUNTER: Status: ACTIVE | Noted: 2022-12-05

## 2022-12-05 PROBLEM — S01.81XA FACIAL LACERATION, INITIAL ENCOUNTER: Status: ACTIVE | Noted: 2022-12-05

## 2022-12-05 LAB
ALBUMIN SERPL BCG-MCNC: 3.3 G/DL (ref 3.5–5.2)
ALP SERPL-CCNC: 70 U/L (ref 40–129)
ALT SERPL W P-5'-P-CCNC: 18 U/L (ref 10–50)
ANION GAP SERPL CALCULATED.3IONS-SCNC: 9 MMOL/L (ref 7–15)
AST SERPL W P-5'-P-CCNC: 27 U/L (ref 10–50)
ATRIAL RATE - MUSE: 87 BPM
BILIRUB SERPL-MCNC: 0.7 MG/DL
BUN SERPL-MCNC: 55.2 MG/DL (ref 8–23)
CALCIUM SERPL-MCNC: 8.7 MG/DL (ref 8.8–10.2)
CHLORIDE SERPL-SCNC: 103 MMOL/L (ref 98–107)
CREAT SERPL-MCNC: 2.59 MG/DL (ref 0.67–1.17)
DEPRECATED HCO3 PLAS-SCNC: 23 MMOL/L (ref 22–29)
DIASTOLIC BLOOD PRESSURE - MUSE: NORMAL MMHG
ERYTHROCYTE [DISTWIDTH] IN BLOOD BY AUTOMATED COUNT: 14.7 % (ref 10–15)
GFR SERPL CREATININE-BSD FRML MDRD: 24 ML/MIN/1.73M2
GLUCOSE BLDC GLUCOMTR-MCNC: 112 MG/DL (ref 70–99)
GLUCOSE BLDC GLUCOMTR-MCNC: 151 MG/DL (ref 70–99)
GLUCOSE BLDC GLUCOMTR-MCNC: 176 MG/DL (ref 70–99)
GLUCOSE BLDC GLUCOMTR-MCNC: 184 MG/DL (ref 70–99)
GLUCOSE SERPL-MCNC: 127 MG/DL (ref 70–99)
HCT VFR BLD AUTO: 29 % (ref 40–53)
HGB BLD-MCNC: 9.5 G/DL (ref 13.3–17.7)
HOLD SPECIMEN: NORMAL
INR PPP: 1.48 (ref 0.85–1.15)
INTERPRETATION ECG - MUSE: NORMAL
MCH RBC QN AUTO: 32.2 PG (ref 26.5–33)
MCHC RBC AUTO-ENTMCNC: 32.8 G/DL (ref 31.5–36.5)
MCV RBC AUTO: 98 FL (ref 78–100)
P AXIS - MUSE: 4 DEGREES
PLATELET # BLD AUTO: 170 10E3/UL (ref 150–450)
POTASSIUM SERPL-SCNC: 3.8 MMOL/L (ref 3.4–5.3)
PR INTERVAL - MUSE: 256 MS
PROT SERPL-MCNC: 6.5 G/DL (ref 6.4–8.3)
QRS DURATION - MUSE: 176 MS
QT - MUSE: 428 MS
QTC - MUSE: 515 MS
R AXIS - MUSE: -21 DEGREES
RBC # BLD AUTO: 2.95 10E6/UL (ref 4.4–5.9)
SODIUM SERPL-SCNC: 135 MMOL/L (ref 136–145)
SYSTOLIC BLOOD PRESSURE - MUSE: NORMAL MMHG
T AXIS - MUSE: 133 DEGREES
TROPONIN T SERPL HS-MCNC: 201 NG/L
TROPONIN T SERPL HS-MCNC: 204 NG/L
VENTRICULAR RATE- MUSE: 87 BPM
WBC # BLD AUTO: 8.6 10E3/UL (ref 4–11)

## 2022-12-05 PROCEDURE — 85027 COMPLETE CBC AUTOMATED: CPT | Performed by: INTERNAL MEDICINE

## 2022-12-05 PROCEDURE — 120N000001 HC R&B MED SURG/OB

## 2022-12-05 PROCEDURE — 250N000013 HC RX MED GY IP 250 OP 250 PS 637: Performed by: INTERNAL MEDICINE

## 2022-12-05 PROCEDURE — 97530 THERAPEUTIC ACTIVITIES: CPT | Mod: GP | Performed by: PHYSICAL THERAPIST

## 2022-12-05 PROCEDURE — 93010 ELECTROCARDIOGRAM REPORT: CPT | Performed by: INTERNAL MEDICINE

## 2022-12-05 PROCEDURE — 82962 GLUCOSE BLOOD TEST: CPT

## 2022-12-05 PROCEDURE — 36415 COLL VENOUS BLD VENIPUNCTURE: CPT | Performed by: INTERNAL MEDICINE

## 2022-12-05 PROCEDURE — 96375 TX/PRO/DX INJ NEW DRUG ADDON: CPT

## 2022-12-05 PROCEDURE — 93005 ELECTROCARDIOGRAM TRACING: CPT

## 2022-12-05 PROCEDURE — G0378 HOSPITAL OBSERVATION PER HR: HCPCS

## 2022-12-05 PROCEDURE — P9047 ALBUMIN (HUMAN), 25%, 50ML: HCPCS | Performed by: INTERNAL MEDICINE

## 2022-12-05 PROCEDURE — 97116 GAIT TRAINING THERAPY: CPT | Mod: GP | Performed by: PHYSICAL THERAPIST

## 2022-12-05 PROCEDURE — 250N000011 HC RX IP 250 OP 636: Performed by: INTERNAL MEDICINE

## 2022-12-05 PROCEDURE — 250N000013 HC RX MED GY IP 250 OP 250 PS 637: Performed by: FAMILY MEDICINE

## 2022-12-05 PROCEDURE — 96372 THER/PROPH/DIAG INJ SC/IM: CPT

## 2022-12-05 PROCEDURE — 84484 ASSAY OF TROPONIN QUANT: CPT | Performed by: INTERNAL MEDICINE

## 2022-12-05 PROCEDURE — 85610 PROTHROMBIN TIME: CPT | Performed by: INTERNAL MEDICINE

## 2022-12-05 PROCEDURE — 99233 SBSQ HOSP IP/OBS HIGH 50: CPT | Performed by: INTERNAL MEDICINE

## 2022-12-05 PROCEDURE — 99232 SBSQ HOSP IP/OBS MODERATE 35: CPT | Performed by: INTERNAL MEDICINE

## 2022-12-05 PROCEDURE — 80053 COMPREHEN METABOLIC PANEL: CPT | Performed by: INTERNAL MEDICINE

## 2022-12-05 PROCEDURE — 250N000013 HC RX MED GY IP 250 OP 250 PS 637: Performed by: HOSPITALIST

## 2022-12-05 RX ORDER — ALBUMIN (HUMAN) 12.5 G/50ML
50 SOLUTION INTRAVENOUS ONCE
Status: COMPLETED | OUTPATIENT
Start: 2022-12-05 | End: 2022-12-05

## 2022-12-05 RX ORDER — WARFARIN SODIUM 5 MG/1
5 TABLET ORAL
Status: COMPLETED | OUTPATIENT
Start: 2022-12-05 | End: 2022-12-05

## 2022-12-05 RX ORDER — ACETAMINOPHEN 325 MG/1
975 TABLET ORAL 3 TIMES DAILY
Status: DISCONTINUED | OUTPATIENT
Start: 2022-12-05 | End: 2022-12-07 | Stop reason: HOSPADM

## 2022-12-05 RX ADMIN — ALBUMIN HUMAN 50 G: 0.25 SOLUTION INTRAVENOUS at 08:45

## 2022-12-05 RX ADMIN — EZETIMIBE 10 MG: 10 TABLET ORAL at 09:08

## 2022-12-05 RX ADMIN — METOPROLOL SUCCINATE 25 MG: 25 TABLET, EXTENDED RELEASE ORAL at 09:18

## 2022-12-05 RX ADMIN — ACETAMINOPHEN 975 MG: 325 TABLET ORAL at 20:21

## 2022-12-05 RX ADMIN — ALLOPURINOL 200 MG: 100 TABLET ORAL at 09:08

## 2022-12-05 RX ADMIN — PANTOPRAZOLE SODIUM 40 MG: 40 TABLET, DELAYED RELEASE ORAL at 06:43

## 2022-12-05 RX ADMIN — ACETAMINOPHEN 1000 MG: 500 TABLET ORAL at 13:49

## 2022-12-05 RX ADMIN — Medication 81 MG: at 09:08

## 2022-12-05 RX ADMIN — GLIMEPIRIDE 1 MG: 1 TABLET ORAL at 09:08

## 2022-12-05 RX ADMIN — INSULIN ASPART 1 UNITS: 100 INJECTION, SOLUTION INTRAVENOUS; SUBCUTANEOUS at 12:44

## 2022-12-05 RX ADMIN — INSULIN ASPART 1 UNITS: 100 INJECTION, SOLUTION INTRAVENOUS; SUBCUTANEOUS at 09:05

## 2022-12-05 RX ADMIN — ATORVASTATIN CALCIUM 40 MG: 40 TABLET, FILM COATED ORAL at 09:08

## 2022-12-05 RX ADMIN — WARFARIN SODIUM 5 MG: 5 TABLET ORAL at 17:23

## 2022-12-05 RX ADMIN — THERA TABS 1 TABLET: TAB at 09:08

## 2022-12-05 RX ADMIN — POLYETHYLENE GLYCOL 3350 17 G: 17 POWDER, FOR SOLUTION ORAL at 09:08

## 2022-12-05 RX ADMIN — CLOPIDOGREL BISULFATE 75 MG: 75 TABLET ORAL at 09:08

## 2022-12-05 RX ADMIN — TRAZODONE HYDROCHLORIDE 200 MG: 100 TABLET ORAL at 22:13

## 2022-12-05 RX ADMIN — BUMETANIDE 2 MG: 2 TABLET ORAL at 09:08

## 2022-12-05 ASSESSMENT — ACTIVITIES OF DAILY LIVING (ADL)
ADLS_ACUITY_SCORE: 36
ADLS_ACUITY_SCORE: 40
ADLS_ACUITY_SCORE: 36
ADLS_ACUITY_SCORE: 40
ADLS_ACUITY_SCORE: 40
ADLS_ACUITY_SCORE: 34
ADLS_ACUITY_SCORE: 36
ADLS_ACUITY_SCORE: 40
ADLS_ACUITY_SCORE: 36
ADLS_ACUITY_SCORE: 40
ADLS_ACUITY_SCORE: 40
ADLS_ACUITY_SCORE: 36

## 2022-12-05 NOTE — UTILIZATION REVIEW
Admission Status; Secondary Review Determination   Under the authority of the Utilization Management Committee, the utilization review process indicated a secondary review on Eduardo Laughlin. The review outcome is based on review of the medical records, discussions with staff, and applying clinical experience noted on the date of the review.   (x) Inpatient Status Appropriate - This patient's medical care is consistent with medical management for inpatient care and reasonable inpatient medical practice.     RATIONALE FOR DETERMINATION   82-year-old male with a history of recent non-STEMI, CHF, atrial fibrillation, chronic kidney disease stage IV and DVT was readmitted after a fall.  Had acute head trauma with no ICH on head CT but required close monitoring on anticoagulation and repeat head CT which was stable.  Had hypotension requiring IV albumin.  Has A. fib with RVR and restarting warfarin today with negative head CT.  Requiring continued INR monitoring.  Creatinine and troponin have remained elevated and have increased today necessitating continued hospitalization.  Also had acute hypoxia on admission which has resolved.    At the time of admission with the information available to the attending physician more than 2 nights Hospital complex care was anticipated, based on patient risk of adverse outcome if treated as outpatient and complex care required. Inpatient admission is appropriate based on the Medicare guidelines.   The information on this document is developed by the utilization review team in order for the business office to ensure compliance. This only denotes the appropriateness of proper admission status and does not reflect the quality of care rendered.   The definitions of Inpatient Status and Observation Status used in making the determination above are those provided in the CMS Coverage Manual, Chapter 1 and Chapter 6, section 70.4.   Sincerely,   Shen Smith MD  Utilization  Review  Physician Advisor  [facility]

## 2022-12-05 NOTE — PROGRESS NOTES
HEART CARE NOTE          Assessment/Recommendations     1. HFpEF c/b ADHF  Assessment / Plan    Near euvolemia - no changes to diuretic regimen at this time    GDMT as detailed below; mainstay of treatment for HFpEF includes diuretics and adequate BP control (class I) and SGLT2-I (class 2a); additional medical therapy (ARNI, MRA, ARB) demonstrated less robust evidence for indication but may be considered per guideline recommendations (2b); no indication for BBlockers      Current Pharmacotherapy AHA Guideline-Directed Medical Therapy   Losartan  - on hold given ROBERT ARNI/ARB   Spironolactone  - on hold  MRA   SGLT2 inhibitor -  Not started SGLT2-I    Bumetanide 2 mg daily Loop diuretic       2. Hx NSTEMI now presenting with elevated troponin  Assessment / Plan    High sensitivity troponin >700 during recent admission from 11/18-12/1; now s/p coronary angiogram and PCI to [facility] with CORA; troponin noted to be trending down since and was noted to [id number] on this admission; will expect some persistence of enzymes in the setting of ongoing renal dysfunction; patient denies any chest pain or anginal equivalents; continue to cycle cardiac enzymes - no further cardiac interventions or testing indicated at this time     Continue ASA, metoprolol, high in tensity atorvastatin; clopidogrel     3. DM2  Assessment / Plan    Management per primary team     4. CKD  Assessment / Plan    Continue to monitor UOP and renal function closely     Cardiology team will sign-off for now. Please do not hesitate to consult us again if new questions or concerns arise. Follow-up appointment will be arranged by CORE/HF clinic.       History of Present Illness/Subjective      Mr. Eduardo Laughlni is a 82 year old male with a PMHx significant for (per Dr. Phillips's note) recent Non-STEMI, CHF, A.fib, HTN, CKD 4, DVT, bladder cancer who was discharged from [facility] yesterday s/p Non-STEMI, CHF exacerbation and PNA who returns to  "hospital today s/p fall at home.     Today, Mr. Motta denies any acute cardiac events or complaints; Management plan as detailed above.      ECG: Personally reviewed. atrial fibrillation, RVR, LBBB.     ECHO (personnaly Reviewed) 11/18/22:  1. Technically difficult study despite utilization of ultrasound enhancing  agent.  2. The left ventricle is normal in size. Image quality does not provide for  detailed assessment of LV systolic function, but is felt to be mildly  decreased with a visually estimated ejection fraction of roughly 45%.  3. There is mild global reduction in left ventricular systolic performance.  4. There is abnormal septal motion likely related to altered electrical  activation due to bundle branch block.  5. There is mild aortic stenosis.  6. Probable normal right ventricular size and systolic performance though  right-sided structures are not clearly visualized on all views on this study.  7. There is mild enlargement of the proximal ascending aorta.     The acoustic quality of this study is quite poor. If a more accurate  assessment of left ventricular systolicperformance, regional wall motion, and  other morphology/function is required; would recommend cardiac MRI or other  alternative imaging modality for further evaluation.     When compared to the prior real-time echocardiogram dated 13 October 2022, the  findings are felt to be fairly similar on both examinations though accurate  comparison is somewhat difficult due to the poor acoustic quality on both  studies.          Physical Examination Review of Systems   /74 (BP Location: Right arm, Patient Position: Semi-Wills's, Cuff Size: Adult Regular)   Pulse 80   Temp 97.6  F (36.4  C) (Oral)   Resp 22   Ht 1.778 m (5' 10\")   Wt 105.2 kg (232 lb)   SpO2 95%   BMI 33.29 kg/m    Body mass index is 33.29 kg/m .  Wt Readings from Last 3 Encounters:   12/03/22 105.2 kg (232 lb)   12/01/22 106.8 kg (235 lb 8 oz)   11/08/22 111.2 kg " (245 lb 2.4 oz)     General Appearance:   no distress, normal body habitus   ENT/Mouth: membranes moist, no oral lesions or bleeding gums.      EYES:  no scleral icterus, normal conjunctivae   Neck: no carotid bruits or thyromegaly   Chest/Lungs:   lungs are clear to auscultation, no rales or wheezing, equal chest wall expansion    Cardiovascular:   Regular. Normal first and second heart sounds with no murmurs, rubs, or gallops; the carotid, radial and posterior tibial pulses are intact, no JVD and trace LE edema bilaterally    Abdomen:  no organomegaly, masses, bruits, or tenderness; bowel sounds are present   Extremities: no cyanosis or clubbing   Skin: no xanthelasma, warm.    Neurologic: alert and oriented x3, calm      Psychiatric: alert and oriented x3, calm     A complete 10 systems ROS was reviewed  And is negative except what is listed in the HPI.          Medical History  Surgical History Family History Social History   Past Medical History:   Diagnosis Date     Acute renal failure with acute tubular necrosis superimposed on stage 3 chronic kidney disease (H)      Arteriosclerotic cardiovascular disease (ASCVD)      Atrial fibrillation (H)      Bladder cancer (H)      BPH (benign prostatic hypertrophy)      Chronic kidney disease      Complicated UTI (urinary tract infection) 08/25/2019     Congestive heart failure (H)      Coronary artery disease      Diabetes mellitus (H)      Diabetic neuropathy (H)      E coli bacteremia      GERD (gastroesophageal reflux disease)      Gout      Hyperlipidemia      Hypertension      Hyponatremia      Iliac artery aneurysm, 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    Added automatically from request for surgery [id number]     Kidney stone      Osteoarthritis      Personal history of malignant neoplasm of prostate 04/27/2020     Prostate cancer (H)      Sleep apnea     CPAP    Past Surgical History:   Procedure Laterality Date     CARDIAC CATHETERIZATION  03/28/2008    and coronary angios     CV  CORONARY ANGIOGRAM N/A 11/28/2022    Procedure: CV CORONARY ANGIOGRAM;  Surgeon: Gonzales Hager MD;  Location: [facility] CV     CV LEFT HEART CATH N/A 11/28/2022    Procedure: Left Heart Catheterization;  Surgeon: Gonzales Hager MD;  Location: [facility] LAB CV     CV PCI STENT DRUG ELUTING N/A 11/28/2022    Procedure: Percutaneous Coronary Intervention Stent;  Surgeon: Gonzales Hager MD;  Location: [facility] CV     CYSTECTOMY       CYSTOSCOPY       CYSTOSCOPY N/A 12/23/2015    Procedure: CYSTOSCOPY BLADDER BIOPSIES with Fulgeration and Placement of Otero ;  Surgeon: Gordon Bajwa MD;  Location: [facility] [place];  Service:      IR EXTREMITY ANGIOGRAM LEFT  3/22/2019     IR EXTREMITY ANGIOGRAM LEFT  3/22/2019     IR ILEAL CONDUIT INJECTION  12/23/2016     IR NEPHROSTOMY TUBE PLACEMENT RIGHT  8/18/2016     IR URETEROSTOMY TUBE CHANGE RIGHT  9/21/2016     KIDNEY STONE SURGERY  x4     LAMINECTOMY LUMBAR ONE LEVEL Bilateral 9/13/2022    Procedure: LUMBAR 4 - LUMBAR 5 BILATERAL MEDIAL FACETECTOMY AND DECOMPRESSION;  Surgeon: Everett Holland MD;  Location: [facility]     PICC TRIPLE LUMEN PLACEMENT  11/18/2022          PROSTATE SURGERY      no family history of premature coronary artery disease Social History     Socioeconomic History     Marital status:      Spouse name: Not on file     Number of children: Not on file     Years of education: Not on file     Highest education level: Not on file   Occupational History     Not on file   Tobacco Use     Smoking status: Former     Smokeless tobacco: Never     Tobacco comments:     Quit smoking 30 years ago   Vaping Use     Vaping Use: Never used   Substance and Sexual Activity     Alcohol use: Not Currently     Drug use: Never     Sexual activity: Not Currently   Other Topics Concern     Not on file   Social History Narrative     Not on file     Social Determinants of Health     Financial Resource Strain: Not on  file   Food Insecurity: Not on file   Transportation Needs: Not on file   Physical Activity: Not on file   Stress: Not on file   Social Connections: Not on file   Intimate Partner Violence: Not on file   Housing Stability: Not on file           Lab Results    Chemistry/lipid CBC Cardiac Enzymes/BNP/TSH/INR   Lab Results   Component Value Date    CHOL 168 03/03/2022    HDL 38 (L) 03/03/2022    TRIG 117 03/03/2022    BUN 51.9 (H) 12/04/2022     12/04/2022    CO2 20 (L) 12/04/2022    Lab Results   Component Value Date    WBC 8.6 12/05/2022    HGB 9.5 (L) 12/05/2022    HCT 29.0 (L) 12/05/2022    MCV 98 12/05/2022     12/05/2022    Lab Results   Component Value Date    TROPONINI 0.10 04/25/2020     (H) 04/25/2020    INR 1.48 (H) 12/05/2022     Lab Results   Component Value Date    TROPONINI 0.10 04/25/2020          Weight:    Wt Readings from Last 3 Encounters:   12/03/22 105.2 kg (232 lb)   12/01/22 106.8 kg (235 lb 8 oz)   11/08/22 111.2 kg (245 lb 2.4 oz)       Allergies  Allergies   Allergen Reactions     Metoprolol Succinate Er      Other reaction(s): low blood pressure     Pcn [Penicillins] Other (See Comments)     Childhood-doesn't know reactions     Statin Drugs [Hmg-Coa-R Inhibitors] Cramps     Uroxatral [Alfuzosin] Other (See Comments)     hypotension         Surgical History  Past Surgical History:   Procedure Laterality Date     CARDIAC CATHETERIZATION  03/28/2008    and coronary angios     CV CORONARY ANGIOGRAM N/A 11/28/2022    Procedure: CV CORONARY ANGIOGRAM;  Surgeon: Gonzales Hager MD;  Location: [facility] CV     CV LEFT HEART CATH N/A 11/28/2022    Procedure: Left Heart Catheterization;  Surgeon: Gonzales Hager MD;  Location: [facility] CV     CV PCI STENT DRUG ELUTING N/A 11/28/2022    Procedure: Percutaneous Coronary Intervention Stent;  Surgeon: Gonzales Hager MD;  Location: [facility] CV     CYSTECTOMY       CYSTOSCOPY       CYSTOSCOPY N/A  12/23/2015    Procedure: CYSTOSCOPY BLADDER BIOPSIES with Fulgeration and Placement of Otero ;  Surgeon: Gordon Bajwa MD;  Location: [facility] [place] [place];  Service:      IR EXTREMITY ANGIOGRAM LEFT  3/22/2019     IR EXTREMITY ANGIOGRAM LEFT  3/22/2019     IR ILEAL CONDUIT INJECTION  12/23/2016     IR NEPHROSTOMY TUBE PLACEMENT RIGHT  8/18/2016     IR URETEROSTOMY TUBE CHANGE RIGHT  9/21/2016     KIDNEY STONE SURGERY  x4     LAMINECTOMY LUMBAR ONE LEVEL Bilateral 9/13/2022    Procedure: LUMBAR 4 - LUMBAR 5 BILATERAL MEDIAL FACETECTOMY AND DECOMPRESSION;  Surgeon: Everett Holland MD;  Location: [facility] [place]     PICC TRIPLE LUMEN PLACEMENT  11/18/2022          PROSTATE SURGERY         Social History  Tobacco:   History   Smoking Status     Former   Smokeless Tobacco     Never    Alcohol:   Social History    Substance and Sexual Activity      Alcohol use: Not Currently   Illicit Drugs:   History   Drug Use Unknown       Family History  Family History   Problem Relation Age of Onset     No Known Problems Mother      Prostate Cancer Father      Deep Vein Thrombosis Father      Cancer Sister      Heart Disease Sister      No Known Problems Daughter      Heart Disease Brother      Heart Disease Sister      No Known Problems Daughter           Abdelrahman Mazariegos MD on 12/5/2022      cc: Rafael Montelongo

## 2022-12-05 NOTE — PLAN OF CARE
Goal Outcome Evaluation:                      PRIMARY DIAGNOSIS: GENERALIZED WEAKNESS    OUTPATIENT/OBSERVATION GOALS TO BE MET BEFORE DISCHARGE  1. Orthostatic performed: N/A    2. Tolerating PO medications: Yes    3. Return to near baseline physical activity: No    4. Cleared for discharge by consultants (if involved): No    Discharge Planner Nurse   Safe discharge environment identified: No  Barriers to discharge: Yes       Entered by: Leora John RN 12/05/2022 3:02 AM     Please review provider order for any additional goals.   Nurse to notify provider when observation goals have been met and patient is ready for discharge.

## 2022-12-05 NOTE — PLAN OF CARE
Problem: Plan of Care - These are the overarching goals to be used throughout the patient stay.    Goal: Optimal Comfort and Wellbeing  Outcome: Progressing  Intervention: Monitor Pain and Promote Comfort  Recent Flowsheet Documentation  Taken 12/5/2022 1349 by Lukasz Portillo RN  Pain Management Interventions: medication (see MAR)     Orthostatic BP normal. No complaints of dizziness or lightheadedness. However, pt did report new SOB and mild-moderate left-sided chest pain after orthostatic BP performed. Tylenol was given for headache and was effective. VSS. A-fib with controlled rate on tele.

## 2022-12-05 NOTE — PLAN OF CARE
PRIMARY DIAGNOSIS: GENERALIZED WEAKNESS    OUTPATIENT/OBSERVATION GOALS TO BE MET BEFORE DISCHARGE  1. Orthostatic performed: No    2. Tolerating PO medications: Yes    3. Return to near baseline physical activity: No    4. Cleared for discharge by consultants (if involved): No    Discharge Planner Nurse   Safe discharge environment identified: No  Barriers to discharge: Yes       Entered by: Ana Harris RN 12/04/2022 10:46 PM     Please review provider order for any additional goals.   Nurse to notify provider when observation goals have been met and patient is ready for discharge.Goal Outcome Evaluation:       Patient is alert and oriented x 4. Denies pain. Patient has multiple bruises on the skin from fall at home, including sutured laceration and bruises balta the left eyebrow. Patient has a urostomy bag. New ostomy pouch was replaced this evening.

## 2022-12-05 NOTE — PLAN OF CARE
Goal Outcome Evaluation:                      PRIMARY DIAGNOSIS: GENERALIZED WEAKNESS    OUTPATIENT/OBSERVATION GOALS TO BE MET BEFORE DISCHARGE  1. Orthostatic performed: N/A    2. Tolerating PO medications: Yes    3. Return to near baseline physical activity: No    4. Cleared for discharge by consultants (if involved): No    Discharge Planner Nurse   Safe discharge environment identified: No  Barriers to discharge: Yes, pt to eval for falls        Entered by: Leora John RN 12/05/2022 5:32 AM     Please review provider order for any additional goals.   Nurse to notify provider when observation goals have been met and patient is ready for discharge.

## 2022-12-05 NOTE — PROGRESS NOTES
"Care Management Follow Up    Length of Stay (days): 0    Expected Discharge Date: 12/05/2022     Concerns to be Addressed:   Monitor BP and labs    Patient plan of care discussed at interdisciplinary rounds: Yes    Anticipated Discharge Disposition:  TCU     Anticipated Discharge Services:  TCU  Anticipated Discharge DME:      Patient/family educated on Medicare website which has current facility and service quality ratings:    Education Provided on the Discharge Plan:    Patient/Family in Agreement with the Plan:      Referrals Placed by CM/SW:  TCU  Private pay costs discussed: Not applicable    Additional Information:  Chart review:  Yuniel lives in at [facility] with his wife. He is independent with most ADLs, but many falls over the past months. He drives and his wife does not drive. He states, \"I drive or my daughter or son or sister in law can help\". At home he has help with \"housekeeping help every 2 weeks. And also Meals 5 days a week, but mostly for my wife\".   He has a \"4ww that I use some of the time\".    Yuniel was admitted on 11/5/22-11/8/22 and again on 11/18/22-12/1-22. The last time he left the plan was for home care ([facility]) to start on 12/22/22 but this did not happen as he was admitted to the hosp.    11:03 AM  Per admissions from [place] pt has not been clinically accepted yet but is reviewing now.  [facility] however not quite ready per hospitalist. Will check tomorrow am on medical clearance and update the TCU once medically ready to transfer to TCU.    3:26 PM  [facility] both [facility] and family accepting of this just waiting on medical progression.    Mandi Pardo RN        "

## 2022-12-05 NOTE — PROGRESS NOTES
[facility]    PROGRESS NOTE - Hospitalist Service    Assessment and Plan    Principal Problem:    Facial laceration, initial encounter  Active Problems:    Acute kidney injury (H)    Injury of head, initial encounter    82 year old male with a past medical history of recent Non-STEMI, CHF, A.fib, HTN, CKD 4, DVT, bladder cancer who was discharged from [facility] on 12/1/2022 with diagnosis of Non-STEMI, CHF exacerbation and pneumonia who returns to hospital today s/p fall at home.  He was readmitted on 12/2/2022.      Recurrent falls..   - S/P Mechanical fall versus syncope   - Admission CT head negative.   - Patient has had at least 4 falls at home in past 5 weeks.  - PT evaluate and treat. May need Inpatient Rehabilitation/ SNF placement.   - Anticipate TCU on discharge     Acute head trauma  - Forehead laceration sutured in ED.   - No ICH on CT head.   - Monitor closely due to hx of multiple anticoagulants.  - Repeat CT head shows no significant acute changes.  - Restart warfarin     Recent acute kidney injury  - Patient has underlying CKD stage IV.   - Nephrology note and recommendations from 12/01/22 reviewed. - Avoid nephrotoxic agents.  - Continue Bumex due to underlying CHF.   - Continue to monitor renal function.  - Consider nephrologist reconsultation if Creatinine continues to increase.     A.fib with RVR  -- Patient has known A.fib and is chronically anticoagulated with warfarin.  - INR current subtherapeutic.   - Rate currently controlled.   - Continue home dose of Metoprolol with BP and HR parameters.  - restart warfarin as repeated CT head is negative for acute changes     Elevated troponin  - History of coronary artery disease  - Patient had dizziness episode with EKG shows QRS complex  - Recent NSTEMI s/p angiogram last week  - Cardiology consult, appreciate input  - Continue to monitor troponin and telemetry  - Resume aspirin, metoprolol, statin and Plavix  -  Patient still get intermittent chest pain and shortness of breath episodes     Hypotension  - Resolved.   - Home medications reviewed.   - Monitor vitals.   - Recent echo on 11/2022 shows EF of 45%  - Resume diuretics as recommended by cardiologist  - Continue IV albumin as per cardiologist     Acute hypoxia  - Resolved.   - O2 sat=98 % on RA while I was at bedside.   - CT chest no PNA appreciated.   - COVID negative. Monitor vitals closely.     Leukocytosis  - Possible stress induced reaction.   - Afebrile. No PNA on CT chest.   - Unremarkable repeat CBC      Acute Right Rib fractures 9th-11th  - Secondary to fall  - Also present on imaging from recent hospitalization.  - Patient says he has had at least 4 falls at home in the past 5 weeks.   - Currently pain free.  - Recommend Incentive spirometer.  - Change Tylenol to scheduled 3 times daily     Diabetes mellitus type 2  - Restart home glimepiride  - Cover with insulin sliding scale  - Last hemoglobin A1c 7.7    VTE prophylaxis:  Warfarin  DIET: Orders Placed This Encounter      Combination Diet Low Consistent Carb (45 g CHO per Meal) Diet; 2 gm NA Diet; Low Saturated Fat Na <2400mg Diet, No Caffeine Diet      Disposition/Barriers to discharge: Intermittent chest pain, IV albumin, elevated troponin  Code Status: Full Code    Subjective:  Eduardo is feeling about the same today, still has on and off intermittent chest pain.    PHYSICAL EXAM  Vitals:    12/03/22 1642   Weight: 105.2 kg (232 lb)     B/P:105/64 T:98.4 P:88 R:20     Intake/Output Summary (Last 24 hours) at 12/5/2022 1607  Last data filed at 12/5/2022 1338  Gross per 24 hour   Intake 1203 ml   Output 3050 ml   Net -1847 ml      Body mass index is 33.29 kg/m .    Constitutional: awake, alert, cooperative, no apparent distress, and appears stated age  Eyes: Multiple bruises on forhead and left upper eyelid  Respiratory: No increased work of breathing, good air exchange, clear to auscultation bilaterally,  no crackles or wheezing  Cardiovascular: Normal apical impulse, regular rate and rhythm, normal S1 and S2, no S3 or S4, and no murmur noted  GI: No scars, normal bowel sounds, soft, non-distended, non-tender, no masses palpated, no hepatosplenomegally  Skin: no bruising or bleeding and normal skin color, texture, turgor  Musculoskeletal: Multiple bruises on upper and lower extremities.  Neurologic: Awake, alert, oriented to name, place and time.  Cranial nerves II-XII are grossly intact.  Motor is 5 out of 5 bilaterally.   Sensory is intact.    Neuropsychiatric: Appropriate with examiner      PERTINENT LABS/IMAGING:  Recent Labs   Lab 12/05/22  1239 12/05/22  0851 12/05/22  0509 12/04/22  1215 12/04/22  0953 12/04/22  0825 12/04/22  0659 12/03/22  0847 12/03/22  0742 12/02/22  2316 12/02/22  1817   WBC  --   --  8.6  --   --   --  9.5  --  8.5  --  12.5*   HGB  --   --  9.5*  --   --   --  10.7*  --  10.0*  --  11.2*   MCV  --   --  98  --   --   --  98  --  97  --  98   PLT  --   --  170  --   --   --  191  --  165  --  196   INR  --   --  1.48*  --  1.42*  --   --   --   --   --  1.35*   NA  --   --  135*  --   --   --  136  --  137  --  135*   POTASSIUM  --   --  3.8  --   --   --  3.9  --  4.7  --  4.1   CHLORIDE  --   --  103  --   --   --  104  --  105  --  101   CO2  --   --  23  --   --   --  20*  --  19*  --  22   BUN  --   --  55.2*  --   --   --  51.9*  --  49.6*  --  51.5*   CR  --   --  2.59*  --   --   --  2.30*  --  2.27*  --  2.49*   ANIONGAP  --   --  9  --   --   --  12  --  13  --  12   GALLO  --   --  8.7*  --   --   --  9.1  --  8.9  --  8.9   * 151* 127*   < >  --    < > 143*   < > 133*   < > 127*   ALBUMIN  --   --  3.3*  --   --   --  3.4*  --   --   --  3.7   PROTTOTAL  --   --  6.5  --   --   --  6.9  --   --   --  7.0   BILITOTAL  --   --  0.7  --   --   --  0.8  --   --   --  1.0   ALKPHOS  --   --  70  --   --   --  74  --   --   --  74   ALT  --   --  18  --   --   --  21  --   --    --  29   AST  --   --  27  --   --   --  25  --   --   --  20    < > = values in this interval not displayed.     No results found for this or any previous visit (from the past 24 hour(s)).    Discussed with patient, family, nursing staff and discharge planner    Hernesto Mazariegos MD  [facility] Medicine Service  [phone]

## 2022-12-05 NOTE — SIGNIFICANT EVENT
New SOB and L sided aching chest pain (4/10) reported by pt at 1430. Vitals normal. No changes on telemetry. Dr. Mazariegos updated. EKG and trops ordered. Pt reminded to notify if new changes occur. Will continue to monitor.

## 2022-12-06 ENCOUNTER — APPOINTMENT (OUTPATIENT)
Dept: OCCUPATIONAL THERAPY | Facility: HOSPITAL | Age: 82
DRG: 091 | End: 2022-12-06
Payer: COMMERCIAL

## 2022-12-06 ENCOUNTER — APPOINTMENT (OUTPATIENT)
Dept: PHYSICAL THERAPY | Facility: HOSPITAL | Age: 82
DRG: 091 | End: 2022-12-06
Payer: COMMERCIAL

## 2022-12-06 LAB
ALBUMIN SERPL BCG-MCNC: 3.7 G/DL (ref 3.5–5.2)
ALP SERPL-CCNC: 63 U/L (ref 40–129)
ALT SERPL W P-5'-P-CCNC: 14 U/L (ref 10–50)
ANION GAP SERPL CALCULATED.3IONS-SCNC: 11 MMOL/L (ref 7–15)
AST SERPL W P-5'-P-CCNC: 24 U/L (ref 10–50)
BILIRUB SERPL-MCNC: 0.8 MG/DL
BUN SERPL-MCNC: 55.8 MG/DL (ref 8–23)
CALCIUM SERPL-MCNC: 8.7 MG/DL (ref 8.8–10.2)
CHLORIDE SERPL-SCNC: 101 MMOL/L (ref 98–107)
CREAT SERPL-MCNC: 2.52 MG/DL (ref 0.67–1.17)
DEPRECATED HCO3 PLAS-SCNC: 23 MMOL/L (ref 22–29)
ERYTHROCYTE [DISTWIDTH] IN BLOOD BY AUTOMATED COUNT: 14.6 % (ref 10–15)
GFR SERPL CREATININE-BSD FRML MDRD: 25 ML/MIN/1.73M2
GLUCOSE BLDC GLUCOMTR-MCNC: 123 MG/DL (ref 70–99)
GLUCOSE BLDC GLUCOMTR-MCNC: 138 MG/DL (ref 70–99)
GLUCOSE BLDC GLUCOMTR-MCNC: 140 MG/DL (ref 70–99)
GLUCOSE BLDC GLUCOMTR-MCNC: 167 MG/DL (ref 70–99)
GLUCOSE SERPL-MCNC: 120 MG/DL (ref 70–99)
HCT VFR BLD AUTO: 26.7 % (ref 40–53)
HGB BLD-MCNC: 8.7 G/DL (ref 13.3–17.7)
INR PPP: 1.67 (ref 0.85–1.15)
MAGNESIUM SERPL-MCNC: 2.3 MG/DL (ref 1.7–2.3)
MCH RBC QN AUTO: 32 PG (ref 26.5–33)
MCHC RBC AUTO-ENTMCNC: 32.6 G/DL (ref 31.5–36.5)
MCV RBC AUTO: 98 FL (ref 78–100)
PLATELET # BLD AUTO: 166 10E3/UL (ref 150–450)
POTASSIUM SERPL-SCNC: 3.7 MMOL/L (ref 3.4–5.3)
PROT SERPL-MCNC: 6.6 G/DL (ref 6.4–8.3)
RBC # BLD AUTO: 2.72 10E6/UL (ref 4.4–5.9)
SODIUM SERPL-SCNC: 135 MMOL/L (ref 136–145)
TROPONIN T SERPL HS-MCNC: 176 NG/L
WBC # BLD AUTO: 7.9 10E3/UL (ref 4–11)

## 2022-12-06 PROCEDURE — 36415 COLL VENOUS BLD VENIPUNCTURE: CPT | Performed by: INTERNAL MEDICINE

## 2022-12-06 PROCEDURE — 97110 THERAPEUTIC EXERCISES: CPT | Mod: GP

## 2022-12-06 PROCEDURE — 97535 SELF CARE MNGMENT TRAINING: CPT | Mod: GO

## 2022-12-06 PROCEDURE — 250N000013 HC RX MED GY IP 250 OP 250 PS 637: Performed by: FAMILY MEDICINE

## 2022-12-06 PROCEDURE — 84484 ASSAY OF TROPONIN QUANT: CPT | Performed by: INTERNAL MEDICINE

## 2022-12-06 PROCEDURE — 83735 ASSAY OF MAGNESIUM: CPT | Performed by: INTERNAL MEDICINE

## 2022-12-06 PROCEDURE — 120N000001 HC R&B MED SURG/OB

## 2022-12-06 PROCEDURE — 99232 SBSQ HOSP IP/OBS MODERATE 35: CPT | Performed by: INTERNAL MEDICINE

## 2022-12-06 PROCEDURE — 80053 COMPREHEN METABOLIC PANEL: CPT | Performed by: INTERNAL MEDICINE

## 2022-12-06 PROCEDURE — 85014 HEMATOCRIT: CPT | Performed by: INTERNAL MEDICINE

## 2022-12-06 PROCEDURE — 85610 PROTHROMBIN TIME: CPT | Performed by: INTERNAL MEDICINE

## 2022-12-06 PROCEDURE — 250N000013 HC RX MED GY IP 250 OP 250 PS 637: Performed by: HOSPITALIST

## 2022-12-06 PROCEDURE — 250N000013 HC RX MED GY IP 250 OP 250 PS 637: Performed by: INTERNAL MEDICINE

## 2022-12-06 PROCEDURE — 82040 ASSAY OF SERUM ALBUMIN: CPT | Performed by: INTERNAL MEDICINE

## 2022-12-06 PROCEDURE — 97110 THERAPEUTIC EXERCISES: CPT | Mod: GO

## 2022-12-06 PROCEDURE — 97116 GAIT TRAINING THERAPY: CPT | Mod: GP

## 2022-12-06 RX ADMIN — BUMETANIDE 2 MG: 2 TABLET ORAL at 09:22

## 2022-12-06 RX ADMIN — ACETAMINOPHEN 975 MG: 325 TABLET ORAL at 09:22

## 2022-12-06 RX ADMIN — PANTOPRAZOLE SODIUM 40 MG: 40 TABLET, DELAYED RELEASE ORAL at 06:57

## 2022-12-06 RX ADMIN — GLIMEPIRIDE 1 MG: 1 TABLET ORAL at 09:20

## 2022-12-06 RX ADMIN — METOPROLOL SUCCINATE 25 MG: 25 TABLET, EXTENDED RELEASE ORAL at 09:20

## 2022-12-06 RX ADMIN — EZETIMIBE 10 MG: 10 TABLET ORAL at 09:23

## 2022-12-06 RX ADMIN — Medication 81 MG: at 09:20

## 2022-12-06 RX ADMIN — ACETAMINOPHEN 975 MG: 325 TABLET ORAL at 14:02

## 2022-12-06 RX ADMIN — TRAZODONE HYDROCHLORIDE 200 MG: 100 TABLET ORAL at 21:37

## 2022-12-06 RX ADMIN — CLOPIDOGREL BISULFATE 75 MG: 75 TABLET ORAL at 09:23

## 2022-12-06 RX ADMIN — ACETAMINOPHEN 975 MG: 325 TABLET ORAL at 21:37

## 2022-12-06 RX ADMIN — ATORVASTATIN CALCIUM 40 MG: 40 TABLET, FILM COATED ORAL at 09:22

## 2022-12-06 RX ADMIN — WARFARIN SODIUM 7.5 MG: 5 TABLET ORAL at 17:00

## 2022-12-06 RX ADMIN — ALLOPURINOL 200 MG: 100 TABLET ORAL at 09:20

## 2022-12-06 RX ADMIN — INSULIN ASPART 1 UNITS: 100 INJECTION, SOLUTION INTRAVENOUS; SUBCUTANEOUS at 13:23

## 2022-12-06 RX ADMIN — THERA TABS 1 TABLET: TAB at 09:20

## 2022-12-06 ASSESSMENT — ACTIVITIES OF DAILY LIVING (ADL)
ADLS_ACUITY_SCORE: 40

## 2022-12-06 NOTE — PLAN OF CARE
Problem: Plan of Care - These are the overarching goals to be used throughout the patient stay.    Goal: Absence of Hospital-Acquired Illness or Injury  Outcome: Progressing  Intervention: Identify and Manage Fall Risk  Recent Flowsheet Documentation  Taken 12/5/2022 1630 by Nando Jovel RN  Safety Promotion/Fall Prevention:    activity supervised    assistive device/personal items within reach    clutter free environment maintained    fall prevention program maintained    lighting adjusted    mobility aid in reach    nonskid shoes/slippers when out of bed    patient and family education    room door open    safety round/check completed    supervised activity     Problem: Plan of Care - These are the overarching goals to be used throughout the patient stay.    Goal: Optimal Comfort and Wellbeing  Outcome: Progressing  Intervention: Monitor Pain and Promote Comfort  Recent Flowsheet Documentation  Taken 12/5/2022 2026 by Nando Jovel RN  Pain Management Interventions: medication (see MAR)     Problem: Fall Injury Risk  Goal: Absence of Fall and Fall-Related Injury  Outcome: Progressing  Intervention: Identify and Manage Contributors  Recent Flowsheet Documentation  Taken 12/5/2022 1630 by Nando Jovel RN  Medication Review/Management: medications reviewed  Intervention: Promote Injury-Free Environment  Recent Flowsheet Documentation  Taken 12/5/2022 1630 by Nando Jovel RN  Safety Promotion/Fall Prevention:    activity supervised    assistive device/personal items within reach    clutter free environment maintained    fall prevention program maintained    lighting adjusted    mobility aid in reach    nonskid shoes/slippers when out of bed    patient and family education    room door open    safety round/check completed    supervised activity     Problem: Risk for Delirium  Goal: Optimal Coping  Outcome: Progressing  Goal: Improved Behavioral Control  Outcome: Progressing  Intervention: Minimize Safety  Risk  Recent Flowsheet Documentation  Taken 12/5/2022 1630 by Nando Jovel RN  Enhanced Safety Measures: bed alarm set  Goal: Improved Attention and Thought Clarity  Outcome: Progressing  Goal: Improved Sleep  Outcome: Progressing     Problem: Diabetes Comorbidity  Goal: Blood Glucose Level Within Targeted Range  Outcome: Progressing     Problem: Heart Failure Comorbidity  Goal: Maintenance of Heart Failure Symptom Control  Outcome: Progressing  Intervention: Maintain Heart Failure Management  Recent Flowsheet Documentation  Taken 12/5/2022 1630 by Nando Jovel RN  Medication Review/Management: medications reviewed     Problem: Hypertension Comorbidity  Goal: Blood Pressure in Desired Range  Outcome: Progressing  Intervention: Maintain Blood Pressure Management  Recent Flowsheet Documentation  Taken 12/5/2022 1630 by Nando Jovel RN  Medication Review/Management: medications reviewed     Problem: Obstructive Sleep Apnea Risk or Actual Comorbidity Management  Goal: Unobstructed Breathing During Sleep  Outcome: Progressing  Intervention: Monitor and Manage Obstructive Sleep Apnea  Recent Flowsheet Documentation  Taken 12/5/2022 1630 by Nando Jovel RN  Medication Review/Management: medications reviewed     Pt alert and oriented. Pt stated he felt better in regards to chest pressure and SOB at the beginning and throughout the shift. ECG showed sinus rhythm w/ 1st degree AV block and left bundle branch block. Troponin was 201. MD notified of test results and were ok w/ them; MD started pt on scheduled acetaminophen. Pt has a urostomy; stoma is pink and bag is intact. Pt is an assist of 1 w/ transfers and cares.

## 2022-12-06 NOTE — PROGRESS NOTES
"Care Management Follow Up    Length of Stay (days): 1    Expected Discharge Date: 12/07/2022     Concerns to be Addressed:    Monitor BP and labs   Patient plan of care discussed at interdisciplinary rounds: Yes    Anticipated Discharge Disposition:  TCU     Anticipated Discharge Services:    Anticipated Discharge DME:      Patient/family educated on Medicare website which has current facility and service quality ratings:    Education Provided on the Discharge Plan:    Patient/Family in Agreement with the Plan:      Referrals Placed by CM/SW:  TCU  Private pay costs discussed: Not applicable    Additional Information:  Chart review:  Yuniel lives in at [facility] with his wife. He is independent with most ADLs, but many falls over the past months. He drives and his wife does not drive. He states, \"I drive or my daughter or son or sister in law can help\". At home he has help with \"housekeeping help every 2 weeks. And also Meals 5 days a week, but mostly for my wife\".   He has a \"4ww that I use some of the time\".     Yuniel was admitted on 11/5/22-11/8/22 and again on 11/18/22-12/1-22. The last time he left the plan was for home care ([facility]) to start on 12/22/22 but this did not happen as he was admitted to the [facility].     Yuniel has been clinically accepted to [facility] and he and his wife are accepting of this he is just not quite medically ready.  [facility] updated and would like to be called when he is medically ready.      Mandi Pardo RN        "

## 2022-12-06 NOTE — PLAN OF CARE
Problem: Obstructive Sleep Apnea Risk or Actual Comorbidity Management  Goal: Unobstructed Breathing During Sleep  Outcome: Progressing  Intervention: Monitor and Manage Obstructive Sleep Apnea  Recent Flowsheet Documentation  Taken 12/6/2022 0114 by Benita Jennings RN  Medication Review/Management: medications reviewed     Problem: Heart Failure Comorbidity  Goal: Maintenance of Heart Failure Symptom Control  Outcome: Progressing  Intervention: Maintain Heart Failure Management  Recent Flowsheet Documentation  Taken 12/6/2022 0114 by Benita Jennings RN  Medication Review/Management: medications reviewed     Problem: Plan of Care - These are the overarching goals to be used throughout the patient stay.    Goal: Optimal Comfort and Wellbeing  Outcome: Progressing  Intervention: Provide Person-Centered Care  Recent Flowsheet Documentation  Taken 12/6/2022 0114 by Benita Jennings RN  Trust Relationship/Rapport: care explained   Goal Outcome Evaluation:       Pt is A/O X4, denied pain, hs some edema in upper and lower extremities, urostomy in place draining, denied chest pain, tele read 1st degree block with some PVC's, vitals are stable and pt is on room air.

## 2022-12-06 NOTE — PLAN OF CARE
"  Problem: Plan of Care - These are the overarching goals to be used throughout the patient stay.    Goal: Plan of Care Review  Description: The Plan of Care Review/Shift note should be completed every shift.  The Outcome Evaluation is a brief statement about your assessment that the patient is improving, declining, or no change.  This information will be displayed automatically on your shift note.  Outcome: Progressing  Goal: Patient-Specific Goal (Individualized)  Description: You can add care plan individualizations to a care plan. Examples of Individualization might be:  \"Parent requests to be called daily at 9am for status\", \"I have a hard time hearing out of my right ear\", or \"Do not touch me to wake me up as it startles me\".  Outcome: Progressing  Goal: Absence of Hospital-Acquired Illness or Injury  Outcome: Progressing  Intervention: Identify and Manage Fall Risk  Recent Flowsheet Documentation  Taken 12/6/2022 1207 by Kush Alcantar RN  Safety Promotion/Fall Prevention: bed alarm on  Intervention: Prevent Skin Injury  Recent Flowsheet Documentation  Taken 12/6/2022 1207 by Kush Alcantar RN  Body Position: position changed independently  Taken 12/6/2022 0800 by Kush Alcantar RN  Body Position: position changed independently  Intervention: Prevent and Manage VTE (Venous Thromboembolism) Risk  Recent Flowsheet Documentation  Taken 12/6/2022 1207 by Kush Alcantar RN  VTE Prevention/Management: compression stockings on  Goal: Optimal Comfort and Wellbeing  Outcome: Progressing  Intervention: Monitor Pain and Promote Comfort  Recent Flowsheet Documentation  Taken 12/6/2022 1207 by Kush Alcantar RN  Pain Management Interventions: medication (see MAR)  Taken 12/6/2022 0800 by Kush Alcantar RN  Pain Management Interventions: medication (see MAR)  Intervention: Provide Person-Centered Care  Recent Flowsheet Documentation  Taken 12/6/2022 1207 by Kush Alcantar RN  Trust Relationship/Rapport: care " explained   Goal Outcome Evaluation:    Pt was up in chair and tolerated sitting up in chair. Pt denied dizziness and requested needs appropriately. VSS, lung sounds clear and denied any discomfort. Pt and spouse participated in treated and care and verbalized understanding of treatment.

## 2022-12-06 NOTE — PROGRESS NOTES
[facility]    Medicine Progress Note - Hospitalist Service    Date of Admission:  12/2/2022    Assessment & Plan     82 year old male with a past medical history of recent Non-STEMI, CHF, A.fib, HTN, CKD 4, DVT, bladder cancer who was discharged from [facility] on 12/1/2022 with diagnosis of Non-STEMI, CHF exacerbation and pneumonia who returns to hospital  s/p fall at home.  He was readmitted on 12/2/2022.      1.Recurrent falls.  - S/P Mechanical fall versus syncope   - Admission CT head negative.   - Patient has had at least 4 falls at home in past 5 weeks.  - PT/OT  - Anticipate TCU on discharge     2.Acute head trauma  - Forehead laceration sutured in ED.   - No ICH on CT head.   - Monitor closely due to hx of multiple anticoagulants.  - Repeat CT head shows no significant acute changes.  - Restarted warfarin     3.Recent acute kidney injury  - Patient has underlying CKD stage IV.   - Nephrology note and recommendations from 12/01/22 reviewed. - Avoid nephrotoxic agents.  - Continue Bumex due to underlying CHF.   - Continue to monitor renal function.  -today creat at 2.5 which looks close to baseline     4.A.fib with RVR  - Patient has known A.fib and is chronically anticoagulated with warfarin.  - INR daily  - Rate currently controlled.   - Continue home dose of Metoprolol with BP and HR parameters.  - restarted warfarin as repeated CT head is negative for acute changes     5.Elevated troponin  - History of coronary artery disease  - Patient had dizziness episode with EKG shows QRS complex  - Recent NSTEMI s/p angiogram last week  - Cardiology consult, appreciate input--now signed off  - Continue to monitor troponin and telemetry  - Resume aspirin, metoprolol, statin and Plavix  - Patient still get intermittent chest pain and shortness of breath episodes     6.Hypotension  - Resolved.   - Home medications reviewed.   - Monitor vitals.   - Recent echo on 11/2022 shows EF of  45%  - Resume diuretics as recommended by cardiologist--now bumex 2 mg po     7.Acute hypoxia  - Resolved.   - O2 sat=98 % on RA while I was at bedside.   - CT chest no PNA appreciated.   - COVID negative. Monitor vitals closely.     8.Leukocytosis  - Possible stress induced reaction.   - Afebrile. No PNA on CT chest.   - Unremarkable repeat CBC      9.Acute Right Rib fractures 9th-11th  - Secondary to fall  - Also present on imaging from recent hospitalization.  - Patient says he has had at least 4 falls at home in the past 5 weeks.   - Currently pain free.  - Recommend Incentive spirometer.  - Change Tylenol to scheduled 3 times daily     10.Diabetes mellitus type 2  - Restart home glimepiride  - Cover with insulin sliding scale  - Last hemoglobin A1c 7.7    11.Obesity  -bmi 33    12.Mild hyponatremia  -135 today, monitor on diuretics       Diet: Combination Diet Low Consistent Carb (45 g CHO per Meal) Diet; 2 gm NA Diet; Low Saturated Fat Na <2400mg Diet, No Caffeine Diet  Fluid restriction 1500 ML FLUID    DVT Prophylaxis: Warfarin  Otero Catheter: Not present  Central Lines: None  Cardiac Monitoring: ACTIVE order. Indication: Chest pain/ ACS rule out (24 hours)  Code Status: Full Code      Disposition Plan      Expected Discharge Date: 12/07/2022    Discharge Delays: Placement - TCU  Destination: nursing home          The patient's care was discussed with the Care Coordinator/ and Patient. I called wife to update at 1614    Wendy Chatterjee MD  Hospitalist Service  [facility]  Securely message with the Vocera Web Console (learn more here)  Text page via [employer] Paging/Directory         ______________________________________________________________________    Interval History   He feels better  No pain in face or head  Eating ok  Less sob  No cp today  Agreed to tcu    Data reviewed today: I reviewed all medications, new labs and imaging results over the last 24 hours. I  personally reviewed no images or EKG's today.    Physical Exam   Vital Signs: Temp: 97.5  F (36.4  C) Temp src: Oral BP: 109/69 Pulse: 88   Resp: 20 SpO2: 100 % O2 Device: None (Room air)    Weight: 232 lbs 0 oz  Constitutional: awake, fatigued, alert, cooperative and no apparent distress  Respiratory: no increased work of breathing, good air exchange, no retractions and diminished breath sounds right base and left base  Cardiovascular: Normal apical impulse, regular rate and rhythm, normal S1 and S2, no S3 or S4, and no murmur noted  GI: normal bowel sounds, soft, non-distended and non-tender  Skin:sutures in on left forehead intact, extensive ecchymosis around left eye  Musculoskeletal: no lower extremity pitting edema present  Neurologic: Mental Status Exam:  Level of Alertness:   awake  Neuropsychiatric: General: normal, calm and normal eye contact    Data   Recent Labs   Lab 12/06/22  1206 12/06/22  0735 12/06/22  0503 12/05/22  0851 12/05/22  0509 12/04/22  1215 12/04/22  0953 12/04/22  0825 12/04/22  0659   WBC  --   --  7.9  --  8.6  --   --   --  9.5   HGB  --   --  8.7*  --  9.5*  --   --   --  10.7*   MCV  --   --  98  --  98  --   --   --  98   PLT  --   --  166  --  170  --   --   --  191   INR  --   --  1.67*  --  1.48*  --  1.42*  --   --    NA  --   --  135*  --  135*  --   --   --  136   POTASSIUM  --   --  3.7  --  3.8  --   --   --  3.9   CHLORIDE  --   --  101  --  103  --   --   --  104   CO2  --   --  23  --  23  --   --   --  20*   BUN  --   --  55.8*  --  55.2*  --   --   --  51.9*   CR  --   --  2.52*  --  2.59*  --   --   --  2.30*   ANIONGAP  --   --  11  --  9  --   --   --  12   GALLO  --   --  8.7*  --  8.7*  --   --   --  9.1   * 138* 120*   < > 127*   < >  --    < > 143*   ALBUMIN  --   --  3.7  --  3.3*  --   --   --  3.4*   PROTTOTAL  --   --  6.6  --  6.5  --   --   --  6.9   BILITOTAL  --   --  0.8  --  0.7  --   --   --  0.8   ALKPHOS  --   --  63  --  70  --   --   --  74    ALT  --   --  14  --  18  --   --   --  21   AST  --   --  24  --  27  --   --   --  25    < > = values in this interval not displayed.     No results found for this or any previous visit (from the past 24 hour(s)).

## 2022-12-06 NOTE — PROVIDER NOTIFICATION
ECG showed sinus rhythm w/ 1st degree AV block and left bundle branch block. Troponin was 201. MD notified of test results and MD was ok w/ them. MD started pt on scheduled acetaminophen and will have staff continue to monitor pt.

## 2022-12-07 ENCOUNTER — LAB REQUISITION (OUTPATIENT)
Dept: LAB | Facility: CLINIC | Age: 82
End: 2022-12-07
Payer: COMMERCIAL

## 2022-12-07 ENCOUNTER — APPOINTMENT (OUTPATIENT)
Dept: OCCUPATIONAL THERAPY | Facility: HOSPITAL | Age: 82
DRG: 091 | End: 2022-12-07
Payer: COMMERCIAL

## 2022-12-07 VITALS
RESPIRATION RATE: 18 BRPM | HEART RATE: 81 BPM | WEIGHT: 232.6 LBS | BODY MASS INDEX: 33.3 KG/M2 | DIASTOLIC BLOOD PRESSURE: 79 MMHG | HEIGHT: 70 IN | TEMPERATURE: 98 F | OXYGEN SATURATION: 99 % | SYSTOLIC BLOOD PRESSURE: 118 MMHG

## 2022-12-07 DIAGNOSIS — Z79.01 LONG TERM (CURRENT) USE OF ANTICOAGULANTS: ICD-10-CM

## 2022-12-07 LAB
ANION GAP SERPL CALCULATED.3IONS-SCNC: 17 MMOL/L (ref 7–15)
BUN SERPL-MCNC: 58 MG/DL (ref 8–23)
CALCIUM SERPL-MCNC: 9 MG/DL (ref 8.8–10.2)
CHLORIDE SERPL-SCNC: 99 MMOL/L (ref 98–107)
CREAT SERPL-MCNC: 2.38 MG/DL (ref 0.67–1.17)
DEPRECATED HCO3 PLAS-SCNC: 17 MMOL/L (ref 22–29)
ERYTHROCYTE [DISTWIDTH] IN BLOOD BY AUTOMATED COUNT: 14.8 % (ref 10–15)
GFR SERPL CREATININE-BSD FRML MDRD: 27 ML/MIN/1.73M2
GLUCOSE BLDC GLUCOMTR-MCNC: 132 MG/DL (ref 70–99)
GLUCOSE BLDC GLUCOMTR-MCNC: 137 MG/DL (ref 70–99)
GLUCOSE SERPL-MCNC: 119 MG/DL (ref 70–99)
HCT VFR BLD AUTO: 30.7 % (ref 40–53)
HGB BLD-MCNC: 9.4 G/DL (ref 13.3–17.7)
INR PPP: 1.91 (ref 0.85–1.15)
MAGNESIUM SERPL-MCNC: 2.2 MG/DL (ref 1.7–2.3)
MCH RBC QN AUTO: 32.2 PG (ref 26.5–33)
MCHC RBC AUTO-ENTMCNC: 30.6 G/DL (ref 31.5–36.5)
MCV RBC AUTO: 105 FL (ref 78–100)
PLATELET # BLD AUTO: 172 10E3/UL (ref 150–450)
POTASSIUM SERPL-SCNC: 3.5 MMOL/L (ref 3.4–5.3)
RBC # BLD AUTO: 2.92 10E6/UL (ref 4.4–5.9)
SODIUM SERPL-SCNC: 133 MMOL/L (ref 136–145)
WBC # BLD AUTO: 8.9 10E3/UL (ref 4–11)

## 2022-12-07 PROCEDURE — U0005 INFEC AGEN DETEC AMPLI PROBE: HCPCS | Mod: ORL | Performed by: FAMILY MEDICINE

## 2022-12-07 PROCEDURE — 36415 COLL VENOUS BLD VENIPUNCTURE: CPT | Performed by: INTERNAL MEDICINE

## 2022-12-07 PROCEDURE — 250N000013 HC RX MED GY IP 250 OP 250 PS 637: Performed by: INTERNAL MEDICINE

## 2022-12-07 PROCEDURE — 85610 PROTHROMBIN TIME: CPT | Performed by: INTERNAL MEDICINE

## 2022-12-07 PROCEDURE — 83735 ASSAY OF MAGNESIUM: CPT | Performed by: INTERNAL MEDICINE

## 2022-12-07 PROCEDURE — 250N000011 HC RX IP 250 OP 636: Performed by: INTERNAL MEDICINE

## 2022-12-07 PROCEDURE — 250N000013 HC RX MED GY IP 250 OP 250 PS 637: Performed by: FAMILY MEDICINE

## 2022-12-07 PROCEDURE — 97535 SELF CARE MNGMENT TRAINING: CPT | Mod: GO

## 2022-12-07 PROCEDURE — 85027 COMPLETE CBC AUTOMATED: CPT | Performed by: INTERNAL MEDICINE

## 2022-12-07 PROCEDURE — 80048 BASIC METABOLIC PNL TOTAL CA: CPT | Performed by: INTERNAL MEDICINE

## 2022-12-07 PROCEDURE — XW023W7 INTRODUCTION OF COVID-19 VACCINE BOOSTER INTO MUSCLE, PERCUTANEOUS APPROACH, NEW TECHNOLOGY GROUP 7: ICD-10-PCS | Performed by: INTERNAL MEDICINE

## 2022-12-07 PROCEDURE — 97110 THERAPEUTIC EXERCISES: CPT | Mod: GO

## 2022-12-07 PROCEDURE — 99239 HOSP IP/OBS DSCHRG MGMT >30: CPT | Performed by: INTERNAL MEDICINE

## 2022-12-07 PROCEDURE — 0124A HC ADMIN COVID VAC PFIZER 12+ BIVAL ADDITIONAL: CPT | Performed by: INTERNAL MEDICINE

## 2022-12-07 PROCEDURE — 91312 HC RX IP 250 OP 636: CPT | Performed by: INTERNAL MEDICINE

## 2022-12-07 RX ORDER — LORAZEPAM 0.5 MG/1
0.5 TABLET ORAL DAILY PRN
Qty: 10 TABLET | Refills: 0 | Status: SHIPPED | OUTPATIENT
Start: 2022-12-07 | End: 2022-12-07

## 2022-12-07 RX ORDER — BUMETANIDE 2 MG/1
2 TABLET ORAL DAILY
Qty: 30 TABLET | Refills: 0 | Status: ON HOLD | OUTPATIENT
Start: 2022-12-07 | End: 2023-01-07

## 2022-12-07 RX ORDER — MULTIVITAMIN,THERAPEUTIC
1 TABLET ORAL DAILY
Qty: 30 TABLET | Refills: 3 | Status: SHIPPED | OUTPATIENT
Start: 2022-12-07

## 2022-12-07 RX ORDER — ACETAMINOPHEN 325 MG/1
975 TABLET ORAL 3 TIMES DAILY
Qty: 63 TABLET | Refills: 0 | Status: SHIPPED | OUTPATIENT
Start: 2022-12-07 | End: 2022-12-14

## 2022-12-07 RX ORDER — PANTOPRAZOLE SODIUM 40 MG/1
40 TABLET, DELAYED RELEASE ORAL
Qty: 30 TABLET | Refills: 1 | Status: ON HOLD | OUTPATIENT
Start: 2022-12-08 | End: 2023-01-07

## 2022-12-07 RX ORDER — LORAZEPAM 0.5 MG/1
0.5 TABLET ORAL DAILY PRN
Qty: 10 TABLET | Refills: 0 | Status: SHIPPED | OUTPATIENT
Start: 2022-12-07 | End: 2023-01-05

## 2022-12-07 RX ORDER — ATORVASTATIN CALCIUM 40 MG/1
40 TABLET, FILM COATED ORAL DAILY
Qty: 90 TABLET | Refills: 3 | Status: SHIPPED | OUTPATIENT
Start: 2022-12-07

## 2022-12-07 RX ORDER — POLYETHYLENE GLYCOL 3350 17 G/17G
17 POWDER, FOR SOLUTION ORAL DAILY
Qty: 20 PACKET | Refills: 0 | Status: SHIPPED | OUTPATIENT
Start: 2022-12-07 | End: 2023-05-17

## 2022-12-07 RX ORDER — CLOPIDOGREL BISULFATE 75 MG/1
75 TABLET ORAL DAILY
Qty: 30 TABLET | Refills: 1 | Status: SHIPPED | OUTPATIENT
Start: 2022-12-07

## 2022-12-07 RX ORDER — EZETIMIBE 10 MG/1
10 TABLET ORAL DAILY
Qty: 30 TABLET | Refills: 1 | Status: SHIPPED | OUTPATIENT
Start: 2022-12-07

## 2022-12-07 RX ORDER — NITROGLYCERIN 0.4 MG/1
TABLET SUBLINGUAL
Qty: 30 TABLET | Refills: 1 | Status: SHIPPED | OUTPATIENT
Start: 2022-12-07

## 2022-12-07 RX ORDER — TRAZODONE HYDROCHLORIDE 100 MG/1
100 TABLET ORAL AT BEDTIME
Qty: 30 TABLET | Refills: 0 | Status: SHIPPED | OUTPATIENT
Start: 2022-12-07

## 2022-12-07 RX ORDER — GINSENG 100 MG
CAPSULE ORAL 2 TIMES DAILY
Status: DISCONTINUED | OUTPATIENT
Start: 2022-12-07 | End: 2022-12-07 | Stop reason: HOSPADM

## 2022-12-07 RX ORDER — ALLOPURINOL 100 MG/1
200 TABLET ORAL DAILY
Qty: 60 TABLET | Refills: 0 | Status: SHIPPED | OUTPATIENT
Start: 2022-12-07 | End: 2023-02-20

## 2022-12-07 RX ORDER — GINSENG 100 MG
CAPSULE ORAL 2 TIMES DAILY
Qty: 14 G | Refills: 0 | Status: ON HOLD | OUTPATIENT
Start: 2022-12-07 | End: 2023-01-07

## 2022-12-07 RX ORDER — GLIMEPIRIDE 1 MG/1
1 TABLET ORAL DAILY
Qty: 30 TABLET | Refills: 0 | Status: SHIPPED | OUTPATIENT
Start: 2022-12-07

## 2022-12-07 RX ORDER — METOPROLOL SUCCINATE 25 MG/1
25 TABLET, EXTENDED RELEASE ORAL DAILY
Qty: 30 TABLET | Refills: 1 | Status: SHIPPED | OUTPATIENT
Start: 2022-12-07 | End: 2023-02-20

## 2022-12-07 RX ORDER — WARFARIN SODIUM 5 MG/1
5 TABLET ORAL DAILY
Qty: 30 TABLET | Refills: 0 | Status: ON HOLD | OUTPATIENT
Start: 2022-12-07 | End: 2023-01-07

## 2022-12-07 RX ADMIN — PANTOPRAZOLE SODIUM 40 MG: 40 TABLET, DELAYED RELEASE ORAL at 06:50

## 2022-12-07 RX ADMIN — ACETAMINOPHEN 975 MG: 325 TABLET ORAL at 08:21

## 2022-12-07 RX ADMIN — BNT162B2 ORIGINAL AND OMICRON BA.4/BA.5 30 MCG: .1125; .1125 INJECTION, SUSPENSION INTRAMUSCULAR at 11:28

## 2022-12-07 RX ADMIN — BUMETANIDE 2 MG: 2 TABLET ORAL at 08:22

## 2022-12-07 RX ADMIN — ATORVASTATIN CALCIUM 40 MG: 40 TABLET, FILM COATED ORAL at 08:21

## 2022-12-07 RX ADMIN — METOPROLOL SUCCINATE 25 MG: 25 TABLET, EXTENDED RELEASE ORAL at 08:22

## 2022-12-07 RX ADMIN — POLYETHYLENE GLYCOL 3350 17 G: 17 POWDER, FOR SOLUTION ORAL at 08:19

## 2022-12-07 RX ADMIN — Medication 81 MG: at 08:21

## 2022-12-07 RX ADMIN — GLIMEPIRIDE 1 MG: 1 TABLET ORAL at 08:21

## 2022-12-07 RX ADMIN — THERA TABS 1 TABLET: TAB at 08:22

## 2022-12-07 RX ADMIN — CLOPIDOGREL BISULFATE 75 MG: 75 TABLET ORAL at 08:21

## 2022-12-07 RX ADMIN — ALLOPURINOL 200 MG: 100 TABLET ORAL at 08:21

## 2022-12-07 RX ADMIN — EZETIMIBE 10 MG: 10 TABLET ORAL at 08:21

## 2022-12-07 ASSESSMENT — ACTIVITIES OF DAILY LIVING (ADL)
ADLS_ACUITY_SCORE: 40

## 2022-12-07 NOTE — PROGRESS NOTES
Care Management Follow Up    Call placed to [facility] to follow-up on referral and clinical acceptance. Message left requesting return call.    Wendy Trujillo RN

## 2022-12-07 NOTE — PLAN OF CARE
Discharging to TCU.  Covid booster given, consent signed and in chart, patient has card.  Urostomy lino bag disconnected per patient request, urostomy bag intact.  Patient did not want to eat lunch before leaving.  Alert, oriented and very pleasant.  [place] wheelchair ride to transport patient.    Jennie Smith RN

## 2022-12-07 NOTE — PLAN OF CARE
Problem: Plan of Care - These are the overarching goals to be used throughout the patient stay.    Goal: Plan of Care Review  Description: The Plan of Care Review/Shift note should be completed every shift.  The Outcome Evaluation is a brief statement about your assessment that the patient is improving, declining, or no change.  This information will be displayed automatically on your shift note.  Outcome: Progressing   Goal Outcome Evaluation:             Pt is alert and oriented x 4,denied having any pain,Vs stable,urostomy patent and had good output,BG  =132,slept most of the night.

## 2022-12-07 NOTE — PLAN OF CARE
Problem: Plan of Care - These are the overarching goals to be used throughout the patient stay.    Goal: Absence of Hospital-Acquired Illness or Injury  Outcome: Progressing  Intervention: Identify and Manage Fall Risk  Recent Flowsheet Documentation  Taken 12/6/2022 1655 by Nando Jovel RN  Safety Promotion/Fall Prevention:   activity supervised   assistive device/personal items within reach   bed alarm on   clutter free environment maintained   fall prevention program maintained   lighting adjusted   mobility aid in reach   nonskid shoes/slippers when out of bed   patient and family education   room door open   safety round/check completed   supervised activity     Problem: Plan of Care - These are the overarching goals to be used throughout the patient stay.    Goal: Optimal Comfort and Wellbeing  Outcome: Progressing     Problem: Fall Injury Risk  Goal: Absence of Fall and Fall-Related Injury  Outcome: Progressing  Intervention: Identify and Manage Contributors  Recent Flowsheet Documentation  Taken 12/6/2022 1655 by Nando Jovel RN  Medication Review/Management: medications reviewed  Intervention: Promote Injury-Free Environment  Recent Flowsheet Documentation  Taken 12/6/2022 1655 by Nando Jovel RN  Safety Promotion/Fall Prevention:   activity supervised   assistive device/personal items within reach   bed alarm on   clutter free environment maintained   fall prevention program maintained   lighting adjusted   mobility aid in reach   nonskid shoes/slippers when out of bed   patient and family education   room door open   safety round/check completed   supervised activity     Problem: Risk for Delirium  Goal: Optimal Coping  Outcome: Progressing  Goal: Improved Behavioral Control  Outcome: Progressing  Intervention: Minimize Safety Risk  Recent Flowsheet Documentation  Taken 12/6/2022 1655 by Nando Jovel RN  Enhanced Safety Measures: bed alarm set  Goal: Improved Attention and Thought  Clarity  Outcome: Progressing  Goal: Improved Sleep  Outcome: Progressing     Problem: Diabetes Comorbidity  Goal: Blood Glucose Level Within Targeted Range  Outcome: Progressing     Problem: Heart Failure Comorbidity  Goal: Maintenance of Heart Failure Symptom Control  Outcome: Progressing  Intervention: Maintain Heart Failure Management  Recent Flowsheet Documentation  Taken 12/6/2022 1655 by Nando Jovel RN  Medication Review/Management: medications reviewed     Problem: Hypertension Comorbidity  Goal: Blood Pressure in Desired Range  Outcome: Progressing  Intervention: Maintain Blood Pressure Management  Recent Flowsheet Documentation  Taken 12/6/2022 1655 by Nando Jovel RN  Medication Review/Management: medications reviewed     Problem: Obstructive Sleep Apnea Risk or Actual Comorbidity Management  Goal: Unobstructed Breathing During Sleep  Outcome: Progressing  Intervention: Monitor and Manage Obstructive Sleep Apnea  Recent Flowsheet Documentation  Taken 12/6/2022 1655 by Nando Jovel RN  Medication Review/Management: medications reviewed     Pt alert and oriented. Pt denied chest pain or SOB out of baseline this shift. Urostomy intact and drained 1400 mL of yellow urine. Tele showed sinus rhythm w/ 1st degree AV block and bundle branch block. Left upper eye laceration sutures intact. Pt is an assist of 1 for transfers.

## 2022-12-07 NOTE — PROGRESS NOTES
Care Management Discharge Note    Discharge Date: 12/07/2022       Discharge Disposition: Transitional Care    Discharge Services:  Rehab at [facility]    Discharge DME:  Non    Discharge Transportation: [employer] w/c transport at 1200    Private pay costs discussed: Not applicable    PAS Confirmation Code: [id number]  Patient/family educated on Medicare website which has current facility and service quality ratings:      Education Provided on the Discharge Plan:  Yes  Persons Notified of Discharge Plans: pt  Patient/Family in Agreement with the Plan: yes    Handoff Referral Completed: Yes    Additional Information:  Received call back from Teresa at [facility], stating they can accept pt for placement today. They request pt receives a Covid booster prior to discharge  Paged hospitalist with request.    Received call from Amy at [facility], stating they can also accept pt.    Met with pt to discuss discharge planning and provided update about TCUs.  Pt states he would like to got to [facility] since it is closest to his house.  He also requests w/c transport since his wife is not feeling well.    Call placed to [facility] and scheduled w/c transport at 1200.   Updates provided to [facility] at [facility], pt's bedside RN, charge RN, [facility] and hospitalist.  PAS completed.     Met with pt again to update on transport time and he states he is agreeable with receiving Covid booster prior to discharge.     Wendy Trujillo RN

## 2022-12-07 NOTE — PLAN OF CARE
Occupational Therapy Discharge Summary    Reason for therapy discharge:    Discharged to transitional care facility.    Progress towards therapy goal(s). See goals on Care Plan in [facility] electronic health record for goal details.  Goals partially met.  Barriers to achieving goals:   discharge from facility.    Therapy recommendation(s):    Continued therapy is recommended.  Rationale/Recommendations:  Return to baseline ADL fx and increase fx endurance.

## 2022-12-07 NOTE — PROGRESS NOTES
[facility]    Medicine Progress Note - Hospitalist Service    Date of Admission:  12/2/2022    Assessment & Plan        82 year old male with a past medical history of recent Non-STEMI, CHF, A.fib, HTN, CKD 4, DVT, bladder cancer who was discharged from [facility] on 12/1/2022 with diagnosis of Non-STEMI, CHF exacerbation and pneumonia who returns to hospital  s/p fall at home.  He was readmitted on 12/2/2022.      1.Recurrent falls.  - S/P Mechanical fall versus syncope   - Admission CT head negative.   - Patient has had at least 4 falls at home in past 5 weeks.  - PT/OT  - Anticipate TCU on discharge hopefully today     2.Acute head trauma  - Forehead laceration sutured in ED.Plan on 7 days total then removal(so need to remove 12/9)   - No ICH on CT head.   - Monitor closely due to hx of multiple anticoagulants.  - Repeat CT head shows no significant acute changes.  - Restarted warfarin     3.Recent acute kidney injury  - Patient has underlying CKD stage IV.   - Nephrology note and recommendations from 12/01/22 reviewed. - Avoid nephrotoxic agents.  - Continue Bumex due to underlying CHF.   - Continue to monitor renal function.  -today creat at 2.3 which looks close to baseline     4.A.fib with RVR  - Patient has known A.fib and is chronically anticoagulated with warfarin.  - INR daily  - Rate currently controlled.   - Continue home dose of Metoprolol with BP and HR parameters.  - restarted warfarin as repeated CT head is negative for acute changes     5.Elevated troponin  - History of coronary artery disease  - Patient had dizziness episode with EKG shows QRS complex  - Recent NSTEMI s/p angiogram last week  - Cardiology consult, appreciate input--now signed off  - Continue to monitor troponin and telemetry  - Resume aspirin, metoprolol, statin and Plavix  -follow up with cards in clinic     6.Hypotension  - Resolved.   - Home medications reviewed.   - Monitor vitals.   - Recent  echo on 11/2022 shows EF of 45%  - Resume diuretics as recommended by cardiologist--now bumex 2 mg po     7.Acute hypoxia  - Resolved.   - O2 sat=98 % on RA while I was at bedside.   - CT chest no PNA appreciated.   - COVID negative. Monitor vitals closely.     8.Leukocytosis  - Possible stress induced reaction.   - Afebrile. No PNA on CT chest.   - Unremarkable repeat CBC , now wbc 8.9     9.Acute Right Rib fractures 9th-11th  - Secondary to fall  - Also present on imaging from recent hospitalization.  - Patient says he has had at least 4 falls at home in the past 5 weeks.   - Currently pain free.  - Recommend Incentive spirometer.  - Change Tylenol to scheduled 3 times daily     10.Diabetes mellitus type 2  - Restart home glimepiride  - Cover with insulin sliding scale  - Last hemoglobin A1c 7.7     11.Obesity  -bmi 33     12.Mild hyponatremia  -133   -will need labs checked at TCU         Diet: Combination Diet Low Consistent Carb (45 g CHO per Meal) Diet; 2 gm NA Diet; Low Saturated Fat Na <2400mg Diet, No Caffeine Diet  Fluid restriction 1500 ML FLUID    DVT Prophylaxis: Warfarin  Otero Catheter: Not present  Central Lines: None  Cardiac Monitoring: ACTIVE order. Indication: Acute decompensated heart failure (48 hours)  Code Status: Full Code      Disposition Plan   Tcu today        The patient's care was discussed with the Care Coordinator/ and Patient.    Wendy Chatterjee MD  Hospitalist Service  [facility]  Securely message with the [employer] Web Console (learn more here)  Text page via [employer] Paging/Directory           ______________________________________________________________________    Interval History   He feels well  No headache  No face pain  No other pains  He feels ready for TCU    Data reviewed today: I reviewed all medications, new labs and imaging results over the last 24 hours. I personally reviewed no images or EKG's today.    Physical Exam   Vital Signs:  Temp: 97.5  F (36.4  C) Temp src: Oral BP: 115/66 Pulse: 70   Resp: 17 SpO2: 97 % O2 Device: None (Room air)    Weight: 232 lbs 9.6 oz  Constitutional: awake, alert, cooperative and no apparent distress  Eyes: charles orbital ecchymosis, left eyebrow laceration, sutures intact  Respiratory: No increased work of breathing, good air exchange, clear to auscultation bilaterally, no crackles or wheezing  Cardiovascular: Normal apical impulse, regular rate and rhythm, normal S1 and S2, no S3 or S4, and no murmur noted  GI: normal bowel sounds, soft, non-distended and non-tender  Skin: ecchymosis left eye  Musculoskeletal: no lower extremity pitting edema present  Neurologic: Mental Status Exam:  Level of Alertness:   awake  Neuropsychiatric: General: normal, calm and normal eye contact    Data   Recent Labs   Lab 12/07/22  0547 12/07/22  0139 12/06/22  2037 12/06/22  0735 12/06/22  0503 12/05/22  0851 12/05/22  0509   WBC 8.9  --   --   --  7.9  --  8.6   HGB 9.4*  --   --   --  8.7*  --  9.5*   *  --   --   --  98  --  98     --   --   --  166  --  170   INR 1.91*  --   --   --  1.67*  --  1.48*   *  --   --   --  135*  --  135*   POTASSIUM 3.5  --   --   --  3.7  --  3.8   CHLORIDE 99  --   --   --  101  --  103   CO2 17*  --   --   --  23  --  23   BUN 58.0*  --   --   --  55.8*  --  55.2*   CR 2.38*  --   --   --  2.52*  --  2.59*   ANIONGAP 17*  --   --   --  11  --  9   GALLO 9.0  --   --   --  8.7*  --  8.7*   * 132* 140*   < > 120*   < > 127*   ALBUMIN  --   --   --   --  3.7  --  3.3*   PROTTOTAL  --   --   --   --  6.6  --  6.5   BILITOTAL  --   --   --   --  0.8  --  0.7   ALKPHOS  --   --   --   --  63  --  70   ALT  --   --   --   --  14  --  18   AST  --   --   --   --  24  --  27    < > = values in this interval not displayed.     No results found for this or any previous visit (from the past 24 hour(s)).

## 2022-12-07 NOTE — DISCHARGE SUMMARY
[facility] MEDICINE  DISCHARGE SUMMARY     Primary Care Physician: Rafael Montelongo  Admission Date: 12/2/2022   Discharge Provider: Wendy Chatterjee MD Discharge Date: 12/7/2022   Diet:   Active Diet and Nourishment Order   Procedures     Fluid restriction 1500 ML FLUID     Combination Diet Low Consistent Carb (45 g CHO per Meal) Diet; 2 gm NA Diet; Low Saturated Fat Na <2400mg Diet, No Caffeine Diet     Diet       Code Status: Full Code   Activity:fall risk, up with assist        Condition at Discharge: Fair     REASON FOR PRESENTATION(See Admission Note for Details)   falls    PRINCIPAL & ACTIVE DISCHARGE DIAGNOSES     Principal Problem:    Facial laceration, initial encounter  Active Problems:    Acute kidney injury (H)    Injury of head, initial encounter      PENDING LABS     Unresulted Labs Ordered in the Past 30 Days of this Admission     No orders found from 11/2/2022 to 12/3/2022.            PROCEDURES ( this hospitalization only)          RECOMMENDATIONS TO OUTPATIENT PROVIDER FOR F/U VISIT     Follow-up Appointments     Follow Up and recommended labs and tests      Follow up with residential physician.  The following labs/tests are   recommended: INR in 2 days and then every Monday and Thursday and call MD   with inr to dose warfarin, will need BMP in 2 days and then also every   Mon/Thursday as well.  Follow up with primary care provider in 1 week   Follow up CHF clinic 1 week  Needs sutures removed from laceration by left eyebrow in 2 days, so on   12/9/22                 DISPOSITION     Skilled Nursing Facility    SUMMARY OF HOSPITAL COURSE:           82 year old male with a past medical history of recent Non-STEMI, CHF, A.fib, HTN, CKD 4, DVT, bladder cancer who was discharged from [facility] on 12/1/2022 with diagnosis of Non-STEMI, CHF exacerbation and pneumonia who returns to hospital  s/p fall at home.  He was readmitted on  12/2/2022.      1.Recurrent falls.  - S/P Mechanical fall versus syncope   - Admission CT head negative.   - Patient has had at least 4 falls at home in past 5 weeks.  - PT/OT  - Anticipate TCU on discharge hopefully today     2.Acute head trauma  - Forehead laceration sutured in ED.Plan on 7 days total then removal(so need to remove 12/9)   - No ICH on CT head.   - Monitor closely due to hx of multiple anticoagulants.  - Repeat CT head shows no significant acute changes.  - Restarted warfarin     3.Recent acute kidney injury  - Patient has underlying CKD stage IV.   - Nephrology note and recommendations from 12/01/22 reviewed. - Avoid nephrotoxic agents.  - Continue Bumex due to underlying CHF.   - Continue to monitor renal function.  -today creat at 2.3 which looks close to baseline     4.A.fib with RVR  - Patient has known A.fib and is chronically anticoagulated with warfarin.  - INR daily  - Rate currently controlled.   - Continue home dose of Metoprolol with BP and HR parameters.  - restarted warfarin as repeated CT head is negative for acute changes     5.Elevated troponin  - History of coronary artery disease  - Patient had dizziness episode with EKG shows QRS complex  - Recent NSTEMI s/p angiogram last week  - Cardiology consult, appreciate input--now signed off  - Continue to monitor troponin and telemetry  - Resume aspirin, metoprolol, statin and Plavix  -follow up with cards in clinic     6.Hypotension  - Resolved.   - Home medications reviewed.   - Monitor vitals.   - Recent echo on 11/2022 shows EF of 45%  - Resume diuretics as recommended by cardiologist--now bumex 2 mg po     7.Acute hypoxia  - Resolved.   - O2 sat=98 % on RA while I was at bedside.   - CT chest no PNA appreciated.   - COVID negative. Monitor vitals closely.     8.Leukocytosis  - Possible stress induced reaction.   - Afebrile. No PNA on CT chest.   - Unremarkable repeat CBC , now wbc 8.9     9.Acute Right Rib fractures  9th-11th  - Secondary to fall  - Also present on imaging from recent hospitalization.  - Patient says he has had at least 4 falls at home in the past 5 weeks.   - Currently pain free.  - Recommend Incentive spirometer.  - Change Tylenol to scheduled 3 times daily     10.Diabetes mellitus type 2  - Restart home glimepiride  - Cover with insulin sliding scale  - Last hemoglobin A1c 7.7     11.Obesity  -bmi 33     12.Mild hyponatremia  -133   -will need labs checked at [facility]    Covid update--neg here  -called wife on day of discharge and last shot was 12/2021. She was ok with booster given now and we will give it now    Discharge Medications with Med changes:     Current Discharge Medication List      START taking these medications    Details   bacitracin 500 UNIT/GM OINT Apply topically 2 times daily To laceration on left eyebrow x 3 days  Qty: 14 g, Refills: 0    Associated Diagnoses: Laceration of left eyebrow, sequela      insulin aspart (NOVOLOG PEN) 100 UNIT/ML pen Inject 1-7 Units Subcutaneous 3 times daily (before meals) Correction Scale - MEDIUM INSULIN RESISTANCE DOSING     Do Not give Correction Insulin if Pre-Meal BG less than 140.   For Pre-Meal  - 189 give 1 unit.   For Pre-Meal  - 239 give 2 units.   For Pre-Meal  - 289 give 3 units.   For Pre-Meal  - 339 give 4 units.   For Pre-Meal - 399 give 5 units.   For Pre-Meal -449 give 6 units  For Pre-Meal BG greater than or equal to 450 give 7 units.   To be given with prandial insulin, and based on pre-meal blood glucose.    Notify provider if glucose greater than or equal to 350 mg/dL after administration of correction dose.  If given at mealtime, administer within 30 minutes of start of meal.  Qty: 15 mL, Refills: 1    Associated Diagnoses: Type 2 diabetes mellitus with diabetic neuropathic arthropathy, unspecified whether long term insulin use (H)         CONTINUE these medications which have CHANGED    Details    acetaminophen (TYLENOL) 325 MG tablet Take 3 tablets (975 mg) by mouth 3 times daily for 7 days  Qty: 63 tablet, Refills: 0    Associated Diagnoses: Pain      allopurinol (ZYLOPRIM) 100 MG tablet Take 2 tablets (200 mg) by mouth daily  Qty: 60 tablet, Refills: 0    Associated Diagnoses: Chronic gout without tophus, unspecified cause, unspecified site      aspirin (ASA) 81 MG EC tablet Take 1 tablet (81 mg) by mouth daily  Qty: 30 tablet, Refills: 1    Associated Diagnoses: Chronic heart failure with preserved ejection fraction (H)      atorvastatin (LIPITOR) 40 MG tablet Take 1 tablet (40 mg) by mouth daily  Qty: 90 tablet, Refills: 3    Associated Diagnoses: Coronary artery disease involving native coronary artery of native heart with unstable angina pectoris (H)      bumetanide (BUMEX) 2 MG tablet Take 1 tablet (2 mg) by mouth daily  Qty: 30 tablet, Refills: 0    Associated Diagnoses: Acute congestive heart failure, unspecified heart failure type (H)      clopidogrel (PLAVIX) 75 MG tablet Take 1 tablet (75 mg) by mouth daily  Qty: 30 tablet, Refills: 1    Associated Diagnoses: Chronic heart failure with preserved ejection fraction (H)      ezetimibe (ZETIA) 10 MG tablet Take 1 tablet (10 mg) by mouth daily  Qty: 30 tablet, Refills: 1    Associated Diagnoses: Chronic heart failure with preserved ejection fraction (H)      glimepiride (AMARYL) 1 MG tablet Take 1 tablet (1 mg) by mouth daily  Qty: 30 tablet, Refills: 0    Associated Diagnoses: Type 2 diabetes mellitus with diabetic neuropathic arthropathy, unspecified whether long term insulin use (H)      LORazepam (ATIVAN) 0.5 MG tablet Take 1 tablet (0.5 mg) by mouth daily as needed for anxiety  Qty: 10 tablet, Refills: 0    Associated Diagnoses: Anxiety      metoprolol succinate ER (TOPROL XL) 25 MG 24 hr tablet Take 1 tablet (25 mg) by mouth daily  Qty: 30 tablet, Refills: 1    Associated Diagnoses: Chronic heart failure with preserved ejection fraction (H)       multivitamin, therapeutic (THERA-VIT) TABS tablet Take 1 tablet by mouth daily  Qty: 30 tablet, Refills: 3    Associated Diagnoses: Malnutrition, unspecified type (H)      nitroGLYcerin (NITROSTAT) 0.4 MG sublingual tablet For chest pain place 1 tablet under the tongue every 5 minutes for 3 doses. If symptoms persist 5 minutes after 1st dose call 911.  Qty: 30 tablet, Refills: 1    Associated Diagnoses: Coronary artery disease involving native coronary artery of native heart with unstable angina pectoris (H); NSTEMI (non-ST elevated myocardial infarction) (H)      pantoprazole (PROTONIX) 40 MG EC tablet Take 1 tablet (40 mg) by mouth every morning (before breakfast)  Qty: 30 tablet, Refills: 1    Associated Diagnoses: Gastro-esophageal reflux disease without esophagitis      polyethylene glycol (MIRALAX) 17 g packet Take 17 g by mouth daily  Qty: 20 packet, Refills: 0    Associated Diagnoses: Constipation, unspecified constipation type      traZODone (DESYREL) 100 MG tablet Take 1 tablet (100 mg) by mouth At Bedtime  Qty: 30 tablet, Refills: 0    Associated Diagnoses: Insomnia, unspecified type      warfarin ANTICOAGULANT (COUMADIN) 5 MG tablet Take 1 tablet (5 mg) by mouth daily Needs INR in 2 days and call MD to adjust dosing. Then will need INR checked every Monday, Thursday  Qty: 30 tablet, Refills: 0    Associated Diagnoses: Chronic anticoagulation         STOP taking these medications       temazepam (RESTORIL) 15 MG capsule Comments:   Reason for Stopping:                     Rationale for medication changes:      Bacitracin for laceration  Insulin ss        Consults       PHYSICAL THERAPY ADULT IP CONSULT  OCCUPATIONAL THERAPY ADULT IP CONSULT  CARE MANAGEMENT / SOCIAL WORK IP CONSULT  PHARMACY TO DOSE WARFARIN  CARDIOLOGY IP CONSULT  PHYSICAL THERAPY ADULT IP CONSULT  OCCUPATIONAL THERAPY ADULT IP CONSULT    Immunizations given this encounter     Most Recent Immunizations   Administered Date(s)  Administered     COVID-19 Vaccine 18+ (Moderna) 12/07/2021     FLU 6-35 months 11/03/2008     FLUAD(HD)65+ QUAD 09/23/2022     Flu 65+ Years 09/14/2020     Influenza (H1N1) 01/21/2010     Influenza (High Dose) 3 valent vaccine 10/15/2019     Influenza (IIV3) PF 11/14/2013     Influenza Vaccine, 6+MO IM (QUADRIVALENT W/PRESERVATIVES) 10/18/2018     Pneumo Conj 13-V (2010&after) 07/29/2015     Pneumococcal 23 valent 12/19/2005     TD (ADULT, 7+) 11/13/2006     Tdap (Adacel,Boostrix) 04/07/2016     Varicella 06/25/2010           Anticoagulation Information      Recent INR results:   Recent Labs   Lab 12/07/22  0547 12/06/22  0503 12/05/22  0509 12/04/22  0953 12/02/22  1817 12/01/22  0448   INR 1.91* 1.67* 1.48* 1.42* 1.35* 1.24*     Warfarin doses (if applicable) or name of other anticoagulant: warfarin, 5 mg      SIGNIFICANT IMAGING FINDINGS     Results for orders placed or performed during the hospital encounter of 12/02/22   Head CT w/o contrast    Impression    IMPRESSION:  HEAD CT:  1.  No CT finding of a mass, hemorrhage or focal area suggestive of acute infarct.  2.  Stable diffuse age related changes.    CERVICAL SPINE CT:  1.  No CT evidence for acute fracture or post traumatic subluxation.  2.  Diffuse neural foraminal narrowing without canal compromise as described above.   Cervical spine CT w/o contrast    Impression    IMPRESSION:  HEAD CT:  1.  No CT finding of a mass, hemorrhage or focal area suggestive of acute infarct.  2.  Stable diffuse age related changes.    CERVICAL SPINE CT:  1.  No CT evidence for acute fracture or post traumatic subluxation.  2.  Diffuse neural foraminal narrowing without canal compromise as described above.   CT Chest/Abdomen/Pelvis w Contrast    Impression    IMPRESSION:  1.  There are acute, nondisplaced right posterior ninth through 11th rib fractures at the costovertebral junction. No hydropneumothorax.  2.  Minimal stranding of the subcutaneous fat in the anterior right  upper abdomen and over the right mid pelvis. No soft tissue hematoma.  3.  No evidence of solid organ injury in the abdomen or pelvis.  4.  Atrophied right kidney with mild to moderate right-sided hydroureter and hydronephrosis, unchanged. Cystectomy with ileal conduit.  5.  Resolution of the previously noted pneumonia.  6.  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  7.  Other noncritical findings as noted above.   CT Head w/o Contrast    Impression    IMPRESSION:  1.  No CT evidence for acute intracranial process.  2.  Brain atrophy and presumed chronic microvascular ischemic changes as above.     Echocardiogram Limited   Result Value Ref Range    LVEF  55-60%        SIGNIFICANT LABORATORY FINDINGS     Most Recent 3 CBC's:Recent Labs   Lab Test 12/07/22  0547 12/06/22  0503 12/05/22  0509   WBC 8.9 7.9 8.6   HGB 9.4* 8.7* 9.5*   * 98 98    166 170     Most Recent 3 BMP's:Recent Labs   Lab Test 12/07/22  0753 12/07/22  0547 12/07/22  0139 12/06/22  0735 12/06/22  0503 12/05/22  0851 12/05/22  0509   NA  --  133*  --   --  135*  --  135*   POTASSIUM  --  3.5  --   --  3.7  --  3.8   CHLORIDE  --  99  --   --  101  --  103   CO2  --  17*  --   --  23  --  23   BUN  --  58.0*  --   --  55.8*  --  55.2*   CR  --  2.38*  --   --  2.52*  --  2.59*   ANIONGAP  --  17*  --   --  11  --  9   GALLO  --  9.0  --   --  8.7*  --  8.7*   * 119* 132*   < > 120*   < > 127*    < > = values in this interval not displayed.     Most Recent 3 INR's:Recent Labs   Lab Test 12/07/22  0547 12/06/22  0503 12/05/22  0509   INR 1.91* 1.67* 1.48*       CT Chest/Abdomen/Pelvis w Contrast    Result Date: 12/2/2022  EXAM: CT CHEST/ABDOMEN/PELVIS W CONTRAST LOCATION: [facility] DATE/TIME: 12/2/2022 7:30 PM INDICATION: Fall. On warfarin with bleeding. Pain. COMPARISON: 05/18/2022 and older studies. TECHNIQUE: CT scan of the chest, abdomen, and pelvis was performed following injection  of IV contrast. Multiplanar reformats were obtained. Dose reduction techniques were used. CONTRAST: isovue 370 75ml FINDINGS: LUNGS AND PLEURA: No hydropneumothorax. Marked clearing of the predominantly left-sided groundglass and nodular opacities. Minimal subpleural atelectasis in the left base. MEDIASTINUM/AXILLAE: No mediastinal hematoma. No central pulmonary emboli. Ascending aorta measures 4 cm and is unchanged. CORONARY ARTERY CALCIFICATION: Previous intervention (stents or CABG). HEPATOBILIARY: Normal. PANCREAS: Normal. SPLEEN: Normal. ADRENAL GLANDS: Normal. KIDNEYS/BLADDER: Atrophied right kidney with moderate distention of the right intrarenal collecting system and ureter. Small left renal cysts again noted which needs no follow-up. No calculi. Cystectomy with ileal diversion. No conduit calcifications. BOWEL: Redundant colon with moderate distal colonic diverticuli. No inflammatory changes. LYMPH NODES: Normal. 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 PELVIC ORGANS: Removed. MUSCULOSKELETAL: Areas of subcutaneous fat stranding anteriorly in the right upper quadrant and inferiorly overlying the right lower pelvis. No discrete hematoma. There are acute nondisplaced fractures of the left posterior 9th-11th ribs at the costovertebral junctions. Bone island in the right humeral head.     IMPRESSION: 1.  There are acute, nondisplaced right posterior ninth through 11th rib fractures at the costovertebral junction. No hydropneumothorax. 2.  Minimal stranding of the subcutaneous fat in the anterior right upper abdomen and over the right mid pelvis. No soft tissue hematoma. 3.  No evidence of solid organ injury in the abdomen or pelvis. 4.  Atrophied right kidney with mild to moderate right-sided hydroureter and hydronephrosis, unchanged. Cystectomy with ileal conduit. 5.  Resolution of the previously noted pneumonia. 6.  Prior stent  grafting of the left common iliac artery aneurysm. 7.  Other noncritical findings as noted above.    Cardiac Catheterization    Result Date: 2022    1st Mrg lesion is 90% stenosed.  LAD irregular but no severe stenoses. LCX huge dominant vessel, with high-grade  lesion of large OM1 (likely culprit lesion), diffuse moderate plaque in second order  Branches. 80% stenosis mid-distal LPDA RCA smaller system, 30-50% mid narrowing into RV marginal branch.  No gradient across aortic valve. Successful PCI with CORA to mid OM1     Echocardiogram Complete    Result Date: 2022  [id number]^OXANA^[place] [address]  Name: ROXANNA PITT MRN: [id number] : 1940 Study Date: 2022 12:29 PM Age: 82 yrs Gender: Male Patient Location: [facility] Reason For Study: CHF Ordering Physician: ALETA DAIGLE Performed By: GEOVANI/JULIO  BSA: 2.3 m2 Height: 70 in Weight: 247 lb HR: 96 BP: 137/90 mmHg ______________________________________________________________________________ Procedure Complete Portable Echo Adult. Definity (NDC #15210-415) given intravenously. ______________________________________________________________________________ Interpretation Summary  1. Technically difficult study despite utilization of ultrasound enhancing agent. 2. The left ventricle is normal in size. Image quality does not provide for detailed assessment of LV systolic function, but is felt to be mildly decreased with a visually estimated ejection fraction of roughly 45%. 3. There is mild global reduction in left ventricular systolic performance. 4. There is abnormal septal motion likely related to altered electrical activation due to bundle branch block. 5. There is mild aortic stenosis. 6. Probable normal right ventricular size and systolic performance though right-sided structures are not clearly visualized on all views on this study. 7. There is mild  enlargement of the proximal ascending aorta.  The acoustic quality of this study is quite poor. If a more accurate assessment of left ventricular systolicperformance, regional wall motion, and other morphology/function is required; would recommend cardiac MRI or other alternative imaging modality for further evaluation.  When compared to the prior real-time echocardiogram dated 13 October 2022, the findings are felt to be fairly similar on both examinations though accurate comparison is somewhat difficult due to the poor acoustic quality on both studies. ______________________________________________________________________________ Left ventricle: The left ventricle is normal in size. Image quality does not provide for detailed assessment of LV systolic function, but is felt to be mildly decreased with a visually estimated ejection fraction of roughly 45%. There is mild global reduction in left ventricular systolic performance. There is abnormal septal motion likely related to altered electrical activation due to bundle branch block. Left ventricular wall thickness is normal.  Assessment of left atrial pressure (LAP): The cumulative findings are indeterminate in evaluating left atrial pressure.  Right ventricle: Probable normal right ventricular size and systolic performance though right- sided structures are not clearly visualized on all views on this study.  Left atrium: The left atrium is not well visualized.  Right atrium: The right atrium is not well visualized.  IVC: The IVC is not well-visualized.  Aortic valve: The aortic valve is not well visualized, but suspected to be comprised of three cusps. There is mild aortic stenosis. There is no significant aortic insufficiency.  Mitral valve: The mitral valve appears morphologically normal. There is probable trace mitral insufficiency.  Tricuspid valve: The tricuspid valve is grossly morphologically normal. There is probable trace tricuspid insufficiency.   Pulmonic valve: The pulmonic valve is not well visualized.  Thoracic aorta: There is mild enlargement of the proximal ascending aorta.  Pericardium: There is no significant pericardial effusion. There are intrapericardial echoes suggestive of adipose tissue. ______________________________________________________________________________ ______________________________________________________________________________ MMode/2D Measurements & Calculations Ao root diam: 4.0 cm asc Aorta Diam: 4.3 cm LVOT diam: 2.0 cm LVOT area: 3.1 cm2  Time Measurements MM HR: 98.0 BPM  Doppler Measurements & Calculations MV E max micha: 102.2 cm/sec MV dec time: 0.15 sec Ao V2 max: 202.0 cm/sec Ao max P.0 mmHg Ao V2 mean: 153.7 cm/sec Ao mean PG: 10.8 mmHg Ao V2 VTI: 29.2 cm EMILIANO(I,D): 2.2 cm2 EMILIANO(V,D): 2.1 cm2 LV V1 max P.4 mmHg LV V1 max: 136.2 cm/sec LV V1 VTI: 21.1 cm SV(LVOT): 65.4 ml SI(LVOT): 28.6 ml/m2 PA acc time: 0.13 sec  AV Micha Ratio (DI): 0.67 EMILIANO Index (cm2/m2): 0.98 E/E' av.5 Lateral E/e': 20.0 Medial E/e': 17.1  ______________________________________________________________________________ Report approved by: Rufina Angulo 2022 02:00 PM       US Upper Extremity Venous Duplex Left    Result Date: 2022  EXAM: US UPPER EXTREMITY VENOUS DUPLEX LEFT LOCATION: [facility] DATE/TIME: 2022 9:31 PM INDICATION: L arm swelling COMPARISON: None. TECHNIQUE: Venous Duplex ultrasound of the left upper extremity with (when possible) and without compression, augmentation, and duplex. Color flow and spectral Doppler with waveform analysis performed. FINDINGS: Ultrasound includes evaluation of the internal jugular vein, innominate vein, subclavian vein, axillary vein, and brachial vein. The superficial cephalic and basilic veins were also evaluated where seen. LEFT: Partially occlusive thrombus involving the left mid subclavian vein and axillary veins. Nonocclusive thrombus in  the basilic vein in the upper arm.     IMPRESSION: 1.  Partially occlusive deep venous thrombosis involving the left subclavian and axillary veins. 2.  Superficial thrombus involving the left basilic vein.    XR Chest Port 1 View    Result Date: 11/18/2022  EXAM: XR CHEST PORT 1 VIEW LOCATION: [facility] DATE/TIME: 11/18/2022 5:28 PM INDICATION: PICC tip placement. COMPARISON: 11/05/2022.     IMPRESSION: Left upper extremity PICC tip overlies the region of the superior vena cava. Nonspecific bilateral interstitial opacities are nonspecific left retrocardiac opacity. Possible small left pleural effusion. No pneumothorax. Unchanged cardiomegaly.    MRA Brain ([place]) wo Contrast    Result Date: 11/19/2022  EXAM: MR BRAIN W/O and W CONTRAST, MRA NECK (CAROTIDS) W/O and W CONTRAST, MRA BRAIN ([place]) W/O CONTRAST LOCATION: [facility] DATE/TIME: 11/19/2022 7:16 PM INDICATION: tingling and weakness on left side COMPARISON: 11/18/2022 CONTRAST: 10 ml gadavist TECHNIQUE: 1) Routine multiplanar multisequence head MRI without and with intravenous contrast. 2) 3D time-of-flight head MRA without intravenous contrast. 3) Neck MRA without and with IV contrast. Stenosis measurements made according to NASCET criteria unless otherwise specified. FINDINGS: HEAD MRI: INTRACRANIAL CONTENTS: Relatively symmetric subcentimeter foci restricted diffusion in the bilateral globus pallidus. No mass, acute hemorrhage, or extra-axial fluid collections. Patchy nonspecific T2/FLAIR hyperintensities within the cerebral white matter most consistent with mild to moderate chronic microvascular ischemic change. Chronic lacunar infarcts left cerebellum, left basal ganglia and bilateral centrum semiovale.. Mild generalized cerebral atrophy. No hydrocephalus. Normal position of the  cerebellar tonsils. No pathologic contrast enhancement. SELLA: No abnormality accounting for technique.  OSSEOUS STRUCTURES/SOFT TISSUES: No suspicious marrow lesions. T1 hypointense lesion in the anterior left parietal bone without enhancement. The major intracranial vascular flow voids are maintained. ORBITS: No abnormality accounting for technique. SINUSES/MASTOIDS: No paranasal sinus mucosal disease. No middle ear or mastoid effusion. HEAD MRA: ANTERIOR CIRCULATION: No stenosis/occlusion, aneurysm, or high flow vascular malformation. Standard [place] anatomy. 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 No branch occlusion, aneurysm or AVM. Dominant left and smaller right vertebral artery contribute to a normal basilar artery. NECK MRA: RIGHT CAROTID: No measurable stenosis or dissection. LEFT CAROTID: No measurable stenosis or dissection. VERTEBRAL ARTERIES: No focal stenosis or dissection. Balanced vertebral arteries. AORTIC ARCH: Classic aortic arch anatomy with no significant stenosis at the origin of the great vessels.     IMPRESSION: HEAD MRI: 1.  Symmetric subcentimeter foci of restricted diffusion in the bilateral globus pallidus. Findings can be seen in carbon monoxide poisoning and other toxic metabolic etiologies. Correlate clinically. 2.  Age-related changes. HEAD MRA: 1.  No branch occlusion, aneurysm or AVM. 2.  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 NECK MRA: 1.  No measurable stenosis or dissection.    MRA Neck (Carotids) wo & w Contrast    Result Date: 11/19/2022  EXAM: MR BRAIN W/O and W CONTRAST, MRA NECK (CAROTIDS) W/O and W CONTRAST, MRA BRAIN ([place]) W/O CONTRAST LOCATION: [facility] DATE/TIME: 11/19/2022 7:16 PM INDICATION: tingling and weakness on left side COMPARISON: 11/18/2022 CONTRAST: 10 ml gadavist TECHNIQUE: 1) Routine multiplanar multisequence head MRI without and with intravenous contrast. 2) 3D time-of-flight head MRA without intravenous contrast. 3) Neck MRA without and with IV  contrast. Stenosis measurements made according to NASCET criteria unless otherwise specified. FINDINGS: HEAD MRI: INTRACRANIAL CONTENTS: Relatively symmetric subcentimeter foci restricted diffusion in the bilateral globus pallidus. No mass, acute hemorrhage, or extra-axial fluid collections. Patchy nonspecific T2/FLAIR hyperintensities within the cerebral white matter most consistent with mild to moderate chronic microvascular ischemic change. Chronic lacunar infarcts left cerebellum, left basal ganglia and bilateral centrum semiovale.. Mild generalized cerebral atrophy. No hydrocephalus. Normal position of the  cerebellar tonsils. No pathologic contrast enhancement. SELLA: No abnormality accounting for technique. OSSEOUS STRUCTURES/SOFT TISSUES: No suspicious marrow lesions. T1 hypointense lesion in the anterior left parietal bone without enhancement. The major intracranial vascular flow voids are maintained. ORBITS: No abnormality accounting for technique. SINUSES/MASTOIDS: No paranasal sinus mucosal disease. No middle ear or mastoid effusion. HEAD MRA: ANTERIOR CIRCULATION: No stenosis/occlusion, aneurysm, or high flow vascular malformation. Standard [place] anatomy. 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 No branch occlusion, aneurysm or AVM. Dominant left and smaller right vertebral artery contribute to a normal basilar artery. NECK MRA: RIGHT CAROTID: No measurable stenosis or dissection. LEFT CAROTID: No measurable stenosis or dissection. VERTEBRAL ARTERIES: No focal stenosis or dissection. Balanced vertebral arteries. AORTIC ARCH: Classic aortic arch anatomy with no significant stenosis at the origin of the great vessels.     IMPRESSION: HEAD MRI: 1.  Symmetric subcentimeter foci of restricted diffusion in the bilateral globus pallidus. Findings can be seen in carbon monoxide poisoning and other toxic metabolic etiologies. Correlate clinically. 2.  Age-related  changes. HEAD MRA: 1.  No branch occlusion, aneurysm or AVM. 2.  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 NECK MRA: 1.  No measurable stenosis or dissection.    MR Brain w/o & w Contrast    Result Date: 11/19/2022  EXAM: MR BRAIN W/O and W CONTRAST, MRA NECK (CAROTIDS) W/O and W CONTRAST, MRA BRAIN ([place]) W/O CONTRAST LOCATION: [facility] DATE/TIME: 11/19/2022 7:16 PM INDICATION: tingling and weakness on left side COMPARISON: 11/18/2022 CONTRAST: 10 ml gadavist TECHNIQUE: 1) Routine multiplanar multisequence head MRI without and with intravenous contrast. 2) 3D time-of-flight head MRA without intravenous contrast. 3) Neck MRA without and with IV contrast. Stenosis measurements made according to NASCET criteria unless otherwise specified. FINDINGS: HEAD MRI: INTRACRANIAL CONTENTS: Relatively symmetric subcentimeter foci restricted diffusion in the bilateral globus pallidus. No mass, acute hemorrhage, or extra-axial fluid collections. Patchy nonspecific T2/FLAIR hyperintensities within the cerebral white matter most consistent with mild to moderate chronic microvascular ischemic change. Chronic lacunar infarcts left cerebellum, left basal ganglia and bilateral centrum semiovale.. Mild generalized cerebral atrophy. No hydrocephalus. Normal position of the  cerebellar tonsils. No pathologic contrast enhancement. SELLA: No abnormality accounting for technique. OSSEOUS STRUCTURES/SOFT TISSUES: No suspicious marrow lesions. T1 hypointense lesion in the anterior left parietal bone without enhancement. The major intracranial vascular flow voids are maintained. ORBITS: No abnormality accounting for technique. SINUSES/MASTOIDS: No paranasal sinus mucosal disease. No middle ear or mastoid effusion. HEAD MRA: ANTERIOR CIRCULATION: No stenosis/occlusion, aneurysm, or high flow vascular malformation. Standard [place] anatomy. POSTERIOR CIRCULATION: Moderate stenosis at  the distal right vertebral artery. No branch occlusion, aneurysm or AVM. Dominant left and smaller right vertebral artery contribute to a normal basilar artery. NECK MRA: RIGHT CAROTID: No measurable stenosis or dissection. LEFT CAROTID: No measurable stenosis or dissection. VERTEBRAL ARTERIES: No focal stenosis or dissection. Balanced vertebral arteries. AORTIC ARCH: Classic aortic arch anatomy with no significant stenosis at the origin of the great vessels.     IMPRESSION: HEAD MRI: 1.  Symmetric subcentimeter foci of restricted diffusion in the bilateral globus pallidus. Findings can be seen in carbon monoxide poisoning and other toxic metabolic etiologies. Correlate clinically. 2.  Age-related changes. HEAD MRA: 1.  No branch occlusion, aneurysm or AVM. 2.  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 NECK MRA: 1.  No measurable stenosis or dissection.    Echocardiogram Limited    Result Date: 2022  [id number]^OXANA^[place] [address]  Name: ROXANNA PITT MRN: [id number] : 1940 Study Date: 2022 12:54 PM Age: 82 yrs Gender: Male Patient Location: [facility] Reason For Study: CAD Ordering Physician: ALETA DAIGLE Performed By: ACE  BSA: 2.2 m2 Height: 70 in Weight: 232 lb HR: 94 BP: 126/61 mmHg ______________________________________________________________________________ Procedure Limited Echo Adult. Definity (NDC #70464-446) given intravenously. 2mL given. ______________________________________________________________________________ Interpretation Summary  Normal right ventricle size and systolic function. Left ventricular size, wall motion and function are normal. The ejection fraction is 55-60%. Definity contrast was used without apparent complications. The study was technically difficult. No hemodynamically significant valvular abnormalities on 2D or color flow imaging.  ______________________________________________________________________________ Left Ventricle Left ventricular size, wall motion and function are normal. The ejection fraction is 55-60%. There is normal left ventricular wall thickness. No regional wall motion abnormalities noted.  Right Ventricle Normal right ventricle size and systolic function. TAPSE is normal, which is consistent with normal right ventricular systolic function.  Atria Normal left atrial size. Right atrial size is normal. There is no color Doppler evidence of an atrial shunt.  Mitral Valve The mitral valve is not well visualized. There is mild (1+) mitral regurgitation.  Tricuspid Valve The tricuspid valve is not well visualized.  Aortic Valve The aortic valve is not well visualized. No hemodynamically significant valvular aortic stenosis.  Pulmonic Valve The pulmonic valve is not well visualized.  Vessels The aorta root cannot be assessed. IVC diameter <2.1 cm collapsing >50% with sniff suggests a normal RA pressure of 3 mmHg.  Pericardium There is no pericardial effusion.  ______________________________________________________________________________ Time Measurements  MM HR: 98.0 BPM  ______________________________________________________________________________ Report approved by: Rufina Wilson 12/04/2022 02:24 PM       Cervical spine CT w/o contrast    Result Date: 12/2/2022  EXAM: CT HEAD W/O CONTRAST, CT CERVICAL SPINE W/O CONTRAST LOCATION: [facility] DATE/TIME: 12/2/2022 7:20 PM INDICATION: head injury on coumadin and plavix with neck pain. COMPARISON: 11/18/2022. TECHNIQUE: 1) Routine CT Head without IV contrast. Multiplanar reformats. Dose reduction techniques were used. 2) Routine CT Cervical Spine without IV contrast. Multiplanar reformats. Dose reduction techniques were used. FINDINGS: HEAD CT: INTRACRANIAL CONTENTS: No intracranial hemorrhage, extraaxial collection, or mass effect.  No CT evidence  of acute infarct. Scattered diffuse low attenuation within the periventricular and subcortical white matter consistent with diffuse small vessel ischemic disease. 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 The ventricular system, basal cisterns and the cortical sulci are consistent with diffuse volume loss. VISUALIZED ORBITS/SINUSES/MASTOIDS: No intraorbital abnormality. No paranasal sinus mucosal disease. No middle ear or mastoid effusion. BONES/SOFT TISSUES: No acute abnormality. CERVICAL SPINE CT: VERTEBRA: Normal vertebral body heights and alignment. No fracture or posttraumatic subluxation. CANAL/FORAMINA: There is degenerative disc disease primarily from the C4-C5 to the C6-C7 disc space levels. These levels have a moderate loss of disc space heights, endplate changes and small anterior osteophyte formations. There is mild diffuse facet arthropathy. There is diffuse facet arthropathy visualized. At the C3-C4 disc space level there is mild right neural foraminal narrowing. At the C4-C5 and the C5-C6 disc space level there is mild bilateral neural foraminal narrowing. There is no significant canal compromise throughout the cervical spine. PARASPINAL: No extraspinal abnormality. Visualized lung fields are clear.     IMPRESSION: HEAD CT: 1.  No CT finding of a mass, hemorrhage or focal area suggestive of acute infarct. 2.  Stable diffuse age related changes. CERVICAL SPINE CT: 1.  No CT evidence for acute fracture or post traumatic subluxation. 2.  Diffuse neural foraminal narrowing without canal compromise as described above.    Cervical spine CT w/o contrast    Result Date: 11/18/2022  EXAM: CT CERVICAL SPINE W/O CONTRAST LOCATION: [facility] DATE/TIME: 11/18/2022 11:06 AM INDICATION: Fall, weakness, AMS. COMPARISON: Cervical spine CT 8/24/2019. TECHNIQUE: Routine CT Cervical Spine without IV contrast. Multiplanar reformats. Dose reduction techniques were used. FINDINGS:  VERTEBRA: Rotation of C1 on C2 which is presumably positional. No acute lucent fracture line visualized. No significant loss of vertebral body height. Marked degenerative endplate changes and loss of disc height in the cervical spine particularly at C4-5, C5-6, and C6-7. Fusion of the right C2 and C3 facets. Right greater than left facet hypertrophy throughout the cervical spine.  CANAL/FORAMINA: No significant spinal canal narrowing at any level. Multiple levels of neural foraminal narrowing, particularly at C4-5, C5-6, and C6-7. PARASPINAL: Groundglass opacities in the visualized left lung, better described on CT chest, abdomen and pelvis. Atherosclerotic calcifications of the carotid arteries.     IMPRESSION: 1.  No fracture or posttraumatic subluxation. 2.  Multilevel degenerative changes in the cervical spine as detailed above.    CT Chest Abdomen Pelvis w/o Contrast    Result Date: 11/18/2022  EXAM: CT CHEST ABDOMEN PELVIS W/O CONTRAST LOCATION: [facility] DATE/TIME: 11/18/2022 11:13 AM INDICATION: Severe weakness, dyspnea, sepsis evaluation, fall COMPARISON: CT abdomen pelvis 03/25/2022, CT chest 12/09/2019 TECHNIQUE: CT scan of the chest, abdomen, and pelvis was performed without IV contrast. Multiplanar reformats were obtained. Dose reduction techniques were used. CONTRAST: None. FINDINGS: LUNGS AND PLEURA: Patchy and nodular airspace opacities throughout the upper and lower lobes bilaterally. Bronchial wall thickening and endobronchial debris in the left lower lobe. No pleural effusion or pneumothorax. MEDIASTINUM/AXILLAE: Normal. CORONARY ARTERY CALCIFICATION: Moderate. HEPATOBILIARY: Normal. PANCREAS: Normal. SPLEEN: Normal. ADRENAL GLANDS: Normal. KIDNEYS/BLADDER: Atrophic right kidney with unchanged mild collecting system dilation to the urinary diversion. Cystectomy with ileal diversion. Simple cysts, which do not require follow-up. BOWEL: Diverticulosis of the colon. No acute  inflammatory change. No obstruction. LYMPH NODES: Normal. VASCULATURE: Moderate atherosclerotic calcification with stents in the left common, external, and internal iliac arteries. Patency not assessed without IV contrast. PELVIC ORGANS: Absent. MUSCULOSKELETAL: Osteopenia and degenerative changes in the spine. No aggressive or destructive lesion. No acute fracture.     IMPRESSION: 1.  Airspace opacities in both lungs, left greater than right, concerning for multifocal pneumonia associated endobronchial debris and bronchial wall thickening in the left lower lobe suggests superimposed aspiration.    CT Head w/o Contrast    Result Date: 12/3/2022  EXAM: CT HEAD WITHOUT CONTRAST LOCATION: [facility] DATE/TIME: 12/03/2022, 1:39 PM INDICATION: Fall with head trauma, on anticoagulation. COMPARISON: Head CT 12/02/2022. TECHNIQUE: Routine CT Head without IV contrast. Multiplanar reformats. Dose reduction techniques were used. FINDINGS: INTRACRANIAL CONTENTS: No intracranial hemorrhage, extra-axial collection, or mass effect.  Small chronic infarction left insula and basal ganglia. Mild presumed chronic small vessel ischemic changes. Mild generalized volume loss. No hydrocephalus. Scattered calcified plaque distal vertebral arteries and carotid siphons. VISUALIZED ORBITS/SINUSES/MASTOIDS: No intraorbital abnormality. No paranasal sinus mucosal disease. No middle ear or mastoid effusion. BONES/SOFT TISSUES: No acute abnormality.     IMPRESSION: 1.  No CT evidence for acute intracranial process. 2.  Brain atrophy and presumed chronic microvascular ischemic changes as above.     Head CT w/o contrast    Result Date: 12/2/2022  EXAM: CT HEAD W/O CONTRAST, CT CERVICAL SPINE W/O CONTRAST LOCATION: [facility] DATE/TIME: 12/2/2022 7:20 PM INDICATION: head injury on coumadin and plavix with neck pain. COMPARISON: 11/18/2022. TECHNIQUE: 1) Routine CT Head without IV contrast.  Multiplanar reformats. Dose reduction techniques were used. 2) Routine CT Cervical Spine without IV contrast. Multiplanar reformats. Dose reduction techniques were used. FINDINGS: HEAD CT: INTRACRANIAL CONTENTS: No intracranial hemorrhage, extraaxial collection, or mass effect.  No CT evidence of acute infarct. Scattered diffuse low attenuation within the periventricular and subcortical white matter consistent with diffuse small vessel ischemic disease. 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 The ventricular system, basal cisterns and the cortical sulci are consistent with diffuse volume loss. VISUALIZED ORBITS/SINUSES/MASTOIDS: No intraorbital abnormality. No paranasal sinus mucosal disease. No middle ear or mastoid effusion. BONES/SOFT TISSUES: No acute abnormality. CERVICAL SPINE CT: VERTEBRA: Normal vertebral body heights and alignment. No fracture or posttraumatic subluxation. CANAL/FORAMINA: There is degenerative disc disease primarily from the C4-C5 to the C6-C7 disc space levels. These levels have a moderate loss of disc space heights, endplate changes and small anterior osteophyte formations. There is mild diffuse facet arthropathy. There is diffuse facet arthropathy visualized. At the C3-C4 disc space level there is mild right neural foraminal narrowing. At the C4-C5 and the C5-C6 disc space level there is mild bilateral neural foraminal narrowing. There is no significant canal compromise throughout the cervical spine. PARASPINAL: No extraspinal abnormality. Visualized lung fields are clear.     IMPRESSION: HEAD CT: 1.  No CT finding of a mass, hemorrhage or focal area suggestive of acute infarct. 2.  Stable diffuse age related changes. CERVICAL SPINE CT: 1.  No CT evidence for acute fracture or post traumatic subluxation. 2.  Diffuse neural foraminal narrowing without canal compromise as described above.    Head CT w/o contrast    Result Date: 11/18/2022  EXAM: CT HEAD W/O CONTRAST  LOCATION: [facility] DATE/TIME: 11/18/2022 11:04 AM INDICATION: Recurrent falls, weakness, anticoagulation. COMPARISON: Head CT 5/12/2020. TECHNIQUE: Routine CT Head without IV contrast. Multiplanar reformats. Dose reduction techniques were used. FINDINGS: INTRACRANIAL CONTENTS: No acute intracranial hemorrhage. No mass effect or midline shift. Mild diffuse parenchymal volume loss. Patchy periventricular white matter hypodensities are nonspecific, but most likely related to chronic microvascular ischemic disease. Chronic-appearing infarct in the left insular cortex, also present on prior. Small chronic lacunar infarct versus dilated perivascular space in the left basal ganglia. VISUALIZED ORBITS/SINUSES/MASTOIDS: No intraorbital abnormality. No paranasal sinus mucosal disease. No middle ear or mastoid effusion. BONES/SOFT TISSUES: No acute abnormality.     IMPRESSION: 1.  No acute intracranial hemorrhage, mass, or herniation. 2.  Mild diffuse parenchymal volume loss and white matter changes most likely due to chronic microvascular ischemic disease. 3.  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          Lumbar spine CT w/o contrast    Result Date: 11/18/2022  EXAM: CT THORACIC SPINE W/O CONTRAST, CT LUMBAR SPINE W/O CONTRAST LOCATION: [facility] DATE/TIME: 11/18/2022 11:07 AM INDICATION: Fall, back pain, very limited historian. COMPARISON: None. TECHNIQUE: 1) Routine CT Thoracic Spine without IV contrast. Multiplanar reformats. Dose reduction techniques were used. 2) Routine CT Lumbar Spine without IV contrast. Multiplanar reformats. Dose reduction techniques were used. FINDINGS: THORACIC SPINE CT: VERTEBRA: Thoracic spine alignment is grossly within normal limits. No significant loss of thoracic vertebral body height. No acute lucent fracture lines appreciated in the thoracic vertebral bodies. Subtle cortical step-off and fracture line through the  medial right 11th rib (series 4 image 135). Very subtle cortical irregularity also of the medial right 10th and ninth ribs which could represent subtle fractures. Subtle cortical irregularity and sclerosis in the right 12th rib. Mild degenerative endplate changes and loss of disc height throughout the thoracic spine. Prominent anterior osteophytic spurring in the thoracic spine. No significant disc herniation appreciated. No significant facet hypertrophy. CANAL/FORAMINA: No high-grade spinal canal or neural foraminal narrowing. PARASPINAL: Groundglass opacities throughout the lungs, these are better described on CT chest abdomen pelvis report. LUMBAR SPINE CT: VERTEBRA: Leftward listhesis of L2 on L3. Mild grade 1 retrolisthesis of L2 on L3. Grade 1 anterolisthesis of L4 on L5. Sequela of previous left-sided laminectomy at L4-L5. Lucency through the left L4 inferior facet with sclerotic margins is likely chronic and may be related to surgery at that site. Subtle cortical step-off with sclerosis of the right L1, L2, and L3 transverse processes which may represent subacute fractures. No other lucent fracture lines appreciated. No significant loss of lumbar vertebral body height. Marked degenerative endplate changes and loss of disc height at L2-L3. Moderate degenerative endplate changes and loss of disc height at the other levels. Multiple disc herniations throughout the lumbar spine particularly at L2-L3 and asymmetric towards the left at L3-L4, L4-L5, and L5-S1. Facet hypertrophy in the lower lumbar spine. CANAL/FORAMINA: Moderate spinal canal narrowings at L2-L3 and L3-L4. Multiple levels of neural foraminal narrowing most pronounced right greater than left at L2-L3 and left greater than right at L3-L4, L4-L5, and L5-S1. PARASPINAL: Vascular stents in place.     IMPRESSION: THORACIC SPINE CT: 1.  Acute appearing fracture of the medial right 11th rib. Probable subtle acute fractures also of the medial right 10th and  ninth ribs. Question acute to subacute fracture of the right 12th rib. 2.  No fractures within the thoracic vertebral bodies. Thoracic spine alignment is within normal limits. 3.  Degenerative changes in the thoracic spine without significant spinal canal or neural foraminal narrowing. LUMBAR SPINE CT: 1.  Subtle acute to subacute appearing fractures of the right L1, L2, and L3 transverse processes with minimal cortical step-off and surrounding sclerosis. 2.  No other acute fractures appreciated in the lumbar spine. No significant loss of lumbar vertebral body height. 3.  Multilevel degenerative changes in the lumbar spine as detailed above. Sequela of previous left-sided laminectomy at L4-L5.     CT Thoracic Spine w/o Contrast    Result Date: 11/18/2022  EXAM: CT THORACIC SPINE W/O CONTRAST, CT LUMBAR SPINE W/O CONTRAST LOCATION: [facility] DATE/TIME: 11/18/2022 11:07 AM INDICATION: Fall, back pain, very limited historian. COMPARISON: None. TECHNIQUE: 1) Routine CT Thoracic Spine without IV contrast. Multiplanar reformats. Dose reduction techniques were used. 2) Routine CT Lumbar Spine without IV contrast. Multiplanar reformats. Dose reduction techniques were used. FINDINGS: THORACIC SPINE CT: VERTEBRA: Thoracic spine alignment is grossly within normal limits. No significant loss of thoracic vertebral body height. No acute lucent fracture lines appreciated in the thoracic vertebral bodies. Subtle cortical step-off and fracture line through the medial right 11th rib (series 4 image 135). Very subtle cortical irregularity also of the medial right 10th and ninth ribs which could represent subtle fractures. Subtle cortical irregularity and sclerosis in the right 12th rib. Mild degenerative endplate changes and loss of disc height throughout the thoracic spine. Prominent anterior osteophytic spurring in the thoracic spine. No significant disc herniation appreciated. No significant facet  hypertrophy. CANAL/FORAMINA: No high-grade spinal canal or neural foraminal narrowing. PARASPINAL: Groundglass opacities throughout the lungs, these are better described on CT chest abdomen pelvis report. LUMBAR SPINE CT: VERTEBRA: Leftward listhesis of L2 on L3. Mild grade 1 retrolisthesis of L2 on L3. Grade 1 anterolisthesis of L4 on L5. Sequela of previous left-sided laminectomy at L4-L5. Lucency through the left L4 inferior facet with sclerotic margins is likely chronic and may be related to surgery at that site. Subtle cortical step-off with sclerosis of the right L1, L2, and L3 transverse processes which may represent subacute fractures. No other lucent fracture lines appreciated. No significant loss of lumbar vertebral body height. Marked degenerative endplate changes and loss of disc height at L2-L3. Moderate degenerative endplate changes and loss of disc height at the other levels. Multiple disc herniations throughout the lumbar spine particularly at L2-L3 and asymmetric towards the left at L3-L4, L4-L5, and L5-S1. Facet hypertrophy in the lower lumbar spine. CANAL/FORAMINA: Moderate spinal canal narrowings at L2-L3 and L3-L4. Multiple levels of neural foraminal narrowing most pronounced right greater than left at L2-L3 and left greater than right at L3-L4, L4-L5, and L5-S1. PARASPINAL: Vascular stents in place.     IMPRESSION: THORACIC SPINE CT: 1.  Acute appearing fracture of the medial right 11th rib. Probable subtle acute fractures also of the medial right 10th and ninth ribs. Question acute to subacute fracture of the right 12th rib. 2.  No fractures within the thoracic vertebral bodies. Thoracic spine alignment is within normal limits. 3.  Degenerative changes in the thoracic spine without significant spinal canal or neural foraminal narrowing. LUMBAR SPINE CT: 1.  Subtle acute to subacute appearing fractures of the right L1, L2, and L3 transverse processes with minimal cortical step-off and  surrounding sclerosis. 2.  No other acute fractures appreciated in the lumbar spine. No significant loss of lumbar vertebral body height. 3.  Multilevel degenerative changes in the lumbar spine as detailed above. Sequela of previous left-sided laminectomy at L4-L5.     POC US ABDOMEN LIMITED    Result Date: 11/18/2022  Ildefonso Pickens MD     11/18/2022 11:04 AM POC US ABDOMEN LIMITED Date/Time: 11/18/2022 11:01 AM Performed by: Ildefonso Pickens MD Authorized by: Ildefonso Pickens MD Procedure details:   Indications comment:  Fall, lightheadedness, low blood pressure in the field Comments:    Views obtained: Right upper quadrant, left upper quadrant, suprapubic, subxiphoid cardiac, bilateral thoracic. Findings: There is no evidence of intra-abdominal fluid.  The right kidney is irregular consistent with the patient's history of chronic right-sided hydronephrosis.  Patient has also had a cystectomy, no visible lower abdominal fluid collections.  There is no evidence of pericardial effusion.  There is bilateral lung sliding. Impression: Negative E fast exam Attempted to save images in PACS.  I was able to pull up the patient's exam, but I am sure if these images are going to be able to properly load due to recent technical difficulties.    MRV Brain wo & w Contrast    Result Date: 11/21/2022  EXAM: MRV BRAIN  W/O and W CONTRAST LOCATION: [facility] DATE/TIME: 11/21/2022 2:17 PM INDICATION: rule out venous thrombosis. Left-sided tingling and weakness. COMPARISON: No prior MRV available for comparison at the time of this dictation. CONTRAST: 11ml gadavist TECHNIQUE: Phase contrast and 2-D time-of-flight head MRV performed after administration of intravenous contrast. FINDINGS: DURAL SINUSES: No significant stenosis or occlusion. Dominant left and smaller right transverse and sigmoid dural sinuses. INTERNAL CEREBRAL VEINS: No significant stenosis or occlusion.  MAJOR CORTICAL VENOUS BRANCHES:  No significant stenosis or occlusion.     IMPRESSION: 1.  No dural venous sinus thrombosis or significant stenosis.    XR Humerus Port Left G/E 2 Views    Result Date: 11/19/2022  EXAM: XR HUMERUS PORTABLE LEFT G/E 2 VIEWS LOCATION: [facility] DATE/TIME: 11/19/2022, 10:34 AM INDICATION: Left humerus pain. COMPARISON: None.     IMPRESSION: 1.  Unremarkable left humerus. 2.  No fracture. 3.  Partially visualized left-sided PICC.       Discharge Orders        General info for SNF    Length of Stay Estimate: Short Term Care: Estimated # of Days <30  Condition at Discharge: Improving  Level of care:skilled   Rehabilitation Potential: Fair  Admission H&P remains valid and up-to-date: Yes  Recent Chemotherapy: N/A  Use Nursing Home Standing Orders: Yes     Follow Up and recommended labs and tests    Follow up with [facility] physician.  The following labs/tests are recommended: INR in 2 days and then every Monday and Thursday and call MD with inr to dose warfarin, will need BMP in 2 days and then also every Mon/Thursday as well.  Follow up with primary care provider in 1 week   Follow up CHF clinic 1 week  Needs sutures removed from laceration by left eyebrow in 2 days, so on 12/9/22     Reason for your hospital stay    Falls,chf, CKD     Glucose monitor nursing POCT    Before meals and at bedtime     Intake and output    Every shift     Daily weights    Call Provider for weight gain of more than 2 pounds per day or 5 pounds per week.     Otero catheter    Urostomy cares     Activity - Up with nursing assistance     Wound care (specify)    Site:   left face, laceration by left eyebrow  Instructions:  bacitracin bid x 3 days and then remove sutures on 12/9/22--call TCU provider if any questions at TCU     Full Code     Physical Therapy Adult Consult    Evaluate and treat as clinically indicated.    Reason:  falls, weakness     Occupational Therapy Adult Consult    Evaluate and treat as clinically  indicated.    Reason:  falls , weakness     Fall precautions     Diet    Follow this diet upon discharge: Orders Placed This Encounter      Fluid restriction 1500 ML FLUID      Combination Diet Low Consistent Carb (45 g CHO per Meal) Diet; 2 gm NA Diet; Low Saturated Fat Na <2400mg Diet, No Caffeine Diet       Examination   Physical Exam   Temp:  [97.5  F (36.4  C)-98.2  F (36.8  C)] 98  F (36.7  C)  Pulse:  [70-92] 81  Resp:  [17-20] 18  BP: (104-121)/(58-79) 118/79  SpO2:  [97 %-100 %] 99 %  Wt Readings from Last 1 Encounters:   12/07/22 105.5 kg (232 lb 9.6 oz)       See today's note    Please see EMR for more detailed significant labs, imaging, consultant notes etc.    I, Wendy Chatterjee MD, personally saw the patient today and spent greater than 30 minutes discharging this patient.    Wendy Chatterjee MD  [facility]    CC:Rafael Montelongo

## 2022-12-07 NOTE — PLAN OF CARE
Physical Therapy Discharge Summary    Reason for therapy discharge:    Discharged to transitional care facility.    Progress towards therapy goal(s). See goals on Care Plan in [facility] electronic health record for goal details.  Goals partially met.  Barriers to achieving goals:   discharge from facility.    Therapy recommendation(s):    Continued therapy is recommended.  Rationale/Recommendations:  PT goals not fully met.

## 2022-12-08 LAB
ANION GAP SERPL CALCULATED.3IONS-SCNC: 12 MMOL/L (ref 7–15)
BUN SERPL-MCNC: 63.7 MG/DL (ref 8–23)
CALCIUM SERPL-MCNC: 9.1 MG/DL (ref 8.8–10.2)
CHLORIDE SERPL-SCNC: 102 MMOL/L (ref 98–107)
CREAT SERPL-MCNC: 2.74 MG/DL (ref 0.67–1.17)
DEPRECATED HCO3 PLAS-SCNC: 21 MMOL/L (ref 22–29)
GFR SERPL CREATININE-BSD FRML MDRD: 22 ML/MIN/1.73M2
GLUCOSE SERPL-MCNC: 143 MG/DL (ref 70–99)
INR PPP: 2.13 (ref 0.85–1.15)
POTASSIUM SERPL-SCNC: 3.5 MMOL/L (ref 3.4–5.3)
SODIUM SERPL-SCNC: 135 MMOL/L (ref 136–145)

## 2022-12-08 PROCEDURE — 80048 BASIC METABOLIC PNL TOTAL CA: CPT | Mod: ORL

## 2022-12-08 PROCEDURE — 85610 PROTHROMBIN TIME: CPT | Mod: ORL

## 2022-12-08 PROCEDURE — 36415 COLL VENOUS BLD VENIPUNCTURE: CPT | Mod: ORL

## 2022-12-08 PROCEDURE — P9604 ONE-WAY ALLOW PRORATED TRIP: HCPCS | Mod: ORL

## 2022-12-08 PROCEDURE — 85610 PROTHROMBIN TIME: CPT | Mod: ORL | Performed by: FAMILY MEDICINE

## 2022-12-08 PROCEDURE — 36415 COLL VENOUS BLD VENIPUNCTURE: CPT | Mod: ORL | Performed by: FAMILY MEDICINE

## 2022-12-08 PROCEDURE — 80048 BASIC METABOLIC PNL TOTAL CA: CPT | Mod: ORL | Performed by: FAMILY MEDICINE

## 2022-12-08 PROCEDURE — P9604 ONE-WAY ALLOW PRORATED TRIP: HCPCS | Mod: ORL | Performed by: FAMILY MEDICINE

## 2022-12-09 ENCOUNTER — LAB REQUISITION (OUTPATIENT)
Dept: LAB | Facility: CLINIC | Age: 82
End: 2022-12-09
Payer: COMMERCIAL

## 2022-12-09 DIAGNOSIS — I48.0 PAROXYSMAL ATRIAL FIBRILLATION (H): ICD-10-CM

## 2022-12-11 ENCOUNTER — LAB REQUISITION (OUTPATIENT)
Dept: LAB | Facility: CLINIC | Age: 82
End: 2022-12-11
Payer: COMMERCIAL

## 2022-12-11 DIAGNOSIS — I50.9 HEART FAILURE, UNSPECIFIED (H): ICD-10-CM

## 2022-12-11 DIAGNOSIS — I48.91 UNSPECIFIED ATRIAL FIBRILLATION (H): ICD-10-CM

## 2022-12-12 LAB
ANION GAP SERPL CALCULATED.3IONS-SCNC: 20 MMOL/L (ref 7–15)
BASOPHILS # BLD AUTO: 0 10E3/UL (ref 0–0.2)
BASOPHILS NFR BLD AUTO: 0 %
BUN SERPL-MCNC: 74.2 MG/DL (ref 8–23)
CALCIUM SERPL-MCNC: 9.2 MG/DL (ref 8.8–10.2)
CHLORIDE SERPL-SCNC: 101 MMOL/L (ref 98–107)
CREAT SERPL-MCNC: 2.95 MG/DL (ref 0.67–1.17)
DEPRECATED HCO3 PLAS-SCNC: 15 MMOL/L (ref 22–29)
EOSINOPHIL # BLD AUTO: 0.1 10E3/UL (ref 0–0.7)
EOSINOPHIL NFR BLD AUTO: 1 %
ERYTHROCYTE [DISTWIDTH] IN BLOOD BY AUTOMATED COUNT: 16.3 % (ref 10–15)
GFR SERPL CREATININE-BSD FRML MDRD: 21 ML/MIN/1.73M2
GLUCOSE SERPL-MCNC: 218 MG/DL (ref 70–99)
HCT VFR BLD AUTO: 30 % (ref 40–53)
HGB BLD-MCNC: 9.7 G/DL (ref 13.3–17.7)
IMM GRANULOCYTES # BLD: 0.1 10E3/UL
IMM GRANULOCYTES NFR BLD: 1 %
INR PPP: 4.24 (ref 0.85–1.15)
LYMPHOCYTES # BLD AUTO: 1 10E3/UL (ref 0.8–5.3)
LYMPHOCYTES NFR BLD AUTO: 9 %
MCH RBC QN AUTO: 32.7 PG (ref 26.5–33)
MCHC RBC AUTO-ENTMCNC: 32.3 G/DL (ref 31.5–36.5)
MCV RBC AUTO: 101 FL (ref 78–100)
MONOCYTES # BLD AUTO: 0.7 10E3/UL (ref 0–1.3)
MONOCYTES NFR BLD AUTO: 6 %
NEUTROPHILS # BLD AUTO: 8.8 10E3/UL (ref 1.6–8.3)
NEUTROPHILS NFR BLD AUTO: 83 %
NRBC # BLD AUTO: 0 10E3/UL
NRBC BLD AUTO-RTO: 0 /100
PLATELET # BLD AUTO: 236 10E3/UL (ref 150–450)
POTASSIUM SERPL-SCNC: 3.7 MMOL/L (ref 3.4–5.3)
RBC # BLD AUTO: 2.97 10E6/UL (ref 4.4–5.9)
RETICS # AUTO: 0.14 10E6/UL (ref 0.03–0.1)
RETICS/RBC NFR AUTO: 4.9 % (ref 0.5–2)
SODIUM SERPL-SCNC: 136 MMOL/L (ref 136–145)
WBC # BLD AUTO: 10.6 10E3/UL (ref 4–11)

## 2022-12-12 PROCEDURE — 80048 BASIC METABOLIC PNL TOTAL CA: CPT | Mod: ORL | Performed by: FAMILY MEDICINE

## 2022-12-12 PROCEDURE — P9604 ONE-WAY ALLOW PRORATED TRIP: HCPCS | Mod: ORL | Performed by: FAMILY MEDICINE

## 2022-12-12 PROCEDURE — 36415 COLL VENOUS BLD VENIPUNCTURE: CPT | Mod: ORL | Performed by: FAMILY MEDICINE

## 2022-12-12 PROCEDURE — 85610 PROTHROMBIN TIME: CPT | Mod: ORL | Performed by: FAMILY MEDICINE

## 2022-12-12 PROCEDURE — 85025 COMPLETE CBC W/AUTO DIFF WBC: CPT | Mod: ORL | Performed by: FAMILY MEDICINE

## 2022-12-12 PROCEDURE — 85045 AUTOMATED RETICULOCYTE COUNT: CPT | Mod: ORL | Performed by: FAMILY MEDICINE

## 2022-12-13 LAB
INR PPP: 4.35 (ref 0.85–1.15)
PATH REPORT.COMMENTS IMP SPEC: NORMAL
PATH REPORT.COMMENTS IMP SPEC: NORMAL
PATH REPORT.FINAL DX SPEC: NORMAL
PATH REPORT.RELEVANT HX SPEC: NORMAL

## 2022-12-13 PROCEDURE — P9603 ONE-WAY ALLOW PRORATED MILES: HCPCS | Mod: ORL | Performed by: FAMILY MEDICINE

## 2022-12-13 PROCEDURE — 85610 PROTHROMBIN TIME: CPT | Mod: ORL | Performed by: FAMILY MEDICINE

## 2022-12-13 PROCEDURE — 36415 COLL VENOUS BLD VENIPUNCTURE: CPT | Mod: ORL | Performed by: FAMILY MEDICINE

## 2022-12-13 PROCEDURE — 99207 BLOOD MORPHOLOGY PATHOLOGIST REVIEW: CPT | Performed by: PATHOLOGY

## 2022-12-14 ENCOUNTER — LAB REQUISITION (OUTPATIENT)
Dept: LAB | Facility: CLINIC | Age: 82
End: 2022-12-14
Payer: COMMERCIAL

## 2022-12-14 DIAGNOSIS — I48.91 UNSPECIFIED ATRIAL FIBRILLATION (H): ICD-10-CM

## 2022-12-14 DIAGNOSIS — I50.9 HEART FAILURE, UNSPECIFIED (H): ICD-10-CM

## 2022-12-15 ENCOUNTER — LAB REQUISITION (OUTPATIENT)
Dept: LAB | Facility: CLINIC | Age: 82
End: 2022-12-15
Payer: COMMERCIAL

## 2022-12-15 DIAGNOSIS — I48.91 UNSPECIFIED ATRIAL FIBRILLATION (H): ICD-10-CM

## 2022-12-15 LAB
ANION GAP SERPL CALCULATED.3IONS-SCNC: 18 MMOL/L (ref 7–15)
BUN SERPL-MCNC: 90.6 MG/DL (ref 8–23)
CALCIUM SERPL-MCNC: 9.2 MG/DL (ref 8.8–10.2)
CHLORIDE SERPL-SCNC: 102 MMOL/L (ref 98–107)
CREAT SERPL-MCNC: 3.45 MG/DL (ref 0.67–1.17)
DEPRECATED HCO3 PLAS-SCNC: 18 MMOL/L (ref 22–29)
GFR SERPL CREATININE-BSD FRML MDRD: 17 ML/MIN/1.73M2
GLUCOSE SERPL-MCNC: 179 MG/DL (ref 70–99)
INR PPP: 3.35 (ref 0.85–1.15)
POTASSIUM SERPL-SCNC: 3.6 MMOL/L (ref 3.4–5.3)
SODIUM SERPL-SCNC: 138 MMOL/L (ref 136–145)

## 2022-12-15 PROCEDURE — 85610 PROTHROMBIN TIME: CPT | Mod: ORL | Performed by: FAMILY MEDICINE

## 2022-12-15 PROCEDURE — 36415 COLL VENOUS BLD VENIPUNCTURE: CPT | Mod: ORL | Performed by: FAMILY MEDICINE

## 2022-12-15 PROCEDURE — P9604 ONE-WAY ALLOW PRORATED TRIP: HCPCS | Mod: ORL | Performed by: FAMILY MEDICINE

## 2022-12-15 PROCEDURE — 80048 BASIC METABOLIC PNL TOTAL CA: CPT | Mod: ORL | Performed by: FAMILY MEDICINE

## 2022-12-16 ENCOUNTER — LAB REQUISITION (OUTPATIENT)
Dept: LAB | Facility: CLINIC | Age: 82
End: 2022-12-16
Payer: COMMERCIAL

## 2022-12-16 DIAGNOSIS — I48.0 PAROXYSMAL ATRIAL FIBRILLATION (H): ICD-10-CM

## 2022-12-16 DIAGNOSIS — I48.91 UNSPECIFIED ATRIAL FIBRILLATION (H): ICD-10-CM

## 2022-12-16 DIAGNOSIS — I50.9 HEART FAILURE, UNSPECIFIED (H): ICD-10-CM

## 2022-12-16 LAB — INR PPP: 3.06 (ref 0.85–1.15)

## 2022-12-16 PROCEDURE — 36415 COLL VENOUS BLD VENIPUNCTURE: CPT | Mod: ORL | Performed by: FAMILY MEDICINE

## 2022-12-16 PROCEDURE — P9604 ONE-WAY ALLOW PRORATED TRIP: HCPCS | Mod: ORL | Performed by: FAMILY MEDICINE

## 2022-12-16 PROCEDURE — 85610 PROTHROMBIN TIME: CPT | Mod: ORL | Performed by: FAMILY MEDICINE

## 2022-12-19 ENCOUNTER — LAB REQUISITION (OUTPATIENT)
Dept: LAB | Facility: CLINIC | Age: 82
End: 2022-12-19
Payer: COMMERCIAL

## 2022-12-19 DIAGNOSIS — I48.91 UNSPECIFIED ATRIAL FIBRILLATION (H): ICD-10-CM

## 2022-12-19 LAB
ANION GAP SERPL CALCULATED.3IONS-SCNC: 15 MMOL/L (ref 7–15)
BUN SERPL-MCNC: 80.9 MG/DL (ref 8–23)
CALCIUM SERPL-MCNC: 9 MG/DL (ref 8.8–10.2)
CHLORIDE SERPL-SCNC: 103 MMOL/L (ref 98–107)
CREAT SERPL-MCNC: 2.49 MG/DL (ref 0.67–1.17)
DEPRECATED HCO3 PLAS-SCNC: 20 MMOL/L (ref 22–29)
GFR SERPL CREATININE-BSD FRML MDRD: 25 ML/MIN/1.73M2
GLUCOSE SERPL-MCNC: 182 MG/DL (ref 70–99)
POTASSIUM SERPL-SCNC: 4.3 MMOL/L (ref 3.4–5.3)
SODIUM SERPL-SCNC: 138 MMOL/L (ref 136–145)

## 2022-12-19 PROCEDURE — 36415 COLL VENOUS BLD VENIPUNCTURE: CPT | Mod: ORL | Performed by: FAMILY MEDICINE

## 2022-12-19 PROCEDURE — 80048 BASIC METABOLIC PNL TOTAL CA: CPT | Mod: ORL | Performed by: FAMILY MEDICINE

## 2022-12-19 PROCEDURE — P9603 ONE-WAY ALLOW PRORATED MILES: HCPCS | Mod: ORL | Performed by: FAMILY MEDICINE

## 2022-12-20 ENCOUNTER — LAB REQUISITION (OUTPATIENT)
Dept: LAB | Facility: CLINIC | Age: 82
End: 2022-12-20
Payer: COMMERCIAL

## 2022-12-20 DIAGNOSIS — I48.91 UNSPECIFIED ATRIAL FIBRILLATION (H): ICD-10-CM

## 2022-12-20 DIAGNOSIS — I50.9 HEART FAILURE, UNSPECIFIED (H): ICD-10-CM

## 2022-12-20 LAB — INR PPP: 2.53 (ref 0.85–1.15)

## 2022-12-20 PROCEDURE — 36415 COLL VENOUS BLD VENIPUNCTURE: CPT | Mod: ORL | Performed by: NURSE PRACTITIONER

## 2022-12-20 PROCEDURE — 85610 PROTHROMBIN TIME: CPT | Mod: ORL | Performed by: NURSE PRACTITIONER

## 2022-12-20 PROCEDURE — P9603 ONE-WAY ALLOW PRORATED MILES: HCPCS | Mod: ORL | Performed by: NURSE PRACTITIONER

## 2022-12-21 ENCOUNTER — LAB REQUISITION (OUTPATIENT)
Dept: LAB | Facility: CLINIC | Age: 82
End: 2022-12-21
Payer: COMMERCIAL

## 2022-12-21 DIAGNOSIS — I48.0 PAROXYSMAL ATRIAL FIBRILLATION (H): ICD-10-CM

## 2022-12-22 LAB
ANION GAP SERPL CALCULATED.3IONS-SCNC: 11 MMOL/L (ref 7–15)
BUN SERPL-MCNC: 67.3 MG/DL (ref 8–23)
CALCIUM SERPL-MCNC: 8.8 MG/DL (ref 8.8–10.2)
CHLORIDE SERPL-SCNC: 103 MMOL/L (ref 98–107)
CREAT SERPL-MCNC: 2.47 MG/DL (ref 0.67–1.17)
DEPRECATED HCO3 PLAS-SCNC: 22 MMOL/L (ref 22–29)
GFR SERPL CREATININE-BSD FRML MDRD: 25 ML/MIN/1.73M2
GLUCOSE SERPL-MCNC: 159 MG/DL (ref 70–99)
INR PPP: 2.5 (ref 0.85–1.15)
POTASSIUM SERPL-SCNC: 3.9 MMOL/L (ref 3.4–5.3)
SODIUM SERPL-SCNC: 136 MMOL/L (ref 136–145)

## 2022-12-22 PROCEDURE — 80048 BASIC METABOLIC PNL TOTAL CA: CPT | Mod: ORL | Performed by: FAMILY MEDICINE

## 2022-12-22 PROCEDURE — P9604 ONE-WAY ALLOW PRORATED TRIP: HCPCS | Mod: ORL | Performed by: FAMILY MEDICINE

## 2022-12-22 PROCEDURE — 85610 PROTHROMBIN TIME: CPT | Mod: ORL | Performed by: FAMILY MEDICINE

## 2022-12-22 PROCEDURE — 36415 COLL VENOUS BLD VENIPUNCTURE: CPT | Mod: ORL | Performed by: FAMILY MEDICINE

## 2022-12-23 ENCOUNTER — LAB REQUISITION (OUTPATIENT)
Dept: LAB | Facility: CLINIC | Age: 82
End: 2022-12-23
Payer: COMMERCIAL

## 2022-12-23 DIAGNOSIS — I48.91 UNSPECIFIED ATRIAL FIBRILLATION (H): ICD-10-CM

## 2022-12-23 LAB — INR PPP: 2.32 (ref 0.85–1.15)

## 2022-12-23 PROCEDURE — 85610 PROTHROMBIN TIME: CPT | Mod: ORL | Performed by: FAMILY MEDICINE

## 2022-12-23 PROCEDURE — 36415 COLL VENOUS BLD VENIPUNCTURE: CPT | Mod: ORL | Performed by: FAMILY MEDICINE

## 2022-12-23 PROCEDURE — P9604 ONE-WAY ALLOW PRORATED TRIP: HCPCS | Mod: ORL | Performed by: FAMILY MEDICINE

## 2022-12-26 LAB
ANION GAP SERPL CALCULATED.3IONS-SCNC: 13 MMOL/L (ref 7–15)
BUN SERPL-MCNC: 52.6 MG/DL (ref 8–23)
CALCIUM SERPL-MCNC: 8.9 MG/DL (ref 8.8–10.2)
CHLORIDE SERPL-SCNC: 105 MMOL/L (ref 98–107)
CREAT SERPL-MCNC: 2.21 MG/DL (ref 0.67–1.17)
DEPRECATED HCO3 PLAS-SCNC: 19 MMOL/L (ref 22–29)
GFR SERPL CREATININE-BSD FRML MDRD: 29 ML/MIN/1.73M2
GLUCOSE SERPL-MCNC: 244 MG/DL (ref 70–99)
INR PPP: 2.14 (ref 0.85–1.15)
POTASSIUM SERPL-SCNC: 4.2 MMOL/L (ref 3.4–5.3)
SODIUM SERPL-SCNC: 137 MMOL/L (ref 136–145)

## 2022-12-26 PROCEDURE — 80048 BASIC METABOLIC PNL TOTAL CA: CPT | Mod: ORL | Performed by: FAMILY MEDICINE

## 2022-12-26 PROCEDURE — 36415 COLL VENOUS BLD VENIPUNCTURE: CPT | Mod: ORL | Performed by: FAMILY MEDICINE

## 2022-12-26 PROCEDURE — 85610 PROTHROMBIN TIME: CPT | Mod: ORL | Performed by: FAMILY MEDICINE

## 2022-12-26 PROCEDURE — P9604 ONE-WAY ALLOW PRORATED TRIP: HCPCS | Mod: ORL | Performed by: FAMILY MEDICINE

## 2022-12-27 ENCOUNTER — LAB REQUISITION (OUTPATIENT)
Dept: LAB | Facility: CLINIC | Age: 82
End: 2022-12-27
Payer: COMMERCIAL

## 2022-12-27 DIAGNOSIS — I48.0 PAROXYSMAL ATRIAL FIBRILLATION (H): ICD-10-CM

## 2022-12-28 ENCOUNTER — LAB REQUISITION (OUTPATIENT)
Dept: LAB | Facility: CLINIC | Age: 82
End: 2022-12-28
Payer: COMMERCIAL

## 2022-12-28 DIAGNOSIS — I48.0 PAROXYSMAL ATRIAL FIBRILLATION (H): ICD-10-CM

## 2022-12-29 ENCOUNTER — LAB REQUISITION (OUTPATIENT)
Dept: LAB | Facility: CLINIC | Age: 82
End: 2022-12-29
Payer: COMMERCIAL

## 2022-12-29 DIAGNOSIS — I48.91 UNSPECIFIED ATRIAL FIBRILLATION (H): ICD-10-CM

## 2022-12-29 LAB
ANION GAP SERPL CALCULATED.3IONS-SCNC: 19 MMOL/L (ref 7–15)
BUN SERPL-MCNC: 46.5 MG/DL (ref 8–23)
CALCIUM SERPL-MCNC: 9 MG/DL (ref 8.8–10.2)
CHLORIDE SERPL-SCNC: 103 MMOL/L (ref 98–107)
CREAT SERPL-MCNC: 2.4 MG/DL (ref 0.67–1.17)
DEPRECATED HCO3 PLAS-SCNC: 15 MMOL/L (ref 22–29)
GFR SERPL CREATININE-BSD FRML MDRD: 26 ML/MIN/1.73M2
GLUCOSE SERPL-MCNC: 242 MG/DL (ref 70–99)
INR PPP: 1.72 (ref 0.85–1.15)
POTASSIUM SERPL-SCNC: 4.1 MMOL/L (ref 3.4–5.3)
SODIUM SERPL-SCNC: 137 MMOL/L (ref 136–145)

## 2022-12-29 PROCEDURE — P9604 ONE-WAY ALLOW PRORATED TRIP: HCPCS | Mod: ORL | Performed by: FAMILY MEDICINE

## 2022-12-29 PROCEDURE — 80048 BASIC METABOLIC PNL TOTAL CA: CPT | Mod: ORL | Performed by: FAMILY MEDICINE

## 2022-12-29 PROCEDURE — 36415 COLL VENOUS BLD VENIPUNCTURE: CPT | Mod: ORL | Performed by: FAMILY MEDICINE

## 2022-12-29 PROCEDURE — 85610 PROTHROMBIN TIME: CPT | Mod: ORL | Performed by: FAMILY MEDICINE

## 2023-01-02 ENCOUNTER — LAB REQUISITION (OUTPATIENT)
Dept: LAB | Facility: CLINIC | Age: 83
End: 2023-01-02
Payer: COMMERCIAL

## 2023-01-02 DIAGNOSIS — E11.9 TYPE 2 DIABETES MELLITUS WITHOUT COMPLICATIONS (H): ICD-10-CM

## 2023-01-03 ENCOUNTER — LAB REQUISITION (OUTPATIENT)
Dept: LAB | Facility: CLINIC | Age: 83
End: 2023-01-03
Payer: COMMERCIAL

## 2023-01-03 DIAGNOSIS — I48.0 PAROXYSMAL ATRIAL FIBRILLATION (H): ICD-10-CM

## 2023-01-03 LAB — INR PPP: 1.76 (ref 0.85–1.15)

## 2023-01-03 PROCEDURE — 36415 COLL VENOUS BLD VENIPUNCTURE: CPT | Mod: ORL | Performed by: FAMILY MEDICINE

## 2023-01-03 PROCEDURE — P9603 ONE-WAY ALLOW PRORATED MILES: HCPCS | Mod: ORL | Performed by: FAMILY MEDICINE

## 2023-01-03 PROCEDURE — 85610 PROTHROMBIN TIME: CPT | Mod: ORL | Performed by: FAMILY MEDICINE

## 2023-01-05 ENCOUNTER — HOSPITAL ENCOUNTER (OUTPATIENT)
Facility: CLINIC | Age: 83
Setting detail: OBSERVATION
Discharge: NURSING, REHAB OR LONG-TERM CARE | End: 2023-01-07
Attending: FAMILY MEDICINE | Admitting: INTERNAL MEDICINE
Payer: COMMERCIAL

## 2023-01-05 DIAGNOSIS — D64.9 ANEMIA, UNSPECIFIED TYPE: Primary | ICD-10-CM

## 2023-01-05 DIAGNOSIS — Z79.01 CHRONIC ANTICOAGULATION: ICD-10-CM

## 2023-01-05 DIAGNOSIS — D62 ANEMIA DUE TO BLOOD LOSS, ACUTE: ICD-10-CM

## 2023-01-05 DIAGNOSIS — K92.1 GASTROINTESTINAL HEMORRHAGE WITH MELENA: ICD-10-CM

## 2023-01-05 DIAGNOSIS — I50.9 ACUTE CONGESTIVE HEART FAILURE, UNSPECIFIED HEART FAILURE TYPE (H): ICD-10-CM

## 2023-01-05 PROBLEM — I82.511 CHRONIC DEEP VEIN THROMBOSIS (DVT) OF FEMORAL VEIN OF RIGHT LOWER EXTREMITY (H): Chronic | Status: ACTIVE | Noted: 2019-12-04

## 2023-01-05 PROBLEM — M1A.00X0 IDIOPATHIC CHRONIC GOUT WITHOUT TOPHUS: Chronic | Status: ACTIVE | Noted: 2020-04-27

## 2023-01-05 PROBLEM — E11.610 TYPE 2 DIABETES MELLITUS WITH DIABETIC NEUROPATHIC ARTHROPATHY (H): Chronic | Status: ACTIVE | Noted: 2020-04-27

## 2023-01-05 PROBLEM — I50.32 CHRONIC HEART FAILURE WITH PRESERVED EJECTION FRACTION (H): Chronic | Status: ACTIVE | Noted: 2022-11-04

## 2023-01-05 PROBLEM — K21.9 GASTRO-ESOPHAGEAL REFLUX DISEASE WITHOUT ESOPHAGITIS: Chronic | Status: ACTIVE | Noted: 2020-04-27

## 2023-01-05 PROBLEM — G47.33 OSA (OBSTRUCTIVE SLEEP APNEA): Chronic | Status: ACTIVE | Noted: 2022-11-04

## 2023-01-05 PROBLEM — N17.9 ACUTE KIDNEY INJURY (H): Status: ACTIVE | Noted: 2022-12-05

## 2023-01-05 PROBLEM — E66.01 MORBID OBESITY (H): Chronic | Status: ACTIVE | Noted: 2021-10-04

## 2023-01-05 LAB
ABO/RH(D): NORMAL
ALBUMIN SERPL BCG-MCNC: 3.2 G/DL (ref 3.5–5.2)
ALP SERPL-CCNC: 80 U/L (ref 40–129)
ALT SERPL W P-5'-P-CCNC: 17 U/L (ref 10–50)
ANION GAP SERPL CALCULATED.3IONS-SCNC: 12 MMOL/L (ref 7–15)
ANION GAP SERPL CALCULATED.3IONS-SCNC: 14 MMOL/L (ref 7–15)
ANTIBODY SCREEN: NEGATIVE
AST SERPL W P-5'-P-CCNC: 19 U/L (ref 10–50)
BASOPHILS # BLD AUTO: 0 10E3/UL (ref 0–0.2)
BASOPHILS NFR BLD AUTO: 0 %
BILIRUB SERPL-MCNC: 0.2 MG/DL
BLD PROD TYP BPU: NORMAL
BLD PROD TYP BPU: NORMAL
BLOOD COMPONENT TYPE: NORMAL
BLOOD COMPONENT TYPE: NORMAL
BUN SERPL-MCNC: 34.8 MG/DL (ref 8–23)
BUN SERPL-MCNC: 39.9 MG/DL (ref 8–23)
CALCIUM SERPL-MCNC: 9 MG/DL (ref 8.8–10.2)
CALCIUM SERPL-MCNC: 9.2 MG/DL (ref 8.8–10.2)
CHLORIDE SERPL-SCNC: 106 MMOL/L (ref 98–107)
CHLORIDE SERPL-SCNC: 106 MMOL/L (ref 98–107)
CODING SYSTEM: NORMAL
CODING SYSTEM: NORMAL
CREAT SERPL-MCNC: 1.9 MG/DL (ref 0.67–1.17)
CREAT SERPL-MCNC: 2 MG/DL (ref 0.67–1.17)
CROSSMATCH: NORMAL
CROSSMATCH: NORMAL
DEPRECATED HCO3 PLAS-SCNC: 19 MMOL/L (ref 22–29)
DEPRECATED HCO3 PLAS-SCNC: 21 MMOL/L (ref 22–29)
EOSINOPHIL # BLD AUTO: 0.3 10E3/UL (ref 0–0.7)
EOSINOPHIL NFR BLD AUTO: 3 %
ERYTHROCYTE [DISTWIDTH] IN BLOOD BY AUTOMATED COUNT: 15.5 % (ref 10–15)
ERYTHROCYTE [DISTWIDTH] IN BLOOD BY AUTOMATED COUNT: 15.7 % (ref 10–15)
GFR SERPL CREATININE-BSD FRML MDRD: 33 ML/MIN/1.73M2
GFR SERPL CREATININE-BSD FRML MDRD: 35 ML/MIN/1.73M2
GLUCOSE SERPL-MCNC: 151 MG/DL (ref 70–99)
GLUCOSE SERPL-MCNC: 159 MG/DL (ref 70–99)
HCT VFR BLD AUTO: 21.3 % (ref 40–53)
HCT VFR BLD AUTO: 23.2 % (ref 40–53)
HGB BLD-MCNC: 6.1 G/DL (ref 13.3–17.7)
HGB BLD-MCNC: 6.4 G/DL (ref 13.3–17.7)
HOLD SPECIMEN: NORMAL
IMM GRANULOCYTES # BLD: 0.1 10E3/UL
IMM GRANULOCYTES NFR BLD: 1 %
INR PPP: 1.83 (ref 0.85–1.15)
INR PPP: 1.94 (ref 0.85–1.15)
ISSUE DATE AND TIME: NORMAL
ISSUE DATE AND TIME: NORMAL
LYMPHOCYTES # BLD AUTO: 1.5 10E3/UL (ref 0.8–5.3)
LYMPHOCYTES NFR BLD AUTO: 19 %
MCH RBC QN AUTO: 28.5 PG (ref 26.5–33)
MCH RBC QN AUTO: 28.7 PG (ref 26.5–33)
MCHC RBC AUTO-ENTMCNC: 27.6 G/DL (ref 31.5–36.5)
MCHC RBC AUTO-ENTMCNC: 28.6 G/DL (ref 31.5–36.5)
MCV RBC AUTO: 100 FL (ref 78–100)
MCV RBC AUTO: 104 FL (ref 78–100)
MONOCYTES # BLD AUTO: 0.6 10E3/UL (ref 0–1.3)
MONOCYTES NFR BLD AUTO: 8 %
NEUTROPHILS # BLD AUTO: 5.6 10E3/UL (ref 1.6–8.3)
NEUTROPHILS NFR BLD AUTO: 70 %
NRBC # BLD AUTO: 0 10E3/UL
NRBC BLD AUTO-RTO: 0 /100
PLATELET # BLD AUTO: 284 10E3/UL (ref 150–450)
PLATELET # BLD AUTO: 320 10E3/UL (ref 150–450)
POTASSIUM SERPL-SCNC: 3.7 MMOL/L (ref 3.4–5.3)
POTASSIUM SERPL-SCNC: 3.7 MMOL/L (ref 3.4–5.3)
PROT SERPL-MCNC: 6.8 G/DL (ref 6.4–8.3)
RBC # BLD AUTO: 2.14 10E6/UL (ref 4.4–5.9)
RBC # BLD AUTO: 2.23 10E6/UL (ref 4.4–5.9)
SODIUM SERPL-SCNC: 139 MMOL/L (ref 136–145)
SODIUM SERPL-SCNC: 139 MMOL/L (ref 136–145)
SPECIMEN EXPIRATION DATE: NORMAL
UNIT ABO/RH: NORMAL
UNIT ABO/RH: NORMAL
UNIT NUMBER: NORMAL
UNIT NUMBER: NORMAL
UNIT STATUS: NORMAL
UNIT STATUS: NORMAL
UNIT TYPE ISBT: 7300
UNIT TYPE ISBT: 7300
WBC # BLD AUTO: 8 10E3/UL (ref 4–11)
WBC # BLD AUTO: 8 10E3/UL (ref 4–11)

## 2023-01-05 PROCEDURE — 85027 COMPLETE CBC AUTOMATED: CPT | Performed by: FAMILY MEDICINE

## 2023-01-05 PROCEDURE — G0378 HOSPITAL OBSERVATION PER HR: HCPCS

## 2023-01-05 PROCEDURE — 36415 COLL VENOUS BLD VENIPUNCTURE: CPT | Mod: ORL | Performed by: FAMILY MEDICINE

## 2023-01-05 PROCEDURE — P9016 RBC LEUKOCYTES REDUCED: HCPCS | Performed by: FAMILY MEDICINE

## 2023-01-05 PROCEDURE — 85610 PROTHROMBIN TIME: CPT | Performed by: FAMILY MEDICINE

## 2023-01-05 PROCEDURE — 250N000011 HC RX IP 250 OP 636: Performed by: FAMILY MEDICINE

## 2023-01-05 PROCEDURE — 99223 1ST HOSP IP/OBS HIGH 75: CPT | Mod: AI

## 2023-01-05 PROCEDURE — 96374 THER/PROPH/DIAG INJ IV PUSH: CPT

## 2023-01-05 PROCEDURE — 85610 PROTHROMBIN TIME: CPT | Mod: ORL | Performed by: NURSE PRACTITIONER

## 2023-01-05 PROCEDURE — P9603 ONE-WAY ALLOW PRORATED MILES: HCPCS | Mod: ORL | Performed by: FAMILY MEDICINE

## 2023-01-05 PROCEDURE — 36430 TRANSFUSION BLD/BLD COMPNT: CPT | Performed by: FAMILY MEDICINE

## 2023-01-05 PROCEDURE — P9603 ONE-WAY ALLOW PRORATED MILES: HCPCS | Mod: ORL | Performed by: NURSE PRACTITIONER

## 2023-01-05 PROCEDURE — 99285 EMERGENCY DEPT VISIT HI MDM: CPT | Performed by: FAMILY MEDICINE

## 2023-01-05 PROCEDURE — 80053 COMPREHEN METABOLIC PANEL: CPT | Mod: ORL | Performed by: FAMILY MEDICINE

## 2023-01-05 PROCEDURE — 86923 COMPATIBILITY TEST ELECTRIC: CPT | Mod: 91 | Performed by: FAMILY MEDICINE

## 2023-01-05 PROCEDURE — 36415 COLL VENOUS BLD VENIPUNCTURE: CPT | Performed by: FAMILY MEDICINE

## 2023-01-05 PROCEDURE — 85025 COMPLETE CBC W/AUTO DIFF WBC: CPT | Mod: ORL | Performed by: FAMILY MEDICINE

## 2023-01-05 PROCEDURE — C9113 INJ PANTOPRAZOLE SODIUM, VIA: HCPCS | Performed by: FAMILY MEDICINE

## 2023-01-05 PROCEDURE — 86901 BLOOD TYPING SEROLOGIC RH(D): CPT | Performed by: FAMILY MEDICINE

## 2023-01-05 PROCEDURE — 99285 EMERGENCY DEPT VISIT HI MDM: CPT | Mod: 25 | Performed by: FAMILY MEDICINE

## 2023-01-05 RX ORDER — CLOPIDOGREL BISULFATE 75 MG/1
75 TABLET ORAL DAILY
Status: CANCELLED | OUTPATIENT
Start: 2023-01-06

## 2023-01-05 RX ORDER — PSEUDOEPHED/ACETAMINOPH/DIPHEN 30MG-500MG
TABLET ORAL PRN
COMMUNITY
Start: 2022-12-23

## 2023-01-05 RX ADMIN — PANTOPRAZOLE SODIUM 40 MG: 40 INJECTION, POWDER, FOR SOLUTION INTRAVENOUS at 21:14

## 2023-01-05 ASSESSMENT — ACTIVITIES OF DAILY LIVING (ADL)
ADLS_ACUITY_SCORE: 32
ADLS_ACUITY_SCORE: 35

## 2023-01-05 NOTE — LETTER
Eduardo Laughlin  [address]   [place] [address]  January 10, 2023    Dear Eduardo,   This letter is to inform you of the results of your pathology report on your upper endoscopy (EGD).    Your pathology report was:  Normal, please follow up by having a repeat upper endoscopy (EGD), if your symptoms worsen.  If you have questions, please contact your primary care physician.    Final Diagnosis   Stomach, antrum, biopsy:  - Gastric body mucosa without diagnostic abnormality.  - Negative for Helicobacter pylori on H&E examination.     If you have any questions or concerns please do not hesitate to call my office at [phone].  Sincerely,   Phan Lobo, DO   [facility]

## 2023-01-06 ENCOUNTER — ANESTHESIA EVENT (OUTPATIENT)
Dept: GASTROENTEROLOGY | Facility: CLINIC | Age: 83
End: 2023-01-06
Payer: COMMERCIAL

## 2023-01-06 ENCOUNTER — TELEPHONE (OUTPATIENT)
Dept: SURGERY | Facility: CLINIC | Age: 83
End: 2023-01-06

## 2023-01-06 ENCOUNTER — ANESTHESIA (OUTPATIENT)
Dept: GASTROENTEROLOGY | Facility: CLINIC | Age: 83
End: 2023-01-06
Payer: COMMERCIAL

## 2023-01-06 LAB
ANION GAP SERPL CALCULATED.3IONS-SCNC: 9 MMOL/L (ref 7–15)
BUN SERPL-MCNC: 35.5 MG/DL (ref 8–23)
CALCIUM SERPL-MCNC: 8.6 MG/DL (ref 8.8–10.2)
CHLORIDE SERPL-SCNC: 108 MMOL/L (ref 98–107)
CREAT SERPL-MCNC: 1.74 MG/DL (ref 0.67–1.17)
DEPRECATED HCO3 PLAS-SCNC: 22 MMOL/L (ref 22–29)
ERYTHROCYTE [DISTWIDTH] IN BLOOD BY AUTOMATED COUNT: 15 % (ref 10–15)
GFR SERPL CREATININE-BSD FRML MDRD: 39 ML/MIN/1.73M2
GLUCOSE BLDC GLUCOMTR-MCNC: 130 MG/DL (ref 70–99)
GLUCOSE BLDC GLUCOMTR-MCNC: 144 MG/DL (ref 70–99)
GLUCOSE BLDC GLUCOMTR-MCNC: 147 MG/DL (ref 70–99)
GLUCOSE BLDC GLUCOMTR-MCNC: 159 MG/DL (ref 70–99)
GLUCOSE SERPL-MCNC: 145 MG/DL (ref 70–99)
HCT VFR BLD AUTO: 25 % (ref 40–53)
HCT VFR BLD AUTO: 25 % (ref 40–53)
HGB BLD-MCNC: 7.7 G/DL (ref 13.3–17.7)
HGB BLD-MCNC: 7.7 G/DL (ref 13.3–17.7)
HGB BLD-MCNC: 7.8 G/DL (ref 13.3–17.7)
HOLD SPECIMEN: NORMAL
HOLD SPECIMEN: NORMAL
INR PPP: 1.41 (ref 0.85–1.15)
INR PPP: 1.62 (ref 0.85–1.15)
INR PPP: 1.87 (ref 0.85–1.15)
MCH RBC QN AUTO: 30 PG (ref 26.5–33)
MCHC RBC AUTO-ENTMCNC: 30.8 G/DL (ref 31.5–36.5)
MCV RBC AUTO: 97 FL (ref 78–100)
PLATELET # BLD AUTO: 280 10E3/UL (ref 150–450)
POTASSIUM SERPL-SCNC: 4.4 MMOL/L (ref 3.4–5.3)
RBC # BLD AUTO: 2.57 10E6/UL (ref 4.4–5.9)
SODIUM SERPL-SCNC: 139 MMOL/L (ref 136–145)
UPPER GI ENDOSCOPY: NORMAL
WBC # BLD AUTO: 7.3 10E3/UL (ref 4–11)

## 2023-01-06 PROCEDURE — 85610 PROTHROMBIN TIME: CPT | Mod: 91 | Performed by: SURGERY

## 2023-01-06 PROCEDURE — P9016 RBC LEUKOCYTES REDUCED: HCPCS | Performed by: FAMILY MEDICINE

## 2023-01-06 PROCEDURE — 82962 GLUCOSE BLOOD TEST: CPT | Mod: 91

## 2023-01-06 PROCEDURE — 250N000011 HC RX IP 250 OP 636

## 2023-01-06 PROCEDURE — 85027 COMPLETE CBC AUTOMATED: CPT

## 2023-01-06 PROCEDURE — 96376 TX/PRO/DX INJ SAME DRUG ADON: CPT

## 2023-01-06 PROCEDURE — 82962 GLUCOSE BLOOD TEST: CPT

## 2023-01-06 PROCEDURE — 250N000013 HC RX MED GY IP 250 OP 250 PS 637: Performed by: SURGERY

## 2023-01-06 PROCEDURE — 258N000003 HC RX IP 258 OP 636

## 2023-01-06 PROCEDURE — G0378 HOSPITAL OBSERVATION PER HR: HCPCS

## 2023-01-06 PROCEDURE — 99232 SBSQ HOSP IP/OBS MODERATE 35: CPT | Performed by: INTERNAL MEDICINE

## 2023-01-06 PROCEDURE — 36415 COLL VENOUS BLD VENIPUNCTURE: CPT

## 2023-01-06 PROCEDURE — 250N000009 HC RX 250: Performed by: NURSE ANESTHETIST, CERTIFIED REGISTERED

## 2023-01-06 PROCEDURE — 370N000017 HC ANESTHESIA TECHNICAL FEE, PER MIN: Performed by: SURGERY

## 2023-01-06 PROCEDURE — 43239 EGD BIOPSY SINGLE/MULTIPLE: CPT | Performed by: SURGERY

## 2023-01-06 PROCEDURE — 85018 HEMOGLOBIN: CPT | Performed by: INTERNAL MEDICINE

## 2023-01-06 PROCEDURE — 250N000011 HC RX IP 250 OP 636: Performed by: NURSE ANESTHETIST, CERTIFIED REGISTERED

## 2023-01-06 PROCEDURE — 36415 COLL VENOUS BLD VENIPUNCTURE: CPT | Performed by: SURGERY

## 2023-01-06 PROCEDURE — 250N000013 HC RX MED GY IP 250 OP 250 PS 637: Performed by: INTERNAL MEDICINE

## 2023-01-06 PROCEDURE — 96375 TX/PRO/DX INJ NEW DRUG ADDON: CPT

## 2023-01-06 PROCEDURE — 36415 COLL VENOUS BLD VENIPUNCTURE: CPT | Performed by: INTERNAL MEDICINE

## 2023-01-06 PROCEDURE — C9113 INJ PANTOPRAZOLE SODIUM, VIA: HCPCS

## 2023-01-06 PROCEDURE — 85610 PROTHROMBIN TIME: CPT

## 2023-01-06 PROCEDURE — 80048 BASIC METABOLIC PNL TOTAL CA: CPT

## 2023-01-06 PROCEDURE — 258N000003 HC RX IP 258 OP 636: Performed by: NURSE ANESTHETIST, CERTIFIED REGISTERED

## 2023-01-06 PROCEDURE — 88305 TISSUE EXAM BY PATHOLOGIST: CPT | Mod: TC | Performed by: SURGERY

## 2023-01-06 PROCEDURE — 250N000013 HC RX MED GY IP 250 OP 250 PS 637

## 2023-01-06 RX ORDER — METOPROLOL SUCCINATE 25 MG/1
25 TABLET, EXTENDED RELEASE ORAL DAILY
Status: DISCONTINUED | OUTPATIENT
Start: 2023-01-06 | End: 2023-01-07 | Stop reason: HOSPADM

## 2023-01-06 RX ORDER — ONDANSETRON 4 MG/1
4 TABLET, ORALLY DISINTEGRATING ORAL EVERY 6 HOURS PRN
Status: DISCONTINUED | OUTPATIENT
Start: 2023-01-06 | End: 2023-01-07 | Stop reason: HOSPADM

## 2023-01-06 RX ORDER — PROPOFOL 10 MG/ML
INJECTION, EMULSION INTRAVENOUS PRN
Status: DISCONTINUED | OUTPATIENT
Start: 2023-01-06 | End: 2023-01-06

## 2023-01-06 RX ORDER — ASPIRIN 81 MG/1
81 TABLET ORAL DAILY
Status: DISCONTINUED | OUTPATIENT
Start: 2023-01-06 | End: 2023-01-06

## 2023-01-06 RX ORDER — AMOXICILLIN 250 MG
1-2 CAPSULE ORAL 2 TIMES DAILY
Status: DISCONTINUED | OUTPATIENT
Start: 2023-01-06 | End: 2023-01-07 | Stop reason: HOSPADM

## 2023-01-06 RX ORDER — ONDANSETRON 2 MG/ML
4 INJECTION INTRAMUSCULAR; INTRAVENOUS EVERY 6 HOURS PRN
Status: DISCONTINUED | OUTPATIENT
Start: 2023-01-06 | End: 2023-01-07 | Stop reason: HOSPADM

## 2023-01-06 RX ORDER — LIDOCAINE 40 MG/G
CREAM TOPICAL
Status: DISCONTINUED | OUTPATIENT
Start: 2023-01-06 | End: 2023-01-06

## 2023-01-06 RX ORDER — GLIMEPIRIDE 1 MG/1
1 TABLET ORAL DAILY
Status: DISCONTINUED | OUTPATIENT
Start: 2023-01-06 | End: 2023-01-07 | Stop reason: HOSPADM

## 2023-01-06 RX ORDER — PANTOPRAZOLE SODIUM 40 MG/1
40 TABLET, DELAYED RELEASE ORAL
Status: DISCONTINUED | OUTPATIENT
Start: 2023-01-06 | End: 2023-01-07 | Stop reason: HOSPADM

## 2023-01-06 RX ORDER — BUMETANIDE 1 MG/1
1 TABLET ORAL DAILY
Status: DISCONTINUED | OUTPATIENT
Start: 2023-01-06 | End: 2023-01-07 | Stop reason: HOSPADM

## 2023-01-06 RX ORDER — SODIUM CHLORIDE, SODIUM LACTATE, POTASSIUM CHLORIDE, CALCIUM CHLORIDE 600; 310; 30; 20 MG/100ML; MG/100ML; MG/100ML; MG/100ML
INJECTION, SOLUTION INTRAVENOUS CONTINUOUS PRN
Status: DISCONTINUED | OUTPATIENT
Start: 2023-01-06 | End: 2023-01-06

## 2023-01-06 RX ORDER — POLYETHYLENE GLYCOL 3350 17 G/17G
17 POWDER, FOR SOLUTION ORAL DAILY PRN
Status: DISCONTINUED | OUTPATIENT
Start: 2023-01-06 | End: 2023-01-06

## 2023-01-06 RX ORDER — POLYETHYLENE GLYCOL 3350 17 G/17G
17 POWDER, FOR SOLUTION ORAL DAILY PRN
Status: DISCONTINUED | OUTPATIENT
Start: 2023-01-06 | End: 2023-01-07 | Stop reason: HOSPADM

## 2023-01-06 RX ORDER — ATORVASTATIN CALCIUM 20 MG/1
40 TABLET, FILM COATED ORAL EVERY EVENING
Status: DISCONTINUED | OUTPATIENT
Start: 2023-01-06 | End: 2023-01-07 | Stop reason: HOSPADM

## 2023-01-06 RX ORDER — NICOTINE POLACRILEX 4 MG
15-30 LOZENGE BUCCAL
Status: DISCONTINUED | OUTPATIENT
Start: 2023-01-06 | End: 2023-01-07 | Stop reason: HOSPADM

## 2023-01-06 RX ORDER — SUCRALFATE ORAL 1 G/10ML
1 SUSPENSION ORAL
Status: DISCONTINUED | OUTPATIENT
Start: 2023-01-06 | End: 2023-01-06

## 2023-01-06 RX ORDER — ONDANSETRON 2 MG/ML
4 INJECTION INTRAMUSCULAR; INTRAVENOUS EVERY 6 HOURS PRN
Status: DISCONTINUED | OUTPATIENT
Start: 2023-01-06 | End: 2023-01-06

## 2023-01-06 RX ORDER — EZETIMIBE 10 MG/1
10 TABLET ORAL EVERY EVENING
Status: DISCONTINUED | OUTPATIENT
Start: 2023-01-06 | End: 2023-01-07 | Stop reason: HOSPADM

## 2023-01-06 RX ORDER — ONDANSETRON 4 MG/1
4 TABLET, ORALLY DISINTEGRATING ORAL EVERY 6 HOURS PRN
Status: DISCONTINUED | OUTPATIENT
Start: 2023-01-06 | End: 2023-01-06

## 2023-01-06 RX ORDER — DEXTROSE MONOHYDRATE 25 G/50ML
25-50 INJECTION, SOLUTION INTRAVENOUS
Status: DISCONTINUED | OUTPATIENT
Start: 2023-01-06 | End: 2023-01-07 | Stop reason: HOSPADM

## 2023-01-06 RX ORDER — ATORVASTATIN CALCIUM 20 MG/1
40 TABLET, FILM COATED ORAL DAILY
Status: DISCONTINUED | OUTPATIENT
Start: 2023-01-06 | End: 2023-01-06

## 2023-01-06 RX ORDER — LIDOCAINE HYDROCHLORIDE 10 MG/ML
INJECTION, SOLUTION EPIDURAL; INFILTRATION; INTRACAUDAL; PERINEURAL PRN
Status: DISCONTINUED | OUTPATIENT
Start: 2023-01-06 | End: 2023-01-06

## 2023-01-06 RX ORDER — ALLOPURINOL 100 MG/1
200 TABLET ORAL DAILY
Status: DISCONTINUED | OUTPATIENT
Start: 2023-01-06 | End: 2023-01-07 | Stop reason: HOSPADM

## 2023-01-06 RX ADMIN — TOPICAL ANESTHETIC 1 SPRAY: 200 SPRAY DENTAL; PERIODONTAL at 12:42

## 2023-01-06 RX ADMIN — PHYTONADIONE 2 MG: 2 INJECTION, EMULSION INTRAMUSCULAR; INTRAVENOUS; SUBCUTANEOUS at 01:18

## 2023-01-06 RX ADMIN — EZETIMIBE 10 MG: 10 TABLET ORAL at 16:31

## 2023-01-06 RX ADMIN — ALLOPURINOL 200 MG: 100 TABLET ORAL at 08:20

## 2023-01-06 RX ADMIN — PROPOFOL 50 MG: 10 INJECTION, EMULSION INTRAVENOUS at 12:51

## 2023-01-06 RX ADMIN — SUCRALFATE 1 G: 1 SUSPENSION ORAL at 11:27

## 2023-01-06 RX ADMIN — LIDOCAINE HYDROCHLORIDE 100 MG: 10 INJECTION, SOLUTION EPIDURAL; INFILTRATION; INTRACAUDAL; PERINEURAL at 12:45

## 2023-01-06 RX ADMIN — TRAZODONE HYDROCHLORIDE 125 MG: 100 TABLET ORAL at 21:32

## 2023-01-06 RX ADMIN — METOPROLOL SUCCINATE 25 MG: 25 TABLET, EXTENDED RELEASE ORAL at 08:20

## 2023-01-06 RX ADMIN — SENNOSIDES AND DOCUSATE SODIUM 1 TABLET: 50; 8.6 TABLET ORAL at 08:20

## 2023-01-06 RX ADMIN — SENNOSIDES AND DOCUSATE SODIUM 1 TABLET: 50; 8.6 TABLET ORAL at 19:47

## 2023-01-06 RX ADMIN — SODIUM CHLORIDE, POTASSIUM CHLORIDE, SODIUM LACTATE AND CALCIUM CHLORIDE: 600; 310; 30; 20 INJECTION, SOLUTION INTRAVENOUS at 12:42

## 2023-01-06 RX ADMIN — ATORVASTATIN CALCIUM 40 MG: 20 TABLET, FILM COATED ORAL at 16:30

## 2023-01-06 RX ADMIN — PROPOFOL 50 MG: 10 INJECTION, EMULSION INTRAVENOUS at 12:47

## 2023-01-06 RX ADMIN — PANTOPRAZOLE SODIUM 40 MG: 40 INJECTION, POWDER, FOR SOLUTION INTRAVENOUS at 08:20

## 2023-01-06 RX ADMIN — SUCRALFATE 1 G: 1 SUSPENSION ORAL at 06:09

## 2023-01-06 RX ADMIN — BUMETANIDE 1 MG: 1 TABLET ORAL at 08:20

## 2023-01-06 RX ADMIN — PANTOPRAZOLE SODIUM 40 MG: 40 TABLET, DELAYED RELEASE ORAL at 16:30

## 2023-01-06 ASSESSMENT — ACTIVITIES OF DAILY LIVING (ADL)
ADLS_ACUITY_SCORE: 32
ADLS_ACUITY_SCORE: 33
ADLS_ACUITY_SCORE: 32
ADLS_ACUITY_SCORE: 33
ADLS_ACUITY_SCORE: 32
ADLS_ACUITY_SCORE: 33
DEPENDENT_IADLS:: CLEANING;COOKING;LAUNDRY;SHOPPING;MEAL PREPARATION;MEDICATION MANAGEMENT;TRANSPORTATION;MONEY MANAGEMENT;INCONTINENCE
ADLS_ACUITY_SCORE: 32
ADLS_ACUITY_SCORE: 33
ADLS_ACUITY_SCORE: 33
ADLS_ACUITY_SCORE: 32
ADLS_ACUITY_SCORE: 33
ADLS_ACUITY_SCORE: 34

## 2023-01-06 ASSESSMENT — ENCOUNTER SYMPTOMS: DYSRHYTHMIAS: 1

## 2023-01-06 ASSESSMENT — LIFESTYLE VARIABLES: TOBACCO_USE: 1

## 2023-01-06 NOTE — CONSULTS
82 year old year old male here for upper endoscopy for melena and anemia.  Patient is on ASA, Plavix, and coumadin.  He was transfused PRBC x 2 today.  He has been hemodynamically stable.        Patient Active Problem List   Diagnosis     Bladder cancer (H)     Hydronephrosis of right kidney     Coronary artery disease     S/P ileal conduit (H)     HTN (hypertension)     CKD (chronic kidney disease) stage 3, GFR 30-59 ml/min (H)     Chronic deep vein thrombosis (DVT) of femoral vein of right lower extremity (H)     Paroxysmal atrial fibrillation (H)     LBBB (left bundle branch block)     Deep vein thrombosis (DVT) of proximal vein of both lower extremities, unspecified chronicity (H)     Gastro-esophageal reflux disease without esophagitis     Idiopathic chronic gout without tophus     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)     Morbid obesity (H)     Status post spinal surgery     Chronic heart failure with preserved ejection fraction (H)     GINNY (obstructive sleep apnea)     NSTEMI (non-ST elevated myocardial infarction) (H)     Acute congestive heart failure, unspecified heart failure type (H)     Syncope     Chronic anticoagulation     Lactic acidosis     Acute respiratory failure with hypoxia (H)     Aspiration pneumonia of both lungs, unspecified aspiration pneumonia type, unspecified part of lung (H)     Acute kidney injury (H)     Facial laceration, initial encounter     Injury of head, initial encounter     Anemia due to blood loss, acute       Past Medical History:   Diagnosis Date     Acute renal failure with acute tubular necrosis superimposed on stage 3 chronic kidney disease (H)      Arteriosclerotic cardiovascular disease (ASCVD)      Atrial fibrillation (H)      Bladder cancer (H)      BPH (benign prostatic hypertrophy)      Chronic kidney disease      Complicated UTI (urinary tract infection) 08/25/2019      Congestive heart failure (H)      Coronary artery disease      Diabetes mellitus (H)      Diabetic neuropathy (H)      E coli bacteremia      GERD (gastroesophageal reflux disease)      Gout      Hyperlipidemia      Hypertension      Hyponatremia      Iliac artery aneurysm, 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    Added automatically from request for surgery [id number]     Kidney stone      Osteoarthritis      Personal history of malignant neoplasm of prostate 04/27/2020     Prostate cancer (H)      Sleep apnea     CPAP       Past Surgical History:   Procedure Laterality Date     CARDIAC CATHETERIZATION  03/28/2008    and coronary angios     CV CORONARY ANGIOGRAM N/A 11/28/2022    Procedure: CV CORONARY ANGIOGRAM;  Surgeon: Gonzales Hager MD;  Location: [facility] CV     CV LEFT HEART CATH N/A 11/28/2022    Procedure: Left Heart Catheterization;  Surgeon: Gonzales Hager MD;  Location: [facility]     CV PCI STENT DRUG ELUTING N/A 11/28/2022    Procedure: Percutaneous Coronary Intervention Stent;  Surgeon: Gonzales Hager MD;  Location: [facility] CV     CYSTECTOMY       CYSTOSCOPY       CYSTOSCOPY N/A 12/23/2015    Procedure: CYSTOSCOPY BLADDER BIOPSIES with Fulgeration and Placement of Otero ;  Surgeon: Gordon Bajwa MD;  Location: [facility];  Service:      IR EXTREMITY ANGIOGRAM LEFT  3/22/2019     IR EXTREMITY ANGIOGRAM LEFT  3/22/2019     IR ILEAL CONDUIT INJECTION  12/23/2016     IR NEPHROSTOMY TUBE PLACEMENT RIGHT  8/18/2016     IR URETEROSTOMY TUBE CHANGE RIGHT  9/21/2016     KIDNEY STONE SURGERY  x4     LAMINECTOMY LUMBAR ONE LEVEL Bilateral 9/13/2022    Procedure: LUMBAR 4 - LUMBAR 5 BILATERAL MEDIAL FACETECTOMY AND DECOMPRESSION;  Surgeon: Everett Holland MD;  Location: [facility]     PICC TRIPLE LUMEN PLACEMENT  11/18/2022          PROSTATE SURGERY         Family History   Problem Relation Age of Onset     No Known Problems Mother      Prostate Cancer  Father      Deep Vein Thrombosis Father      Cancer Sister      Heart Disease Sister      No Known Problems Daughter      Heart Disease Brother      Heart Disease Sister      No Known Problems Daughter        No current outpatient medications on file.       Allergies   Allergen Reactions     Metoprolol Succinate Er      Other reaction(s): low blood pressure     Pcn [Penicillins] Other (See Comments)     Childhood-doesn't know reactions     Statin Drugs [Hmg-Coa-R Inhibitors] Cramps     Uroxatral [Alfuzosin] Other (See Comments)     hypotension       Pt reports that he has quit smoking. He has never used smokeless tobacco. He reports that he does not currently use alcohol. He reports that he does not use drugs.    Exam:    Awake, Alert OX3  Lungs - CTA bilaterally  Abd - soft, non-distended, non-tender, +BS  Extr - No cyanosis or edema    Lab Results   Component Value Date    WBC 7.3 01/06/2023    WBC 8.0 06/13/2020     Lab Results   Component Value Date    RBC 2.57 01/06/2023    RBC 4.22 06/13/2020     Lab Results   Component Value Date    HGB 7.7 01/06/2023    HGB 7.7 01/06/2023    HGB 13.2 06/13/2020     Lab Results   Component Value Date    HCT 25.0 01/06/2023    HCT 25.0 01/06/2023    HCT 39.8 06/13/2020     Lab Results   Component Value Date    MCV 97 01/06/2023    MCV 94 06/13/2020     Lab Results   Component Value Date    MCH 30.0 01/06/2023    MCH 31.3 06/13/2020     Lab Results   Component Value Date    MCHC 30.8 01/06/2023    MCHC 33.2 06/13/2020     Lab Results   Component Value Date    RDW 15.0 01/06/2023    RDW 14.3 06/13/2020     Lab Results   Component Value Date     01/06/2023     06/13/2020     Last Comprehensive Metabolic Panel:  Sodium   Date Value Ref Range Status   01/06/2023 139 136 - 145 mmol/L Final   06/13/2020 140 133 - 144 mmol/L Final     Potassium   Date Value Ref Range Status   01/06/2023 4.4 3.4 - 5.3 mmol/L Final     Comment:     Specimen slightly hemolyzed, potassium  may be falsely elevated.   03/03/2022 4.6 3.5 - 5.0 mmol/L Final   06/13/2020 4.4 3.4 - 5.3 mmol/L Final     Chloride   Date Value Ref Range Status   01/06/2023 108 (H) 98 - 107 mmol/L Final   03/03/2022 108 (H) 98 - 107 mmol/L Final   06/13/2020 111 (H) 94 - 109 mmol/L Final     Carbon Dioxide   Date Value Ref Range Status   06/13/2020 22 20 - 32 mmol/L Final     Carbon Dioxide (CO2)   Date Value Ref Range Status   01/06/2023 22 22 - 29 mmol/L Final   03/03/2022 20 (L) 22 - 31 mmol/L Final     Anion Gap   Date Value Ref Range Status   01/06/2023 9 7 - 15 mmol/L Final   03/03/2022 10 5 - 18 mmol/L Final   06/13/2020 7 3 - 14 mmol/L Final     Glucose   Date Value Ref Range Status   01/06/2023 145 (H) 70 - 99 mg/dL Final   09/14/2022 227 (H) 70 - 125 mg/dL Final   06/13/2020 115 (H) 70 - 99 mg/dL Final     GLUCOSE BY METER POCT   Date Value Ref Range Status   01/06/2023 144 (H) 70 - 99 mg/dL Final     Urea Nitrogen   Date Value Ref Range Status   01/06/2023 35.5 (H) 8.0 - 23.0 mg/dL Final   03/03/2022 30 (H) 8 - 28 mg/dL Final   06/13/2020 30 7 - 30 mg/dL Final     Creatinine   Date Value Ref Range Status   01/06/2023 1.74 (H) 0.67 - 1.17 mg/dL Final   06/13/2020 2.07 (H) 0.66 - 1.25 mg/dL Final     GFR Estimate   Date Value Ref Range Status   01/06/2023 39 (L) >60 mL/min/1.73m2 Final     Comment:     Effective December 21, 2021 eGFRcr in adults is calculated using the 2021 CKD-EPI creatinine equation which includes age and gender (Roselyn carter al., NEJM, DOI: 10.1056/CWWRmj4239975)   02/23/2021 26 (L) >60 mL/min/1.73m2 Final   06/13/2020 29 (L) >60 mL/min/[1.73_m2] Final     Comment:     Non  GFR Calc  Starting 12/18/2018, serum creatinine based estimated GFR (eGFR) will be   calculated using the Chronic Kidney Disease Epidemiology Collaboration   (CKD-EPI) equation.       GFR, ESTIMATED POCT   Date Value Ref Range Status   11/18/2022 13 (L) >60 mL/min/1.73m2 Final     Calcium   Date Value Ref Range  Status   01/06/2023 8.6 (L) 8.8 - 10.2 mg/dL Final   06/13/2020 8.0 (L) 8.5 - 10.1 mg/dL Final     Bilirubin Total   Date Value Ref Range Status   01/05/2023 0.2 <=1.2 mg/dL Final     Alkaline Phosphatase   Date Value Ref Range Status   01/05/2023 80 40 - 129 U/L Final     ALT   Date Value Ref Range Status   01/05/2023 17 10 - 50 U/L Final     AST   Date Value Ref Range Status   01/05/2023 19 10 - 50 U/L Final               A/P 82 year old year old male in need of upper endoscopy for anemia, melena. Risks, benefits, alternatives, and complications were discussed including the possibility of perforation and the patient agreed to proceed.    Phan Lobo, DO on 1/6/2023 at 12:23 PM

## 2023-01-06 NOTE — TELEPHONE ENCOUNTER
Type of surgery: ESOPHAGOGASTRODUODENOSCOPY, WITH HEMORRHAGE CONTROL (N/A)     Location of surgery: [place] [facility]  Date and time of surgery: 01/06/2023  Surgeon: Lashell  Pre-Op Appt Date: seen on call   Post-Op Appt Date: tbd   Packet sent out: No  Pre-cert/Authorization completed:  No  Date:

## 2023-01-06 NOTE — H&P
[facility]    History and Physical  Hospital Medicine       Date of Admission:  1/5/2023  Date of Service: 1/5/2023     Assessment & Plan   Eduardo Laughlin is a 82 year old male who presents on 1/5/2023 with low hemoglobin on serial lab check at living facility.     Anemia due to blood loss, acute  Gastro-esophageal reflux disease without esophagitis  Hemoglobin around 6 on routine check 1/5 at TCU. Patient denies any current symptoms (no pain, vomiting) but did have melena 12/27 - 1/2 (no BM since 1/2). Hemoglobin on presentation 6.4 --> 6.1. Remains stable (SBP >100, not tachycardic). Suspect acute upper GI bleed due to DAPT & anticoagulation causing significant clotting impairment (see below). Per below, hold all blood thinners & will have to consider how to restart once bleeding is resolved. Would not recommend DAPT & anticoagulation together.   -Surgery consult, appreciate recommendations  -NPO for bleeding & for possibility of EGD tomorrow   -Prep & transfuse 2 units PRBCs  -CBC in AM to monitor  -Sucralfate 1g four times daily  -Pantoprazole 40mg IV BID    History of deep vein thrombosis (DVT)   Paroxysmal atrial fibrillation   Chronic anticoagulation  Most recent DVT I can find in chart is 2020. No current DVT symptoms. Auscultated to be in sinus rhythm on admission. Managed PTA with metoprolol & warfarin. INR on presentation 1.94. Given significant bleed (above) will reverse anticoagulation in preparation for probable EGD to further evaluate. See discussion below under CAD for whether & how to restart.   -Hold PTA warfarin  -Vitamin K 2mg IV once for INR reversal   -INR Q8hrs    Coronary artery disease  NSTEMI s/p PCI with CORA placement  NSTEMI s/p PCI with CORA to OM1 11/28/2022. Was discharged from that hospitalization 12/1 on aspirin & clopidogrel. Patient was cleared by cardiology prior to discharge (of note, cardiology discusses being ok with current regimen and talks  about patient's DAPT but does not mention afib for warfarin in their note. Patient was discharged with DAPT + Warfarin), was supposed to follow up outpatient for continued monitoring but has not yet. Denies any recent chest pain. Given recent stent placement, it will likely be important to continue some form of antiplatelet. Given that patient is currently on DAPT and anticoagulation (above) and now has a significant bleed, key discharge decision will be how to manage this combination (Warfarin & aspirin versus DAPT without anticoagulation).   -Hold aspirin & clopidogrel  -Consider cardiology consult to discuss plan for blood thinner regimen on discharge    HTN (hypertension)  Chronic heart failure with preserved ejection fraction  LBBB (left bundle branch block)  Minimally hypertenstive since presentation (SBP 140s). Heart failure seems compensated (no obvious JVP elevation or LE edema, no PND or orthopnea). Managed PTA with bumetanide, metoprolol succinate 25mg daily.   -continue PTA bumetanide   -continue PTA metoprolol with hold parameters    CKD (chronic kidney disease) stage 3, GFR 30-59 ml/min   Stable, at baseline. Baseline creatinine around 2.3, creatinine on presentation 2.00.   -BMP in AM    Bladder cancer   S/P ileal conduit   Diagnosed 2016, s/p cystectomy / urostomy. No complications with urostomy currently. Nonbloody urine present in urostomy bag.   -Continue regular outpatient follow up.     Idiopathic chronic gout without tophus  Stable, no new symptoms. Managed PTA with allopurinol 200mg daily, continue.     Type 2 diabetes mellitus with diabetic neuropathic arthropathy  Good control. Hemoglobin A1C 10/12/2022 7.7. Glucose mildly elevated since presentation (151 --> 159). Managed PTA with ezetimibe and glimepiride.   -Continue PTA ezetimibe  -Hold PTA sulfonylurea until able to have PO diet (high risk for hypoglycemia)    Morbid obesity  Contributes to overall morbidity and mortality.     GINNY  "(obstructive sleep apnea)  May use home CPAP if available.     Clinically Significant Risk Factors Present on Admission              # Hypoalbuminemia: Lowest albumin = 3.2 g/dL at 1/5/2023  7:25 PM, will monitor as appropriate  # Drug Induced Coagulation Defect: home medication list includes an anticoagulant medication  # Drug Induced Platelet Defect: home medication list includes an antiplatelet medication       # DMII: A1C = 7.7 % (Ref range: <5.7 %) within past 3 months    # Obesity: Estimated body mass index is 32.14 kg/m  as calculated from the following:    Height as of this encounter: 1.778 m (5' 10\").    Weight as of this encounter: 101.6 kg (224 lb).            Diet:  Advance as tolerated from clear liquids  DVT Prophylaxis: None, was on DAPT + anticoagulation and reversing for bleed as in A&P  Otero Catheter: Not present  Code Status:  DNR, DNI  Lines: PIV    Disposition Plan      Expected Discharge Date: 01/06/2023             Entered: Fariba Mathis PA-C 01/05/2023, 9:17 PM     Status: Patient is appropriate for observation  Fariba Mathis PA-C        The patient's care was discussed with the Attending Physician, Dr. Mehran Bello, and the patient.    Primary Care Physician   Rafael Montelongo [phone]    History is obtained from the patient, who is an ok historian, handoff from ER provider, and review of old records via the EMR.    History of Present Illness   Eduardo Laughlin is a 82 year old male with past medical history of paroxysmal atrial fibrillation and bilateral DVT now chronically anticoagulated on warfarin, recent NSTEMI currently on dual antiplatelet therapy, GERD, chronic kidney disease, hypertension, gout, left bundle branch block, type 2 diabetes, ileal conduit presents now presents on 1/5/2023 with low hemoglobin on serial labs at living facility.    Patient has multiple recent hospitalizations, first 11/5 - 11/8/2022 at [facility] for acute heart failure.  " Subsequently admitted 11/18 - 12/1/2022 at [facility] for lactic acidosis, respiratory failure due to pneumonia, NSTEMI status post PCI with CORA to OM1 11/28/2022.  Patient was discharged with aspirin and Plavix for recent NSTEMI, and continued on home warfarin for history of DVT and paroxysmal atrial fibrillation.  Patient was subsequently admitted 12/2 - 12/7/2022 at [facility] for falling.  Head CT negative for change, patient did have acute right ninth through 11th rib fractures.  At time of discharge patient was to continue PTA aspirin, Plavix, and warfarin.    Patient denies any acute complaints or changes.  Patient has had some difficulty chewing since his fall in November 2022, but says he is still eating and drinking water without issues.  No abdominal pain, no onset of pain with oral intake.  No nausea, no vomiting.  Last bowel movement was Monday, 1/2/2023.  BM was black.  Patient endorses black stools about 4-5 days prior to Monday 1/2 (which would put initiation of melena around 12/27 or 12/28).  Patient denies any urinary complaints.  Has a chronic urostomy that is stable.  Last available colonoscopy result appears to be 2006 showing diverticulosis.  Patient did have CT finding suggesting likely irritation from profuse diarrhea in April 2020, a colonoscopy was recommended at that time but does not appear to have been completed.    Chronically, patient has lightheadedness especially with changes in position and moving.  No changes or worsening in lightheadedness.  Has not had a fall since November.  No recent losses of consciousness.  Patient also endorses some intermittent dyspnea that is currently at baseline.  No new coughing, no pain with breathing.    Patient is currently residing at [facility].  Upon arrival to emergency department patient received lab and imaging work-up.  Received 40 mg IV pantoprazole, and was consented for blood transfusion.    Review of Systems   Constitutional:  denies weight loss, fever, chills   Eyes: denies changes in vision, discharge, or pain in eyes  HENT: denies changes in hearing, sore throat  Respiratory: denies new or changing cough, dyspnea  Cardiovascular: denies chest pain, heart palpitations, syncope, limb swelling  Gastroenterology: See HPI  Genitourinary: denies dysuria  Integumentary: no new rashes or skin changes  Musculoskeletal: denies new muscle pain or joint trauma  Neuro: denies numbness, tingling, headaches, tremor  Psychiatric: denies significant changes to mood    Past Medical History    Past Medical History:     Iliac artery aneurysm, 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     Prostate cancer (H)        Aspiration pneumonia of both lungs, unspecified aspiration pneumonia type, unspecified part of lung (H) 11/18/2022     Priority: Medium     Chronic anticoagulation 11/07/2022     Priority: Medium     NSTEMI (non-ST elevated myocardial infarction) (H) 11/06/2022     Priority: Medium     Chronic heart failure with preserved ejection fraction (H) 11/04/2022     Priority: Medium     Gastro-esophageal reflux disease without esophagitis 04/27/2020     Priority: Medium     Idiopathic chronic gout without tophus 04/27/2020     Priority: Medium     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 04/27/2020     Priority: Medium     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) 04/27/2020     Priority: Medium     Coronary artery disease      Priority: Medium     Paroxysmal atrial fibrillation (H)      Priority: Medium     LBBB (left bundle branch block)      Priority: Medium     Deep vein thrombosis (DVT) of proximal vein of both lower extremities, unspecified chronicity (H)      Priority: Medium     Chronic deep vein thrombosis (DVT) of femoral vein of right lower extremity (H) 12/04/2019     Priority: Medium     S/P ileal conduit (H) 08/18/2016     Priority: Medium     HTN (hypertension) 08/18/2016     Priority: Medium     CKD (chronic  kidney disease) stage 3, GFR 30-59 ml/min (H) 08/18/2016     Priority: Medium     Bladder cancer (H) 01/27/2016     Priority: Medium      Past Surgical History   Past Surgical History:   Procedure Laterality Date     CARDIAC CATHETERIZATION  03/28/2008    and coronary angios     CV CORONARY ANGIOGRAM N/A 11/28/2022    Procedure: CV CORONARY ANGIOGRAM;  Surgeon: Gonzales Hager MD;  Location: [facility] CV     CV LEFT HEART CATH N/A 11/28/2022    Procedure: Left Heart Catheterization;  Surgeon: Gonzales Hager MD;  Location: [facility] CV     CV PCI STENT DRUG ELUTING N/A 11/28/2022    Procedure: Percutaneous Coronary Intervention Stent;  Surgeon: Gonzales Hager MD;  Location: [facility] CV     CYSTECTOMY       CYSTOSCOPY       CYSTOSCOPY N/A 12/23/2015    Procedure: CYSTOSCOPY BLADDER BIOPSIES with Fulgeration and Placement of Otero ;  Surgeon: Gordon Bajwa MD;  Location: [facility];  Service:      IR EXTREMITY ANGIOGRAM LEFT  3/22/2019     IR EXTREMITY ANGIOGRAM LEFT  3/22/2019     IR ILEAL CONDUIT INJECTION  12/23/2016     IR NEPHROSTOMY TUBE PLACEMENT RIGHT  8/18/2016     IR URETEROSTOMY TUBE CHANGE RIGHT  9/21/2016     KIDNEY STONE SURGERY  x4     LAMINECTOMY LUMBAR ONE LEVEL Bilateral 9/13/2022    Procedure: LUMBAR 4 - LUMBAR 5 BILATERAL MEDIAL FACETECTOMY AND DECOMPRESSION;  Surgeon: Everett Holland MD;  Location: [facility]     PICC TRIPLE LUMEN PLACEMENT  11/18/2022          PROSTATE SURGERY        Prior to Admission Medications   Prior to Admission Medications   Prescriptions Last Dose Informant Patient Reported? Taking?   ACETAMINOPHEN EXTRA STRENGTH 500 MG tablet 1/5/2023 at 1400 Nursing Home Yes Yes   Sig: TAKE 2 TABS (1,000MG) BY MOUTH THREE TIMES DAILY   allopurinol (ZYLOPRIM) 100 MG tablet 1/5/2023 at am Nursing Home No Yes   Sig: Take 2 tablets (200 mg) by mouth daily   aspirin (ASA) 81 MG EC tablet 1/5/2023 at am Nursing Home No Yes    Sig: Take 1 tablet (81 mg) by mouth daily   atorvastatin (LIPITOR) 40 MG tablet 1/4/2023 at pm Nursing Home No Yes   Sig: Take 1 tablet (40 mg) by mouth daily   bacitracin 500 UNIT/GM OINT  Nursing Home No No   Sig: Apply topically 2 times daily To laceration on left eyebrow x 3 days   bumetanide (BUMEX) 2 MG tablet 1/5/2023 at am Nursing Home No Yes   Sig: Take 1 tablet (2 mg) by mouth daily   Patient taking differently: Take 1 mg by mouth daily   clopidogrel (PLAVIX) 75 MG tablet 1/5/2023 at am Nursing Home No Yes   Sig: Take 1 tablet (75 mg) by mouth daily   ezetimibe (ZETIA) 10 MG tablet 1/4/2023 at pm Nursing Home No Yes   Sig: Take 1 tablet (10 mg) by mouth daily   glimepiride (AMARYL) 1 MG tablet 1/5/2023 at 1200 Nursing Home No Yes   Sig: Take 1 tablet (1 mg) by mouth daily   metoprolol succinate ER (TOPROL XL) 25 MG 24 hr tablet  Nursing Home No No   Sig: Take 1 tablet (25 mg) by mouth daily   multivitamin, therapeutic (THERA-VIT) TABS tablet 1/5/2023 at am Nursing Home No Yes   Sig: Take 1 tablet by mouth daily   nitroGLYcerin (NITROSTAT) 0.4 MG sublingual tablet  Nursing Home No No   Sig: For chest pain place 1 tablet under the tongue every 5 minutes for 3 doses. If symptoms persist 5 minutes after 1st dose call 911.   pantoprazole (PROTONIX) 40 MG EC tablet 1/5/2023 at am Nursing Home No Yes   Sig: Take 1 tablet (40 mg) by mouth every morning (before breakfast)   polyethylene glycol (MIRALAX) 17 g packet Past Month Nursing Home No Yes   Sig: Take 17 g by mouth daily   traZODone (DESYREL) 100 MG tablet 1/4/2023 at pm Nursing Home No Yes   Sig: Take 1 tablet (100 mg) by mouth At Bedtime   Patient taking differently: Take 125 mg by mouth At Bedtime   warfarin ANTICOAGULANT (COUMADIN) 5 MG tablet 1/4/2023 at am Nursing Home No Yes   Sig: Take 1 tablet (5 mg) by mouth daily Needs INR in 2 days and call MD to adjust dosing. Then will need INR checked every Monday, Thursday   Patient taking differently: Take  "2.5 mg by mouth daily      Facility-Administered Medications: None     Allergies   Allergies   Allergen Reactions     Metoprolol Succinate Er      Other reaction(s): low blood pressure     Pcn [Penicillins] Other (See Comments)     Childhood-doesn't know reactions     Statin Drugs [Hmg-Coa-R Inhibitors] Cramps     Uroxatral [Alfuzosin] Other (See Comments)     hypotension     Family History    Family History   Problem Relation Age of Onset     No Known Problems Mother      Prostate Cancer Father      Deep Vein Thrombosis Father      Cancer Sister      Heart Disease Sister      No Known Problems Daughter      Heart Disease Brother      Heart Disease Sister      No Known Problems Daughter      Social History   Social History     Socioeconomic History     Marital status:    Tobacco Use     Smoking status: Former     Smokeless tobacco: Never     Tobacco comments:     Quit smoking 30 years ago   Vaping Use     Vaping Use: Never used   Substance and Sexual Activity     Alcohol use: Not Currently     Drug use: Never     Physical Exam   /70   Pulse 82   Temp 97.6  F (36.4  C) (Oral)   Resp 20   Ht 1.778 m (5' 10\")   Wt 101.6 kg (224 lb)   SpO2 95%   BMI 32.14 kg/m       Weight: 224 lbs 0 oz Body mass index is 32.14 kg/m .     Constitutional: Alert, oriented, cooperative, no apparent distress, appears nontoxic  Eyes: Eyes are clear, pupils are round without exudate.  HENT: No evidence of cranial trauma.  Cardiovascular: Regular rate and rhythm, normal S1 and S2, 2/6 blowing systolic murmur noted best over left lower sternal border. Good peripheral pulses in wrists bilaterally. No lower extremity edema.  Respiratory: Clear to auscultation bilaterally, unlabored on room air.  No wheezes, rhonchi, rales.  GI: Nondistended, bowel sounds present throughout all quadrants.  Diffusely tender to palpation, most tender in right and left upper quadrant.  No rebound tenderness or guarding.  Genitourinary: Urostomy " bag in place with moderate volume clear yellow urine  Musculoskeletal: Normal muscle bulk, no obvious joint deformities.  Skin: Warm and dry.  Bruising present over bilateral forearms.  Neurologic: Neck supple. Cranial nerves are grossly intact.  is symmetric.  Interacting appropriately.  Nonfocal.    Data   Data reviewed today:   Recent Labs   Lab 01/05/23  1925 01/05/23  0828 01/03/23  0432   WBC 8.0 8.0  --    HGB 6.1* 6.4*  --     104*  --     320  --    INR 1.94* 1.83* 1.76*    139  --    POTASSIUM 3.7 3.7  --    CHLORIDE 106 106  --    CO2 21* 19*  --    BUN 39.9* 34.8*  --    CR 2.00* 1.90*  --    ANIONGAP 12 14  --    GALLO 9.0 9.2  --    * 159*  --    ALBUMIN 3.2*  --   --    PROTTOTAL 6.8  --   --    BILITOTAL 0.2  --   --    ALKPHOS 80  --   --    ALT 17  --   --    AST 19  --   --

## 2023-01-06 NOTE — CONSULTS
Care Management Initial Consult    General Information  Assessment completed with: Spouse or significant otherginny  Type of CM/SW Visit: Initial Assessment    Primary Care Provider verified and updated as needed: Yes   Readmission within the last 30 days: unable to assess      Reason for Consult: discharge planning  Advance Care Planning:     None     Communication Assessment  Patient's communication style: spoken language (English or Bilingual)    Hearing Difficulty or Deaf: no   Wear Glasses or Blind: yes (Cheaters only)    Cognitive  Cognitive/Neuro/Behavioral: WDL                      Living Environment:   People in home: alone     Current living Arrangements: TCU cares at [facility]   Able to return to prior arrangements: yes     Family/Social Support:  Care provided by: self, other (see comments) ([facility] staff)  Provides care for: no one, unable/limited ability to care for self  Marital Status:   Wife  Ginny       Description of Support System: Supportive, Involved    Support Assessment: Adequate family and caregiver support, Adequate social supports    Current Resources:   Patient receiving home care services: No  Community Resources: [facility] [place]   Equipment currently used at home: walker, rolling  Supplies currently used at home: Oxygen Tubing/Supplies    Employment/Financial:  Employment Status: retired     Financial Concerns:   No     Lifestyle & Psychosocial Needs:  Social Determinants of Health     Tobacco Use: Medium Risk     Smoking Tobacco Use: Former     Smokeless Tobacco Use: Never     Passive Exposure: Not on file   Alcohol Use: Not on file   Financial Resource Strain: Not on file   Food Insecurity: Not on file   Transportation Needs: Not on file   Physical Activity: Not on file   Stress: Not on file   Social Connections: Not on file   Intimate Partner Violence: Not on file   Depression: Not on file   Housing Stability: Not on file     Functional  Status:  Prior to admission patient needed assistance:   Dependent ADLs:: Ambulation-walker, Bathing, Dressing, Incontinence, Transfers, Toileting  Dependent IADLs:: Cleaning, Cooking, Laundry, Shopping, Meal Preparation, Medication Management, Transportation, Money Management, Incontinence     Mental Health Status:  Mental Health Status: No Current Concerns       Chemical Dependency Status:  Chemical Dependency Status: No Current Concerns           Values/Beliefs:  Spiritual, Cultural Beliefs, Advent Practices, Values that affect care: no             Additional Information:  TRESSA received referral for discharge planning as pt came to the hospital from [facility].  TRESSA attempted to meet with pt, however, out of the room at a procedure.  TRESSA called pt's spouse, Ginny, to confirm that there is a bedhold in place.  Per Ginny, pt will return to [facility] TCU (Main Phone:[phone] Admissions Phone:[phone] Fax: [phone]) upon discharge.    TRESSA called & spoke with Teresa, admissions at [facility] & confirmed bedhold.  Teresa stated pt is able to return on the weekend.    TRESSA and Ginny discussed transportation & she requested that we use [facility] (Ph: [phone]).  Ginny is aware that insurance may not cover the cost of transportation.      Plan:  Pt to return to [facility] when medically stable.    ANJEL Gandara

## 2023-01-06 NOTE — ED NOTES
"[facility]   Admission Handoff    The patient is Eduardo Laughlin, 82 year old who arrived in the ED by AMBULANCE from TCU with a complaint of Abnormal Labs  . The patient's current symptoms are new and during this time the symptoms have remained the same. In the ED, patient was diagnosed with   Final diagnoses:   None         Needed?: No    Allergies:    Allergies   Allergen Reactions     Metoprolol Succinate Er      Other reaction(s): low blood pressure     Pcn [Penicillins] Other (See Comments)     Childhood-doesn't know reactions     Statin Drugs [Hmg-Coa-R Inhibitors] Cramps     Uroxatral [Alfuzosin] Other (See Comments)     hypotension       Past Medical Hx:   Past Medical History:   Diagnosis Date     Acute renal failure with acute tubular necrosis superimposed on stage 3 chronic kidney disease (H)      Arteriosclerotic cardiovascular disease (ASCVD)      Atrial fibrillation (H)      Bladder cancer (H)      BPH (benign prostatic hypertrophy)      Chronic kidney disease      Complicated UTI (urinary tract infection) 08/25/2019     Congestive heart failure (H)      Coronary artery disease      Diabetes mellitus (H)      Diabetic neuropathy (H)      E coli bacteremia      GERD (gastroesophageal reflux disease)      Gout      Hyperlipidemia      Hypertension      Hyponatremia      Iliac artery aneurysm, 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    Added automatically from request for surgery [id number]     Kidney stone      Osteoarthritis      Personal history of malignant neoplasm of prostate 04/27/2020     Prostate cancer (H)      Sleep apnea     CPAP       Initial vitals were: BP: 138/70  Pulse: 82  Temp: 97.6  F (36.4  C)  Resp: 20  Height: 177.8 cm (5' 10\")  Weight: 101.6 kg (224 lb)  SpO2: 95 %   Recent vital Signs: /70   Pulse 82   Temp 97.6  F (36.4  C) (Oral)   Resp 20   Ht 1.778 m (5' 10\")   Wt 101.6 kg (224 lb)   SpO2 95%   BMI 32.14 kg/m      Elimination Status: Continent: " Yes     Activity Level: 2 assist    Fall Status: Reason for falls risk:  Mobility  patient and family education    Baseline Mental status: WDL  Current Mental Status changes: at basesline    Infection present or suspected this encounter: no  Sepsis suspected: No    Isolation type: NA    Bariatric equipment needed?: No    In the ED these meds were given:   Medications   pantoprazole (PROTONIX) IV push injection 40 mg (has no administration in time range)       Drips running?  No    Home pump  No    Current LDAs: Peripheral IV: Site RAC; Gauge 18       Results:   Labs/Imaging  Ordered and Resulted from Time of ED Arrival Up to the Time of Departure from the ED  Results for orders placed or performed during the hospital encounter of 01/05/23 (from the past 24 hour(s))   [place] Draw    Narrative    The following orders were created for panel order [place] Draw.  Procedure                               Abnormality         Status                     ---------                               -----------         ------                     Extra Blue Top Tube[[id number]]                              Final result               Extra Red Top Tube[[id number]]                               Final result               Extra Green Top (Lithium...[[id number]]                      Final result               Extra Purple Top Tube[[id number]]                            Final result               Extra Blood Bank Purple ...[[id number]]                      In process                   Please view results for these tests on the individual orders.   Extra Blue Top Tube   Result Value Ref Range    Hold Specimen JIC    Extra Red Top Tube   Result Value Ref Range    Hold Specimen JIC    Extra Green Top (Lithium Heparin) Tube   Result Value Ref Range    Hold Specimen JIC    Extra Purple Top Tube   Result Value Ref Range    Hold Specimen JIC    CBC with platelets, differential    Narrative    The following orders were created for panel order CBC  with platelets, differential.  Procedure                               Abnormality         Status                     ---------                               -----------         ------                     CBC with platelets and d...[[id number]]  Abnormal            Final result                 Please view results for these tests on the individual orders.   CBC with platelets and differential   Result Value Ref Range    WBC Count 8.0 4.0 - 11.0 10e3/uL    RBC Count 2.14 (L) 4.40 - 5.90 10e6/uL    Hemoglobin 6.1 (LL) 13.3 - 17.7 g/dL    Hematocrit 21.3 (L) 40.0 - 53.0 %     78 - 100 fL    MCH 28.5 26.5 - 33.0 pg    MCHC 28.6 (L) 31.5 - 36.5 g/dL    RDW 15.5 (H) 10.0 - 15.0 %    Platelet Count 284 150 - 450 10e3/uL    % Neutrophils 70 %    % Lymphocytes 19 %    % Monocytes 8 %    % Eosinophils 3 %    % Basophils 0 %    % Immature Granulocytes 1 %    NRBCs per 100 WBC 0 <1 /100    Absolute Neutrophils 5.6 1.6 - 8.3 10e3/uL    Absolute Lymphocytes 1.5 0.8 - 5.3 10e3/uL    Absolute Monocytes 0.6 0.0 - 1.3 10e3/uL    Absolute Eosinophils 0.3 0.0 - 0.7 10e3/uL    Absolute Basophils 0.0 0.0 - 0.2 10e3/uL    Absolute Immature Granulocytes 0.1 <=0.4 10e3/uL    Absolute NRBCs 0.0 10e3/uL   Comprehensive metabolic panel   Result Value Ref Range    Sodium 139 136 - 145 mmol/L    Potassium 3.7 3.4 - 5.3 mmol/L    Chloride 106 98 - 107 mmol/L    Carbon Dioxide (CO2) 21 (L) 22 - 29 mmol/L    Anion Gap 12 7 - 15 mmol/L    Urea Nitrogen 39.9 (H) 8.0 - 23.0 mg/dL    Creatinine 2.00 (H) 0.67 - 1.17 mg/dL    Calcium 9.0 8.8 - 10.2 mg/dL    Glucose 151 (H) 70 - 99 mg/dL    Alkaline Phosphatase 80 40 - 129 U/L    AST 19 10 - 50 U/L    ALT 17 10 - 50 U/L    Protein Total 6.8 6.4 - 8.3 g/dL    Albumin 3.2 (L) 3.5 - 5.2 g/dL    Bilirubin Total 0.2 <=1.2 mg/dL    GFR Estimate 33 (L) >60 mL/min/1.73m2   INR   Result Value Ref Range    INR 1.94 (H) 0.85 - 1.15   ABO/Rh type and screen    Narrative    The following orders were created for  panel order ABO/Rh type and screen.  Procedure                               Abnormality         Status                     ---------                               -----------         ------                     Adult Type and Screen[[id number]]                            In process                   Please view results for these tests on the individual orders.   CBC with Platelets & Differential *Canceled*    Narrative    The following orders were created for panel order CBC with Platelets & Differential.  Procedure                               Abnormality         Status                     ---------                               -----------         ------                       Please view results for these tests on the individual orders.       For the majority of the shift this patient's behavior was Green     Cardiac Rhythm: Normal Sinus  Pt needs tele? No  Skin/wound Issues: None    Code Status: Full Code    Pain control: good    Nausea control: good    Abnormal labs/tests/findings requiring intervention: HGB 6.1, no source of bleeding    Patient tested for COVID 19 prior to admission: NO     OBS brochure/video discussed/provided to patient/family: N/A     Family present during ED course? No     Family Comments/Social Situation comments: NA    Tasks needing completion: None    Osbaldo Tran, RN

## 2023-01-06 NOTE — ED NOTES
Observation Brochure and Video    Patient informed of observation status based on provider's order.  Observation brochure was given. Patient stated understanding. Questions answered.  Tana Raman RN

## 2023-01-06 NOTE — ED PROVIDER NOTES
HPI   The patient is an 82-year-old male presenting by EMS transport from his nursing home with anemia, hemoglobin 6.4.  This blood work was checked today after routine visit.  The chart reveals that the patient has a history of bladder cancer with ileal conduit.  He has hypertension and CKD 4.  He has had a DVT and atrial fibrillation.  He takes Coumadin.  He takes aspirin 81 mg.  Left bundle branch block present.  He has diabetes and obesity.  He has chronic heart failure with preserved ejection fraction and a known history of NSTEMI.  Obstructive sleep apnea.  Medications include Lipitor, Bumex, Plavix, insulin, Protonix.  Former smoker.  No drugs.  No alcohol.    The patient denies obvious source of blood loss and anemia.  He denies hematochezia or melena.  He denies blood in the urine.  No reported bruising or mucosal bleeding.  No new trauma since his last hospitalization from 12/2 until 12/7.  There is no indication for GI bleed during that hospitalization.  The patient denies pain.  He denies shortness of breath.  He denies lightheadedness or fainting.        Allergies:  Allergies   Allergen Reactions     Metoprolol Succinate Er      Other reaction(s): low blood pressure     Pcn [Penicillins] Other (See Comments)     Childhood-doesn't know reactions     Statin Drugs [Hmg-Coa-R Inhibitors] Cramps     Uroxatral [Alfuzosin] Other (See Comments)     hypotension     Problem List:    Patient Active Problem List    Diagnosis Date Noted     Acute kidney injury (H) 12/05/2022     Priority: Medium     Facial laceration, initial encounter 12/05/2022     Priority: Medium     Injury of head, initial encounter 12/05/2022     Priority: Medium     Lactic acidosis 11/18/2022     Priority: Medium     Acute respiratory failure with hypoxia (H) 11/18/2022     Priority: Medium     Aspiration pneumonia of both lungs, unspecified aspiration pneumonia type, unspecified part of lung (H) 11/18/2022     Priority: Medium     Chronic  anticoagulation 11/07/2022     Priority: Medium     NSTEMI (non-ST elevated myocardial infarction) (H) 11/06/2022     Priority: Medium     Acute congestive heart failure, unspecified heart failure type (H) 11/06/2022     Priority: Medium     Syncope 11/06/2022     Priority: Medium     Chronic heart failure with preserved ejection fraction (H) 11/04/2022     Priority: Medium     GINNY (obstructive sleep apnea) 11/04/2022     Priority: Medium     Status post spinal surgery 09/13/2022     Priority: Medium     Morbid obesity (H) 10/04/2021     Priority: Medium     Gastro-esophageal reflux disease without esophagitis 04/27/2020     Priority: Medium     Idiopathic chronic gout without tophus 04/27/2020     Priority: Medium     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 04/27/2020     Priority: Medium     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) 04/27/2020     Priority: Medium     Hydronephrosis of right kidney      Priority: Medium     Coronary artery disease      Priority: Medium     Paroxysmal atrial fibrillation (H)      Priority: Medium     LBBB (left bundle branch block)      Priority: Medium     Deep vein thrombosis (DVT) of proximal vein of both lower extremities, unspecified chronicity (H)      Priority: Medium     Chronic deep vein thrombosis (DVT) of femoral vein of right lower extremity (H) 12/04/2019     Priority: Medium     S/P ileal conduit (H) 08/18/2016     Priority: Medium     HTN (hypertension) 08/18/2016     Priority: Medium     CKD (chronic kidney disease) stage 3, GFR 30-59 ml/min (H) 08/18/2016     Priority: Medium     Bladder cancer (H) 01/27/2016     Priority: Medium      Past Medical History:    Past Medical History:   Diagnosis Date     Acute renal failure with acute tubular necrosis superimposed on stage 3 chronic kidney disease (H)      Arteriosclerotic cardiovascular disease (ASCVD)      Atrial fibrillation (H)      Bladder cancer (H)      BPH  (benign prostatic hypertrophy)      Chronic kidney disease      Complicated UTI (urinary tract infection) 08/25/2019     Congestive heart failure (H)      Coronary artery disease      Diabetes mellitus (H)      Diabetic neuropathy (H)      E coli bacteremia      GERD (gastroesophageal reflux disease)      Gout      Hyperlipidemia      Hypertension      Hyponatremia      Iliac artery aneurysm, 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     Kidney stone      Osteoarthritis      Personal history of malignant neoplasm of prostate 04/27/2020     Prostate cancer (H)      Sleep apnea      Past Surgical History:    Past Surgical History:   Procedure Laterality Date     CARDIAC CATHETERIZATION  03/28/2008    and coronary angios     CV CORONARY ANGIOGRAM N/A 11/28/2022    Procedure: CV CORONARY ANGIOGRAM;  Surgeon: Gonzales Hager MD;  Location: [facility] CV     CV LEFT HEART CATH N/A 11/28/2022    Procedure: Left Heart Catheterization;  Surgeon: Gonzales Hager MD;  Location: [facility]     CV PCI STENT DRUG ELUTING N/A 11/28/2022    Procedure: Percutaneous Coronary Intervention Stent;  Surgeon: Gonzales Hager MD;  Location: [facility]     CYSTECTOMY       CYSTOSCOPY       CYSTOSCOPY N/A 12/23/2015    Procedure: CYSTOSCOPY BLADDER BIOPSIES with Fulgeration and Placement of Otero ;  Surgeon: Gordon Bajwa MD;  [place] [facility] [place]:      IR EXTREMITY ANGIOGRAM LEFT  3/22/2019     IR EXTREMITY ANGIOGRAM LEFT  3/22/2019     IR ILEAL CONDUIT INJECTION  12/23/2016     IR NEPHROSTOMY TUBE PLACEMENT RIGHT  8/18/2016     IR URETEROSTOMY TUBE CHANGE RIGHT  9/21/2016     KIDNEY STONE SURGERY  x4     LAMINECTOMY LUMBAR ONE LEVEL Bilateral 9/13/2022    Procedure: LUMBAR 4 - LUMBAR 5 BILATERAL MEDIAL FACETECTOMY AND DECOMPRESSION;  Surgeon: Everett Holland MD;  Location: [facility]     PICC TRIPLE LUMEN PLACEMENT  11/18/2022          PROSTATE SURGERY       Family History:    Family  "History   Problem Relation Age of Onset     No Known Problems Mother      Prostate Cancer Father      Deep Vein Thrombosis Father      Cancer Sister      Heart Disease Sister      No Known Problems Daughter      Heart Disease Brother      Heart Disease Sister      No Known Problems Daughter      Social History:  Marital Status:   [2]  Social History     Tobacco Use     Smoking status: Former     Smokeless tobacco: Never     Tobacco comments:     Quit smoking 30 years ago   Vaping Use     Vaping Use: Never used   Substance Use Topics     Alcohol use: Not Currently     Drug use: Never      Medications:    allopurinol (ZYLOPRIM) 100 MG tablet  aspirin (ASA) 81 MG EC tablet  atorvastatin (LIPITOR) 40 MG tablet  bacitracin 500 UNIT/GM OINT  bumetanide (BUMEX) 2 MG tablet  clopidogrel (PLAVIX) 75 MG tablet  ezetimibe (ZETIA) 10 MG tablet  glimepiride (AMARYL) 1 MG tablet  insulin aspart (NOVOLOG PEN) 100 UNIT/ML pen  metoprolol succinate ER (TOPROL XL) 25 MG 24 hr tablet  multivitamin, therapeutic (THERA-VIT) TABS tablet  nitroGLYcerin (NITROSTAT) 0.4 MG sublingual tablet  pantoprazole (PROTONIX) 40 MG EC tablet  polyethylene glycol (MIRALAX) 17 g packet  traZODone (DESYREL) 100 MG tablet  warfarin ANTICOAGULANT (COUMADIN) 5 MG tablet      Review of Systems   All other systems reviewed and are negative.      PE   BP: 138/70  Pulse: 82  Temp: 97.6  F (36.4  C)  Resp: 20  Height: 177.8 cm (5' 10\")  Weight: 101.6 kg (224 lb)  SpO2: 95 %  Physical Exam  Vitals and nursing note reviewed.   Constitutional:       General: He is not in acute distress.  HENT:      Head: Atraumatic.      Right Ear: External ear normal.      Left Ear: External ear normal.      Nose: Nose normal.      Mouth/Throat:      Mouth: Mucous membranes are moist.      Pharynx: Oropharynx is clear.   Eyes:      General: No scleral icterus.     Extraocular Movements: Extraocular movements intact.      Conjunctiva/sclera: Conjunctivae normal.      Pupils: " "Pupils are equal, round, and reactive to light.   Cardiovascular:      Rate and Rhythm: Normal rate.   Pulmonary:      Effort: Pulmonary effort is normal. No respiratory distress.   Abdominal:      Comments: Obese.  Not tender.  No distention.  No organomegaly or mass.   Musculoskeletal:         General: Normal range of motion.      Cervical back: Normal range of motion.   Skin:     General: Skin is warm and dry.      Coloration: Skin is pale.   Neurological:      Mental Status: He is alert and oriented to person, place, and time.   Psychiatric:         Behavior: Behavior normal.         ED COURSE and MDM   1943.  The patient presents from his nursing home with a hemoglobin of 6.4, source of anemia unknown.  Recent hospitalization from 12/2 until 12/7.  Primary diagnoses was fall with facial laceration.  Recent NSTEMI just prior to that hospitalization.  He does take aspirin 81 mg, Plavix, and Coumadin.  Lab values pending.  Patient is agreeable to a transfusion if his hemoglobin is in fact less than 7.    2026.  Hemoglobin 6.1.  Patient will be transfused with 2 units.  Again, source of blood loss is unknown though I suspect that it is GI in origin.  The patient's history is suspect but he does tell me that he has seen black, tarry stool.  He cannot tell me when the last time this was but, \"I think before Monday,\" which was 4 days ago.  INR 1.94.  Patient would like to stay in the hospital and I think this is a reasonable plan.  It will take hours before his blood is actually transfused and at that time it will be past midnight.  We do have a bed and I am waiting for the hospitalist to call back.  We can trend his hemoglobin following make sure he is not having black, tarry stool.    2102.  Patient accepted by the hospital service.  Estephania is requesting no new orders.  Patient has not started transfusions yet.  Patient agrees to transfusions after discussion about risks and benefits.  Protonix will be started in case " there is a GI source.  N.p.o. after midnight in case he does need procedure.    2103.  The patient, their parent if applicable, and/or their medical decision maker(s) and I have reviewed all of the available historical information, applicable PMH, physical exam findings, and objective diagnostic data gathered during this ED visit.  We then discussed all work-up options and then together agreed upon the course taken during this visit.  The ultimate disposition and plan was a cooperative decision made between myself and the patient, their parent if applicable, and/or their legal decision maker(s).  The risks and benefits of all decisions made during this visit were discussed to the best of my abilities given the circumstances, and all parties are understanding of the pertinent ramifications of these decisions.      LABS  Labs Ordered and Resulted from Time of ED Arrival to Time of ED Departure   CBC WITH PLATELETS AND DIFFERENTIAL - Abnormal       Result Value    WBC Count 8.0      RBC Count 2.14 (*)     Hemoglobin 6.1 (*)     Hematocrit 21.3 (*)           MCH 28.5      MCHC 28.6 (*)     RDW 15.5 (*)     Platelet Count 284      % Neutrophils 70      % Lymphocytes 19      % Monocytes 8      % Eosinophils 3      % Basophils 0      % Immature Granulocytes 1      NRBCs per 100 WBC 0      Absolute Neutrophils 5.6      Absolute Lymphocytes 1.5      Absolute Monocytes 0.6      Absolute Eosinophils 0.3      Absolute Basophils 0.0      Absolute Immature Granulocytes 0.1      Absolute NRBCs 0.0     COMPREHENSIVE METABOLIC PANEL - Abnormal    Sodium 139      Potassium 3.7      Chloride 106      Carbon Dioxide (CO2) 21 (*)     Anion Gap 12      Urea Nitrogen 39.9 (*)     Creatinine 2.00 (*)     Calcium 9.0      Glucose 151 (*)     Alkaline Phosphatase 80      AST 19      ALT 17      Protein Total 6.8      Albumin 3.2 (*)     Bilirubin Total 0.2      GFR Estimate 33 (*)    INR - Abnormal    INR 1.94 (*)    TYPE AND SCREEN,  ADULT   PREPARE RED BLOOD CELLS (UNIT)   TRANSFUSE RED BLOOD CELLS (UNIT)   ABO/RH TYPE AND SCREEN       IMAGING  Images reviewed by me.  Radiology report also reviewed.  No orders to display       Procedures    Medications   pantoprazole (PROTONIX) IV push injection 40 mg (has no administration in time range)         IMPRESSION       ICD-10-CM    1. Anemia, unspecified type  D64.9                        Medication List      There are no discharge medications for this visit.                     Robin Phelps MD  01/05/23 [id number]

## 2023-01-06 NOTE — PLAN OF CARE
Patient arrived to the floor at from ED. Patient is A/Ox4, was oriented to room/call light use. Patient verbalized understanding of disease process and treatment plan. Patient denies having pain, discomfort. Patient received 2 Units RBC.hemoglobin recheck in the morning.+ BM.Urostomy has adequate cloudy yellow  UOP.Will continue to monitor

## 2023-01-06 NOTE — ANESTHESIA CARE TRANSFER NOTE
Patient: Eduardo Laughlin    Procedure: Procedure(s):  ESOPHAGOGASTRODUODENOSCOPY, WITH BIOPSY       Diagnosis: Anemia, unspecified type [D64.9]  Gastrointestinal hemorrhage with melena [K92.1]  Diagnosis Additional Information: No value filed.    Anesthesia Type:   General     Note:    Oropharynx: oropharynx clear of all foreign objects and spontaneously breathing  Level of Consciousness: awake  Oxygen Supplementation: nasal cannula  Level of Supplemental Oxygen (L/min / FiO2): 2  Independent Airway: airway patency satisfactory and stable  Dentition: dentition unchanged  Vital Signs Stable: post-procedure vital signs reviewed and stable  Report to RN Given: handoff report given  Patient transferred to: Phase II    Handoff Report: Identifed the Patient, Identified the Reponsible Provider, Reviewed the pertinent medical history, Discussed the surgical course, Reviewed Intra-OP anesthesia mangement and issues during anesthesia, Set expectations for post-procedure period and Allowed opportunity for questions and acknowledgement of understanding      Vitals:  Vitals Value Taken Time   BP     Temp     Pulse     Resp     SpO2         Electronically Signed By: Efrain Waterman CRNA, APRN CRNA  January 6, 2023  1:01 PM

## 2023-01-06 NOTE — PROGRESS NOTES
[facility]    Hospitalist Progress Note    Date of Service (when I saw the patient): 01/06/2023    Assessment & Plan   Eduardo Laughlin is a 82 year old male who presents on 1/5/2023 with low hemoglobin on serial lab check at living facility.      Anemia due to blood loss, acute  Gastro-esophageal reflux disease without esophagitis  Hemoglobin around 6 on routine check 1/5 at TCU. Patient denies any current symptoms (no pain, vomiting) but did have melena 12/27 - 1/2 (no BM since 1/2). Hemoglobin on presentation 6.4 --> 6.1. Remains stable (SBP >100, not tachycardic). Suspect acute upper GI bleed due to DAPT & anticoagulation causing significant clotting impairment (see below). Per below, hold all blood thinners & will have to consider how to restart once bleeding is resolved. Would not recommend DAPT & anticoagulation together.   -Surgery consult, appreciate recommendations  -Prep & transfuse 2 units PRBCs on 1/5  -EGD on 1/6 revealed no source of bleeding from esophagus, stomach, duodenum.  Biopsies from stomach taken, pathology pending.  -Sucralfate 1g four times daily on admission; will discharge after negative EGD  -Empiric treatment with Pantoprazole 40mg PO BID  -CBC in AM to monitor  -If hemoglobin stable, discharge in AM     History of deep vein thrombosis (DVT)   Paroxysmal atrial fibrillation   Chronic anticoagulation  Most recent DVT I can find in chart is 2020. No current DVT symptoms. Auscultated to be in sinus rhythm on admission. Managed PTA with metoprolol & warfarin. INR on presentation 1.94. Given significant bleed (above) will reverse anticoagulation in preparation for probable EGD to further evaluate. See discussion below under CAD for whether & how to restart.   -Hold PTA warfarin  -Vitamin K 2mg IV once for INR reversal   -INR Q8hrs     Coronary artery disease  NSTEMI s/p PCI with CORA placement  NSTEMI s/p PCI with CORA to OM1 11/28/2022. Was discharged from that  hospitalization 12/1 on aspirin & clopidogrel. Patient was cleared by cardiology prior to discharge (of note, cardiology discusses being ok with current regimen and talks about patient's DAPT but does not mention afib for warfarin in their note. Patient was discharged with DAPT + Warfarin), was supposed to follow up outpatient for continued monitoring but has not yet. Denies any recent chest pain. Given recent stent placement, it will likely be important to continue some form of antiplatelet. Given that patient is currently on DAPT and anticoagulation (above) and now has a significant bleed, key discharge decision will be how to manage this combination (Warfarin & aspirin versus DAPT without anticoagulation).   -Hold aspirin & clopidogrel  -Consider cardiology consult to discuss plan for blood thinner regimen on discharge     HTN (hypertension)  Chronic heart failure with preserved ejection fraction  LBBB (left bundle branch block)  Minimally hypertenstive since presentation (SBP 140s). Heart failure seems compensated (no obvious JVP elevation or LE edema, no PND or orthopnea). Managed PTA with bumetanide, metoprolol succinate 25mg daily.   -continue PTA bumetanide   -continue PTA metoprolol with hold parameters     CKD (chronic kidney disease) stage 3, GFR 30-59 ml/min   Stable, at baseline. Baseline creatinine around 2.3, creatinine on presentation 2.00.   -BMP in AM     Bladder cancer   S/P ileal conduit   Diagnosed 2016, s/p cystectomy / urostomy. No complications with urostomy currently. Nonbloody urine present in urostomy bag.   -Continue regular outpatient follow up.      Idiopathic chronic gout without tophus  Stable, no new symptoms. Managed PTA with allopurinol 200mg daily, continue.      Type 2 diabetes mellitus with diabetic neuropathic arthropathy  Good control. Hemoglobin A1C 10/12/2022 7.7. Glucose mildly elevated since presentation (151 --> 159). Managed PTA with ezetimibe and glimepiride.  "  -Continue PTA ezetimibe  -Hold PTA sulfonylurea until able to have PO diet (high risk for hypoglycemia)     Morbid obesity  Contributes to overall morbidity and mortality.      GINNY (obstructive sleep apnea)  May use home CPAP if available.     Clinically Significant Risk Factors Present on Admission               # Hypoalbuminemia: Lowest albumin = 3.2 g/dL at 1/5/2023  7:25 PM, will monitor as appropriate  # Drug Induced Coagulation Defect: home medication list includes an anticoagulant medication  # Drug Induced Platelet Defect: home medication list includes an antiplatelet medication       # DMII: A1C = 7.7 % (Ref range: <5.7 %) within past 3 months    # Obesity: Estimated body mass index is 32.14 kg/m  as calculated from the following:    Height as of this encounter: 1.778 m (5' 10\").    Weight as of this encounter: 101.6 kg (224 lb).            Diet:  Advance as tolerated from clear liquids  DVT Prophylaxis: None, was on DAPT + anticoagulation and reversing for bleed as in A&P  Otero Catheter: Not present  Code Status:  DNR, DNI  Lines: PIV    Disposition: Expected discharge in 1-2 days once hemoglobin stable.    Kali Butt MD    Interval History   Patient with multipe episodes black, tarry stools overnight.  No abdominal pain.    -Data reviewed today: I reviewed all new labs and imaging results over the last 24 hours. I personally reviewed no images or EKG's today.    Physical Exam   Temp: 97.6  F (36.4  C) Temp src: Oral BP: 115/66 Pulse: 74   Resp: 20 SpO2: 98 % O2 Device: None (Room air) Oxygen Delivery: 3 LPM  Vitals:    01/05/23 1920 01/05/23 2231 01/06/23 0600   Weight: 101.6 kg (224 lb) 101.8 kg (224 lb 6.9 oz) 102 kg (224 lb 13.9 oz)     Vital Signs with Ranges  Temp:  [97.6  F (36.4  C)-98  F (36.7  C)] 97.6  F (36.4  C)  Pulse:  [64-92] 74  Resp:  [15-29] 20  BP: ()/() 115/66  SpO2:  [94 %-100 %] 98 %  I/O last 3 completed shifts:  In: 681.66 [I.V.:100]  Out: 550 " [Urine:550]    Gen: Well nourished, well developed, alert and oriented x 3, no acute distressed  HEENT: Atraumatic, normocephalic; sclera non-injected, anicterric; oral mucosa moist, no lesion, no exudate  Lungs: Clear to ausculation, no wheezes, no rhonchi, no rales  Heart: Regular rate, regular rhythm, no gallops, no rubs, no murmurs  GI: Bowel sound normal, no hepatosplenomegaly, no masses, non-tender, non-distended, no guarding, no rebound tenderness  Lymph: No lymphadenopathy, no edema  Skin: No rashes, no chronic venous stasis     Medications       allopurinol  200 mg Oral Daily     atorvastatin  40 mg Oral QPM     bumetanide  1 mg Oral Daily     ezetimibe  10 mg Oral QPM     [Held by provider] glimepiride  1 mg Oral Daily     metoprolol succinate ER  25 mg Oral Daily     pantoprazole  40 mg Intravenous BID     senna-docusate  1-2 tablet Oral BID     sucralfate  1 g Oral 4x Daily AC & HS     zz extemporaneous template  125 mg Oral At Bedtime       Data   Recent Labs   Lab 01/06/23  1122 01/06/23  0756 01/06/23  0607 01/06/23  0221 01/06/23  0054 01/05/23  1925 01/05/23  0828   WBC  --   --  7.3  --   --  8.0 8.0   HGB  --   --  7.7*  7.7*  --   --  6.1* 6.4*   MCV  --   --  97  --   --  100 104*   PLT  --   --  280  --   --  284 320   INR  --   --  1.62*  --  1.87* 1.94* 1.83*   NA  --   --  139  --   --  139 139   POTASSIUM  --   --  4.4  --   --  3.7 3.7   CHLORIDE  --   --  108*  --   --  106 106   CO2  --   --  22  --   --  21* 19*   BUN  --   --  35.5*  --   --  39.9* 34.8*   CR  --   --  1.74*  --   --  2.00* 1.90*   ANIONGAP  --   --  9  --   --  12 14   GALLO  --   --  8.6*  --   --  9.0 9.2   * 159* 145*   < >  --  151* 159*   ALBUMIN  --   --   --   --   --  3.2*  --    PROTTOTAL  --   --   --   --   --  6.8  --    BILITOTAL  --   --   --   --   --  0.2  --    ALKPHOS  --   --   --   --   --  80  --    ALT  --   --   --   --   --  17  --    AST  --   --   --   --   --  19  --     < > = values  in this interval not displayed.       No results found for this or any previous visit (from the past 24 hour(s)).

## 2023-01-06 NOTE — ANESTHESIA PREPROCEDURE EVALUATION
Anesthesia Pre-Procedure Evaluation    Patient: Eduardo Laughlin   MRN: [id number] : 1940        Procedure : Procedure(s):  LUMBAR 4 - LUMBAR 5 BILATERAL MEDIAL FACETECTOMY AND DECOMPRESSION          Past Medical History:   Diagnosis Date     Acute renal failure with acute tubular necrosis superimposed on stage 3 chronic kidney disease (H)      Arteriosclerotic cardiovascular disease (ASCVD)      Atrial fibrillation (H)      Bladder cancer (H)      BPH (benign prostatic hypertrophy)      Chronic kidney disease      Complicated UTI (urinary tract infection) 2019     Congestive heart failure (H)      Coronary artery disease      Diabetes mellitus (H)      Diabetic neuropathy (H)      E coli bacteremia      GERD (gastroesophageal reflux disease)      Gout      Hyperlipidemia      Hypertension      Hyponatremia      Iliac artery aneurysm, left (H) 2019    Added automatically from request for surgery [id number]     Kidney stone      Osteoarthritis      Personal history of malignant neoplasm of prostate 2020     Prostate cancer (H)      Sleep apnea     CPAP      Past Surgical History:   Procedure Laterality Date     CARDIAC CATHETERIZATION  2008    and coronary angios     CV CORONARY ANGIOGRAM N/A 2022    Procedure: CV CORONARY ANGIOGRAM;  Surgeon: Gonzales Hager MD;  Location: [facility]     CV LEFT HEART CATH N/A 2022    Procedure: Left Heart Catheterization;  Surgeon: Gonzales Hager MD;  Location: [facility]     CV PCI STENT DRUG ELUTING N/A 2022    Procedure: Percutaneous Coronary Intervention Stent;  Surgeon: Gonzales Hager MD;  Location: [facility]     CYSTECTOMY       CYSTOSCOPY       CYSTOSCOPY N/A 2015    Procedure: CYSTOSCOPY BLADDER BIOPSIES with Fulgeration and Placement of Otero ;  Surgeon: Gordon Bajwa MD;  Location: [facility] OR;  Service:      IR EXTREMITY ANGIOGRAM LEFT  3/22/2019     IR EXTREMITY  ANGIOGRAM LEFT  3/22/2019     IR ILEAL CONDUIT INJECTION  12/23/2016     IR NEPHROSTOMY TUBE PLACEMENT RIGHT  8/18/2016     IR URETEROSTOMY TUBE CHANGE RIGHT  9/21/2016     KIDNEY STONE SURGERY  x4     LAMINECTOMY LUMBAR ONE LEVEL Bilateral 9/13/2022    Procedure: LUMBAR 4 - LUMBAR 5 BILATERAL MEDIAL FACETECTOMY AND DECOMPRESSION;  Surgeon: Everett Holland MD;  Location: [facility] OR     PICC TRIPLE LUMEN PLACEMENT  11/18/2022          PROSTATE SURGERY        Allergies   Allergen Reactions     Metoprolol Succinate Er      Other reaction(s): low blood pressure     Pcn [Penicillins] Other (See Comments)     Childhood-doesn't know reactions     Statin Drugs [Hmg-Coa-R Inhibitors] Cramps     Uroxatral [Alfuzosin] Other (See Comments)     hypotension      Social History     Tobacco Use     Smoking status: Former     Smokeless tobacco: Never     Tobacco comments:     Quit smoking 30 years ago   Substance Use Topics     Alcohol use: Not Currently      Wt Readings from Last 1 Encounters:   01/06/23 102 kg (224 lb 13.9 oz)        Anesthesia Evaluation   Pt has had prior anesthetic. Type: General, MAC and Regional.    No history of anesthetic complications       ROS/MED HX  ENT/Pulmonary: Comment: Hx aspiration pneumonia    (+) sleep apnea, tobacco use, Past use,     Neurologic:     (+) peripheral neuropathy,     Cardiovascular: Comment: Iliac artery aneurysm     (+) Dyslipidemia hypertension--CAD -past MI -stent-11/28/22. 1 Drug Eluting Stent. Taking blood thinners CHF Last EF: 55-60 date: 12/2022 fainting (syncope). dysrhythmias, a-fib, Previous cardiac testing   Echo: Date: 12/4/22 Results:  Interpretation Summary     Normal right ventricle size and systolic function.  Left ventricular size, wall motion and function are normal. The ejection  fraction is 55-60%.  Definity contrast was used without apparent complications. The study was  technically difficult. No hemodynamically significant valvular  abnormalities  on 2D or color flow imaging.  ______________________________________________________________________________  Left Ventricle  Left ventricular size, wall motion and function are normal. The ejection  fraction is 55-60%. There is normal left ventricular wall thickness. No  regional wall motion abnormalities noted.     Right Ventricle  Normal right ventricle size and systolic function. TAPSE is normal, which is  consistent with normal right ventricular systolic function.     Atria  Normal left atrial size. Right atrial size is normal. There is no color  Doppler evidence of an atrial shunt.     Mitral Valve  The mitral valve is not well visualized. There is mild (1+) mitral  regurgitation.     Tricuspid Valve  The tricuspid valve is not well visualized.     Aortic Valve  The aortic valve is not well visualized. No hemodynamically significant  valvular aortic stenosis.     Pulmonic Valve  The pulmonic valve is not well visualized.     Vessels  The aorta root cannot be assessed. IVC diameter <2.1 cm collapsing >50% with  sniff suggests a normal RA pressure of 3 mmHg.     Pericardium  There is no pericardial effusion.     Stress Test: Date: Results:    ECG Reviewed: Date: 12/5/22 Results:  Sinus rhythm with 1st degree A-V block   Left bundle branch block   Abnormal ECG   Cath:  Date: 11/28/22 Results:  ? 1st Mrg lesion is 90% stenosed.     LAD irregular but no severe stenoses.  LCX huge dominant vessel, with high-grade  lesion of large OM1 (likely culprit lesion), diffuse moderate plaque in second order  Branches. 80% stenosis mid-distal LPDA  RCA smaller system, 30-50% mid narrowing into RV marginal branch.     No gradient across aortic valve.     Successful PCI with CORA to mid OM1      METS/Exercise Tolerance: 4 - Raking leaves, gardening    Hematologic:     (+) History of blood clots, pt is anticoagulated, anemia, history of blood transfusion,     Musculoskeletal:  - neg musculoskeletal ROS      GI/Hepatic:     (+) GERD,     Renal/Genitourinary:     (+) renal disease, type: CRI,     Endo:     (+) type II DM, Diabetic complications: neuropathy. Obesity,     Psychiatric/Substance Use:  - neg psychiatric ROS     Infectious Disease:  - neg infectious disease ROS     Malignancy:   (+) Malignancy, History of Prostate.    Other:  - neg other ROS          Physical Exam    Airway             Respiratory Devices and Support         Dental           Cardiovascular             Pulmonary                   OUTSIDE LABS:  CBC:   Lab Results   Component Value Date    WBC 7.3 01/06/2023    WBC 8.0 01/05/2023    HGB 7.7 (L) 01/06/2023    HGB 7.7 (L) 01/06/2023    HCT 25.0 (L) 01/06/2023    HCT 25.0 (L) 01/06/2023     01/06/2023     01/05/2023     BMP:   Lab Results   Component Value Date     01/06/2023     01/05/2023    POTASSIUM 4.4 01/06/2023    POTASSIUM 3.7 01/05/2023    CHLORIDE 108 (H) 01/06/2023    CHLORIDE 106 01/05/2023    CO2 22 01/06/2023    CO2 21 (L) 01/05/2023    BUN 35.5 (H) 01/06/2023    BUN 39.9 (H) 01/05/2023    CR 1.74 (H) 01/06/2023    CR 2.00 (H) 01/05/2023     (H) 01/06/2023     (H) 01/06/2023     COAGS:   Lab Results   Component Value Date    PTT 23 12/02/2022    INR 1.62 (H) 01/06/2023     POC: No results found for: BGM, HCG, HCGS  HEPATIC:   Lab Results   Component Value Date    ALBUMIN 3.2 (L) 01/05/2023    PROTTOTAL 6.8 01/05/2023    ALT 17 01/05/2023    AST 19 01/05/2023    ALKPHOS 80 01/05/2023    BILITOTAL 0.2 01/05/2023     OTHER:   Lab Results   Component Value Date    LACT 1.5 11/18/2022    A1C 7.7 (H) 10/12/2022    GALLO 8.6 (L) 01/06/2023    PHOS 3.2 11/25/2022    MAG 2.2 12/07/2022    CRP 12.0 (H) 04/25/2020       Anesthesia Plan    ASA Status:  4      Anesthesia Type: General.     - Airway: Native airway              Consents    Anesthesia Plan(s) and associated risks, benefits, and realistic alternatives discussed. Questions answered and  patient/representative(s) expressed understanding.    - Discussed:     - Discussed with:  Patient         Postoperative Care            Comments:                    CARLOS Arora CRNA

## 2023-01-06 NOTE — ED TRIAGE NOTES
Patient had labs drawn this am at [facility] and has a hemoglobin of 6.4. Patient denies any symptoms and was upset he had to come.     Triage Assessment     Row Name 01/05/23 1922       Triage Assessment (Adult)    Airway WDL WDL       Respiratory WDL    Respiratory WDL WDL       Skin Circulation/Temperature WDL    Skin Circulation/Temperature WDL WDL       Cardiac WDL    Cardiac WDL WDL       Peripheral/Neurovascular WDL    Peripheral Neurovascular WDL WDL       Cognitive/Neuro/Behavioral WDL    Cognitive/Neuro/Behavioral WDL WDL       [place] Coma Scale    Best Eye Response 4-->(E4) spontaneous    Best Motor Response 6-->(M6) obeys commands    Best Verbal Response 5-->(V5) oriented    [place] Coma Scale Score 15

## 2023-01-06 NOTE — ANESTHESIA POSTPROCEDURE EVALUATION
Patient: Eduardo Laughlin    Procedure: Procedure(s):  ESOPHAGOGASTRODUODENOSCOPY, WITH BIOPSY       Anesthesia Type:  General    Note:  Disposition: Outpatient   Postop Pain Control: Uneventful            Sign Out: Well controlled pain   PONV: No   Neuro/Psych: Uneventful            Sign Out: Acceptable/Baseline neuro status   Airway/Respiratory: Uneventful            Sign Out: Acceptable/Baseline resp. status   CV/Hemodynamics: Uneventful            Sign Out: Acceptable CV status; No obvious hypovolemia; No obvious fluid overload   Other NRE: NONE   DID A NON-ROUTINE EVENT OCCUR? No           Last vitals:  Vitals Value Taken Time   BP 74/48 01/06/23 1303   Temp     Pulse 82 01/06/23 1303   Resp 166 01/06/23 1303   SpO2 98 % 01/06/23 1303       Electronically Signed By: Efrain Waterman CRNA, APRN CRNA  January 6, 2023  1:07 PM

## 2023-01-07 VITALS
TEMPERATURE: 97.3 F | DIASTOLIC BLOOD PRESSURE: 80 MMHG | HEIGHT: 70 IN | HEART RATE: 89 BPM | SYSTOLIC BLOOD PRESSURE: 130 MMHG | WEIGHT: 225.53 LBS | RESPIRATION RATE: 20 BRPM | OXYGEN SATURATION: 97 % | BODY MASS INDEX: 32.29 KG/M2

## 2023-01-07 LAB
ANION GAP SERPL CALCULATED.3IONS-SCNC: 5 MMOL/L (ref 7–15)
BUN SERPL-MCNC: 31 MG/DL (ref 8–23)
CALCIUM SERPL-MCNC: 8.8 MG/DL (ref 8.8–10.2)
CHLORIDE SERPL-SCNC: 109 MMOL/L (ref 98–107)
CREAT SERPL-MCNC: 1.6 MG/DL (ref 0.67–1.17)
DEPRECATED HCO3 PLAS-SCNC: 25 MMOL/L (ref 22–29)
GFR SERPL CREATININE-BSD FRML MDRD: 43 ML/MIN/1.73M2
GLUCOSE BLDC GLUCOMTR-MCNC: 165 MG/DL (ref 70–99)
GLUCOSE BLDC GLUCOMTR-MCNC: 230 MG/DL (ref 70–99)
GLUCOSE SERPL-MCNC: 154 MG/DL (ref 70–99)
HGB BLD-MCNC: 7.9 G/DL (ref 13.3–17.7)
POTASSIUM SERPL-SCNC: 4 MMOL/L (ref 3.4–5.3)
SODIUM SERPL-SCNC: 139 MMOL/L (ref 136–145)

## 2023-01-07 PROCEDURE — 82962 GLUCOSE BLOOD TEST: CPT

## 2023-01-07 PROCEDURE — 85018 HEMOGLOBIN: CPT | Performed by: INTERNAL MEDICINE

## 2023-01-07 PROCEDURE — 99239 HOSP IP/OBS DSCHRG MGMT >30: CPT | Performed by: INTERNAL MEDICINE

## 2023-01-07 PROCEDURE — 36415 COLL VENOUS BLD VENIPUNCTURE: CPT | Performed by: INTERNAL MEDICINE

## 2023-01-07 PROCEDURE — 250N000013 HC RX MED GY IP 250 OP 250 PS 637: Performed by: SURGERY

## 2023-01-07 PROCEDURE — G0378 HOSPITAL OBSERVATION PER HR: HCPCS

## 2023-01-07 PROCEDURE — 250N000013 HC RX MED GY IP 250 OP 250 PS 637: Performed by: INTERNAL MEDICINE

## 2023-01-07 PROCEDURE — 80048 BASIC METABOLIC PNL TOTAL CA: CPT | Performed by: INTERNAL MEDICINE

## 2023-01-07 RX ORDER — BUMETANIDE 1 MG/1
1 TABLET ORAL DAILY
COMMUNITY
Start: 2023-01-07 | End: 2023-02-20

## 2023-01-07 RX ORDER — WARFARIN SODIUM 5 MG/1
5 TABLET ORAL DAILY
Qty: 1 TABLET | Refills: 0 | COMMUNITY
Start: 2023-01-07

## 2023-01-07 RX ORDER — PANTOPRAZOLE SODIUM 40 MG/1
40 TABLET, DELAYED RELEASE ORAL
Qty: 60 TABLET | Refills: 0 | Status: SHIPPED | OUTPATIENT
Start: 2023-01-07

## 2023-01-07 RX ADMIN — METOPROLOL SUCCINATE 25 MG: 25 TABLET, EXTENDED RELEASE ORAL at 07:59

## 2023-01-07 RX ADMIN — PANTOPRAZOLE SODIUM 40 MG: 40 TABLET, DELAYED RELEASE ORAL at 05:58

## 2023-01-07 RX ADMIN — SENNOSIDES AND DOCUSATE SODIUM 1 TABLET: 50; 8.6 TABLET ORAL at 07:59

## 2023-01-07 RX ADMIN — BUMETANIDE 1 MG: 1 TABLET ORAL at 07:59

## 2023-01-07 RX ADMIN — ALLOPURINOL 200 MG: 100 TABLET ORAL at 07:59

## 2023-01-07 ASSESSMENT — ACTIVITIES OF DAILY LIVING (ADL)
ADLS_ACUITY_SCORE: 35

## 2023-01-07 NOTE — PHARMACY-ANTICOAGULATION SERVICE
Clinical Pharmacy- Warfarin Discharge Note  This patient is currently on warfarin for the treatment of Atrial fibrillation.  INR Goal= 2-3  Expected length of therapy lifetime.           Anticoagulation Dose History     Recent Dosing and Labs Latest Ref Rng & Units 12/29/2022 1/3/2023 1/5/2023 1/5/2023 1/6/2023 1/6/2023 1/6/2023    INR 0.85 - 1.15 1.72(H) 1.76(H) 1.83(H) 1.94(H) 1.87(H) 1.62(H) 1.41(H)          Vitamin K doses administered during the last 7 days:  2 mg IV on 1/6/2023    Continue to take your coumadin, 2.5 mg daily, starting tonight, Saturday, January 7th. Have your INR checked early next week and have results called to your primary physician. They will determine future dosing and set up further INR draws

## 2023-01-07 NOTE — DISCHARGE INSTRUCTIONS
Continue to take your coumadin, 2.5 mg daily, starting tonight, Saturday, January 7th. Have your INR checked early next week and have results called to your primary physician. They will determine future dosing and set up further INR draws.

## 2023-01-07 NOTE — PROGRESS NOTES
Care Management Discharge Note    Discharge Date: 01/07/2023       Discharge Disposition: Transitional Care    Discharge Services: Transportation Services    Discharge DME: None    Discharge Transportation: agency    Private pay costs discussed: transportation costs    PAS Confirmation Code:  (NA)  Patient/family educated on Medicare website which has current facility and service quality ratings: no    Education Provided on the Discharge Plan:  yes  Persons Notified of Discharge Plans: Sin Gross  Patient/Family in Agreement with the Plan: yes    Handoff Referral Completed: No    Additional Information:    Plan:  Return to [facility]      Kylah Chi RN

## 2023-01-07 NOTE — PROGRESS NOTES
VSS. Doing well, offers no complaints. Plan for discharge at 3 pm. Patient is alert, SBA to BR. Tolerating regular diet well. Needs assistance with emptying urostomy

## 2023-01-07 NOTE — PLAN OF CARE
Goal Outcome Evaluation: progressing well.    Patient slept well through the night, offered no complaints of pain, Hgb was 7.8 at 2200, yellow cloudy urine returns to urostomy bag 125 ml prior to settling for night and 675 ml this am.  Awaiting am Hgb reading for 0600.

## 2023-01-07 NOTE — PROGRESS NOTES
Pt stable during shift - tolerating regular diet after egd.  Urostomy working well.  No stools during shift.  No c/o pain/dizziness - vss.

## 2023-01-07 NOTE — DISCHARGE SUMMARY
[facility]  Hospitalist Discharge Summary       Date of Admission:  1/5/2023  Date of Discharge:  1/7/2023  Discharging Provider: Kali Butt MD      Discharge Diagnoses     Anemia due to blood loss, acute  Gastro-esophageal reflux disease without esophagitis  History of deep vein thrombosis (DVT)   Paroxysmal atrial fibrillation   Chronic anticoagulation  Coronary artery disease  NSTEMI s/p PCI with CORA placement  HTN (hypertension)  Chronic heart failure with preserved ejection fraction  LBBB (left bundle branch block)  CKD (chronic kidney disease) stage 3, GFR 30-59 ml/min   Bladder cancer   S/P ileal conduit   Idiopathic chronic gout without tophus  Type 2 diabetes mellitus with diabetic neuropathic arthropathy  Morbid obesity  GINNY (obstructive sleep apnea)   Hypoalbuminemia  Drug Induced Coagulation Defect  Drug Induced Platelet Defect  Obesity    Follow-ups Needed After Discharge   Follow-up Appointments     Follow-up and recommended labs and tests      Follow up with primary care provider, Rafael Montelongo, within 7 days for   hospital follow- up.  The following labs/tests are recommended: BMP and   CBC.           Discharge Disposition   Discharged to home  Condition at discharge: Stable    Hospital Course   Eduardo Laughlin is a 82 year old male who presents on 1/5/2023 with low hemoglobin on serial lab check at living facility.      Anemia due to blood loss, acute  Gastro-esophageal reflux disease without esophagitis  Hemoglobin around 6 on routine check 1/5 at TCU. Patient denies any current symptoms (no pain, vomiting) but did have melena 12/27 - 1/2 (no BM since 1/2). Hemoglobin on presentation 6.4 --> 6.1. Remains stable (SBP >100, not tachycardic). Suspect acute upper GI bleed due to DAPT & anticoagulation causing significant clotting impairment (see below). Per below, hold all blood thinners & will have to consider how to restart once bleeding is resolved. Would  not recommend DAPT & anticoagulation together.   -Surgery consult, appreciate recommendations  -Prep & transfuse 2 units PRBCs on 1/5  -EGD on 1/6 revealed no source of bleeding from esophagus, stomach, duodenum.  Biopsies from stomach taken, pathology pending.  -Sucralfate 1g four times daily on admission; will discharge after negative EGD  -Empiric treatment with Pantoprazole 40mg PO BID  -CBC in AM to monitor  -Hemoglobin stable overnight at 7.9, discharge to home with hemoglobin recheck with PCP     History of deep vein thrombosis (DVT)   Paroxysmal atrial fibrillation   Chronic anticoagulation  Most recent DVT I can find in chart is 2020. No current DVT symptoms. Auscultated to be in sinus rhythm on admission. Managed PTA with metoprolol & warfarin. INR on presentation 1.94. Given significant bleed (above) will reverse anticoagulation in preparation for probable EGD to further evaluate. See discussion below under CAD for whether & how to restart.   -Hold PTA warfarin  -Vitamin K 2mg IV once for INR reversal   -Resume coumadin at discharge     Coronary artery disease  NSTEMI s/p PCI with CORA placement  NSTEMI s/p PCI with CORA to OM1 11/28/2022. Was discharged from that hospitalization 12/1 on aspirin & clopidogrel. Patient was cleared by cardiology prior to discharge (of note, cardiology discusses being ok with current regimen and talks about patient's DAPT but does not mention afib for warfarin in their note. Patient was discharged with DAPT + Warfarin), was supposed to follow up outpatient for continued monitoring but has not yet. Denies any recent chest pain. Given recent stent placement, it will likely be important to continue some form of antiplatelet. Given that patient is currently on DAPT and anticoagulation (above) and now has a significant bleed, key discharge decision will be how to manage this combination (Warfarin & aspirin versus DAPT without anticoagulation).   -Hold aspirin & clopidogrel during  hospitalization  -Resume clopidogrel at discharge    HTN (hypertension)  Chronic heart failure with preserved ejection fraction  LBBB (left bundle branch block)  Minimally hypertenstive since presentation (SBP 140s). Heart failure seems compensated (no obvious JVP elevation or LE edema, no PND or orthopnea). Managed PTA with bumetanide, metoprolol succinate 25mg daily.   -continue PTA bumetanide   -continue PTA metoprolol with hold parameters     CKD (chronic kidney disease) stage 3, GFR 30-59 ml/min   Stable, at baseline. Baseline creatinine around 2.3, creatinine on presentation 2.00.   -BMP in AM     Bladder cancer   S/P ileal conduit   Diagnosed 2016, s/p cystectomy / urostomy. No complications with urostomy currently. Nonbloody urine present in urostomy bag.   -Continue regular outpatient follow up.      Idiopathic chronic gout without tophus  Stable, no new symptoms. Managed PTA with allopurinol 200mg daily, continue.      Type 2 diabetes mellitus with diabetic neuropathic arthropathy  Good control. Hemoglobin A1C 10/12/2022 7.7. Glucose mildly elevated since presentation (151 --> 159). Managed PTA with ezetimibe and glimepiride.   -Continue PTA ezetimibe  -Hold PTA sulfonylurea until able to have PO diet (high risk for hypoglycemia)     Morbid obesity  Contributes to overall morbidity and mortality.      GINNY (obstructive sleep apnea)  May use home CPAP if available.     Clinically Significant Risk Factors Present on Admission               # Hypoalbuminemia: Lowest albumin = 3.2 g/dL at 1/5/2023  7:25 PM, will monitor as appropriate  # Drug Induced Coagulation Defect: home medication list includes an anticoagulant medication  # Drug Induced Platelet Defect: home medication list includes an antiplatelet medication       # DMII: A1C = 7.7 % (Ref range: <5.7 %) within past 3 months    # Obesity: Estimated body mass index is 32.14 kg/m  as calculated from the following:    Height as of this encounter: 1.778 m (5'  "10\").    Weight as of this encounter: 101.6 kg (224 lb).        Consultations This Hospital Stay   SURGERY GENERAL IP CONSULT  CARE MANAGEMENT / SOCIAL WORK IP CONSULT    Code Status   No CPR- Do NOT Intubate    Time Spent on this Encounter   I, Kali Butt MD, personally saw the patient today and spent greater than 30 minutes discharging this patient.       Kali Butt MD  [facility]  ______________________________________________________________________    Physical Exam   Vital Signs: Temp: 97.3  F (36.3  C) Temp src: Oral BP: 130/80 Pulse: 89   Resp: 20 SpO2: 97 % O2 Device: None (Room air) Oxygen Delivery: 3 LPM  Weight: 225 lbs 8.49 oz       Gen: Debilitated elderly male, flat affect, no acute distressed  HEENT: Atraumatic, normocephalic; sclera non-injected, anicterric; oral mucosa moist, no lesion, no exudate  Lungs: Clear to ausculation, no wheezes, no rhonchi, no rales  Heart: Regular rate, regular rhythm, no gallops, no rubs, no murmurs  GI: Bowel sound normal, no hepatosplenomegaly, no masses, non-tender, non-distended, no guarding, no rebound tenderness  Lymph: No lymphadenopathy, no edema  Skin: No rashes, no chronic venous stasis     Primary Care Physician   Rafael Montelongo    Discharge Orders      Primary Care - Care Coordination Referral      Reason for your hospital stay    This is an 82 year old male admitted with anemia due to a presumed upper GI bleed.     Follow-up and recommended labs and tests    Follow up with primary care provider, Rafael Montelongo, within 7 days for hospital follow- up.  The following labs/tests are recommended: BMP and CBC.     Activity    Your activity upon discharge: activity as tolerated     Diet    Follow this diet upon discharge: Orders Placed This Encounter      Low Salt Low Fat Diet Adult       Significant Results and Procedures   Most Recent 3 CBC's:Recent Labs   Lab Test 01/07/23  0606 01/06/23  2204 01/06/23  0607 " 01/05/23 1925 01/05/23  0828   WBC  --   --  7.3 8.0 8.0   HGB 7.9* 7.8* 7.7*  7.7* 6.1* 6.4*   MCV  --   --  97 100 104*   PLT  --   --  280 284 320     Most Recent 3 BMP's:Recent Labs   Lab Test 01/07/23  0737 01/07/23  0606 01/06/23  2131 01/06/23  0756 01/06/23  0607 01/06/23  0221 01/05/23 1925   NA  --  139  --   --  139  --  139   POTASSIUM  --  4.0  --   --  4.4  --  3.7   CHLORIDE  --  109*  --   --  108*  --  106   CO2  --  25  --   --  22  --  21*   BUN  --  31.0*  --   --  35.5*  --  39.9*   CR  --  1.60*  --   --  1.74*  --  2.00*   ANIONGAP  --  5*  --   --  9  --  12   GALLO  --  8.8  --   --  8.6*  --  9.0   * 154* 147*   < > 145*   < > 151*    < > = values in this interval not displayed.   ,   Results for orders placed or performed during the hospital encounter of 12/02/22   Head CT w/o contrast    Narrative    EXAM: CT HEAD W/O CONTRAST, CT CERVICAL SPINE W/O CONTRAST  LOCATION: [facility]  DATE/TIME: 12/2/2022 7:20 PM    INDICATION: head injury on coumadin and plavix with neck pain.  COMPARISON: 11/18/2022.  TECHNIQUE:   1) Routine CT Head without IV contrast. Multiplanar reformats. Dose reduction techniques were used.  2) Routine CT Cervical Spine without IV contrast. Multiplanar reformats. Dose reduction techniques were used.    FINDINGS:   HEAD CT:   INTRACRANIAL CONTENTS: No intracranial hemorrhage, extraaxial collection, or mass effect.  No CT evidence of acute infarct. Scattered diffuse low attenuation within the periventricular and subcortical white matter consistent with diffuse small vessel   ischemic disease. 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 The ventricular system, basal cisterns and the cortical sulci are consistent with diffuse volume loss.     VISUALIZED ORBITS/SINUSES/MASTOIDS: No intraorbital abnormality. No paranasal sinus mucosal disease. No middle ear or mastoid effusion.    BONES/SOFT TISSUES: No acute  abnormality.    CERVICAL SPINE CT:   VERTEBRA: Normal vertebral body heights and alignment. No fracture or posttraumatic subluxation.     CANAL/FORAMINA: There is degenerative disc disease primarily from the C4-C5 to the C6-C7 disc space levels. These levels have a moderate loss of disc space heights, endplate changes and small anterior osteophyte formations. There is mild diffuse facet   arthropathy. There is diffuse facet arthropathy visualized.    At the C3-C4 disc space level there is mild right neural foraminal narrowing.    At the C4-C5 and the C5-C6 disc space level there is mild bilateral neural foraminal narrowing.    There is no significant canal compromise throughout the cervical spine.    PARASPINAL: No extraspinal abnormality. Visualized lung fields are clear.      Impression    IMPRESSION:  HEAD CT:  1.  No CT finding of a mass, hemorrhage or focal area suggestive of acute infarct.  2.  Stable diffuse age related changes.    CERVICAL SPINE CT:  1.  No CT evidence for acute fracture or post traumatic subluxation.  2.  Diffuse neural foraminal narrowing without canal compromise as described above.   Cervical spine CT w/o contrast    Narrative    EXAM: CT HEAD W/O CONTRAST, CT CERVICAL SPINE W/O CONTRAST  LOCATION: [facility]  DATE/TIME: 12/2/2022 7:20 PM    INDICATION: head injury on coumadin and plavix with neck pain.  COMPARISON: 11/18/2022.  TECHNIQUE:   1) Routine CT Head without IV contrast. Multiplanar reformats. Dose reduction techniques were used.  2) Routine CT Cervical Spine without IV contrast. Multiplanar reformats. Dose reduction techniques were used.    FINDINGS:   HEAD CT:   INTRACRANIAL CONTENTS: No intracranial hemorrhage, extraaxial collection, or mass effect.  No CT evidence of acute infarct. Scattered diffuse low attenuation within the periventricular and subcortical white matter consistent with diffuse small vessel   ischemic disease. There is again an old  lacunar infarct left insular cortex. The ventricular system, basal cisterns and the cortical sulci are consistent with diffuse volume loss.     VISUALIZED ORBITS/SINUSES/MASTOIDS: No intraorbital abnormality. No paranasal sinus mucosal disease. No middle ear or mastoid effusion.    BONES/SOFT TISSUES: No acute abnormality.    CERVICAL SPINE CT:   VERTEBRA: Normal vertebral body heights and alignment. No fracture or posttraumatic subluxation.     CANAL/FORAMINA: There is degenerative disc disease primarily from the C4-C5 to the C6-C7 disc space levels. These levels have a moderate loss of disc space heights, endplate changes and small anterior osteophyte formations. There is mild diffuse facet   arthropathy. There is diffuse facet arthropathy visualized.    At the C3-C4 disc space level there is mild right neural foraminal narrowing.    At the C4-C5 and the C5-C6 disc space level there is mild bilateral neural foraminal narrowing.    There is no significant canal compromise throughout the cervical spine.    PARASPINAL: No extraspinal abnormality. Visualized lung fields are clear.      Impression    IMPRESSION:  HEAD CT:  1.  No CT finding of a mass, hemorrhage or focal area suggestive of acute infarct.  2.  Stable diffuse age related changes.    CERVICAL SPINE CT:  1.  No CT evidence for acute fracture or post traumatic subluxation.  2.  Diffuse neural foraminal narrowing without canal compromise as described above.   CT Chest/Abdomen/Pelvis w Contrast    Narrative    EXAM: CT CHEST/ABDOMEN/PELVIS W CONTRAST  LOCATION: [facility]  DATE/TIME: 12/2/2022 7:30 PM    INDICATION: Fall. On warfarin with bleeding. Pain.  COMPARISON: 05/18/2022 and older studies.  TECHNIQUE: CT scan of the chest, abdomen, and pelvis was performed following injection of IV contrast. Multiplanar reformats were obtained. Dose reduction techniques were used.   CONTRAST: isovue 370 75ml    FINDINGS:   LUNGS AND PLEURA: No  hydropneumothorax. Marked clearing of the predominantly left-sided groundglass and nodular opacities. Minimal subpleural atelectasis in the left base.    MEDIASTINUM/AXILLAE: No mediastinal hematoma. No central pulmonary emboli. Ascending aorta measures 4 cm and is unchanged.    CORONARY ARTERY CALCIFICATION: Previous intervention (stents or CABG).    HEPATOBILIARY: Normal.    PANCREAS: Normal.    SPLEEN: Normal.    ADRENAL GLANDS: Normal.    KIDNEYS/BLADDER: Atrophied right kidney with moderate distention of the right intrarenal collecting system and ureter. Small left renal cysts again noted which needs no follow-up. No calculi. Cystectomy with ileal diversion. No conduit calcifications.    BOWEL: Redundant colon with moderate distal colonic diverticuli. No inflammatory changes.    LYMPH NODES: Normal.    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    PELVIC ORGANS: Removed.    MUSCULOSKELETAL: Areas of subcutaneous fat stranding anteriorly in the right upper quadrant and inferiorly overlying the right lower pelvis. No discrete hematoma.    There are acute nondisplaced fractures of the left posterior 9th-11th ribs at the costovertebral junctions. Bone island in the right humeral head.      Impression    IMPRESSION:  1.  There are acute, nondisplaced right posterior ninth through 11th rib fractures at the costovertebral junction. No hydropneumothorax.  2.  Minimal stranding of the subcutaneous fat in the anterior right upper abdomen and over the right mid pelvis. No soft tissue hematoma.  3.  No evidence of solid organ injury in the abdomen or pelvis.  4.  Atrophied right kidney with mild to moderate right-sided hydroureter and hydronephrosis, unchanged. Cystectomy with ileal conduit.  5.  Resolution of the previously noted pneumonia.  6.  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  7.  Other noncritical findings as noted  above.   CT Head w/o Contrast    Narrative    EXAM: CT HEAD WITHOUT CONTRAST  LOCATION: [facility]  DATE/TIME: 2022, 1:39 PM    INDICATION: Fall with head trauma, on anticoagulation.  COMPARISON: Head CT 2022.  TECHNIQUE: Routine CT Head without IV contrast. Multiplanar reformats. Dose reduction techniques were used.    FINDINGS:  INTRACRANIAL CONTENTS: No intracranial hemorrhage, extra-axial collection, or mass effect.  Small chronic infarction left insula and basal ganglia. Mild presumed chronic small vessel ischemic changes. Mild generalized volume loss. No hydrocephalus.   Scattered calcified plaque distal vertebral arteries and carotid siphons.     VISUALIZED ORBITS/SINUSES/MASTOIDS: No intraorbital abnormality. No paranasal sinus mucosal disease. No middle ear or mastoid effusion.    BONES/SOFT TISSUES: No acute abnormality.      Impression    IMPRESSION:  1.  No CT evidence for acute intracranial process.  2.  Brain atrophy and presumed chronic microvascular ischemic changes as above.     Echocardiogram Limited     Value    LVEF  55-60%    Narrative    [id number]  [id number]  [id number]  [id number]^OXANA^[place] [address]     Name: ROXANNA PITT  MRN: [id number]  : 1940  Study Date: 2022 12:54 PM  Age: 82 yrs  Gender: Male  Patient Location: [facility]  Reason For Study: CAD  Ordering Physician: ALETA DAIGLE  Performed By: ACE     BSA: 2.2 m2  Height: 70 in  Weight: 232 lb  HR: 94  BP: 126/61 mmHg  ______________________________________________________________________________  Procedure  Limited Echo Adult. Definity (NDC #37791-982) given intravenously. 2mL given.  ______________________________________________________________________________  Interpretation Summary     Normal right ventricle size and systolic function.  Left ventricular size, wall motion and function are normal. The  ejection  fraction is 55-60%.  Definity contrast was used without apparent complications. The study was  technically difficult. No hemodynamically significant valvular abnormalities  on 2D or color flow imaging.  ______________________________________________________________________________  Left Ventricle  Left ventricular size, wall motion and function are normal. The ejection  fraction is 55-60%. There is normal left ventricular wall thickness. No  regional wall motion abnormalities noted.     Right Ventricle  Normal right ventricle size and systolic function. TAPSE is normal, which is  consistent with normal right ventricular systolic function.     Atria  Normal left atrial size. Right atrial size is normal. There is no color  Doppler evidence of an atrial shunt.     Mitral Valve  The mitral valve is not well visualized. There is mild (1+) mitral  regurgitation.     Tricuspid Valve  The tricuspid valve is not well visualized.     Aortic Valve  The aortic valve is not well visualized. No hemodynamically significant  valvular aortic stenosis.     Pulmonic Valve  The pulmonic valve is not well visualized.     Vessels  The aorta root cannot be assessed. IVC diameter <2.1 cm collapsing >50% with  sniff suggests a normal RA pressure of 3 mmHg.     Pericardium  There is no pericardial effusion.     ______________________________________________________________________________  Time Measurements     MM HR: 98.0 BPM     ______________________________________________________________________________  Report approved by: Rufina Wilson 12/04/2022 02:24 PM             Discharge Medications   Current Discharge Medication List      CONTINUE these medications which have CHANGED    Details   bumetanide (BUMEX) 1 MG tablet Take 1 tablet (1 mg) by mouth daily    Associated Diagnoses: Acute congestive heart failure, unspecified heart failure type (H)      pantoprazole (PROTONIX) 40 MG EC tablet Take 1 tablet (40 mg) by  mouth 2 times daily (before meals)  Qty: 60 tablet, Refills: 0    Associated Diagnoses: Anemia due to blood loss, acute         CONTINUE these medications which have NOT CHANGED    Details   ACETAMINOPHEN EXTRA STRENGTH 500 MG tablet TAKE 2 TABS (1,000MG) BY MOUTH THREE TIMES DAILY      allopurinol (ZYLOPRIM) 100 MG tablet Take 2 tablets (200 mg) by mouth daily  Qty: 60 tablet, Refills: 0    Associated Diagnoses: Chronic gout without tophus, unspecified cause, unspecified site      atorvastatin (LIPITOR) 40 MG tablet Take 1 tablet (40 mg) by mouth daily  Qty: 90 tablet, Refills: 3    Associated Diagnoses: Coronary artery disease involving native coronary artery of native heart with unstable angina pectoris (H)      clopidogrel (PLAVIX) 75 MG tablet Take 1 tablet (75 mg) by mouth daily  Qty: 30 tablet, Refills: 1    Associated Diagnoses: Chronic heart failure with preserved ejection fraction (H)      ezetimibe (ZETIA) 10 MG tablet Take 1 tablet (10 mg) by mouth daily  Qty: 30 tablet, Refills: 1    Associated Diagnoses: Chronic heart failure with preserved ejection fraction (H)      glimepiride (AMARYL) 1 MG tablet Take 1 tablet (1 mg) by mouth daily  Qty: 30 tablet, Refills: 0    Associated Diagnoses: Type 2 diabetes mellitus with diabetic neuropathic arthropathy, unspecified whether long term insulin use (H)      multivitamin, therapeutic (THERA-VIT) TABS tablet Take 1 tablet by mouth daily  Qty: 30 tablet, Refills: 3    Associated Diagnoses: Malnutrition, unspecified type (H)      polyethylene glycol (MIRALAX) 17 g packet Take 17 g by mouth daily  Qty: 20 packet, Refills: 0    Associated Diagnoses: Constipation, unspecified constipation type      traZODone (DESYREL) 100 MG tablet Take 1 tablet (100 mg) by mouth At Bedtime  Qty: 30 tablet, Refills: 0    Associated Diagnoses: Insomnia, unspecified type      warfarin ANTICOAGULANT (COUMADIN) 5 MG tablet Take 1 tablet (5 mg) by mouth daily Needs INR in 2 days and call  MD to adjust dosing. Then will need INR checked every Monday, Thursday  Qty: 30 tablet, Refills: 0    Associated Diagnoses: Chronic anticoagulation      metoprolol succinate ER (TOPROL XL) 25 MG 24 hr tablet Take 1 tablet (25 mg) by mouth daily  Qty: 30 tablet, Refills: 1    Associated Diagnoses: Chronic heart failure with preserved ejection fraction (H)      nitroGLYcerin (NITROSTAT) 0.4 MG sublingual tablet For chest pain place 1 tablet under the tongue every 5 minutes for 3 doses. If symptoms persist 5 minutes after 1st dose call 911.  Qty: 30 tablet, Refills: 1    Associated Diagnoses: Coronary artery disease involving native coronary artery of native heart with unstable angina pectoris (H); NSTEMI (non-ST elevated myocardial infarction) (H)         STOP taking these medications       aspirin (ASA) 81 MG EC tablet Comments:   Reason for Stopping:         bacitracin 500 UNIT/GM OINT Comments:   Reason for Stopping:             Allergies   Allergies   Allergen Reactions     Metoprolol Succinate Er      Other reaction(s): low blood pressure     Pcn [Penicillins] Other (See Comments)     Childhood-doesn't know reactions     Statin Drugs [Hmg-Coa-R Inhibitors] Cramps     Uroxatral [Alfuzosin] Other (See Comments)     hypotension

## 2023-01-07 NOTE — PROGRESS NOTES
Discharged to [facility]. Stable, alert. Offers no complaints. [place] [facility] DISCHARGE NOTE    Patient discharged to long term care facility at 2:46 PM via wheel chair. Accompanied by other:EMS driver and staff. Discharge instructions reviewed with patient, opportunity offered to ask questions. Prescriptions sent to patients preferred pharmacy. All belongings sent with patient.    Halie Torres RN

## 2023-01-10 LAB
PATH REPORT.COMMENTS IMP SPEC: NORMAL
PATH REPORT.COMMENTS IMP SPEC: NORMAL
PATH REPORT.FINAL DX SPEC: NORMAL
PATH REPORT.GROSS SPEC: NORMAL
PATH REPORT.MICROSCOPIC SPEC OTHER STN: NORMAL
PATH REPORT.RELEVANT HX SPEC: NORMAL
PHOTO IMAGE: NORMAL

## 2023-01-10 PROCEDURE — 88305 TISSUE EXAM BY PATHOLOGIST: CPT | Mod: 26 | Performed by: PATHOLOGY

## 2023-01-12 ENCOUNTER — LAB REQUISITION (OUTPATIENT)
Dept: LAB | Facility: CLINIC | Age: 83
End: 2023-01-12
Payer: COMMERCIAL

## 2023-01-12 DIAGNOSIS — I48.91 UNSPECIFIED ATRIAL FIBRILLATION (H): ICD-10-CM

## 2023-01-14 ENCOUNTER — LAB REQUISITION (OUTPATIENT)
Dept: LAB | Facility: CLINIC | Age: 83
End: 2023-01-14
Payer: COMMERCIAL

## 2023-01-14 DIAGNOSIS — I48.91 UNSPECIFIED ATRIAL FIBRILLATION (H): ICD-10-CM

## 2023-01-15 LAB
ERYTHROCYTE [DISTWIDTH] IN BLOOD BY AUTOMATED COUNT: 15.1 % (ref 10–15)
HCT VFR BLD AUTO: 29.5 % (ref 40–53)
HGB BLD-MCNC: 8.8 G/DL (ref 13.3–17.7)
MCH RBC QN AUTO: 28.3 PG (ref 26.5–33)
MCHC RBC AUTO-ENTMCNC: 29.8 G/DL (ref 31.5–36.5)
MCV RBC AUTO: 95 FL (ref 78–100)
PLATELET # BLD AUTO: 250 10E3/UL (ref 150–450)
RBC # BLD AUTO: 3.11 10E6/UL (ref 4.4–5.9)
WBC # BLD AUTO: 8.8 10E3/UL (ref 4–11)

## 2023-01-15 PROCEDURE — P9603 ONE-WAY ALLOW PRORATED MILES: HCPCS | Mod: ORL | Performed by: FAMILY MEDICINE

## 2023-01-15 PROCEDURE — 85027 COMPLETE CBC AUTOMATED: CPT | Mod: ORL | Performed by: FAMILY MEDICINE

## 2023-01-15 PROCEDURE — 36415 COLL VENOUS BLD VENIPUNCTURE: CPT | Mod: ORL | Performed by: FAMILY MEDICINE

## 2023-01-16 ENCOUNTER — LAB REQUISITION (OUTPATIENT)
Dept: LAB | Facility: CLINIC | Age: 83
End: 2023-01-16
Payer: COMMERCIAL

## 2023-01-16 DIAGNOSIS — I48.91 UNSPECIFIED ATRIAL FIBRILLATION (H): ICD-10-CM

## 2023-01-16 LAB
ERYTHROCYTE [DISTWIDTH] IN BLOOD BY AUTOMATED COUNT: 15 % (ref 10–15)
HCT VFR BLD AUTO: 30 % (ref 40–53)
HGB BLD-MCNC: 8.7 G/DL (ref 13.3–17.7)
INR PPP: 1.81 (ref 0.85–1.15)
MCH RBC QN AUTO: 28.2 PG (ref 26.5–33)
MCHC RBC AUTO-ENTMCNC: 29 G/DL (ref 31.5–36.5)
MCV RBC AUTO: 97 FL (ref 78–100)
PLATELET # BLD AUTO: 281 10E3/UL (ref 150–450)
RBC # BLD AUTO: 3.08 10E6/UL (ref 4.4–5.9)
WBC # BLD AUTO: 7.8 10E3/UL (ref 4–11)

## 2023-01-16 PROCEDURE — 36415 COLL VENOUS BLD VENIPUNCTURE: CPT | Mod: ORL | Performed by: NURSE PRACTITIONER

## 2023-01-16 PROCEDURE — 85610 PROTHROMBIN TIME: CPT | Mod: ORL | Performed by: NURSE PRACTITIONER

## 2023-01-16 PROCEDURE — 85027 COMPLETE CBC AUTOMATED: CPT | Mod: ORL | Performed by: FAMILY MEDICINE

## 2023-01-16 PROCEDURE — P9604 ONE-WAY ALLOW PRORATED TRIP: HCPCS | Mod: ORL | Performed by: NURSE PRACTITIONER

## 2023-01-17 ENCOUNTER — LAB REQUISITION (OUTPATIENT)
Dept: LAB | Facility: CLINIC | Age: 83
End: 2023-01-17
Payer: COMMERCIAL

## 2023-01-17 DIAGNOSIS — I48.91 UNSPECIFIED ATRIAL FIBRILLATION (H): ICD-10-CM

## 2023-01-17 LAB — INR PPP: 1.71 (ref 0.85–1.15)

## 2023-01-17 PROCEDURE — 85610 PROTHROMBIN TIME: CPT | Mod: ORL | Performed by: FAMILY MEDICINE

## 2023-01-17 PROCEDURE — P9603 ONE-WAY ALLOW PRORATED MILES: HCPCS | Mod: ORL | Performed by: FAMILY MEDICINE

## 2023-01-17 PROCEDURE — 36415 COLL VENOUS BLD VENIPUNCTURE: CPT | Mod: ORL | Performed by: FAMILY MEDICINE

## 2023-01-22 ENCOUNTER — LAB REQUISITION (OUTPATIENT)
Dept: LAB | Facility: CLINIC | Age: 83
End: 2023-01-22
Payer: COMMERCIAL

## 2023-01-22 DIAGNOSIS — I48.91 UNSPECIFIED ATRIAL FIBRILLATION (H): ICD-10-CM

## 2023-01-23 ENCOUNTER — LAB REQUISITION (OUTPATIENT)
Dept: LAB | Facility: CLINIC | Age: 83
End: 2023-01-23
Payer: COMMERCIAL

## 2023-01-23 LAB
ANION GAP SERPL CALCULATED.3IONS-SCNC: 13 MMOL/L (ref 7–15)
BUN SERPL-MCNC: 29.3 MG/DL (ref 8–23)
CALCIUM SERPL-MCNC: 9.4 MG/DL (ref 8.8–10.2)
CHLORIDE SERPL-SCNC: 106 MMOL/L (ref 98–107)
CREAT SERPL-MCNC: 1.25 MG/DL (ref 0.67–1.17)
DEPRECATED HCO3 PLAS-SCNC: 19 MMOL/L (ref 22–29)
GFR SERPL CREATININE-BSD FRML MDRD: 57 ML/MIN/1.73M2
GLUCOSE SERPL-MCNC: 158 MG/DL (ref 70–99)
INR PPP: 1.74 (ref 0.85–1.15)
POTASSIUM SERPL-SCNC: 4.2 MMOL/L (ref 3.4–5.3)
SODIUM SERPL-SCNC: 138 MMOL/L (ref 136–145)

## 2023-01-23 PROCEDURE — 85610 PROTHROMBIN TIME: CPT | Mod: ORL | Performed by: FAMILY MEDICINE

## 2023-01-23 PROCEDURE — 80048 BASIC METABOLIC PNL TOTAL CA: CPT | Mod: ORL | Performed by: FAMILY MEDICINE

## 2023-01-23 PROCEDURE — 36415 COLL VENOUS BLD VENIPUNCTURE: CPT | Mod: ORL

## 2023-01-23 PROCEDURE — P9604 ONE-WAY ALLOW PRORATED TRIP: HCPCS | Mod: ORL | Performed by: FAMILY MEDICINE

## 2023-01-26 ENCOUNTER — LAB REQUISITION (OUTPATIENT)
Dept: LAB | Facility: CLINIC | Age: 83
End: 2023-01-26
Payer: COMMERCIAL

## 2023-01-26 DIAGNOSIS — I48.91 UNSPECIFIED ATRIAL FIBRILLATION (H): ICD-10-CM

## 2023-01-26 DIAGNOSIS — I50.9 HEART FAILURE, UNSPECIFIED (H): ICD-10-CM

## 2023-01-30 LAB
ANION GAP SERPL CALCULATED.3IONS-SCNC: 10 MMOL/L (ref 7–15)
BASOPHILS # BLD AUTO: 0 10E3/UL (ref 0–0.2)
BASOPHILS NFR BLD AUTO: 0 %
BUN SERPL-MCNC: 18.7 MG/DL (ref 8–23)
CALCIUM SERPL-MCNC: 9.1 MG/DL (ref 8.8–10.2)
CHLORIDE SERPL-SCNC: 108 MMOL/L (ref 98–107)
CREAT SERPL-MCNC: 1.46 MG/DL (ref 0.67–1.17)
DEPRECATED HCO3 PLAS-SCNC: 20 MMOL/L (ref 22–29)
EOSINOPHIL # BLD AUTO: 0.1 10E3/UL (ref 0–0.7)
EOSINOPHIL NFR BLD AUTO: 2 %
ERYTHROCYTE [DISTWIDTH] IN BLOOD BY AUTOMATED COUNT: 15.8 % (ref 10–15)
GFR SERPL CREATININE-BSD FRML MDRD: 48 ML/MIN/1.73M2
GLUCOSE SERPL-MCNC: 230 MG/DL (ref 70–99)
HCT VFR BLD AUTO: 29.3 % (ref 40–53)
HGB BLD-MCNC: 8.7 G/DL (ref 13.3–17.7)
IMM GRANULOCYTES # BLD: 0.1 10E3/UL
IMM GRANULOCYTES NFR BLD: 1 %
INR PPP: 1.65 (ref 0.85–1.15)
LYMPHOCYTES # BLD AUTO: 1 10E3/UL (ref 0.8–5.3)
LYMPHOCYTES NFR BLD AUTO: 12 %
MCH RBC QN AUTO: 27.9 PG (ref 26.5–33)
MCHC RBC AUTO-ENTMCNC: 29.7 G/DL (ref 31.5–36.5)
MCV RBC AUTO: 94 FL (ref 78–100)
MONOCYTES # BLD AUTO: 0.5 10E3/UL (ref 0–1.3)
MONOCYTES NFR BLD AUTO: 6 %
NEUTROPHILS # BLD AUTO: 6.9 10E3/UL (ref 1.6–8.3)
NEUTROPHILS NFR BLD AUTO: 79 %
NRBC # BLD AUTO: 0 10E3/UL
NRBC BLD AUTO-RTO: 0 /100
PLATELET # BLD AUTO: 257 10E3/UL (ref 150–450)
POTASSIUM SERPL-SCNC: 4 MMOL/L (ref 3.4–5.3)
RBC # BLD AUTO: 3.12 10E6/UL (ref 4.4–5.9)
SODIUM SERPL-SCNC: 138 MMOL/L (ref 136–145)
WBC # BLD AUTO: 8.7 10E3/UL (ref 4–11)

## 2023-01-30 PROCEDURE — P9604 ONE-WAY ALLOW PRORATED TRIP: HCPCS | Mod: ORL | Performed by: FAMILY MEDICINE

## 2023-01-30 PROCEDURE — 80048 BASIC METABOLIC PNL TOTAL CA: CPT | Mod: ORL | Performed by: FAMILY MEDICINE

## 2023-01-30 PROCEDURE — 85025 COMPLETE CBC W/AUTO DIFF WBC: CPT | Mod: ORL | Performed by: FAMILY MEDICINE

## 2023-01-30 PROCEDURE — 36415 COLL VENOUS BLD VENIPUNCTURE: CPT | Mod: ORL | Performed by: FAMILY MEDICINE

## 2023-01-30 PROCEDURE — 85610 PROTHROMBIN TIME: CPT | Mod: ORL | Performed by: FAMILY MEDICINE

## 2023-02-01 ENCOUNTER — LAB REQUISITION (OUTPATIENT)
Dept: LAB | Facility: CLINIC | Age: 83
End: 2023-02-01
Payer: COMMERCIAL

## 2023-02-01 DIAGNOSIS — I48.91 UNSPECIFIED ATRIAL FIBRILLATION (H): ICD-10-CM

## 2023-02-02 LAB — INR PPP: 1.58 (ref 0.85–1.15)

## 2023-02-02 PROCEDURE — 36415 COLL VENOUS BLD VENIPUNCTURE: CPT | Mod: ORL | Performed by: FAMILY MEDICINE

## 2023-02-02 PROCEDURE — P9604 ONE-WAY ALLOW PRORATED TRIP: HCPCS | Mod: ORL | Performed by: FAMILY MEDICINE

## 2023-02-02 PROCEDURE — 85610 PROTHROMBIN TIME: CPT | Mod: ORL | Performed by: FAMILY MEDICINE

## 2023-02-08 ENCOUNTER — LAB REQUISITION (OUTPATIENT)
Dept: LAB | Facility: CLINIC | Age: 83
End: 2023-02-08

## 2023-02-08 ENCOUNTER — TRANSFERRED RECORDS (OUTPATIENT)
Dept: HEALTH INFORMATION MANAGEMENT | Facility: CLINIC | Age: 83
End: 2023-02-08
Payer: COMMERCIAL

## 2023-02-08 LAB
ALBUMIN SERPL BCG-MCNC: 3.6 G/DL (ref 3.5–5.2)
ALP SERPL-CCNC: 74 U/L (ref 40–129)
ALT SERPL W P-5'-P-CCNC: 17 U/L (ref 10–50)
ANION GAP SERPL CALCULATED.3IONS-SCNC: 17 MMOL/L (ref 7–15)
AST SERPL W P-5'-P-CCNC: 22 U/L (ref 10–50)
BILIRUB SERPL-MCNC: 0.3 MG/DL
BUN SERPL-MCNC: 28.3 MG/DL (ref 8–23)
CALCIUM SERPL-MCNC: 9.6 MG/DL (ref 8.8–10.2)
CHLORIDE SERPL-SCNC: 106 MMOL/L (ref 98–107)
CREAT SERPL-MCNC: 1.73 MG/DL (ref 0.67–1.17)
DEPRECATED HCO3 PLAS-SCNC: 18 MMOL/L (ref 22–29)
GFR SERPL CREATININE-BSD FRML MDRD: 39 ML/MIN/1.73M2
GLUCOSE SERPL-MCNC: 117 MG/DL (ref 70–99)
POTASSIUM SERPL-SCNC: 4.3 MMOL/L (ref 3.4–5.3)
PROT SERPL-MCNC: 7.3 G/DL (ref 6.4–8.3)
SODIUM SERPL-SCNC: 141 MMOL/L (ref 136–145)

## 2023-02-08 PROCEDURE — 80053 COMPREHEN METABOLIC PANEL: CPT | Performed by: FAMILY MEDICINE

## 2023-02-13 ENCOUNTER — LAB REQUISITION (OUTPATIENT)
Dept: LAB | Facility: CLINIC | Age: 83
End: 2023-02-13
Payer: COMMERCIAL

## 2023-02-13 DIAGNOSIS — I50.32 CHRONIC DIASTOLIC (CONGESTIVE) HEART FAILURE (H): ICD-10-CM

## 2023-02-13 DIAGNOSIS — N18.4 CHRONIC KIDNEY DISEASE, STAGE 4 (SEVERE) (H): ICD-10-CM

## 2023-02-13 LAB
ALBUMIN SERPL BCG-MCNC: 3.7 G/DL (ref 3.5–5.2)
ALP SERPL-CCNC: 77 U/L (ref 40–129)
ALT SERPL W P-5'-P-CCNC: 13 U/L (ref 10–50)
ANION GAP SERPL CALCULATED.3IONS-SCNC: 17 MMOL/L (ref 7–15)
AST SERPL W P-5'-P-CCNC: 22 U/L (ref 10–50)
BASOPHILS # BLD AUTO: 0 10E3/UL (ref 0–0.2)
BASOPHILS NFR BLD AUTO: 0 %
BILIRUB SERPL-MCNC: 0.3 MG/DL
BUN SERPL-MCNC: 37.9 MG/DL (ref 8–23)
CALCIUM SERPL-MCNC: 9.5 MG/DL (ref 8.8–10.2)
CHLORIDE SERPL-SCNC: 101 MMOL/L (ref 98–107)
CREAT SERPL-MCNC: 1.87 MG/DL (ref 0.67–1.17)
DEPRECATED HCO3 PLAS-SCNC: 19 MMOL/L (ref 22–29)
EOSINOPHIL # BLD AUTO: 0.1 10E3/UL (ref 0–0.7)
EOSINOPHIL NFR BLD AUTO: 1 %
ERYTHROCYTE [DISTWIDTH] IN BLOOD BY AUTOMATED COUNT: 15.9 % (ref 10–15)
GFR SERPL CREATININE-BSD FRML MDRD: 35 ML/MIN/1.73M2
GLUCOSE SERPL-MCNC: 114 MG/DL (ref 70–99)
HCT VFR BLD AUTO: 31.5 % (ref 40–53)
HGB BLD-MCNC: 9.7 G/DL (ref 13.3–17.7)
IMM GRANULOCYTES # BLD: 0.1 10E3/UL
IMM GRANULOCYTES NFR BLD: 1 %
LYMPHOCYTES # BLD AUTO: 1.5 10E3/UL (ref 0.8–5.3)
LYMPHOCYTES NFR BLD AUTO: 15 %
MCH RBC QN AUTO: 26.4 PG (ref 26.5–33)
MCHC RBC AUTO-ENTMCNC: 30.8 G/DL (ref 31.5–36.5)
MCV RBC AUTO: 86 FL (ref 78–100)
MONOCYTES # BLD AUTO: 0.7 10E3/UL (ref 0–1.3)
MONOCYTES NFR BLD AUTO: 7 %
NEUTROPHILS # BLD AUTO: 7.5 10E3/UL (ref 1.6–8.3)
NEUTROPHILS NFR BLD AUTO: 76 %
NRBC # BLD AUTO: 0 10E3/UL
NRBC BLD AUTO-RTO: 0 /100
PLATELET # BLD AUTO: 410 10E3/UL (ref 150–450)
POTASSIUM SERPL-SCNC: 3.5 MMOL/L (ref 3.4–5.3)
PROT SERPL-MCNC: 8 G/DL (ref 6.4–8.3)
RBC # BLD AUTO: 3.67 10E6/UL (ref 4.4–5.9)
SODIUM SERPL-SCNC: 137 MMOL/L (ref 136–145)
WBC # BLD AUTO: 9.9 10E3/UL (ref 4–11)

## 2023-02-13 PROCEDURE — 80053 COMPREHEN METABOLIC PANEL: CPT | Mod: ORL | Performed by: FAMILY MEDICINE

## 2023-02-13 PROCEDURE — 85025 COMPLETE CBC W/AUTO DIFF WBC: CPT | Mod: ORL | Performed by: FAMILY MEDICINE

## 2023-02-20 ENCOUNTER — OFFICE VISIT (OUTPATIENT)
Dept: CARDIOLOGY | Facility: CLINIC | Age: 83
End: 2023-02-20
Payer: COMMERCIAL

## 2023-02-20 VITALS
SYSTOLIC BLOOD PRESSURE: 118 MMHG | HEIGHT: 70 IN | WEIGHT: 229 LBS | BODY MASS INDEX: 32.78 KG/M2 | DIASTOLIC BLOOD PRESSURE: 72 MMHG | RESPIRATION RATE: 18 BRPM | HEART RATE: 74 BPM

## 2023-02-20 DIAGNOSIS — I50.33 ACUTE ON CHRONIC HEART FAILURE WITH PRESERVED EJECTION FRACTION (HFPEF) (H): Primary | ICD-10-CM

## 2023-02-20 PROCEDURE — 99214 OFFICE O/P EST MOD 30 MIN: CPT | Performed by: INTERNAL MEDICINE

## 2023-02-20 RX ORDER — FUROSEMIDE 40 MG
40 TABLET ORAL DAILY
Qty: 90 TABLET | Refills: 3 | Status: SHIPPED | OUTPATIENT
Start: 2023-02-20 | End: 2023-05-17

## 2023-02-20 RX ORDER — METOPROLOL SUCCINATE 25 MG/1
12.5 TABLET, EXTENDED RELEASE ORAL 2 TIMES DAILY
COMMUNITY
Start: 2023-01-22 | End: 2024-02-29

## 2023-02-20 RX ORDER — MULTIVITAMIN/IRON/FOLIC ACID 18MG-0.4MG
TABLET ORAL 2 TIMES DAILY
COMMUNITY
Start: 2023-01-30

## 2023-02-20 RX ORDER — FINASTERIDE 5 MG/1
5 TABLET, FILM COATED ORAL DAILY
COMMUNITY
Start: 2023-02-08

## 2023-02-20 RX ORDER — FUROSEMIDE 20 MG
TABLET ORAL
COMMUNITY
Start: 2023-02-08 | End: 2023-02-20

## 2023-02-20 RX ORDER — NYSTATIN 100000 [USP'U]/G
POWDER TOPICAL 2 TIMES DAILY
COMMUNITY
Start: 2023-01-12

## 2023-02-20 RX ORDER — CHLORAL HYDRATE 500 MG
1 CAPSULE ORAL DAILY
COMMUNITY
Start: 2023-01-12

## 2023-02-20 NOTE — PROGRESS NOTES
Patient seen in clinic accompanied by daughter for HF education s/p recent hospital discharge  Reviewed HF Folder that includes the  HF Sx Awareness & and Weight log booklet highlighting :  __X_patient s heart failure  _X__Na management in diet  __X_importance of daily wts   _X__Fluid Guidelines  __X_medication review and importance of compliance     Instructed patient in signs and sx of heart failure, reiterated when to call clinic - reviewed HF hotline # [phone] and after hours call # [phone].  Majority of time was spent reviewing: Sodium content for daily sodium intake, along with fluid restriction and reasoning behind the restrictions. Open Arms and HRS monitoring kit were discussed during education and patient would like to start both. Packet given to patient and daughter. They understand to complete patient portion and bring entire packet back to clinic to be completed and faxed. Discussed HRS in length and expectations. Patient and daughter verbalized understanding and agreed. HRS Monitoring Kit ordered.   Patient verbalized understanding of HF discussion.  Plan for f/u with continued HF education reviewed.  No formal f/u scheduled with nurse clinician for continued education - will continue to reinforce HF management education.    Marta Maldonado RN BSN

## 2023-02-20 NOTE — LETTER
2/20/2023    Rafael Montelongo MD  [address] [address]    RE: Eduardo Laughlin       Dear Colleague,     [facility] the [facility].  0  HEART CARE NOTE          Assessment/Recommendations     1. HFpEF c/b ADHF  Assessment / Plan    Hypervolemic on physical exam; endorses symptoms of fluid overload including dyspnea on exertion, and weigth gain; will increase furosemide to 40 mg daily; [facility] will call Thursday to follow-up; patient will get Monday lab work    GDMT as detailed below; mainstay of treatment for HFpEF includes diuretics and adequate BP control (class I) and SGLT2-I (class 2a); additional medical therapy (ARNI, MRA, ARB) demonstrated less robust evidence for indication but may be considered per guideline recommendations (2b); no indication for BBlockers      Current Pharmacotherapy AHA Guideline-Directed Medical Therapy   Losartan  - on hold given ROBERT ARNI/ARB   Spironolactone  - on hold  MRA   SGLT2 inhibitor -  Not started SGLT2-I    Furosemide 40 mg daily Loop diuretic       2. Hx NSTEMI now presenting with elevated troponin  Assessment / Plan    High sensitivity troponin >700 during recent admission from 11/18-12/1; now s/p coronary angiogram and PCI to OM1 with CORA    Denies chest pain or anginal equivalents    Continue metoprolol, clopidogrel, atorvastatin     3. DM2  Assessment / Plan    Management per primary team    4. Atrial fibrillation  Assessment / Plan    Warfarin per coumadin clinic and PMD     5. CKD  Assessment / Plan    Renal function stable on serial lab work    6. GINNY  Assessment / Plan    Non-compliant; will consider resuming    History of Present Illness/Subjective      Mr. Eduardo Laughlin is a 82 year old male with a PMHx significant for (per Dr. Phillips's note) recent Non-STEMI, CHF, A.fib, HTN, CKD 4, DVT, bladder cancer who presents to [facility] for follow-up care     Today, Mr. Motta  "endorsed HF symptoms; Management plan as detailed above.      ECG: Personally reviewed. atrial fibrillation, RVR, LBBB.     ECHO (personnaly Reviewed) 11/18/22:  1. Technically difficult study despite utilization of ultrasound enhancing  agent.  2. The left ventricle is normal in size. Image quality does not provide for  detailed assessment of LV systolic function, but is felt to be mildly  decreased with a visually estimated ejection fraction of roughly 45%.  3. There is mild global reduction in left ventricular systolic performance.  4. There is abnormal septal motion likely related to altered electrical  activation due to bundle branch block.  5. There is mild aortic stenosis.  6. Probable normal right ventricular size and systolic performance though  right-sided structures are not clearly visualized on all views on this study.  7. There is mild enlargement of the proximal ascending aorta.     The acoustic quality of this study is quite poor. If a more accurate  assessment of left ventricular systolicperformance, regional wall motion, and  other morphology/function is required; would recommend cardiac MRI or other  alternative imaging modality for further evaluation.     When compared to the prior real-time echocardiogram dated 13 October 2022, the  findings are felt to be fairly similar on both examinations though accurate  comparison is somewhat difficult due to the poor acoustic quality on both  studies.          Physical Examination Review of Systems   /72 (BP Location: Right arm, Patient Position: Sitting, Cuff Size: Adult Large)   Pulse 74   Resp 18   Ht 1.778 m (5' 10\")   Wt 103.9 kg (229 lb)   BMI 32.86 kg/m    Body mass index is 32.86 kg/m .  Wt Readings from Last 3 Encounters:   02/20/23 103.9 kg (229 lb)   01/07/23 102.3 kg (225 lb 8.5 oz)   12/07/22 105.5 kg (232 lb 9.6 oz)     General Appearance:   no distress, normal body habitus   ENT/Mouth: membranes moist, no oral lesions or bleeding " gums.      EYES:  no scleral icterus, normal conjunctivae   Neck: no carotid bruits or thyromegaly   Chest/Lungs:   lungs are clear to auscultation, no rales or wheezing, equal chest wall expansion    Cardiovascular:   Regular. Normal first and second heart sounds with no murmurs, rubs, or gallops; the carotid, radial and posterior tibial pulses are intact, + JVD, but trace LE edema bilaterally    Abdomen:  no organomegaly, masses, bruits, or tenderness; bowel sounds are present   Extremities: no cyanosis or clubbing   Skin: no xanthelasma, warm.    Neurologic: alert and oriented x3, calm     Psychiatric: alert and oriented x3, calm     A complete 10 systems ROS was reviewed  And is negative except what is listed in the HPI.          Medical History  Surgical History Family History Social History   Past Medical History:   Diagnosis Date     Acute renal failure with acute tubular necrosis superimposed on stage 3 chronic kidney disease (H)      Arteriosclerotic cardiovascular disease (ASCVD)      Atrial fibrillation (H)      Bladder cancer (H)      BPH (benign prostatic hypertrophy)      Chronic kidney disease      Complicated UTI (urinary tract infection) 08/25/2019     Congestive heart failure (H)      Coronary artery disease      Diabetes mellitus (H)      Diabetic neuropathy (H)      E coli bacteremia      GERD (gastroesophageal reflux disease)      Gout      Hyperlipidemia      Hypertension      Hyponatremia      Iliac artery aneurysm, 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    Added automatically from request for surgery [id number]     Kidney stone      Osteoarthritis      Personal history of malignant neoplasm of prostate 04/27/2020     Prostate cancer (H)      Sleep apnea     CPAP    Past Surgical History:   Procedure Laterality Date     CARDIAC CATHETERIZATION  03/28/2008    and coronary angios     CV CORONARY ANGIOGRAM N/A 11/28/2022    Procedure: CV CORONARY ANGIOGRAM;  Surgeon: Gonzales Hager MD;  Location: [facility]  CV     CV LEFT HEART CATH N/A 11/28/2022    Procedure: Left Heart Catheterization;  Surgeon: Gonzales Hager MD;  Location: [facility] CV     CV PCI STENT DRUG ELUTING N/A 11/28/2022    Procedure: Percutaneous Coronary Intervention Stent;  Surgeon: Gonzales Hager MD;  Location: [facility] CV     CYSTECTOMY       CYSTOSCOPY       CYSTOSCOPY N/A 12/23/2015    Procedure: CYSTOSCOPY BLADDER BIOPSIES with Fulgeration and Placement of Otero ;  Surgeon: Gordon Bajwa MD;  Location: [facility];  Service:      ESOPHAGOSCOPY, GASTROSCOPY, DUODENOSCOPY (EGD), COMBINED N/A 1/6/2023    Procedure: ESOPHAGOGASTRODUODENOSCOPY, WITH BIOPSY;  Surgeon: Phan Lobo DO;  Location: [facility]     IR EXTREMITY ANGIOGRAM LEFT  3/22/2019     IR EXTREMITY ANGIOGRAM LEFT  3/22/2019     IR ILEAL CONDUIT INJECTION  12/23/2016     IR NEPHROSTOMY TUBE PLACEMENT RIGHT  8/18/2016     IR URETEROSTOMY TUBE CHANGE RIGHT  9/21/2016     KIDNEY STONE SURGERY  x4     LAMINECTOMY LUMBAR ONE LEVEL Bilateral 9/13/2022    Procedure: LUMBAR 4 - LUMBAR 5 BILATERAL MEDIAL FACETECTOMY AND DECOMPRESSION;  Surgeon: Everett Holland MD;  Location: [facility]     PICC TRIPLE LUMEN PLACEMENT  11/18/2022          PROSTATE SURGERY      no family history of premature coronary artery disease Social History     Socioeconomic History     Marital status:      Spouse name: Not on file     Number of children: Not on file     Years of education: Not on file     Highest education level: Not on file   Occupational History     Not on file   Tobacco Use     Smoking status: Former     Smokeless tobacco: Never     Tobacco comments:     Quit smoking 30 years ago   Vaping Use     Vaping Use: Never used   Substance and Sexual Activity     Alcohol use: Not Currently     Drug use: Never     Sexual activity: Not Currently   Other Topics Concern     Not on file   Social History Narrative     Not on file     Social  Determinants of Health     Financial Resource Strain: Not on file   Food Insecurity: Not on file   Transportation Needs: Not on file   Physical Activity: Not on file   Stress: Not on file   Social Connections: Not on file   Intimate Partner Violence: Not on file   Housing Stability: Not on file           Lab Results    Chemistry/lipid CBC Cardiac Enzymes/BNP/TSH/INR   Lab Results   Component Value Date    CHOL 168 03/03/2022    HDL 38 (L) 03/03/2022    TRIG 117 03/03/2022    BUN 37.9 (H) 02/13/2023     02/13/2023    CO2 19 (L) 02/13/2023    Lab Results   Component Value Date    WBC 9.9 02/13/2023    HGB 9.7 (L) 02/13/2023    HCT 31.5 (L) 02/13/2023    MCV 86 02/13/2023     02/13/2023    Lab Results   Component Value Date    TROPONINI 0.10 04/25/2020     (H) 04/25/2020    INR 1.58 (H) 02/02/2023     Lab Results   Component Value Date    TROPONINI 0.10 04/25/2020          Weight:    Wt Readings from Last 3 Encounters:   01/07/23 102.3 kg (225 lb 8.5 oz)   12/07/22 105.5 kg (232 lb 9.6 oz)   12/01/22 106.8 kg (235 lb 8 oz)       Allergies  Allergies   Allergen Reactions     Metoprolol Succinate Er      Other reaction(s): low blood pressure     Pcn [Penicillins] Other (See Comments)     Childhood-doesn't know reactions     Statin Drugs [Hmg-Coa-R Inhibitors] Cramps     Uroxatral [Alfuzosin] Other (See Comments)     hypotension         Surgical History  Past Surgical History:   Procedure Laterality Date     CARDIAC CATHETERIZATION  03/28/2008    and coronary angios     CV CORONARY ANGIOGRAM N/A 11/28/2022    Procedure: CV CORONARY ANGIOGRAM;  Surgeon: Gonzales Hager MD;  Location: [facility] CV     CV LEFT HEART CATH N/A 11/28/2022    Procedure: Left Heart Catheterization;  Surgeon: Gonzales Hager MD;  Location: [facility] CV     CV PCI STENT DRUG ELUTING N/A 11/28/2022    Procedure: Percutaneous Coronary Intervention Stent;  Surgeon: Gonzales Hager MD;  Location: [facility]  LAB CV     CYSTECTOMY       CYSTOSCOPY       CYSTOSCOPY N/A 12/23/2015    Procedure: CYSTOSCOPY BLADDER BIOPSIES with Fulgeration and Placement of Otero ;  Surgeon: Gordon Bajwa MD;  Location: [facility];  Service:      ESOPHAGOSCOPY, GASTROSCOPY, DUODENOSCOPY (EGD), COMBINED N/A 1/6/2023    Procedure: ESOPHAGOGASTRODUODENOSCOPY, WITH BIOPSY;  Surgeon: Phan Lobo DO;  Location: [facility]     IR EXTREMITY ANGIOGRAM LEFT  3/22/2019     IR EXTREMITY ANGIOGRAM LEFT  3/22/2019     IR ILEAL CONDUIT INJECTION  12/23/2016     IR NEPHROSTOMY TUBE PLACEMENT RIGHT  8/18/2016     IR URETEROSTOMY TUBE CHANGE RIGHT  9/21/2016     KIDNEY STONE SURGERY  x4     LAMINECTOMY LUMBAR ONE LEVEL Bilateral 9/13/2022    Procedure: LUMBAR 4 - LUMBAR 5 BILATERAL MEDIAL FACETECTOMY AND DECOMPRESSION;  Surgeon: Everett Holland MD;  Location: [facility]     PICC TRIPLE LUMEN PLACEMENT  11/18/2022          PROSTATE SURGERY         Social History  Tobacco:   History   Smoking Status     Former   Smokeless Tobacco     Never    Alcohol:   Social History    Substance and Sexual Activity      Alcohol use: Not Currently   Illicit Drugs:   History   Drug Use Unknown       Family History  Family History   Problem Relation Age of Onset     No Known Problems Mother      Prostate Cancer Father      Deep Vein Thrombosis Father      Cancer Sister      Heart Disease Sister      No Known Problems Daughter      Heart Disease Brother      Heart Disease Sister      No Known Problems Daughter           Abdelrahman Mazariegos MD on 2/20/2023      cc: Rafael Montelongo      Patient will be enrolled in [employer] technology. Nursing staff will monitor metric input and heart failure symptom questions daily via Clinician Connect Portal. Nursing will notify the heart failure provider if metrics fall outside the following parameters:  Notified for diastolic blood pressure greater than 100 and less than  50  Notified for systolic blood pressure greater than 160 and less than 80  Notified for heart rate greater than 100 and less than 50   Notified for pulse oximeter reading less than 90%  Notified of weight increase of 2 pounds in 1 days   Notified of weight increase of 5 pounds in 7 days   Notified of weight increase of 10 pounds from baseline weight  Patient will be prompted to answer daily symptom questions. Unfavorable questions will be addressed by nursing staff.   1. Have you experienced any increased shortness of breath with daily activity in the past 24 hours?   2. Have you had any increased or new swelling in your ankles, feet, or other body parts in the last 24 hours?   3. Have you experienced any increased tiredness or fatigue in the last 24 hours?        Marta Maldonado RN BSN    Patient seen in clinic accompanied by daughter for HF education s/p recent hospital discharge  Reviewed HF Folder that includes the  HF Sx Awareness & and Weight log booklet highlighting :  __X_patient s heart failure  _X__Na management in diet  __X_importance of daily wts   _X__Fluid Guidelines  __X_medication review and importance of compliance     Instructed patient in signs and sx of heart failure, reiterated when to call clinic - reviewed HF hotline # [phone] and after hours call # [phone].  Majority of time was spent reviewing: Sodium content for daily sodium intake, along with fluid restriction and reasoning behind the restrictions. Open Arms and HRS monitoring kit were discussed during education and patient would like to start both. Packet given to patient and daughter. They understand to complete patient portion and bring entire packet back to clinic to be completed and faxed. Discussed HRS in length and expectations. Patient and daughter verbalized understanding and agreed. HRS Monitoring Kit ordered.   Patient verbalized understanding of HF discussion.  Plan for f/u with continued HF education reviewed.  No  formal f/u scheduled with nurse clinician for continued education - will continue to reinforce HF management education.    Marta Maldonado RN BSN        Thank you for allowing me to participate in the care of your patient.    Sincerely,     Abdelrahman Mazariegos MD     [facility]

## 2023-02-20 NOTE — PATIENT INSTRUCTIONS
Thank you for allowing the [facility] clinic to be a part of your care. Please pay close attention to the following medications as they have been changed during this visit.    1.) Please increase your furosemide to 40 mg (two 20 mg tablets) ONE TIME daily.    2.) Please have blood drawn on Monday (2/27/23) to follow-up kidney function and potassium levels.

## 2023-02-20 NOTE — PROGRESS NOTES
0  HEART CARE NOTE          Assessment/Recommendations     1. HFpEF c/b ADHF  Assessment / Plan    Hypervolemic on physical exam; endorses symptoms of fluid overload including dyspnea on exertion, and weigth gain; will increase furosemide to 40 mg daily; [facility] will call Thursday to follow-up; patient will get Monday lab work    GDMT as detailed below; mainstay of treatment for HFpEF includes diuretics and adequate BP control (class I) and SGLT2-I (class 2a); additional medical therapy (ARNI, MRA, ARB) demonstrated less robust evidence for indication but may be considered per guideline recommendations (2b); no indication for BBlockers      Current Pharmacotherapy AHA Guideline-Directed Medical Therapy   Losartan  - on hold given ROBERT ARNI/ARB   Spironolactone  - on hold  MRA   SGLT2 inhibitor -  Not started SGLT2-I    Furosemide 40 mg daily Loop diuretic       2. Hx NSTEMI now presenting with elevated troponin  Assessment / Plan    High sensitivity troponin >700 during recent admission from 11/18-12/1; now s/p coronary angiogram and PCI to OM1 with CORA    Denies chest pain or anginal equivalents    Continue metoprolol, clopidogrel, atorvastatin     3. DM2  Assessment / Plan    Management per primary team    4. Atrial fibrillation  Assessment / Plan    Warfarin per coumadin clinic and PMD     5. CKD  Assessment / Plan    Renal function stable on serial lab work    6. GINNY  Assessment / Plan    Non-compliant; will consider resuming    History of Present Illness/Subjective      Mr. Eduardo Laughlin is a 82 year old male with a PMHx significant for (per Dr. Phillips's note) recent Non-STEMI, CHF, A.fib, HTN, CKD 4, DVT, bladder cancer who presents to [facility] for follow-up care     Today, Mr. Motta endorsed HF symptoms; Management plan as detailed above.      ECG: Personally reviewed. atrial fibrillation, RVR, LBBB.     ECHO (personnaly Reviewed) 11/18/22:  1. Technically difficult study despite utilization  "of ultrasound enhancing  agent.  2. The left ventricle is normal in size. Image quality does not provide for  detailed assessment of LV systolic function, but is felt to be mildly  decreased with a visually estimated ejection fraction of roughly 45%.  3. There is mild global reduction in left ventricular systolic performance.  4. There is abnormal septal motion likely related to altered electrical  activation due to bundle branch block.  5. There is mild aortic stenosis.  6. Probable normal right ventricular size and systolic performance though  right-sided structures are not clearly visualized on all views on this study.  7. There is mild enlargement of the proximal ascending aorta.     The acoustic quality of this study is quite poor. If a more accurate  assessment of left ventricular systolicperformance, regional wall motion, and  other morphology/function is required; would recommend cardiac MRI or other  alternative imaging modality for further evaluation.     When compared to the prior real-time echocardiogram dated 13 October 2022, the  findings are felt to be fairly similar on both examinations though accurate  comparison is somewhat difficult due to the poor acoustic quality on both  studies.          Physical Examination Review of Systems   /72 (BP Location: Right arm, Patient Position: Sitting, Cuff Size: Adult Large)   Pulse 74   Resp 18   Ht 1.778 m (5' 10\")   Wt 103.9 kg (229 lb)   BMI 32.86 kg/m    Body mass index is 32.86 kg/m .  Wt Readings from Last 3 Encounters:   02/20/23 103.9 kg (229 lb)   01/07/23 102.3 kg (225 lb 8.5 oz)   12/07/22 105.5 kg (232 lb 9.6 oz)     General Appearance:   no distress, normal body habitus   ENT/Mouth: membranes moist, no oral lesions or bleeding gums.      EYES:  no scleral icterus, normal conjunctivae   Neck: no carotid bruits or thyromegaly   Chest/Lungs:   lungs are clear to auscultation, no rales or wheezing, equal chest wall expansion  "   Cardiovascular:   Regular. Normal first and second heart sounds with no murmurs, rubs, or gallops; the carotid, radial and posterior tibial pulses are intact, + JVD, but trace LE edema bilaterally    Abdomen:  no organomegaly, masses, bruits, or tenderness; bowel sounds are present   Extremities: no cyanosis or clubbing   Skin: no xanthelasma, warm.    Neurologic: alert and oriented x3, calm     Psychiatric: alert and oriented x3, calm     A complete 10 systems ROS was reviewed  And is negative except what is listed in the HPI.          Medical History  Surgical History Family History Social History   Past Medical History:   Diagnosis Date     Acute renal failure with acute tubular necrosis superimposed on stage 3 chronic kidney disease (H)      Arteriosclerotic cardiovascular disease (ASCVD)      Atrial fibrillation (H)      Bladder cancer (H)      BPH (benign prostatic hypertrophy)      Chronic kidney disease      Complicated UTI (urinary tract infection) 08/25/2019     Congestive heart failure (H)      Coronary artery disease      Diabetes mellitus (H)      Diabetic neuropathy (H)      E coli bacteremia      GERD (gastroesophageal reflux disease)      Gout      Hyperlipidemia      Hypertension      Hyponatremia      Iliac artery aneurysm, 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    Added automatically from request for surgery [id number]     Kidney stone      Osteoarthritis      Personal history of malignant neoplasm of prostate 04/27/2020     Prostate cancer (H)      Sleep apnea     CPAP    Past Surgical History:   Procedure Laterality Date     CARDIAC CATHETERIZATION  03/28/2008    and coronary angios     CV CORONARY ANGIOGRAM N/A 11/28/2022    Procedure: CV CORONARY ANGIOGRAM;  Surgeon: Gonzales Hager MD;  Location: [facility] CATH LAB CV     CV LEFT HEART CATH N/A 11/28/2022    Procedure: Left Heart Catheterization;  Surgeon: Gonzales Hager MD;  Location: [facility] CATH LAB CV     CV PCI STENT DRUG ELUTING N/A 11/28/2022     Procedure: Percutaneous Coronary Intervention Stent;  Surgeon: Gonzales Hager MD;  Location: [facility] CATH LAB CV     CYSTECTOMY       CYSTOSCOPY       CYSTOSCOPY N/A 12/23/2015    Procedure: CYSTOSCOPY BLADDER BIOPSIES with Fulgeration and Placement of Otero ;  Surgeon: Gordon Bajwa MD;  Location: [facility];  Service:      ESOPHAGOSCOPY, GASTROSCOPY, DUODENOSCOPY (EGD), COMBINED N/A 1/6/2023    Procedure: ESOPHAGOGASTRODUODENOSCOPY, WITH BIOPSY;  Surgeon: Phan Lobo DO;  Location: [facility]     IR EXTREMITY ANGIOGRAM LEFT  3/22/2019     IR EXTREMITY ANGIOGRAM LEFT  3/22/2019     IR ILEAL CONDUIT INJECTION  12/23/2016     IR NEPHROSTOMY TUBE PLACEMENT RIGHT  8/18/2016     IR URETEROSTOMY TUBE CHANGE RIGHT  9/21/2016     KIDNEY STONE SURGERY  x4     LAMINECTOMY LUMBAR ONE LEVEL Bilateral 9/13/2022    Procedure: LUMBAR 4 - LUMBAR 5 BILATERAL MEDIAL FACETECTOMY AND DECOMPRESSION;  Surgeon: Everett Holland MD;  Location: [facility]     PICC TRIPLE LUMEN PLACEMENT  11/18/2022          PROSTATE SURGERY      no family history of premature coronary artery disease Social History     Socioeconomic History     Marital status:      Spouse name: Not on file     Number of children: Not on file     Years of education: Not on file     Highest education level: Not on file   Occupational History     Not on file   Tobacco Use     Smoking status: Former     Smokeless tobacco: Never     Tobacco comments:     Quit smoking 30 years ago   Vaping Use     Vaping Use: Never used   Substance and Sexual Activity     Alcohol use: Not Currently     Drug use: Never     Sexual activity: Not Currently   Other Topics Concern     Not on file   Social History Narrative     Not on file     Social Determinants of Health     Financial Resource Strain: Not on file   Food Insecurity: Not on file   Transportation Needs: Not on file   Physical Activity: Not on file   Stress: Not on file   Social  Connections: Not on file   Intimate Partner Violence: Not on file   Housing Stability: Not on file           Lab Results    Chemistry/lipid CBC Cardiac Enzymes/BNP/TSH/INR   Lab Results   Component Value Date    CHOL 168 03/03/2022    HDL 38 (L) 03/03/2022    TRIG 117 03/03/2022    BUN 37.9 (H) 02/13/2023     02/13/2023    CO2 19 (L) 02/13/2023    Lab Results   Component Value Date    WBC 9.9 02/13/2023    HGB 9.7 (L) 02/13/2023    HCT 31.5 (L) 02/13/2023    MCV 86 02/13/2023     02/13/2023    Lab Results   Component Value Date    TROPONINI 0.10 04/25/2020     (H) 04/25/2020    INR 1.58 (H) 02/02/2023     Lab Results   Component Value Date    TROPONINI 0.10 04/25/2020          Weight:    Wt Readings from Last 3 Encounters:   01/07/23 102.3 kg (225 lb 8.5 oz)   12/07/22 105.5 kg (232 lb 9.6 oz)   12/01/22 106.8 kg (235 lb 8 oz)       Allergies  Allergies   Allergen Reactions     Metoprolol Succinate Er      Other reaction(s): low blood pressure     Pcn [Penicillins] Other (See Comments)     Childhood-doesn't know reactions     Statin Drugs [Hmg-Coa-R Inhibitors] Cramps     Uroxatral [Alfuzosin] Other (See Comments)     hypotension         Surgical History  Past Surgical History:   Procedure Laterality Date     CARDIAC CATHETERIZATION  03/28/2008    and coronary angios     CV CORONARY ANGIOGRAM N/A 11/28/2022    Procedure: CV CORONARY ANGIOGRAM;  Surgeon: Gonzales Hager MD;  Location: [facility] CV     CV LEFT HEART CATH N/A 11/28/2022    Procedure: Left Heart Catheterization;  Surgeon: Gonzales Hager MD;  Location: [facility] CV     CV PCI STENT DRUG ELUTING N/A 11/28/2022    Procedure: Percutaneous Coronary Intervention Stent;  Surgeon: Gonzales Hager MD;  Location: [facility] CV     CYSTECTOMY       CYSTOSCOPY       CYSTOSCOPY N/A 12/23/2015    Procedure: CYSTOSCOPY BLADDER BIOPSIES with Fulgeration and Placement of Otero ;  Surgeon: Gordon Bajwa MD;   Location: [facility];  Service:      ESOPHAGOSCOPY, GASTROSCOPY, DUODENOSCOPY (EGD), COMBINED N/A 1/6/2023    Procedure: ESOPHAGOGASTRODUODENOSCOPY, WITH BIOPSY;  Surgeon: Phan Lobo DO;  Location: [facility]     IR EXTREMITY ANGIOGRAM LEFT  3/22/2019     IR EXTREMITY ANGIOGRAM LEFT  3/22/2019     IR ILEAL CONDUIT INJECTION  12/23/2016     IR NEPHROSTOMY TUBE PLACEMENT RIGHT  8/18/2016     IR URETEROSTOMY TUBE CHANGE RIGHT  9/21/2016     KIDNEY STONE SURGERY  x4     LAMINECTOMY LUMBAR ONE LEVEL Bilateral 9/13/2022    Procedure: LUMBAR 4 - LUMBAR 5 BILATERAL MEDIAL FACETECTOMY AND DECOMPRESSION;  Surgeon: Everett Holland MD;  Location: [facility]     PICC TRIPLE LUMEN PLACEMENT  11/18/2022          PROSTATE SURGERY         Social History  Tobacco:   History   Smoking Status     Former   Smokeless Tobacco     Never    Alcohol:   Social History    Substance and Sexual Activity      Alcohol use: Not Currently   Illicit Drugs:   History   Drug Use Unknown       Family History  Family History   Problem Relation Age of Onset     No Known Problems Mother      Prostate Cancer Father      Deep Vein Thrombosis Father      Cancer Sister      Heart Disease Sister      No Known Problems Daughter      Heart Disease Brother      Heart Disease Sister      No Known Problems Daughter           Abdelrahman Mazariegos MD on 2/20/2023      cc: Rafael Montelongo

## 2023-02-20 NOTE — PROGRESS NOTES
Patient will be enrolled in [employer] technology. Nursing staff will monitor metric input and heart failure symptom questions daily via Clinician Connect Portal. Nursing will notify the heart failure provider if metrics fall outside the following parameters:  Notified for diastolic blood pressure greater than 100 and less than 50  Notified for systolic blood pressure greater than 160 and less than 80  Notified for heart rate greater than 100 and less than 50   Notified for pulse oximeter reading less than 90%  Notified of weight increase of 2 pounds in 1 days   Notified of weight increase of 5 pounds in 7 days   Notified of weight increase of 10 pounds from baseline weight  Patient will be prompted to answer daily symptom questions. Unfavorable questions will be addressed by nursing staff.   1. Have you experienced any increased shortness of breath with daily activity in the past 24 hours?   2. Have you had any increased or new swelling in your ankles, feet, or other body parts in the last 24 hours?   3. Have you experienced any increased tiredness or fatigue in the last 24 hours?        Marta Maldonado RN BSN

## 2023-02-23 ENCOUNTER — TELEPHONE (OUTPATIENT)
Dept: CARDIOLOGY | Facility: CLINIC | Age: 83
End: 2023-02-23
Payer: COMMERCIAL

## 2023-02-23 NOTE — TELEPHONE ENCOUNTER
"\"Well I'm down to 220lbs and I think I was at 223lbs when I started taking the two (clinic wt was 229lbs).\" Pt noticed increased urination. Still has SOB with activity, dizziness comes and goes.. Pt states he came very close to falling today though. Denies any swelling. BP was 117/77.    RAJ HAMLIN RN  BSN        "

## 2023-02-23 NOTE — TELEPHONE ENCOUNTER
----- Message from Abdelrahman Mazariegos MD sent at 2/23/2023  2:22 PM CST -----  Regarding: Follow-up  Please contact Yuniel today (2/23/23) for follow-up after adjusting his diuretic regimen in clinic this week.     Thanks;  ~ Awilda

## 2023-02-24 ENCOUNTER — HOSPITAL ENCOUNTER (EMERGENCY)
Facility: CLINIC | Age: 83
Discharge: HOME | End: 2023-02-24
Attending: PHYSICIAN ASSISTANT | Admitting: PHYSICIAN ASSISTANT
Payer: COMMERCIAL

## 2023-02-24 VITALS
OXYGEN SATURATION: 98 % | TEMPERATURE: 97.7 F | SYSTOLIC BLOOD PRESSURE: 130 MMHG | DIASTOLIC BLOOD PRESSURE: 84 MMHG | HEART RATE: 82 BPM | RESPIRATION RATE: 22 BRPM

## 2023-02-24 DIAGNOSIS — S61.412A SKIN TEAR OF LEFT HAND WITHOUT COMPLICATION, INITIAL ENCOUNTER: ICD-10-CM

## 2023-02-24 PROCEDURE — G0463 HOSPITAL OUTPT CLINIC VISIT: HCPCS | Performed by: PHYSICIAN ASSISTANT

## 2023-02-24 PROCEDURE — 99212 OFFICE O/P EST SF 10 MIN: CPT | Performed by: PHYSICIAN ASSISTANT

## 2023-02-24 ASSESSMENT — ENCOUNTER SYMPTOMS
CONSTITUTIONAL NEGATIVE: 1
WOUND: 1
MUSCULOSKELETAL NEGATIVE: 1

## 2023-02-24 NOTE — ED PROVIDER NOTES
History     Chief Complaint   Patient presents with     Laceration     Hand laceration happened two days ago pt is on coumadin      HPI  Eduardo Laughlin is a 82 year old male who presents with complaints of left hand laceration 2 days ago.  Patient states he accidentally bumped his hand against the lid of a pan, sustaining a skin tear to the dorsal aspect of his left hand.  Patient is on Coumadin for atrial fibrillation and states the wound has been oozing blood since that time.  He states he had a nurse Steri-Stripped the area this morning in clinic but it has continued to ooze through this.  Denies fevers or chills.  Patient's last tetanus was in 2016.      Allergies:  Allergies   Allergen Reactions     Metoprolol Succinate Er      Other reaction(s): low blood pressure     Pcn [Penicillins] Other (See Comments)     Childhood-doesn't know reactions     Statin Drugs [Hmg-Coa-R Inhibitors] Cramps     Uroxatral [Alfuzosin] Other (See Comments)     hypotension       Problem List:    Patient Active Problem List    Diagnosis Date Noted     Anemia due to blood loss, acute 01/05/2023     Priority: Medium     Acute kidney injury (H) 12/05/2022     Priority: Medium     Facial laceration, initial encounter 12/05/2022     Priority: Medium     Injury of head, initial encounter 12/05/2022     Priority: Medium     Lactic acidosis 11/18/2022     Priority: Medium     Acute respiratory failure with hypoxia (H) 11/18/2022     Priority: Medium     Aspiration pneumonia of both lungs, unspecified aspiration pneumonia type, unspecified part of lung (H) 11/18/2022     Priority: Medium     Chronic anticoagulation 11/07/2022     Priority: Medium     NSTEMI (non-ST elevated myocardial infarction) (H) 11/06/2022     Priority: Medium     Acute congestive heart failure, unspecified heart failure type (H) 11/06/2022     Priority: Medium     Syncope 11/06/2022     Priority: Medium     Chronic heart failure with preserved ejection fraction  (H) 11/04/2022     Priority: Medium     GINNY (obstructive sleep apnea) 11/04/2022     Priority: Medium     Status post spinal surgery 09/13/2022     Priority: Medium     Morbid obesity (H) 10/04/2021     Priority: Medium     Gastro-esophageal reflux disease without esophagitis 04/27/2020     Priority: Medium     Idiopathic chronic gout without tophus 04/27/2020     Priority: Medium     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 04/27/2020     Priority: Medium     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) 04/27/2020     Priority: Medium     Hydronephrosis of right kidney      Priority: Medium     Coronary artery disease      Priority: Medium     Paroxysmal atrial fibrillation (H)      Priority: Medium     LBBB (left bundle branch block)      Priority: Medium     Deep vein thrombosis (DVT) of proximal vein of both lower extremities, unspecified chronicity (H)      Priority: Medium     Chronic deep vein thrombosis (DVT) of femoral vein of right lower extremity (H) 12/04/2019     Priority: Medium     S/P ileal conduit (H) 08/18/2016     Priority: Medium     HTN (hypertension) 08/18/2016     Priority: Medium     CKD (chronic kidney disease) stage 3, GFR 30-59 ml/min (H) 08/18/2016     Priority: Medium     Bladder cancer (H) 01/27/2016     Priority: Medium        Past Medical History:    Past Medical History:   Diagnosis Date     Acute renal failure with acute tubular necrosis superimposed on stage 3 chronic kidney disease (H)      Arteriosclerotic cardiovascular disease (ASCVD)      Atrial fibrillation (H)      Bladder cancer (H)      BPH (benign prostatic hypertrophy)      Chronic kidney disease      Complicated UTI (urinary tract infection) 08/25/2019     Congestive heart failure (H)      Coronary artery disease      Diabetes mellitus (H)      Diabetic neuropathy (H)      E coli bacteremia      GERD (gastroesophageal reflux disease)      Gout      Hyperlipidemia       Hypertension      Hyponatremia      Iliac artery aneurysm, 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     Kidney stone      Osteoarthritis      Personal history of malignant neoplasm of prostate 04/27/2020     Prostate cancer (H)      Sleep apnea        Past Surgical History:    Past Surgical History:   Procedure Laterality Date     CARDIAC CATHETERIZATION  03/28/2008    and coronary angios     CV CORONARY ANGIOGRAM N/A 11/28/2022    Procedure: CV CORONARY ANGIOGRAM;  Surgeon: Gonzales Hager MD;  Location: [facility] CV     CV LEFT HEART CATH N/A 11/28/2022    Procedure: Left Heart Catheterization;  Surgeon: Gonzales Hager MD;  Location: [facility] CV     CV PCI STENT DRUG ELUTING N/A 11/28/2022    Procedure: Percutaneous Coronary Intervention Stent;  Surgeon: Gonzales Hager MD;  Location: [facility] CV     CYSTECTOMY       CYSTOSCOPY       CYSTOSCOPY N/A 12/23/2015    Procedure: CYSTOSCOPY BLADDER BIOPSIES with Fulgeration and Placement of Otero ;  Surgeon: Gordon Bajwa MD;  [facility] [facility];  Service:      ESOPHAGOSCOPY, GASTROSCOPY, DUODENOSCOPY (EGD), COMBINED N/A 1/6/2023    Procedure: ESOPHAGOGASTRODUODENOSCOPY, WITH BIOPSY;  Surgeon: Phan Lobo DO;  Location: [facility]     IR EXTREMITY ANGIOGRAM LEFT  3/22/2019     IR EXTREMITY ANGIOGRAM LEFT  3/22/2019     IR ILEAL CONDUIT INJECTION  12/23/2016     IR NEPHROSTOMY TUBE PLACEMENT RIGHT  8/18/2016     IR URETEROSTOMY TUBE CHANGE RIGHT  9/21/2016     KIDNEY STONE SURGERY  x4     LAMINECTOMY LUMBAR ONE LEVEL Bilateral 9/13/2022    Procedure: LUMBAR 4 - LUMBAR 5 BILATERAL MEDIAL FACETECTOMY AND DECOMPRESSION;  Surgeon: Everett Holland MD;  Location: [facility]     PICC TRIPLE LUMEN PLACEMENT  11/18/2022          PROSTATE SURGERY         Family History:    Family History   Problem Relation Age of Onset     No Known Problems Mother      Prostate Cancer Father      Deep Vein Thrombosis Father      Cancer Sister       Heart Disease Sister      No Known Problems Daughter      Heart Disease Brother      Heart Disease Sister      No Known Problems Daughter        Social History:  Marital Status:   [2]  Social History     Tobacco Use     Smoking status: Former     Smokeless tobacco: Never     Tobacco comments:     Quit smoking 30 years ago   Vaping Use     Vaping Use: Never used   Substance Use Topics     Alcohol use: Not Currently     Drug use: Never        Medications:    ACETAMINOPHEN EXTRA STRENGTH 500 MG tablet  atorvastatin (LIPITOR) 40 MG tablet  clopidogrel (PLAVIX) 75 MG tablet  ezetimibe (ZETIA) 10 MG tablet  finasteride (PROSCAR) 5 MG tablet  furosemide (LASIX) 40 MG tablet  glimepiride (AMARYL) 1 MG tablet  glucosamine-chondroitinoitin 5973-5899 MG/30ML LIQD  metoprolol succinate ER (TOPROL XL) 25 MG 24 hr tablet  multivitamin, therapeutic (THERA-VIT) TABS tablet  nitroGLYcerin (NITROSTAT) 0.4 MG sublingual tablet  nystatin (MYCOSTATIN) 180912 UNIT/GM external powder  OMEGA-3 FISH OIL 1000 MG capsule  pantoprazole (PROTONIX) 40 MG EC tablet  polyethylene glycol (MIRALAX) 17 g packet  traZODone (DESYREL) 100 MG tablet  warfarin ANTICOAGULANT (COUMADIN) 5 MG tablet          Review of Systems   Constitutional: Negative.    Musculoskeletal: Negative.    Skin: Positive for wound.   All other systems reviewed and are negative.      Physical Exam   BP: 130/84  Pulse: 82  Temp: 97.7  F (36.5  C)  Resp: 22  SpO2: 98 %      Physical Exam  Constitutional:       General: He is not in acute distress.     Appearance: Normal appearance. He is well-developed. He is not ill-appearing, toxic-appearing or diaphoretic.   HENT:      Head: Normocephalic and atraumatic.   Eyes:      Conjunctiva/sclera: Conjunctivae normal.   Cardiovascular:      Pulses: Normal pulses.   Pulmonary:      Effort: Pulmonary effort is normal.   Musculoskeletal:         General: Normal range of motion.      Left hand: Laceration present. No swelling,  deformity or tenderness. Normal range of motion. Normal strength. Normal sensation. There is no disruption of two-point discrimination. Normal capillary refill. Normal pulse.      Cervical back: Neck supple.      Comments: Skin tear to dorsal aspect of left hand.  One area of wound continues to slowly ooze blood.  No bony tenderness of the hand.  No swelling.  Normal range of motion of fingers.   Skin:     General: Skin is warm and dry.      Capillary Refill: Capillary refill takes less than 2 seconds.   Neurological:      General: No focal deficit present.      Mental Status: He is alert.      Sensory: Sensation is intact.      Motor: Motor function is intact.         ED Course                 Procedures    No results found for this or any previous visit (from the past 24 hour(s)).    Medications - No data to display    Assessments & Plan (with Medical Decision Making)     Pt is a 82 year old male who presents with complaints of left hand laceration 2 days ago.  Patient states he accidentally bumped his hand against the lid of a pan, sustaining a skin tear to the dorsal aspect of his left hand.  Patient is on Coumadin for atrial fibrillation and states the wound has been oozing blood since that time.  He states he had a nurse Steri-Stripped the area this morning in clinic but it has continued to ooze through this.  Patient's last tetanus was in 2016.     Pt is afebrile on arrival.  Exam as above.  Patient is noted to have a skin tear to the dorsal aspect of his left hand on exam.  One area of wound continues to slowly ooze blood.  No bony tenderness of the hand.  Surgicel and bulky pressure dressing applied.  Encouraged continued wound care at home.  Return precautions were reviewed.  Hand-outs were provided.    Patient was instructed to follow-up with PCP in 3-5 days for continued care and management or sooner if new or worsening symptoms.  He is to return to the ED for persistent and/or worsening symptoms.   Patient expressed understanding of the diagnosis and plan and was discharged home in good condition.    I have reviewed the nursing notes.    I have reviewed the findings, diagnosis, plan and need for follow up with the patient.      Discharge Medication List as of 2/24/2023  4:52 PM          Final diagnoses:   Skin tear of left hand without complication, initial encounter       2/24/2023   [facility] EMERGENCY DEPT      Disclaimer:  This note consists of symbols derived from keyboarding, dictation and/or voice recognition software.  As a result, there may be errors in the script that have gone undetected.  Please consider this when interpreting information found in this chart.     Tana Romeo PA-C  02/24/23 1920

## 2023-02-27 ENCOUNTER — LAB (OUTPATIENT)
Dept: LAB | Facility: CLINIC | Age: 83
End: 2023-02-27
Payer: COMMERCIAL

## 2023-02-27 DIAGNOSIS — I50.33 ACUTE ON CHRONIC HEART FAILURE WITH PRESERVED EJECTION FRACTION (HFPEF) (H): ICD-10-CM

## 2023-02-27 LAB
ANION GAP SERPL CALCULATED.3IONS-SCNC: 11 MMOL/L (ref 7–15)
BUN SERPL-MCNC: 26.9 MG/DL (ref 8–23)
CALCIUM SERPL-MCNC: 9 MG/DL (ref 8.8–10.2)
CHLORIDE SERPL-SCNC: 100 MMOL/L (ref 98–107)
CREAT SERPL-MCNC: 1.82 MG/DL (ref 0.67–1.17)
DEPRECATED HCO3 PLAS-SCNC: 26 MMOL/L (ref 22–29)
GFR SERPL CREATININE-BSD FRML MDRD: 37 ML/MIN/1.73M2
GLUCOSE SERPL-MCNC: 169 MG/DL (ref 70–99)
POTASSIUM SERPL-SCNC: 3.2 MMOL/L (ref 3.4–5.3)
SODIUM SERPL-SCNC: 137 MMOL/L (ref 136–145)

## 2023-02-27 PROCEDURE — 80048 BASIC METABOLIC PNL TOTAL CA: CPT

## 2023-02-27 PROCEDURE — 36415 COLL VENOUS BLD VENIPUNCTURE: CPT

## 2023-02-28 ENCOUNTER — TELEPHONE (OUTPATIENT)
Dept: CARDIOLOGY | Facility: CLINIC | Age: 83
End: 2023-02-28
Payer: COMMERCIAL

## 2023-02-28 NOTE — TELEPHONE ENCOUNTER
[facility] Call Center    Phone Message    May a detailed message be left on voicemail: yes     Reason for Call: Other: [facility] was reaching out to speak with the team regarding the patient's weight gain. They would like a call back at [phone] to discuss, thank you!     Action Taken: Message routed to:  Other: Cardiology    Travel Screening: Not Applicable

## 2023-02-28 NOTE — TELEPHONE ENCOUNTER
Home care nurse advised that [facility] is monitoring his daily wt b/p pulse oximetry and symptoms each day and watching trends closely. They will be notified of any medication changes needed.  Yumiko VALDIVIA RN BSN, CHFN, PCCN-K

## 2023-03-13 ENCOUNTER — TELEPHONE (OUTPATIENT)
Dept: CARDIOLOGY | Facility: CLINIC | Age: 83
End: 2023-03-13
Payer: COMMERCIAL

## 2023-03-13 NOTE — TELEPHONE ENCOUNTER
Kelly called back to discuss assessment further.  On 2/27 147/77 . Pain 3/10 (from a recent fall) 02 97%  On 3/6 220lbs.  On 3/10 his weight was 221.5lbs and trace edema on LLE at this time .  Today on 3/13 pt weight is 222. Lungs are clear, no edema noted today. No tightness in his abdomen, SOB, or increased fatigue. Reports he has not yet had breakfast.   No concern for fluid gain at this time with current assessment. Encouraged nursing to call back if pt continues to gain weight or develops symptoms.     Marta Maldonado RN

## 2023-03-13 NOTE — TELEPHONE ENCOUNTER
[facility] Call Center    Phone Message    May a detailed message be left on voicemail: yes     Reason for Call: Other: Kelly called on Yuniel's behalf to inform Dr. Proctor that Yuniel's weigh is outside his parameters. Yuniel's weight is currently 222 instead of being between 218 and 220. Please reach out to Kelly to discuss care for Yuniel and she will pass along any information to him. Thank you!     Action Taken: Other: Cardiology    Travel Screening: Not Applicable     Thank you!  [facility]

## 2023-03-15 ENCOUNTER — OFFICE VISIT (OUTPATIENT)
Dept: CARDIOLOGY | Facility: CLINIC | Age: 83
End: 2023-03-15
Attending: INTERNAL MEDICINE
Payer: COMMERCIAL

## 2023-03-15 VITALS
WEIGHT: 226 LBS | HEART RATE: 68 BPM | BODY MASS INDEX: 32.35 KG/M2 | HEIGHT: 70 IN | SYSTOLIC BLOOD PRESSURE: 98 MMHG | DIASTOLIC BLOOD PRESSURE: 52 MMHG | OXYGEN SATURATION: 100 % | RESPIRATION RATE: 20 BRPM

## 2023-03-15 DIAGNOSIS — I10 PRIMARY HYPERTENSION: Chronic | ICD-10-CM

## 2023-03-15 DIAGNOSIS — N18.32 STAGE 3B CHRONIC KIDNEY DISEASE (H): Chronic | ICD-10-CM

## 2023-03-15 DIAGNOSIS — I50.32 CHRONIC HEART FAILURE WITH PRESERVED EJECTION FRACTION (H): Primary | Chronic | ICD-10-CM

## 2023-03-15 DIAGNOSIS — I48.0 PAROXYSMAL ATRIAL FIBRILLATION (H): Chronic | ICD-10-CM

## 2023-03-15 DIAGNOSIS — D62 ANEMIA DUE TO BLOOD LOSS, ACUTE: ICD-10-CM

## 2023-03-15 DIAGNOSIS — E87.6 HYPOKALEMIA: Primary | ICD-10-CM

## 2023-03-15 DIAGNOSIS — G47.33 OSA (OBSTRUCTIVE SLEEP APNEA): Chronic | ICD-10-CM

## 2023-03-15 LAB
ANION GAP SERPL CALCULATED.3IONS-SCNC: 12 MMOL/L (ref 7–15)
BUN SERPL-MCNC: 31.6 MG/DL (ref 8–23)
CALCIUM SERPL-MCNC: 9 MG/DL (ref 8.8–10.2)
CHLORIDE SERPL-SCNC: 101 MMOL/L (ref 98–107)
CREAT SERPL-MCNC: 1.84 MG/DL (ref 0.67–1.17)
DEPRECATED HCO3 PLAS-SCNC: 25 MMOL/L (ref 22–29)
GFR SERPL CREATININE-BSD FRML MDRD: 36 ML/MIN/1.73M2
GLUCOSE SERPL-MCNC: 165 MG/DL (ref 70–99)
POTASSIUM SERPL-SCNC: 3.3 MMOL/L (ref 3.4–5.3)
SODIUM SERPL-SCNC: 138 MMOL/L (ref 136–145)

## 2023-03-15 PROCEDURE — 80048 BASIC METABOLIC PNL TOTAL CA: CPT | Performed by: NURSE PRACTITIONER

## 2023-03-15 PROCEDURE — 36415 COLL VENOUS BLD VENIPUNCTURE: CPT | Performed by: NURSE PRACTITIONER

## 2023-03-15 PROCEDURE — 99214 OFFICE O/P EST MOD 30 MIN: CPT | Performed by: NURSE PRACTITIONER

## 2023-03-15 RX ORDER — POTASSIUM CHLORIDE 1500 MG/1
20 TABLET, EXTENDED RELEASE ORAL DAILY
Qty: 60 TABLET | Refills: 3 | Status: SHIPPED | OUTPATIENT
Start: 2023-03-15 | End: 2023-05-12

## 2023-03-15 NOTE — PROGRESS NOTES
Assessment/Recommendations   Assessment:    1.  Heart failure with preserved ejection fraction, NYHA class II: Compensated.  He states his dyspnea on exertion has improved.  His weight has also decreased approximately 4 to 5 pounds since increasing Lasix.  He is enrolled in [facility].  He tries to follow a low-salt diet.  Unfortunately, he does not qualify for open arms because he is not within the radius of delivery  2.  Hypertension: Controlled.  Blood pressure 98/52  3.  Chronic kidney disease stage III: Recent creatinine was 1.82 which is stable  4.  Paroxysmal atrial fibrillation: Normal sinus rhythm..  He continues warfarin for anticoagulation and Toprol for rate control  5.  Anemia: Most recent hemoglobin 9.7.  He had a GI bleed in January and source was not able to be found. He may benefit from watchman procedure. Does have a history of DVT in 2020 so not sure if warfarin can be discontinued.  May need to see hematologist if he does want to proceed with possible watchman  6.  GINNY: Noncompliant with wearing CPAP      Plan:  1.  BMP pending due to recent potassium being 3.2  2.  Continue current medications  3.  Continue monitoring weights, blood pressure, heart rate through HRS  4.  Continue low-salt diet    Eduardo Laughlin will follow up with Dr. Mazariegos in July and in the heart failure clinic in 2 months.     History of Present Illness/Subjective    Mr. Eduardo Laughlin is a 82 year old male seen at [facility] heart failure clinic today for continued follow-up.  His son accompanies him today.  He follows up for heart failure with preserved ejection fraction.  His most recent echocardiogram was done December 2022 which showed ejection fraction of 55 to 60%.  He has had multiple hospitalizations for falls, anemia, heart failure in the last 6 months.  He has a past medical history significant for hypertension, chronic kidney disease stage III, paroxysmal atrial fibrillation, diabetes,  "hyperlipidemia, GINNY on CPAP, DVT in 2020, anemia, frequent falls.    During the last clinic visit, Dr. Mazariegos increased his Lasix to 40 mg daily due to increased dyspnea on exertion and weight gain.  Today, he states his dyspnea on exertion has improved. He denies shortness of breath, orthopnea, PND, palpitations, chest pain, abdominal fullness/bloating and lower extremity edema.      He is monitoring home weights which are stable between 219-221 pounds.  He is following a low sodium diet.      Physical Examination Review of Systems   BP 98/52 (BP Location: Left arm, Patient Position: Sitting, Cuff Size: Adult Large)   Pulse 68   Resp 20   Ht 1.778 m (5' 10\")   Wt 102.5 kg (226 lb)   SpO2 100%   BMI 32.43 kg/m    Body mass index is 32.43 kg/m .  Wt Readings from Last 3 Encounters:   03/15/23 102.5 kg (226 lb)   02/20/23 103.9 kg (229 lb)   01/07/23 102.3 kg (225 lb 8.5 oz)       General Appearance:   no acute distress   ENT/Mouth: Wearing mask   EYES:  no scleral icterus, normal conjunctivae   Neck: no thyromegaly   Chest/Lungs:   lungs are clear to auscultation, no rales or wheezing, equal chest wall expansion    Cardiovascular:   Regular. Normal first and second heart sounds with no murmurs, rubs, or gallops, no edema bilaterally    Abdomen:  bowel sounds are present   Extremities: no cyanosis or clubbing   Skin: no xanthelasma, warm.    Neurologic: normal  bilateral, no tremors     Psychiatric: alert and oriented x3                                              Medical History  Surgical History Family History Social History   Past Medical History:   Diagnosis Date     Acute renal failure with acute tubular necrosis superimposed on stage 3 chronic kidney disease (H)      Arteriosclerotic cardiovascular disease (ASCVD)      Atrial fibrillation (H)      Bladder cancer (H)      BPH (benign prostatic hypertrophy)      Chronic kidney disease      Complicated UTI (urinary tract infection) 08/25/2019     " Congestive heart failure (H)      Coronary artery disease      Diabetes mellitus (H)      Diabetic neuropathy (H)      E coli bacteremia      GERD (gastroesophageal reflux disease)      Gout      Hyperlipidemia      Hypertension      Hyponatremia      Iliac artery aneurysm, 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    Added automatically from request for surgery [id number]     Kidney stone      Osteoarthritis      Personal history of malignant neoplasm of prostate 04/27/2020     Prostate cancer (H)      Sleep apnea     CPAP    Past Surgical History:   Procedure Laterality Date     CARDIAC CATHETERIZATION  03/28/2008    and coronary angios     CV CORONARY ANGIOGRAM N/A 11/28/2022    Procedure: CV CORONARY ANGIOGRAM;  Surgeon: Gonzales Hager MD;  Location: [facility] CV     CV LEFT HEART CATH N/A 11/28/2022    Procedure: Left Heart Catheterization;  Surgeon: Gonzales Hager MD;  Location: [facility]     CV PCI STENT DRUG ELUTING N/A 11/28/2022    Procedure: Percutaneous Coronary Intervention Stent;  Surgeon: Gonzales Hager MD;  Location: [facility]     CYSTECTOMY       CYSTOSCOPY       CYSTOSCOPY N/A 12/23/2015    Procedure: CYSTOSCOPY BLADDER BIOPSIES with Fulgeration and Placement of Otero ;  Surgeon: Gordon Bajwa MD;  Location: [facility];  Service:      ESOPHAGOSCOPY, GASTROSCOPY, DUODENOSCOPY (EGD), COMBINED N/A 1/6/2023    Procedure: ESOPHAGOGASTRODUODENOSCOPY, WITH BIOPSY;  Surgeon: Phan Lobo DO;  Location: [facility]     IR EXTREMITY ANGIOGRAM LEFT  3/22/2019     IR EXTREMITY ANGIOGRAM LEFT  3/22/2019     IR ILEAL CONDUIT INJECTION  12/23/2016     IR NEPHROSTOMY TUBE PLACEMENT RIGHT  8/18/2016     IR URETEROSTOMY TUBE CHANGE RIGHT  9/21/2016     KIDNEY STONE SURGERY  x4     LAMINECTOMY LUMBAR ONE LEVEL Bilateral 9/13/2022    Procedure: LUMBAR 4 - LUMBAR 5 BILATERAL MEDIAL FACETECTOMY AND DECOMPRESSION;  Surgeon: Everett Holland MD;  Location: [facility]      PICC TRIPLE LUMEN PLACEMENT  11/18/2022          PROSTATE SURGERY      Family History   Problem Relation Age of Onset     No Known Problems Mother      Prostate Cancer Father      Deep Vein Thrombosis Father      Cancer Sister      Heart Disease Sister      No Known Problems Daughter      Heart Disease Brother      Heart Disease Sister      No Known Problems Daughter     Social History     Socioeconomic History     Marital status:      Spouse name: Not on file     Number of children: Not on file     Years of education: Not on file     Highest education level: Not on file   Occupational History     Not on file   Tobacco Use     Smoking status: Former     Smokeless tobacco: Never     Tobacco comments:     Quit smoking 30 years ago   Vaping Use     Vaping Use: Never used   Substance and Sexual Activity     Alcohol use: Not Currently     Drug use: Never     Sexual activity: Not Currently   Other Topics Concern     Not on file   Social History Narrative     Not on file     Social Determinants of Health     Financial Resource Strain: Not on file   Food Insecurity: Not on file   Transportation Needs: Not on file   Physical Activity: Not on file   Stress: Not on file   Social Connections: Not on file   Intimate Partner Violence: Not on file   Housing Stability: Not on file          Medications  Allergies   Current Outpatient Medications   Medication Sig Dispense Refill     ACETAMINOPHEN EXTRA STRENGTH 500 MG tablet TAKE 2 TABS (1,000MG) BY MOUTH THREE TIMES DAILY       atorvastatin (LIPITOR) 40 MG tablet Take 1 tablet (40 mg) by mouth daily 90 tablet 3     clopidogrel (PLAVIX) 75 MG tablet Take 1 tablet (75 mg) by mouth daily 30 tablet 1     ezetimibe (ZETIA) 10 MG tablet Take 1 tablet (10 mg) by mouth daily 30 tablet 1     finasteride (PROSCAR) 5 MG tablet Take 5 mg by mouth daily       furosemide (LASIX) 40 MG tablet Take 1 tablet (40 mg) by mouth daily 90 tablet 3     glimepiride (AMARYL) 1 MG tablet Take 1  tablet (1 mg) by mouth daily 30 tablet 0     glucosamine-chondroitinoitin 6594-2141 MG/30ML LIQD Take by mouth 2 times daily       metoprolol succinate ER (TOPROL XL) 25 MG 24 hr tablet Take 12.5 mg by mouth 2 times daily Twice daily if BP <100       multivitamin, therapeutic (THERA-VIT) TABS tablet Take 1 tablet by mouth daily 30 tablet 3     nitroGLYcerin (NITROSTAT) 0.4 MG sublingual tablet For chest pain place 1 tablet under the tongue every 5 minutes for 3 doses. If symptoms persist 5 minutes after 1st dose call 911. 30 tablet 1     OMEGA-3 FISH OIL 1000 MG capsule Take 1 capsule by mouth daily       pantoprazole (PROTONIX) 40 MG EC tablet Take 1 tablet (40 mg) by mouth 2 times daily (before meals) 60 tablet 0     polyethylene glycol (MIRALAX) 17 g packet Take 17 g by mouth daily 20 packet 0     traZODone (DESYREL) 100 MG tablet Take 1 tablet (100 mg) by mouth At Bedtime (Patient taking differently: Take 200 mg by mouth At Bedtime) 30 tablet 0     warfarin ANTICOAGULANT (COUMADIN) 5 MG tablet Take 5 mg by mouth daily 1 tablet 0     nystatin (MYCOSTATIN) 751038 UNIT/GM external powder 2 times daily (Patient not taking: Reported on 3/15/2023)      Allergies   Allergen Reactions     Metoprolol Succinate Er      Other reaction(s): low blood pressure     Pcn [Penicillins] Other (See Comments)     Childhood-doesn't know reactions     Statin Drugs [Hmg-Coa-R Inhibitors] Cramps     Uroxatral [Alfuzosin] Other (See Comments)     hypotension         Lab Results    Chemistry/lipid CBC Cardiac Enzymes/BNP/TSH/INR   Lab Results   Component Value Date    CHOL 168 03/03/2022    HDL 38 (L) 03/03/2022    TRIG 117 03/03/2022    BUN 26.9 (H) 02/27/2023     02/27/2023    CO2 26 02/27/2023    Lab Results   Component Value Date    WBC 9.9 02/13/2023    HGB 9.7 (L) 02/13/2023    HCT 31.5 (L) 02/13/2023    MCV 86 02/13/2023     02/13/2023    Lab Results   Component Value Date    TROPONINI 0.10 04/25/2020     (H)  04/25/2020    INR 1.58 (H) 02/02/2023             This note has been dictated using voice recognition software. Any grammatical, typographical, or context distortions are unintentional and inherent to the software    30 minutes spent on the date of encounter doing chart review, review of outside records, review of test results, interpretation with above tests, patient visit, documentation and discussion with family.

## 2023-03-15 NOTE — LETTER
3/15/2023    Rafael Montelongo MD  [address]  [facility] [address]    RE: Eduardo Laughlin       Dear Colleague,     I had the pleasure of seeing Eduardo Laughlin in the [facility].        Assessment/Recommendations   Assessment:    1.  Heart failure with preserved ejection fraction, NYHA class II: Compensated.  He states his dyspnea on exertion has improved.  His weight has also decreased approximately 4 to 5 pounds since increasing Lasix.  He is enrolled in [facility].  He tries to follow a low-salt diet.  Unfortunately, he does not qualify for [facility] because he is not within the radius of delivery  2.  Hypertension: Controlled.  Blood pressure 98/52  3.  Chronic kidney disease stage III: Recent creatinine was 1.82 which is stable  4.  Paroxysmal atrial fibrillation: Normal sinus rhythm..  He continues warfarin for anticoagulation and Toprol for rate control  5.  Anemia: Most recent hemoglobin 9.7.  He had a GI bleed in January and source was not able to be found. He may benefit from watchman procedure. Does have a history of DVT in 2020 so not sure if warfarin can be discontinued.  May need to see hematologist if he does want to proceed with possible watchman  6.  GINNY: Noncompliant with wearing CPAP      Plan:  1.  BMP pending due to recent potassium being 3.2  2.  Continue current medications  3.  Continue monitoring weights, blood pressure, heart rate through HRS  4.  Continue low-salt diet    Eduardo Laughlin will follow up with Dr. Mazariegos in July and in the heart failure clinic in 2 months.     History of Present Illness/Subjective    Mr. Eduardo Laughlin is a 82 year old male seen at [facility] heart failure clinic today for continued follow-up.  His son accompanies him today.  He follows up for heart failure with preserved ejection fraction.  His most recent echocardiogram was done December 2022 which showed ejection fraction of 55 to 60%.  He has had multiple  "hospitalizations for falls, anemia, heart failure in the last 6 months.  He has a past medical history significant for hypertension, chronic kidney disease stage III, paroxysmal atrial fibrillation, diabetes, hyperlipidemia, GINNY on CPAP, DVT in 2020, anemia, frequent falls.    During the last clinic visit, Dr. Mazariegos increased his Lasix to 40 mg daily due to increased dyspnea on exertion and weight gain.  Today, he states his dyspnea on exertion has improved. He denies shortness of breath, orthopnea, PND, palpitations, chest pain, abdominal fullness/bloating and lower extremity edema.      He is monitoring home weights which are stable between 219-221 pounds.  He is following a low sodium diet.      Physical Examination Review of Systems   BP 98/52 (BP Location: Left arm, Patient Position: Sitting, Cuff Size: Adult Large)   Pulse 68   Resp 20   Ht 1.778 m (5' 10\")   Wt 102.5 kg (226 lb)   SpO2 100%   BMI 32.43 kg/m    Body mass index is 32.43 kg/m .  Wt Readings from Last 3 Encounters:   03/15/23 102.5 kg (226 lb)   02/20/23 103.9 kg (229 lb)   01/07/23 102.3 kg (225 lb 8.5 oz)       General Appearance:   no acute distress   ENT/Mouth: Wearing mask   EYES:  no scleral icterus, normal conjunctivae   Neck: no thyromegaly   Chest/Lungs:   lungs are clear to auscultation, no rales or wheezing, equal chest wall expansion    Cardiovascular:   Regular. Normal first and second heart sounds with no murmurs, rubs, or gallops, no edema bilaterally    Abdomen:  bowel sounds are present   Extremities: no cyanosis or clubbing   Skin: no xanthelasma, warm.    Neurologic: normal  bilateral, no tremors     Psychiatric: alert and oriented x3                                              Medical History  Surgical History Family History Social History   Past Medical History:   Diagnosis Date     Acute renal failure with acute tubular necrosis superimposed on stage 3 chronic kidney disease (H)      Arteriosclerotic " cardiovascular disease (ASCVD)      Atrial fibrillation (H)      Bladder cancer (H)      BPH (benign prostatic hypertrophy)      Chronic kidney disease      Complicated UTI (urinary tract infection) 08/25/2019     Congestive heart failure (H)      Coronary artery disease      Diabetes mellitus (H)      Diabetic neuropathy (H)      E coli bacteremia      GERD (gastroesophageal reflux disease)      Gout      Hyperlipidemia      Hypertension      Hyponatremia      Iliac artery aneurysm, 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    Added automatically from request for surgery [id number]     Kidney stone      Osteoarthritis      Personal history of malignant neoplasm of prostate 04/27/2020     Prostate cancer (H)      Sleep apnea     CPAP    Past Surgical History:   Procedure Laterality Date     CARDIAC CATHETERIZATION  03/28/2008    and coronary angios     CV CORONARY ANGIOGRAM N/A 11/28/2022    Procedure: CV CORONARY ANGIOGRAM;  Surgeon: Gonzales Hager MD;  Location: [facility] CV     CV LEFT HEART CATH N/A 11/28/2022    Procedure: Left Heart Catheterization;  Surgeon: Gonzales Hager MD;  Location: [facility]     CV PCI STENT DRUG ELUTING N/A 11/28/2022    Procedure: Percutaneous Coronary Intervention Stent;  Surgeon: Gonzales Hager MD;  Location: [facility] CV     CYSTECTOMY       CYSTOSCOPY       CYSTOSCOPY N/A 12/23/2015    Procedure: CYSTOSCOPY BLADDER BIOPSIES with Fulgeration and Placement of Otero ;  Surgeon: Gordon Bajwa MD;  Location: [facility];  Service:      ESOPHAGOSCOPY, GASTROSCOPY, DUODENOSCOPY (EGD), COMBINED N/A 1/6/2023    Procedure: ESOPHAGOGASTRODUODENOSCOPY, WITH BIOPSY;  Surgeon: Phan Lobo DO;  Location: [facility]     IR EXTREMITY ANGIOGRAM LEFT  3/22/2019     IR EXTREMITY ANGIOGRAM LEFT  3/22/2019     IR ILEAL CONDUIT INJECTION  12/23/2016     IR NEPHROSTOMY TUBE PLACEMENT RIGHT  8/18/2016     IR URETEROSTOMY TUBE CHANGE RIGHT  9/21/2016     KIDNEY STONE SURGERY   x4     LAMINECTOMY LUMBAR ONE LEVEL Bilateral 9/13/2022    Procedure: LUMBAR 4 - LUMBAR 5 BILATERAL MEDIAL FACETECTOMY AND DECOMPRESSION;  Surgeon: Everett Holland MD;  Location: [facility] OR     PICC TRIPLE LUMEN PLACEMENT  11/18/2022          PROSTATE SURGERY      Family History   Problem Relation Age of Onset     No Known Problems Mother      Prostate Cancer Father      Deep Vein Thrombosis Father      Cancer Sister      Heart Disease Sister      No Known Problems Daughter      Heart Disease Brother      Heart Disease Sister      No Known Problems Daughter     Social History     Socioeconomic History     Marital status:      Spouse name: Not on file     Number of children: Not on file     Years of education: Not on file     Highest education level: Not on file   Occupational History     Not on file   Tobacco Use     Smoking status: Former     Smokeless tobacco: Never     Tobacco comments:     Quit smoking 30 years ago   Vaping Use     Vaping Use: Never used   Substance and Sexual Activity     Alcohol use: Not Currently     Drug use: Never     Sexual activity: Not Currently   Other Topics Concern     Not on file   Social History Narrative     Not on file     Social Determinants of Health     Financial Resource Strain: Not on file   Food Insecurity: Not on file   Transportation Needs: Not on file   Physical Activity: Not on file   Stress: Not on file   Social Connections: Not on file   Intimate Partner Violence: Not on file   Housing Stability: Not on file          Medications  Allergies   Current Outpatient Medications   Medication Sig Dispense Refill     ACETAMINOPHEN EXTRA STRENGTH 500 MG tablet TAKE 2 TABS (1,000MG) BY MOUTH THREE TIMES DAILY       atorvastatin (LIPITOR) 40 MG tablet Take 1 tablet (40 mg) by mouth daily 90 tablet 3     clopidogrel (PLAVIX) 75 MG tablet Take 1 tablet (75 mg) by mouth daily 30 tablet 1     ezetimibe (ZETIA) 10 MG tablet Take 1 tablet (10 mg) by mouth  daily 30 tablet 1     finasteride (PROSCAR) 5 MG tablet Take 5 mg by mouth daily       furosemide (LASIX) 40 MG tablet Take 1 tablet (40 mg) by mouth daily 90 tablet 3     glimepiride (AMARYL) 1 MG tablet Take 1 tablet (1 mg) by mouth daily 30 tablet 0     glucosamine-chondroitinoitin 2891-4053 MG/30ML LIQD Take by mouth 2 times daily       metoprolol succinate ER (TOPROL XL) 25 MG 24 hr tablet Take 12.5 mg by mouth 2 times daily Twice daily if BP <100       multivitamin, therapeutic (THERA-VIT) TABS tablet Take 1 tablet by mouth daily 30 tablet 3     nitroGLYcerin (NITROSTAT) 0.4 MG sublingual tablet For chest pain place 1 tablet under the tongue every 5 minutes for 3 doses. If symptoms persist 5 minutes after 1st dose call 911. 30 tablet 1     OMEGA-3 FISH OIL 1000 MG capsule Take 1 capsule by mouth daily       pantoprazole (PROTONIX) 40 MG EC tablet Take 1 tablet (40 mg) by mouth 2 times daily (before meals) 60 tablet 0     polyethylene glycol (MIRALAX) 17 g packet Take 17 g by mouth daily 20 packet 0     traZODone (DESYREL) 100 MG tablet Take 1 tablet (100 mg) by mouth At Bedtime (Patient taking differently: Take 200 mg by mouth At Bedtime) 30 tablet 0     warfarin ANTICOAGULANT (COUMADIN) 5 MG tablet Take 5 mg by mouth daily 1 tablet 0     nystatin (MYCOSTATIN) 162745 UNIT/GM external powder 2 times daily (Patient not taking: Reported on 3/15/2023)      Allergies   Allergen Reactions     Metoprolol Succinate Er      Other reaction(s): low blood pressure     Pcn [Penicillins] Other (See Comments)     Childhood-doesn't know reactions     Statin Drugs [Hmg-Coa-R Inhibitors] Cramps     Uroxatral [Alfuzosin] Other (See Comments)     hypotension         Lab Results    Chemistry/lipid CBC Cardiac Enzymes/BNP/TSH/INR   Lab Results   Component Value Date    CHOL 168 03/03/2022    HDL 38 (L) 03/03/2022    TRIG 117 03/03/2022    BUN 26.9 (H) 02/27/2023     02/27/2023    CO2 26 02/27/2023    Lab Results   Component  Value Date    WBC 9.9 02/13/2023    HGB 9.7 (L) 02/13/2023    HCT 31.5 (L) 02/13/2023    MCV 86 02/13/2023     02/13/2023    Lab Results   Component Value Date    TROPONINI 0.10 04/25/2020     (H) 04/25/2020    INR 1.58 (H) 02/02/2023             This note has been dictated using voice recognition software. Any grammatical, typographical, or context distortions are unintentional and inherent to the software    30 minutes spent on the date of encounter doing chart review, review of outside records, review of test results, interpretation with above tests, patient visit, documentation and discussion with family.                  Thank you for allowing me to participate in the care of your patient.      Sincerely,     [facility]  cc:   Abdelrahman Mazariegos MD  [address]  [place] [address]

## 2023-03-15 NOTE — PATIENT INSTRUCTIONS
Eduardo Laughlin,    It was a pleasure to see you today at [facility].     My recommendations after this visit include:  - Please follow up with Dr Mazariegos in July   - Please follow up with Toma Diaz in 2 months  - I have checked labs and will contact you with results  - Continue current medications    Toma Diaz, CNP

## 2023-03-17 ENCOUNTER — TELEPHONE (OUTPATIENT)
Dept: CARDIOLOGY | Facility: CLINIC | Age: 83
End: 2023-03-17
Payer: COMMERCIAL

## 2023-03-17 NOTE — TELEPHONE ENCOUNTER
[facility] Call Center    Phone Message    May a detailed message be left on voicemail: yes     Reason for Call: Other: Please follow up with further details.     Action Taken: Other: cardio    Travel Screening: Not Applicable     Thank you!  [facility]

## 2023-03-17 NOTE — TELEPHONE ENCOUNTER
Christine was contacted and informed the standard to call is wt gain of 3lbs in one day and 5lbs in one week. There are no specific number parameters. Pt did not gain 3lbs in one day. According to note from office visit 3/15, his home wts are 219-221lbs.    Luis Armando HAMLIN RN  BSN

## 2023-03-27 ENCOUNTER — TELEPHONE (OUTPATIENT)
Dept: CARDIOLOGY | Facility: CLINIC | Age: 83
End: 2023-03-27
Payer: COMMERCIAL

## 2023-03-27 NOTE — TELEPHONE ENCOUNTER
[facility] Call Center    Phone Message    May a detailed message be left on voicemail: no     Reason for Call: Other: Ron called to speak with a member of the care team, he would like to discuss a change in dosage to Rx Fludrocortison 1 MG 1/2 tab per day. Please reach out to Yuinel at [phone].     Action Taken: Other: [facility]    Travel Screening: Not Applicable

## 2023-03-27 NOTE — TELEPHONE ENCOUNTER
Yuniel states he was on this medication in the nursing home. When he came to see Dr. Mazariegos, medication was not on the Pt's list and so PCP only refilled for 10 days. Explained to the Pt cardiology typically does not prescribe this medication nor refill it. Unsure why Pt was taking it and Pt does not know either. Advised to fall back to PCP about this matter whether Pt needs to continue to be on this medication or he can stop. Pt stated understanding and agreement.    Luis Armando HAMLIN RN  BSN

## 2023-03-30 ENCOUNTER — ALLIED HEALTH/NURSE VISIT (OUTPATIENT)
Dept: CARDIOLOGY | Facility: CLINIC | Age: 83
End: 2023-03-30
Payer: COMMERCIAL

## 2023-03-30 DIAGNOSIS — I50.32 CHRONIC HEART FAILURE WITH PRESERVED EJECTION FRACTION (H): Primary | ICD-10-CM

## 2023-03-30 PROCEDURE — 99454 REM MNTR PHYSIOL PARAM 16-30: CPT | Performed by: INTERNAL MEDICINE

## 2023-03-31 NOTE — PROGRESS NOTES
[facility]: [facility] Routine Remote Evaluation     HRS Enrollment Date:           2/27/23                                 Initial setup by: HRS Team 2/27/23   Billing Dates: 2/27/23 through 3/30/23  HF Dx:HFpEF      HRS Alerts During Monitoring Period:   3/13/23 HRS symptoms     No adjustments made this month.  Discussed with patient/caregiver and they will continue with current plan of care.           Future Appointments   Date Time Provider Department Center   5/1/2023  4:00 AM KAVITHAN [facility] CARDIOMEMS [facility]   5/17/2023  2:10 PM [facility]     Will continue with weekly monitoring with HF provider team.     TOTAL INTERACTIVE COMMUNICATION WITH PATIENT DURING THIS BILLING PERIOD: 5 minutes.    Luis Armando HAMLIN RN  BSN    I have reviewed Luis Armando VALDIVIA RN, BSN's note and agree.    Abdelrahman Mazariegos MD., MHS    READINGS:

## 2023-04-04 ENCOUNTER — HOSPITAL ENCOUNTER (EMERGENCY)
Facility: CLINIC | Age: 83
Discharge: HOME | End: 2023-04-04
Attending: NURSE PRACTITIONER | Admitting: NURSE PRACTITIONER
Payer: COMMERCIAL

## 2023-04-04 VITALS
SYSTOLIC BLOOD PRESSURE: 106 MMHG | RESPIRATION RATE: 20 BRPM | DIASTOLIC BLOOD PRESSURE: 66 MMHG | HEART RATE: 85 BPM | OXYGEN SATURATION: 99 % | TEMPERATURE: 98.2 F

## 2023-04-04 DIAGNOSIS — T14.8XXA OPEN WOUND: ICD-10-CM

## 2023-04-04 PROCEDURE — 99213 OFFICE O/P EST LOW 20 MIN: CPT | Performed by: NURSE PRACTITIONER

## 2023-04-04 PROCEDURE — G0463 HOSPITAL OUTPT CLINIC VISIT: HCPCS | Performed by: NURSE PRACTITIONER

## 2023-04-04 ASSESSMENT — ENCOUNTER SYMPTOMS: WOUND: 1

## 2023-04-04 NOTE — ED PROVIDER NOTES
.  History     Chief Complaint   Patient presents with     Wounds     Wound on RUE that wont stop bleeding has been for over 5hrs     HPI  Eduardo Laughlin is a 82 year old male who presents to the urgent care for evaluation of bleeding wound on the left forearm. Unknown injury. Patient is on Plavix and Warfarin. Has been slightly bleeding for about five hours. No lightheadedness or dizziness. Here with daughter.     Allergies:  Allergies   Allergen Reactions     Metoprolol Succinate Er      Other reaction(s): low blood pressure     Pcn [Penicillins] Other (See Comments)     Childhood-doesn't know reactions     Statin Drugs [Hmg-Coa-R Inhibitors] Cramps     Uroxatral [Alfuzosin] Other (See Comments)     hypotension       Problem List:    Patient Active Problem List    Diagnosis Date Noted     Anemia due to blood loss, acute 01/05/2023     Priority: Medium     Acute kidney injury (H) 12/05/2022     Priority: Medium     Facial laceration, initial encounter 12/05/2022     Priority: Medium     Injury of head, initial encounter 12/05/2022     Priority: Medium     Lactic acidosis 11/18/2022     Priority: Medium     Acute respiratory failure with hypoxia (H) 11/18/2022     Priority: Medium     Aspiration pneumonia of both lungs, unspecified aspiration pneumonia type, unspecified part of lung (H) 11/18/2022     Priority: Medium     Chronic anticoagulation 11/07/2022     Priority: Medium     NSTEMI (non-ST elevated myocardial infarction) (H) 11/06/2022     Priority: Medium     Acute congestive heart failure, unspecified heart failure type (H) 11/06/2022     Priority: Medium     Syncope 11/06/2022     Priority: Medium     Chronic heart failure with preserved ejection fraction (H) 11/04/2022     Priority: Medium     GINNY (obstructive sleep apnea) 11/04/2022     Priority: Medium     Status post spinal surgery 09/13/2022     Priority: Medium     Morbid obesity (H) 10/04/2021     Priority: Medium     Gastro-esophageal reflux  disease without esophagitis 04/27/2020     Priority: Medium     Idiopathic chronic gout without tophus 04/27/2020     Priority: Medium     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 04/27/2020     Priority: Medium     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) 04/27/2020     Priority: Medium     Hydronephrosis of right kidney      Priority: Medium     Coronary artery disease      Priority: Medium     Paroxysmal atrial fibrillation (H)      Priority: Medium     LBBB (left bundle branch block)      Priority: Medium     Deep vein thrombosis (DVT) of proximal vein of both lower extremities, unspecified chronicity (H)      Priority: Medium     Chronic deep vein thrombosis (DVT) of femoral vein of right lower extremity (H) 12/04/2019     Priority: Medium     S/P ileal conduit (H) 08/18/2016     Priority: Medium     HTN (hypertension) 08/18/2016     Priority: Medium     CKD (chronic kidney disease) stage 3, GFR 30-59 ml/min (H) 08/18/2016     Priority: Medium     Bladder cancer (H) 01/27/2016     Priority: Medium        Past Medical History:    Past Medical History:   Diagnosis Date     Acute renal failure with acute tubular necrosis superimposed on stage 3 chronic kidney disease (H)      Arteriosclerotic cardiovascular disease (ASCVD)      Atrial fibrillation (H)      Bladder cancer (H)      BPH (benign prostatic hypertrophy)      Chronic kidney disease      Complicated UTI (urinary tract infection) 08/25/2019     Congestive heart failure (H)      Coronary artery disease      Diabetes mellitus (H)      Diabetic neuropathy (H)      E coli bacteremia      GERD (gastroesophageal reflux disease)      Gout      Hyperlipidemia      Hypertension      Hyponatremia      Iliac artery aneurysm, 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     Kidney stone      Osteoarthritis      Personal history of malignant neoplasm of prostate 04/27/2020     Prostate cancer (H)      Sleep apnea        Past Surgical History:     Past Surgical History:   Procedure Laterality Date     CARDIAC CATHETERIZATION  03/28/2008    and coronary angios     CV CORONARY ANGIOGRAM N/A 11/28/2022    Procedure: CV CORONARY ANGIOGRAM;  Surgeon: Gonzales Hager MD;  Location: [facility] CV     CV LEFT HEART CATH N/A 11/28/2022    Procedure: Left Heart Catheterization;  Surgeon: Gonzales Hager MD;  Location: [facility] CV     CV PCI STENT DRUG ELUTING N/A 11/28/2022    Procedure: Percutaneous Coronary Intervention Stent;  Surgeon: Gonzales Hager MD;  Location: [facility] CV     CYSTECTOMY       CYSTOSCOPY       CYSTOSCOPY N/A 12/23/2015    Procedure: CYSTOSCOPY BLADDER BIOPSIES with Fulgeration and Placement of Otero ;  Surgeon: Gordon Bajwa MD;  Location: [facility];  Service:      ESOPHAGOSCOPY, GASTROSCOPY, DUODENOSCOPY (EGD), COMBINED N/A 1/6/2023    Procedure: ESOPHAGOGASTRODUODENOSCOPY, WITH BIOPSY;  Surgeon: Phan Lobo DO;  Location: [facility]     IR EXTREMITY ANGIOGRAM LEFT  3/22/2019     IR EXTREMITY ANGIOGRAM LEFT  3/22/2019     IR ILEAL CONDUIT INJECTION  12/23/2016     IR NEPHROSTOMY TUBE PLACEMENT RIGHT  8/18/2016     IR URETEROSTOMY TUBE CHANGE RIGHT  9/21/2016     KIDNEY STONE SURGERY  x4     LAMINECTOMY LUMBAR ONE LEVEL Bilateral 9/13/2022    Procedure: LUMBAR 4 - LUMBAR 5 BILATERAL MEDIAL FACETECTOMY AND DECOMPRESSION;  Surgeon: Everett Holland MD;  Location: [facility]     PICC TRIPLE LUMEN PLACEMENT  11/18/2022          PROSTATE SURGERY         Family History:    Family History   Problem Relation Age of Onset     No Known Problems Mother      Prostate Cancer Father      Deep Vein Thrombosis Father      Cancer Sister      Heart Disease Sister      No Known Problems Daughter      Heart Disease Brother      Heart Disease Sister      No Known Problems Daughter        Social History:  Marital Status:   [2]  Social History     Tobacco Use     Smoking status: Former      Smokeless tobacco: Never     Tobacco comments:     Quit smoking 30 years ago   Vaping Use     Vaping status: Never Used   Substance Use Topics     Alcohol use: Not Currently     Drug use: Never        Medications:    ACETAMINOPHEN EXTRA STRENGTH 500 MG tablet  atorvastatin (LIPITOR) 40 MG tablet  clopidogrel (PLAVIX) 75 MG tablet  ezetimibe (ZETIA) 10 MG tablet  finasteride (PROSCAR) 5 MG tablet  furosemide (LASIX) 40 MG tablet  glimepiride (AMARYL) 1 MG tablet  glucosamine-chondroitinoitin 6767-6082 MG/30ML LIQD  metoprolol succinate ER (TOPROL XL) 25 MG 24 hr tablet  multivitamin, therapeutic (THERA-VIT) TABS tablet  nitroGLYcerin (NITROSTAT) 0.4 MG sublingual tablet  nystatin (MYCOSTATIN) 643459 UNIT/GM external powder  OMEGA-3 FISH OIL 1000 MG capsule  pantoprazole (PROTONIX) 40 MG EC tablet  polyethylene glycol (MIRALAX) 17 g packet  potassium chloride ER (KLOR-CON M) 20 MEQ CR tablet  traZODone (DESYREL) 100 MG tablet  warfarin ANTICOAGULANT (COUMADIN) 5 MG tablet          Review of Systems   Skin: Positive for wound.   All other systems reviewed and are negative.      Physical Exam   BP: 106/66  Pulse: 85  Temp: 98.2  F (36.8  C)  Resp: 20  SpO2: 99 %      Physical Exam  Constitutional:       General: He is not in acute distress.     Appearance: Normal appearance.   Eyes:      Conjunctiva/sclera: Conjunctivae normal.      Pupils: Pupils are equal, round, and reactive to light.   Cardiovascular:      Rate and Rhythm: Normal rate.   Pulmonary:      Effort: Pulmonary effort is normal.   Musculoskeletal:         General: Normal range of motion.      Cervical back: Normal range of motion.   Skin:     General: Skin is warm.      Capillary Refill: Capillary refill takes less than 2 seconds.      Comments: Superficial wound on the left forearm measuring about 2 mm x 3 mm with scant amount of continued bloody drainage. Wound appears to be from small skin tear   Neurological:      General: No focal deficit present.       Mental Status: He is alert.         ED Course                 Procedures            No results found for this or any previous visit (from the past 24 hour(s)).    Medications - No data to display    Assessments & Plan (with Medical Decision Making)   Eduardo Laughlin is a 82 year old male who presents to the urgent care for evaluation of bleeding wound on the left forearm. Vital signs normal, physical exam as above. No sign of infection. Surgicel placed on wound and bandaged. Return precautions reviewed, all questions answered. Patient is agreeable to plan of care and discharged in no acute distress.     I have reviewed the nursing notes.    I have reviewed the findings, diagnosis, plan and need for follow up with the patient.    New Prescriptions    No medications on file       Final diagnoses:   Open wound       4/4/2023   [facility] EMERGENCY DEPT     Marnie Ferrer, CARLOS CNP  04/04/23 1545

## 2023-04-13 ENCOUNTER — LAB REQUISITION (OUTPATIENT)
Dept: LAB | Facility: CLINIC | Age: 83
End: 2023-04-13
Payer: COMMERCIAL

## 2023-04-13 DIAGNOSIS — D50.9 IRON DEFICIENCY ANEMIA, UNSPECIFIED: ICD-10-CM

## 2023-04-13 DIAGNOSIS — E78.5 HYPERLIPIDEMIA, UNSPECIFIED: ICD-10-CM

## 2023-04-13 PROCEDURE — 83550 IRON BINDING TEST: CPT | Mod: ORL | Performed by: FAMILY MEDICINE

## 2023-04-13 PROCEDURE — 80061 LIPID PANEL: CPT | Mod: ORL | Performed by: FAMILY MEDICINE

## 2023-04-14 LAB
CHOLEST SERPL-MCNC: 102 MG/DL
HDLC SERPL-MCNC: 37 MG/DL
IRON BINDING CAPACITY (ROCHE): 256 UG/DL (ref 240–430)
IRON SATN MFR SERPL: 10 % (ref 15–46)
IRON SERPL-MCNC: 25 UG/DL (ref 61–157)
LDLC SERPL CALC-MCNC: 44 MG/DL
NONHDLC SERPL-MCNC: 65 MG/DL
TRIGL SERPL-MCNC: 103 MG/DL

## 2023-04-28 ENCOUNTER — TELEPHONE (OUTPATIENT)
Dept: CARDIOLOGY | Facility: CLINIC | Age: 83
End: 2023-04-28
Payer: COMMERCIAL

## 2023-04-28 NOTE — TELEPHONE ENCOUNTER
"Weight gain from 214.6 to 218 in 11 days. Other vitals remain stable.     Prescribed Lasix 40mg daily.    Pt reports he feels perfectly fine and denies all symptoms. He reports his healthy weight is between 216 and 219. He  Does agree that his weight today of 218 is higher than it has been in a while but would not like to make any changes at this time.    Pt was unable to relay his sodium or fluid intake, giving vague answers and then stopping to report that his diet is \"fine\".    Will continue to monitor HRS.    Marta Maldonado RN    Total time 10 min      "

## 2023-05-01 ENCOUNTER — ALLIED HEALTH/NURSE VISIT (OUTPATIENT)
Dept: CARDIOLOGY | Facility: CLINIC | Age: 83
End: 2023-05-01
Payer: COMMERCIAL

## 2023-05-01 DIAGNOSIS — I50.32 CHRONIC HEART FAILURE WITH PRESERVED EJECTION FRACTION (H): Primary | ICD-10-CM

## 2023-05-01 PROCEDURE — 99454 REM MNTR PHYSIOL PARAM 16-30: CPT | Performed by: INTERNAL MEDICINE

## 2023-05-03 NOTE — PROGRESS NOTES
[facility]: [facility] Routine Remote Evaluation     HRS Enrollment Date: 2/27/23  Billing Dates: 3/31/23 through 5/1/23  HF Dx: HFpEF    HRS Alerts During Monitoring Period:   4/28/23 weight     These adjustments have been discussed with the patient/caregiver and they express verbal understanding.      Future Appointments   Date Time Provider Department Center   5/17/2023  2:10 PM Toma Diaz, APRN CNP [facility]   6/1/2023  4:00 AM [facility]     Will continue with weekly monitoring with HF provider team.     TOTAL INTERACTIVE COMMUNICATION WITH PATIENT DURING THIS BILLING PERIOD: 10 minutes.      I have reviewed Marta Maldonado RN's note and agree.    Abdelrahman Mazariegos MD., MHS    READINGS:

## 2023-05-11 DIAGNOSIS — E87.6 HYPOKALEMIA: ICD-10-CM

## 2023-05-12 RX ORDER — POTASSIUM CHLORIDE 1500 MG/1
TABLET, EXTENDED RELEASE ORAL
Qty: 90 TABLET | Refills: 3 | Status: SHIPPED | OUTPATIENT
Start: 2023-05-12 | End: 2023-08-30

## 2023-05-17 ENCOUNTER — OFFICE VISIT (OUTPATIENT)
Dept: CARDIOLOGY | Facility: CLINIC | Age: 83
End: 2023-05-17
Payer: COMMERCIAL

## 2023-05-17 ENCOUNTER — TELEPHONE (OUTPATIENT)
Dept: CARDIOLOGY | Facility: CLINIC | Age: 83
End: 2023-05-17

## 2023-05-17 VITALS
RESPIRATION RATE: 24 BRPM | HEART RATE: 90 BPM | SYSTOLIC BLOOD PRESSURE: 90 MMHG | BODY MASS INDEX: 30.85 KG/M2 | WEIGHT: 215 LBS | OXYGEN SATURATION: 100 % | DIASTOLIC BLOOD PRESSURE: 60 MMHG

## 2023-05-17 DIAGNOSIS — I48.0 PAROXYSMAL ATRIAL FIBRILLATION (H): Chronic | ICD-10-CM

## 2023-05-17 DIAGNOSIS — I50.32 CHRONIC HEART FAILURE WITH PRESERVED EJECTION FRACTION (H): Primary | Chronic | ICD-10-CM

## 2023-05-17 DIAGNOSIS — N18.32 STAGE 3B CHRONIC KIDNEY DISEASE (H): Chronic | ICD-10-CM

## 2023-05-17 DIAGNOSIS — I10 PRIMARY HYPERTENSION: Chronic | ICD-10-CM

## 2023-05-17 DIAGNOSIS — I50.33 ACUTE ON CHRONIC HEART FAILURE WITH PRESERVED EJECTION FRACTION (HFPEF) (H): Primary | ICD-10-CM

## 2023-05-17 LAB
ANION GAP SERPL CALCULATED.3IONS-SCNC: 15 MMOL/L (ref 7–15)
BUN SERPL-MCNC: 59.4 MG/DL (ref 8–23)
CALCIUM SERPL-MCNC: 9.5 MG/DL (ref 8.8–10.2)
CHLORIDE SERPL-SCNC: 100 MMOL/L (ref 98–107)
CREAT SERPL-MCNC: 2.82 MG/DL (ref 0.67–1.17)
DEPRECATED HCO3 PLAS-SCNC: 20 MMOL/L (ref 22–29)
GFR SERPL CREATININE-BSD FRML MDRD: 22 ML/MIN/1.73M2
GLUCOSE SERPL-MCNC: 170 MG/DL (ref 70–99)
POTASSIUM SERPL-SCNC: 4.2 MMOL/L (ref 3.4–5.3)
SODIUM SERPL-SCNC: 135 MMOL/L (ref 136–145)

## 2023-05-17 PROCEDURE — 99214 OFFICE O/P EST MOD 30 MIN: CPT | Performed by: NURSE PRACTITIONER

## 2023-05-17 PROCEDURE — 36415 COLL VENOUS BLD VENIPUNCTURE: CPT | Performed by: NURSE PRACTITIONER

## 2023-05-17 PROCEDURE — 80048 BASIC METABOLIC PNL TOTAL CA: CPT | Performed by: NURSE PRACTITIONER

## 2023-05-17 RX ORDER — FUROSEMIDE 20 MG
20 TABLET ORAL DAILY
Qty: 90 TABLET | Refills: 3 | Status: SHIPPED | OUTPATIENT
Start: 2023-05-17 | End: 2023-08-31

## 2023-05-17 NOTE — TELEPHONE ENCOUNTER
Please inform patient of lab results.  Kidney function has worsened.  He was euvolemic on exam today and had a low blood pressure also.  Please make sure he is drinking 50 to 60 ounces of fluids per day.  Also recommend decreasing Lasix to 20 mg daily.  Please continue to monitor weights on HRS.  Recheck BMP in 1 week.  Thank you    Toma Diaz, CARLOS CNP      Spoke with pt and reported labs and recommendations. Pt verbalized understanding and in agreement. Orders placed.    Marta Maldonado RN

## 2023-05-17 NOTE — LETTER
5/17/2023    Rafael Montelongo MD  [address]  [place] [address]    RE: Eduardo Laughlin       Dear Colleague,     I had the pleasure of seeing Eduardo Laughlin in the [facility].        Assessment/Recommendations   Assessment:    1.  Heart failure with preserved ejection fraction, NYHA class II: Compensated.  He states he is feeling well today.  He denies any acute heart failure symptoms.  His weight has been stable which she monitors on HRS.  He follows a low-sodium diet.  2.  Hypertension: Controlled.  Blood pressure 90/60.  He does monitor his blood pressure on HRS which is 110- 120/60-70  3.  Chronic kidney disease stage III: He had labs in March which were stable.  4.  Paroxysmal atrial fibrillation: Normal sinus rhythm.  He continues warfarin for anticoagulation and Toprol for rate control    Plan:  1.  BMP pending  2.  Continue current medications  3.  Continue monitoring weights and blood pressure through HRS  4.  Continue low-salt diet    Eduardo Laughlin will follow up with Dr. Mazariegos in August and in the heart failure clinic in 6 months.     History of Present Illness/Subjective    Mr. Eduardo Laughlin is a 82 year old male seen at [facility] heart failure clinic today for continued follow-up.  He follows up for heart failure with preserved ejection fraction.  His most recent echocardiogram was done December 2022 which showed ejection fraction of 55 to 60%. He has a past medical history significant for hypertension, chronic kidney disease stage III, paroxysmal atrial fibrillation, diabetes, hyperlipidemia, GINNY not on CPAP, DVT in 2020, anemia, frequent falls.    Today, he states he is feeling well.  He denies any acute heart failure symptoms.  He has mild fatigue.  He denies lightheadedness, shortness of breath, dyspnea on exertion, orthopnea, PND, palpitations, chest pain, abdominal fullness/bloating and lower extremity edema.      He is monitoring home  weights which are stable between 211-216 pounds.  He is following a low sodium diet.       Physical Examination Review of Systems   BP 90/60   Pulse 90   Resp 24   Wt 97.5 kg (215 lb)   SpO2 100%   BMI 30.85 kg/m    Body mass index is 30.85 kg/m .  Wt Readings from Last 3 Encounters:   05/17/23 97.5 kg (215 lb)   03/15/23 102.5 kg (226 lb)   02/20/23 103.9 kg (229 lb)       General Appearance:   no acute distress   ENT/Mouth: No abnormalities   EYES:  no scleral icterus, normal conjunctivae   Neck: no thyromegaly   Chest/Lungs:   lungs are clear to auscultation, no rales or wheezing, equal chest wall expansion    Cardiovascular:   Regular. Normal first and second heart sounds with no murmurs, rubs, or gallops, no edema bilaterally    Abdomen:  bowel sounds are present   Extremities: no cyanosis or clubbing   Skin: warm   Neurologic: no tremors     Psychiatric: alert and oriented x3                                              Medical History  Surgical History Family History Social History   Past Medical History:   Diagnosis Date    Acute renal failure with acute tubular necrosis superimposed on stage 3 chronic kidney disease (H)     Arteriosclerotic cardiovascular disease (ASCVD)     Atrial fibrillation (H)     Bladder cancer (H)     BPH (benign prostatic hypertrophy)     Chronic kidney disease     Complicated UTI (urinary tract infection) 08/25/2019    Congestive heart failure (H)     Coronary artery disease     Diabetes mellitus (H)     Diabetic neuropathy (H)     E coli bacteremia     GERD (gastroesophageal reflux disease)     Gout     Hyperlipidemia     Hypertension     Hyponatremia     Iliac artery aneurysm, 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    Added automatically from request for surgery [id number]    Kidney stone     Osteoarthritis     Personal history of malignant neoplasm of prostate 04/27/2020    Prostate cancer (H)     Sleep apnea     CPAP    Past Surgical History:   Procedure Laterality Date    CARDIAC  CATHETERIZATION  03/28/2008    and coronary angios    CV CORONARY ANGIOGRAM N/A 11/28/2022    Procedure: CV CORONARY ANGIOGRAM;  Surgeon: Gonzales Hager MD;  Location: [facility] CV    CV LEFT HEART CATH N/A 11/28/2022    Procedure: Left Heart Catheterization;  Surgeon: Gonzales Hager MD;  Location: [facility] CV    CV PCI STENT DRUG ELUTING N/A 11/28/2022    Procedure: Percutaneous Coronary Intervention Stent;  Surgeon: Gonzales Hager MD;  Location: [facility] CV    CYSTECTOMY      CYSTOSCOPY      CYSTOSCOPY N/A 12/23/2015    Procedure: CYSTOSCOPY BLADDER BIOPSIES with Fulgeration and Placement of Otero ;  Surgeon: Gordon Bajwa MD;  Location: [facility];  Service:     ESOPHAGOSCOPY, GASTROSCOPY, DUODENOSCOPY (EGD), COMBINED N/A 1/6/2023    Procedure: ESOPHAGOGASTRODUODENOSCOPY, WITH BIOPSY;  Surgeon: Phan Lobo DO;  Location: [facility]    IR EXTREMITY ANGIOGRAM LEFT  3/22/2019    IR EXTREMITY ANGIOGRAM LEFT  3/22/2019    IR ILEAL CONDUIT INJECTION  12/23/2016    IR NEPHROSTOMY TUBE PLACEMENT RIGHT  8/18/2016    IR URETEROSTOMY TUBE CHANGE RIGHT  9/21/2016    KIDNEY STONE SURGERY  x4    LAMINECTOMY LUMBAR ONE LEVEL Bilateral 9/13/2022    Procedure: LUMBAR 4 - LUMBAR 5 BILATERAL MEDIAL FACETECTOMY AND DECOMPRESSION;  Surgeon: Everett Holland MD;  Location: [facility]    PICC TRIPLE LUMEN PLACEMENT  11/18/2022         PROSTATE SURGERY      Family History   Problem Relation Age of Onset    No Known Problems Mother     Prostate Cancer Father     Deep Vein Thrombosis Father     Cancer Sister     Heart Disease Sister     No Known Problems Daughter     Heart Disease Brother     Heart Disease Sister     No Known Problems Daughter     Social History     Socioeconomic History    Marital status:      Spouse name: Not on file    Number of children: Not on file    Years of education: Not on file    Highest education level: Not on file    Occupational History    Not on file   Tobacco Use    Smoking status: Former    Smokeless tobacco: Never    Tobacco comments:     Quit smoking 30 years ago   Vaping Use    Vaping status: Never Used   Substance and Sexual Activity    Alcohol use: Not Currently    Drug use: Never    Sexual activity: Not Currently   Other Topics Concern    Not on file   Social History Narrative    Not on file     Social Determinants of Health     Financial Resource Strain: Not on file   Food Insecurity: Not on file   Transportation Needs: Not on file   Physical Activity: Not on file   Stress: Not on file   Social Connections: Not on file   Intimate Partner Violence: Not on file   Housing Stability: Not on file          Medications  Allergies   Current Outpatient Medications   Medication Sig Dispense Refill    ACETAMINOPHEN EXTRA STRENGTH 500 MG tablet TAKE 2 TABS (1,000MG) BY MOUTH THREE TIMES DAILY      atorvastatin (LIPITOR) 40 MG tablet Take 1 tablet (40 mg) by mouth daily 90 tablet 3    clopidogrel (PLAVIX) 75 MG tablet Take 1 tablet (75 mg) by mouth daily 30 tablet 1    ezetimibe (ZETIA) 10 MG tablet Take 1 tablet (10 mg) by mouth daily 30 tablet 1    Ferrous Sulfate (IRON) 28 MG TABS Take 1 tablet by mouth daily      finasteride (PROSCAR) 5 MG tablet Take 5 mg by mouth daily      furosemide (LASIX) 40 MG tablet Take 1 tablet (40 mg) by mouth daily 90 tablet 3    glimepiride (AMARYL) 1 MG tablet Take 1 tablet (1 mg) by mouth daily 30 tablet 0    glucosamine-chondroitinoitin 0737-3139 MG/30ML LIQD Take by mouth 2 times daily      KLOR-CON 20 MEQ CR tablet TAKE 1 TABLET BY MOUTH DAILY 90 tablet 3    metoprolol succinate ER (TOPROL XL) 25 MG 24 hr tablet Take 12.5 mg by mouth 2 times daily Twice daily if BP <100      multivitamin, therapeutic (THERA-VIT) TABS tablet Take 1 tablet by mouth daily 30 tablet 3    nitroGLYcerin (NITROSTAT) 0.4 MG sublingual tablet For chest pain place 1 tablet under the tongue every 5 minutes for 3  doses. If symptoms persist 5 minutes after 1st dose call 911. 30 tablet 1    nystatin (MYCOSTATIN) 509771 UNIT/GM external powder 2 times daily      OMEGA-3 FISH OIL 1000 MG capsule Take 1 capsule by mouth daily      pantoprazole (PROTONIX) 40 MG EC tablet Take 1 tablet (40 mg) by mouth 2 times daily (before meals) 60 tablet 0    traZODone (DESYREL) 100 MG tablet Take 1 tablet (100 mg) by mouth At Bedtime (Patient taking differently: Take 200 mg by mouth At Bedtime) 30 tablet 0    warfarin ANTICOAGULANT (COUMADIN) 5 MG tablet Take 5 mg by mouth daily 1 tablet 0    Allergies   Allergen Reactions    Metoprolol Tartrate      Other reaction(s): low blood pressure    Pcn [Penicillins] Other (See Comments)     Childhood-doesn't know reactions    Statin Drugs [Statins] Cramps    Uroxatral [Alfuzosin] Other (See Comments)     hypotension         Lab Results    Chemistry/lipid CBC Cardiac Enzymes/BNP/TSH/INR   Lab Results   Component Value Date    CHOL 102 04/13/2023    HDL 37 (L) 04/13/2023    TRIG 103 04/13/2023    BUN 31.6 (H) 03/15/2023     03/15/2023    CO2 25 03/15/2023    Lab Results   Component Value Date    WBC 9.9 02/13/2023    HGB 9.7 (L) 02/13/2023    HCT 31.5 (L) 02/13/2023    MCV 86 02/13/2023     02/13/2023    Lab Results   Component Value Date    TROPONINI 0.10 04/25/2020     (H) 04/25/2020    INR 1.58 (H) 02/02/2023             This note has been dictated using voice recognition software. Any grammatical, typographical, or context distortions are unintentional and inherent to the software    30 minutes spent on the date of encounter doing chart review, review of outside records, review of test results, interpretation with above tests, patient visit and documentation.                  Thank you for allowing me to participate in the care of your patient.      Sincerely,     CARLOS Dumas [facility]  cc:    Toma Diaz, APRN CNP  [address], [address]  [place] [address]

## 2023-05-17 NOTE — PROGRESS NOTES
Assessment/Recommendations   Assessment:    1.  Heart failure with preserved ejection fraction, NYHA class II: Compensated.  He states he is feeling well today.  He denies any acute heart failure symptoms.  His weight has been stable which she monitors on HRS.  He follows a low-sodium diet.  2.  Hypertension: Controlled.  Blood pressure 90/60.  He does monitor his blood pressure on HRS which is 110- 120/60-70  3.  Chronic kidney disease stage III: He had labs in March which were stable.  4.  Paroxysmal atrial fibrillation: Normal sinus rhythm.  He continues warfarin for anticoagulation and Toprol for rate control    Plan:  1.  BMP pending  2.  Continue current medications  3.  Continue monitoring weights and blood pressure through HRS  4.  Continue low-salt diet    Eduardo Laughlin will follow up with Dr. Mazariegos in August and in the heart failure clinic in 6 months.     History of Present Illness/Subjective    Mr. Eduardo Laughlin is a 82 year old male seen at [facility] heart failure clinic today for continued follow-up.  He follows up for heart failure with preserved ejection fraction.  His most recent echocardiogram was done December 2022 which showed ejection fraction of 55 to 60%. He has a past medical history significant for hypertension, chronic kidney disease stage III, paroxysmal atrial fibrillation, diabetes, hyperlipidemia, GINNY not on CPAP, DVT in 2020, anemia, frequent falls.    Today, he states he is feeling well.  He denies any acute heart failure symptoms.  He has mild fatigue.  He denies lightheadedness, shortness of breath, dyspnea on exertion, orthopnea, PND, palpitations, chest pain, abdominal fullness/bloating and lower extremity edema.      He is monitoring home weights which are stable between 211-216 pounds.  He is following a low sodium diet.       Physical Examination Review of Systems   BP 90/60   Pulse 90   Resp 24   Wt 97.5 kg (215 lb)   SpO2 100%   BMI 30.85  kg/m    Body mass index is 30.85 kg/m .  Wt Readings from Last 3 Encounters:   05/17/23 97.5 kg (215 lb)   03/15/23 102.5 kg (226 lb)   02/20/23 103.9 kg (229 lb)       General Appearance:   no acute distress   ENT/Mouth: No abnormalities   EYES:  no scleral icterus, normal conjunctivae   Neck: no thyromegaly   Chest/Lungs:   lungs are clear to auscultation, no rales or wheezing, equal chest wall expansion    Cardiovascular:   Regular. Normal first and second heart sounds with no murmurs, rubs, or gallops, no edema bilaterally    Abdomen:  bowel sounds are present   Extremities: no cyanosis or clubbing   Skin: warm   Neurologic: no tremors     Psychiatric: alert and oriented x3                                              Medical History  Surgical History Family History Social History   Past Medical History:   Diagnosis Date     Acute renal failure with acute tubular necrosis superimposed on stage 3 chronic kidney disease (H)      Arteriosclerotic cardiovascular disease (ASCVD)      Atrial fibrillation (H)      Bladder cancer (H)      BPH (benign prostatic hypertrophy)      Chronic kidney disease      Complicated UTI (urinary tract infection) 08/25/2019     Congestive heart failure (H)      Coronary artery disease      Diabetes mellitus (H)      Diabetic neuropathy (H)      E coli bacteremia      GERD (gastroesophageal reflux disease)      Gout      Hyperlipidemia      Hypertension      Hyponatremia      Iliac artery aneurysm, 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    Added automatically from request for surgery [id number]     Kidney stone      Osteoarthritis      Personal history of malignant neoplasm of prostate 04/27/2020     Prostate cancer (H)      Sleep apnea     CPAP    Past Surgical History:   Procedure Laterality Date     CARDIAC CATHETERIZATION  03/28/2008    and coronary angios     CV CORONARY ANGIOGRAM N/A 11/28/2022    Procedure: CV CORONARY ANGIOGRAM;  Surgeon: Gonzales Hager MD;  Location: [facility] CATH LAB CV     CV  LEFT HEART CATH N/A 11/28/2022    Procedure: Left Heart Catheterization;  Surgeon: Gonzales Hager MD;  Location: [facility] CV     CV PCI STENT DRUG ELUTING N/A 11/28/2022    Procedure: Percutaneous Coronary Intervention Stent;  Surgeon: Gonzales Hager MD;  Location: [facility] CV     CYSTECTOMY       CYSTOSCOPY       CYSTOSCOPY N/A 12/23/2015    Procedure: CYSTOSCOPY BLADDER BIOPSIES with Fulgeration and Placement of Otero ;  Surgeon: Gordon Bajwa MD;  Location: [facility];  Service:      ESOPHAGOSCOPY, GASTROSCOPY, DUODENOSCOPY (EGD), COMBINED N/A 1/6/2023    Procedure: ESOPHAGOGASTRODUODENOSCOPY, WITH BIOPSY;  Surgeon: Phan Lobo DO;  Location: [facility]     IR EXTREMITY ANGIOGRAM LEFT  3/22/2019     IR EXTREMITY ANGIOGRAM LEFT  3/22/2019     IR ILEAL CONDUIT INJECTION  12/23/2016     IR NEPHROSTOMY TUBE PLACEMENT RIGHT  8/18/2016     IR URETEROSTOMY TUBE CHANGE RIGHT  9/21/2016     KIDNEY STONE SURGERY  x4     LAMINECTOMY LUMBAR ONE LEVEL Bilateral 9/13/2022    Procedure: LUMBAR 4 - LUMBAR 5 BILATERAL MEDIAL FACETECTOMY AND DECOMPRESSION;  Surgeon: Everett Holland MD;  Location: [facility]     PICC TRIPLE LUMEN PLACEMENT  11/18/2022          PROSTATE SURGERY      Family History   Problem Relation Age of Onset     No Known Problems Mother      Prostate Cancer Father      Deep Vein Thrombosis Father      Cancer Sister      Heart Disease Sister      No Known Problems Daughter      Heart Disease Brother      Heart Disease Sister      No Known Problems Daughter     Social History     Socioeconomic History     Marital status:      Spouse name: Not on file     Number of children: Not on file     Years of education: Not on file     Highest education level: Not on file   Occupational History     Not on file   Tobacco Use     Smoking status: Former     Smokeless tobacco: Never     Tobacco comments:     Quit smoking 30 years ago   Vaping Use      Vaping status: Never Used   Substance and Sexual Activity     Alcohol use: Not Currently     Drug use: Never     Sexual activity: Not Currently   Other Topics Concern     Not on file   Social History Narrative     Not on file     Social Determinants of Health     Financial Resource Strain: Not on file   Food Insecurity: Not on file   Transportation Needs: Not on file   Physical Activity: Not on file   Stress: Not on file   Social Connections: Not on file   Intimate Partner Violence: Not on file   Housing Stability: Not on file          Medications  Allergies   Current Outpatient Medications   Medication Sig Dispense Refill     ACETAMINOPHEN EXTRA STRENGTH 500 MG tablet TAKE 2 TABS (1,000MG) BY MOUTH THREE TIMES DAILY       atorvastatin (LIPITOR) 40 MG tablet Take 1 tablet (40 mg) by mouth daily 90 tablet 3     clopidogrel (PLAVIX) 75 MG tablet Take 1 tablet (75 mg) by mouth daily 30 tablet 1     ezetimibe (ZETIA) 10 MG tablet Take 1 tablet (10 mg) by mouth daily 30 tablet 1     Ferrous Sulfate (IRON) 28 MG TABS Take 1 tablet by mouth daily       finasteride (PROSCAR) 5 MG tablet Take 5 mg by mouth daily       furosemide (LASIX) 40 MG tablet Take 1 tablet (40 mg) by mouth daily 90 tablet 3     glimepiride (AMARYL) 1 MG tablet Take 1 tablet (1 mg) by mouth daily 30 tablet 0     glucosamine-chondroitinoitin 6837-0251 MG/30ML LIQD Take by mouth 2 times daily       KLOR-CON 20 MEQ CR tablet TAKE 1 TABLET BY MOUTH DAILY 90 tablet 3     metoprolol succinate ER (TOPROL XL) 25 MG 24 hr tablet Take 12.5 mg by mouth 2 times daily Twice daily if BP <100       multivitamin, therapeutic (THERA-VIT) TABS tablet Take 1 tablet by mouth daily 30 tablet 3     nitroGLYcerin (NITROSTAT) 0.4 MG sublingual tablet For chest pain place 1 tablet under the tongue every 5 minutes for 3 doses. If symptoms persist 5 minutes after 1st dose call 911. 30 tablet 1     nystatin (MYCOSTATIN) 940096 UNIT/GM external powder 2 times daily       OMEGA-3  FISH OIL 1000 MG capsule Take 1 capsule by mouth daily       pantoprazole (PROTONIX) 40 MG EC tablet Take 1 tablet (40 mg) by mouth 2 times daily (before meals) 60 tablet 0     traZODone (DESYREL) 100 MG tablet Take 1 tablet (100 mg) by mouth At Bedtime (Patient taking differently: Take 200 mg by mouth At Bedtime) 30 tablet 0     warfarin ANTICOAGULANT (COUMADIN) 5 MG tablet Take 5 mg by mouth daily 1 tablet 0    Allergies   Allergen Reactions     Metoprolol Tartrate      Other reaction(s): low blood pressure     Pcn [Penicillins] Other (See Comments)     Childhood-doesn't know reactions     Statin Drugs [Statins] Cramps     Uroxatral [Alfuzosin] Other (See Comments)     hypotension         Lab Results    Chemistry/lipid CBC Cardiac Enzymes/BNP/TSH/INR   Lab Results   Component Value Date    CHOL 102 04/13/2023    HDL 37 (L) 04/13/2023    TRIG 103 04/13/2023    BUN 31.6 (H) 03/15/2023     03/15/2023    CO2 25 03/15/2023    Lab Results   Component Value Date    WBC 9.9 02/13/2023    HGB 9.7 (L) 02/13/2023    HCT 31.5 (L) 02/13/2023    MCV 86 02/13/2023     02/13/2023    Lab Results   Component Value Date    TROPONINI 0.10 04/25/2020     (H) 04/25/2020    INR 1.58 (H) 02/02/2023             This note has been dictated using voice recognition software. Any grammatical, typographical, or context distortions are unintentional and inherent to the software    30 minutes spent on the date of encounter doing chart review, review of outside records, review of test results, interpretation with above tests, patient visit and documentation.

## 2023-05-17 NOTE — PATIENT INSTRUCTIONS
Eduardo Laughlin,    It was a pleasure to see you today at [facility].     My recommendations after this visit include:  - Please follow up with Dr Mazariegos in August  - Please follow up with Toma Diaz in 6 months  - I have checked labs  - Continue current medications    Toma Diaz, CNP

## 2023-05-24 ENCOUNTER — LAB (OUTPATIENT)
Dept: LAB | Facility: HOSPITAL | Age: 83
End: 2023-05-24
Payer: COMMERCIAL

## 2023-05-24 ENCOUNTER — TELEPHONE (OUTPATIENT)
Dept: CARDIOLOGY | Facility: CLINIC | Age: 83
End: 2023-05-24
Payer: MEDICARE

## 2023-05-24 DIAGNOSIS — I50.33 ACUTE ON CHRONIC HEART FAILURE WITH PRESERVED EJECTION FRACTION (HFPEF) (H): ICD-10-CM

## 2023-05-24 LAB
ANION GAP SERPL CALCULATED.3IONS-SCNC: 10 MMOL/L (ref 7–15)
BUN SERPL-MCNC: 50.7 MG/DL (ref 8–23)
CALCIUM SERPL-MCNC: 9.3 MG/DL (ref 8.8–10.2)
CHLORIDE SERPL-SCNC: 105 MMOL/L (ref 98–107)
CREAT SERPL-MCNC: 2.23 MG/DL (ref 0.67–1.17)
DEPRECATED HCO3 PLAS-SCNC: 23 MMOL/L (ref 22–29)
GFR SERPL CREATININE-BSD FRML MDRD: 29 ML/MIN/1.73M2
GLUCOSE SERPL-MCNC: 165 MG/DL (ref 70–99)
POTASSIUM SERPL-SCNC: 4.4 MMOL/L (ref 3.4–5.3)
SODIUM SERPL-SCNC: 138 MMOL/L (ref 136–145)

## 2023-05-24 PROCEDURE — 80048 BASIC METABOLIC PNL TOTAL CA: CPT

## 2023-05-24 PROCEDURE — 36415 COLL VENOUS BLD VENIPUNCTURE: CPT

## 2023-05-30 ENCOUNTER — TELEPHONE (OUTPATIENT)
Dept: CARDIOLOGY | Facility: CLINIC | Age: 83
End: 2023-05-30
Payer: MEDICARE

## 2023-05-30 NOTE — TELEPHONE ENCOUNTER
Weight increase noted of 3lbs over past several days.Since 5/8 pt weighed around 214lbs and today up to 217.8lbs.     Lasix was reduced from 40mg daily to 20mg daily on 5/17 due to being euvolemic and low BP during his appt that day and his creatinine being 2.84.    Last BMP on 5/24 with creatinine of 2.23- 1 week after starting Lasix 20mg daily.     Patient takes medications as prescribed and denies all symptoms.     CORDELLI to Dr. Mazariegos.    Marta Maldonado RN    Total time 10 min.

## 2023-06-01 ENCOUNTER — ALLIED HEALTH/NURSE VISIT (OUTPATIENT)
Dept: CARDIOLOGY | Facility: CLINIC | Age: 83
End: 2023-06-01
Payer: COMMERCIAL

## 2023-06-01 DIAGNOSIS — I50.32 CHRONIC HEART FAILURE WITH PRESERVED EJECTION FRACTION (H): Primary | ICD-10-CM

## 2023-06-01 PROCEDURE — 99454 REM MNTR PHYSIOL PARAM 16-30: CPT | Performed by: INTERNAL MEDICINE

## 2023-06-07 NOTE — PROGRESS NOTES
[facility]: [facility] Routine Remote Evaluation     HRS Enrollment Date:       2/27/23                                    Billing Dates: 5/2/23 through 6/1/23  HF Dx: HFpEF      HRS Alerts During Monitoring Period:    0    No adjustments made this month.  Discussed with patient/caregiver and they will continue with current plan of care.           Future Appointments   Date Time Provider Department Center   8/30/2023  1:00 PM SANYA [facility] DEVICE NURSE 1 HRCVN [facility]   8/30/2023  1:50 PM Abdelrahman Mazariegos MD [facility]     Will continue with weekly monitoring with HF provider team.     TOTAL INTERACTIVE COMMUNICATION WITH PATIENT DURING THIS BILLING PERIOD: 0 minutes.    Luis Armando HAMLIN RN  BSN    I have reviewed Luis Armando VALDIVIA RN, BSN's note and agree.    Abdelrahman Mazariegos MD., MHS      READINGS:

## 2023-06-14 ENCOUNTER — TELEPHONE (OUTPATIENT)
Dept: CARDIOLOGY | Facility: CLINIC | Age: 83
End: 2023-06-14
Payer: MEDICARE

## 2023-06-14 NOTE — TELEPHONE ENCOUNTER
Noted.    Luis Armando PAUL    ----- Message -----  From: Abdelrahman Mazariegos MD  Sent: 6/14/2023  11:48 AM CDT  To: Luis Armando Mendez RN    Thanks Luis Armando. Let's continue to monitor for now.    ~ Awilda

## 2023-06-14 NOTE — TELEPHONE ENCOUNTER
"About 4lbs wt gain in 10 days, gradually increasing (214.2lbs --> 218lbs)    \"Well it's a combination of things that happened uh that caused me to gain a little weight. Well, I've been eating some pasta for one thing and uhm I had made a batch of chicken noodle soup and those aren't good for the diet. I did break away from what I normally do so I'm going to get back on track.\"    Furosemide 20mg daily  Last creatinine 2.23 5/24/23    He denies any symptoms.     Dr. Mazariegos, any recs?    10mins    Luis Armando HAMLIN RN  BSN    "

## 2023-07-03 ENCOUNTER — ALLIED HEALTH/NURSE VISIT (OUTPATIENT)
Dept: CARDIOLOGY | Facility: CLINIC | Age: 83
End: 2023-07-03
Payer: COMMERCIAL

## 2023-07-03 DIAGNOSIS — I50.32 CHRONIC HEART FAILURE WITH PRESERVED EJECTION FRACTION (H): Primary | ICD-10-CM

## 2023-07-03 PROCEDURE — 99454 REM MNTR PHYSIOL PARAM 16-30: CPT | Performed by: INTERNAL MEDICINE

## 2023-07-13 NOTE — PROGRESS NOTES
[facility]: [facility] Routine Remote Evaluation     HRS Enrollment Date:    2/27/23                                     Billing Dates: 6/2/23 through 7/3/23  HF Dx: HFpEF      HRS Alerts During Monitoring Period:   6/14/23 HRS wt     No adjustments made this month.  Discussed with patient/caregiver and they will continue with current plan of care.           Future Appointments   Date Time Provider Department Center   8/3/2023  4:00 AM ETHAN [facility]   8/30/2023  1:00 PM SANYA [facility] DEVICE NURSE 1 [facility]   8/30/2023  1:50 PM Abdelrahman Mazariegos MD [facility]     Will continue with weekly monitoring with HF provider team.     TOTAL INTERACTIVE COMMUNICATION WITH PATIENT DURING THIS BILLING PERIOD: 10 minutes.    Luis Armando HAMLIN RN  BSN    I have reviewed Luis Armando VALDIVIA RN, BSN's note and agree.    Abdelrahman Mazariegos MD., MHS    READINGS:

## 2023-08-01 ENCOUNTER — LAB REQUISITION (OUTPATIENT)
Dept: LAB | Facility: CLINIC | Age: 83
End: 2023-08-01
Payer: COMMERCIAL

## 2023-08-01 ENCOUNTER — TRANSFERRED RECORDS (OUTPATIENT)
Dept: HEALTH INFORMATION MANAGEMENT | Facility: CLINIC | Age: 83
End: 2023-08-01
Payer: MEDICARE

## 2023-08-01 DIAGNOSIS — L03.012 CELLULITIS OF LEFT FINGER: ICD-10-CM

## 2023-08-01 PROCEDURE — 87070 CULTURE OTHR SPECIMN AEROBIC: CPT | Mod: ORL | Performed by: FAMILY MEDICINE

## 2023-08-01 PROCEDURE — 87070 CULTURE OTHR SPECIMN AEROBIC: CPT | Performed by: FAMILY MEDICINE

## 2023-08-03 ENCOUNTER — ALLIED HEALTH/NURSE VISIT (OUTPATIENT)
Dept: CARDIOLOGY | Facility: CLINIC | Age: 83
End: 2023-08-03
Payer: COMMERCIAL

## 2023-08-03 DIAGNOSIS — I50.32 CHRONIC HEART FAILURE WITH PRESERVED EJECTION FRACTION (H): Primary | Chronic | ICD-10-CM

## 2023-08-03 PROCEDURE — 99454 REM MNTR PHYSIOL PARAM 16-30: CPT | Performed by: INTERNAL MEDICINE

## 2023-08-04 LAB
BACTERIA WND CULT: ABNORMAL
BACTERIA WND CULT: ABNORMAL

## 2023-08-16 NOTE — PROGRESS NOTES
[facility]:   [facility] Routine Remote Evaluation    HRS Enrollment date: 2/27/23     Dates: 7/4/23 through 8/3/23    This is not the first billing cycle.    ALERTS:none    No adjustments made this month.  Discussed with patient/caregiver and they will continue current plan of care.       Readings:     Total Minutes Spent: 00 minutes   Yumiko VALDIVIA RN BSN, CHFN, PCCN-K    I have reviewed Yumiko Lomeli RN BSN, CHFN's note and agree.    Abdelrahman Mazariegos MD., MHS    Future Appointments   Date Time Provider Department Center   8/30/2023  1:00 PM SANYA [facility] DEVICE NURSE 1 [facility]CVN [facility]   8/30/2023  1:50 PM [facility]

## 2023-08-30 ENCOUNTER — OFFICE VISIT (OUTPATIENT)
Dept: CARDIOLOGY | Facility: CLINIC | Age: 83
End: 2023-08-30
Payer: COMMERCIAL

## 2023-08-30 ENCOUNTER — ANCILLARY PROCEDURE (OUTPATIENT)
Dept: CARDIOLOGY | Facility: CLINIC | Age: 83
End: 2023-08-30
Attending: INTERNAL MEDICINE
Payer: COMMERCIAL

## 2023-08-30 VITALS
DIASTOLIC BLOOD PRESSURE: 70 MMHG | WEIGHT: 215.8 LBS | BODY MASS INDEX: 30.9 KG/M2 | RESPIRATION RATE: 20 BRPM | HEART RATE: 76 BPM | HEIGHT: 70 IN | SYSTOLIC BLOOD PRESSURE: 102 MMHG

## 2023-08-30 DIAGNOSIS — I49.5 SICK SINUS SYNDROME (H): Primary | ICD-10-CM

## 2023-08-30 DIAGNOSIS — I50.32 CHRONIC HEART FAILURE WITH PRESERVED EJECTION FRACTION (H): Primary | Chronic | ICD-10-CM

## 2023-08-30 DIAGNOSIS — Z95.0 PACEMAKER: ICD-10-CM

## 2023-08-30 DIAGNOSIS — I50.32 CHRONIC HEART FAILURE WITH PRESERVED EJECTION FRACTION (H): ICD-10-CM

## 2023-08-30 LAB
ANION GAP SERPL CALCULATED.3IONS-SCNC: 13 MMOL/L (ref 7–15)
BUN SERPL-MCNC: 44 MG/DL (ref 8–23)
CALCIUM SERPL-MCNC: 9 MG/DL (ref 8.8–10.2)
CHLORIDE SERPL-SCNC: 104 MMOL/L (ref 98–107)
CREAT SERPL-MCNC: 2.44 MG/DL (ref 0.67–1.17)
DEPRECATED HCO3 PLAS-SCNC: 20 MMOL/L (ref 22–29)
GFR SERPL CREATININE-BSD FRML MDRD: 26 ML/MIN/1.73M2
GLUCOSE SERPL-MCNC: 131 MG/DL (ref 70–99)
MDC_IDC_LEAD_IMPLANT_DT: NORMAL
MDC_IDC_LEAD_LOCATION: NORMAL
MDC_IDC_LEAD_LOCATION_DETAIL_1: NORMAL
MDC_IDC_LEAD_MFG: NORMAL
MDC_IDC_LEAD_MODEL: NORMAL
MDC_IDC_LEAD_POLARITY_TYPE: NORMAL
MDC_IDC_LEAD_SERIAL: NORMAL
MDC_IDC_MSMT_BATTERY_DTM: NORMAL
MDC_IDC_MSMT_BATTERY_REMAINING_LONGEVITY: 126 MO
MDC_IDC_MSMT_BATTERY_REMAINING_PERCENTAGE: 100 %
MDC_IDC_MSMT_BATTERY_STATUS: NORMAL
MDC_IDC_MSMT_LEADCHNL_LV_IMPEDANCE_VALUE: 858 OHM
MDC_IDC_MSMT_LEADCHNL_LV_PACING_THRESHOLD_AMPLITUDE: 1.4 V
MDC_IDC_MSMT_LEADCHNL_LV_PACING_THRESHOLD_PULSEWIDTH: 0.8 MS
MDC_IDC_MSMT_LEADCHNL_RA_IMPEDANCE_VALUE: 764 OHM
MDC_IDC_MSMT_LEADCHNL_RA_PACING_THRESHOLD_AMPLITUDE: 1.2 V
MDC_IDC_MSMT_LEADCHNL_RA_PACING_THRESHOLD_PULSEWIDTH: 1 MS
MDC_IDC_MSMT_LEADCHNL_RA_SENSING_INTR_AMPL: 4.4 MV
MDC_IDC_MSMT_LEADCHNL_RV_IMPEDANCE_VALUE: 627 OHM
MDC_IDC_MSMT_LEADCHNL_RV_PACING_THRESHOLD_AMPLITUDE: 0.8 V
MDC_IDC_MSMT_LEADCHNL_RV_PACING_THRESHOLD_PULSEWIDTH: 0.8 MS
MDC_IDC_PG_IMPLANT_DTM: NORMAL
MDC_IDC_PG_MFG: NORMAL
MDC_IDC_PG_MODEL: NORMAL
MDC_IDC_PG_SERIAL: NORMAL
MDC_IDC_PG_TYPE: NORMAL
MDC_IDC_SESS_CLINIC_NAME: NORMAL
MDC_IDC_SESS_DTM: NORMAL
MDC_IDC_SESS_TYPE: NORMAL
MDC_IDC_SET_BRADY_AT_MODE_SWITCH_MODE: NORMAL
MDC_IDC_SET_BRADY_AT_MODE_SWITCH_RATE: 170 {BEATS}/MIN
MDC_IDC_SET_BRADY_LOWRATE: 60 {BEATS}/MIN
MDC_IDC_SET_BRADY_MAX_TRACKING_RATE: 130 {BEATS}/MIN
MDC_IDC_SET_BRADY_MODE: NORMAL
MDC_IDC_SET_BRADY_PAV_DELAY_HIGH: 200 MS
MDC_IDC_SET_BRADY_PAV_DELAY_LOW: 240 MS
MDC_IDC_SET_BRADY_SAV_DELAY_HIGH: 200 MS
MDC_IDC_SET_BRADY_SAV_DELAY_LOW: 240 MS
MDC_IDC_SET_CRT_LVRV_DELAY: 0 MS
MDC_IDC_SET_CRT_PACED_CHAMBERS: NORMAL
MDC_IDC_SET_LEADCHNL_LV_PACING_AMPLITUDE: 2.4 V
MDC_IDC_SET_LEADCHNL_LV_PACING_ANODE_ELECTRODE_1: NORMAL
MDC_IDC_SET_LEADCHNL_LV_PACING_ANODE_LOCATION_1: NORMAL
MDC_IDC_SET_LEADCHNL_LV_PACING_CATHODE_ELECTRODE_1: NORMAL
MDC_IDC_SET_LEADCHNL_LV_PACING_CATHODE_LOCATION_1: NORMAL
MDC_IDC_SET_LEADCHNL_LV_PACING_PULSEWIDTH: 0.8 MS
MDC_IDC_SET_LEADCHNL_LV_SENSING_ADAPTATION_MODE: NORMAL
MDC_IDC_SET_LEADCHNL_LV_SENSING_ANODE_ELECTRODE_1: NORMAL
MDC_IDC_SET_LEADCHNL_LV_SENSING_ANODE_LOCATION_1: NORMAL
MDC_IDC_SET_LEADCHNL_LV_SENSING_CATHODE_ELECTRODE_1: NORMAL
MDC_IDC_SET_LEADCHNL_LV_SENSING_CATHODE_LOCATION_1: NORMAL
MDC_IDC_SET_LEADCHNL_LV_SENSING_SENSITIVITY: 2.5 MV
MDC_IDC_SET_LEADCHNL_RA_PACING_AMPLITUDE: 2.4 V
MDC_IDC_SET_LEADCHNL_RA_PACING_CAPTURE_MODE: NORMAL
MDC_IDC_SET_LEADCHNL_RA_PACING_POLARITY: NORMAL
MDC_IDC_SET_LEADCHNL_RA_PACING_PULSEWIDTH: 1 MS
MDC_IDC_SET_LEADCHNL_RA_SENSING_ADAPTATION_MODE: NORMAL
MDC_IDC_SET_LEADCHNL_RA_SENSING_POLARITY: NORMAL
MDC_IDC_SET_LEADCHNL_RA_SENSING_SENSITIVITY: 0.75 MV
MDC_IDC_SET_LEADCHNL_RV_PACING_AMPLITUDE: 2 V
MDC_IDC_SET_LEADCHNL_RV_PACING_CAPTURE_MODE: NORMAL
MDC_IDC_SET_LEADCHNL_RV_PACING_POLARITY: NORMAL
MDC_IDC_SET_LEADCHNL_RV_PACING_PULSEWIDTH: 0.8 MS
MDC_IDC_SET_LEADCHNL_RV_SENSING_ADAPTATION_MODE: NORMAL
MDC_IDC_SET_LEADCHNL_RV_SENSING_POLARITY: NORMAL
MDC_IDC_SET_LEADCHNL_RV_SENSING_SENSITIVITY: 2.5 MV
MDC_IDC_SET_ZONE_DETECTION_INTERVAL: 375 MS
MDC_IDC_SET_ZONE_TYPE: NORMAL
MDC_IDC_SET_ZONE_VENDOR_TYPE: NORMAL
MDC_IDC_STAT_AT_BURDEN_PERCENT: 0 %
MDC_IDC_STAT_AT_DTM_END: NORMAL
MDC_IDC_STAT_AT_DTM_START: NORMAL
MDC_IDC_STAT_AT_MODE_SW_COUNT: 0
MDC_IDC_STAT_BRADY_DTM_END: NORMAL
MDC_IDC_STAT_BRADY_DTM_START: NORMAL
MDC_IDC_STAT_BRADY_RA_PERCENT_PACED: 23 %
MDC_IDC_STAT_BRADY_RV_PERCENT_PACED: 97 %
MDC_IDC_STAT_CRT_DTM_END: NORMAL
MDC_IDC_STAT_CRT_DTM_START: NORMAL
MDC_IDC_STAT_CRT_LV_PERCENT_PACED: 97 %
MDC_IDC_STAT_EPISODE_RECENT_COUNT: 0
MDC_IDC_STAT_EPISODE_RECENT_COUNT: 1
MDC_IDC_STAT_EPISODE_RECENT_COUNT: 3
MDC_IDC_STAT_EPISODE_RECENT_COUNT_DTM_END: NORMAL
MDC_IDC_STAT_EPISODE_RECENT_COUNT_DTM_START: NORMAL
MDC_IDC_STAT_EPISODE_TYPE: NORMAL
MDC_IDC_STAT_EPISODE_VENDOR_TYPE: NORMAL
POTASSIUM SERPL-SCNC: 4 MMOL/L (ref 3.4–5.3)
SODIUM SERPL-SCNC: 137 MMOL/L (ref 136–145)

## 2023-08-30 PROCEDURE — 93281 PM DEVICE PROGR EVAL MULTI: CPT | Performed by: INTERNAL MEDICINE

## 2023-08-30 PROCEDURE — 36415 COLL VENOUS BLD VENIPUNCTURE: CPT | Performed by: INTERNAL MEDICINE

## 2023-08-30 PROCEDURE — 80048 BASIC METABOLIC PNL TOTAL CA: CPT | Performed by: INTERNAL MEDICINE

## 2023-08-30 PROCEDURE — 99214 OFFICE O/P EST MOD 30 MIN: CPT | Performed by: INTERNAL MEDICINE

## 2023-08-30 RX ORDER — FLUDROCORTISONE ACETATE 0.1 MG/1
0.1 TABLET ORAL DAILY
COMMUNITY
End: 2024-02-29

## 2023-08-30 RX ORDER — DOCUSATE SODIUM 50MG AND SENNOSIDES 8.6MG 8.6; 5 MG/1; MG/1
1 TABLET, FILM COATED ORAL DAILY
COMMUNITY

## 2023-08-30 RX ORDER — ALLOPURINOL 300 MG/1
300 TABLET ORAL DAILY
COMMUNITY

## 2023-08-30 RX ORDER — POLYETHYLENE GLYCOL 1450
POWDER (GRAM) MISCELLANEOUS SEE ADMIN INSTRUCTIONS
COMMUNITY

## 2023-08-30 NOTE — LETTER
8/30/2023    Rafael Montelongo MD  [address] [address]    RE: Eduardo Laughlin       Dear Colleague,     I had the pleasure of seeing Eduardo Laughlin in the [facility].    HEART CARE NOTE          Assessment/Recommendations     1. HFpEF c/b ADHF  Assessment / Plan  Near euvolemia on physical exam; denies HF symptoms of orthopnea, PND, fluid retention/edema - no changes to regimen at this time  GDMT as detailed below; mainstay of treatment for HFpEF includes diuretics and adequate BP control (class I) and SGLT2-I (class 2a); additional medical therapy (ARNI, MRA, ARB) demonstrated less robust evidence for indication but may be considered per guideline recommendations (2b); no indication for BBlockers      Current Pharmacotherapy AHA Guideline-Directed Medical Therapy   Losartan  - on hold given ROBERT ARNI/ARB   Spironolactone  - on hold  MRA   SGLT2 inhibitor -  Not started SGLT2-I    Furosemide 20 mg daily Loop diuretic       2. HTN  Assessment / Plan  Adequately controlled on current regimen - no changes at this time    3. DM2  Assessment / Plan  Management per PMD     4. Atrial fibrillation  Assessment / Plan  Warfarin per coumadin clinic and PMD  Continue metoprolol     5. CKD  Assessment / Plan  Recent labs notable for progression of CKD - will repeat lab work today     6. GINNY  Assessment / Plan  Non-compliant; will consider resuming      35 minutes spent reviewing prior records (including documentation, laboratory studies, cardiac testing/imaging), history and physical exam, planning, and subsequent documentation.  .    History of Present Illness/Subjective    Mr. Eduardo Laughlin is a 82 year old male with a PMHx significant for (per Dr. Phillips's note) recent Non-STEMI, CHF, A.fib, HTN, CKD 4, DVT, bladder cancer who [facility] for [facility]-up care     Today, Mr. Motta endorsed HF symptoms; Management plan as detailed above.      ECG: Personally  "reviewed. atrial fibrillation, RVR, LBBB.     [facility]/23 (personnaly Reviewed 8/30/23):   1. Technically difficult exam.     2. Normal LV size with normal wall thickness. Visually estimated LVEF 45%   (Mildly reduced function). Indeterminate diastolic function. There appears   to   be mid to distal anteroseptal hypokinesis, but images are challenging even   with contrast.     3. Normal RV size with normal systolic function.     4. Mild LA enlargement.     5. Mild mitral valve regurgitation.     6. The prox ascending aorta measures normal at 4.0 cm, mildly dilated.     7. The mid ascending aorta measures 4.17 cm, milldy dilated.     8. A prior study is not available for comparison. However, report from   [facility] 11/2022 showed EF 45% with abnormal septal motion, ? BBB.     Lab results: personally reviewed August 30, 2023; notable for progressive CKD    Medical history and pertinent documents reviewed in Care Everywhere please where applicable see details above        Physical Examination Review of Systems   /70 (BP Location: Left arm, Patient Position: Sitting, Cuff Size: Adult Regular)   Pulse 76   Resp 20   Ht 1.778 m (5' 10\")   Wt 97.9 kg (215 lb 12.8 oz)   BMI 30.96 kg/m    Body mass index is 30.96 kg/m .  Wt Readings from Last 3 Encounters:   08/30/23 97.9 kg (215 lb 12.8 oz)   05/17/23 97.5 kg (215 lb)   03/15/23 102.5 kg (226 lb)     General Appearance:   no distress, normal body habitus   ENT/Mouth: membranes moist, no oral lesions or bleeding gums.      EYES:  no scleral icterus, normal conjunctivae   Neck: no carotid bruits or thyromegaly   Chest/Lungs:   lungs are clear to auscultation, no rales or wheezing, equal chest wall expansion    Cardiovascular:   Regular. Normal first and second heart sounds with no murmurs, rubs, or gallops; the carotid, radial and posterior tibial pulses are intact, no JVD or LE edema bilaterally    Abdomen:  no organomegaly, masses, bruits, " or tenderness; bowel sounds are present   Extremities: no cyanosis or clubbing   Skin: no xanthelasma, warm.    Neurologic: NAD     Psychiatric: alert and oriented x3, calm     A complete 10 systems ROS was reviewed  And is negative except what is listed in the HPI.          Medical History  Surgical History Family History Social History   Past Medical History:   Diagnosis Date    Acute renal failure with acute tubular necrosis superimposed on stage 3 chronic kidney disease (H)     Arteriosclerotic cardiovascular disease (ASCVD)     Atrial fibrillation (H)     Bladder cancer (H)     BPH (benign prostatic hypertrophy)     Chronic kidney disease     Complicated UTI (urinary tract infection) 08/25/2019    Congestive heart failure (H)     Coronary artery disease     Diabetes mellitus (H)     Diabetic neuropathy (H)     E coli bacteremia     GERD (gastroesophageal reflux disease)     Gout     Hyperlipidemia     Hypertension     Hyponatremia     Iliac artery aneurysm, 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    Added automatically from request for surgery [id number]    Kidney stone     Osteoarthritis     Personal history of malignant neoplasm of prostate 04/27/2020    Prostate cancer (H)     Sleep apnea     CPAP    Past Surgical History:   Procedure Laterality Date    CARDIAC CATHETERIZATION  03/28/2008    and coronary angios    CV CORONARY ANGIOGRAM N/A 11/28/2022    Procedure: CV CORONARY ANGIOGRAM;  Surgeon: Gonzales Hager MD;  Location: [facility]    CV LEFT HEART CATH N/A 11/28/2022    Procedure: Left Heart Catheterization;  Surgeon: Gonzales Hager MD;  Location: [facility] CV    CV PCI STENT DRUG ELUTING N/A 11/28/2022    Procedure: Percutaneous Coronary Intervention Stent;  Surgeon: Gonzales Hager MD;  Location: [facility] CV    CYSTECTOMY      CYSTOSCOPY      CYSTOSCOPY N/A 12/23/2015    Procedure: CYSTOSCOPY BLADDER BIOPSIES with Fulgeration and Placement of Otero ;  Surgeon: Gordon Bajwa MD;   Location: [facility] OR;  Service:     ESOPHAGOSCOPY, GASTROSCOPY, DUODENOSCOPY (EGD), COMBINED N/A 1/6/2023    Procedure: ESOPHAGOGASTRODUODENOSCOPY, WITH BIOPSY;  Surgeon: Phan Lobo DO;  Location: [facility]    IR EXTREMITY ANGIOGRAM LEFT  3/22/2019    IR EXTREMITY ANGIOGRAM LEFT  3/22/2019    IR ILEAL CONDUIT INJECTION  12/23/2016    IR NEPHROSTOMY TUBE PLACEMENT RIGHT  8/18/2016    IR URETEROSTOMY TUBE CHANGE RIGHT  9/21/2016    KIDNEY STONE SURGERY  x4    LAMINECTOMY LUMBAR ONE LEVEL Bilateral 9/13/2022    Procedure: LUMBAR 4 - LUMBAR 5 BILATERAL MEDIAL FACETECTOMY AND DECOMPRESSION;  Surgeon: Everett Holland MD;  Location: [facility]    PICC TRIPLE LUMEN PLACEMENT  11/18/2022         PROSTATE SURGERY      no family history of premature coronary artery disease Social History     Socioeconomic History    Marital status:      Spouse name: Not on file    Number of children: Not on file    Years of education: Not on file    Highest education level: Not on file   Occupational History    Not on file   Tobacco Use    Smoking status: Former    Smokeless tobacco: Never    Tobacco comments:     Quit smoking 30 years ago   Vaping Use    Vaping Use: Never used   Substance and Sexual Activity    Alcohol use: Not Currently    Drug use: Never    Sexual activity: Not Currently   Other Topics Concern    Not on file   Social History Narrative    Not on file     Social Determinants of Health     Financial Resource Strain: Not on file   Food Insecurity: Not on file   Transportation Needs: Not on file   Physical Activity: Not on file   Stress: Not on file   Social Connections: Not on file   Intimate Partner Violence: Not on file   Housing Stability: Not on file           Lab Results    Chemistry/lipid CBC Cardiac Enzymes/BNP/TSH/INR   Lab Results   Component Value Date    CHOL 102 04/13/2023    HDL 37 (L) 04/13/2023    TRIG 103 04/13/2023    BUN 50.7 (H) 05/24/2023     05/24/2023     CO2 23 05/24/2023    Lab Results   Component Value Date    WBC 9.9 02/13/2023    HGB 9.7 (L) 02/13/2023    HCT 31.5 (L) 02/13/2023    MCV 86 02/13/2023     02/13/2023    Lab Results   Component Value Date    TROPONINI 0.10 04/25/2020     (H) 04/25/2020    INR 1.58 (H) 02/02/2023     Lab Results   Component Value Date    TROPONINI 0.10 04/25/2020          Weight:    Wt Readings from Last 3 Encounters:   05/17/23 97.5 kg (215 lb)   03/15/23 102.5 kg (226 lb)   02/20/23 103.9 kg (229 lb)       Allergies  Allergies   Allergen Reactions    Metoprolol Tartrate      Other reaction(s): low blood pressure    Pcn [Penicillins] Other (See Comments)     Childhood-doesn't know reactions    Statin Drugs [Statins] Cramps    Uroxatral [Alfuzosin] Other (See Comments)     hypotension         Surgical History  Past Surgical History:   Procedure Laterality Date    CARDIAC CATHETERIZATION  03/28/2008    and coronary angios    CV CORONARY ANGIOGRAM N/A 11/28/2022    Procedure: CV CORONARY ANGIOGRAM;  Surgeon: Gonzales Hager MD;  Location: [facility]    CV LEFT HEART CATH N/A 11/28/2022    Procedure: Left Heart Catheterization;  Surgeon: Gonzales Hager MD;  Location: [facility]    CV PCI STENT DRUG ELUTING N/A 11/28/2022    Procedure: Percutaneous Coronary Intervention Stent;  Surgeon: Gonzales Hager MD;  Location: [facility] CV    CYSTECTOMY      CYSTOSCOPY      CYSTOSCOPY N/A 12/23/2015    Procedure: CYSTOSCOPY BLADDER BIOPSIES with Fulgeration and Placement of Otero ;  Surgeon: Gordon Bajwa MD;  Location: [facility] OR;  Service:     ESOPHAGOSCOPY, GASTROSCOPY, DUODENOSCOPY (EGD), COMBINED N/A 1/6/2023    Procedure: ESOPHAGOGASTRODUODENOSCOPY, WITH BIOPSY;  Surgeon: Phan Lobo DO;  Location: [facility]    IR EXTREMITY ANGIOGRAM LEFT  3/22/2019    IR EXTREMITY ANGIOGRAM LEFT  3/22/2019    IR ILEAL CONDUIT INJECTION  12/23/2016    IR NEPHROSTOMY TUBE PLACEMENT RIGHT   8/18/2016    IR URETEROSTOMY TUBE CHANGE RIGHT  9/21/2016    KIDNEY STONE SURGERY  x4    LAMINECTOMY LUMBAR ONE LEVEL Bilateral 9/13/2022    Procedure: LUMBAR 4 - LUMBAR 5 BILATERAL MEDIAL FACETECTOMY AND DECOMPRESSION;  Surgeon: Everett Holland MD;  Location: [facility] OR    PICC TRIPLE LUMEN PLACEMENT  11/18/2022         PROSTATE SURGERY         Social History  Tobacco:   History   Smoking Status    Former   Smokeless Tobacco    Never    Alcohol:   Social History    Substance and Sexual Activity      Alcohol use: Not Currently   Illicit Drugs:   History   Drug Use Unknown       Family History  Family History   Problem Relation Age of Onset    No Known Problems Mother     Prostate Cancer Father     Deep Vein Thrombosis Father     Cancer Sister     Heart Disease Sister     No Known Problems Daughter     Heart Disease Brother     Heart Disease Sister     No Known Problems Daughter           Abdelrahman Mazariegos MD on 8/30/2023      cc: Rfaael Montelongo      Thank you for allowing me to participate in the care of your patient.      Sincerely,     Abdelrahman Mazariegos MD     [facility]  cc:   CARLOS Dumas CNP  [address], [address]  [place] [address]

## 2023-08-30 NOTE — PROGRESS NOTES
HEART CARE NOTE          Assessment/Recommendations     1. HFpEF c/b ADHF  Assessment / Plan  Near euvolemia on physical exam; denies HF symptoms of orthopnea, PND, fluid retention/edema - no changes to regimen at this time  GDMT as detailed below; mainstay of treatment for HFpEF includes diuretics and adequate BP control (class I) and SGLT2-I (class 2a); additional medical therapy (ARNI, MRA, ARB) demonstrated less robust evidence for indication but may be considered per guideline recommendations (2b); no indication for BBlockers      Current Pharmacotherapy AHA Guideline-Directed Medical Therapy   Losartan  - on hold given ROBERT ARNI/ARB   Spironolactone  - on hold  MRA   SGLT2 inhibitor -  Not started SGLT2-I    Furosemide 20 mg daily Loop diuretic       2. HTN  Assessment / Plan  Adequately controlled on current regimen - no changes at this time    3. DM2  Assessment / Plan  Management per PMD     4. Atrial fibrillation  Assessment / Plan  Warfarin per coumadin clinic and PMD  Continue metoprolol     5. CKD  Assessment / Plan  Recent labs notable for progression of CKD - will repeat lab work today     6. GINNY  Assessment / Plan  Non-compliant; will consider resuming      35 minutes spent reviewing prior records (including documentation, laboratory studies, cardiac testing/imaging), history and physical exam, planning, and subsequent documentation.  .    History of Present Illness/Subjective    Mr. Eduardo Laughlin is a 82 year old male with a PMHx significant for (per Dr. Phillips's note) recent Non-STEMI, CHF, A.fib, HTN, CKD 4, DVT, bladder cancer who [facility] for [facility]-up care     Today, Mr. Motta endorsed HF symptoms; Management plan as detailed above.      ECG: Personally reviewed. atrial fibrillation, RVR, LBBB.     [facility] on [facility]/23 (personnaly Reviewed 8/30/23):   1. Technically difficult exam.     2. Normal LV size with normal wall thickness. Visually estimated  "LVEF 45%   (Mildly reduced function). Indeterminate diastolic function. There appears   to   be mid to distal anteroseptal hypokinesis, but images are challenging even   with contrast.     3. Normal RV size with normal systolic function.     4. Mild LA enlargement.     5. Mild mitral valve regurgitation.     6. The prox ascending aorta measures normal at 4.0 cm, mildly dilated.     7. The mid ascending aorta measures 4.17 cm, milldy dilated.     8. A prior study is not available for comparison. However, report [facility]   [facility] 11/[facility] showed EF 45% with abnormal septal motion, ? BBB.     Lab results: personally reviewed August 30, 2023; notable for progressive CKD    Medical history and pertinent documents reviewed in Care Everywhere please where applicable see details above        Physical Examination Review of Systems   /70 (BP Location: Left arm, Patient Position: Sitting, Cuff Size: Adult Regular)   Pulse 76   Resp 20   Ht 1.778 m (5' 10\")   Wt 97.9 kg (215 lb 12.8 oz)   BMI 30.96 kg/m    Body mass index is 30.96 kg/m .  Wt Readings from Last 3 Encounters:   08/30/23 97.9 kg (215 lb 12.8 oz)   05/17/23 97.5 kg (215 lb)   03/15/23 102.5 kg (226 lb)     General Appearance:   no distress, normal body habitus   ENT/Mouth: membranes moist, no oral lesions or bleeding gums.      EYES:  no scleral icterus, normal conjunctivae   Neck: no carotid bruits or thyromegaly   Chest/Lungs:   lungs are clear to auscultation, no rales or wheezing, equal chest wall expansion    Cardiovascular:   Regular. Normal first and second heart sounds with no murmurs, rubs, or gallops; the carotid, radial and posterior tibial pulses are intact, no JVD or LE edema bilaterally    Abdomen:  no organomegaly, masses, bruits, or tenderness; bowel sounds are present   Extremities: no cyanosis or clubbing   Skin: no xanthelasma, warm.    Neurologic: NAD     Psychiatric: alert and oriented x3, calm     A complete 10 systems ROS was reviewed  And " is negative except what is listed in the HPI.          Medical History  Surgical History Family History Social History   Past Medical History:   Diagnosis Date    Acute renal failure with acute tubular necrosis superimposed on stage 3 chronic kidney disease (H)     Arteriosclerotic cardiovascular disease (ASCVD)     Atrial fibrillation (H)     Bladder cancer (H)     BPH (benign prostatic hypertrophy)     Chronic kidney disease     Complicated UTI (urinary tract infection) 08/25/2019    Congestive heart failure (H)     Coronary artery disease     Diabetes mellitus (H)     Diabetic neuropathy (H)     E coli bacteremia     GERD (gastroesophageal reflux disease)     Gout     Hyperlipidemia     Hypertension     Hyponatremia     Iliac artery aneurysm, 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    Added automatically from request for surgery [id number]    Kidney stone     Osteoarthritis     Personal history of malignant neoplasm of prostate 04/27/2020    Prostate cancer (H)     Sleep apnea     CPAP    Past Surgical History:   Procedure Laterality Date    CARDIAC CATHETERIZATION  03/28/2008    and coronary angios    CV CORONARY ANGIOGRAM N/A 11/28/2022    Procedure: CV CORONARY ANGIOGRAM;  Surgeon: Gonzales Hager MD;  Location: [facility] CV    CV LEFT HEART CATH N/A 11/28/2022    Procedure: Left Heart Catheterization;  Surgeon: Gonzales Hager MD;  Location: [facility]    CV PCI STENT DRUG ELUTING N/A 11/28/2022    Procedure: Percutaneous Coronary Intervention Stent;  Surgeon: Gonzales Hager MD;  Location: [facility]    CYSTECTOMY      CYSTOSCOPY      CYSTOSCOPY N/A 12/23/2015    Procedure: CYSTOSCOPY BLADDER BIOPSIES with Fulgeration and Placement of Otero ;  Surgeon: Gordon Bajwa MD;  Location: [facility];  Service:     ESOPHAGOSCOPY, GASTROSCOPY, DUODENOSCOPY (EGD), COMBINED N/A 1/6/2023    Procedure: ESOPHAGOGASTRODUODENOSCOPY, WITH BIOPSY;  Surgeon: Phan Lobo DO;  Location: [facility]     IR EXTREMITY ANGIOGRAM LEFT  3/22/2019    IR EXTREMITY ANGIOGRAM LEFT  3/22/2019    IR ILEAL CONDUIT INJECTION  12/23/2016    IR NEPHROSTOMY TUBE PLACEMENT RIGHT  8/18/2016    IR URETEROSTOMY TUBE CHANGE RIGHT  9/21/2016    KIDNEY STONE SURGERY  x4    LAMINECTOMY LUMBAR ONE LEVEL Bilateral 9/13/2022    Procedure: LUMBAR 4 - LUMBAR 5 BILATERAL MEDIAL FACETECTOMY AND DECOMPRESSION;  Surgeon: Everett Holland MD;  Location: [facility]    PICC TRIPLE LUMEN PLACEMENT  11/18/2022         PROSTATE SURGERY      no family history of premature coronary artery disease Social History     Socioeconomic History    Marital status:      Spouse name: Not on file    Number of children: Not on file    Years of education: Not on file    Highest education level: Not on file   Occupational History    Not on file   Tobacco Use    Smoking status: Former    Smokeless tobacco: Never    Tobacco comments:     Quit smoking 30 years ago   Vaping Use    Vaping Use: Never used   Substance and Sexual Activity    Alcohol use: Not Currently    Drug use: Never    Sexual activity: Not Currently   Other Topics Concern    Not on file   Social History Narrative    Not on file     Social Determinants of Health     Financial Resource Strain: Not on file   Food Insecurity: Not on file   Transportation Needs: Not on file   Physical Activity: Not on file   Stress: Not on file   Social Connections: Not on file   Intimate Partner Violence: Not on file   Housing Stability: Not on file           Lab Results    Chemistry/lipid CBC Cardiac Enzymes/BNP/TSH/INR   Lab Results   Component Value Date    CHOL 102 04/13/2023    HDL 37 (L) 04/13/2023    TRIG 103 04/13/2023    BUN 50.7 (H) 05/24/2023     05/24/2023    CO2 23 05/24/2023    Lab Results   Component Value Date    WBC 9.9 02/13/2023    HGB 9.7 (L) 02/13/2023    HCT 31.5 (L) 02/13/2023    MCV 86 02/13/2023     02/13/2023    Lab Results   Component Value Date    TROPONINI  0.10 04/25/2020     (H) 04/25/2020    INR 1.58 (H) 02/02/2023     Lab Results   Component Value Date    TROPONINI 0.10 04/25/2020          Weight:    Wt Readings from Last 3 Encounters:   05/17/23 97.5 kg (215 lb)   03/15/23 102.5 kg (226 lb)   02/20/23 103.9 kg (229 lb)       Allergies  Allergies   Allergen Reactions    Metoprolol Tartrate      Other reaction(s): low blood pressure    Pcn [Penicillins] Other (See Comments)     Childhood-doesn't know reactions    Statin Drugs [Statins] Cramps    Uroxatral [Alfuzosin] Other (See Comments)     hypotension         Surgical History  Past Surgical History:   Procedure Laterality Date    CARDIAC CATHETERIZATION  03/28/2008    and coronary angios    CV CORONARY ANGIOGRAM N/A 11/28/2022    Procedure: CV CORONARY ANGIOGRAM;  Surgeon: Gonzales Hager MD;  Location: [facility] CV    CV LEFT HEART CATH N/A 11/28/2022    Procedure: Left Heart Catheterization;  Surgeon: Gonzales Hager MD;  Location: [facility]    CV PCI STENT DRUG ELUTING N/A 11/28/2022    Procedure: Percutaneous Coronary Intervention Stent;  Surgeon: Gonzales Hager MD;  Location: [facility] CV    CYSTECTOMY      CYSTOSCOPY      CYSTOSCOPY N/A 12/23/2015    Procedure: CYSTOSCOPY BLADDER BIOPSIES with Fulgeration and Placement of Otero ;  Surgeon: Gordon Bajwa MD;  [place] [facility]  Service:     ESOPHAGOSCOPY, GASTROSCOPY, DUODENOSCOPY (EGD), COMBINED N/A 1/6/2023    Procedure: ESOPHAGOGASTRODUODENOSCOPY, WITH BIOPSY;  Surgeon: Phan Lobo DO;  Location: [facility]    IR EXTREMITY ANGIOGRAM LEFT  3/22/2019    IR EXTREMITY ANGIOGRAM LEFT  3/22/2019    IR ILEAL CONDUIT INJECTION  12/23/2016    IR NEPHROSTOMY TUBE PLACEMENT RIGHT  8/18/2016    IR URETEROSTOMY TUBE CHANGE RIGHT  9/21/2016    KIDNEY STONE SURGERY  x4    LAMINECTOMY LUMBAR ONE LEVEL Bilateral 9/13/2022    Procedure: LUMBAR 4 - LUMBAR 5 BILATERAL MEDIAL FACETECTOMY AND DECOMPRESSION;   Surgeon: Everett Holland MD;  Location: [facility] OR    PICC TRIPLE LUMEN PLACEMENT  11/18/2022         PROSTATE SURGERY         Social History  Tobacco:   History   Smoking Status    Former   Smokeless Tobacco    Never    Alcohol:   Social History    Substance and Sexual Activity      Alcohol use: Not Currently   Illicit Drugs:   History   Drug Use Unknown       Family History  Family History   Problem Relation Age of Onset    No Known Problems Mother     Prostate Cancer Father     Deep Vein Thrombosis Father     Cancer Sister     Heart Disease Sister     No Known Problems Daughter     Heart Disease Brother     Heart Disease Sister     No Known Problems Daughter           Abdelrahman Mazariegos MD on 8/30/2023      cc: Rafael Montelongo

## 2023-08-31 DIAGNOSIS — I50.33 ACUTE ON CHRONIC HEART FAILURE WITH PRESERVED EJECTION FRACTION (HFPEF) (H): Primary | ICD-10-CM

## 2023-08-31 RX ORDER — FUROSEMIDE 20 MG
10 TABLET ORAL DAILY
Qty: 90 TABLET | Refills: 3 | Status: SHIPPED | OUTPATIENT
Start: 2023-08-31 | End: 2024-02-29

## 2023-09-05 ENCOUNTER — ALLIED HEALTH/NURSE VISIT (OUTPATIENT)
Dept: CARDIOLOGY | Facility: CLINIC | Age: 83
End: 2023-09-05
Payer: COMMERCIAL

## 2023-09-05 DIAGNOSIS — I50.32 CHRONIC HEART FAILURE WITH PRESERVED EJECTION FRACTION (H): Primary | ICD-10-CM

## 2023-09-05 PROCEDURE — 99454 REM MNTR PHYSIOL PARAM 16-30: CPT | Performed by: INTERNAL MEDICINE

## 2023-09-08 ENCOUNTER — LAB (OUTPATIENT)
Dept: LAB | Facility: HOSPITAL | Age: 83
End: 2023-09-08
Payer: COMMERCIAL

## 2023-09-08 DIAGNOSIS — I50.33 ACUTE ON CHRONIC HEART FAILURE WITH PRESERVED EJECTION FRACTION (HFPEF) (H): ICD-10-CM

## 2023-09-08 LAB
ANION GAP SERPL CALCULATED.3IONS-SCNC: 9 MMOL/L (ref 7–15)
BUN SERPL-MCNC: 40 MG/DL (ref 8–23)
CALCIUM SERPL-MCNC: 9.2 MG/DL (ref 8.8–10.2)
CHLORIDE SERPL-SCNC: 105 MMOL/L (ref 98–107)
CREAT SERPL-MCNC: 2.34 MG/DL (ref 0.67–1.17)
DEPRECATED HCO3 PLAS-SCNC: 22 MMOL/L (ref 22–29)
EGFRCR SERPLBLD CKD-EPI 2021: 27 ML/MIN/1.73M2
GLUCOSE SERPL-MCNC: 168 MG/DL (ref 70–99)
POTASSIUM SERPL-SCNC: 4.5 MMOL/L (ref 3.4–5.3)
SODIUM SERPL-SCNC: 136 MMOL/L (ref 136–145)

## 2023-09-08 PROCEDURE — 80048 BASIC METABOLIC PNL TOTAL CA: CPT

## 2023-09-08 PROCEDURE — 36415 COLL VENOUS BLD VENIPUNCTURE: CPT

## 2023-09-14 NOTE — PROGRESS NOTES
[facility]:   [facility] ([email or web address]) Routine Remote Evaluation    HRS Enrollment date: 2/27/23     Dates: 8/4/23 through 9/5/23    This is not the first billing cycle.    Adjustments made in the last month: no    No adjustments made this month.  Discussed with patient/caregiver and they will continue current plan of care.     Last 30 days:        Total Minutes Spent: 0 minutes     Future Appointments   Date Time Provider Department Center   10/6/2023  4:00 AM ETHAN [facility]   12/6/2023 12:00 AM SANYA [facility] REMOTE DEVICE CHECK FROM HOME [facility]  BSN    I have reviewed Luis Armando VALDIVIA RN, BSN's note and agree.    Abdelrahman Mazariegos MD., MHS

## 2023-09-26 ENCOUNTER — PATIENT OUTREACH (OUTPATIENT)
Dept: CARDIOLOGY | Facility: CLINIC | Age: 83
End: 2023-09-26
Payer: MEDICARE

## 2023-09-26 NOTE — PROGRESS NOTES
"[facility]:   HRS ([employer]) Daily Alert     September 26, 2023     Current diuretic dose: Lasix 10mg daily    Recent plan of care changes:   3lb wt gain overnight. Baseline 213-215lbs. Today 217lbs.    \"I just had two big meals for dinner and it went up. I usually have the meals (open arms) but last night happened to be a roast beef mashed potatoes and corn. We haven't done that in a while.\"    No sob, no increased swelling.     Pt had concerns about his BP being elevated. Informed him we sometimes see this when there is more fluid retention which is reflected here with his wt gain. /70 today. Usually systolic 130s over diastolic 60-70s.    Last creatinine 9/8/23 2.34    Time spent in review this day: 10 min.    HRS reviewed and routed to patient's provider, Dr. Mazariegos - any recs?     Future Appointments   Date Time Provider Department Center   10/6/2023  4:00 AM ETHAN [facility] CARDIOMEMS [facility]RSJN [facility]   12/6/2023 12:00 AM SANYA [facility] REMOTE DEVICE CHECK FROM HOME HRCVN [facility] HAMLIN RN  BSN    "

## 2023-09-26 NOTE — TELEPHONE ENCOUNTER
Noted.    Luis Armando PAUL    ----- Message -----  From: Abdelrahman Mazariegos MD  Sent: 9/26/2023   3:36 PM CDT  To: Luis Armando Mendez RN    Thanks Luis Armando. Let's continue to monitor for now.    ~ Awilda

## 2023-10-06 ENCOUNTER — ALLIED HEALTH/NURSE VISIT (OUTPATIENT)
Dept: CARDIOLOGY | Facility: CLINIC | Age: 83
End: 2023-10-06
Payer: COMMERCIAL

## 2023-10-06 DIAGNOSIS — I50.32 CHRONIC HEART FAILURE WITH PRESERVED EJECTION FRACTION (H): Primary | Chronic | ICD-10-CM

## 2023-10-06 PROCEDURE — 99454 REM MNTR PHYSIOL PARAM 16-30: CPT | Performed by: INTERNAL MEDICINE

## 2023-10-17 ENCOUNTER — TRANSFERRED RECORDS (OUTPATIENT)
Dept: HEALTH INFORMATION MANAGEMENT | Facility: CLINIC | Age: 83
End: 2023-10-17

## 2023-10-17 ENCOUNTER — LAB REQUISITION (OUTPATIENT)
Dept: LAB | Facility: CLINIC | Age: 83
End: 2023-10-17

## 2023-10-17 DIAGNOSIS — I13.0 HYPERTENSIVE HEART AND CHRONIC KIDNEY DISEASE WITH HEART FAILURE AND STAGE 1 THROUGH STAGE 4 CHRONIC KIDNEY DISEASE, OR UNSPECIFIED CHRONIC KIDNEY DISEASE (H): ICD-10-CM

## 2023-10-17 DIAGNOSIS — E78.5 HYPERLIPIDEMIA, UNSPECIFIED: ICD-10-CM

## 2023-10-17 PROCEDURE — 80048 BASIC METABOLIC PNL TOTAL CA: CPT | Performed by: FAMILY MEDICINE

## 2023-10-17 PROCEDURE — 80061 LIPID PANEL: CPT | Performed by: FAMILY MEDICINE

## 2023-10-17 PROCEDURE — 83735 ASSAY OF MAGNESIUM: CPT | Performed by: FAMILY MEDICINE

## 2023-10-18 LAB
ANION GAP SERPL CALCULATED.3IONS-SCNC: 10 MMOL/L (ref 7–15)
BUN SERPL-MCNC: 37.2 MG/DL (ref 8–23)
CALCIUM SERPL-MCNC: 9.1 MG/DL (ref 8.8–10.2)
CHLORIDE SERPL-SCNC: 105 MMOL/L (ref 98–107)
CHOLEST SERPL-MCNC: 96 MG/DL
CREAT SERPL-MCNC: 2.22 MG/DL (ref 0.67–1.17)
DEPRECATED HCO3 PLAS-SCNC: 23 MMOL/L (ref 22–29)
EGFRCR SERPLBLD CKD-EPI 2021: 29 ML/MIN/1.73M2
GLUCOSE SERPL-MCNC: 137 MG/DL (ref 70–99)
HDLC SERPL-MCNC: 34 MG/DL
LDLC SERPL CALC-MCNC: 42 MG/DL
MAGNESIUM SERPL-MCNC: 2.3 MG/DL (ref 1.7–2.3)
NONHDLC SERPL-MCNC: 62 MG/DL
POTASSIUM SERPL-SCNC: 4.3 MMOL/L (ref 3.4–5.3)
SODIUM SERPL-SCNC: 138 MMOL/L (ref 135–145)
TRIGL SERPL-MCNC: 99 MG/DL

## 2023-11-06 ENCOUNTER — ALLIED HEALTH/NURSE VISIT (OUTPATIENT)
Dept: CARDIOLOGY | Facility: CLINIC | Age: 83
End: 2023-11-06
Payer: COMMERCIAL

## 2023-11-06 DIAGNOSIS — I50.32 CHRONIC HEART FAILURE WITH PRESERVED EJECTION FRACTION (H): Primary | ICD-10-CM

## 2023-11-06 PROCEDURE — 99454 REM MNTR PHYSIOL PARAM 16-30: CPT | Performed by: INTERNAL MEDICINE

## 2023-11-22 NOTE — PROGRESS NOTES
[facility]:   [facility] ([employer]) Routine Remote Evaluation    HRS Enrollment date: 2/27/2023     Dates: 9/7/2023 through 10/6/2023    This is not the first billing cycle.    Alerts in the last month:   9/26/23  wt alert    No adjustments made this month.  Discussed with patient/caregiver and they will continue current plan of care.      Readings:        Total Minutes Spent: 10 minutes     Yumiko VALDIVIA RN BSN, CHFN, PCCN-K    I have reviewed Yumiko Lomeli RN BSN, CHFN's note and agree.    Abdelrahman Mazariegos MD., MHS      Future Appointments   Date Time Provider Department Center   12/6/2023 12:00 AM [facility] REMOTE DEVICE CHECK FROM HOME [facility]N [facility] DANNY   12/7/2023  4:00 AM [facility]   1/8/2024  4:00 AM [facility]

## 2023-12-06 ENCOUNTER — ANCILLARY PROCEDURE (OUTPATIENT)
Dept: CARDIOLOGY | Facility: CLINIC | Age: 83
End: 2023-12-06
Attending: INTERNAL MEDICINE
Payer: COMMERCIAL

## 2023-12-06 DIAGNOSIS — Z95.0 PACEMAKER: ICD-10-CM

## 2023-12-06 DIAGNOSIS — I49.5 SICK SINUS SYNDROME (H): ICD-10-CM

## 2023-12-06 DIAGNOSIS — I50.32 CHRONIC HEART FAILURE WITH PRESERVED EJECTION FRACTION (H): ICD-10-CM

## 2023-12-07 ENCOUNTER — ALLIED HEALTH/NURSE VISIT (OUTPATIENT)
Dept: CARDIOLOGY | Facility: CLINIC | Age: 83
End: 2023-12-07
Payer: COMMERCIAL

## 2023-12-07 DIAGNOSIS — I50.33 ACUTE ON CHRONIC HEART FAILURE WITH PRESERVED EJECTION FRACTION (HFPEF) (H): Primary | ICD-10-CM

## 2023-12-07 PROCEDURE — 99454 REM MNTR PHYSIOL PARAM 16-30: CPT | Performed by: INTERNAL MEDICINE

## 2023-12-14 LAB
MDC_IDC_EPISODE_DTM: NORMAL
MDC_IDC_EPISODE_ID: NORMAL
MDC_IDC_EPISODE_TYPE: NORMAL
MDC_IDC_LEAD_CONNECTION_STATUS: NORMAL
MDC_IDC_LEAD_IMPLANT_DT: NORMAL
MDC_IDC_LEAD_LOCATION: NORMAL
MDC_IDC_LEAD_LOCATION_DETAIL_1: NORMAL
MDC_IDC_LEAD_MFG: NORMAL
MDC_IDC_LEAD_MODEL: NORMAL
MDC_IDC_LEAD_POLARITY_TYPE: NORMAL
MDC_IDC_LEAD_SERIAL: NORMAL
MDC_IDC_MSMT_BATTERY_DTM: NORMAL
MDC_IDC_MSMT_BATTERY_REMAINING_LONGEVITY: 114 MO
MDC_IDC_MSMT_BATTERY_REMAINING_PERCENTAGE: 100 %
MDC_IDC_MSMT_BATTERY_STATUS: NORMAL
MDC_IDC_MSMT_LEADCHNL_LV_IMPEDANCE_VALUE: 916 OHM
MDC_IDC_MSMT_LEADCHNL_LV_PACING_THRESHOLD_AMPLITUDE: 1.4 V
MDC_IDC_MSMT_LEADCHNL_LV_PACING_THRESHOLD_PULSEWIDTH: 0.8 MS
MDC_IDC_MSMT_LEADCHNL_RA_IMPEDANCE_VALUE: 743 OHM
MDC_IDC_MSMT_LEADCHNL_RV_IMPEDANCE_VALUE: 628 OHM
MDC_IDC_PG_IMPLANT_DTM: NORMAL
MDC_IDC_PG_MFG: NORMAL
MDC_IDC_PG_MODEL: NORMAL
MDC_IDC_PG_SERIAL: NORMAL
MDC_IDC_PG_TYPE: NORMAL
MDC_IDC_SESS_CLINIC_NAME: NORMAL
MDC_IDC_SESS_DTM: NORMAL
MDC_IDC_SESS_TYPE: NORMAL
MDC_IDC_SET_BRADY_AT_MODE_SWITCH_MODE: NORMAL
MDC_IDC_SET_BRADY_AT_MODE_SWITCH_RATE: 170 {BEATS}/MIN
MDC_IDC_SET_BRADY_LOWRATE: 60 {BEATS}/MIN
MDC_IDC_SET_BRADY_MAX_TRACKING_RATE: 130 {BEATS}/MIN
MDC_IDC_SET_BRADY_MODE: NORMAL
MDC_IDC_SET_BRADY_PAV_DELAY_HIGH: 200 MS
MDC_IDC_SET_BRADY_PAV_DELAY_LOW: 240 MS
MDC_IDC_SET_BRADY_SAV_DELAY_HIGH: 200 MS
MDC_IDC_SET_BRADY_SAV_DELAY_LOW: 240 MS
MDC_IDC_SET_CRT_LVRV_DELAY: 0 MS
MDC_IDC_SET_CRT_PACED_CHAMBERS: NORMAL
MDC_IDC_SET_LEADCHNL_LV_PACING_AMPLITUDE: 2.4 V
MDC_IDC_SET_LEADCHNL_LV_PACING_ANODE_ELECTRODE_1: NORMAL
MDC_IDC_SET_LEADCHNL_LV_PACING_ANODE_LOCATION_1: NORMAL
MDC_IDC_SET_LEADCHNL_LV_PACING_CATHODE_ELECTRODE_1: NORMAL
MDC_IDC_SET_LEADCHNL_LV_PACING_CATHODE_LOCATION_1: NORMAL
MDC_IDC_SET_LEADCHNL_LV_PACING_PULSEWIDTH: 0.8 MS
MDC_IDC_SET_LEADCHNL_LV_SENSING_ADAPTATION_MODE: NORMAL
MDC_IDC_SET_LEADCHNL_LV_SENSING_ANODE_ELECTRODE_1: NORMAL
MDC_IDC_SET_LEADCHNL_LV_SENSING_ANODE_LOCATION_1: NORMAL
MDC_IDC_SET_LEADCHNL_LV_SENSING_CATHODE_ELECTRODE_1: NORMAL
MDC_IDC_SET_LEADCHNL_LV_SENSING_CATHODE_LOCATION_1: NORMAL
MDC_IDC_SET_LEADCHNL_LV_SENSING_SENSITIVITY: 2.5 MV
MDC_IDC_SET_LEADCHNL_RA_PACING_AMPLITUDE: 2.4 V
MDC_IDC_SET_LEADCHNL_RA_PACING_CAPTURE_MODE: NORMAL
MDC_IDC_SET_LEADCHNL_RA_PACING_POLARITY: NORMAL
MDC_IDC_SET_LEADCHNL_RA_PACING_PULSEWIDTH: 1 MS
MDC_IDC_SET_LEADCHNL_RA_SENSING_ADAPTATION_MODE: NORMAL
MDC_IDC_SET_LEADCHNL_RA_SENSING_POLARITY: NORMAL
MDC_IDC_SET_LEADCHNL_RA_SENSING_SENSITIVITY: 0.75 MV
MDC_IDC_SET_LEADCHNL_RV_PACING_AMPLITUDE: 2 V
MDC_IDC_SET_LEADCHNL_RV_PACING_CAPTURE_MODE: NORMAL
MDC_IDC_SET_LEADCHNL_RV_PACING_POLARITY: NORMAL
MDC_IDC_SET_LEADCHNL_RV_PACING_PULSEWIDTH: 0.8 MS
MDC_IDC_SET_LEADCHNL_RV_SENSING_ADAPTATION_MODE: NORMAL
MDC_IDC_SET_LEADCHNL_RV_SENSING_POLARITY: NORMAL
MDC_IDC_SET_LEADCHNL_RV_SENSING_SENSITIVITY: 2.5 MV
MDC_IDC_SET_ZONE_DETECTION_INTERVAL: 375 MS
MDC_IDC_SET_ZONE_STATUS: NORMAL
MDC_IDC_SET_ZONE_TYPE: NORMAL
MDC_IDC_SET_ZONE_VENDOR_TYPE: NORMAL
MDC_IDC_STAT_AT_BURDEN_PERCENT: 0 %
MDC_IDC_STAT_AT_DTM_END: NORMAL
MDC_IDC_STAT_AT_DTM_START: NORMAL
MDC_IDC_STAT_BRADY_DTM_END: NORMAL
MDC_IDC_STAT_BRADY_DTM_START: NORMAL
MDC_IDC_STAT_BRADY_RA_PERCENT_PACED: 35 %
MDC_IDC_STAT_BRADY_RV_PERCENT_PACED: 99 %
MDC_IDC_STAT_CRT_DTM_END: NORMAL
MDC_IDC_STAT_CRT_DTM_START: NORMAL
MDC_IDC_STAT_CRT_LV_PERCENT_PACED: 99 %
MDC_IDC_STAT_EPISODE_RECENT_COUNT: 0
MDC_IDC_STAT_EPISODE_RECENT_COUNT_DTM_END: NORMAL
MDC_IDC_STAT_EPISODE_RECENT_COUNT_DTM_START: NORMAL
MDC_IDC_STAT_EPISODE_TYPE: NORMAL
MDC_IDC_STAT_EPISODE_VENDOR_TYPE: NORMAL

## 2023-12-14 PROCEDURE — 93296 REM INTERROG EVL PM/IDS: CPT | Performed by: INTERNAL MEDICINE

## 2023-12-14 PROCEDURE — 93294 REM INTERROG EVL PM/LDLS PM: CPT | Performed by: INTERNAL MEDICINE

## 2023-12-23 NOTE — PROGRESS NOTES
[facility]:   [facility] ([employer]) Routine Remote Evaluation    HRS Enrollment date: 2/27/23     Dates: 10/7/23 through 11/6/23    This is not the first billing cycle.    Alerts in the last month: no    No adjustments made this month.  Discussed with patient/caregiver and they will continue current plan of care.      Readings:        Total Minutes Spent: 0 minutes     Future Appointments   Date Time Provider Department Center   1/8/2024  4:00 AM [facility] CARDIOMEMS [facility]   2/8/2024  4:00 AM [facility] CARDIOMEMS [facility]   2/29/2024  9:00 AM [facility]   2/29/2024 10:30 AM Toma Diaz, APRN CNP [facility]   3/11/2024  4:00 AM [facility] CARDIOMEMS [facility]   3/19/2024 12:00 AM [facility]   4/11/2024  4:00 AM N [facility] CARDIOMEMS [facility]     Luis Armando HAMLIN RN  BSN    I have reviewed Luis Armando VALDIVIA RN, BSN's note and agree.    Abdelrahman Mazariegos MD., MHS

## 2024-01-08 ENCOUNTER — ALLIED HEALTH/NURSE VISIT (OUTPATIENT)
Dept: CARDIOLOGY | Facility: CLINIC | Age: 84
End: 2024-01-08
Payer: COMMERCIAL

## 2024-01-08 DIAGNOSIS — I50.33 ACUTE ON CHRONIC HEART FAILURE WITH PRESERVED EJECTION FRACTION (HFPEF) (H): Primary | ICD-10-CM

## 2024-01-08 PROCEDURE — 99454 REM MNTR PHYSIOL PARAM 16-30: CPT | Performed by: INTERNAL MEDICINE

## 2024-01-19 ENCOUNTER — TELEPHONE (OUTPATIENT)
Dept: CARDIOLOGY | Facility: CLINIC | Age: 84
End: 2024-01-19
Payer: COMMERCIAL

## 2024-01-19 NOTE — TELEPHONE ENCOUNTER
[facility]:   HRS ([employer]) Daily Alert     January 19, 2024    Alert(s): Weight, 213.4lbs    10/17/2023 BMP: Creatinine 2.22, eGFR 29. Per chart review, referral placed in 10/2023 for pt to see nephrology ([facility]), but per pt, did not schedule an appt    Current diuretic dose: furosemide 10mg daily    Recent plan of care changes: Pt reports that he went out to eat yesterday with neighbors and had a big lunch, ate spaghetti. Wt the last two days was 210.2lbs & 210.6lbs, so wt gain was +3lbs in 1 day, but not out of his normal range. No increased SOB, swelling, fatigue. Pt reports following fluid restriction. No missed diuretic doses.     Goal Weight Range: 210-215lbs, rarely under 210lbs per pt. Generally 212-215lbs.     Weight, BP and HR Readings: /68, HR 63, pulse ox 98%    Time spent in review this day: 10 min.    Dr. Mazariegos, do you have recommendations at this time? I will advise pt to call [facility] to schedule an appt as was requested by his PCP.     HEAVENLY Soni RN       Future Appointments   Date Time Provider Department Center   2/8/2024  4:00 AM ETHAN [facility]   2/29/2024  9:00 AM SANYA [facility] DEVICE NURSE 1 RAMONA LONG   2/29/2024 10:30 AM [facility]oma Diaz[facility]N [facility]   3/11/2024  4:00 AM ETHAN [facility]   3/19/2024 12:00 AM SANYA [facility] REMOTE DEVICE CHECK FROM HOME RAMONA LONG   4/11/2024  4:00 AM ETHAN [facility]

## 2024-01-19 NOTE — TELEPHONE ENCOUNTER
Noted. Reminded pt to monitor sodium and fluid intake & to schedule appt w/ [facility] which was ordered by his PCP last year 10/2023. Pt verbalized understanding.    HEAVENLY Soni RN     --------------------------------------    Abdelrahman Mazariegos MD Strom, Tayva M RN  Caller: Unspecified (Today,  8:39 AM)  Thanks, Karlie. Let's continue to monitor for now.    ~ Awilda

## 2024-02-07 ENCOUNTER — TELEPHONE (OUTPATIENT)
Dept: CARDIOLOGY | Facility: CLINIC | Age: 84
End: 2024-02-07
Payer: COMMERCIAL

## 2024-02-07 NOTE — TELEPHONE ENCOUNTER
RAJ Chua-    Pt's wt up 2.6lbs today (209.2--> 211.8lbs). He denies worsening HF symptoms of SOB, swelling. Little tired today. He and his wife went out to eat last night and he had chicken, shrimp, garlic mashed potatoes. Reminded him of fluid and sodium restrictions. He verbalized understanding.     Wt has come down over the last month per HRS, range 207.2- 215lbs. Usually around 209-210lbs last couple weeks.     On furosemide 10mg daily, last creatinine was 2.22, eGFR 29 on 10/17/23.     Has appt w/ you for 2/29/24, I reminded him of this. Will continue to monitor HRS.    Time spent: 5 min    HEAVENLY Soni RN

## 2024-02-08 ENCOUNTER — ALLIED HEALTH/NURSE VISIT (OUTPATIENT)
Dept: CARDIOLOGY | Facility: CLINIC | Age: 84
End: 2024-02-08
Payer: COMMERCIAL

## 2024-02-08 DIAGNOSIS — I50.33 ACUTE ON CHRONIC HEART FAILURE WITH PRESERVED EJECTION FRACTION (HFPEF) (H): Primary | ICD-10-CM

## 2024-02-08 PROCEDURE — 99454 REM MNTR PHYSIOL PARAM 16-30: CPT | Performed by: INTERNAL MEDICINE

## 2024-02-12 NOTE — PROGRESS NOTES
[facility]:   [facility] ([employer]) Routine Remote Evaluation    HRS Enrollment date: 2/27/23     Dates: 11/7/23 through 12/7/23    This is not the first billing cycle.    Alerts in the last month: none    No adjustments made this month.  Discussed with patient/caregiver and they will continue current plan of care.        Readings:     Total Minutes Spent: 00 minutes   Yumiko VALDIVIA RN BSN, CHFN, PCCN-K    I have reviewed Yumiko Lomeli RN BSN, CHFN's note and agree.    Abdelrahman Mazariegos MD., MHS    Future Appointments   Date Time Provider Department Center   2/29/2024  9:00 AM SANYA [facility] DEVICE NURSE 1 [facility]   2/29/2024 10:30 AM [facility]   3/11/2024  4:00 AM [facility]AN [facility]   3/19/2024 12:00 AM SANYA [facility] REMOTE DEVICE CHECK FROM HOME RAMONA LARISA DANNY   4/11/2024  4:00 AM ETHAN [facility] CARDIOMEMS [facility]

## 2024-02-21 ENCOUNTER — TRANSFERRED RECORDS (OUTPATIENT)
Dept: HEALTH INFORMATION MANAGEMENT | Facility: CLINIC | Age: 84
End: 2024-02-21
Payer: COMMERCIAL

## 2024-02-29 ENCOUNTER — ANCILLARY PROCEDURE (OUTPATIENT)
Dept: CARDIOLOGY | Facility: CLINIC | Age: 84
End: 2024-02-29
Attending: INTERNAL MEDICINE
Payer: COMMERCIAL

## 2024-02-29 ENCOUNTER — OFFICE VISIT (OUTPATIENT)
Dept: CARDIOLOGY | Facility: CLINIC | Age: 84
End: 2024-02-29
Attending: NURSE PRACTITIONER
Payer: COMMERCIAL

## 2024-02-29 VITALS
WEIGHT: 215 LBS | RESPIRATION RATE: 24 BRPM | BODY MASS INDEX: 30.85 KG/M2 | OXYGEN SATURATION: 98 % | HEART RATE: 64 BPM | DIASTOLIC BLOOD PRESSURE: 66 MMHG | SYSTOLIC BLOOD PRESSURE: 110 MMHG

## 2024-02-29 DIAGNOSIS — I48.0 PAROXYSMAL ATRIAL FIBRILLATION (H): Chronic | ICD-10-CM

## 2024-02-29 DIAGNOSIS — Z95.0 PACEMAKER: Primary | ICD-10-CM

## 2024-02-29 DIAGNOSIS — I50.32 CHRONIC HEART FAILURE WITH PRESERVED EJECTION FRACTION (H): ICD-10-CM

## 2024-02-29 DIAGNOSIS — N18.4 CHRONIC KIDNEY DISEASE, STAGE IV (SEVERE) (H): ICD-10-CM

## 2024-02-29 DIAGNOSIS — I50.32 CHRONIC HEART FAILURE WITH PRESERVED EJECTION FRACTION (H): Primary | Chronic | ICD-10-CM

## 2024-02-29 DIAGNOSIS — I10 PRIMARY HYPERTENSION: Chronic | ICD-10-CM

## 2024-02-29 DIAGNOSIS — I49.5 SICK SINUS SYNDROME (H): ICD-10-CM

## 2024-02-29 LAB
MDC_IDC_EPISODE_DTM: NORMAL
MDC_IDC_EPISODE_ID: NORMAL
MDC_IDC_EPISODE_TYPE: NORMAL
MDC_IDC_LEAD_CONNECTION_STATUS: NORMAL
MDC_IDC_LEAD_IMPLANT_DT: NORMAL
MDC_IDC_LEAD_LOCATION: NORMAL
MDC_IDC_LEAD_LOCATION_DETAIL_1: NORMAL
MDC_IDC_LEAD_MFG: NORMAL
MDC_IDC_LEAD_MODEL: NORMAL
MDC_IDC_LEAD_POLARITY_TYPE: NORMAL
MDC_IDC_LEAD_SERIAL: NORMAL
MDC_IDC_MSMT_BATTERY_DTM: NORMAL
MDC_IDC_MSMT_BATTERY_REMAINING_LONGEVITY: 120 MO
MDC_IDC_MSMT_BATTERY_REMAINING_PERCENTAGE: 100 %
MDC_IDC_MSMT_BATTERY_STATUS: NORMAL
MDC_IDC_MSMT_LEADCHNL_LV_IMPEDANCE_VALUE: 903 OHM
MDC_IDC_MSMT_LEADCHNL_LV_IMPEDANCE_VALUE: 903 OHM
MDC_IDC_MSMT_LEADCHNL_LV_PACING_THRESHOLD_AMPLITUDE: 1.5 V
MDC_IDC_MSMT_LEADCHNL_LV_PACING_THRESHOLD_AMPLITUDE: 1.5 V
MDC_IDC_MSMT_LEADCHNL_LV_PACING_THRESHOLD_PULSEWIDTH: 0.8 MS
MDC_IDC_MSMT_LEADCHNL_LV_PACING_THRESHOLD_PULSEWIDTH: 0.8 MS
MDC_IDC_MSMT_LEADCHNL_RA_IMPEDANCE_VALUE: 779 OHM
MDC_IDC_MSMT_LEADCHNL_RA_IMPEDANCE_VALUE: 779 OHM
MDC_IDC_MSMT_LEADCHNL_RA_PACING_THRESHOLD_AMPLITUDE: 1.2 V
MDC_IDC_MSMT_LEADCHNL_RA_PACING_THRESHOLD_AMPLITUDE: 1.2 V
MDC_IDC_MSMT_LEADCHNL_RA_PACING_THRESHOLD_PULSEWIDTH: 0.4 MS
MDC_IDC_MSMT_LEADCHNL_RA_PACING_THRESHOLD_PULSEWIDTH: 0.4 MS
MDC_IDC_MSMT_LEADCHNL_RA_SENSING_INTR_AMPL: 4.2 MV
MDC_IDC_MSMT_LEADCHNL_RV_IMPEDANCE_VALUE: 668 OHM
MDC_IDC_MSMT_LEADCHNL_RV_IMPEDANCE_VALUE: 668 OHM
MDC_IDC_MSMT_LEADCHNL_RV_PACING_THRESHOLD_AMPLITUDE: 0.7 V
MDC_IDC_MSMT_LEADCHNL_RV_PACING_THRESHOLD_PULSEWIDTH: 0.4 MS
MDC_IDC_PG_IMPLANT_DTM: NORMAL
MDC_IDC_PG_MFG: NORMAL
MDC_IDC_PG_MODEL: NORMAL
MDC_IDC_PG_SERIAL: NORMAL
MDC_IDC_PG_TYPE: NORMAL
MDC_IDC_SESS_CLINIC_NAME: NORMAL
MDC_IDC_SESS_DTM: NORMAL
MDC_IDC_SESS_TYPE: NORMAL
MDC_IDC_SET_BRADY_AT_MODE_SWITCH_MODE: NORMAL
MDC_IDC_SET_BRADY_AT_MODE_SWITCH_RATE: 170 {BEATS}/MIN
MDC_IDC_SET_BRADY_LOWRATE: 60 {BEATS}/MIN
MDC_IDC_SET_BRADY_MAX_TRACKING_RATE: 130 {BEATS}/MIN
MDC_IDC_SET_BRADY_MODE: NORMAL
MDC_IDC_SET_BRADY_PAV_DELAY_HIGH: 200 MS
MDC_IDC_SET_BRADY_PAV_DELAY_LOW: 240 MS
MDC_IDC_SET_BRADY_SAV_DELAY_HIGH: 200 MS
MDC_IDC_SET_BRADY_SAV_DELAY_LOW: 240 MS
MDC_IDC_SET_CRT_LVRV_DELAY: 0 MS
MDC_IDC_SET_CRT_PACED_CHAMBERS: NORMAL
MDC_IDC_SET_LEADCHNL_LV_PACING_AMPLITUDE: 2.4 V
MDC_IDC_SET_LEADCHNL_LV_PACING_ANODE_ELECTRODE_1: NORMAL
MDC_IDC_SET_LEADCHNL_LV_PACING_ANODE_LOCATION_1: NORMAL
MDC_IDC_SET_LEADCHNL_LV_PACING_CATHODE_ELECTRODE_1: NORMAL
MDC_IDC_SET_LEADCHNL_LV_PACING_CATHODE_LOCATION_1: NORMAL
MDC_IDC_SET_LEADCHNL_LV_PACING_PULSEWIDTH: 0.8 MS
MDC_IDC_SET_LEADCHNL_LV_SENSING_ADAPTATION_MODE: NORMAL
MDC_IDC_SET_LEADCHNL_LV_SENSING_ANODE_ELECTRODE_1: NORMAL
MDC_IDC_SET_LEADCHNL_LV_SENSING_ANODE_LOCATION_1: NORMAL
MDC_IDC_SET_LEADCHNL_LV_SENSING_CATHODE_ELECTRODE_1: NORMAL
MDC_IDC_SET_LEADCHNL_LV_SENSING_CATHODE_LOCATION_1: NORMAL
MDC_IDC_SET_LEADCHNL_LV_SENSING_SENSITIVITY: 2.5 MV
MDC_IDC_SET_LEADCHNL_RA_PACING_AMPLITUDE: 2.4 V
MDC_IDC_SET_LEADCHNL_RA_PACING_CAPTURE_MODE: NORMAL
MDC_IDC_SET_LEADCHNL_RA_PACING_POLARITY: NORMAL
MDC_IDC_SET_LEADCHNL_RA_PACING_PULSEWIDTH: 0.4 MS
MDC_IDC_SET_LEADCHNL_RA_SENSING_ADAPTATION_MODE: NORMAL
MDC_IDC_SET_LEADCHNL_RA_SENSING_POLARITY: NORMAL
MDC_IDC_SET_LEADCHNL_RA_SENSING_SENSITIVITY: 0.75 MV
MDC_IDC_SET_LEADCHNL_RV_PACING_AMPLITUDE: 2 V
MDC_IDC_SET_LEADCHNL_RV_PACING_CAPTURE_MODE: NORMAL
MDC_IDC_SET_LEADCHNL_RV_PACING_POLARITY: NORMAL
MDC_IDC_SET_LEADCHNL_RV_PACING_PULSEWIDTH: 0.8 MS
MDC_IDC_SET_LEADCHNL_RV_SENSING_ADAPTATION_MODE: NORMAL
MDC_IDC_SET_LEADCHNL_RV_SENSING_POLARITY: NORMAL
MDC_IDC_SET_LEADCHNL_RV_SENSING_SENSITIVITY: 2.5 MV
MDC_IDC_SET_ZONE_DETECTION_INTERVAL: 375 MS
MDC_IDC_SET_ZONE_STATUS: NORMAL
MDC_IDC_SET_ZONE_TYPE: NORMAL
MDC_IDC_SET_ZONE_VENDOR_TYPE: NORMAL
MDC_IDC_STAT_BRADY_DTM_END: NORMAL
MDC_IDC_STAT_BRADY_DTM_START: NORMAL
MDC_IDC_STAT_BRADY_RA_PERCENT_PACED: 0 %
MDC_IDC_STAT_BRADY_RV_PERCENT_PACED: 100 %
MDC_IDC_STAT_CRT_DTM_END: NORMAL
MDC_IDC_STAT_CRT_DTM_START: NORMAL
MDC_IDC_STAT_CRT_LV_PERCENT_PACED: 100 %
MDC_IDC_STAT_EPISODE_RECENT_COUNT: 0
MDC_IDC_STAT_EPISODE_RECENT_COUNT_DTM_END: NORMAL
MDC_IDC_STAT_EPISODE_RECENT_COUNT_DTM_START: NORMAL
MDC_IDC_STAT_EPISODE_TYPE: NORMAL
MDC_IDC_STAT_EPISODE_VENDOR_TYPE: NORMAL
MDC_IDC_STAT_EPISODE_VENDOR_TYPE: NORMAL

## 2024-02-29 PROCEDURE — 99214 OFFICE O/P EST MOD 30 MIN: CPT | Performed by: NURSE PRACTITIONER

## 2024-02-29 PROCEDURE — 93281 PM DEVICE PROGR EVAL MULTI: CPT | Performed by: INTERNAL MEDICINE

## 2024-02-29 RX ORDER — TEMAZEPAM 15 MG/1
1 CAPSULE ORAL AT BEDTIME
COMMUNITY
Start: 2024-01-06

## 2024-02-29 RX ORDER — POTASSIUM CHLORIDE 1500 MG/1
1 TABLET, EXTENDED RELEASE ORAL DAILY
COMMUNITY
Start: 2024-01-26

## 2024-02-29 RX ORDER — LISINOPRIL 5 MG/1
1 TABLET ORAL DAILY
COMMUNITY
Start: 2023-11-28

## 2024-02-29 RX ORDER — METOPROLOL SUCCINATE 25 MG/1
12.5 TABLET, EXTENDED RELEASE ORAL 2 TIMES DAILY
Qty: 90 TABLET | Refills: 3 | Status: SHIPPED | OUTPATIENT
Start: 2024-02-29

## 2024-02-29 NOTE — PROGRESS NOTES
Assessment/Recommendations   Assessment:    1. Heart failure with preserved ejection fraction, NYHA class II: Compensated.  He states he is feeling well.  He is currently not taking Lasix 10 mg daily anymore.  He states his primary discontinued this but is unsure of when.  His weight has been stable on HRS and he tries to follow a low-sodium diet.  2.  Paroxysmal atrial fibrillation: Device check today shows no atrial fibrillation noted since August 2023.  He continues warfarin for anticoagulation and Toprol for rate control.  Scheduled for remote device check June 5  3.  Hypertension: Blood pressure well-controlled today at 110/66.  He states his primary recently started him on lisinopril approximately 2 months ago  4.  CKD stage IV: He states his primary will check labs at his next visit in March.    Plan:  1.  Continue current medications  2.  Recommend BMP with his primary at his next visit  3.  Continue monitoring daily weights and blood pressure through HRS  4.  Continue low-sodium diet    Eduardo Laughlin will follow up with Dr. Mazariegos in 6 months     History of Present Illness/Subjective    Mr. Eduardo Laughlin is a 83 year old male seen at [facility] heart failure clinic today for continued follow-up.  He follows up for heart failure with preserved ejection fraction.  His most recent echocardiogram was done December 2022 which showed ejection fraction of 55 to 60%. He has a past medical history significant for hypertension, chronic kidney disease stage IV, paroxysmal atrial fibrillation, diabetes, hyperlipidemia, GINNY not on CPAP, DVT in 2020, anemia, frequent falls, status post CRT-P.     Today, he states he is feeling well.  He has mild dyspnea on exertion.  He denies lightheadedness, shortness of breath, orthopnea, PND, palpitations, chest pain, abdominal fullness/bloating, and lower extremity edema.      He is monitoring home weights which are stable between 208-211 pounds.  He is  following a low sodium diet.      Physical Examination Review of Systems   /66 (BP Location: Right arm, Patient Position: Sitting, Cuff Size: Adult Large)   Pulse 64   Resp 24   Wt 97.5 kg (215 lb)   SpO2 98%   BMI 30.85 kg/m    Body mass index is 30.85 kg/m .  Wt Readings from Last 3 Encounters:   02/29/24 97.5 kg (215 lb)   08/30/23 97.9 kg (215 lb 12.8 oz)   05/17/23 97.5 kg (215 lb)       General Appearance:   no acute distress   ENT/Mouth: No abnormalities   EYES:  no scleral icterus, normal conjunctivae   Neck: no thyromegaly   Chest/Lungs:   lungs are clear to auscultation, no rales or wheezing, equal chest wall expansion    Cardiovascular:   Regular. Normal first and second heart sounds with no murmurs, rubs, or gallops, no edema bilaterally    Abdomen:  bowel sounds are present   Extremities: no cyanosis or clubbing   Skin: warm   Neurologic: no tremors     Psychiatric: alert and oriented x3                                              Medical History  Surgical History Family History Social History   Past Medical History:   Diagnosis Date    Acute renal failure with acute tubular necrosis superimposed on stage 3 chronic kidney disease (H)     Arteriosclerotic cardiovascular disease (ASCVD)     Atrial fibrillation (H)     Bladder cancer (H)     BPH (benign prostatic hypertrophy)     Chronic kidney disease     Chronic kidney disease, stage IV (severe) (H) 02/29/2024    Complicated UTI (urinary tract infection) 08/25/2019    Congestive heart failure (H)     Coronary artery disease     Diabetes mellitus (H)     Diabetic neuropathy (H)     E coli bacteremia     GERD (gastroesophageal reflux disease)     Gout     Hyperlipidemia     Hypertension     Hyponatremia     Iliac artery aneurysm, 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    Added automatically from request for surgery [id number]    Kidney stone     Osteoarthritis     Personal history of malignant neoplasm of prostate 04/27/2020    Prostate cancer (H)     Sleep apnea      CPAP    Past Surgical History:   Procedure Laterality Date    CARDIAC CATHETERIZATION  03/28/2008    and coronary angios    CV CORONARY ANGIOGRAM N/A 11/28/2022    Procedure: CV CORONARY ANGIOGRAM;  Surgeon: Gonzales Hager MD;  Location: [facility] CV    CV LEFT HEART CATH N/A 11/28/2022    Procedure: Left Heart Catheterization;  Surgeon: Gonzales Hager MD;  Location: [facility] CV    CV PCI STENT DRUG ELUTING N/A 11/28/2022    Procedure: Percutaneous Coronary Intervention Stent;  Surgeon: Gonzales Hager MD;  Location: [facility] CV    CYSTECTOMY      CYSTOSCOPY      CYSTOSCOPY N/A 12/23/2015    Procedure: CYSTOSCOPY BLADDER BIOPSIES with Fulgeration and Placement of Otero ;  Surgeon: Gordon Bajwa MD;  Location: [facility];  Service:     ESOPHAGOSCOPY, GASTROSCOPY, DUODENOSCOPY (EGD), COMBINED N/A 01/06/2023    Procedure: ESOPHAGOGASTRODUODENOSCOPY, WITH BIOPSY;  Surgeon: Phan Lobo DO;  Location: [facility]    IMPLANT PACEMAKER      IR EXTREMITY ANGIOGRAM LEFT  03/22/2019    IR EXTREMITY ANGIOGRAM LEFT  03/22/2019    IR ILEAL CONDUIT INJECTION  12/23/2016    IR NEPHROSTOMY TUBE PLACEMENT RIGHT  08/18/2016    IR URETEROSTOMY TUBE CHANGE RIGHT  09/21/2016    KIDNEY STONE SURGERY  x4    LAMINECTOMY LUMBAR ONE LEVEL Bilateral 09/13/2022    Procedure: LUMBAR 4 - LUMBAR 5 BILATERAL MEDIAL FACETECTOMY AND DECOMPRESSION;  Surgeon: Everett Holland MD;  Location: [facility]    PICC TRIPLE LUMEN PLACEMENT  11/18/2022         PROSTATE SURGERY      Family History   Problem Relation Age of Onset    No Known Problems Mother     Prostate Cancer Father     Deep Vein Thrombosis Father     Cancer Sister     Heart Disease Sister     No Known Problems Daughter     Heart Disease Brother     Heart Disease Sister     No Known Problems Daughter     Social History     Socioeconomic History    Marital status:      Spouse name: Not on file    Number of  children: Not on file    Years of education: Not on file    Highest education level: Not on file   Occupational History    Not on file   Tobacco Use    Smoking status: Former    Smokeless tobacco: Never    Tobacco comments:     Quit smoking 30 years ago   Vaping Use    Vaping Use: Never used   Substance and Sexual Activity    Alcohol use: Not Currently    Drug use: Never    Sexual activity: Not Currently   Other Topics Concern    Not on file   Social History Narrative    Not on file     Social Determinants of Health     Financial Resource Strain: Not on file   Food Insecurity: Not on file   Transportation Needs: Not on file   Physical Activity: Not on file   Stress: Not on file   Social Connections: Not on file   Interpersonal Safety: Not on file   Housing Stability: Not on file          Medications  Allergies   Current Outpatient Medications   Medication Sig Dispense Refill    ACETAMINOPHEN EXTRA STRENGTH 500 MG tablet as needed      allopurinol (ZYLOPRIM) 300 MG tablet Take 300 mg by mouth daily      atorvastatin (LIPITOR) 40 MG tablet Take 1 tablet (40 mg) by mouth daily 90 tablet 3    clopidogrel (PLAVIX) 75 MG tablet Take 1 tablet (75 mg) by mouth daily 30 tablet 1    ezetimibe (ZETIA) 10 MG tablet Take 1 tablet (10 mg) by mouth daily 30 tablet 1    Ferrous Sulfate (IRON) 28 MG TABS Take 1 tablet by mouth three times a week      finasteride (PROSCAR) 5 MG tablet Take 5 mg by mouth daily      glimepiride (AMARYL) 1 MG tablet Take 1 tablet (1 mg) by mouth daily 30 tablet 0    glucosamine-chondroitinoitin 3893-9695 MG/30ML LIQD Take by mouth 2 times daily      KLOR-CON M20 20 MEQ CR tablet Take 1 tablet by mouth daily      lisinopril (ZESTRIL) 5 MG tablet Take 1 tablet by mouth daily      metoprolol succinate ER (TOPROL XL) 25 MG 24 hr tablet Take 0.5 tablets (12.5 mg) by mouth 2 times daily Twice daily if BP <100 90 tablet 3    multivitamin, therapeutic (THERA-VIT) TABS tablet Take 1 tablet by mouth daily 30  tablet 3    nitroGLYcerin (NITROSTAT) 0.4 MG sublingual tablet For chest pain place 1 tablet under the tongue every 5 minutes for 3 doses. If symptoms persist 5 minutes after 1st dose call 911. 30 tablet 1    nystatin (MYCOSTATIN) 281083 UNIT/GM external powder 2 times daily      OMEGA-3 FISH OIL 1000 MG capsule Take 1 capsule by mouth daily      pantoprazole (PROTONIX) 40 MG EC tablet Take 1 tablet (40 mg) by mouth 2 times daily (before meals) 60 tablet 0    Polyethylene Glycol POWD See Admin Instructions      SENEXON-S 8.6-50 MG tablet Take 1 tablet by mouth daily      temazepam (RESTORIL) 15 MG capsule Take 1 capsule by mouth nightly as needed      traZODone (DESYREL) 100 MG tablet Take 1 tablet (100 mg) by mouth At Bedtime (Patient taking differently: Take 200 mg by mouth at bedtime) 30 tablet 0    warfarin ANTICOAGULANT (COUMADIN) 5 MG tablet Take 5 mg by mouth daily 1 tablet 0    Allergies   Allergen Reactions    Metoprolol Tartrate      Other reaction(s): low blood pressure    Pcn [Penicillins] Other (See Comments)     Childhood-doesn't know reactions    Statin Drugs [Statins] Cramps    Uroxatral [Alfuzosin] Other (See Comments)     hypotension         Lab Results    Chemistry/lipid CBC Cardiac Enzymes/BNP/TSH/INR   Lab Results   Component Value Date    CHOL 96 10/17/2023    HDL 34 (L) 10/17/2023    TRIG 99 10/17/2023    BUN 37.2 (H) 10/17/2023     10/17/2023    CO2 23 10/17/2023    Lab Results   Component Value Date    WBC 9.9 02/13/2023    HGB 9.7 (L) 02/13/2023    HCT 31.5 (L) 02/13/2023    MCV 86 02/13/2023     02/13/2023    Lab Results   Component Value Date    TROPONINI 0.10 04/25/2020     (H) 04/25/2020    INR 1.58 (H) 02/02/2023             This note has been dictated using voice recognition software. Any grammatical, typographical, or context distortions are unintentional and inherent to the software    30 minutes spent on the date of encounter doing chart review, review of  outside records, review of test results, interpretation with above tests, patient visit, and documentation.

## 2024-02-29 NOTE — PATIENT INSTRUCTIONS
Eduardo Laughlin,    It was a pleasure to see you today at [facility] HEART [facility].     My recommendations after this visit include:  - Please follow up with Dr Mazariegos in 6 months   - Continue current medications    Toma Diaz, CNP

## 2024-02-29 NOTE — LETTER
2/29/2024    Rafael Montelongo MD  [address] [address]    RE: Eduardo Laughlin       Dear Colleague,     I had the pleasure of seeing Eduardo Laughlin in the [facility].        Assessment/Recommendations   Assessment:    1. Heart failure with preserved ejection fraction, NYHA class II: Compensated.  He states he is feeling well.  He is currently not taking Lasix 10 mg daily anymore.  He states his primary discontinued this but is unsure of when.  His weight has been stable on HRS and he tries to follow a low-sodium diet.  2.  Paroxysmal atrial fibrillation: Device check today shows no atrial fibrillation noted since August 2023.  He continues warfarin for anticoagulation and Toprol for rate control.  Scheduled for remote device check June 5  3.  Hypertension: Blood pressure well-controlled today at 110/66.  He states his primary recently started him on lisinopril approximately 2 months ago  4.  CKD stage IV: He states his primary will check labs at his next visit in March.    Plan:  1.  Continue current medications  2.  Recommend BMP with his primary at his next visit  3.  Continue monitoring daily weights and blood pressure through HRS  4.  Continue low-sodium diet    Eduardo Laughlin will follow up with Dr. Mazariegos in 6 months     History of Present Illness/Subjective    Mr. Eduardo Laughlin is a 83 year old male seen at [facility] heart failure clinic today for continued follow-up.  He follows up for heart failure with preserved ejection fraction.  His most recent echocardiogram was done December 2022 which showed ejection fraction of 55 to 60%. He has a past medical history significant for hypertension, chronic kidney disease stage IV, paroxysmal atrial fibrillation, diabetes, hyperlipidemia, GINNY not on CPAP, DVT in 2020, anemia, frequent falls, status post CRT-P.     Today, he states he is feeling well.  He has mild dyspnea on exertion.  He denies  lightheadedness, shortness of breath, orthopnea, PND, palpitations, chest pain, abdominal fullness/bloating, and lower extremity edema.      He is monitoring home weights which are stable between 208-211 pounds.  He is following a low sodium diet.      Physical Examination Review of Systems   /66 (BP Location: Right arm, Patient Position: Sitting, Cuff Size: Adult Large)   Pulse 64   Resp 24   Wt 97.5 kg (215 lb)   SpO2 98%   BMI 30.85 kg/m    Body mass index is 30.85 kg/m .  Wt Readings from Last 3 Encounters:   02/29/24 97.5 kg (215 lb)   08/30/23 97.9 kg (215 lb 12.8 oz)   05/17/23 97.5 kg (215 lb)       General Appearance:   no acute distress   ENT/Mouth: No abnormalities   EYES:  no scleral icterus, normal conjunctivae   Neck: no thyromegaly   Chest/Lungs:   lungs are clear to auscultation, no rales or wheezing, equal chest wall expansion    Cardiovascular:   Regular. Normal first and second heart sounds with no murmurs, rubs, or gallops, no edema bilaterally    Abdomen:  bowel sounds are present   Extremities: no cyanosis or clubbing   Skin: warm   Neurologic: no tremors     Psychiatric: alert and oriented x3                                              Medical History  Surgical History Family History Social History   Past Medical History:   Diagnosis Date    Acute renal failure with acute tubular necrosis superimposed on stage 3 chronic kidney disease (H)     Arteriosclerotic cardiovascular disease (ASCVD)     Atrial fibrillation (H)     Bladder cancer (H)     BPH (benign prostatic hypertrophy)     Chronic kidney disease     Chronic kidney disease, stage IV (severe) (H) 02/29/2024    Complicated UTI (urinary tract infection) 08/25/2019    Congestive heart failure (H)     Coronary artery disease     Diabetes mellitus (H)     Diabetic neuropathy (H)     E coli bacteremia     GERD (gastroesophageal reflux disease)     Gout     Hyperlipidemia     Hypertension     Hyponatremia     Iliac artery aneurysm,  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    Added automatically from request for surgery [id number]    Kidney stone     Osteoarthritis     Personal history of malignant neoplasm of prostate 04/27/2020    Prostate cancer (H)     Sleep apnea     CPAP    Past Surgical History:   Procedure Laterality Date    CARDIAC CATHETERIZATION  03/28/2008    and coronary angios    CV CORONARY ANGIOGRAM N/A 11/28/2022    Procedure: CV CORONARY ANGIOGRAM;  Surgeon: Gonzales Hager MD;  Location: [facility] CV    CV LEFT HEART CATH N/A 11/28/2022    Procedure: Left Heart Catheterization;  Surgeon: Gonzales Hager MD;  Location: [facility] CV    CV PCI STENT DRUG ELUTING N/A 11/28/2022    Procedure: Percutaneous Coronary Intervention Stent;  Surgeon: Gonzales Hager MD;  Location: [facility]    CYSTECTOMY      CYSTOSCOPY      CYSTOSCOPY N/A 12/23/2015    Procedure: CYSTOSCOPY BLADDER BIOPSIES with Fulgeration and Placement of Otero ;  Surgeon: Gordon Bajwa MD;  Location: [facility];  Service:     ESOPHAGOSCOPY, GASTROSCOPY, DUODENOSCOPY (EGD), COMBINED N/A 01/06/2023    Procedure: ESOPHAGOGASTRODUODENOSCOPY, WITH BIOPSY;  Surgeon: Phan Lobo DO;  Location: [facility]    IMPLANT PACEMAKER      IR EXTREMITY ANGIOGRAM LEFT  03/22/2019    IR EXTREMITY ANGIOGRAM LEFT  03/22/2019    IR ILEAL CONDUIT INJECTION  12/23/2016    IR NEPHROSTOMY TUBE PLACEMENT RIGHT  08/18/2016    IR URETEROSTOMY TUBE CHANGE RIGHT  09/21/2016    KIDNEY STONE SURGERY  x4    LAMINECTOMY LUMBAR ONE LEVEL Bilateral 09/13/2022    Procedure: LUMBAR 4 - LUMBAR 5 BILATERAL MEDIAL FACETECTOMY AND DECOMPRESSION;  Surgeon: Everett Holland MD;  Location: [facility]    PICC TRIPLE LUMEN PLACEMENT  11/18/2022         PROSTATE SURGERY      Family History   Problem Relation Age of Onset    No Known Problems Mother     Prostate Cancer Father     Deep Vein Thrombosis Father     Cancer Sister     Heart Disease Sister     No Known  Problems Daughter     Heart Disease Brother     Heart Disease Sister     No Known Problems Daughter     Social History     Socioeconomic History    Marital status:      Spouse name: Not on file    Number of children: Not on file    Years of education: Not on file    Highest education level: Not on file   Occupational History    Not on file   Tobacco Use    Smoking status: Former    Smokeless tobacco: Never    Tobacco comments:     Quit smoking 30 years ago   Vaping Use    Vaping Use: Never used   Substance and Sexual Activity    Alcohol use: Not Currently    Drug use: Never    Sexual activity: Not Currently   Other Topics Concern    Not on file   Social History Narrative    Not on file     Social Determinants of Health     Financial Resource Strain: Not on file   Food Insecurity: Not on file   Transportation Needs: Not on file   Physical Activity: Not on file   Stress: Not on file   Social Connections: Not on file   Interpersonal Safety: Not on file   Housing Stability: Not on file          Medications  Allergies   Current Outpatient Medications   Medication Sig Dispense Refill    ACETAMINOPHEN EXTRA STRENGTH 500 MG tablet as needed      allopurinol (ZYLOPRIM) 300 MG tablet Take 300 mg by mouth daily      atorvastatin (LIPITOR) 40 MG tablet Take 1 tablet (40 mg) by mouth daily 90 tablet 3    clopidogrel (PLAVIX) 75 MG tablet Take 1 tablet (75 mg) by mouth daily 30 tablet 1    ezetimibe (ZETIA) 10 MG tablet Take 1 tablet (10 mg) by mouth daily 30 tablet 1    Ferrous Sulfate (IRON) 28 MG TABS Take 1 tablet by mouth three times a week      finasteride (PROSCAR) 5 MG tablet Take 5 mg by mouth daily      glimepiride (AMARYL) 1 MG tablet Take 1 tablet (1 mg) by mouth daily 30 tablet 0    glucosamine-chondroitinoitin 8870-7630 MG/30ML LIQD Take by mouth 2 times daily      KLOR-CON M20 20 MEQ CR tablet Take 1 tablet by mouth daily      lisinopril (ZESTRIL) 5 MG tablet Take 1 tablet by mouth daily      metoprolol  succinate ER (TOPROL XL) 25 MG 24 hr tablet Take 0.5 tablets (12.5 mg) by mouth 2 times daily Twice daily if BP <100 90 tablet 3    multivitamin, therapeutic (THERA-VIT) TABS tablet Take 1 tablet by mouth daily 30 tablet 3    nitroGLYcerin (NITROSTAT) 0.4 MG sublingual tablet For chest pain place 1 tablet under the tongue every 5 minutes for 3 doses. If symptoms persist 5 minutes after 1st dose call 911. 30 tablet 1    nystatin (MYCOSTATIN) 098663 UNIT/GM external powder 2 times daily      OMEGA-3 FISH OIL 1000 MG capsule Take 1 capsule by mouth daily      pantoprazole (PROTONIX) 40 MG EC tablet Take 1 tablet (40 mg) by mouth 2 times daily (before meals) 60 tablet 0    Polyethylene Glycol POWD See Admin Instructions      SENEXON-S 8.6-50 MG tablet Take 1 tablet by mouth daily      temazepam (RESTORIL) 15 MG capsule Take 1 capsule by mouth nightly as needed      traZODone (DESYREL) 100 MG tablet Take 1 tablet (100 mg) by mouth At Bedtime (Patient taking differently: Take 200 mg by mouth at bedtime) 30 tablet 0    warfarin ANTICOAGULANT (COUMADIN) 5 MG tablet Take 5 mg by mouth daily 1 tablet 0    Allergies   Allergen Reactions    Metoprolol Tartrate      Other reaction(s): low blood pressure    Pcn [Penicillins] Other (See Comments)     Childhood-doesn't know reactions    Statin Drugs [Statins] Cramps    Uroxatral [Alfuzosin] Other (See Comments)     hypotension         Lab Results    Chemistry/lipid CBC Cardiac Enzymes/BNP/TSH/INR   Lab Results   Component Value Date    CHOL 96 10/17/2023    HDL 34 (L) 10/17/2023    TRIG 99 10/17/2023    BUN 37.2 (H) 10/17/2023     10/17/2023    CO2 23 10/17/2023    Lab Results   Component Value Date    WBC 9.9 02/13/2023    HGB 9.7 (L) 02/13/2023    HCT 31.5 (L) 02/13/2023    MCV 86 02/13/2023     02/13/2023    Lab Results   Component Value Date    TROPONINI 0.10 04/25/2020     (H) 04/25/2020    INR 1.58 (H) 02/02/2023             This note has been dictated  using voice recognition software. Any grammatical, typographical, or context distortions are unintentional and inherent to the software    30 minutes spent on the date of encounter doing chart review, review of outside records, review of test results, interpretation with above tests, patient visit, and documentation.                    Thank you for allowing me to participate in the care of your patient.      Sincerely,     CARLOS Dumas CNP     [facility]  cc:   CARLOS Dumas CNP  [address], [address]  [place] [id number]

## 2024-03-11 ENCOUNTER — ALLIED HEALTH/NURSE VISIT (OUTPATIENT)
Dept: CARDIOLOGY | Facility: CLINIC | Age: 84
End: 2024-03-11
Payer: COMMERCIAL

## 2024-03-11 DIAGNOSIS — I50.32 CHRONIC HEART FAILURE WITH PRESERVED EJECTION FRACTION (H): Primary | ICD-10-CM

## 2024-03-11 PROCEDURE — 99454 REM MNTR PHYSIOL PARAM 16-30: CPT | Performed by: INTERNAL MEDICINE

## 2024-03-18 NOTE — PROGRESS NOTES
[facility]:   [facility] ([employer]) Routine Remote Evaluation    HRS Enrollment date:  2/27/23       Dates: 12/08/2023 through 01/08/2024    This is not the first billing cycle.    Alerts in the last month: 0    No adjustments made this month.  Discussed with patient/caregiver and they will continue current plan of care.        Readings:       Total Minutes Spent: 0 minutes     Future Appointments   Date Time Provider Department Center   4/11/2024  4:00 AM ETHAN [facility]   6/5/2024 12:00 AM SANYA [facility] REMOTE DEVICE CHECK FROM HOME HRCVN [facility]      I have reviewed Krzysztof Fountain, RN's note and agree.    Abdelrahman Mazariegos MD., MHS

## 2024-03-22 ENCOUNTER — LAB REQUISITION (OUTPATIENT)
Dept: LAB | Facility: CLINIC | Age: 84
End: 2024-03-22

## 2024-03-22 DIAGNOSIS — E78.5 HYPERLIPIDEMIA, UNSPECIFIED: ICD-10-CM

## 2024-03-22 DIAGNOSIS — E11.22 TYPE 2 DIABETES MELLITUS WITH DIABETIC CHRONIC KIDNEY DISEASE (H): ICD-10-CM

## 2024-03-22 PROCEDURE — 82570 ASSAY OF URINE CREATININE: CPT | Performed by: FAMILY MEDICINE

## 2024-03-22 PROCEDURE — 80061 LIPID PANEL: CPT | Performed by: FAMILY MEDICINE

## 2024-03-22 PROCEDURE — 80053 COMPREHEN METABOLIC PANEL: CPT | Performed by: FAMILY MEDICINE

## 2024-03-22 NOTE — PROGRESS NOTES
[facility]:   [facility] ([employer]) Routine Remote Evaluation    HRS Enrollment date: 2/27/23       Dates: 01/09/2024 through 02/08/2024    This is not the first billing cycle.    Alerts in the last month: 2    01/19/2024 Wt  02/07/2024 Wt    These adjustments have been discussed with the patient/caregiver and they express verbal understanding.       Readings:      Total Minutes Spent: 15 minutes     Future Appointments   Date Time Provider Department Center   4/11/2024  4:00 AM [facility] CARDIOMEMS [facility] N   5/13/2024  4:00 AM [facility] CARDIOMEMS [facility]N [facility]   6/5/2024 12:00 AM [facility] REMOTE DEVICE CHECK FROM HOME HRCVN [facility] N   6/13/2024  4:00 AM [facility] CARDIOMEMS [facility]JN [facility]   7/15/2024  4:00 AM [facility] CARDIOMEMS [facility]JN [facility]   8/15/2024  4:00 AM [facility] CARDIOMEMS [facility]RSJN [facility] N      I have reviewed Krzysztof Fountain, RN's note and agree.    Abdelrahman Mazariegos MD., MHS

## 2024-03-23 LAB
ALBUMIN SERPL BCG-MCNC: 3.7 G/DL (ref 3.5–5.2)
ALP SERPL-CCNC: 68 U/L (ref 40–150)
ALT SERPL W P-5'-P-CCNC: 45 U/L (ref 0–70)
ANION GAP SERPL CALCULATED.3IONS-SCNC: 9 MMOL/L (ref 7–15)
AST SERPL W P-5'-P-CCNC: 33 U/L (ref 0–45)
BILIRUB SERPL-MCNC: 0.4 MG/DL
BUN SERPL-MCNC: 59.9 MG/DL (ref 8–23)
CALCIUM SERPL-MCNC: 9.1 MG/DL (ref 8.8–10.2)
CHLORIDE SERPL-SCNC: 110 MMOL/L (ref 98–107)
CHOLEST SERPL-MCNC: 99 MG/DL
CREAT SERPL-MCNC: 2.66 MG/DL (ref 0.67–1.17)
CREAT UR-MCNC: 33.2 MG/DL
DEPRECATED HCO3 PLAS-SCNC: 19 MMOL/L (ref 22–29)
EGFRCR SERPLBLD CKD-EPI 2021: 23 ML/MIN/1.73M2
FASTING STATUS PATIENT QL REPORTED: NORMAL
GLUCOSE SERPL-MCNC: 141 MG/DL (ref 70–99)
HDLC SERPL-MCNC: 42 MG/DL
LDLC SERPL CALC-MCNC: 42 MG/DL
MICROALBUMIN UR-MCNC: 22.8 MG/L
MICROALBUMIN/CREAT UR: 68.67 MG/G CR (ref 0–17)
NONHDLC SERPL-MCNC: 57 MG/DL
POTASSIUM SERPL-SCNC: 5.2 MMOL/L (ref 3.4–5.3)
PROT SERPL-MCNC: 7.5 G/DL (ref 6.4–8.3)
SODIUM SERPL-SCNC: 138 MMOL/L (ref 135–145)
TRIGL SERPL-MCNC: 74 MG/DL

## 2024-03-24 NOTE — ANESTHESIA CARE TRANSFER NOTE
Last vitals:   Vitals:    04/16/19 1501   BP: 124/74   Pulse: 71   Resp: 16   Temp:    SpO2: 96%     Patient's level of consciousness is drowsy  Spontaneous respirations: yes  Maintains airway independently: yes  Dentition unchanged: yes  Oropharynx: oropharynx clear of all foreign objects    QCDR Measures:  ASA# 20 - Surgical Safety Checklist: WHO surgical safety checklist completed prior to induction    PQRS# 430 - Adult PONV Prevention: 4558F - Pt received => 2 anti-emetic agents (different classes) preop & intraop  ASA# 8 - Peds PONV Prevention: NA - Not pediatric patient, not GA or 2 or more risk factors NOT present  PQRS# 424 - [place]-op Temp Management: 4559F - At least one body temp DOCUMENTED => 35.5C or 95.9F within required timeframe  PQRS# 426 - PACU Transfer Protocol: - Transfer of care checklist used  ASA# 14 - Acute Post-op Pain: ASA14B - Patient did NOT experience pain >= 7 out of 10  
There are no Wet Read(s) to document.

## 2024-03-26 NOTE — PROGRESS NOTES
[facility]:   [facility] ([employer]) Routine Remote Evaluation    HRS Enrollment date: 2/27/23     Dates: 2/9/24 through 3/11/24    This is not the first billing cycle.    Alerts in the last month:     None    No adjustments made this month.  Discussed with patient/caregiver and they will continue current plan of care.     Readings:        Total Minutes Spent: 0 minutes     Hailee Logan CMA    Future Appointments   Date Time Provider Department Center   4/11/2024  4:00 AM [facility] CARDIOMEMS [facility]JN [facility]   5/13/2024  4:00 AM [facility] CARDIOMEMS [facility]JN [facility]   6/5/2024 12:00 AM [facility] REMOTE DEVICE CHECK FROM HOME HRCVN [facility]N   6/13/2024  4:00 AM [facility] CARDIOMEMS HRSJN [facility]N   7/15/2024  4:00 AM [facility] CARDIOMEMS HRSJN [facility]   8/15/2024  4:00 AM [facility] CARDIOMEMS HRSJN [facility]N     I have reviewed Hailee Logan CMA 's note and agree.    Abdelrahman Mazariegos MD., MHS

## 2024-04-03 ENCOUNTER — TELEPHONE (OUTPATIENT)
Dept: CARDIOLOGY | Facility: CLINIC | Age: 84
End: 2024-04-03
Payer: COMMERCIAL

## 2024-04-03 DIAGNOSIS — I50.32 CHRONIC HEART FAILURE WITH PRESERVED EJECTION FRACTION (H): Primary | ICD-10-CM

## 2024-04-03 RX ORDER — FUROSEMIDE 20 MG
10 TABLET ORAL DAILY
Qty: 45 TABLET | Refills: 3 | Status: SHIPPED | OUTPATIENT
Start: 2024-04-03

## 2024-04-03 NOTE — TELEPHONE ENCOUNTER
Update-     Refreshed CareEverywhere, able to see results from 4/1 drawn under Dr. Zhang. Creatinine 2.8, eGFR 22, BUN 63, K 4.8. Also able to see Dr. Zhang's note.    HEAVENLY Soni RN

## 2024-04-03 NOTE — TELEPHONE ENCOUNTER
Toma-     You last saw pt recently on 2/29/24. Your note from that visit reports that pt's PCP took him off of furosemide 10mg daily. Pt is followed on HRS. Today his wt is +2.8lbs (210.6lbs--> 213.4lbs). /65, HR 62&68, pulse ox 96%. Pt's wt trend is generally stable around 210-211lbs.     Spoke w/ pt his morning. Pt reports that he had made an error, and it was actually fludorcortisone that he was taken off of, not furosemide. He has been taking furosemide 10mg daily uninterrupted. Will update med list w/ this. He reports that his is not taking potassium chloride, though it is on his med list.     Pt unsure why his wt increased that much, but upon further discussion pt reports a large meal of roast beef and potatoes last night, as well as a big easter dinner this past weekend. We discussed how high sodium meals/diet can contribute to fluid retention. Despite wt gain, pt denies any worsening HF symptoms.     Per chart review, last creatinine 2.66, eGFR 23, K 5.2 on 3/22/24. Pt reports that he saw nephrology at [facility] this past Monday 4/1 & had more blood tests & was found to have slight UTI which he will be treated for. I am unable to see this, but pt gave verbal consent to request records from that visit to scan into his chart so I will do that. He reports that his kidney function may have been worse and that he is supposed to see nephrology again in 3 months.    Do you have any recommendations at this time? Thank you.    Time spent: 10 min    HEAVENLY Soni RN

## 2024-04-03 NOTE — TELEPHONE ENCOUNTER
Please continue recommendations of 20 mg of Lasix daily for 3 days then 10 mg daily thereafter.  Thank you

## 2024-04-03 NOTE — TELEPHONE ENCOUNTER
Noted. Called pt w/ recommendations from Toma. He verbalized understanding. He took his furosemide 10mg this AM so he will take another 10mg later this afternoon. Tomorrow 4/4 and Friday 4/5 he will take 20mg in the AM and then on Saturday 4/6 he will go back to his normal daily dose of 10mg. Will continue to follow wt's on HRS, pt aware to call if notices lightheadedness, dizziness, other issues.     HEAVENLY Soni RN

## 2024-04-11 ENCOUNTER — ALLIED HEALTH/NURSE VISIT (OUTPATIENT)
Dept: CARDIOLOGY | Facility: CLINIC | Age: 84
End: 2024-04-11
Payer: COMMERCIAL

## 2024-04-11 DIAGNOSIS — I50.32 CHRONIC HEART FAILURE WITH PRESERVED EJECTION FRACTION (H): Primary | ICD-10-CM

## 2024-04-11 PROCEDURE — 99454 REM MNTR PHYSIOL PARAM 16-30: CPT | Performed by: INTERNAL MEDICINE

## 2024-05-13 ENCOUNTER — ALLIED HEALTH/NURSE VISIT (OUTPATIENT)
Dept: CARDIOLOGY | Facility: CLINIC | Age: 84
End: 2024-05-13
Payer: COMMERCIAL

## 2024-05-13 DIAGNOSIS — I50.32 CHRONIC HEART FAILURE WITH PRESERVED EJECTION FRACTION (H): Primary | ICD-10-CM

## 2024-05-13 PROCEDURE — 99454 REM MNTR PHYSIOL PARAM 16-30: CPT | Performed by: INTERNAL MEDICINE

## 2024-05-17 NOTE — PROGRESS NOTES
[facility]:   [facility] ([employer]) Routine Remote Evaluation    HRS Enrollment date:    2/27/23     Dates: 03/12/2024 through 04/11/2024    This is not the first billing cycle.     Alerts in the last month: 1  04/03/2024    These adjustments have been discussed with the patient/caregiver and they express verbal understanding.       Readings:         Krzysztof Fountain RN [facility]       Total Minutes Spent: 10 minutes     Future Appointments   Date Time Provider Department Center   6/5/2024 12:00 AM [facility] REMOTE DEVICE CHECK FROM HOME HRCVN [facility]   6/13/2024  4:00 AM [facility]   7/15/2024  4:00 AM [facility]   8/15/2024  4:00 AM [facility]   8/21/2024 11:50 AM Abdelrahman Mazariegos MD [facility]      I have reviewed Krzysztof Fountain RN's note and agree.    Abdelrahman Mazariegos MD., S

## 2024-06-05 ENCOUNTER — ANCILLARY PROCEDURE (OUTPATIENT)
Dept: CARDIOLOGY | Facility: CLINIC | Age: 84
End: 2024-06-05
Attending: INTERNAL MEDICINE
Payer: COMMERCIAL

## 2024-06-05 DIAGNOSIS — Z95.0 PACEMAKER: ICD-10-CM

## 2024-06-05 DIAGNOSIS — I49.5 SICK SINUS SYNDROME (H): ICD-10-CM

## 2024-06-05 DIAGNOSIS — I50.32 CHRONIC HEART FAILURE WITH PRESERVED EJECTION FRACTION (H): ICD-10-CM

## 2024-06-07 LAB
MDC_IDC_EPISODE_DTM: NORMAL
MDC_IDC_EPISODE_DTM: NORMAL
MDC_IDC_EPISODE_ID: NORMAL
MDC_IDC_EPISODE_ID: NORMAL
MDC_IDC_EPISODE_TYPE: NORMAL
MDC_IDC_EPISODE_TYPE: NORMAL
MDC_IDC_LEAD_CONNECTION_STATUS: NORMAL
MDC_IDC_LEAD_IMPLANT_DT: NORMAL
MDC_IDC_LEAD_LOCATION: NORMAL
MDC_IDC_LEAD_LOCATION_DETAIL_1: NORMAL
MDC_IDC_LEAD_MFG: NORMAL
MDC_IDC_LEAD_MODEL: NORMAL
MDC_IDC_LEAD_POLARITY_TYPE: NORMAL
MDC_IDC_LEAD_SERIAL: NORMAL
MDC_IDC_MSMT_BATTERY_DTM: NORMAL
MDC_IDC_MSMT_BATTERY_REMAINING_LONGEVITY: 108 MO
MDC_IDC_MSMT_BATTERY_REMAINING_PERCENTAGE: 100 %
MDC_IDC_MSMT_BATTERY_STATUS: NORMAL
MDC_IDC_MSMT_LEADCHNL_LV_IMPEDANCE_VALUE: 807 OHM
MDC_IDC_MSMT_LEADCHNL_LV_PACING_THRESHOLD_AMPLITUDE: 1.5 V
MDC_IDC_MSMT_LEADCHNL_LV_PACING_THRESHOLD_PULSEWIDTH: 0.8 MS
MDC_IDC_MSMT_LEADCHNL_RA_IMPEDANCE_VALUE: 724 OHM
MDC_IDC_MSMT_LEADCHNL_RA_PACING_THRESHOLD_AMPLITUDE: 1.4 V
MDC_IDC_MSMT_LEADCHNL_RA_PACING_THRESHOLD_PULSEWIDTH: 0.4 MS
MDC_IDC_MSMT_LEADCHNL_RV_IMPEDANCE_VALUE: 605 OHM
MDC_IDC_PG_IMPLANT_DTM: NORMAL
MDC_IDC_PG_MFG: NORMAL
MDC_IDC_PG_MODEL: NORMAL
MDC_IDC_PG_SERIAL: NORMAL
MDC_IDC_PG_TYPE: NORMAL
MDC_IDC_SESS_CLINIC_NAME: NORMAL
MDC_IDC_SESS_DTM: NORMAL
MDC_IDC_SESS_TYPE: NORMAL
MDC_IDC_SET_BRADY_AT_MODE_SWITCH_MODE: NORMAL
MDC_IDC_SET_BRADY_AT_MODE_SWITCH_RATE: 170 {BEATS}/MIN
MDC_IDC_SET_BRADY_LOWRATE: 60 {BEATS}/MIN
MDC_IDC_SET_BRADY_MAX_TRACKING_RATE: 130 {BEATS}/MIN
MDC_IDC_SET_BRADY_MODE: NORMAL
MDC_IDC_SET_BRADY_PAV_DELAY_HIGH: 200 MS
MDC_IDC_SET_BRADY_PAV_DELAY_LOW: 240 MS
MDC_IDC_SET_BRADY_SAV_DELAY_HIGH: 200 MS
MDC_IDC_SET_BRADY_SAV_DELAY_LOW: 240 MS
MDC_IDC_SET_CRT_LVRV_DELAY: 0 MS
MDC_IDC_SET_CRT_PACED_CHAMBERS: NORMAL
MDC_IDC_SET_LEADCHNL_LV_PACING_AMPLITUDE: 2.4 V
MDC_IDC_SET_LEADCHNL_LV_PACING_ANODE_ELECTRODE_1: NORMAL
MDC_IDC_SET_LEADCHNL_LV_PACING_ANODE_LOCATION_1: NORMAL
MDC_IDC_SET_LEADCHNL_LV_PACING_CATHODE_ELECTRODE_1: NORMAL
MDC_IDC_SET_LEADCHNL_LV_PACING_CATHODE_LOCATION_1: NORMAL
MDC_IDC_SET_LEADCHNL_LV_PACING_PULSEWIDTH: 0.8 MS
MDC_IDC_SET_LEADCHNL_LV_SENSING_ADAPTATION_MODE: NORMAL
MDC_IDC_SET_LEADCHNL_LV_SENSING_ANODE_ELECTRODE_1: NORMAL
MDC_IDC_SET_LEADCHNL_LV_SENSING_ANODE_LOCATION_1: NORMAL
MDC_IDC_SET_LEADCHNL_LV_SENSING_CATHODE_ELECTRODE_1: NORMAL
MDC_IDC_SET_LEADCHNL_LV_SENSING_CATHODE_LOCATION_1: NORMAL
MDC_IDC_SET_LEADCHNL_LV_SENSING_SENSITIVITY: 2.5 MV
MDC_IDC_SET_LEADCHNL_RA_PACING_AMPLITUDE: 3 V
MDC_IDC_SET_LEADCHNL_RA_PACING_CAPTURE_MODE: NORMAL
MDC_IDC_SET_LEADCHNL_RA_PACING_POLARITY: NORMAL
MDC_IDC_SET_LEADCHNL_RA_PACING_PULSEWIDTH: 0.4 MS
MDC_IDC_SET_LEADCHNL_RA_SENSING_ADAPTATION_MODE: NORMAL
MDC_IDC_SET_LEADCHNL_RA_SENSING_POLARITY: NORMAL
MDC_IDC_SET_LEADCHNL_RA_SENSING_SENSITIVITY: 0.75 MV
MDC_IDC_SET_LEADCHNL_RV_PACING_AMPLITUDE: 2 V
MDC_IDC_SET_LEADCHNL_RV_PACING_CAPTURE_MODE: NORMAL
MDC_IDC_SET_LEADCHNL_RV_PACING_POLARITY: NORMAL
MDC_IDC_SET_LEADCHNL_RV_PACING_PULSEWIDTH: 0.8 MS
MDC_IDC_SET_LEADCHNL_RV_SENSING_ADAPTATION_MODE: NORMAL
MDC_IDC_SET_LEADCHNL_RV_SENSING_POLARITY: NORMAL
MDC_IDC_SET_LEADCHNL_RV_SENSING_SENSITIVITY: 2.5 MV
MDC_IDC_SET_ZONE_DETECTION_INTERVAL: 375 MS
MDC_IDC_SET_ZONE_STATUS: NORMAL
MDC_IDC_SET_ZONE_TYPE: NORMAL
MDC_IDC_SET_ZONE_VENDOR_TYPE: NORMAL
MDC_IDC_STAT_AT_BURDEN_PERCENT: 0 %
MDC_IDC_STAT_AT_DTM_END: NORMAL
MDC_IDC_STAT_AT_DTM_START: NORMAL
MDC_IDC_STAT_BRADY_DTM_END: NORMAL
MDC_IDC_STAT_BRADY_DTM_START: NORMAL
MDC_IDC_STAT_BRADY_RA_PERCENT_PACED: 37 %
MDC_IDC_STAT_BRADY_RV_PERCENT_PACED: 100 %
MDC_IDC_STAT_CRT_DTM_END: NORMAL
MDC_IDC_STAT_CRT_DTM_START: NORMAL
MDC_IDC_STAT_CRT_LV_PERCENT_PACED: 100 %
MDC_IDC_STAT_EPISODE_RECENT_COUNT: 0
MDC_IDC_STAT_EPISODE_RECENT_COUNT_DTM_END: NORMAL
MDC_IDC_STAT_EPISODE_RECENT_COUNT_DTM_START: NORMAL
MDC_IDC_STAT_EPISODE_TYPE: NORMAL
MDC_IDC_STAT_EPISODE_VENDOR_TYPE: NORMAL

## 2024-06-07 PROCEDURE — 93296 REM INTERROG EVL PM/IDS: CPT | Performed by: INTERNAL MEDICINE

## 2024-06-07 PROCEDURE — 93294 REM INTERROG EVL PM/LDLS PM: CPT | Performed by: INTERNAL MEDICINE

## 2024-06-13 ENCOUNTER — ALLIED HEALTH/NURSE VISIT (OUTPATIENT)
Dept: CARDIOLOGY | Facility: CLINIC | Age: 84
End: 2024-06-13
Payer: COMMERCIAL

## 2024-06-13 DIAGNOSIS — I50.32 CHRONIC HEART FAILURE WITH PRESERVED EJECTION FRACTION (H): Primary | ICD-10-CM

## 2024-06-13 PROCEDURE — 99454 REM MNTR PHYSIOL PARAM 16-30: CPT | Performed by: INTERNAL MEDICINE

## 2024-06-16 LAB — HOLD SPECIMEN: NORMAL

## 2024-06-26 ENCOUNTER — TRANSFERRED RECORDS (OUTPATIENT)
Dept: HEALTH INFORMATION MANAGEMENT | Facility: CLINIC | Age: 84
End: 2024-06-26

## 2024-06-26 ENCOUNTER — LAB REQUISITION (OUTPATIENT)
Dept: LAB | Facility: CLINIC | Age: 84
End: 2024-06-26

## 2024-06-26 DIAGNOSIS — I13.0 HYPERTENSIVE HEART AND CHRONIC KIDNEY DISEASE WITH HEART FAILURE AND STAGE 1 THROUGH STAGE 4 CHRONIC KIDNEY DISEASE, OR UNSPECIFIED CHRONIC KIDNEY DISEASE (H): ICD-10-CM

## 2024-06-26 PROCEDURE — 84156 ASSAY OF PROTEIN URINE: CPT | Performed by: FAMILY MEDICINE

## 2024-06-26 PROCEDURE — 83735 ASSAY OF MAGNESIUM: CPT | Performed by: FAMILY MEDICINE

## 2024-06-26 PROCEDURE — 80069 RENAL FUNCTION PANEL: CPT | Performed by: FAMILY MEDICINE

## 2024-06-27 LAB
ALBUMIN MFR UR ELPH: 43.6 MG/DL
ALBUMIN SERPL BCG-MCNC: 3.6 G/DL (ref 3.5–5.2)
ANION GAP SERPL CALCULATED.3IONS-SCNC: 9 MMOL/L (ref 7–15)
BUN SERPL-MCNC: 45.3 MG/DL (ref 8–23)
CALCIUM SERPL-MCNC: 9 MG/DL (ref 8.8–10.2)
CHLORIDE SERPL-SCNC: 105 MMOL/L (ref 98–107)
CREAT SERPL-MCNC: 2.47 MG/DL (ref 0.67–1.17)
CREAT UR-MCNC: 70.9 MG/DL
DEPRECATED HCO3 PLAS-SCNC: 20 MMOL/L (ref 22–29)
EGFRCR SERPLBLD CKD-EPI 2021: 25 ML/MIN/1.73M2
GLUCOSE SERPL-MCNC: 133 MG/DL (ref 70–99)
MAGNESIUM SERPL-MCNC: 2.2 MG/DL (ref 1.7–2.3)
PHOSPHATE SERPL-MCNC: 2.9 MG/DL (ref 2.5–4.5)
POTASSIUM SERPL-SCNC: 4.5 MMOL/L (ref 3.4–5.3)
PROT/CREAT 24H UR: 0.61 MG/MG CR (ref 0–0.2)
SODIUM SERPL-SCNC: 134 MMOL/L (ref 135–145)

## 2024-07-15 ENCOUNTER — ALLIED HEALTH/NURSE VISIT (OUTPATIENT)
Dept: CARDIOLOGY | Facility: CLINIC | Age: 84
End: 2024-07-15
Payer: COMMERCIAL

## 2024-07-15 DIAGNOSIS — I50.32 CHRONIC HEART FAILURE WITH PRESERVED EJECTION FRACTION (H): Primary | ICD-10-CM

## 2024-07-15 PROCEDURE — 99454 REM MNTR PHYSIOL PARAM 16-30: CPT | Performed by: INTERNAL MEDICINE

## 2024-07-15 NOTE — PROGRESS NOTES
[facility]:   [facility] ([employer]) Routine Remote Evaluation    HRS Enrollment date: 2/27/23     Dates: 4/12/24 through 5/13/24    This is not the first billing cycle.    Alerts in the last month: 0    No adjustments made this month.  Discussed with patient/caregiver and they will continue current plan of care.       Readings:         Total Minutes Spent: 0 minutes     Hailee Logan CMA    Future Appointments   Date Time Provider Department Center   8/15/2024  4:00 AM ETHAN [facility] CARDIOMEMS [facility]   8/21/2024 11:50 AM [facility]   9/16/2024  4:00 AM ETHAN [facility] CARDIOMEMS [facility]   9/18/2024 12:00 AM [facility] REMOTE DEVICE CHECK FROM HOME HRCVN [facility]      I have reviewed Hailee Logan CMA 's note and agree.    Abdelrahman Mazariegos MD., S

## 2024-07-31 NOTE — PROGRESS NOTES
[facility]:   HRS ([email or web address]) Routine Remote Evaluation    HRS Enrollment date: 2/27/23     Dates: 5/14/24 through 6/13/24    This is not the first billing cycle.    Alerts in the last month: None    No adjustments made this month.  Discussed with patient/caregiver and they will continue current plan of care.     Readings:         Total Minutes Spent: 0 minutes     Hailee Logan CMA    Future Appointments   Date Time Provider Department Center   8/21/2024 11:50 AM Abdelrahman Mazariegos MD [facility]JN [facility]   9/18/2024 12:00 AM SANYA [facility] REMOTE DEVICE CHECK FROM HOME HRCVN [facility]     I have reviewed Hailee Logan CMA 's note and agree.    Abdelrahman Mazariegos MD., MHS

## 2024-08-21 ENCOUNTER — OFFICE VISIT (OUTPATIENT)
Dept: CARDIOLOGY | Facility: CLINIC | Age: 84
End: 2024-08-21
Payer: COMMERCIAL

## 2024-08-21 VITALS
SYSTOLIC BLOOD PRESSURE: 100 MMHG | BODY MASS INDEX: 30.49 KG/M2 | DIASTOLIC BLOOD PRESSURE: 65 MMHG | RESPIRATION RATE: 18 BRPM | WEIGHT: 213 LBS | HEIGHT: 70 IN | HEART RATE: 98 BPM

## 2024-08-21 DIAGNOSIS — I50.32 CHRONIC HEART FAILURE WITH PRESERVED EJECTION FRACTION (H): Primary | Chronic | ICD-10-CM

## 2024-08-21 PROCEDURE — G2211 COMPLEX E/M VISIT ADD ON: HCPCS | Performed by: INTERNAL MEDICINE

## 2024-08-21 PROCEDURE — 99214 OFFICE O/P EST MOD 30 MIN: CPT | Performed by: INTERNAL MEDICINE

## 2024-08-21 NOTE — LETTER
8/21/2024    Rafael Montelongo MD  [address] [address]    RE: Eduardo Melgarjoanne       Dear Colleague,     I had the pleasure of seeing Eduardo Laughlin in the [facility].    HEART CARE NOTE          Assessment/Recommendations   1. HFpEF c/b ADHF  Assessment / Plan  Near euvolemia on physical exam; denies HF symptoms of orthopnea, PND, fluid retention/edema - no changes to regimen at this time  GDMT as detailed below; mainstay of treatment for HFpEF includes diuretics and adequate BP control (class I) and SGLT2-I (class 2a); additional medical therapy (ARNI, MRA, ARB) demonstrated less robust evidence for indication but may be considered per guideline recommendations (2b); no indication for BBlockers      Current Pharmacotherapy AHA Guideline-Directed Medical Therapy   Losartan  - on hold given ROBERT ARNI/ARB   Spironolactone  - on hold  MRA   SGLT2 inhibitor -  Not started SGLT2-I    Furosemide 20 mg daily Loop diuretic       2. HTN  Assessment / Plan  Adequate control     3. DM2  Assessment / Plan  Management per PMD     4. Atrial fibrillation  Assessment / Plan  Warfarin per coumadin clinic and PMD  Continue metoprolol     5. CKD  Assessment / Plan  Renal function overall stable - follows with Dr. Zhang with [facility]; no changes regimen at this time     6. GINNY  Assessment / Plan  Non-compliant; will consider resuming    35 minutes spent reviewing prior records (including documentation, laboratory studies, cardiac testing/imaging), history and physical exam, planning, and subsequent documentation.      The longitudinal plan of care for HFmrEF was addressed during this visit. Due to the added complexity in care, I will continue to support Mr. Eduardo Laughlin in the subsequent management of this condition(s) and with the ongoing continuity of care of this condition(s).      History of Present Illness/Subjective    Mr. Eduardo Laughlin is a 82 year old male with a PMHx  "significant for (per Dr. Phillips's note) recent Non-STEMI, CHF, A.fib, HTN, CKD 4, DVT, bladder cancer who [facility] for [facility]-up care     Today, Mr. Motta denies acute cardiac events or complaints; Management plan as detailed above.      ECG: Personally reviewed. atrial fibrillation, RVR, LBBB.     [facility] performed at [facility] on 1/[facility]/23 (personnaly Reviewed 8/30/23):   1. Technically difficult exam.     2. Normal LV size with normal wall thickness. Visually estimated LVEF 45%   (Mildly reduced function). Indeterminate diastolic function. There appears   to   be mid to distal anteroseptal hypokinesis, but images are challenging even   with contrast.     3. Normal RV size with normal systolic function.     4. Mild LA enlargement.     5. Mild mitral valve regurgitation.     6. The prox ascending aorta measures normal at 4.0 cm, mildly dilated.     7. The mid ascending aorta measures 4.17 cm, milldy dilated.     8. A prior study is not available for comparison. However, report from   [facility] 11/2022 showed EF 45% with abnormal septal motion, ? BBB.     Lab results: personally reviewed August 21, 2024; notable for stable renal function    Medical history and pertinent documents reviewed in Care Everywhere please where applicable see details above        Physical Examination Review of Systems   /65 (BP Location: Left arm, Patient Position: Sitting, Cuff Size: Adult Large)   Pulse 98   Resp 18   Ht 1.778 m (5' 10\")   Wt 96.6 kg (213 lb)   BMI 30.56 kg/m    Body mass index is 30.56 kg/m .  Wt Readings from Last 3 Encounters:   08/21/24 96.6 kg (213 lb)   02/29/24 97.5 kg (215 lb)   08/30/23 97.9 kg (215 lb 12.8 oz)     General Appearance:   no distress, normal body habitus   ENT/Mouth: membranes moist, no oral lesions or bleeding gums.      EYES:  no scleral icterus, normal conjunctivae   Neck: no carotid bruits or thyromegaly   Chest/Lungs:   lungs are clear to auscultation, no rales or " wheezing, equal chest wall expansion    Cardiovascular:   Regular. Normal first and second heart sounds with no murmurs, rubs, or gallops; the carotid, radial and posterior tibial pulses are intact, no JVD or LE edema bilaterally    Abdomen:  no organomegaly, masses, bruits, or tenderness; bowel sounds are present   Extremities: no cyanosis or clubbing   Skin: no xanthelasma, warm.    Neurologic: NAD     Psychiatric: alert and oriented x3, calm     A complete 10 systems ROS was reviewed  And is negative except what is listed in the HPI.          Medical History  Surgical History Family History Social History   Past Medical History:   Diagnosis Date     Acute renal failure with acute tubular necrosis superimposed on stage 3 chronic kidney disease (H)      Arteriosclerotic cardiovascular disease (ASCVD)      Atrial fibrillation (H)      Bladder cancer (H)      BPH (benign prostatic hypertrophy)      Chronic kidney disease      Chronic kidney disease, stage IV (severe) (H) 02/29/2024     Complicated UTI (urinary tract infection) 08/25/2019     Congestive heart failure (H)      Coronary artery disease      Diabetes mellitus (H)      Diabetic neuropathy (H)      E coli bacteremia      GERD (gastroesophageal reflux disease)      Gout      Hyperlipidemia      Hypertension      Hyponatremia      Iliac artery aneurysm, 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    Added automatically from request for surgery [id number]     Kidney stone      Osteoarthritis      Personal history of malignant neoplasm of prostate 04/27/2020     Prostate cancer (H)      Sleep apnea     CPAP    Past Surgical History:   Procedure Laterality Date     CARDIAC CATHETERIZATION  03/28/2008    and coronary angios     CV CORONARY ANGIOGRAM N/A 11/28/2022    Procedure: CV CORONARY ANGIOGRAM;  Surgeon: Gonzales Hager MD;  Location: [facility] CATH LAB CV     CV LEFT HEART CATH N/A 11/28/2022    Procedure: Left Heart Catheterization;  Surgeon: Gonzales Hager MD;  Location:   [facility] LAB CV     CV PCI STENT DRUG ELUTING N/A 11/28/2022    Procedure: Percutaneous Coronary Intervention Stent;  Surgeon: Gonzales Hager MD;  Location: [facility] CV     CYSTECTOMY       CYSTOSCOPY       CYSTOSCOPY N/A 12/23/2015    Procedure: CYSTOSCOPY BLADDER BIOPSIES with Fulgeration and Placement of Otero ;  Surgeon: Gordon Bajwa MD;  [place] [facility];  Service:      ESOPHAGOSCOPY, GASTROSCOPY, DUODENOSCOPY (EGD), COMBINED N/A 01/06/2023    Procedure: ESOPHAGOGASTRODUODENOSCOPY, WITH BIOPSY;  Surgeon: Phan Loob DO;  Location: [facility]     IMPLANT PACEMAKER       IR EXTREMITY ANGIOGRAM LEFT  03/22/2019     IR EXTREMITY ANGIOGRAM LEFT  03/22/2019     IR ILEAL CONDUIT INJECTION  12/23/2016     IR NEPHROSTOMY TUBE PLACEMENT RIGHT  08/18/2016     IR URETEROSTOMY TUBE CHANGE RIGHT  09/21/2016     KIDNEY STONE SURGERY  x4     LAMINECTOMY LUMBAR ONE LEVEL Bilateral 09/13/2022    Procedure: LUMBAR 4 - LUMBAR 5 BILATERAL MEDIAL FACETECTOMY AND DECOMPRESSION;  Surgeon: Everett Holland MD;  Location: [facility]     PICC TRIPLE LUMEN PLACEMENT  11/18/2022          PROSTATE SURGERY      no family history of premature coronary artery disease Social History     Socioeconomic History     Marital status:      Spouse name: Not on file     Number of children: Not on file     Years of education: Not on file     Highest education level: Not on file   Occupational History     Not on file   Tobacco Use     Smoking status: Former     Smokeless tobacco: Never     Tobacco comments:     Quit smoking 30 years ago   Vaping Use     Vaping status: Never Used   Substance and Sexual Activity     Alcohol use: Not Currently     Drug use: Never     Sexual activity: Not Currently   Other Topics Concern     Not on file   Social History Narrative     Not on file     Social Determinants of Health     Financial Resource Strain: High Risk (1/1/2022)    [facility], [facility]    Financial Resource Strain      Difficulty of Paying Living Expenses: Not on file      Difficulty of Paying Living Expenses: Not on file   Food Insecurity: Not on file   Transportation Needs: Not on file   Physical Activity: Not on file   Stress: Not on file   Social Connections: Unknown (1/1/2022)    Received from [facility], [facility]    Social Connections      Frequency of Communication with Friends and Family: Not on file   Interpersonal Safety: Not on file   Housing Stability: Not on file           Lab Results    Chemistry/lipid CBC Cardiac Enzymes/BNP/TSH/INR   Lab Results   Component Value Date    CHOL 99 03/22/2024    HDL 42 03/22/2024    TRIG 74 03/22/2024    BUN 45.3 (H) 06/26/2024     (L) 06/26/2024    CO2 20 (L) 06/26/2024    Lab Results   Component Value Date    WBC 9.9 02/13/2023    HGB 9.7 (L) 02/13/2023    HCT 31.5 (L) 02/13/2023    MCV 86 02/13/2023     02/13/2023    Lab Results   Component Value Date    TROPONINI 0.10 04/25/2020     (H) 04/25/2020    INR 1.58 (H) 02/02/2023     Lab Results   Component Value Date    TROPONINI 0.10 04/25/2020          Weight:    Wt Readings from Last 3 Encounters:   08/21/24 96.6 kg (213 lb)   02/29/24 97.5 kg (215 lb)   08/30/23 97.9 kg (215 lb 12.8 oz)       Allergies  Allergies   Allergen Reactions     Metoprolol Tartrate      Other reaction(s): low blood pressure     Pcn [Penicillins] Other (See Comments)     Childhood-doesn't know reactions     Statin Drugs [Statins] Cramps     Uroxatral [Alfuzosin] Other (See Comments)     hypotension         Surgical History  Past Surgical History:   Procedure Laterality Date     CARDIAC CATHETERIZATION  03/28/2008    and coronary angios     CV CORONARY ANGIOGRAM N/A 11/28/2022    Procedure: CV CORONARY ANGIOGRAM;  Surgeon: Gonzales Hager MD;   Location: [facility] CV     CV LEFT HEART CATH N/A 11/28/2022    Procedure: Left Heart Catheterization;  Surgeon: Gonzales Hager MD;  Location: [facility] CV     CV PCI STENT DRUG ELUTING N/A 11/28/2022    Procedure: Percutaneous Coronary Intervention Stent;  Surgeon: Gonzales Hager MD;  Location: [facility] CV     CYSTECTOMY       CYSTOSCOPY       CYSTOSCOPY N/A 12/23/2015    Procedure: CYSTOSCOPY BLADDER BIOPSIES with Fulgeration and Placement of Otero ;  Surgeon: Gordon Bajwa MD;  Location: [facility];  Service:      ESOPHAGOSCOPY, GASTROSCOPY, DUODENOSCOPY (EGD), COMBINED N/A 01/06/2023    Procedure: ESOPHAGOGASTRODUODENOSCOPY, WITH BIOPSY;  Surgeon: Phan Lobo DO;  Location: [facility]     IMPLANT PACEMAKER       IR EXTREMITY ANGIOGRAM LEFT  03/22/2019     IR EXTREMITY ANGIOGRAM LEFT  03/22/2019     IR ILEAL CONDUIT INJECTION  12/23/2016     IR NEPHROSTOMY TUBE PLACEMENT RIGHT  08/18/2016     IR URETEROSTOMY TUBE CHANGE RIGHT  09/21/2016     KIDNEY STONE SURGERY  x4     LAMINECTOMY LUMBAR ONE LEVEL Bilateral 09/13/2022    Procedure: LUMBAR 4 - LUMBAR 5 BILATERAL MEDIAL FACETECTOMY AND DECOMPRESSION;  Surgeon: Everett Holland MD;  Location: [facility]     PICC TRIPLE LUMEN PLACEMENT  11/18/2022          PROSTATE SURGERY         Social History  Tobacco:   History   Smoking Status     Former   Smokeless Tobacco     Never    Alcohol:   Social History    Substance and Sexual Activity      Alcohol use: Not Currently   Illicit Drugs:   History   Drug Use Unknown       Family History  Family History   Problem Relation Age of Onset     No Known Problems Mother      Prostate Cancer Father      Deep Vein Thrombosis Father      Cancer Sister      Heart Disease Sister      No Known Problems Daughter      Heart Disease Brother      Heart Disease Sister      No Known Problems Daughter           Abdelrahman Mazariegos MD on 8/21/2024      cc: Rafael Montelongo  H      Thank you for allowing me to participate in the care of your patient.      Sincerely,     Abdelrahman Mazariegos MD     [facility]  cc:   Abdelrahman Mazariegos MD  [address] [address]

## 2024-08-21 NOTE — PROGRESS NOTES
HEART CARE NOTE          Assessment/Recommendations   1. HFpEF c/b ADHF  Assessment / Plan  Near euvolemia on physical exam; denies HF symptoms of orthopnea, PND, fluid retention/edema - no changes to regimen at this time  GDMT as detailed below; mainstay of treatment for HFpEF includes diuretics and adequate BP control (class I) and SGLT2-I (class 2a); additional medical therapy (ARNI, MRA, ARB) demonstrated less robust evidence for indication but may be considered per guideline recommendations (2b); no indication for BBlockers      Current Pharmacotherapy AHA Guideline-Directed Medical Therapy   Losartan  - on hold given ROBERT ARNI/ARB   Spironolactone  - on hold  MRA   SGLT2 inhibitor -  Not started SGLT2-I    Furosemide 20 mg daily Loop diuretic       2. HTN  Assessment / Plan  Adequate control     3. DM2  Assessment / Plan  Management per PMD     4. Atrial fibrillation  Assessment / Plan  Warfarin per coumadin clinic and PMD  Continue metoprolol     5. CKD  Assessment / Plan  Renal function overall stable - follows with Dr. Zhang with [facility]; no changes regimen at this time     6. GINNY  Assessment / Plan  Non-compliant; will consider resuming    35 minutes spent reviewing prior records (including documentation, laboratory studies, cardiac testing/imaging), history and physical exam, planning, and subsequent documentation.      The longitudinal plan of care for HFmrEF was addressed during this visit. Due to the added complexity in care, I will continue to support Mr. Eduardo Laughlin in the subsequent management of this condition(s) and with the ongoing continuity of care of this condition(s).      History of Present Illness/Subjective    Mr. Eduardo Laughlin is a 82 year old male with a PMHx significant for (per Dr. Phillips's note) recent Non-STEMI, CHF, A.fib, HTN, CKD 4, DVT, bladder cancer who [facility] for follow-up care     Today, Mr. Motta denies acute cardiac events or complaints;  "Management plan as detailed above.      ECG: Personally reviewed. atrial fibrillation, RVR, LBBB.     ECHO performed at [facility] facility on 1/9/23 (personnaly Reviewed 8/30/23):   1. Technically difficult exam.     2. Normal LV size with normal wall thickness. Visually estimated LVEF 45%   (Mildly reduced function). Indeterminate diastolic function. There appears   to   be mid to distal anteroseptal hypokinesis, but images are challenging even   with contrast.     3. Normal RV size with normal systolic function.     4. Mild LA enlargement.     5. Mild mitral valve regurgitation.     6. The prox ascending aorta measures normal at 4.0 cm, mildly dilated.     7. The mid ascending aorta measures 4.17 cm, milldy dilated.     8. A prior study is not available for comparison. However, report from   [facility] 11/2022 showed EF 45% with abnormal septal motion, ? BBB.     Lab results: personally reviewed August 21, 2024; notable for stable renal function    Medical history and pertinent documents reviewed in Care Everywhere please where applicable see details above        Physical Examination Review of Systems   /65 (BP Location: Left arm, Patient Position: Sitting, Cuff Size: Adult Large)   Pulse 98   Resp 18   Ht 1.778 m (5' 10\")   Wt 96.6 kg (213 lb)   BMI 30.56 kg/m    Body mass index is 30.56 kg/m .  Wt Readings from Last 3 Encounters:   08/21/24 96.6 kg (213 lb)   02/29/24 97.5 kg (215 lb)   08/30/23 97.9 kg (215 lb 12.8 oz)     General Appearance:   no distress, normal body habitus   ENT/Mouth: membranes moist, no oral lesions or bleeding gums.      EYES:  no scleral icterus, normal conjunctivae   Neck: no carotid bruits or thyromegaly   Chest/Lungs:   lungs are clear to auscultation, no rales or wheezing, equal chest wall expansion    Cardiovascular:   Regular. Normal first and second heart sounds with no murmurs, rubs, or gallops; the carotid, radial and posterior tibial pulses are intact, no JVD or LE edema " bilaterally    Abdomen:  no organomegaly, masses, bruits, or tenderness; bowel sounds are present   Extremities: no cyanosis or clubbing   Skin: no xanthelasma, warm.    Neurologic: NAD     Psychiatric: alert and oriented x3, calm     A complete 10 systems ROS was reviewed  And is negative except what is listed in the HPI.          Medical History  Surgical History Family History Social History   Past Medical History:   Diagnosis Date    Acute renal failure with acute tubular necrosis superimposed on stage 3 chronic kidney disease (H)     Arteriosclerotic cardiovascular disease (ASCVD)     Atrial fibrillation (H)     Bladder cancer (H)     BPH (benign prostatic hypertrophy)     Chronic kidney disease     Chronic kidney disease, stage IV (severe) (H) 02/29/2024    Complicated UTI (urinary tract infection) 08/25/2019    Congestive heart failure (H)     Coronary artery disease     Diabetes mellitus (H)     Diabetic neuropathy (H)     E coli bacteremia     GERD (gastroesophageal reflux disease)     Gout     Hyperlipidemia     Hypertension     Hyponatremia     Iliac artery aneurysm, 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    Added automatically from request for surgery [id number]    Kidney stone     Osteoarthritis     Personal history of malignant neoplasm of prostate 04/27/2020    Prostate cancer (H)     Sleep apnea     CPAP    Past Surgical History:   Procedure Laterality Date    CARDIAC CATHETERIZATION  03/28/2008    and coronary angios    CV CORONARY ANGIOGRAM N/A 11/28/2022    Procedure: CV CORONARY ANGIOGRAM;  Surgeon: Gonzales Hager MD;  Location: [facility]    CV LEFT HEART CATH N/A 11/28/2022    Procedure: Left Heart Catheterization;  Surgeon: Gonzales Hager MD;  Location: [facility]    CV PCI STENT DRUG ELUTING N/A 11/28/2022    Procedure: Percutaneous Coronary Intervention Stent;  Surgeon: Gonzales Hager MD;  Location: [facility]    CYSTECTOMY      CYSTOSCOPY      CYSTOSCOPY N/A 12/23/2015     Procedure: CYSTOSCOPY BLADDER BIOPSIES with Fulgeration and Placement of Otero ;  Surgeon: Gordon Bajwa MD;  Location: [facility];  Service:     ESOPHAGOSCOPY, GASTROSCOPY, DUODENOSCOPY (EGD), COMBINED N/A 01/06/2023    Procedure: ESOPHAGOGASTRODUODENOSCOPY, WITH BIOPSY;  Surgeon: Phan Lobo DO;  Location: [facility]    IMPLANT PACEMAKER      IR EXTREMITY ANGIOGRAM LEFT  03/22/2019    IR EXTREMITY ANGIOGRAM LEFT  03/22/2019    IR ILEAL CONDUIT INJECTION  12/23/2016    IR NEPHROSTOMY TUBE PLACEMENT RIGHT  08/18/2016    IR URETEROSTOMY TUBE CHANGE RIGHT  09/21/2016    KIDNEY STONE SURGERY  x4    LAMINECTOMY LUMBAR ONE LEVEL Bilateral 09/13/2022    Procedure: LUMBAR 4 - LUMBAR 5 BILATERAL MEDIAL FACETECTOMY AND DECOMPRESSION;  Surgeon: Everett Holland MD;  Location: [facility]    PICC TRIPLE LUMEN PLACEMENT  11/18/2022         PROSTATE SURGERY      no family history of premature coronary artery disease Social History     Socioeconomic History    Marital status:      Spouse name: Not on file    Number of children: Not on file    Years of education: Not on file    Highest education level: Not on file   Occupational History    Not on file   Tobacco Use    Smoking status: Former    Smokeless tobacco: Never    Tobacco comments:     Quit smoking 30 years ago   Vaping Use    Vaping status: Never Used   Substance and Sexual Activity    Alcohol use: Not Currently    Drug use: Never    Sexual activity: Not Currently   Other Topics Concern    Not on file   Social History Narrative    Not on file     Social Determinants of Health     Financial Resource Strain: High Risk (1/1/2022)    Received from [facility], [facility]    Financial Resource Strain     Difficulty of Paying Living Expenses: Not on file     Difficulty of Paying Living Expenses: Not on file   Food Insecurity: Not on file   Transportation Needs: Not  on file   Physical Activity: Not on file   Stress: Not on file   Social Connections: Unknown (1/1/2022)    [facility]    Social Connections     Frequency of Communication with Friends and Family: Not on file   Interpersonal Safety: Not on file   Housing Stability: Not on file           Lab Results    Chemistry/lipid CBC Cardiac Enzymes/BNP/TSH/INR   Lab Results   Component Value Date    CHOL 99 03/22/2024    HDL 42 03/22/2024    TRIG 74 03/22/2024    BUN 45.3 (H) 06/26/2024     (L) 06/26/2024    CO2 20 (L) 06/26/2024    Lab Results   Component Value Date    WBC 9.9 02/13/2023    HGB 9.7 (L) 02/13/2023    HCT 31.5 (L) 02/13/2023    MCV 86 02/13/2023     02/13/2023    Lab Results   Component Value Date    TROPONINI 0.10 04/25/2020     (H) 04/25/2020    INR 1.58 (H) 02/02/2023     Lab Results   Component Value Date    TROPONINI 0.10 04/25/2020          Weight:    Wt Readings from Last 3 Encounters:   08/21/24 96.6 kg (213 lb)   02/29/24 97.5 kg (215 lb)   08/30/23 97.9 kg (215 lb 12.8 oz)       Allergies  Allergies   Allergen Reactions    Metoprolol Tartrate      Other reaction(s): low blood pressure    Pcn [Penicillins] Other (See Comments)     Childhood-doesn't know reactions    Statin Drugs [Statins] Cramps    Uroxatral [Alfuzosin] Other (See Comments)     hypotension         Surgical History  Past Surgical History:   Procedure Laterality Date    CARDIAC CATHETERIZATION  03/28/2008    and coronary angios    CV CORONARY ANGIOGRAM N/A 11/28/2022    Procedure: CV CORONARY ANGIOGRAM;  Surgeon: Gonzales Hager MD;  Location: [facility] CV    CV LEFT HEART CATH N/A 11/28/2022    Procedure: Left Heart Catheterization;  Surgeon: Gonzales Hager MD;  Location: [facility] LAB CV    CV PCI STENT DRUG ELUTING N/A 11/28/2022    Procedure: Percutaneous Coronary Intervention Stent;  Surgeon: Madan  Gonzales THAKUR MD;  Location: [facility] CATH LAB CV    CYSTECTOMY      CYSTOSCOPY      CYSTOSCOPY N/A 12/23/2015    Procedure: CYSTOSCOPY BLADDER BIOPSIES with Fulgeration and Placement of [place] ;  Surgeon: Gordon Bajwa MD;  [place] [facility]  Service:     ESOPHAGOSCOPY, GASTROSCOPY, DUODENOSCOPY (EGD), COMBINED N/A 01/06/2023    Procedure: ESOPHAGOGASTRODUODENOSCOPY, WITH BIOPSY;  Surgeon: Phan Lobo DO;  Location: [facility]    IMPLANT PACEMAKER      IR EXTREMITY ANGIOGRAM LEFT  03/22/2019    IR EXTREMITY ANGIOGRAM LEFT  03/22/2019    IR ILEAL CONDUIT INJECTION  12/23/2016    IR NEPHROSTOMY TUBE PLACEMENT RIGHT  08/18/2016    IR URETEROSTOMY TUBE CHANGE RIGHT  09/21/2016    KIDNEY STONE SURGERY  x4    LAMINECTOMY LUMBAR ONE LEVEL Bilateral 09/13/2022    Procedure: LUMBAR 4 - LUMBAR 5 BILATERAL MEDIAL FACETECTOMY AND DECOMPRESSION;  Surgeon: Everett Holland MD;  Location: [facility]    PICC TRIPLE LUMEN PLACEMENT  11/18/2022         PROSTATE SURGERY         Social History  Tobacco:   History   Smoking Status    Former   Smokeless Tobacco    Never    Alcohol:   Social History    Substance and Sexual Activity      Alcohol use: Not Currently   Illicit Drugs:   History   Drug Use Unknown       Family History  Family History   Problem Relation Age of Onset    No Known Problems Mother     Prostate Cancer Father     Deep Vein Thrombosis Father     Cancer Sister     Heart Disease Sister     No Known Problems Daughter     Heart Disease Brother     Heart Disease Sister     No Known Problems Daughter           Abdelrahman Mazariegos MD on 8/21/2024      cc: Rafael Montelongo

## 2024-08-21 NOTE — PROGRESS NOTES
[facility]:   [facility] Routine Remote Evaluation    HRS Enrollment date: 2/27/23     Dates: 6/14/24 through 7/15/24    This is not the first billing cycle.    Alerts in the last month: None    No adjustments made this month.  Discussed with patient/caregiver and they will continue current plan of care.     Readings:           Total Minutes Spent: 0 minutes     Hailee Logan CMA    Future Appointments   Date Time Provider Department Center   8/21/2024 11:50 AM Abdelrahman Mazariegos MD [facility]   9/18/2024 12:00 AM SANYA AP[facility] REMOTE DEVICE CHECK FROM HOME [facility]     I have reviewed Hailee Logan CMA 's note and agree.    Abdelrahman Mazariegos MD., MHS

## 2024-09-18 ENCOUNTER — ANCILLARY PROCEDURE (OUTPATIENT)
Dept: CARDIOLOGY | Facility: CLINIC | Age: 84
End: 2024-09-18
Attending: INTERNAL MEDICINE
Payer: COMMERCIAL

## 2024-09-18 DIAGNOSIS — I49.5 SICK SINUS SYNDROME (H): ICD-10-CM

## 2024-09-18 DIAGNOSIS — Z95.0 PACEMAKER: ICD-10-CM

## 2024-09-18 DIAGNOSIS — I50.32 CHRONIC HEART FAILURE WITH PRESERVED EJECTION FRACTION (H): ICD-10-CM

## 2024-09-18 LAB
MDC_IDC_EPISODE_DTM: NORMAL
MDC_IDC_EPISODE_DTM: NORMAL
MDC_IDC_EPISODE_ID: NORMAL
MDC_IDC_EPISODE_ID: NORMAL
MDC_IDC_EPISODE_TYPE: NORMAL
MDC_IDC_EPISODE_TYPE: NORMAL
MDC_IDC_LEAD_CONNECTION_STATUS: NORMAL
MDC_IDC_LEAD_IMPLANT_DT: NORMAL
MDC_IDC_LEAD_LOCATION: NORMAL
MDC_IDC_LEAD_LOCATION_DETAIL_1: NORMAL
MDC_IDC_LEAD_MFG: NORMAL
MDC_IDC_LEAD_MODEL: NORMAL
MDC_IDC_LEAD_POLARITY_TYPE: NORMAL
MDC_IDC_LEAD_SERIAL: NORMAL
MDC_IDC_MSMT_BATTERY_DTM: NORMAL
MDC_IDC_MSMT_BATTERY_REMAINING_LONGEVITY: 102 MO
MDC_IDC_MSMT_BATTERY_REMAINING_PERCENTAGE: 100 %
MDC_IDC_MSMT_BATTERY_STATUS: NORMAL
MDC_IDC_MSMT_LEADCHNL_LV_IMPEDANCE_VALUE: 803 OHM
MDC_IDC_MSMT_LEADCHNL_LV_PACING_THRESHOLD_AMPLITUDE: 1.5 V
MDC_IDC_MSMT_LEADCHNL_LV_PACING_THRESHOLD_PULSEWIDTH: 0.8 MS
MDC_IDC_MSMT_LEADCHNL_RA_IMPEDANCE_VALUE: 710 OHM
MDC_IDC_MSMT_LEADCHNL_RA_PACING_THRESHOLD_AMPLITUDE: 1.4 V
MDC_IDC_MSMT_LEADCHNL_RA_PACING_THRESHOLD_PULSEWIDTH: 0.4 MS
MDC_IDC_MSMT_LEADCHNL_RV_IMPEDANCE_VALUE: 604 OHM
MDC_IDC_PG_IMPLANT_DTM: NORMAL
MDC_IDC_PG_MFG: NORMAL
MDC_IDC_PG_MODEL: NORMAL
MDC_IDC_PG_SERIAL: NORMAL
MDC_IDC_PG_TYPE: NORMAL
MDC_IDC_SESS_CLINIC_NAME: NORMAL
MDC_IDC_SESS_DTM: NORMAL
MDC_IDC_SESS_TYPE: NORMAL
MDC_IDC_SET_BRADY_AT_MODE_SWITCH_MODE: NORMAL
MDC_IDC_SET_BRADY_AT_MODE_SWITCH_RATE: 170 {BEATS}/MIN
MDC_IDC_SET_BRADY_LOWRATE: 60 {BEATS}/MIN
MDC_IDC_SET_BRADY_MAX_TRACKING_RATE: 130 {BEATS}/MIN
MDC_IDC_SET_BRADY_MODE: NORMAL
MDC_IDC_SET_BRADY_PAV_DELAY_HIGH: 200 MS
MDC_IDC_SET_BRADY_PAV_DELAY_LOW: 240 MS
MDC_IDC_SET_BRADY_SAV_DELAY_HIGH: 200 MS
MDC_IDC_SET_BRADY_SAV_DELAY_LOW: 240 MS
MDC_IDC_SET_CRT_LVRV_DELAY: 0 MS
MDC_IDC_SET_CRT_PACED_CHAMBERS: NORMAL
MDC_IDC_SET_LEADCHNL_LV_PACING_AMPLITUDE: 2.4 V
MDC_IDC_SET_LEADCHNL_LV_PACING_ANODE_ELECTRODE_1: NORMAL
MDC_IDC_SET_LEADCHNL_LV_PACING_ANODE_LOCATION_1: NORMAL
MDC_IDC_SET_LEADCHNL_LV_PACING_CATHODE_ELECTRODE_1: NORMAL
MDC_IDC_SET_LEADCHNL_LV_PACING_CATHODE_LOCATION_1: NORMAL
MDC_IDC_SET_LEADCHNL_LV_PACING_PULSEWIDTH: 0.8 MS
MDC_IDC_SET_LEADCHNL_LV_SENSING_ADAPTATION_MODE: NORMAL
MDC_IDC_SET_LEADCHNL_LV_SENSING_ANODE_ELECTRODE_1: NORMAL
MDC_IDC_SET_LEADCHNL_LV_SENSING_ANODE_LOCATION_1: NORMAL
MDC_IDC_SET_LEADCHNL_LV_SENSING_CATHODE_ELECTRODE_1: NORMAL
MDC_IDC_SET_LEADCHNL_LV_SENSING_CATHODE_LOCATION_1: NORMAL
MDC_IDC_SET_LEADCHNL_LV_SENSING_SENSITIVITY: 2.5 MV
MDC_IDC_SET_LEADCHNL_RA_PACING_AMPLITUDE: 3.4 V
MDC_IDC_SET_LEADCHNL_RA_PACING_CAPTURE_MODE: NORMAL
MDC_IDC_SET_LEADCHNL_RA_PACING_POLARITY: NORMAL
MDC_IDC_SET_LEADCHNL_RA_PACING_PULSEWIDTH: 0.4 MS
MDC_IDC_SET_LEADCHNL_RA_SENSING_ADAPTATION_MODE: NORMAL
MDC_IDC_SET_LEADCHNL_RA_SENSING_POLARITY: NORMAL
MDC_IDC_SET_LEADCHNL_RA_SENSING_SENSITIVITY: 0.75 MV
MDC_IDC_SET_LEADCHNL_RV_PACING_AMPLITUDE: 2 V
MDC_IDC_SET_LEADCHNL_RV_PACING_CAPTURE_MODE: NORMAL
MDC_IDC_SET_LEADCHNL_RV_PACING_POLARITY: NORMAL
MDC_IDC_SET_LEADCHNL_RV_PACING_PULSEWIDTH: 0.8 MS
MDC_IDC_SET_LEADCHNL_RV_SENSING_ADAPTATION_MODE: NORMAL
MDC_IDC_SET_LEADCHNL_RV_SENSING_POLARITY: NORMAL
MDC_IDC_SET_LEADCHNL_RV_SENSING_SENSITIVITY: 2.5 MV
MDC_IDC_SET_ZONE_DETECTION_INTERVAL: 375 MS
MDC_IDC_SET_ZONE_STATUS: NORMAL
MDC_IDC_SET_ZONE_TYPE: NORMAL
MDC_IDC_SET_ZONE_VENDOR_TYPE: NORMAL
MDC_IDC_STAT_AT_BURDEN_PERCENT: 0 %
MDC_IDC_STAT_AT_DTM_END: NORMAL
MDC_IDC_STAT_AT_DTM_START: NORMAL
MDC_IDC_STAT_BRADY_DTM_END: NORMAL
MDC_IDC_STAT_BRADY_DTM_START: NORMAL
MDC_IDC_STAT_BRADY_RA_PERCENT_PACED: 25 %
MDC_IDC_STAT_BRADY_RV_PERCENT_PACED: 100 %
MDC_IDC_STAT_CRT_DTM_END: NORMAL
MDC_IDC_STAT_CRT_DTM_START: NORMAL
MDC_IDC_STAT_CRT_LV_PERCENT_PACED: 100 %
MDC_IDC_STAT_EPISODE_RECENT_COUNT: 0
MDC_IDC_STAT_EPISODE_RECENT_COUNT_DTM_END: NORMAL
MDC_IDC_STAT_EPISODE_RECENT_COUNT_DTM_START: NORMAL
MDC_IDC_STAT_EPISODE_TYPE: NORMAL
MDC_IDC_STAT_EPISODE_VENDOR_TYPE: NORMAL
MDC_IDC_STAT_EPISODE_VENDOR_TYPE: NORMAL

## 2024-09-18 PROCEDURE — 93294 REM INTERROG EVL PM/LDLS PM: CPT | Performed by: INTERNAL MEDICINE

## 2024-09-18 PROCEDURE — 93296 REM INTERROG EVL PM/IDS: CPT | Performed by: INTERNAL MEDICINE

## 2024-10-16 ENCOUNTER — LAB REQUISITION (OUTPATIENT)
Dept: LAB | Facility: CLINIC | Age: 84
End: 2024-10-16

## 2024-10-16 ENCOUNTER — TRANSFERRED RECORDS (OUTPATIENT)
Dept: HEALTH INFORMATION MANAGEMENT | Facility: CLINIC | Age: 84
End: 2024-10-16
Payer: COMMERCIAL

## 2024-10-16 DIAGNOSIS — I13.0 HYPERTENSIVE HEART AND CHRONIC KIDNEY DISEASE WITH HEART FAILURE AND STAGE 1 THROUGH STAGE 4 CHRONIC KIDNEY DISEASE, OR UNSPECIFIED CHRONIC KIDNEY DISEASE (H): ICD-10-CM

## 2024-10-16 DIAGNOSIS — E78.5 HYPERLIPIDEMIA, UNSPECIFIED: ICD-10-CM

## 2024-10-16 LAB
ANION GAP SERPL CALCULATED.3IONS-SCNC: 12 MMOL/L (ref 7–15)
BUN SERPL-MCNC: 48.1 MG/DL (ref 8–23)
CALCIUM SERPL-MCNC: 9.4 MG/DL (ref 8.8–10.4)
CHLORIDE SERPL-SCNC: 104 MMOL/L (ref 98–107)
CHOLEST SERPL-MCNC: 91 MG/DL
CREAT SERPL-MCNC: 2.37 MG/DL (ref 0.67–1.17)
EGFRCR SERPLBLD CKD-EPI 2021: 27 ML/MIN/1.73M2
FASTING STATUS PATIENT QL REPORTED: ABNORMAL
FASTING STATUS PATIENT QL REPORTED: NORMAL
GLUCOSE SERPL-MCNC: 148 MG/DL (ref 70–99)
HBA1C MFR BLD: 6.6 % (ref 4.2–6.1)
HCO3 SERPL-SCNC: 20 MMOL/L (ref 22–29)
HDLC SERPL-MCNC: 40 MG/DL
LDLC SERPL CALC-MCNC: 31 MG/DL
NONHDLC SERPL-MCNC: 51 MG/DL
POTASSIUM SERPL-SCNC: 4.5 MMOL/L (ref 3.4–5.3)
SODIUM SERPL-SCNC: 136 MMOL/L (ref 135–145)
TRIGL SERPL-MCNC: 102 MG/DL

## 2024-10-16 PROCEDURE — 80048 BASIC METABOLIC PNL TOTAL CA: CPT | Performed by: FAMILY MEDICINE

## 2024-10-16 PROCEDURE — 80061 LIPID PANEL: CPT | Performed by: FAMILY MEDICINE

## 2024-10-16 PROCEDURE — 82310 ASSAY OF CALCIUM: CPT | Performed by: FAMILY MEDICINE

## 2024-10-18 NOTE — PROGRESS NOTES
Medication Therapy Management (MTM) Encounter    ASSESSMENT:                            Medication Adherence/Access: See below for considerations.    Diabetes   Meeting A1c goal <7%. Assisting patient in applying to the patient assistance program for Farxiga today.      Hyperlipidemia   Stable      Constipation   Stable      Heart Failure, Hypertension/ASCVD and CKD   Assisting patient in applying to the patient assistance program for Farxiga today. Will need repeat BMP 2-4 weeks after starting medication.     Insomnia   Stable    GERD:   Stable    Supplements   Stable     Gout  Stable    BPH   Stable    PLAN:                            Today we started the enrollment process for the Farxiga Patient assistance program.   This medication is taken once daily in the mornings.  Once you start it, we will need to complete a BMP lab 2-4 weeks after initiation.     Follow-up: Return in about 4 weeks (around 11/18/2024) for Follow up with me or Dr. Montelongo.    SUBJECTIVE/OBJECTIVE:                          Yuniel Laughlin is a 83 year old male seen for an initial visit. He was referred to me from Dr. Montelongo.      Reason for visit: Med review and [facility] and me enrollment.    Allergies/ADRs: Reviewed in chart  Past Medical History: Reviewed in chart  Tobacco: He reports that he has quit smoking. He has never used smokeless tobacco.  Alcohol: 4-6 beverages / week    Medication Adherence/Access: no issues reported.        Diabetes   Glimepiride 1 MG Tablet, take one tablet by mouth daily with breakfast  Patient is not experiencing side effects.  Current diabetes symptoms: none  Patient is in clinic today to start the enrollment process for Farxiga through the Patient assistance program.      Blood sugar monitoring: never  Eye exam in the last 12 months? No  Foot exam: up to date       Hyperlipidemia   Atorvastatin 40 mg daily  Ezetimibe (Zetia) 10 mg once daily  Fish Oil 1000 mg once daily  Patient reports no significant  myalgias or other side effects.     Constipation   MiraLax 17 GM/SCOOP Powder, 17 g Orally Daily as needed   Patient feels that current therapy is effective.   Patient reports no current medication side effects.       Heart Failure   Hypertension/ASCVD and CKD   Beta Blocker: Metoprolol Succinate Er 25 mg tablet 1/2 tablet by mouth twice daily   SGLT2i: will be starting Farxiga   Diuretic(s): Furosemide 20 mg tablet - 1/2 tablet by mouth daily in the morning     Clopidogrel Bisulfate 75 MG Tablet, one tablet by mouth daily   Warfarin Sodium 5 MG Tablet, take 1.5 tablets by mouth on Wednesday and one tablet all other days    Nitroglycerin 0.4 MG Tablet Sublingual, As directed Sublingual 1 tablet under the tongue every 5 minutes for 3 doses.    Patient reports no current medication side effects.     Patient reports these HF symptoms: none.    Patient is not measuring daily weights .   Patient self-monitors blood pressure.  Home BP monitoring 120s/60s..   Patient is following a low sodium diet and is not avoiding alcohol.  He is in clinic today to enroll in the [facility] Patient assistance program.      Insomnia   trazodone 100-200 mg at bedtime   temazepam 15 mg at bedtime  Patient reports no issues at this time.      GERD:   Pantoprazole 40 mg twice daily   Patient reports no current symptoms.   Patient feels that current regimen is effective.    Supplements   Glucosamine-Chondroitin 8273-0776 MG/30ML Liquid, Si ml Orally once a day   Potassium Chloride ER 20 MEQ Tablet one tablet by mouth daily   Iron 28 MG Tablet, 1 tablet Orally Three times a Week  Taking for general health and denies side effects at this time.        Gout  allopurinol 300 mg daily   Patient reports no current pain concerns.   Patient is experiencing the following medication side effects: none.     BPH   Finasteride 5 MG Tablet, take one tablet by mouth daily   Denies issues or concerns      Today's Vitals: /68   Wt 216 lb (98 kg)   BMI  30.99 kg/m    ----------------      I spent 30 minutes with this patient today. All changes were made via collaborative practice agreement with Rafael Montelongo MD. A copy of the visit note was provided to the patient's provider(s).    A summary of these recommendations was mailed to the patient.    Elizabeth Bazan PharmD  Medication Therapy Management Pharmacist       Medication Therapy Recommendations  Chronic kidney disease, stage IV (severe) (H)   1 Rationale: More effective medication available - Ineffective medication - Effectiveness   Recommendation: Start Medication   Status: Accepted per CPA   Identified Date: 10/21/2024 Completed Date: 10/21/2024

## 2024-10-21 ENCOUNTER — OFFICE VISIT (OUTPATIENT)
Dept: PHARMACY | Facility: PHYSICIAN GROUP | Age: 84
End: 2024-10-21
Payer: COMMERCIAL

## 2024-10-21 DIAGNOSIS — I48.0 PAROXYSMAL ATRIAL FIBRILLATION (H): Chronic | ICD-10-CM

## 2024-10-21 DIAGNOSIS — E11.610 TYPE 2 DIABETES MELLITUS WITH DIABETIC NEUROPATHIC ARTHROPATHY, WITHOUT LONG-TERM CURRENT USE OF INSULIN (H): Primary | Chronic | ICD-10-CM

## 2024-10-21 DIAGNOSIS — K21.00 GASTROESOPHAGEAL REFLUX DISEASE WITH ESOPHAGITIS, UNSPECIFIED WHETHER HEMORRHAGE: ICD-10-CM

## 2024-10-21 DIAGNOSIS — I50.32 CHRONIC HEART FAILURE WITH PRESERVED EJECTION FRACTION (H): Chronic | ICD-10-CM

## 2024-10-21 DIAGNOSIS — Z78.9 TAKES DIETARY SUPPLEMENTS: ICD-10-CM

## 2024-10-21 DIAGNOSIS — M10.9 GOUT, UNSPECIFIED CAUSE, UNSPECIFIED CHRONICITY, UNSPECIFIED SITE: ICD-10-CM

## 2024-10-21 DIAGNOSIS — E78.2 MIXED HYPERLIPIDEMIA: ICD-10-CM

## 2024-10-21 DIAGNOSIS — K59.00 CONSTIPATION, UNSPECIFIED CONSTIPATION TYPE: ICD-10-CM

## 2024-10-21 DIAGNOSIS — N40.0 BENIGN PROSTATIC HYPERPLASIA, UNSPECIFIED WHETHER LOWER URINARY TRACT SYMPTOMS PRESENT: ICD-10-CM

## 2024-10-21 DIAGNOSIS — G47.00 INSOMNIA, UNSPECIFIED TYPE: ICD-10-CM

## 2024-10-21 DIAGNOSIS — I25.118 CORONARY ARTERY DISEASE OF NATIVE HEART WITH STABLE ANGINA PECTORIS, UNSPECIFIED VESSEL OR LESION TYPE (H): Chronic | ICD-10-CM

## 2024-10-21 DIAGNOSIS — N18.4 CHRONIC KIDNEY DISEASE, STAGE IV (SEVERE) (H): ICD-10-CM

## 2024-10-21 DIAGNOSIS — I10 PRIMARY HYPERTENSION: Chronic | ICD-10-CM

## 2024-10-21 PROCEDURE — 99607 MTMS BY PHARM ADDL 15 MIN: CPT | Performed by: PHARMACIST

## 2024-10-21 PROCEDURE — 99605 MTMS BY PHARM NP 15 MIN: CPT | Performed by: PHARMACIST

## 2024-10-21 NOTE — LETTER
_  Medication List        Prepared on: Oct 21, 2024     Bring your Medication List when you go to the doctor, hospital, or   emergency room. And, share it with your family or caregivers.     Note any changes to how you take your medications.  Cross out medications when you no longer use them.    Medication How I take it Why I use it Prescriber   allopurinol (ZYLOPRIM) 300 MG tablet Take 300 mg by mouth daily  Gout Rafael Montelongo   atorvastatin (LIPITOR) 40 MG tablet Take 1 tablet (40 mg) by mouth daily Coronary artery disease involving native coronary artery of native heart with unstable angina pectoris (H) Wendy Chatterjee MD   clopidogrel (PLAVIX) 75 MG tablet Take 1 tablet (75 mg) by mouth daily Chronic heart failure with preserved ejection fraction (H) Wendy Chatterjee MD   ezetimibe (ZETIA) 10 MG tablet Take 1 tablet (10 mg) by mouth daily Chronic heart failure with preserved ejection fraction (H) Wendy Chatterjee MD   Ferrous Sulfate (IRON) 28 MG TABS Take 1 tablet by mouth three times a week  General Health Rafael Montelongo   finasteride (PROSCAR) 5 MG tablet Take 5 mg by mouth daily  Urinary incontinence Rafael Montelongo   furosemide (LASIX) 20 MG tablet Take 0.5 tablets (10 mg) by mouth daily Chronic heart failure with preserved ejection fraction (H) CARLOS Dumas CNP   glimepiride (AMARYL) 1 MG tablet Take 1 tablet (1 mg) by mouth daily Type 2 diabetes mellitus with diabetic neuropathic arthropathy, unspecified whether long term insulin use (H) Wendy Chatterjee MD   glucosamine-chondroitinoitin 9579-0181 MG/30ML LIQD Take 30 mLs by mouth daily. General Health Patient Reported   KLOR-CON M20 20 MEQ CR tablet Take 1 tablet by mouth daily  Low potassium Rafael Montelongo   metoprolol succinate ER (TOPROL XL) 25 MG 24 hr tablet Take 0.5 tablets (12.5 mg) by mouth 2 times daily Twice daily if BP <100 Chronic heart failure with preserved ejection fraction (H) Toma Celis  CARLOS Diaz CNP   nitroGLYcerin (NITROSTAT) 0.4 MG sublingual tablet For chest pain place 1 tablet under the tongue every 5 minutes for 3 doses. If symptoms persist 5 minutes after 1st dose call 911. Coronary artery disease involving native coronary artery of native heart with unstable angina pectoris (H); NSTEMI (Non-ST Elevated Myocardial Infarction) (H) Wendy Chatterjee MD   OMEGA-3 FISH OIL 1000 MG capsule Take 1 capsule by mouth daily  Heart Health Patient Reported   pantoprazole (PROTONIX) 40 MG EC tablet Take 1 tablet (40 mg) by mouth 2 times daily (before meals) Anemia due to blood loss, acute Kali Butt MD   Polyethylene Glycol POWD One scoop mixed in a glass of water once daily as needed Constipation Patient Reported   temazepam (RESTORIL) 15 MG capsule Take 1 capsule by mouth at bedtime.  Insomnia Rafael Montelongo   traZODone (DESYREL) 100 MG tablet Take 1 tablet (100 mg) by mouth At Bedtime Insomnia, unspecified type Wendy Chatterjee MD   warfarin ANTICOAGULANT (COUMADIN) 5 MG tablet Take 5 mg by mouth daily. 7.5 mg on wednesdays Chronic Anticoagulation Kali Butt MD         Add new medications, over-the-counter drugs, herbals, vitamins, or  minerals in the blank rows below.    Medication How I take it Why I use it Prescriber                                      Allergies:      - Metoprolol Tartrate  - Pcn [penicillins] - Other (See Comments)  - Statin Drugs [statins] - Cramps  - Uroxatral [alfuzosin] - Other (See Comments)        Side effects I have had:      Not on File        Other Information:              My notes and questions:

## 2024-10-21 NOTE — LETTER
October 24, 2024  Eduardo Sortoana paula  [address]   [place] [address]    Dear Mr. Laughlin, [facility]     Thank you for talking with me on Oct 21, 2024 about your health and medications. As a follow-up to our conversation, I have included two documents:      Your Recommended To-Do List has steps you should take to get the best results from your medications.  Your Medication List will help you keep track of your medications and how to take them.    If you want to talk about these documents, please call Elizabeth Bazan RPH at phone: [phone], Monday-Friday 8-4:30pm.    I look forward to working with you and your doctors to make sure your medications work well for you.    Sincerely,  Elizabeth Bazan RPH  [facility] Pharmacist, [facility]

## 2024-10-21 NOTE — LETTER
"Recommended To-Do List      Prepared on: Oct 21, 2024       You can get the best results from your medications by completing the items on this \"To-Do List.\"      Bring your To-Do List when you go to your doctor. And, share it with your family or caregivers.    My To-Do List:  What we talked about: What I should do:   A medication that is not working    Start taking Farxiga 5 mg tablet by mouth daily. This will be coming from the Patient assistance program.           What we talked about: What I should do:                     "

## 2024-10-24 VITALS — BODY MASS INDEX: 30.99 KG/M2 | DIASTOLIC BLOOD PRESSURE: 68 MMHG | SYSTOLIC BLOOD PRESSURE: 116 MMHG | WEIGHT: 216 LBS

## 2024-10-25 ENCOUNTER — HOSPITAL ENCOUNTER (EMERGENCY)
Facility: CLINIC | Age: 84
Discharge: HOME | End: 2024-10-25
Attending: STUDENT IN AN ORGANIZED HEALTH CARE EDUCATION/TRAINING PROGRAM | Admitting: STUDENT IN AN ORGANIZED HEALTH CARE EDUCATION/TRAINING PROGRAM
Payer: COMMERCIAL

## 2024-10-25 VITALS
BODY MASS INDEX: 30.85 KG/M2 | RESPIRATION RATE: 16 BRPM | HEART RATE: 97 BPM | DIASTOLIC BLOOD PRESSURE: 80 MMHG | OXYGEN SATURATION: 96 % | WEIGHT: 215 LBS | SYSTOLIC BLOOD PRESSURE: 101 MMHG | TEMPERATURE: 98 F

## 2024-10-25 DIAGNOSIS — N30.01 ACUTE CYSTITIS WITH HEMATURIA: ICD-10-CM

## 2024-10-25 DIAGNOSIS — Z79.01 CHRONIC ANTICOAGULATION: ICD-10-CM

## 2024-10-25 LAB
ALBUMIN SERPL BCG-MCNC: 3.3 G/DL (ref 3.5–5.2)
ALBUMIN UR-MCNC: 100 MG/DL
ALP SERPL-CCNC: 75 U/L (ref 40–150)
ALT SERPL W P-5'-P-CCNC: 27 U/L (ref 0–70)
ANION GAP SERPL CALCULATED.3IONS-SCNC: 10 MMOL/L (ref 7–15)
APPEARANCE UR: ABNORMAL
AST SERPL W P-5'-P-CCNC: 22 U/L (ref 0–45)
BACTERIA #/AREA URNS HPF: ABNORMAL /HPF
BASOPHILS # BLD AUTO: 0 10E3/UL (ref 0–0.2)
BASOPHILS NFR BLD AUTO: 1 %
BILIRUB SERPL-MCNC: 0.3 MG/DL
BILIRUB UR QL STRIP: NEGATIVE
BUN SERPL-MCNC: 45.3 MG/DL (ref 8–23)
CALCIUM SERPL-MCNC: 8.7 MG/DL (ref 8.8–10.4)
CHLORIDE SERPL-SCNC: 104 MMOL/L (ref 98–107)
COLOR UR AUTO: ABNORMAL
CREAT SERPL-MCNC: 2.51 MG/DL (ref 0.67–1.17)
EGFRCR SERPLBLD CKD-EPI 2021: 25 ML/MIN/1.73M2
EOSINOPHIL # BLD AUTO: 0.1 10E3/UL (ref 0–0.7)
EOSINOPHIL NFR BLD AUTO: 2 %
ERYTHROCYTE [DISTWIDTH] IN BLOOD BY AUTOMATED COUNT: 13.8 % (ref 10–15)
GLUCOSE SERPL-MCNC: 117 MG/DL (ref 70–99)
GLUCOSE UR STRIP-MCNC: NEGATIVE MG/DL
HCO3 SERPL-SCNC: 21 MMOL/L (ref 22–29)
HCT VFR BLD AUTO: 38.3 % (ref 40–53)
HGB BLD-MCNC: 13.3 G/DL (ref 13.3–17.7)
HGB UR QL STRIP: ABNORMAL
IMM GRANULOCYTES # BLD: 0.1 10E3/UL
IMM GRANULOCYTES NFR BLD: 1 %
INR PPP: 2.44 (ref 0.85–1.15)
KETONES UR STRIP-MCNC: NEGATIVE MG/DL
LEUKOCYTE ESTERASE UR QL STRIP: ABNORMAL
LYMPHOCYTES # BLD AUTO: 1.7 10E3/UL (ref 0.8–5.3)
LYMPHOCYTES NFR BLD AUTO: 21 %
MCH RBC QN AUTO: 33.5 PG (ref 26.5–33)
MCHC RBC AUTO-ENTMCNC: 34.7 G/DL (ref 31.5–36.5)
MCV RBC AUTO: 97 FL (ref 78–100)
MONOCYTES # BLD AUTO: 0.7 10E3/UL (ref 0–1.3)
MONOCYTES NFR BLD AUTO: 9 %
MUCOUS THREADS #/AREA URNS LPF: PRESENT /LPF
NEUTROPHILS # BLD AUTO: 5.6 10E3/UL (ref 1.6–8.3)
NEUTROPHILS NFR BLD AUTO: 68 %
NITRATE UR QL: NEGATIVE
NRBC # BLD AUTO: 0 10E3/UL
NRBC BLD AUTO-RTO: 0 /100
PH UR STRIP: 7 [PH] (ref 5–7)
PLATELET # BLD AUTO: 166 10E3/UL (ref 150–450)
POTASSIUM SERPL-SCNC: 4.5 MMOL/L (ref 3.4–5.3)
PROT SERPL-MCNC: 7 G/DL (ref 6.4–8.3)
RBC # BLD AUTO: 3.97 10E6/UL (ref 4.4–5.9)
RBC URINE: >182 /HPF
SODIUM SERPL-SCNC: 135 MMOL/L (ref 135–145)
SP GR UR STRIP: 1.01 (ref 1–1.03)
UROBILINOGEN UR STRIP-MCNC: NORMAL MG/DL
WBC # BLD AUTO: 8.2 10E3/UL (ref 4–11)
WBC URINE: 34 /HPF

## 2024-10-25 PROCEDURE — 99284 EMERGENCY DEPT VISIT MOD MDM: CPT | Performed by: STUDENT IN AN ORGANIZED HEALTH CARE EDUCATION/TRAINING PROGRAM

## 2024-10-25 PROCEDURE — 36415 COLL VENOUS BLD VENIPUNCTURE: CPT | Performed by: STUDENT IN AN ORGANIZED HEALTH CARE EDUCATION/TRAINING PROGRAM

## 2024-10-25 PROCEDURE — 81001 URINALYSIS AUTO W/SCOPE: CPT | Performed by: STUDENT IN AN ORGANIZED HEALTH CARE EDUCATION/TRAINING PROGRAM

## 2024-10-25 PROCEDURE — 85004 AUTOMATED DIFF WBC COUNT: CPT | Performed by: STUDENT IN AN ORGANIZED HEALTH CARE EDUCATION/TRAINING PROGRAM

## 2024-10-25 PROCEDURE — 87086 URINE CULTURE/COLONY COUNT: CPT | Performed by: STUDENT IN AN ORGANIZED HEALTH CARE EDUCATION/TRAINING PROGRAM

## 2024-10-25 PROCEDURE — 99283 EMERGENCY DEPT VISIT LOW MDM: CPT | Performed by: STUDENT IN AN ORGANIZED HEALTH CARE EDUCATION/TRAINING PROGRAM

## 2024-10-25 PROCEDURE — 85018 HEMOGLOBIN: CPT | Performed by: STUDENT IN AN ORGANIZED HEALTH CARE EDUCATION/TRAINING PROGRAM

## 2024-10-25 PROCEDURE — 85610 PROTHROMBIN TIME: CPT | Performed by: STUDENT IN AN ORGANIZED HEALTH CARE EDUCATION/TRAINING PROGRAM

## 2024-10-25 PROCEDURE — 82435 ASSAY OF BLOOD CHLORIDE: CPT | Performed by: STUDENT IN AN ORGANIZED HEALTH CARE EDUCATION/TRAINING PROGRAM

## 2024-10-25 PROCEDURE — 250N000013 HC RX MED GY IP 250 OP 250 PS 637: Performed by: STUDENT IN AN ORGANIZED HEALTH CARE EDUCATION/TRAINING PROGRAM

## 2024-10-25 RX ORDER — CEFDINIR 300 MG/1
300 CAPSULE ORAL ONCE
Status: COMPLETED | OUTPATIENT
Start: 2024-10-25 | End: 2024-10-25

## 2024-10-25 RX ORDER — CEFPODOXIME PROXETIL 200 MG/1
200 TABLET, FILM COATED ORAL 2 TIMES DAILY
Qty: 14 TABLET | Refills: 0 | Status: SHIPPED | OUTPATIENT
Start: 2024-10-26 | End: 2024-11-02

## 2024-10-25 RX ORDER — CEFPODOXIME PROXETIL 200 MG/1
200 TABLET, FILM COATED ORAL ONCE
Status: DISCONTINUED | OUTPATIENT
Start: 2024-10-25 | End: 2024-10-25

## 2024-10-25 RX ADMIN — CEFDINIR 300 MG: 300 CAPSULE ORAL at 22:17

## 2024-10-25 ASSESSMENT — COLUMBIA-SUICIDE SEVERITY RATING SCALE - C-SSRS
6. HAVE YOU EVER DONE ANYTHING, STARTED TO DO ANYTHING, OR PREPARED TO DO ANYTHING TO END YOUR LIFE?: NO
1. IN THE PAST MONTH, HAVE YOU WISHED YOU WERE DEAD OR WISHED YOU COULD GO TO SLEEP AND NOT WAKE UP?: NO
2. HAVE YOU ACTUALLY HAD ANY THOUGHTS OF KILLING YOURSELF IN THE PAST MONTH?: NO

## 2024-10-25 ASSESSMENT — ACTIVITIES OF DAILY LIVING (ADL)
ADLS_ACUITY_SCORE: 0
ADLS_ACUITY_SCORE: 0

## 2024-10-26 NOTE — DISCHARGE INSTRUCTIONS
I am not exactly sure why you are having blood in your ostomy.  It could be either from your chronic warfarin use or from an infection.  As we discussed, we could start antibiotics or hold off and wait until the culture and you decide to go on antibiotics.  You are given the first dose in the ER and additional antibiotics were sent to the pharmacy.  We discussed that ideally I would place you on 2 different antibiotics, but given your renal function I could only safely put you on 1 antibiotic.  I am hopeful that this will cover any potential infection.  The remainder of your labs were reassuring and your INR was within therapeutic range.  You should follow-up with your regular doctor next week to ensure her conditions improving.  If you develop fever, severe pain, chest pain, lightheadedness, trouble breathing, or large amount of bleeding in your ostomy bag, or any other new or concerning symptoms return to the ER.

## 2024-10-26 NOTE — ED PROVIDER NOTES
History     Chief Complaint   Patient presents with    Flank Pain     Pt has blood in ostomy, in the past this has indicated kidney infection. Pt has had prostate, bladder removed and has just 1 working kidney.      HPI  Eduardo Laughlin is a 83 year old male who has chronic kidney disease, atrial fibrillation, history of DVT on chronic warfarin, coronary artery disease, congestive heart failure with preserved ejection fraction, type 2 diabetes, history of bladder cancer status post ileal conduit who presents to the emergency department for evaluation of blood in his ostomy bag.  Patient states that he just noticed it tonight.  He states that this is happened previously when he was hospitalized, most recently in January 2023 and it was thought to represent a urinary tract infection and it was treated with antibiotics.  Patient states at that time, he was much more sick though and he wanted to come in before he became more sick.  He denies having any other associated symptoms.  States he feels like he is in his normal state of health.  He still having output from his ostomy.  He has noted that the urine is darker than normal and there are clots and dark maroon blood in the urine.  He denies abdominal pain.  No nausea or vomiting.  No fevers.  No diarrhea, melena or hematochezia.  No falls or trauma.  No chest pain or trouble breathing.  He states that he recently changed his warfarin dosing because his INR was too high so he is now taking it 5 mg of warfarin 6 times per week and 7.5 mg of warfarin once per week.  He states he has been compliant with his warfarin.    Allergies:  Allergies   Allergen Reactions    Metoprolol Tartrate      Other reaction(s): low blood pressure    Pcn [Penicillins] Other (See Comments)     Childhood-doesn't know reactions    Statin Drugs [Statins] Cramps    Uroxatral [Alfuzosin] Other (See Comments)     hypotension       Problem List:    Patient Active Problem List    Diagnosis Date  Noted    Chronic kidney disease, stage IV (severe) (H) 02/29/2024     Priority: Medium    Anemia due to blood loss, acute 01/05/2023     Priority: Medium    Acute kidney injury (H) 12/05/2022     Priority: Medium    Facial laceration, initial encounter 12/05/2022     Priority: Medium    Injury of head, initial encounter 12/05/2022     Priority: Medium    Lactic acidosis 11/18/2022     Priority: Medium    Acute respiratory failure with hypoxia (H) 11/18/2022     Priority: Medium    Aspiration pneumonia of both lungs, unspecified aspiration pneumonia type, unspecified part of lung (H) 11/18/2022     Priority: Medium    Chronic anticoagulation 11/07/2022     Priority: Medium    NSTEMI (non-ST elevated myocardial infarction) (H) 11/06/2022     Priority: Medium    Acute congestive heart failure, unspecified heart failure type (H) 11/06/2022     Priority: Medium    Syncope 11/06/2022     Priority: Medium    Chronic heart failure with preserved ejection fraction (H) 11/04/2022     Priority: Medium    GINNY (obstructive sleep apnea) 11/04/2022     Priority: Medium    Status post spinal surgery 09/13/2022     Priority: Medium    Morbid obesity (H) 10/04/2021     Priority: Medium    Gastro-esophageal reflux disease without esophagitis 04/27/2020     Priority: Medium    Idiopathic chronic gout without tophus 04/27/2020     Priority: Medium    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 04/27/2020     Priority: Medium    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) 04/27/2020     Priority: Medium    Hydronephrosis of right kidney      Priority: Medium    Coronary artery disease      Priority: Medium    Paroxysmal atrial fibrillation (H)      Priority: Medium    LBBB (left bundle branch block)      Priority: Medium    Deep vein thrombosis (DVT) of proximal vein of both lower extremities, unspecified chronicity (H)      Priority: Medium    Chronic deep vein thrombosis (DVT) of femoral vein  of right lower extremity (H) 12/04/2019     Priority: Medium    S/P ileal conduit (H) 08/18/2016     Priority: Medium    HTN (hypertension) 08/18/2016     Priority: Medium    Bladder cancer (H) 01/27/2016     Priority: Medium        Past Medical History:    Past Medical History:   Diagnosis Date    Acute renal failure with acute tubular necrosis superimposed on stage 3 chronic kidney disease (H)     Arteriosclerotic cardiovascular disease (ASCVD)     Atrial fibrillation (H)     Bladder cancer (H)     BPH (benign prostatic hypertrophy)     Chronic kidney disease     Chronic kidney disease, stage IV (severe) (H) 02/29/2024    Complicated UTI (urinary tract infection) 08/25/2019    Congestive heart failure (H)     Coronary artery disease     Diabetes mellitus (H)     Diabetic neuropathy (H)     E coli bacteremia     GERD (gastroesophageal reflux disease)     Gout     Hyperlipidemia     Hypertension     Hyponatremia     Iliac artery aneurysm, 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    Kidney stone     Osteoarthritis     Personal history of malignant neoplasm of prostate 04/27/2020    Prostate cancer (H)     Sleep apnea        Past Surgical History:    Past Surgical History:   Procedure Laterality Date    CARDIAC CATHETERIZATION  03/28/2008    and coronary angios    CV CORONARY ANGIOGRAM N/A 11/28/2022    Procedure: CV CORONARY ANGIOGRAM;  Surgeon: Gonzales Hager MD;  Location: [facility]    CV LEFT HEART CATH N/A 11/28/2022    Procedure: Left Heart Catheterization;  Surgeon: Gonzales Hager MD;  Location: [facility]    CV PCI STENT DRUG ELUTING N/A 11/28/2022    Procedure: Percutaneous Coronary Intervention Stent;  Surgeon: Gonzales Hager MD;  Location: [facility]    CYSTECTOMY      CYSTOSCOPY      CYSTOSCOPY N/A 12/23/2015    Procedure: CYSTOSCOPY BLADDER BIOPSIES with Fulgeration and Placement of Otero ;  Surgeon: Gordon Bajwa MD;  Location: [facility] OR;  Service:     ESOPHAGOSCOPY,  GASTROSCOPY, DUODENOSCOPY (EGD), COMBINED N/A 01/06/2023    Procedure: ESOPHAGOGASTRODUODENOSCOPY, WITH BIOPSY;  Surgeon: Phan Lobo DO;  Location: [facility]    IMPLANT PACEMAKER      IR EXTREMITY ANGIOGRAM LEFT  03/22/2019    IR EXTREMITY ANGIOGRAM LEFT  03/22/2019    IR ILEAL CONDUIT INJECTION  12/23/2016    IR NEPHROSTOMY TUBE PLACEMENT RIGHT  08/18/2016    IR URETEROSTOMY TUBE CHANGE RIGHT  09/21/2016    KIDNEY STONE SURGERY  x4    LAMINECTOMY LUMBAR ONE LEVEL Bilateral 09/13/2022    Procedure: LUMBAR 4 - LUMBAR 5 BILATERAL MEDIAL FACETECTOMY AND DECOMPRESSION;  Surgeon: Everett Holland MD;  Location: [facility] OR    PICC TRIPLE LUMEN PLACEMENT  11/18/2022         PROSTATE SURGERY         Family History:    Family History   Problem Relation Age of Onset    No Known Problems Mother     Prostate Cancer Father     Deep Vein Thrombosis Father     Cancer Sister     Heart Disease Sister     No Known Problems Daughter     Heart Disease Brother     Heart Disease Sister     No Known Problems Daughter        Social History:  Marital Status:   [2]  Social History     Tobacco Use    Smoking status: Former    Smokeless tobacco: Never    Tobacco comments:     Quit smoking 30 years ago   Vaping Use    Vaping status: Never Used   Substance Use Topics    Alcohol use: Yes    Drug use: Never        Medications:    [START ON 10/26/2024] cefpodoxime (VANTIN) 200 MG tablet  ACETAMINOPHEN EXTRA STRENGTH 500 MG tablet  allopurinol (ZYLOPRIM) 300 MG tablet  atorvastatin (LIPITOR) 40 MG tablet  clopidogrel (PLAVIX) 75 MG tablet  ezetimibe (ZETIA) 10 MG tablet  Ferrous Sulfate (IRON) 28 MG TABS  finasteride (PROSCAR) 5 MG tablet  furosemide (LASIX) 20 MG tablet  glimepiride (AMARYL) 1 MG tablet  glucosamine-chondroitinoitin 6898-6347 MG/30ML LIQD  KLOR-CON M20 20 MEQ CR tablet  lisinopril (ZESTRIL) 5 MG tablet  metoprolol succinate ER (TOPROL XL) 25 MG 24 hr tablet  nitroGLYcerin (NITROSTAT) 0.4 MG  sublingual tablet  nystatin (MYCOSTATIN) 872771 UNIT/GM external powder  OMEGA-3 FISH OIL 1000 MG capsule  pantoprazole (PROTONIX) 40 MG EC tablet  Polyethylene Glycol POWD  SENEXON-S 8.6-50 MG tablet  temazepam (RESTORIL) 15 MG capsule  traZODone (DESYREL) 100 MG tablet  warfarin ANTICOAGULANT (COUMADIN) 5 MG tablet          Review of Systems  See HPI  Physical Exam   BP: (!) 139/92  Pulse: 97  Temp: 98  F (36.7  C)  Resp: 16  Weight: 97.5 kg (215 lb)  SpO2: 96 %      Physical Exam  BP (!) 139/92   Pulse 97   Temp 98  F (36.7  C) (Oral)   Resp 16   Wt 97.5 kg (215 lb)   SpO2 96%   BMI 30.85 kg/m    General: alert, interactive, in no apparent distress  Head: atraumatic  Nose: no rhinorrhea or epistaxis  Ears: no external auditory canal discharge or bleeding.    Eyes: Sclera nonicteric. Conjunctiva noninjected. PERRL, EOMI  Mouth: no tonsillar erythema, edema, or exudate.  Moist mucous membranes  Neck: supple, moving spontaneously no midline cervical tenderness  Lungs: No increased work of breathing.  Clear to auscultation bilaterally.  CV: RRR, peripheral pulses palpable and symmetric  Abdomen: soft, ostomy noted in the middle of the abdomen draining dark brown urine with stringy maroon clots noted.  The abdomen is nontender to palpation throughout.  Skin: no rash or diaphoresis  Neuro: CN II-XII grossly intact, moving all extremities, following commands    ED Course        Procedures             Critical Care time:  none             Results for orders placed or performed during the hospital encounter of 10/25/24 (from the past 24 hours)   UA Macroscopic with reflex to Microscopic and Culture    Specimen: Urine, NOS   Result Value Ref Range    Color Urine Moon (A) Colorless, Straw, Light Yellow, Yellow    Appearance Urine Slightly Cloudy (A) Clear    Glucose Urine Negative Negative mg/dL    Bilirubin Urine Negative Negative    Ketones Urine Negative Negative mg/dL    Specific Gravity Urine 1.011 1.003 - 1.035     Blood Urine Large (A) Negative    pH Urine 7.0 5.0 - 7.0    Protein Albumin Urine 100 (A) Negative mg/dL    Urobilinogen Urine Normal Normal, 2.0 mg/dL    Nitrite Urine Negative Negative    Leukocyte Esterase Urine Moderate (A) Negative    Bacteria Urine Few (A) None Seen /HPF    Mucus Urine Present (A) None Seen /LPF    RBC Urine >182 (H) <=2 /HPF    WBC Urine 34 (H) <=5 /HPF    Narrative    Urine Culture ordered based on laboratory criteria   CBC with platelets differential    Narrative    The following orders were created for panel order CBC with platelets differential.  Procedure                               Abnormality         Status                     ---------                               -----------         ------                     CBC with platelets and d...[[id number]]  Abnormal            Final result                 Please view results for these tests on the individual orders.   Comprehensive metabolic panel   Result Value Ref Range    Sodium 135 135 - 145 mmol/L    Potassium 4.5 3.4 - 5.3 mmol/L    Carbon Dioxide (CO2) 21 (L) 22 - 29 mmol/L    Anion Gap 10 7 - 15 mmol/L    Urea Nitrogen 45.3 (H) 8.0 - 23.0 mg/dL    Creatinine 2.51 (H) 0.67 - 1.17 mg/dL    GFR Estimate 25 (L) >60 mL/min/1.73m2    Calcium 8.7 (L) 8.8 - 10.4 mg/dL    Chloride 104 98 - 107 mmol/L    Glucose 117 (H) 70 - 99 mg/dL    Alkaline Phosphatase 75 40 - 150 U/L    AST 22 0 - 45 U/L    ALT 27 0 - 70 U/L    Protein Total 7.0 6.4 - 8.3 g/dL    Albumin 3.3 (L) 3.5 - 5.2 g/dL    Bilirubin Total 0.3 <=1.2 mg/dL   INR   Result Value Ref Range    INR 2.44 (H) 0.85 - 1.15   CBC with platelets and differential   Result Value Ref Range    WBC Count 8.2 4.0 - 11.0 10e3/uL    RBC Count 3.97 (L) 4.40 - 5.90 10e6/uL    Hemoglobin 13.3 13.3 - 17.7 g/dL    Hematocrit 38.3 (L) 40.0 - 53.0 %    MCV 97 78 - 100 fL    MCH 33.5 (H) 26.5 - 33.0 pg    MCHC 34.7 31.5 - 36.5 g/dL    RDW 13.8 10.0 - 15.0 %    Platelet Count 166 150 - 450 10e3/uL    %  Neutrophils 68 %    % Lymphocytes 21 %    % Monocytes 9 %    % Eosinophils 2 %    % Basophils 1 %    % Immature Granulocytes 1 %    NRBCs per 100 WBC 0 <1 /100    Absolute Neutrophils 5.6 1.6 - 8.3 10e3/uL    Absolute Lymphocytes 1.7 0.8 - 5.3 10e3/uL    Absolute Monocytes 0.7 0.0 - 1.3 10e3/uL    Absolute Eosinophils 0.1 0.0 - 0.7 10e3/uL    Absolute Basophils 0.0 0.0 - 0.2 10e3/uL    Absolute Immature Granulocytes 0.1 <=0.4 10e3/uL    Absolute NRBCs 0.0 10e3/uL       Medications   cefdinir (OMNICEF) capsule 300 mg (has no administration in time range)       Assessments & Plan (with Medical Decision Making)     I have reviewed the nursing notes.    I have reviewed the findings, diagnosis, plan and need for follow up with the patient.          Medical Decision Making  Eduardo Laughlin is a 83 year old male who has chronic kidney disease, atrial fibrillation, history of DVT on chronic warfarin, coronary artery disease, congestive heart failure with preserved ejection fraction, type 2 diabetes, history of bladder cancer status post ileal conduit who presents to the emergency department for evaluation of blood in his ostomy bag.  Vital signs reviewed notable for hypertension otherwise afebrile and reassuring.  Patient is nontoxic on exam.  He has a benign abdominal exam.  He has a small amount of blood noted in his ostomy bag from his ileal conduit.  He is essentially asymptomatic.  UA was obtained from the ostomy bag, and is difficult to interpret given that it is coming from the ileal conduit.  Urine is likely always dirty.  Does appear infected, but unclear if this demonstrates an acute infection or colonization.  Remainder of his labs are also reviewed.  He has no leukocytosis and no anemia.  His INR is therapeutic.  His CMP shows creatinine of 2.51 with GFR 25, which is similar to previous.  Remainder of CMP is reassuring.  Patient was reassessed and has no change in his clinical condition.  He does not meet  any criteria for IV antibiotics or inpatient admission.  Unclear why he is having the blood in his ostomy.  Could be from chronic anticoagulation and warfarin use, and could be due to a urinary infection.  As discussed above, difficult to interpret the UA and hard to know if this could be from infection.  Previous urine culture reviewed from 1/8/2023 grew out MRSA, Enterococcus, E. coli and group B strep.  He never had any MRSA coverage at that time and was treated with only Vantin which she tolerated.  I had a long discussion with the patient regarding next steps.  We discussed watching and waiting for the urine culture, versus empirically starting antibiotics.  We discussed the risks and benefits of antibiotics.  Patient and his daughter would like to start antibiotics.  I discussed the case with pharmacy after reviewing the urine culture.  Unfortunately, there are no good oral options for MRSA coverage as his renal function cannot tolerate Macrobid or Bactrim.  I do not think he needs to be admitted for IV antibiotics.  Recommendation would be to treat with Vantin as he tolerated this in the past and this is treated previous urine infections.  Patient is in agreement with this plan.  I explained to him that it is possible that this may not cleared up as I am not 100% convinced that this is due to infection and he understands this.  I recommended that he follow-up closely with his regular doctor next week for recheck.  He is given explicit return precautions.  All questions were answered and he and his family member expressed understanding.  He is discharged in stable condition.  He was given the first dose of antibiotics here and additional 7 days of cefpodoxime was sent to his pharmacy        New Prescriptions    CEFPODOXIME (VANTIN) 200 MG TABLET    Take 1 tablet (200 mg) by mouth 2 times daily for 7 days.       Final diagnoses:   Acute cystitis with hematuria   Chronic anticoagulation       10/25/2024   M  [facility] EMERGENCY DEPT       Pierre Alegria MD  10/25/24 1737

## 2024-10-26 NOTE — ED TRIAGE NOTES
Pt noticed blood in ostomy at 1700. In the past when he has had blood in the ostomy it was because of a kidney infection.

## 2024-10-27 LAB — BACTERIA UR CULT: NORMAL

## 2024-11-25 NOTE — PROGRESS NOTES
Medication Therapy Management (MTM) Encounter    ASSESSMENT:                            Medication Adherence/Access: See below for considerations.    Diabetes   Meeting A1c goal <7%. Patient has been taking Farxiga for three weeks now, will have him complete BMP recheck to assess kidney function.     PLAN:                            Your kidney function was rechecked today in the clinic. We will contact you with the results.  Continue taking all medications as prescribed at this time.      Follow-up: Return in 1 day (on 11/27/2024) for Follow up with Dr. Montelongo.    SUBJECTIVE/OBJECTIVE:                          Yuniel Laughlin is a 83 year old male seen for a follow up visit. He was referred to me from Dr. Montelongo.      Reason for visit: Farxiga start.     Allergies/ADRs: Reviewed in chart  Past Medical History: Reviewed in chart  Tobacco: He reports that he has quit smoking. He has never used smokeless tobacco.  Alcohol: 4-6 beverages / week    Medication Adherence/Access: no issues reported.        Diabetes   Farxiga 5 mg tablet by mouth daily in the morning.     Current diabetes symptoms: none  Since our last visit Yuniel has stopped taking glimepiride and started low dose Farxiga from the Patient assistance program.  He reported some occasional lightheadedness and increased urination when he first started but this has since resolved. Denies issues today. Reports he has been taking the medication for 22 days. He is due for a BMP recheck.      Blood sugar monitoring: never  Eye exam in the last 12 months? No  Foot exam: up to date             Today's Vitals: /80   Wt 209 lb (94.8 kg)   BMI 29.99 kg/m    ----------------      I spent 15 minutes with this patient today. All changes were made via collaborative practice agreement with Rafael Montelongo MD. A copy of the visit note was provided to the patient's provider(s).    A summary of these recommendations was mailed to the patient.    Elizabeth Bazan  PharmD  Medication Therapy Management Pharmacist       Medication Therapy Recommendations  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)   1 Current Medication: dapagliflozin (FARXIGA) 5 MG TABS tablet   Current Medication Sig: Take 5 mg by mouth daily.   Rationale: Medication requires monitoring - Needs additional monitoring   Recommendation: Order Lab   Status: Accepted per Provider   Identified Date: 11/26/2024 Completed Date: 11/26/2024

## 2024-11-26 ENCOUNTER — OFFICE VISIT (OUTPATIENT)
Dept: PHARMACY | Facility: PHYSICIAN GROUP | Age: 84
End: 2024-11-26
Payer: COMMERCIAL

## 2024-11-26 ENCOUNTER — LAB REQUISITION (OUTPATIENT)
Dept: LAB | Facility: CLINIC | Age: 84
End: 2024-11-26

## 2024-11-26 VITALS — WEIGHT: 209 LBS | DIASTOLIC BLOOD PRESSURE: 80 MMHG | SYSTOLIC BLOOD PRESSURE: 122 MMHG | BODY MASS INDEX: 29.99 KG/M2

## 2024-11-26 DIAGNOSIS — E11.610 TYPE 2 DIABETES MELLITUS WITH DIABETIC NEUROPATHIC ARTHROPATHY, WITHOUT LONG-TERM CURRENT USE OF INSULIN (H): Primary | ICD-10-CM

## 2024-11-26 DIAGNOSIS — E11.22 TYPE 2 DIABETES MELLITUS WITH DIABETIC CHRONIC KIDNEY DISEASE (H): ICD-10-CM

## 2024-11-26 PROCEDURE — 82435 ASSAY OF BLOOD CHLORIDE: CPT | Performed by: FAMILY MEDICINE

## 2024-11-26 PROCEDURE — 99606 MTMS BY PHARM EST 15 MIN: CPT | Performed by: PHARMACIST

## 2024-11-26 PROCEDURE — 80048 BASIC METABOLIC PNL TOTAL CA: CPT | Performed by: FAMILY MEDICINE

## 2024-11-26 PROCEDURE — 82565 ASSAY OF CREATININE: CPT | Performed by: FAMILY MEDICINE

## 2024-11-26 RX ORDER — DAPAGLIFLOZIN 5 MG/1
5 TABLET, FILM COATED ORAL DAILY
COMMUNITY
Start: 2024-10-24

## 2024-11-26 NOTE — PATIENT INSTRUCTIONS
"Recommendations from today's MTM visit:                                                       Your kidney function was rechecked today in the clinic. We will contact you with the results.  Continue taking all medications as prescribed at this time.      Follow-up: Return in 1 day (on 11/27/2024) for Follow up with Dr. Montelongo.    It was great speaking with you today.  I value your experience and would be very thankful for your time in providing feedback in our clinic survey. In the next few days, you may receive an email or text message from [employer] with a link to a survey related to your  clinical pharmacist.\"     To schedule another MTM appointment, please call the clinic directly or you may call the MTM scheduling line at [phone].    My Clinical Pharmacist's contact information:                                                      Please feel free to contact me with any questions or concerns you have.      Elizabeth Bazan, PharmD  Medication Therapy Management Pharmacist    "

## 2024-11-27 LAB
ANION GAP SERPL CALCULATED.3IONS-SCNC: 9 MMOL/L (ref 7–15)
BUN SERPL-MCNC: 54.6 MG/DL (ref 8–23)
CALCIUM SERPL-MCNC: 9 MG/DL (ref 8.8–10.4)
CHLORIDE SERPL-SCNC: 105 MMOL/L (ref 98–107)
CREAT SERPL-MCNC: 2.66 MG/DL (ref 0.67–1.17)
EGFRCR SERPLBLD CKD-EPI 2021: 23 ML/MIN/1.73M2
GLUCOSE SERPL-MCNC: 200 MG/DL (ref 70–99)
HCO3 SERPL-SCNC: 21 MMOL/L (ref 22–29)
POTASSIUM SERPL-SCNC: 4.3 MMOL/L (ref 3.4–5.3)
SODIUM SERPL-SCNC: 135 MMOL/L (ref 135–145)

## 2024-12-31 ENCOUNTER — ANCILLARY PROCEDURE (OUTPATIENT)
Dept: CARDIOLOGY | Facility: CLINIC | Age: 84
End: 2024-12-31
Attending: INTERNAL MEDICINE
Payer: COMMERCIAL

## 2024-12-31 DIAGNOSIS — I49.5 SICK SINUS SYNDROME (H): ICD-10-CM

## 2024-12-31 DIAGNOSIS — Z95.0 PACEMAKER: ICD-10-CM

## 2024-12-31 DIAGNOSIS — I50.32 CHRONIC HEART FAILURE WITH PRESERVED EJECTION FRACTION (H): ICD-10-CM

## 2024-12-31 LAB
MDC_IDC_EPISODE_DTM: NORMAL
MDC_IDC_EPISODE_DTM: NORMAL
MDC_IDC_EPISODE_ID: NORMAL
MDC_IDC_EPISODE_ID: NORMAL
MDC_IDC_EPISODE_TYPE: NORMAL
MDC_IDC_EPISODE_TYPE: NORMAL
MDC_IDC_LEAD_CONNECTION_STATUS: NORMAL
MDC_IDC_LEAD_IMPLANT_DT: NORMAL
MDC_IDC_LEAD_LOCATION: NORMAL
MDC_IDC_LEAD_LOCATION_DETAIL_1: NORMAL
MDC_IDC_LEAD_MFG: NORMAL
MDC_IDC_LEAD_MODEL: NORMAL
MDC_IDC_LEAD_POLARITY_TYPE: NORMAL
MDC_IDC_LEAD_SERIAL: NORMAL
MDC_IDC_MSMT_BATTERY_DTM: NORMAL
MDC_IDC_MSMT_BATTERY_REMAINING_LONGEVITY: 102 MO
MDC_IDC_MSMT_BATTERY_REMAINING_PERCENTAGE: 100 %
MDC_IDC_MSMT_BATTERY_STATUS: NORMAL
MDC_IDC_MSMT_LEADCHNL_LV_IMPEDANCE_VALUE: 830 OHM
MDC_IDC_MSMT_LEADCHNL_LV_PACING_THRESHOLD_AMPLITUDE: 1.5 V
MDC_IDC_MSMT_LEADCHNL_LV_PACING_THRESHOLD_PULSEWIDTH: 0.8 MS
MDC_IDC_MSMT_LEADCHNL_RA_IMPEDANCE_VALUE: 708 OHM
MDC_IDC_MSMT_LEADCHNL_RA_PACING_THRESHOLD_AMPLITUDE: 1.4 V
MDC_IDC_MSMT_LEADCHNL_RA_PACING_THRESHOLD_PULSEWIDTH: 0.4 MS
MDC_IDC_MSMT_LEADCHNL_RV_IMPEDANCE_VALUE: 610 OHM
MDC_IDC_PG_IMPLANT_DTM: NORMAL
MDC_IDC_PG_MFG: NORMAL
MDC_IDC_PG_MODEL: NORMAL
MDC_IDC_PG_SERIAL: NORMAL
MDC_IDC_PG_TYPE: NORMAL
MDC_IDC_SESS_CLINIC_NAME: NORMAL
MDC_IDC_SESS_DTM: NORMAL
MDC_IDC_SESS_TYPE: NORMAL
MDC_IDC_SET_BRADY_AT_MODE_SWITCH_MODE: NORMAL
MDC_IDC_SET_BRADY_AT_MODE_SWITCH_RATE: 170 {BEATS}/MIN
MDC_IDC_SET_BRADY_LOWRATE: 60 {BEATS}/MIN
MDC_IDC_SET_BRADY_MAX_TRACKING_RATE: 130 {BEATS}/MIN
MDC_IDC_SET_BRADY_MODE: NORMAL
MDC_IDC_SET_BRADY_PAV_DELAY_HIGH: 200 MS
MDC_IDC_SET_BRADY_PAV_DELAY_LOW: 240 MS
MDC_IDC_SET_BRADY_SAV_DELAY_HIGH: 200 MS
MDC_IDC_SET_BRADY_SAV_DELAY_LOW: 240 MS
MDC_IDC_SET_CRT_LVRV_DELAY: 0 MS
MDC_IDC_SET_CRT_PACED_CHAMBERS: NORMAL
MDC_IDC_SET_LEADCHNL_LV_PACING_AMPLITUDE: 2.4 V
MDC_IDC_SET_LEADCHNL_LV_PACING_ANODE_ELECTRODE_1: NORMAL
MDC_IDC_SET_LEADCHNL_LV_PACING_ANODE_LOCATION_1: NORMAL
MDC_IDC_SET_LEADCHNL_LV_PACING_CATHODE_ELECTRODE_1: NORMAL
MDC_IDC_SET_LEADCHNL_LV_PACING_CATHODE_LOCATION_1: NORMAL
MDC_IDC_SET_LEADCHNL_LV_PACING_PULSEWIDTH: 0.8 MS
MDC_IDC_SET_LEADCHNL_LV_SENSING_ADAPTATION_MODE: NORMAL
MDC_IDC_SET_LEADCHNL_LV_SENSING_ANODE_ELECTRODE_1: NORMAL
MDC_IDC_SET_LEADCHNL_LV_SENSING_ANODE_LOCATION_1: NORMAL
MDC_IDC_SET_LEADCHNL_LV_SENSING_CATHODE_ELECTRODE_1: NORMAL
MDC_IDC_SET_LEADCHNL_LV_SENSING_CATHODE_LOCATION_1: NORMAL
MDC_IDC_SET_LEADCHNL_LV_SENSING_SENSITIVITY: 2.5 MV
MDC_IDC_SET_LEADCHNL_RA_PACING_AMPLITUDE: 3.2 V
MDC_IDC_SET_LEADCHNL_RA_PACING_CAPTURE_MODE: NORMAL
MDC_IDC_SET_LEADCHNL_RA_PACING_POLARITY: NORMAL
MDC_IDC_SET_LEADCHNL_RA_PACING_PULSEWIDTH: 0.4 MS
MDC_IDC_SET_LEADCHNL_RA_SENSING_ADAPTATION_MODE: NORMAL
MDC_IDC_SET_LEADCHNL_RA_SENSING_POLARITY: NORMAL
MDC_IDC_SET_LEADCHNL_RA_SENSING_SENSITIVITY: 0.75 MV
MDC_IDC_SET_LEADCHNL_RV_PACING_AMPLITUDE: 2 V
MDC_IDC_SET_LEADCHNL_RV_PACING_CAPTURE_MODE: NORMAL
MDC_IDC_SET_LEADCHNL_RV_PACING_POLARITY: NORMAL
MDC_IDC_SET_LEADCHNL_RV_PACING_PULSEWIDTH: 0.8 MS
MDC_IDC_SET_LEADCHNL_RV_SENSING_ADAPTATION_MODE: NORMAL
MDC_IDC_SET_LEADCHNL_RV_SENSING_POLARITY: NORMAL
MDC_IDC_SET_LEADCHNL_RV_SENSING_SENSITIVITY: 2.5 MV
MDC_IDC_SET_ZONE_DETECTION_INTERVAL: 375 MS
MDC_IDC_SET_ZONE_STATUS: NORMAL
MDC_IDC_SET_ZONE_TYPE: NORMAL
MDC_IDC_SET_ZONE_VENDOR_TYPE: NORMAL
MDC_IDC_STAT_AT_BURDEN_PERCENT: 0 %
MDC_IDC_STAT_AT_DTM_END: NORMAL
MDC_IDC_STAT_AT_DTM_START: NORMAL
MDC_IDC_STAT_BRADY_DTM_END: NORMAL
MDC_IDC_STAT_BRADY_DTM_START: NORMAL
MDC_IDC_STAT_BRADY_RA_PERCENT_PACED: 22 %
MDC_IDC_STAT_BRADY_RV_PERCENT_PACED: 100 %
MDC_IDC_STAT_CRT_DTM_END: NORMAL
MDC_IDC_STAT_CRT_DTM_START: NORMAL
MDC_IDC_STAT_CRT_LV_PERCENT_PACED: 100 %
MDC_IDC_STAT_EPISODE_RECENT_COUNT: 0
MDC_IDC_STAT_EPISODE_RECENT_COUNT_DTM_END: NORMAL
MDC_IDC_STAT_EPISODE_RECENT_COUNT_DTM_START: NORMAL
MDC_IDC_STAT_EPISODE_TYPE: NORMAL
MDC_IDC_STAT_EPISODE_VENDOR_TYPE: NORMAL

## 2024-12-31 PROCEDURE — 93294 REM INTERROG EVL PM/LDLS PM: CPT | Performed by: INTERNAL MEDICINE

## 2024-12-31 PROCEDURE — 93296 REM INTERROG EVL PM/IDS: CPT | Performed by: INTERNAL MEDICINE

## 2025-02-06 ENCOUNTER — LAB REQUISITION (OUTPATIENT)
Dept: LAB | Facility: CLINIC | Age: 85
End: 2025-02-06
Payer: COMMERCIAL

## 2025-02-06 DIAGNOSIS — N18.4 CHRONIC KIDNEY DISEASE, STAGE 4 (SEVERE) (H): ICD-10-CM

## 2025-02-06 DIAGNOSIS — D50.8 OTHER IRON DEFICIENCY ANEMIAS: ICD-10-CM

## 2025-02-06 PROCEDURE — 84156 ASSAY OF PROTEIN URINE: CPT | Mod: ORL | Performed by: FAMILY MEDICINE

## 2025-02-06 PROCEDURE — 83540 ASSAY OF IRON: CPT | Mod: ORL | Performed by: FAMILY MEDICINE

## 2025-02-06 PROCEDURE — 80069 RENAL FUNCTION PANEL: CPT | Mod: ORL | Performed by: FAMILY MEDICINE

## 2025-02-06 PROCEDURE — 83550 IRON BINDING TEST: CPT | Mod: ORL | Performed by: FAMILY MEDICINE

## 2025-02-06 PROCEDURE — 83735 ASSAY OF MAGNESIUM: CPT | Mod: ORL | Performed by: FAMILY MEDICINE

## 2025-02-07 LAB
ALBUMIN MFR UR ELPH: 11.1 MG/DL
ALBUMIN SERPL BCG-MCNC: 3.7 G/DL (ref 3.5–5.2)
ANION GAP SERPL CALCULATED.3IONS-SCNC: 14 MMOL/L (ref 7–15)
BUN SERPL-MCNC: 62.8 MG/DL (ref 8–23)
CALCIUM SERPL-MCNC: 9 MG/DL (ref 8.8–10.4)
CHLORIDE SERPL-SCNC: 104 MMOL/L (ref 98–107)
CREAT SERPL-MCNC: 2.69 MG/DL (ref 0.67–1.17)
CREAT UR-MCNC: 22.7 MG/DL
EGFRCR SERPLBLD CKD-EPI 2021: 23 ML/MIN/1.73M2
GLUCOSE SERPL-MCNC: 217 MG/DL (ref 70–99)
HCO3 SERPL-SCNC: 15 MMOL/L (ref 22–29)
IRON BINDING CAPACITY (ROCHE): 242 UG/DL (ref 240–430)
IRON SATN MFR SERPL: 28 % (ref 15–46)
IRON SERPL-MCNC: 67 UG/DL (ref 61–157)
MAGNESIUM SERPL-MCNC: 2.5 MG/DL (ref 1.7–2.3)
PHOSPHATE SERPL-MCNC: 4.1 MG/DL (ref 2.5–4.5)
POTASSIUM SERPL-SCNC: 4.6 MMOL/L (ref 3.4–5.3)
PROT/CREAT 24H UR: 0.49 MG/MG CR (ref 0–0.2)
SODIUM SERPL-SCNC: 133 MMOL/L (ref 135–145)

## 2025-02-21 ENCOUNTER — ANCILLARY PROCEDURE (OUTPATIENT)
Dept: CARDIOLOGY | Facility: CLINIC | Age: 85
End: 2025-02-21
Attending: INTERNAL MEDICINE
Payer: COMMERCIAL

## 2025-02-21 DIAGNOSIS — I50.32 CHRONIC HEART FAILURE WITH PRESERVED EJECTION FRACTION (H): ICD-10-CM

## 2025-02-21 DIAGNOSIS — Z95.0 PACEMAKER: ICD-10-CM

## 2025-02-21 DIAGNOSIS — I49.5 SICK SINUS SYNDROME (H): ICD-10-CM

## 2025-02-21 PROBLEM — I50.9 ACUTE CONGESTIVE HEART FAILURE, UNSPECIFIED HEART FAILURE TYPE (H): Status: RESOLVED | Noted: 2022-11-06 | Resolved: 2025-02-21

## 2025-02-21 PROBLEM — R55 SYNCOPE: Status: RESOLVED | Noted: 2022-11-06 | Resolved: 2025-02-21

## 2025-02-21 PROBLEM — N17.9 ACUTE KIDNEY INJURY: Status: RESOLVED | Noted: 2022-12-05 | Resolved: 2025-02-21

## 2025-02-21 PROBLEM — J96.01 ACUTE RESPIRATORY FAILURE WITH HYPOXIA (H): Status: RESOLVED | Noted: 2022-11-18 | Resolved: 2025-02-21

## 2025-02-21 PROBLEM — E78.5 DYSLIPIDEMIA, GOAL LDL BELOW 70: Status: ACTIVE | Noted: 2025-02-21

## 2025-02-21 PROBLEM — J69.0 ASPIRATION PNEUMONIA OF BOTH LUNGS, UNSPECIFIED ASPIRATION PNEUMONIA TYPE, UNSPECIFIED PART OF LUNG (H): Status: RESOLVED | Noted: 2022-11-18 | Resolved: 2025-02-21

## 2025-02-21 PROBLEM — E66.01 MORBID OBESITY (H): Chronic | Status: RESOLVED | Noted: 2021-10-04 | Resolved: 2025-02-21

## 2025-02-21 LAB
MDC_IDC_LEAD_CONNECTION_STATUS: NORMAL
MDC_IDC_LEAD_IMPLANT_DT: NORMAL
MDC_IDC_LEAD_LOCATION: NORMAL
MDC_IDC_LEAD_LOCATION_DETAIL_1: NORMAL
MDC_IDC_LEAD_MFG: NORMAL
MDC_IDC_LEAD_MODEL: NORMAL
MDC_IDC_LEAD_POLARITY_TYPE: NORMAL
MDC_IDC_LEAD_SERIAL: NORMAL
MDC_IDC_MSMT_BATTERY_DTM: NORMAL
MDC_IDC_MSMT_BATTERY_REMAINING_LONGEVITY: 107 MO
MDC_IDC_MSMT_BATTERY_REMAINING_PERCENTAGE: 100 %
MDC_IDC_MSMT_BATTERY_STATUS: NORMAL
MDC_IDC_MSMT_LEADCHNL_LV_IMPEDANCE_VALUE: 951 OHM
MDC_IDC_MSMT_LEADCHNL_LV_PACING_THRESHOLD_AMPLITUDE: 0.9 V
MDC_IDC_MSMT_LEADCHNL_LV_PACING_THRESHOLD_PULSEWIDTH: 0.8 MS
MDC_IDC_MSMT_LEADCHNL_RA_IMPEDANCE_VALUE: 716 OHM
MDC_IDC_MSMT_LEADCHNL_RA_PACING_THRESHOLD_AMPLITUDE: 2 V
MDC_IDC_MSMT_LEADCHNL_RA_PACING_THRESHOLD_PULSEWIDTH: 0.4 MS
MDC_IDC_MSMT_LEADCHNL_RA_SENSING_INTR_AMPL: 5.3 MV
MDC_IDC_MSMT_LEADCHNL_RV_IMPEDANCE_VALUE: 637 OHM
MDC_IDC_MSMT_LEADCHNL_RV_PACING_THRESHOLD_AMPLITUDE: 0.7 V
MDC_IDC_MSMT_LEADCHNL_RV_PACING_THRESHOLD_PULSEWIDTH: 0.4 MS
MDC_IDC_PG_IMPLANT_DTM: NORMAL
MDC_IDC_PG_MFG: NORMAL
MDC_IDC_PG_MODEL: NORMAL
MDC_IDC_PG_SERIAL: NORMAL
MDC_IDC_PG_TYPE: NORMAL
MDC_IDC_SESS_CLINIC_NAME: NORMAL
MDC_IDC_SESS_DTM: NORMAL
MDC_IDC_SESS_TYPE: NORMAL
MDC_IDC_SET_BRADY_AT_MODE_SWITCH_MODE: NORMAL
MDC_IDC_SET_BRADY_AT_MODE_SWITCH_RATE: 170 {BEATS}/MIN
MDC_IDC_SET_BRADY_LOWRATE: 60 {BEATS}/MIN
MDC_IDC_SET_BRADY_MAX_SENSOR_RATE: 130 {BEATS}/MIN
MDC_IDC_SET_BRADY_MAX_TRACKING_RATE: 130 {BEATS}/MIN
MDC_IDC_SET_BRADY_MODE: NORMAL
MDC_IDC_SET_BRADY_PAV_DELAY_HIGH: 200 MS
MDC_IDC_SET_BRADY_PAV_DELAY_LOW: 240 MS
MDC_IDC_SET_BRADY_SAV_DELAY_HIGH: 200 MS
MDC_IDC_SET_BRADY_SAV_DELAY_LOW: 240 MS
MDC_IDC_SET_CRT_LVRV_DELAY: 0 MS
MDC_IDC_SET_CRT_PACED_CHAMBERS: NORMAL
MDC_IDC_SET_LEADCHNL_LV_PACING_AMPLITUDE: 1.9 V
MDC_IDC_SET_LEADCHNL_LV_PACING_CAPTURE_MODE: NORMAL
MDC_IDC_SET_LEADCHNL_LV_PACING_POLARITY: NORMAL
MDC_IDC_SET_LEADCHNL_LV_PACING_PULSEWIDTH: 0.8 MS
MDC_IDC_SET_LEADCHNL_LV_SENSING_ADAPTATION_MODE: NORMAL
MDC_IDC_SET_LEADCHNL_LV_SENSING_POLARITY: NORMAL
MDC_IDC_SET_LEADCHNL_LV_SENSING_SENSITIVITY: 2.5 MV
MDC_IDC_SET_LEADCHNL_RA_PACING_AMPLITUDE: NORMAL
MDC_IDC_SET_LEADCHNL_RA_PACING_CAPTURE_MODE: NORMAL
MDC_IDC_SET_LEADCHNL_RA_PACING_POLARITY: NORMAL
MDC_IDC_SET_LEADCHNL_RA_PACING_PULSEWIDTH: 0.4 MS
MDC_IDC_SET_LEADCHNL_RA_SENSING_ADAPTATION_MODE: NORMAL
MDC_IDC_SET_LEADCHNL_RA_SENSING_POLARITY: NORMAL
MDC_IDC_SET_LEADCHNL_RA_SENSING_SENSITIVITY: 0.75 MV
MDC_IDC_SET_LEADCHNL_RV_PACING_AMPLITUDE: 2 V
MDC_IDC_SET_LEADCHNL_RV_PACING_CAPTURE_MODE: NORMAL
MDC_IDC_SET_LEADCHNL_RV_PACING_POLARITY: NORMAL
MDC_IDC_SET_LEADCHNL_RV_PACING_PULSEWIDTH: 0.8 MS
MDC_IDC_SET_LEADCHNL_RV_SENSING_ADAPTATION_MODE: NORMAL
MDC_IDC_SET_LEADCHNL_RV_SENSING_POLARITY: NORMAL
MDC_IDC_SET_LEADCHNL_RV_SENSING_SENSITIVITY: 2.5 MV
MDC_IDC_SET_ZONE_DETECTION_INTERVAL: 375 MS
MDC_IDC_SET_ZONE_STATUS: NORMAL
MDC_IDC_SET_ZONE_TYPE: NORMAL
MDC_IDC_SET_ZONE_VENDOR_TYPE: NORMAL
MDC_IDC_STAT_AT_MODE_SW_COUNT: 0
MDC_IDC_STAT_BRADY_DTM_END: NORMAL
MDC_IDC_STAT_BRADY_DTM_START: NORMAL
MDC_IDC_STAT_BRADY_RA_PERCENT_PACED: 22 %
MDC_IDC_STAT_BRADY_RV_PERCENT_PACED: 100 %
MDC_IDC_STAT_CRT_LV_PERCENT_PACED: 100 %
MDC_IDC_STAT_EPISODE_RECENT_COUNT: 0
MDC_IDC_STAT_EPISODE_RECENT_COUNT_DTM_END: NORMAL
MDC_IDC_STAT_EPISODE_RECENT_COUNT_DTM_START: NORMAL
MDC_IDC_STAT_EPISODE_TOTAL_COUNT: 3
MDC_IDC_STAT_EPISODE_TOTAL_COUNT_DTM_END: NORMAL
MDC_IDC_STAT_EPISODE_TYPE: NORMAL
MDC_IDC_STAT_EPISODE_TYPE: NORMAL
MDC_IDC_STAT_EPISODE_VENDOR_TYPE: NORMAL
MDC_IDC_STAT_EPISODE_VENDOR_TYPE: NORMAL

## 2025-03-13 PROCEDURE — 93281 PM DEVICE PROGR EVAL MULTI: CPT | Performed by: INTERNAL MEDICINE

## 2025-03-19 ENCOUNTER — TELEPHONE (OUTPATIENT)
Dept: WOUND CARE | Facility: HOSPITAL | Age: 85
End: 2025-03-19
Payer: COMMERCIAL

## 2025-03-19 ENCOUNTER — TRANSCRIBE ORDERS (OUTPATIENT)
Dept: WOUND CARE | Facility: CLINIC | Age: 85
End: 2025-03-19
Payer: COMMERCIAL

## 2025-03-19 ENCOUNTER — TRANSCRIBE ORDERS (OUTPATIENT)
Dept: VASCULAR SURGERY | Facility: CLINIC | Age: 85
End: 2025-03-19
Payer: COMMERCIAL

## 2025-03-19 ENCOUNTER — TELEPHONE (OUTPATIENT)
Dept: WOUND CARE | Facility: CLINIC | Age: 85
End: 2025-03-19
Payer: COMMERCIAL

## 2025-03-19 DIAGNOSIS — Z93.6 STATUS OF ARTIFICIAL OPENING OF URINARY TRACT (H): Primary | ICD-10-CM

## 2025-03-19 PROBLEM — Z43.6 ATTENTION TO UROSTOMY (H): Status: ACTIVE | Noted: 2025-03-19

## 2025-03-19 NOTE — TELEPHONE ENCOUNTER
"  OUTPATIENT OSTOMY VISIT PREP    INTAKE  Type of Stoma: Permanent Urostomy    Diagnosis Pertinent to Stoma: Cancer - Bladder   Type of Surgery: Ileal Conduit   Surgery Date: 1/27/2016  Surgeon:Dr. Gordon Bajwa     Hospital: [facility]    Purpose of this visit: Leaking Problem    Pertinent Information: Pt states he's lost weight, has \"wrinkles\" around his stoma    Homecare Involved: unknown      Current Equipment:    Product # Brand Description   Pouch [address] [place] 1 pc 1 1/4\" convex   Convex Ring [address]77 [address] 1 3/16 CeraRing   Ring/Paste [address]                   Pouch Change Frequency: pt?  Provider of Care: Emptying: pt Pouch Change: daily with still leaking      Remind pt to bring set of supplies to appointment.     Advise patient that clinic is located on second floor. To find clinic, go though main entrance, take elevator on the left to the second floor and check in at Clinic E, (first clinic on the right,) then come to clinic F- next clinic down and have a seat in the lobby and wait to be called back.   "

## 2025-03-24 NOTE — PATIENT INSTRUCTIONS
"  OUTPATIENT OSTOMY INSTRUCTIONS                                                                        Type of Stoma: Urostomy         Supplier: [employer] [phone]      Equipment:   Current Equipment:     Product # Brand Description   Pouch [address] 1 pc 1 1/4\" convex   Convex Ring [id number] [place] 1 3/16 CeraRing (not used today)   Belt [address] L   Ring/Paste [address] [place] Barrier Ring         These products are available on [employer] until we get things figured out with your insurance.    Continue using the above supplies, and using an additional 1/3rd of an 8805 barrier ring in the \"valley\" like you did.        Procedure:  1.) Prepare the pouch then set aside:  Apply Ring/Paste: Around edge of pouch opening. When applying the convex ring, you need to stretch it a little bit side to side to make it fit the cut edge of your stoma, (your stoma is a little wider than it is tall so need to be sure this ring is not on your stoma) the apply the other barrier ring over the top of this ensuring you are not going into the opening (line it up with the edge).     2.) Wash skin with water and dry.      3.)  Apply powder to open areas only, brush off excess powder. and Apply No-Sting over powdered area. and allow to dry several seconds    4.) Using 1/3rd of the 8805 ring, apply this directly to the skin in the valley    5.) Apply pouching system to stoma site and hold in place for 2-5 minutes with a warm moist cloth    6.) Continue belt for now, needs to be snug, but can try without too to see if you have the same results     ______________________________________________________________________________    Pouch Change:  can try every 3 days, (if you leaking at day 3 prior to changing your pouch then go 2 days, if the seal around the stoma looks good at 3 days you can try 4 and so on but the goal is to change it before if develops even a \"silent\" leak       [facility] Nurse Specialist: Shirley Proctor RN, CWOCN       "             Questions:  [phone]      Follow-up Appointment:  Please call  [phone]  to request an appointment.      Patient complaining of L sided chest pain starting 30mins PTA - code STEMI with presenting EKG

## 2025-03-25 ENCOUNTER — HOSPITAL ENCOUNTER (OUTPATIENT)
Dept: WOUND CARE | Facility: CLINIC | Age: 85
Discharge: HOME | End: 2025-03-25
Attending: FAMILY MEDICINE | Admitting: UROLOGY
Payer: COMMERCIAL

## 2025-03-25 DIAGNOSIS — Z43.6 ATTENTION TO UROSTOMY (H): Primary | ICD-10-CM

## 2025-03-25 PROCEDURE — G0463 HOSPITAL OUTPT CLINIC VISIT: HCPCS

## 2025-03-25 NOTE — PROGRESS NOTES
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 Gordon Bajwa                                                       Hospital: [facility]     Purpose of this visit: Leaking Problem, follow-up to re-evaluate after adjustments made     Pertinent Information: States pouch applied in clinic leaked Saturday 8am, washout of the barrier all the way around, the bottom was much worse. Daughter replaced a pouch Saturday using a convex ring, flat ring and convex pouch. Replaced again on Sunday doing the same but used an extra 1/2 a ring along medial side where he has noted \"a river\".     Per prior visit: Pt states he's lost weight, (about 10-12lbs,) has \"wrinkles\" around his stoma. He saw a [facility] on Wednesday and his pouch has lasted to today. Pt had to place an additional order of supplies that cost him $700 OOP- daughter did further explore this with him today, it sounds like he may have just ordered an extra months with of supplies and he still had some left but was days short of being able to re-order and he was nervous about running out due to frequent leakage experiences.      Present for Teaching Session: Patient and Family Member - daughter who lives with him   Present: TIFFANY        Current Equipment:     Product # Brand Description   Pouch 8486 Dona 1 pc 1 1/4\" convex   Convex Ring [address] [place] 1 3/16 CeraRing   Ring/Paste 8805 Dona Brava      Powder  Adapt, though had not been using and is just learning now                   From prior visit- Pt states he's used these products since the beginning. After reviewing chart though noted that these weren't the same supplies in used in 12/2016.   Pt lies down when changing pouch, says he can see the stoma this " "way and he does not know he it will go laying down. He described placing the convex ring on the back of the barrier, (unstretched,) with an additional ring over it then taking 1/2 of an adapt ring and applying that around the inside of the hole.     Pouch Change Frequency: previously every 4 days but most recently leaking daily and having to change up to 4 times/day- pouch he had on today was placed by daughter 2 days ago  Provider of Care: Emptying: pt Pouch Change: pt, daughter helped with last 2    ASSESSMENT          Sitting, standing, laying    Stoma Size: Oval 1 x 1 1/4 inches   Protrusion:  up to 1cm   Stoma Appearance: Red, Moist, and Location of Lumen: apex  Mucocutaneous Juncture: Intact   Peristomal Skin: Erythema and Denudement, improved from prior visit  Abdominal Assessment: may have Hernia (location: peristomal, above stoma); pt has lost weight and abdominal pannus \"hangs\" down when pt is standing and stoma retracts deeply into abd, some distal creasing toward naval    Noted belt cannot be worn horizontal to the pouch, has to angle upward in a slight V due to his anatomy. Not sure the belt made much difference in the security of the pouch.  TREATMENT  Applied Stoma Powder and Applied No Sting Skin barrier    INTERVENTIONS  Educated on peristomal skin treatment and Educated on pouch change procedure - Ring use, cannot apply over stoma/in the opening of the barrier/convex ring. Need to stretch the oval convex ring some to match the 1 1/4 width of the stoma. Need to try to work with dtr on changing pouch in sitting position so he is placing it well/not over stoma. Reviewed powder use. Discussed prepping the pouch with both barrier rings on the back of the pouch as shown rather than applying to skin prior to pouch- he was happy about that as he thinks will be much easier.     He may ultimately do better in a deep convex Coloplast or ConvaTec.     INSTRUCTIONS GIVEN  See above, procedure discussed in " detail with daughter's help in discussion with him. See AVS.    PLAN  Continue same Pouching System that he had been doing as this had a good seal today after 2 days, just needs to work on application procedure.  Continue with piece of adapt ring in the crease toward his navel as they did last change. May continue belt but not sure this did anything for him so can also try without.    Pt would benefit from deeper convexity from a Convatec and Coloplast pouch, although Coloplast pouch tap may be more challenging for pt r/t dexterity/arthritis. Daughter feeling like it would be better to stay with what he knows if this will work so not ordering any samples at this time.    Gordon Bajwa is currently the provider signing for ostomy supplies (confirmed with [employer] 3/21). For a letter of medical necessity, all supplies need to be listed and then signed by provider for products to be covered then faxed to [employer]. They are asking for a letter of medical necessity to be written to cover for a daily pouch change since he has had so many issues. Writer will complete this for now and fax to [employer]. If this is not accepted and provider needing to write, then [facility] nurse can assist by sending a message to provider.     He would like to follow up in clinic as needed as this time.    Shirley Proctor RN, CWOCN     Total Time Spent with Patient: 60 minutes

## 2025-03-25 NOTE — LETTER
"March 25, 2025    Re: Eduardo Laughlin  Insurance: [employer] Medicare Advantage      To whom it may concern,     This letter is written to document that Mr. Eduardo Laughlin has been under the medical care of [facility] Ostomy Nurses since 3/21/25 for the treatment of his urostomy with frequent leakage. Given Eduardo's abdominal and stomal anatomy he has had difficulty having a pouch last more than a day and at times the pouches have leaked several times daily. We have worked with him to develop a plan to help with this, though he may have an ongoing need for a daily pouch change. We are requesting coverage for up to 30 urostomy pouch changes a month to meet this need.     The following are the list of supplies he needs for each pouch change:    Product # Brand Description   Urostomy Pouch 8486 Dona 1 pc 1 1/4\" convex- 1 per change   Convex Barrier Ring [address] [place] 1 3/16 CeraRing- one per change   Moldable barrier ring 8805 Dona Adapt- using 1 1/3rd per change          Please take the above information into consideration when determining coverage for Mr. Laughlin's medically necessary durable medical equipment. If there are questions or concerns regarding this information, please call the number listed above to be connected with an ostomy nurse at [facility]. Thank you for your time and consideration in this extremely important matter.    Sincerely,    Shirley Proctor RN, CWOCN   [facility] Ostomy Nurse  [email or web address]    "

## 2025-05-28 ENCOUNTER — ANCILLARY PROCEDURE (OUTPATIENT)
Dept: CARDIOLOGY | Facility: CLINIC | Age: 85
End: 2025-05-28
Attending: INTERNAL MEDICINE
Payer: COMMERCIAL

## 2025-05-28 ENCOUNTER — TELEPHONE (OUTPATIENT)
Dept: CARDIOLOGY | Facility: CLINIC | Age: 85
End: 2025-05-28

## 2025-05-28 DIAGNOSIS — Z95.0 PACEMAKER: ICD-10-CM

## 2025-05-28 DIAGNOSIS — I50.32 CHRONIC HEART FAILURE WITH PRESERVED EJECTION FRACTION (H): ICD-10-CM

## 2025-05-28 DIAGNOSIS — I49.5 SICK SINUS SYNDROME (H): ICD-10-CM

## 2025-05-28 LAB
MDC_IDC_EPISODE_DTM: NORMAL
MDC_IDC_EPISODE_DURATION: 17 S
MDC_IDC_EPISODE_ID: NORMAL
MDC_IDC_EPISODE_TYPE: NORMAL
MDC_IDC_EPISODE_TYPE_INDUCED: NO
MDC_IDC_LEAD_CONNECTION_STATUS: NORMAL
MDC_IDC_LEAD_IMPLANT_DT: NORMAL
MDC_IDC_LEAD_LOCATION: NORMAL
MDC_IDC_LEAD_LOCATION_DETAIL_1: NORMAL
MDC_IDC_LEAD_MFG: NORMAL
MDC_IDC_LEAD_MODEL: NORMAL
MDC_IDC_LEAD_POLARITY_TYPE: NORMAL
MDC_IDC_LEAD_SERIAL: NORMAL
MDC_IDC_MSMT_BATTERY_DTM: NORMAL
MDC_IDC_MSMT_BATTERY_REMAINING_LONGEVITY: 102 MO
MDC_IDC_MSMT_BATTERY_REMAINING_PERCENTAGE: 100 %
MDC_IDC_MSMT_BATTERY_STATUS: NORMAL
MDC_IDC_MSMT_LEADCHNL_LV_IMPEDANCE_VALUE: 945 OHM
MDC_IDC_MSMT_LEADCHNL_LV_PACING_THRESHOLD_AMPLITUDE: 0.9 V
MDC_IDC_MSMT_LEADCHNL_LV_PACING_THRESHOLD_PULSEWIDTH: 0.8 MS
MDC_IDC_MSMT_LEADCHNL_RA_IMPEDANCE_VALUE: 698 OHM
MDC_IDC_MSMT_LEADCHNL_RA_PACING_THRESHOLD_AMPLITUDE: 1.3 V
MDC_IDC_MSMT_LEADCHNL_RA_PACING_THRESHOLD_PULSEWIDTH: 0.4 MS
MDC_IDC_MSMT_LEADCHNL_RV_IMPEDANCE_VALUE: 569 OHM
MDC_IDC_PG_IMPLANT_DTM: NORMAL
MDC_IDC_PG_MFG: NORMAL
MDC_IDC_PG_MODEL: NORMAL
MDC_IDC_PG_SERIAL: NORMAL
MDC_IDC_PG_TYPE: NORMAL
MDC_IDC_SESS_CLINIC_NAME: NORMAL
MDC_IDC_SESS_DTM: NORMAL
MDC_IDC_SESS_TYPE: NORMAL
MDC_IDC_SET_BRADY_AT_MODE_SWITCH_MODE: NORMAL
MDC_IDC_SET_BRADY_AT_MODE_SWITCH_RATE: 170 {BEATS}/MIN
MDC_IDC_SET_BRADY_LOWRATE: 60 {BEATS}/MIN
MDC_IDC_SET_BRADY_MAX_SENSOR_RATE: 130 {BEATS}/MIN
MDC_IDC_SET_BRADY_MAX_TRACKING_RATE: 130 {BEATS}/MIN
MDC_IDC_SET_BRADY_MODE: NORMAL
MDC_IDC_SET_BRADY_PAV_DELAY_HIGH: 200 MS
MDC_IDC_SET_BRADY_PAV_DELAY_LOW: 240 MS
MDC_IDC_SET_BRADY_SAV_DELAY_HIGH: 200 MS
MDC_IDC_SET_BRADY_SAV_DELAY_LOW: 240 MS
MDC_IDC_SET_CRT_LVRV_DELAY: 0 MS
MDC_IDC_SET_CRT_PACED_CHAMBERS: NORMAL
MDC_IDC_SET_LEADCHNL_LV_PACING_AMPLITUDE: 1.9 V
MDC_IDC_SET_LEADCHNL_LV_PACING_ANODE_ELECTRODE_1: NORMAL
MDC_IDC_SET_LEADCHNL_LV_PACING_ANODE_LOCATION_1: NORMAL
MDC_IDC_SET_LEADCHNL_LV_PACING_CATHODE_ELECTRODE_1: NORMAL
MDC_IDC_SET_LEADCHNL_LV_PACING_CATHODE_LOCATION_1: NORMAL
MDC_IDC_SET_LEADCHNL_LV_PACING_PULSEWIDTH: 0.8 MS
MDC_IDC_SET_LEADCHNL_LV_SENSING_ADAPTATION_MODE: NORMAL
MDC_IDC_SET_LEADCHNL_LV_SENSING_ANODE_ELECTRODE_1: NORMAL
MDC_IDC_SET_LEADCHNL_LV_SENSING_ANODE_LOCATION_1: NORMAL
MDC_IDC_SET_LEADCHNL_LV_SENSING_CATHODE_ELECTRODE_1: NORMAL
MDC_IDC_SET_LEADCHNL_LV_SENSING_CATHODE_LOCATION_1: NORMAL
MDC_IDC_SET_LEADCHNL_LV_SENSING_SENSITIVITY: 2.5 MV
MDC_IDC_SET_LEADCHNL_RA_PACING_AMPLITUDE: 3 V
MDC_IDC_SET_LEADCHNL_RA_PACING_CAPTURE_MODE: NORMAL
MDC_IDC_SET_LEADCHNL_RA_PACING_POLARITY: NORMAL
MDC_IDC_SET_LEADCHNL_RA_PACING_PULSEWIDTH: 0.4 MS
MDC_IDC_SET_LEADCHNL_RA_SENSING_ADAPTATION_MODE: NORMAL
MDC_IDC_SET_LEADCHNL_RA_SENSING_POLARITY: NORMAL
MDC_IDC_SET_LEADCHNL_RA_SENSING_SENSITIVITY: 0.75 MV
MDC_IDC_SET_LEADCHNL_RV_PACING_AMPLITUDE: 2 V
MDC_IDC_SET_LEADCHNL_RV_PACING_CAPTURE_MODE: NORMAL
MDC_IDC_SET_LEADCHNL_RV_PACING_POLARITY: NORMAL
MDC_IDC_SET_LEADCHNL_RV_PACING_PULSEWIDTH: 0.8 MS
MDC_IDC_SET_LEADCHNL_RV_SENSING_ADAPTATION_MODE: NORMAL
MDC_IDC_SET_LEADCHNL_RV_SENSING_POLARITY: NORMAL
MDC_IDC_SET_LEADCHNL_RV_SENSING_SENSITIVITY: 2.5 MV
MDC_IDC_SET_ZONE_DETECTION_INTERVAL: 375 MS
MDC_IDC_SET_ZONE_STATUS: NORMAL
MDC_IDC_SET_ZONE_TYPE: NORMAL
MDC_IDC_SET_ZONE_VENDOR_TYPE: NORMAL
MDC_IDC_STAT_AT_BURDEN_PERCENT: 0 %
MDC_IDC_STAT_AT_DTM_END: NORMAL
MDC_IDC_STAT_AT_DTM_START: NORMAL
MDC_IDC_STAT_BRADY_DTM_END: NORMAL
MDC_IDC_STAT_BRADY_DTM_START: NORMAL
MDC_IDC_STAT_BRADY_RA_PERCENT_PACED: 33 %
MDC_IDC_STAT_BRADY_RV_PERCENT_PACED: 100 %
MDC_IDC_STAT_CRT_DTM_END: NORMAL
MDC_IDC_STAT_CRT_DTM_START: NORMAL
MDC_IDC_STAT_CRT_LV_PERCENT_PACED: 99 %
MDC_IDC_STAT_EPISODE_RECENT_COUNT: 0
MDC_IDC_STAT_EPISODE_RECENT_COUNT: 1
MDC_IDC_STAT_EPISODE_RECENT_COUNT_DTM_END: NORMAL
MDC_IDC_STAT_EPISODE_RECENT_COUNT_DTM_START: NORMAL
MDC_IDC_STAT_EPISODE_TYPE: NORMAL
MDC_IDC_STAT_EPISODE_VENDOR_TYPE: NORMAL

## 2025-05-28 NOTE — TELEPHONE ENCOUNTER
----- Message from Jennie Rushing sent at 5/28/2025  7:59 AM CDT -----  Regarding: Device RN Review  Encounter Type: Routine remote pacemaker transmission   Device: BSCI Visionist CRT-P   Pacing % /Programmed: AP 33%, BiVP 100% @ DDDR 60/130 bpm   Lead(s): Stable measurements and trends.   Battery longevity: 8 years, 6 months estimated   Presenting: AP BiVP 60 bpm   Atrial high rates: Since 2/21/2025 none detected.   Anticoagulant: Warfarin   Ventricular High rates: Since 2/21/2025 1 VHR episode, EGM appears to be NSVT 26 beats, duration ~ 8 seconds, avg rate 197 bpm, EF 55-60% per echo 12/4/22.   Comments: Normal device function.   Plan: Routed to device RN for review. Next routine remote scheduled on 9/9/2025. Letter sent. Kayce, Device Specialist    May 28th, 2025  Transmission reviewed- agree with above.  EF 55-60% per echo on 12/4/2022.  NSVT episode occurred on 3/10/2025 at 07:36 AM.  It has been recommended that patient establish with a general cardiologist but he has declined thus far.  He follows with Dr Mazariegos & Lane Patino, CNP.     Phone call placed to patient to review device findings & inquire on symptoms.  Yuniel was unable to correlate any symptoms to NSVT episode.  Will route to Dr Mazariegos for awareness & per protocol since last echo/EF > 1 year ago.      Delfina Mccallum, RN

## 2025-05-29 ENCOUNTER — LAB REQUISITION (OUTPATIENT)
Dept: LAB | Facility: CLINIC | Age: 85
End: 2025-05-29
Payer: COMMERCIAL

## 2025-05-29 DIAGNOSIS — I13.0 HYPERTENSIVE HEART AND CHRONIC KIDNEY DISEASE WITH HEART FAILURE AND STAGE 1 THROUGH STAGE 4 CHRONIC KIDNEY DISEASE, OR UNSPECIFIED CHRONIC KIDNEY DISEASE (H): ICD-10-CM

## 2025-05-29 DIAGNOSIS — E78.5 HYPERLIPIDEMIA, UNSPECIFIED: ICD-10-CM

## 2025-05-29 DIAGNOSIS — E11.22 TYPE 2 DIABETES MELLITUS WITH DIABETIC CHRONIC KIDNEY DISEASE (H): ICD-10-CM

## 2025-05-29 PROCEDURE — 80061 LIPID PANEL: CPT | Mod: ORL | Performed by: FAMILY MEDICINE

## 2025-05-29 PROCEDURE — 82570 ASSAY OF URINE CREATININE: CPT | Mod: ORL | Performed by: FAMILY MEDICINE

## 2025-05-29 PROCEDURE — 80048 BASIC METABOLIC PNL TOTAL CA: CPT | Mod: ORL | Performed by: FAMILY MEDICINE

## 2025-05-30 LAB
ANION GAP SERPL CALCULATED.3IONS-SCNC: 10 MMOL/L (ref 7–15)
BUN SERPL-MCNC: 61.9 MG/DL (ref 8–23)
CALCIUM SERPL-MCNC: 9 MG/DL (ref 8.8–10.4)
CHLORIDE SERPL-SCNC: 110 MMOL/L (ref 98–107)
CHOLEST SERPL-MCNC: 102 MG/DL
CREAT SERPL-MCNC: 2.47 MG/DL (ref 0.67–1.17)
CREAT UR-MCNC: 41 MG/DL
EGFRCR SERPLBLD CKD-EPI 2021: 25 ML/MIN/1.73M2
FASTING STATUS PATIENT QL REPORTED: ABNORMAL
FASTING STATUS PATIENT QL REPORTED: NORMAL
GLUCOSE SERPL-MCNC: 173 MG/DL (ref 70–99)
HCO3 SERPL-SCNC: 16 MMOL/L (ref 22–29)
HDLC SERPL-MCNC: 42 MG/DL
LDLC SERPL CALC-MCNC: 44 MG/DL
MICROALBUMIN UR-MCNC: 92.5 MG/L
MICROALBUMIN/CREAT UR: 225.61 MG/G CR (ref 0–17)
NONHDLC SERPL-MCNC: 60 MG/DL
POTASSIUM SERPL-SCNC: 4.8 MMOL/L (ref 3.4–5.3)
SODIUM SERPL-SCNC: 136 MMOL/L (ref 135–145)
TRIGL SERPL-MCNC: 82 MG/DL

## 2025-06-03 PROCEDURE — 93294 REM INTERROG EVL PM/LDLS PM: CPT | Performed by: INTERNAL MEDICINE

## 2025-06-03 PROCEDURE — 93296 REM INTERROG EVL PM/IDS: CPT | Performed by: INTERNAL MEDICINE

## (undated) DEVICE — GLOVE SURG PI ULTRA TOUCH M SZ 7 LF 42670

## (undated) DEVICE — WIRE GUIDE HI-TRQ  WHISPER MS JTIP 0.014"X190CM 1005357HJ

## (undated) DEVICE — RX SURGIFLO HEMOSTATIC MATRIX 8ML 2991

## (undated) DEVICE — DEVICE INFLATION SYR W/ HEMOSTASIS VALVE 12IN EXT IN4904

## (undated) DEVICE — DRAPE C-ARM 60X42" 1013

## (undated) DEVICE — PACK COLD 6 X 10 1-HOUR 0814-0610

## (undated) DEVICE — DRSG ADAPTIC 3X8" 6113

## (undated) DEVICE — SUCTION MANIFOLD NEPTUNE 2 SYS 4 PORT 0702-020-000

## (undated) DEVICE — GUIDEWIRE STARTER 260CM X 0.035

## (undated) DEVICE — GLOVE UNDER INDICATOR PI SZ 7.0 LF 41670

## (undated) DEVICE — ELECTRODE ADULT PACING MULTI P-211-M1

## (undated) DEVICE — DRSG ABD TNDRSRB WET PRUF 8IN X 10IN STRL  9194A

## (undated) DEVICE — GUIDEWIRE FORTE FLOPPY J TOP 34949-05J

## (undated) DEVICE — CATH BALLOON NC EMERGE 3.50X12MM H7493926712350

## (undated) DEVICE — SOL NACL 0.9% IRRIG 1000ML BOTTLE 2F7124

## (undated) DEVICE — GLOVE SURG PI ULTRA TOUCH M SZ 8 LF

## (undated) DEVICE — WRAP LUMBAR COMPRESS COLD THERAPY 4632P

## (undated) DEVICE — CATH ANGIO INFINITI JL3.5 4FRX100CM 538418

## (undated) DEVICE — POSITIONER ARM CRADLE LAMINECTOMY DISP

## (undated) DEVICE — CUSTOM PACK CORONARY SAN5BCRHEA

## (undated) DEVICE — CATH LAUNCHER 6FR EBU 4.0 LA6EBU40

## (undated) DEVICE — CATH IV ANGIO INTRO 14GA X 1 3/4" 381467

## (undated) DEVICE — DECANTER VIAL 2006S

## (undated) DEVICE — ENDO FORCEP ENDOJAW BIOPSY 2.8MMX230CM FB-220U

## (undated) DEVICE — SLEEVE TR BAND RADIAL COMPRESSION DEVICE 29CM XX-RF06L

## (undated) DEVICE — TOOL DISSECT MIDAS MR8 14CM MATCH HEAD 3MM MR8-14MH30

## (undated) DEVICE — DRSG STERI STRIP 1/2X4" R1547

## (undated) DEVICE — DRSG GAUZE 4X4" 3033

## (undated) DEVICE — SUTURE VICRYL+ 3-0 18IN PS-2 UND VCP497H

## (undated) DEVICE — DRAPE STERI TOWEL LG 1010

## (undated) DEVICE — ESU PENCIL SMOKE EVAC W/ROCKER SWITCH 0703-047-000

## (undated) DEVICE — SHTH INTRO 0.021IN ID 6FR DIA

## (undated) DEVICE — CATH ANGIO INFINITI AL1 4FRX100CM 538445

## (undated) DEVICE — CATH ANGIO IMPULSE 6FR 100CML RIGHT 5

## (undated) DEVICE — KIT HAND CONTROL ACIST 014644 AR-P54

## (undated) DEVICE — PLATE GROUNDING ADULT W/CORD 9165L

## (undated) DEVICE — GUIDEWIRE NITINOL .018 80CM N180802

## (undated) DEVICE — SOL WATER IRRIG 1000ML BOTTLE 2F7114

## (undated) DEVICE — CUSTOM PACK LUMBAR FUSION SNE5BLFHEA

## (undated) DEVICE — SUTURE VICRYL+ 0 27IN CT-1 UND VCP260H

## (undated) DEVICE — SUTURE VICRYL+ 2-0 CT-2 27" UND VCP269H

## (undated) DEVICE — Device

## (undated) DEVICE — CATH BALLOON EMERGE 3.0X15MM H7493918915300

## (undated) DEVICE — CATH DIAG 4FR JR 5.0 538423

## (undated) DEVICE — DRSG GAUZE 4X4" 8044

## (undated) DEVICE — GLOVE BIOGEL PI INDICATOR 8.0 LF 41680

## (undated) DEVICE — MANIFOLD KIT ANGIO AUTOMATED 014613

## (undated) DEVICE — SYR ANGIOGRAPHY MULTIUSE KIT ACIST 014612

## (undated) DEVICE — DRSG ADAPTIC 3X3" 6112

## (undated) DEVICE — POSITIONER PT PRONESAFE HEAD REST W/DERMAPROX INSERT 40599

## (undated) RX ORDER — FENTANYL CITRATE 50 UG/ML
INJECTION, SOLUTION INTRAMUSCULAR; INTRAVENOUS
Status: DISPENSED
Start: 2022-09-13

## (undated) RX ORDER — PROPOFOL 10 MG/ML
INJECTION, EMULSION INTRAVENOUS
Status: DISPENSED
Start: 2022-09-13

## (undated) RX ORDER — ONDANSETRON 2 MG/ML
INJECTION INTRAMUSCULAR; INTRAVENOUS
Status: DISPENSED
Start: 2022-09-13

## (undated) RX ORDER — LIDOCAINE HYDROCHLORIDE 10 MG/ML
INJECTION, SOLUTION EPIDURAL; INFILTRATION; INTRACAUDAL; PERINEURAL
Status: DISPENSED
Start: 2023-01-06

## (undated) RX ORDER — LIDOCAINE HYDROCHLORIDE 10 MG/ML
INJECTION, SOLUTION EPIDURAL; INFILTRATION; INTRACAUDAL; PERINEURAL
Status: DISPENSED
Start: 2022-09-13

## (undated) RX ORDER — CLOPIDOGREL 300 MG/1
TABLET, FILM COATED ORAL
Status: DISPENSED
Start: 2022-11-28

## (undated) RX ORDER — FENTANYL CITRATE 50 UG/ML
INJECTION, SOLUTION INTRAMUSCULAR; INTRAVENOUS
Status: DISPENSED
Start: 2022-11-28

## (undated) RX ORDER — EPHEDRINE SULFATE 50 MG/ML
INJECTION, SOLUTION INTRAMUSCULAR; INTRAVENOUS; SUBCUTANEOUS
Status: DISPENSED
Start: 2022-09-13

## (undated) RX ORDER — FENTANYL CITRATE-0.9 % NACL/PF 10 MCG/ML
PLASTIC BAG, INJECTION (ML) INTRAVENOUS
Status: DISPENSED
Start: 2022-09-13

## (undated) RX ORDER — DEXAMETHASONE SODIUM PHOSPHATE 10 MG/ML
INJECTION, EMULSION INTRAMUSCULAR; INTRAVENOUS
Status: DISPENSED
Start: 2022-09-13

## (undated) RX ORDER — ASPIRIN 325 MG
TABLET ORAL
Status: DISPENSED
Start: 2022-11-28

## (undated) RX ORDER — PROPOFOL 10 MG/ML
INJECTION, EMULSION INTRAVENOUS
Status: DISPENSED
Start: 2023-01-06